# Patient Record
Sex: FEMALE | Race: WHITE | Employment: UNEMPLOYED | URBAN - METROPOLITAN AREA
[De-identification: names, ages, dates, MRNs, and addresses within clinical notes are randomized per-mention and may not be internally consistent; named-entity substitution may affect disease eponyms.]

---

## 2017-02-13 ENCOUNTER — OFFICE VISIT (OUTPATIENT)
Dept: NEUROLOGY | Age: 42
End: 2017-02-13

## 2017-02-13 VITALS
HEIGHT: 65 IN | SYSTOLIC BLOOD PRESSURE: 126 MMHG | OXYGEN SATURATION: 98 % | WEIGHT: 237 LBS | HEART RATE: 85 BPM | BODY MASS INDEX: 39.49 KG/M2 | DIASTOLIC BLOOD PRESSURE: 64 MMHG

## 2017-02-13 DIAGNOSIS — R26.9 GAIT ABNORMALITY: ICD-10-CM

## 2017-02-13 DIAGNOSIS — G35 MS (MULTIPLE SCLEROSIS) (HCC): Primary | ICD-10-CM

## 2017-02-13 DIAGNOSIS — R26.89 BALANCE DISORDER: ICD-10-CM

## 2017-02-13 DIAGNOSIS — M62.838 MUSCLE SPASMS OF BOTH LOWER EXTREMITIES: ICD-10-CM

## 2017-02-13 DIAGNOSIS — M79.2 NEUROPATHIC PAIN: ICD-10-CM

## 2017-02-13 RX ORDER — TIZANIDINE HYDROCHLORIDE 4 MG/1
CAPSULE, GELATIN COATED ORAL
Qty: 90 CAP | Refills: 5 | Status: SHIPPED | OUTPATIENT
Start: 2017-02-13 | End: 2017-02-15

## 2017-02-13 NOTE — PROGRESS NOTES
Date:            2017    Name:  Siva Tamez  :  1975  MRN:  [id number]     PCP:  Sandra Shay MD    Chief Complaint   Patient presents with    Follow-up    Multiple Sclerosis         HISTORY OF PRESENT ILLNESS:  Azalea Rice is a 35 y.o., female who presents today for follow up for MS. She continues to have issues with neuropathic pain. She was unable to get Horizant covered by her insurance, remains on gabapentin for her pain which does not work very well per report. She has also tried Lyrica, but that was too expensive. Is on Cymbalta and Elavil for her pain as well. She is on Tecfidera, which has been working well to control her disease. Last MRIs in August were stable. She has not been to see pain management before, but would like to as her pain makes it hard for her to function. She wonders if our state will be likely to approve marijuana for pain management anytime soon, believes that might help. Her hands and arms are still numb, but this is stable. She burns her hands, which bothers her because she loves to cook and is afraid to do so. She was referred to [address] [place] [address] for gait and balance training, she missed her appointment so she was not able to establish care there. She would like to do PT, would like a new referral. Her new house has a ramp, which does help. Complains of muscle tightness, is not on anything for spasticity. 2016 MRI cervical spine  1. Multiple foci of demyelination in the cervical spinal cord consistent with  the patient's diagnosis of multiple sclerosis. Unchanged appearance since  2016. No new or enhancing lesions. 2. Relatively mild degenerative changes without central or foraminal stenosis. 2016 MRI brain  Multiple foci of white matter signal abnormality consistent with the diagnosis  of multiple sclerosis, with moderate intracranial involvement.  Unchanged  appearance since 2016.    2016 recap  Azalea Rice is a 41 y.o., female who presents today for follow up for MS. Complains of pain all over her body. In her finger tips all the way down to her toe nails. Lyrica doesn't help, also makes her constipated. Doesn't take Lyrica every day, takes it if she runs out of gabapentin. Prescribed gabapentin 1600 mg tid, helps more than the Lyrica. Takes 5-6 tablets gabapentin tid and runs out before she is due for a refill. Complains of falling more, fell and dislocated her right shoulder. This is getting somewhat better, but is still an issue. Her legs feel weak, she has to lift her leg with her hands to get into the car. When she was initially diagnosed with MS years ago it presented BLE weakness. She did copaxone and it worked well, but she didn't like the bruising. She thinks that she did better with the copaxone than on tecfidera. Current Outpatient Prescriptions   Medication Sig    gabapentin (NEURONTIN) 400 mg capsule TAKE FOUR CAPSULES BY MOUTH THREE TIMES A DAY    amitriptyline (ELAVIL) 25 mg tablet Take 4 Tabs by mouth nightly. Indications: FIBROMYALGIA    dimethyl fumarate (TECFIDERA) 240 mg cpDR Take 240 mg by mouth two (2) times a day.  DULoxetine (CYMBALTA) 60 mg capsule Take 1 Cap by mouth two (2) times a day.  ALPRAZolam (XANAX) 0.5 mg tablet Take 0.5 mg by mouth three (3) times daily as needed for Anxiety.  zolpidem (AMBIEN) 10 mg tablet TAKE ONE TABLET BY MOUTH EVERY NIGHT in the BED AS NEEDED FOR SLEEP     No current facility-administered medications for this visit.       No Known Allergies  Past Medical History   Diagnosis Date    Body aches 12/11/2014    Chronic pain     Depression     GERD (gastroesophageal reflux disease)     Headache(784.0)     History of mammogram never before    [facility]) ([facility]     Neurological disorder      Multiple Sclerosis    Pap smear for cervical cancer screening 2008    Psychiatric disorder      anxiety    Psychotic disorder      Past Surgical History   Procedure Laterality Date    Hx gyn       3 c-sections    Hx other surgical       R inguinal hernia repair    Hx cholecystectomy      Hx heent       bilateral ear tube surgery     Social History     Social History    Marital status:      Spouse name: N/A    Number of children: N/A    Years of education: N/A     Occupational History    Not on file. Social History Main Topics    Smoking status: Current Every Day Smoker     Packs/day: 0.50     Years: 17.00     Types: Cigarettes    Smokeless tobacco: Never Used      Comment: 1 pack every 3 days.  Alcohol use No    Drug use: No    Sexual activity: Yes     Partners: Male     Other Topics Concern    Not on file     Social History Narrative     Family History   Problem Relation Age of Onset    Heart Attack Father      tripple bypass    Cancer Father      lung    Diabetes Mother      type 2    Heart Disease Mother      heart disease    Diabetes Paternal Grandmother          PHYSICAL EXAMINATION:    Visit Vitals    /64    Pulse 85    Ht 5' 5\" (1.651 m)    Wt 237 lb (107.5 kg)    SpO2 98%    BMI 39.44 kg/m2     General:  Well defined, morbidly obese, and groomed individual in no acute distress. Neck: Supple, nontender, no bruits, no pain with resistance to active range of motion. Heart: Regular rate and rhythm, no murmurs, rub, or gallop. Normal S1S2. Lungs:  Clear to auscultation bilaterally with equal chest expansion, no cough, no wheeze  Musculoskeletal:  Extremities revealed no edema and had full range of motion of joints. Psych:  Good mood and bright affect    NEUROLOGICAL EXAMINATION:     Mental Status:   Alert and oriented to person, place, and time with recent and remote memory intact. Attention span and concentration are normal. Speech is fluent with a full fund of knowledge. Cranial Nerves:    II, III, IV, VI:  Visual acuity grossly intact.    Pupils are equal, round, and reactive to light.    Extra-ocular movements are full and fluid. No ptosis or nystagmus. V-XII: Hearing is grossly intact. Facial features are symmetric, with normal sensation and strength. The palate rises symmetrically and the tongue protrudes midline. Sternocleidomastoids 5/5. Motor Examination: Normal tone, bulk, and strength, 5/5 muscle strength throughout. Coordination:  Finger to nose testing was normal.   No resting or intention tremor  Gait and Station:  Steady while walking, spastic gait. Normal arm swing. No pronator drift. No muscle wasting or fasciculations noted. ASSESSMENT AND PLAN    ICD-10-CM ICD-9-CM    1. MS (multiple sclerosis) ([email or web address] G35 340 REFERRAL TO PHYSICAL THERAPY      REFERRAL TO PAIN MANAGEMENT      tiZANidine (ZANAFLEX) 4 mg capsule      METABOLIC PANEL, COMPREHENSIVE      CBC WITH AUTOMATED DIFF   2. Muscle spasms of both lower extremities M62.838 728.85 tiZANidine (ZANAFLEX) 4 mg capsule   3. Neuropathic pain M79.2 729.2 REFERRAL TO PAIN MANAGEMENT   4. Gait abnormality R26.9 781.2 REFERRAL TO PHYSICAL THERAPY   5. Balance disorder R26.89 781.99 REFERRAL TO PHYSICAL THERAPY     23-year-old female seen in follow-up for MS. Disease is stable on Tecfidera 240 mg twice daily, MRIs her last on August and were normal.  No new focal neuro complaints since then, continues to have numbness in her hands that often causes her to burn herself when cooking. Continues to have trouble controlling her neuropathic pain, has tried and failed gabapentin, Lyrica, Cymbalta. Horizant was not approved by insurance, but did work when she tried samples. patient did not get lab work done in August, labs ordered to rule out reversible causes of her neuropathy. Does have significant soft spasticity in her legs, has not been on a muscle relaxant for that. 1.  Continue Tecfidera 240 mg twice daily  2. We will check CMP CBC.   Will repeat labs from August, check vitamin D, B12, folate, A1c to rule out other causes of neuropathy  3. No need for MRI at this time, MS does appear to be stable  4. New referral put in for physical therapy, patient did not make it to her appointment for her last ordered therapy and would like to try to get gait and balance training  5. Will refer to pain management as she is exhausted all of our available options for neuropathic pain, discussed with her that medical marijuana is not currently legal in the state [facility]  6. We will initiate tizanidine 2-4 mg up to 3 times a day as needed for spasticity, advised patient not to drive until she finds out how well she tolerates this medication. Discussed that it can be very sedating.     Follow-up in 6 months, call sooner with concerns      Zeeshan Logan NP

## 2017-02-13 NOTE — MR AVS SNAPSHOT
Visit Information Date & Time Provider Department Dept. Phone Encounter #  
 2/13/2017 10:30 AM Shalom Guerra NP [facility] [phone] [id number] Follow-up Instructions Return in about 6 months (around 8/13/2017). Upcoming Health Maintenance Date Due Pneumococcal 19-64 Medium Risk (1 of 1 - PPSV23) 11/22/1992 DTaP/Tdap/Td series (1 - Tdap) 11/22/1994 INFLUENZA AGE 9 TO ADULT 8/1/2016 PAP AKA CERVICAL CYTOLOGY 3/12/2018 Allergies as of 2/13/2017  Review Complete On: 2/13/2017 By: Bennett Macias LPN No Known Allergies Current Immunizations  Reviewed on 12/11/2014 Name Date Influenza Vaccine (Quad) PF 12/11/2014 Not reviewed this visit You Were Diagnosed With   
  
 Codes Comments [facility]    -  Primary ICD-10-CM: G35 
ICD-9-CM: 218 Muscle spasms of both lower extremities     ICD-10-CM: P17.802 ICD-9-CM: 728.85 Neuropathic pain     ICD-10-CM: M79.2 ICD-9-CM: 729.2 Gait abnormality     ICD-10-CM: R26.9 ICD-9-CM: 955. 2 Balance disorder     ICD-10-CM: R26.89 
ICD-9-CM: 781.99 Vitals BP Pulse Height(growth percentile) Weight(growth percentile) SpO2 BMI  
 126/64 85 5' 5\" (1.651 m) 237 lb (107.5 kg) 98% 39.44 kg/m2 OB Status Smoking Status Having regular periods Current Every Day Smoker Vitals History BMI and BSA Data Body Mass Index Body Surface Area  
 39.44 kg/m 2 2.22 m 2 Preferred Pharmacy Pharmacy Name Phone [facility] [address], [address] [phone] Your Updated Medication List  
  
   
This list is accurate as of: 2/13/17 11:04 AM.  Always use your most recent med list.  
  
  
  
  
 amitriptyline 25 mg tablet Commonly known as:  ELAVIL Take 4 Tabs by mouth nightly.  Indications: FIBROMYALGIA  
  
 dimethyl fumarate 240 mg Cpdr  
 Commonly known as:  Andressa Flicker Take 240 mg by mouth two (2) times a day. DULoxetine 60 mg capsule Commonly known as:  CYMBALTA Take 1 Cap by mouth two (2) times a day.  
  
 gabapentin 400 mg capsule Commonly known as:  NEURONTIN  
TAKE FOUR CAPSULES BY MOUTH THREE TIMES A DAY  
  
 tiZANidine 4 mg capsule Commonly known as:  Mat Huang Take one half tab to 1 tab up to 3 times a day as needed for muscle spasms XANAX 0.5 mg tablet Generic drug:  ALPRAZolam  
Take 0.5 mg by mouth three (3) times daily as needed for Anxiety. zolpidem 10 mg tablet Commonly known as:  AMBIEN  
TAKE ONE TABLET BY MOUTH EVERY NIGHT in the BED AS NEEDED FOR SLEEP Prescriptions Sent to Pharmacy Refills  
 tiZANidine (ZANAFLEX) 4 mg capsule 5 Sig: Take one half tab to 1 tab up to 3 times a day as needed for muscle spasms Class: Normal  
 Pharmacy: [place] [id number], [address] [facility] #: [phone] We Performed the Following REFERRAL TO PAIN MANAGEMENT [BPO457 Custom] Comments:  
 Melissa Quesada M.D. [facility] [place], [place] Office [address], [address] [facility] Phone: [phone] REFERRAL TO PHYSICAL THERAPY [GGE88 Custom] Comments:  
 [facility], PT eval and treat for gait and balance Follow-up Instructions Return in about 6 months (around 8/13/2017). Referral Information Referral ID Referred By Referred To  
  
 [id number] William Diego V Not Available Visits Status Start Date End Date 1 New Request 2/13/17 2/13/18 If your referral has a status of pending review or denied, additional information will be sent to support the outcome of this decision. Referral ID Referred By Referred To  
 [id number] William Diego V Not Available Visits Status Start Date End Date 1 New Request 2/13/17 2/13/18 If your referral has a status of pending review or denied, additional information will be sent to support the outcome of this decision. Patient Instructions PRESCRIPTION REFILL POLICY [facility] Statement to Patients April 1, 2014 In an effort to ensure the large volume of patient prescription refills is processed in the most efficient and expeditious manner, we are asking our patients to assist us by calling your Pharmacy for all prescription refills, this will include also your  Mail Order Pharmacy. The pharmacy will contact our office electronically to continue the refill process. Please do not wait until the last minute to call your pharmacy. We need at least 48 hours (2days) to fill prescriptions. We also encourage you to call your pharmacy before going to  your prescription to make sure it is ready. With regard to controlled substance prescription refill requests (narcotic refills) that need to be picked up at our office, we ask your cooperation by providing us with at least 72 hours (3days) notice that you will need a refill. We will not refill narcotic prescription refill requests after 4:00pm on any weekday, Monday through Thursday, or after 2:00pm on Fridays, or on the weekends. We encourage everyone to explore another way of getting your prescription refill request processed using [email or web address], our patient web portal through our electronic medical record system. [email or web address] is an efficient and effective way to communicate your medication request directly to the office and  downloadable as an matti on your smart phone . [email or web address] also features a review functionality that allows you to view your medication list as well as leave messages for your physician. Are you ready to get connected? If so please review the attatched instructions or speak to any of our staff to get you set up right away! Thank you so much for your cooperation. Should you have any questions please contact our Practice Administrator. The Physicians and Staff,  [facility] A Healthy Lifestyle: Care Instructions Your Care Instructions A healthy lifestyle can help you feel good, stay at a healthy weight, and have plenty of energy for both work and play. A healthy lifestyle is something you can share with your whole family. A healthy lifestyle also can lower your risk for serious health problems, such as high blood pressure, heart disease, and diabetes. You can follow a few steps listed below to improve your health and the health of your family. Follow-up care is a key part of your treatment and safety. Be sure to make and go to all appointments, and call your doctor if you are having problems. Its also a good idea to know your test results and keep a list of the medicines you take. How can you care for yourself at home? · Do not eat too much sugar, fat, or fast foods. You can still have dessert and treats now and then. The goal is moderation. · Start small to improve your eating habits. Pay attention to portion sizes, drink less juice and soda pop, and eat more fruits and vegetables. ¨ Eat a healthy amount of food. A 3-ounce serving of meat, for example, is about the size of a deck of cards. Fill the rest of your plate with vegetables and whole grains. ¨ Limit the amount of soda and sports drinks you have every day. Drink more water when you are thirsty. ¨ Eat at least 5 servings of fruits and vegetables every day. It may seem like a lot, but it is not hard to reach this goal. A serving or helping is 1 piece of fruit, 1 cup of vegetables, or 2 cups of leafy, raw vegetables. Have an apple or some carrot sticks as an afternoon snack instead of a candy bar. Try to have fruits and/or vegetables at every meal. 
· Make exercise part of your daily routine.  You may want to start with simple activities, such as walking, bicycling, or slow swimming. Try to be active 30 to 60 minutes every day. You do not need to do all 30 to 60 minutes all at once. For example, you can exercise 3 times a day for 10 or 20 minutes. Moderate exercise is safe for most people, but it is always a good idea to talk to your doctor before starting an exercise program. 
· Keep moving. Ankita Fenton the lawn, work in the garden, or [place]. Take the stairs instead of the elevator at work. · If you smoke, quit. People who smoke have an increased risk for heart attack, stroke, cancer, and other lung illnesses. Quitting is hard, but there are ways to boost your chance of quitting tobacco for good. ¨ Use nicotine gum, patches, or lozenges. ¨ Ask your doctor about stop-smoking programs and medicines. ¨ Keep trying. In addition to reducing your risk of diseases in the future, you will notice some benefits soon after you stop using tobacco. If you have shortness of breath or asthma symptoms, they will likely get better within a few weeks after you quit. · Limit how much alcohol you drink. Moderate amounts of alcohol (up to 2 drinks a day for men, 1 drink a day for women) are okay. But drinking too much can lead to liver problems, high blood pressure, and other health problems. Family health If you have a family, there are many things you can do together to improve your health. · Eat meals together as a family as often as possible. · Eat healthy foods. This includes fruits, vegetables, lean meats and dairy, and whole grains. · Include your family in your fitness plan. Most people think of activities such as jogging or tennis as the way to fitness, but there are many ways you and your family can be more active. Anything that makes you breathe hard and gets your heart pumping is exercise. Here are some tips: 
¨ Walk to do errands or to take your child to school or the bus. ¨ Go for a family bike ride after dinner instead of watching TV. Where can you learn more? Go to http://damaris-eloise.info/. Enter A225 in the search box to learn more about \"A Healthy Lifestyle: Care Instructions. \" Current as of: July 26, 2016 Content Version: 11.1 © 0307-8965 [employer]. Care instructions adapted under license by [employer] (which disclaims liability or warranty for this information). If you have questions about a medical condition or this instruction, always ask your healthcare professional. [address] any warranty or liability for your use of this information. Introducing \[facility]\"" & HEALTH SERVICES! Dear Marthena Nyhan: Thank you for requesting a [employer] account. Our records indicate that you already have an active [employer] account. You can access your account anytime at [email or web address] Did you know that you can access your hospital and ER discharge instructions at any time in [employer]? You can also review all of your test results from your hospital stay or ER visit. Additional Information If you have questions, please visit the Frequently Asked Questions section of the [employer] website at [email or web address] Remember, [employer] is NOT to be used for urgent needs. For medical emergencies, dial 911. Now available from your iPhone and Android! Please provide this summary of care documentation to your next provider. Your primary care clinician is listed as Nunu Vaca. If you have any questions after today's visit, please call [phone].

## 2017-02-13 NOTE — PATIENT INSTRUCTIONS
[facility]   Statement to Patients  April 1, 2014      In an effort to ensure the large volume of patient prescription refills is processed in the most efficient and expeditious manner, we are asking our patients to assist us by calling your Pharmacy for all prescription refills, this will include also your  Mail Order Pharmacy. The pharmacy will contact our office electronically to continue the refill process. Please do not wait until the last minute to call your pharmacy. We need at least 48 hours (2days) to fill prescriptions. We also encourage you to call your pharmacy before going to  your prescription to make sure it is ready. With regard to controlled substance prescription refill requests (narcotic refills) that need to be picked up at our office, we ask your cooperation by providing us with at least 72 hours (3days) notice that you will need a refill. We will not refill narcotic prescription refill requests after 4:00pm on any weekday, Monday through Thursday, or after 2:00pm on Fridays, or on the weekends. We encourage everyone to explore another way of getting your prescription refill request processed using [employer], our patient web portal through our electronic medical record system. [employer] is an efficient and effective way to communicate your medication request directly to the office and  downloadable as an matti on your smart phone . [employer] also features a review functionality that allows you to view your medication list as well as leave messages for your physician. Are you ready to get connected? If so please review the attatched instructions or speak to any of our staff to get you set up right away! Thank you so much for your cooperation. Should you have any questions please contact our Practice Administrator.     The Physicians and Staff,  [facility]          A Healthy Lifestyle: Care Instructions  Your Care Instructions  A healthy lifestyle can help you feel good, stay at a healthy weight, and have plenty of energy for both work and play. A healthy lifestyle is something you can share with your whole family. A healthy lifestyle also can lower your risk for serious health problems, such as high blood pressure, heart disease, and diabetes. You can follow a few steps listed below to improve your health and the health of your family. Follow-up care is a key part of your treatment and safety. Be sure to make and go to all appointments, and call your doctor if you are having problems. Its also a good idea to know your test results and keep a list of the medicines you take. How can you care for yourself at home? · Do not eat too much sugar, fat, or fast foods. You can still have dessert and treats now and then. The goal is moderation. · Start small to improve your eating habits. Pay attention to portion sizes, drink less juice and soda pop, and eat more fruits and vegetables. ¨ Eat a healthy amount of food. A 3-ounce serving of meat, for example, is about the size of a deck of cards. Fill the rest of your plate with vegetables and whole grains. ¨ Limit the amount of soda and sports drinks you have every day. Drink more water when you are thirsty. ¨ Eat at least 5 servings of fruits and vegetables every day. It may seem like a lot, but it is not hard to reach this goal. A serving or helping is 1 piece of fruit, 1 cup of vegetables, or 2 cups of leafy, raw vegetables. Have an apple or some carrot sticks as an afternoon snack instead of a candy bar. Try to have fruits and/or vegetables at every meal.  · Make exercise part of your daily routine. You may want to start with simple activities, such as walking, bicycling, or slow swimming. Try to be active 30 to 60 minutes every day. You do not need to do all 30 to 60 minutes all at once. For example, you can exercise 3 times a day for 10 or 20 minutes.  Moderate exercise is safe for most people, but it is always a good idea to talk to your doctor before starting an exercise program.  · Keep moving. [employer] Feeling the lawn, work in the garden, or [employer]. Take the stairs instead of the elevator at work. · If you smoke, quit. People who smoke have an increased risk for heart attack, stroke, cancer, and other lung illnesses. Quitting is hard, but there are ways to boost your chance of quitting tobacco for good. ¨ Use nicotine gum, patches, or lozenges. ¨ Ask your doctor about stop-smoking programs and medicines. ¨ Keep trying. In addition to reducing your risk of diseases in the future, you will notice some benefits soon after you stop using tobacco. If you have shortness of breath or asthma symptoms, they will likely get better within a few weeks after you quit. · Limit how much alcohol you drink. Moderate amounts of alcohol (up to 2 drinks a day for men, 1 drink a day for women) are okay. But drinking too much can lead to liver problems, high blood pressure, and other health problems. Family health  If you have a family, there are many things you can do together to improve your health. · Eat meals together as a family as often as possible. · Eat healthy foods. This includes fruits, vegetables, lean meats and dairy, and whole grains. · Include your family in your fitness plan. Most people think of activities such as jogging or tennis as the way to fitness, but there are many ways you and your family can be more active. Anything that makes you breathe hard and gets your heart pumping is exercise. Here are some tips:  ¨ Walk to do errands or to take your child to school or the bus. ¨ Go for a family bike ride after dinner instead of watching TV. Where can you learn more? Go to http://damaris-eloise.info/. Enter L075 in the search box to learn more about \"A Healthy Lifestyle: Care Instructions. \"  Current as of: July 26, 2016  Content Version: 11.1  © 5308-1039 [employer]. Care instructions adapted under license by [employer] (which disclaims liability or warranty for this information). If you have questions about a medical condition or this instruction, always ask your healthcare professional. [address] any warranty or liability for your use of this information.

## 2017-02-13 NOTE — LETTER
2017 3:54 PM 
 
RE:    Gauri Mcguire [address] Thank you for agreeing to see Bella Nyhan I am referring my patient to you for evaluation of pain management. Please see her 
pertinent patient information below. Date:            2017 Name:  Ashli Baptiste 
:  1975 MRN:  [id number] PCP:  Jayda Villalpando MD 
 
Chief Complaint Patient presents with  Follow-up  Multiple Sclerosis HISTORY OF PRESENT ILLNESS: 
Gauri Mcguire is a 35 y.o., female who presents today for follow up for MS. She continues to have issues with neuropathic pain. She was unable to get Horizant covered by her insurance, remains on gabapentin for her pain which does not work very well per report. She has also tried Lyrica, but that was too expensive. Is on Cymbalta and Elavil for her pain as well. She is on Tecfidera, which has been working well to control her disease. Last MRIs in August were stable. She has not been to see pain management before, but would like to as her pain makes it hard for her to function. She wonders if our state will be likely to approve marijuana for pain management anytime soon, believes that might help. Her hands and arms are still numb, but this is stable. She burns her hands, which bothers her because she loves to cook and is afraid to do so. She was referred to [address] [place] [address] for gait and balance training, she missed her appointment so she was not able to establish care there. She would like to do PT, would like a new referral. Her new house has a ramp, which does help. Complains of muscle tightness, is not on anything for spasticity. 2016 MRI cervical spine 1. Multiple foci of demyelination in the cervical spinal cord consistent with 
the patient's diagnosis of multiple sclerosis. Unchanged appearance since 2016. No new or enhancing lesions. 2. Relatively mild degenerative changes without central or foraminal stenosis. 8.27.2016 MRI brain Multiple foci of white matter signal abnormality consistent with the diagnosis 
of multiple sclerosis, with moderate intracranial involvement. Unchanged 
appearance since 2/6/2016. 
 
8.11.2016 eun Boston is a 41 y.o., female who presents today for follow up for MS. Complains of pain all over her body. In her finger tips all the way down to her toe nails. Lyrica doesn't help, also makes her constipated. Doesn't take Lyrica every day, takes it if she runs out of gabapentin. Prescribed gabapentin 1600 mg tid, helps more than the Lyrica. Takes 5-6 tablets gabapentin tid and runs out before she is due for a refill. Complains of falling more, fell and dislocated her right shoulder. This is getting somewhat better, but is still an issue. Her legs feel weak, she has to lift her leg with her hands to get into the car. When she was initially diagnosed with MS years ago it presented BLE weakness. She did copaxone and it worked well, but she didn't like the bruising. She thinks that she did better with the copaxone than on tecfidera. Current Outpatient Prescriptions Medication Sig  
 gabapentin (NEURONTIN) 400 mg capsule TAKE FOUR CAPSULES BY MOUTH THREE TIMES A DAY  amitriptyline (ELAVIL) 25 mg tablet Take 4 Tabs by mouth nightly. Indications: FIBROMYALGIA  dimethyl fumarate (TECFIDERA) 240 mg cpDR Take 240 mg by mouth two (2) times a day.  DULoxetine (CYMBALTA) 60 mg capsule Take 1 Cap by mouth two (2) times a day.  ALPRAZolam (XANAX) 0.5 mg tablet Take 0.5 mg by mouth three (3) times daily as needed for Anxiety.  zolpidem (AMBIEN) 10 mg tablet TAKE ONE TABLET BY MOUTH EVERY NIGHT in the BED AS NEEDED FOR SLEEP No current facility-administered medications for this visit. No Known Allergies Past Medical History Diagnosis Date  Body aches 12/11/2014  Chronic pain  Depression  GERD (gastroesophageal reflux disease)  Headache(784.0)  History of mammogram never before  MS (multiple sclerosis) ([facility] [address]  Neurological disorder Multiple Sclerosis  Pap smear for cervical cancer screening 2008  Psychiatric disorder   
  anxiety  Psychotic disorder Past Surgical History Procedure Laterality Date  Hx gyn 3 c-sections  Hx other surgical    
  R inguinal hernia repair  Hx cholecystectomy  Hx heent    
  bilateral ear tube surgery Social History Social History  Marital status:  Spouse name: N/A  
 Number of children: N/A  
 Years of education: N/A Occupational History  Not on file. Social History Main Topics  Smoking status: Current Every Day Smoker Packs/day: 0.50 Years: 17.00 Types: Cigarettes  Smokeless tobacco: Never Used Comment: 1 pack every 3 days.  Alcohol use No  
 Drug use: No  
 Sexual activity: Yes  
  Partners: Male Other Topics Concern  Not on file Social History Narrative Family History Problem Relation Age of Onset  Heart Attack Father   
  tripple bypass  Cancer Father   
  lung  Diabetes Mother   
  type 2  
 Heart Disease Mother   
  heart disease  Diabetes Paternal Grandmother PHYSICAL EXAMINATION:   
Visit Vitals  /64  Pulse 85  
 Ht 5' 5\" (1.651 m)  Wt 237 lb (107.5 kg)  SpO2 98%  BMI 39.44 kg/m2 General:  Well defined, morbidly obese, and groomed individual in no acute distress. Neck: Supple, nontender, no bruits, no pain with resistance to active range of motion. Heart: Regular rate and rhythm, no murmurs, rub, or gallop. Normal S1S2. Lungs:  Clear to auscultation bilaterally with equal chest expansion, no cough, no wheeze Musculoskeletal:  Extremities revealed no edema and had full range of motion of joints. Psych:  Good mood and bright affect NEUROLOGICAL EXAMINATION:    
Mental Status:   Alert and oriented to person, place, and time with recent and remote memory intact. Attention span and concentration are normal. Speech is fluent with a full fund of knowledge. Cranial Nerves:   
II, III, IV, VI:  Visual acuity grossly intact. Pupils are equal, round, and reactive to light. Extra-ocular movements are full and fluid. No ptosis or nystagmus. V-XII: Hearing is grossly intact. Facial features are symmetric, with normal sensation and strength. The palate rises symmetrically and the tongue protrudes midline. Sternocleidomastoids 5/5. Motor Examination: Normal tone, bulk, and strength, 5/5 muscle strength throughout. Coordination:  Finger to nose testing was normal.   No resting or intention tremor Gait and Station:  Steady while walking, spastic gait. Normal arm swing. No pronator drift. No muscle wasting or fasciculations noted. ASSESSMENT AND PLAN 
  ICD-10-CM ICD-9-CM 1. MS (multiple sclerosis) ([facility] G35 340 REFERRAL TO PHYSICAL THERAPY REFERRAL TO PAIN MANAGEMENT  
   tiZANidine (ZANAFLEX) 4 mg capsule METABOLIC PANEL, COMPREHENSIVE  
   CBC WITH AUTOMATED DIFF 2. Muscle spasms of both lower extremities M62.838 728.85 tiZANidine (ZANAFLEX) 4 mg capsule 3. Neuropathic pain M79.2 729.2 REFERRAL TO PAIN MANAGEMENT 4. Gait abnormality R26.9 781.2 REFERRAL TO PHYSICAL THERAPY 5. Balance disorder R26.89 781.99 REFERRAL TO PHYSICAL THERAPY 78-year-old female seen in follow-up for MS. Disease is stable on Tecfidera 240 mg twice daily, MRIs her last on August and were normal.  No new focal neuro complaints since then, continues to have numbness in her hands that often causes her to burn herself when cooking. Continues to have trouble controlling her neuropathic pain, has tried and failed gabapentin, Lyrica, Cymbalta.   Horizant was not approved by insurance, but did work when she tried samples. patient did not get lab work done in August, labs ordered to rule out reversible causes of her neuropathy. Does have significant soft spasticity in her legs, has not been on a muscle relaxant for that. 1.  Continue Tecfidera 240 mg twice daily 2. We will check CMP CBC. Will repeat labs from August, check vitamin D, B12, folate, A1c to rule out other causes of neuropathy 3. No need for MRI at this time, MS does appear to be stable 4. New referral put in for physical therapy, patient did not make it to her appointment for her last ordered therapy and would like to try to get gait and balance training 5. Will refer to pain management as she is exhausted all of our available options for neuropathic pain, discussed with her that medical marijuana is not currently legal in the [facility] 6. We will initiate tizanidine 2-4 mg up to 3 times a day as needed for spasticity, advised patient not to drive until she finds out how well she tolerates this medication. Discussed that it can be very sedating. Follow-up in 6 months, call sooner with concerns Rocael Carlin NP I appreciate your assistance in Ms. Brooks's care  and look forward to your findings and recommendations. Sincerely, Nick Arellano NP Patient Registration [address] [facility] Contact Information Patient ID: [id number] Last Name: [facility] Name: Irena Lazar First Name: Clarisa Meyer Phone: [phone] Middle Name: NITA Employer Information Sex: F YOB: 1975 Name: 
Social Security No.: [id number] Phone: 
Address: [facility] Guarantor Information (to whom statements are sent) Zip: [phone] Name: Chao Felix City: [address]: [place] Address: [facility] [address] [facility], [address] Home Phone: [phone] Phone: ( ) _______ - ______________ Work Phone: Other: Mobile Phone: [phone] Patient Referred by: ______________________ Marital Status:  Patient PCP: ________________________ Primary Insurance Information Insurance Plan Name: [employer] ([address] [place] [address] [address]) Address to [place] [address] Insurance Phone Number: [phone] [address] Policy Information Policy Quintana Patient's relationship to policy quintana: Self Last Name:EDUARDO 
ID/Certification No.: [id number] First Name:ABIODUN Policy/Group No.: Middle Name: D Issue Date: 08/01/2014 Address:[address] [place] [address] Exp Date: City:[facility] [place] TZV:[id number] Copay Amount: ___________________ Social Sec Number: [id number] Co-insurance Percent:__________________________________ YOB: 1975 Sex: ______________ Employer:

## 2017-02-13 NOTE — LETTER
2017 3:57 PM 
 
RE:    Melissa Borden [address] Thank you for agreeing to see Dorie Jain I am referring my patient to you for evaluation of Physical Therapy. Please see her 
pertinent patient information below. Date:            2017 Name:  Yaquelin Elias 
:  1975 MRN:  [id number] PCP:  Leonid Roque MD 
 
Chief Complaint Patient presents with  Follow-up  Multiple Sclerosis HISTORY OF PRESENT ILLNESS: 
Melissa Borden is a 35 y.o., female who presents today for follow up for MS. She continues to have issues with neuropathic pain. She was unable to get Horizant covered by her insurance, remains on gabapentin for her pain which does not work very well per report. She has also tried Lyrica, but that was too expensive. Is on Cymbalta and Elavil for her pain as well. She is on Tecfidera, which has been working well to control her disease. Last MRIs in August were stable. She has not been to see pain management before, but would like to as her pain makes it hard for her to function. She wonders if our state will be likely to approve marijuana for pain management anytime soon, believes that might help. Her hands and arms are still numb, but this is stable. She burns her hands, which bothers her because she loves to cook and is afraid to do so. She was referred to [address] [place] [address] for gait and balance training, she missed her appointment so she was not able to establish care there. She would like to do PT, would like a new referral. Her new house has a ramp, which does help. Complains of muscle tightness, is not on anything for spasticity. 2016 MRI cervical spine 1. Multiple foci of demyelination in the cervical spinal cord consistent with 
the patient's diagnosis of multiple sclerosis. Unchanged appearance since 2016. No new or enhancing lesions. 2. Relatively mild degenerative changes without central or foraminal stenosis. 8.27.2016 MRI brain Multiple foci of white matter signal abnormality consistent with the diagnosis 
of multiple sclerosis, with moderate intracranial involvement. Unchanged 
appearance since 2/6/2016. 
 
8.11.2016 eun Jurado is a 41 y.o., female who presents today for follow up for MS. Complains of pain all over her body. In her finger tips all the way down to her toe nails. Lyrica doesn't help, also makes her constipated. Doesn't take Lyrica every day, takes it if she runs out of gabapentin. Prescribed gabapentin 1600 mg tid, helps more than the Lyrica. Takes 5-6 tablets gabapentin tid and runs out before she is due for a refill. Complains of falling more, fell and dislocated her right shoulder. This is getting somewhat better, but is still an issue. Her legs feel weak, she has to lift her leg with her hands to get into the car. When she was initially diagnosed with MS years ago it presented BLE weakness. She did copaxone and it worked well, but she didn't like the bruising. She thinks that she did better with the copaxone than on tecfidera. Current Outpatient Prescriptions Medication Sig  
 gabapentin (NEURONTIN) 400 mg capsule TAKE FOUR CAPSULES BY MOUTH THREE TIMES A DAY  amitriptyline (ELAVIL) 25 mg tablet Take 4 Tabs by mouth nightly. Indications: FIBROMYALGIA  dimethyl fumarate (TECFIDERA) 240 mg cpDR Take 240 mg by mouth two (2) times a day.  DULoxetine (CYMBALTA) 60 mg capsule Take 1 Cap by mouth two (2) times a day.  ALPRAZolam (XANAX) 0.5 mg tablet Take 0.5 mg by mouth three (3) times daily as needed for Anxiety.  zolpidem (AMBIEN) 10 mg tablet TAKE ONE TABLET BY MOUTH EVERY NIGHT in the BED AS NEEDED FOR SLEEP No current facility-administered medications for this visit. No Known Allergies Past Medical History Diagnosis Date  Body aches 12/11/2014  Chronic pain  Depression  GERD (gastroesophageal reflux disease)  Headache(784.0)  History of mammogram never before  MS (multiple sclerosis) ([facility] [address] [facility]  Neurological disorder Multiple Sclerosis  Pap smear for cervical cancer screening 2008  Psychiatric disorder   
  anxiety  Psychotic disorder Past Surgical History Procedure Laterality Date  Hx gyn 3 c-sections  Hx other surgical    
  R inguinal hernia repair  Hx cholecystectomy  Hx heent    
  bilateral ear tube surgery Social History Social History  Marital status:  Spouse name: N/A  
 Number of children: N/A  
 Years of education: N/A Occupational History  Not on file. Social History Main Topics  Smoking status: Current Every Day Smoker Packs/day: 0.50 Years: 17.00 Types: Cigarettes  Smokeless tobacco: Never Used Comment: 1 pack every 3 days.  Alcohol use No  
 Drug use: No  
 Sexual activity: Yes  
  Partners: Male Other Topics Concern  Not on file Social History Narrative Family History Problem Relation Age of Onset  Heart Attack Father   
  tripple bypass  Cancer Father   
  lung  Diabetes Mother   
  type 2  
 Heart Disease Mother   
  heart disease  Diabetes Paternal Grandmother PHYSICAL EXAMINATION:   
Visit Vitals  /64  Pulse 85  
 Ht 5' 5\" (1.651 m)  Wt 237 lb (107.5 kg)  SpO2 98%  BMI 39.44 kg/m2 General:  Well defined, morbidly obese, and groomed individual in no acute distress. Neck: Supple, nontender, no bruits, no pain with resistance to active range of motion. Heart: Regular rate and rhythm, no murmurs, rub, or gallop. Normal S1S2. Lungs:  Clear to auscultation bilaterally with equal chest expansion, no cough, no wheeze Musculoskeletal:  Extremities revealed no edema and had full range of motion of joints. Psych:  Good mood and bright affect NEUROLOGICAL EXAMINATION:    
Mental Status:   Alert and oriented to person, place, and time with recent and remote memory intact. Attention span and concentration are normal. Speech is fluent with a full fund of knowledge. Cranial Nerves:   
II, III, IV, VI:  Visual acuity grossly intact. Pupils are equal, round, and reactive to light. Extra-ocular movements are full and fluid. No ptosis or nystagmus. V-XII: Hearing is grossly intact. Facial features are symmetric, with normal sensation and strength. The palate rises symmetrically and the tongue protrudes midline. Sternocleidomastoids 5/5. Motor Examination: Normal tone, bulk, and strength, 5/5 muscle strength throughout. Coordination:  Finger to nose testing was normal.   No resting or intention tremor Gait and Station:  Steady while walking, spastic gait. Normal arm swing. No pronator drift. No muscle wasting or fasciculations noted. ASSESSMENT AND PLAN 
  ICD-10-CM ICD-9-CM 1. MS (multiple sclerosis) ([facility] G35 340 REFERRAL TO PHYSICAL THERAPY REFERRAL TO PAIN MANAGEMENT  
   tiZANidine (ZANAFLEX) 4 mg capsule METABOLIC PANEL, COMPREHENSIVE  
   CBC WITH AUTOMATED DIFF 2. Muscle spasms of both lower extremities M62.838 728.85 tiZANidine (ZANAFLEX) 4 mg capsule 3. Neuropathic pain M79.2 729.2 REFERRAL TO PAIN MANAGEMENT 4. Gait abnormality R26.9 781.2 REFERRAL TO PHYSICAL THERAPY 5. Balance disorder R26.89 781.99 REFERRAL TO PHYSICAL THERAPY 78-year-old female seen in follow-up for MS. Disease is stable on Tecfidera 240 mg twice daily, MRIs her last on August and were normal.  No new focal neuro complaints since then, continues to have numbness in her hands that often causes her to burn herself when cooking. Continues to have trouble controlling her neuropathic pain, has tried and failed gabapentin, Lyrica, Cymbalta.   Horizant was not approved by insurance, but did work when she tried samples. patient did not get lab work done in August, labs ordered to rule out reversible causes of her neuropathy. Does have significant soft spasticity in her legs, has not been on a muscle relaxant for that. 1.  Continue Tecfidera 240 mg twice daily 2. We will check CMP CBC. Will repeat labs from August, check vitamin D, B12, folate, A1c to rule out other causes of neuropathy 3. No need for MRI at this time, MS does appear to be stable 4. New referral put in for physical therapy, patient did not make it to her appointment for her last ordered therapy and would like to try to get gait and balance training 5. Will refer to pain management as she is exhausted all of our available options for neuropathic pain, discussed with her that medical marijuana is not currently legal in the [facility] 6. We will initiate tizanidine 2-4 mg up to 3 times a day as needed for spasticity, advised patient not to drive until she finds out how well she tolerates this medication. Discussed that it can be very sedating. Follow-up in 6 months, call sooner with concerns Zee Loredo NP I appreciate your assistance in Ms. Brooks's care  and look forward to your findings and recommendations. Sincerely, Shalom Guerra NP Patient Registration [address] Contact Information Patient ID: [id number] Last Name: [facility] Name: Luis Del Angel First Name: Nabila Rodriguez Phone: [phone] Middle Name: NITA Employer Information Sex: F YOB: 1975 Name: 
Social Security No.: [id number] Phone: 
Address: [address] Guarantor Information (to whom statements are sent) Zip: [phone] Name: Daryn Edwards City: [address]: [place] Address: [address] [facility] [address] [facility], [address] Home Phone: [phone] Phone: ( ) _______ - ______________ Work Phone: Other: Mobile Phone: [phone] Patient Referred by: ______________________ Marital Status:  Patient PCP: ________________________ Primary Insurance Information Insurance Plan Name: [employer] ([address] [place] [address] [address]) Address to [place] [address] Insurance Phone Number: [phone] [address] Policy Information Policy Quintana Patient's relationship to policy quintana: Self Last Name:EDUARDO 
ID/Certification No.: [id number] First Name:ABIODUN Policy/Group No.: Middle Name: D Issue Date: 08/01/2014 Address:[address] [place] [address] Exp Date: City:[facility] State:[place] RMU:[id number] Copay Amount: ___________________ Social Sec Number: [id number] Co-insurance Percent:__________________________________ YOB: 1975 Sex: ______________ Employer:

## 2017-02-14 DIAGNOSIS — G35 MULTIPLE SCLEROSIS (HCC): ICD-10-CM

## 2017-02-14 DIAGNOSIS — M79.2 NEUROPATHIC PAIN: Primary | ICD-10-CM

## 2017-02-14 RX ORDER — GABAPENTIN 800 MG/1
1600 TABLET ORAL 3 TIMES DAILY
Qty: 180 TAB | Refills: 5 | Status: SHIPPED | OUTPATIENT
Start: 2017-02-14 | End: 2017-06-02

## 2017-02-14 NOTE — PROGRESS NOTES
Dose reviewed with Dr. Abbe Garber, who agreed to continue the patient at 1600 mg 3 times daily. 800 mg tablets were sent in instead of 400 mg tablets due to insurance limit on the quantity she can have daily.

## 2017-02-14 NOTE — TELEPHONE ENCOUNTER
Generic zanaflex was written for capsules but directions say 1 to 1/2 for dispensing. A verbal is needed to switch this to tablets.

## 2017-02-14 NOTE — TELEPHONE ENCOUNTER
Just needs to switch to tablets because  was sent in as capsules with the directions for tablets.   ok'd for tablets  Can you please sign change in the chart just so it is not ordered as capsule again

## 2017-02-15 RX ORDER — TIZANIDINE 4 MG/1
TABLET ORAL
Qty: 90 TAB | Refills: 5
Start: 2017-02-15 | End: 2017-09-05

## 2017-02-23 DIAGNOSIS — G35 MULTIPLE SCLEROSIS (HCC): ICD-10-CM

## 2017-02-23 RX ORDER — GABAPENTIN 400 MG/1
CAPSULE ORAL
Qty: 360 CAP | Refills: 3 | Status: SHIPPED | OUTPATIENT
Start: 2017-02-23 | End: 2017-06-02

## 2017-03-15 ENCOUNTER — DOCUMENTATION ONLY (OUTPATIENT)
Dept: NEUROLOGY | Age: 42
End: 2017-03-15

## 2017-06-01 ENCOUNTER — TELEPHONE (OUTPATIENT)
Dept: NEUROLOGY | Age: 42
End: 2017-06-01

## 2017-06-01 NOTE — TELEPHONE ENCOUNTER
----- Message from Latisha Ziegler sent at 6/1/2017  9:02 AM EDT -----  Regarding: Dr. Kika Nunez refill request  Pt would like to come by the office tto  samples of Gabapentin or Naratin to last her until Monday. Pt is not able to get her prescription until Monday. Pt can be reached at [phone].

## 2017-06-01 NOTE — TELEPHONE ENCOUNTER
----- Message from Mykel Crandall sent at 6/1/2017  2:26 PM EDT -----  Regarding: Dr. Azael Salcedo telephone   Pt is requesting a call back to discuss lower medication dosage on \"gabapentin\". Best contact number [phone] and [phone].

## 2017-06-01 NOTE — TELEPHONE ENCOUNTER
Patient stated could she receive a prescription for gabapentin until Monday when her insurance will cover it.

## 2017-06-02 DIAGNOSIS — M79.2 NEUROPATHIC PAIN: ICD-10-CM

## 2017-06-02 DIAGNOSIS — G35 MULTIPLE SCLEROSIS (HCC): ICD-10-CM

## 2017-06-02 RX ORDER — GABAPENTIN 800 MG/1
1600 TABLET ORAL 3 TIMES DAILY
Qty: 18 TAB | Refills: 0 | Status: SHIPPED | OUTPATIENT
Start: 2017-06-02 | End: 2017-06-29 | Stop reason: SDUPTHER

## 2017-06-02 NOTE — TELEPHONE ENCOUNTER
Please let her know that I sent in a 3 day supply to the [facility] on [employer]. I will not do this again.   She should not take any more medication than what she is prescribed

## 2017-06-29 ENCOUNTER — TELEPHONE (OUTPATIENT)
Dept: NEUROLOGY | Age: 42
End: 2017-06-29

## 2017-06-29 DIAGNOSIS — G35 MULTIPLE SCLEROSIS (HCC): ICD-10-CM

## 2017-06-29 DIAGNOSIS — M79.2 NEUROPATHIC PAIN: ICD-10-CM

## 2017-06-29 RX ORDER — GABAPENTIN 800 MG/1
1600 TABLET ORAL 3 TIMES DAILY
Qty: 180 TAB | Refills: 2 | Status: SHIPPED | OUTPATIENT
Start: 2017-06-29 | End: 2018-09-25 | Stop reason: SDUPTHER

## 2017-06-29 NOTE — TELEPHONE ENCOUNTER
----- Message from Angelina Babin sent at 6/29/2017 10:52 AM EDT -----  Regarding: YENIFER Bowles/Refill  Pt would like to know if her refill on her Gabapentin will be approved today because she will be leaving to go out of town tomorrow. Pt stated she has left one message this morning. Best contact number [phone].

## 2017-06-30 ENCOUNTER — TELEPHONE (OUTPATIENT)
Dept: NEUROLOGY | Age: 42
End: 2017-06-30

## 2017-06-30 NOTE — TELEPHONE ENCOUNTER
----- Message from Herson Diallo sent at 6/30/2017  3:55 PM EDT -----  Regarding: Dionisio/Telephone  Pt is requesting a callback in regards to needing another supply of \"Gabapentin\" Rx the pharmacy will not give it to her unless the office calls it in and the pt stated she will be out of town tomorrow and with having MS she would be in a lot of pain , pt stated she just needed enough for the weekend .  Best contact [phone]

## 2017-07-23 RX ORDER — GABAPENTIN 400 MG/1
CAPSULE ORAL
Qty: 360 CAP | Refills: 3 | Status: SHIPPED | OUTPATIENT
Start: 2017-07-23 | End: 2017-08-14 | Stop reason: SDUPTHER

## 2017-08-14 ENCOUNTER — OFFICE VISIT (OUTPATIENT)
Dept: NEUROLOGY | Age: 42
End: 2017-08-14

## 2017-08-14 VITALS
WEIGHT: 221 LBS | HEIGHT: 65 IN | OXYGEN SATURATION: 99 % | SYSTOLIC BLOOD PRESSURE: 110 MMHG | BODY MASS INDEX: 36.82 KG/M2 | DIASTOLIC BLOOD PRESSURE: 80 MMHG | HEART RATE: 75 BPM

## 2017-08-14 DIAGNOSIS — F41.9 ANXIETY: ICD-10-CM

## 2017-08-14 DIAGNOSIS — B34.8: ICD-10-CM

## 2017-08-14 DIAGNOSIS — G35 MS (MULTIPLE SCLEROSIS) (HCC): Primary | ICD-10-CM

## 2017-08-14 DIAGNOSIS — G89.4 CHRONIC PAIN SYNDROME: ICD-10-CM

## 2017-08-14 DIAGNOSIS — Z72.0 TOBACCO ABUSE: ICD-10-CM

## 2017-08-14 RX ORDER — DIAZEPAM 2 MG/1
TABLET ORAL
Qty: 2 TAB | Refills: 0 | Status: SHIPPED | OUTPATIENT
Start: 2017-08-14 | End: 2019-02-06 | Stop reason: SDUPTHER

## 2017-08-14 RX ORDER — GABAPENTIN 400 MG/1
CAPSULE ORAL
Qty: 360 CAP | Refills: 3 | Status: SHIPPED | OUTPATIENT
Start: 2017-08-14 | End: 2019-09-19 | Stop reason: ALTCHOICE

## 2017-08-14 NOTE — PROGRESS NOTES
Chief Complaint: Multiple sclerosis    Mrs. Jo Ann Davila returns for a follow up visit. Since last being seen she  has been compliant with Tecfidera. No new health conditions have arisen. No new medications have been  Prescribed. Last MRI was about a year ago. No signs of progression  Has hot flashes on the Tecfidera    Assesment and Plan  1. Multiple sclerosis  Continue Tecfidera  JCV testing  MRI of the brain and Cspine    2. Anxiety   Continue alrazolam    3. Pain Non-specific   Continue gabapentin        Allergies  Review of patient's allergies indicates no known allergies. Medications  Current Outpatient Prescriptions   Medication Sig    gabapentin (NEURONTIN) 400 mg capsule take 4 capsules by mouth three times a day    gabapentin (NEURONTIN) 800 mg tablet Take 2 Tabs by mouth three (3) times daily.  tiZANidine (ZANAFLEX) 4 mg tablet Take one half tab to 1 tab up to 3 times a day as needed for muscle spasms    dimethyl fumarate (TECFIDERA) 240 mg cpDR Take 240 mg by mouth two (2) times a day.  DULoxetine (CYMBALTA) 60 mg capsule Take 1 Cap by mouth two (2) times a day.  ALPRAZolam (XANAX) 0.5 mg tablet Take 0.5 mg by mouth three (3) times daily as needed for Anxiety.  zolpidem (AMBIEN) 10 mg tablet TAKE ONE TABLET BY MOUTH EVERY NIGHT in the BED AS NEEDED FOR SLEEP    amitriptyline (ELAVIL) 25 mg tablet Take 4 Tabs by mouth nightly. Indications: FIBROMYALGIA     No current facility-administered medications for this visit.          Medical History  Past Medical History:   Diagnosis Date    Body aches 12/11/2014    Chronic pain     Depression     GERD (gastroesophageal reflux disease)     Headache     History of mammogram never before    [facility] ([facility]) ([facility] [address]     Neurological disorder     Multiple Sclerosis    Pap smear for cervical cancer screening 2008    Psychiatric disorder     anxiety    Psychotic disorder      Review of Systems   Constitutional: Negative for chills and fever.   HENT: Negative for ear pain. Eyes: Negative for pain and discharge. Respiratory: Negative for cough and hemoptysis. Cardiovascular: Negative for chest pain and claudication. Gastrointestinal: Negative for constipation and diarrhea. Genitourinary: Negative for flank pain and hematuria. Musculoskeletal: Positive for back pain, myalgias and neck pain. Skin: Negative for itching and rash. Neurological: Positive for dizziness. Negative for tingling and headaches. Endo/Heme/Allergies: Negative for environmental allergies. Does not bruise/bleed easily. Psychiatric/Behavioral: Negative for depression and hallucinations. The patient is nervous/anxious. Exam:    Visit Vitals    /80    Pulse 75    Ht 5' 5\" (1.651 m)    Wt 221 lb (100.2 kg)    SpO2 99%    BMI 36.78 kg/m2         General: Well developed, well nourished. Patient in no apparent distress   Head: Normocephalic, atraumatic, anicteric sclera   Neck Normal ROM, No thyromegally   Lungs:  Clear to auscultation bilaterally, No wheezes or rubs   Cardiac: Regular rate and rhythm with no murmurs. Abd: Bowel sounds were audible. No tenderness on palpation   Ext: No pedal edema   Skin: No overt signs of rash or insect bites     NeurologicExam:  Mental Status: Alert and oriented to person place and time   Speech: Fluent no aphasia or dysarthria. Cranial Nerves:   II Intact visual fields. III, IV VI Extra ocular movements intact  V Facial sensation is normal.   VII Facial movement is symmetric. VIII Hearing intact no nystagmus    IX, X Normal gag, symmetric movement of palate and uvula  XI Symmetric shoulder shrug and head turn   XII Tongue midline with no atrophy   Motor:  Full and symmetric strength of upper and lower proximal and distal muscles. Normal bulk and tone. Reflexes:   Deep tendon reflexes 2+/4 and symmetric.    Sensory:   Symmetric and intact with no perceived deficits modalities involving small or large fibers. Gait:  Gait is balanced and fluid with normal arm swing. Cant tandem   Tremor:   No tremor noted. Cerebellar:  Coordination intact. Neurovascular: No carotid bruits. No JVD           Imaging    CT Results (most recent):    Results from Hospital Encounter encounter on 06/05/15   CT ABD PELV WO CONT   Narrative **Final Report**      ICD Codes / Adm. Diagnosis: 439369  844.9 / Constipation  Constipation; Back   Pain; Abdom  Examination:  CT ABD PELVIS WO CON  - [id number] - Jun 5 2015  6:58PM  Accession No:  [id number]  Reason:  Abd Pain/KS      REPORT:  INDICATION:  Abd and right flank Pain/KS. History of cholecystectomy in the   past.    EXAM: ABDOMINAL AND PELVIC CT WITHOUT CONTRAST. COMPARISON: None. PROCEDURE: Sequential axial images of the abdomen and pelvis were performed   without intravenous or oral contrast . Soft tissue, lung and bone windows   were examined Post-processing was performed for coronal and sagittal   reformatting. ABDOMEN: The lung bases are clear. The gallbladder is surgically absent. The   common bile duct is normal for postcholecystectomy state. Liver and spleen   parenchyma are homogeneous without contrast. The pancreas and adrenal glands   are unremarkable without contrast.The kidneys are normal in size   bilaterally, and the ureters are nondilated. There are no calcifications  . Bowel loops are nondilated and there is no ascites. Overall sensitivity to   bowel abnormalities is significantly decreased without oral or intravenous   contrast, however. Metallic densities in the anterior abdominal and pelvic   wall consistent with prior herniorrhaphy. PELVIS: Additional evaluation of the pelvis reveals a normal appendix . The   distal ureters are nondilated. The bladder is minimally distended and   otherwise unremarkable. There is no abnormal mass or fluid collection. The   uterus and ovaries are unremarkable. IMPRESSION:  1.  No CT evidence for urolithiasis or urinary obstruction at this time. 2. Status post cholecystectomy with no biliary ductal dilatation. 3. Normal appendix, uterus and ovaries. Signing/Reading Doctor: Melinda Tong ([id number])    Approved: Melinda Tong ([id number])  Jun 5 2015  7:23PM                                MRI Results (most recent):    Results from Hospital Encounter encounter on 08/27/16   MRI CERV SPINE W WO CONT   Narrative EXAM:  MRI CERV SPINE W WO CONT  INDICATION:  MS  COMPARISONS:  MRI of 2/6/2016  STUDY PARAMETERS: Sagittal T1, T2, and STIR. Axial T1, gradient echo, sagittal  and axial T1-weighted images following the IV injection of 10 cc Gadavist    FINDINGS:    The sagittal STIR images best demonstrate multiple foci of hyperintensity within  the cervical spinal cord. Lesions are again identified at C1, C3-4, C6, and T1. Thoracic cord involvement at T2-3 is not completely demonstrated. Findings are  similar when compared to the previous study. There is relatively extensive  involvement of the cervical spinal cord. No definite new lesions are identified. There are no enhancing cord lesions. There are no suspicious marrow lesions or evidence of acute bony trauma. Paraspinous soft tissues are within normal limits. There is mild posterior disc bulging at C6-7, asymmetric to the left, without  central or foraminal stenosis. Minimal bulging discs are also noted at C5-6 and  C7-T1 without central or foraminal stenosis. Impression IMPRESSION:  1. Multiple foci of demyelination in the cervical spinal cord consistent with  the patient's diagnosis of multiple sclerosis. Unchanged appearance since  2/6/2016. No new or enhancing lesions. 2. Relatively mild degenerative changes without central or foraminal stenosis.           .  Lab Review    Lab Results   Component Value Date/Time    WBC 4.4 04/20/2016 05:32 PM    HCT 39.9 04/20/2016 05:32 PM    HGB 13.1 04/20/2016 05:32 PM    PLATELET 373 31/82/0435 05:32 PM       Lab Results   Component Value Date/Time    Sodium 141 04/20/2016 05:32 PM    Potassium 4.6 04/20/2016 05:32 PM    Chloride 105 04/20/2016 05:32 PM    CO2 30 04/20/2016 05:32 PM    Glucose 94 04/20/2016 05:32 PM    BUN 12 04/20/2016 05:32 PM    Creatinine 0.88 04/20/2016 05:32 PM    Calcium 9.3 04/20/2016 05:32 PM       Lab Results   Component Value Date/Time    Cholesterol, total 153 02/02/2009 05:27 PM    HDL Cholesterol 48 02/02/2009 05:27 PM

## 2017-08-14 NOTE — PATIENT INSTRUCTIONS
[facility]   Statement to Patients  April 1, 2014      In an effort to ensure the large volume of patient prescription refills is processed in the most efficient and expeditious manner, we are asking our patients to assist us by calling your Pharmacy for all prescription refills, this will include also your  Mail Order Pharmacy. The pharmacy will contact our office electronically to continue the refill process. Please do not wait until the last minute to call your pharmacy. We need at least 48 hours (2days) to fill prescriptions. We also encourage you to call your pharmacy before going to  your prescription to make sure it is ready. With regard to controlled substance prescription refill requests (narcotic refills) that need to be picked up at our office, we ask your cooperation by providing us with at least 72 hours (3days) notice that you will need a refill. We will not refill narcotic prescription refill requests after 4:00pm on any weekday, Monday through Thursday, or after 2:00pm on Fridays, or on the weekends. We encourage everyone to explore another way of getting your prescription refill request processed using [employer], our patient web portal through our electronic medical record system. Eayun is an efficient and effective way to communicate your medication request directly to the office and  downloadable as an matti on your smart phone . [employer] also features a review functionality that allows you to view your medication list as well as leave messages for your physician. Are you ready to get connected? If so please review the attatched instructions or speak to any of our staff to get you set up right away! Thank you so much for your cooperation. Should you have any questions please contact our Practice Administrator.     The Physicians and Staff,  [facility]          A Healthy Lifestyle: Care Instructions  Your Care Instructions  A healthy lifestyle can help you feel good, stay at a healthy weight, and have plenty of energy for both work and play. A healthy lifestyle is something you can share with your whole family. A healthy lifestyle also can lower your risk for serious health problems, such as high blood pressure, heart disease, and diabetes. You can follow a few steps listed below to improve your health and the health of your family. Follow-up care is a key part of your treatment and safety. Be sure to make and go to all appointments, and call your doctor if you are having problems. Its also a good idea to know your test results and keep a list of the medicines you take. How can you care for yourself at home? · Do not eat too much sugar, fat, or fast foods. You can still have dessert and treats now and then. The goal is moderation. · Start small to improve your eating habits. Pay attention to portion sizes, drink less juice and soda pop, and eat more fruits and vegetables. ¨ Eat a healthy amount of food. A 3-ounce serving of meat, for example, is about the size of a deck of cards. Fill the rest of your plate with vegetables and whole grains. ¨ Limit the amount of soda and sports drinks you have every day. Drink more water when you are thirsty. ¨ Eat at least 5 servings of fruits and vegetables every day. It may seem like a lot, but it is not hard to reach this goal. A serving or helping is 1 piece of fruit, 1 cup of vegetables, or 2 cups of leafy, raw vegetables. Have an apple or some carrot sticks as an afternoon snack instead of a candy bar. Try to have fruits and/or vegetables at every meal.  · Make exercise part of your daily routine. You may want to start with simple activities, such as walking, bicycling, or slow swimming. Try to be active 30 to 60 minutes every day. You do not need to do all 30 to 60 minutes all at once. For example, you can exercise 3 times a day for 10 or 20 minutes.  Moderate exercise is safe for most people, but it is always a good idea to talk to your doctor before starting an exercise program.  · Keep moving. Gracielaconner Feeler the lawn, work in the garden, or Pricing Assistant. Take the stairs instead of the elevator at work. · If you smoke, quit. People who smoke have an increased risk for heart attack, stroke, cancer, and other lung illnesses. Quitting is hard, but there are ways to boost your chance of quitting tobacco for good. ¨ Use nicotine gum, patches, or lozenges. ¨ Ask your doctor about stop-smoking programs and medicines. ¨ Keep trying. In addition to reducing your risk of diseases in the future, you will notice some benefits soon after you stop using tobacco. If you have shortness of breath or asthma symptoms, they will likely get better within a few weeks after you quit. · Limit how much alcohol you drink. Moderate amounts of alcohol (up to 2 drinks a day for men, 1 drink a day for women) are okay. But drinking too much can lead to liver problems, high blood pressure, and other health problems. Family health  If you have a family, there are many things you can do together to improve your health. · Eat meals together as a family as often as possible. · Eat healthy foods. This includes fruits, vegetables, lean meats and dairy, and whole grains. · Include your family in your fitness plan. Most people think of activities such as jogging or tennis as the way to fitness, but there are many ways you and your family can be more active. Anything that makes you breathe hard and gets your heart pumping is exercise. Here are some tips:  ¨ Walk to do errands or to take your child to school or the bus. ¨ Go for a family bike ride after dinner instead of watching TV. Where can you learn more? Go to http://damaris-eloise.info/. Enter C867 in the search box to learn more about \"A Healthy Lifestyle: Care Instructions. \"  Current as of: July 26, 2016  Content Version: 11.3  © 8329-6434 [employer]. Care instructions adapted under license by [employer] (which disclaims liability or warranty for this information). If you have questions about a medical condition or this instruction, always ask your healthcare professional. [address] any warranty or liability for your use of this information.

## 2017-08-14 NOTE — MR AVS SNAPSHOT
Visit Information Date & Time Provider Department Dept. Phone Encounter #  
 8/14/2017 10:00 AM Saba Crum MD [facility] [phone] [id number] Follow-up Instructions Return in about 3 months (around 11/14/2017). Follow-up and Disposition History Upcoming Health Maintenance Date Due Pneumococcal 19-64 Medium Risk (1 of 1 - PPSV23) 11/22/1992 DTaP/Tdap/Td series (1 - Tdap) 11/22/1994 INFLUENZA AGE 9 TO ADULT 8/1/2017 PAP AKA CERVICAL CYTOLOGY 3/12/2018 Allergies as of 8/14/2017  Review Complete On: 8/14/2017 By: Saba Crum MD  
 No Known Allergies Current Immunizations  Reviewed on 12/11/2014 Name Date Influenza Vaccine (Quad) PF 12/11/2014 Not reviewed this visit You Were Diagnosed With   
  
 Codes Comments MS (multiple sclerosis) ([facility]    -  Primary ICD-10-CM: G35 
ICD-9-CM: 652 Tobacco abuse     ICD-10-CM: Z72.0 ICD-9-CM: 305.1 Chronic pain syndrome     ICD-10-CM: G89.4 ICD-9-CM: 338.4 Anxiety     ICD-10-CM: F41.9 ICD-9-CM: 300.00 Rubi Rustyis (LUCIO) polyoma viremia     ICD-10-CM: B34.9 ICD-9-CM: 790.8 Vitals BP Pulse Height(growth percentile) Weight(growth percentile) SpO2 BMI  
 110/80 75 5' 5\" (1.651 m) 221 lb (100.2 kg) 99% 36.78 kg/m2 OB Status Smoking Status Having regular periods Current Every Day Smoker Vitals History BMI and BSA Data Body Mass Index Body Surface Area 36.78 kg/m 2 2.14 m 2 Preferred Pharmacy Pharmacy Name Phone Fiona Quinn [address], [address] [phone] Your Updated Medication List  
  
   
This list is accurate as of: 8/14/17 10:52 AM.  Always use your most recent med list.  
  
  
  
  
 amitriptyline 25 mg tablet Commonly known as:  ELAVIL Take 4 Tabs by mouth nightly. Indications: FIBROMYALGIA  
  
 diazePAM 2 mg tablet Commonly known as:  VALIUM Use 30 minutes prior to MRI  
  
 dimethyl fumarate 240 mg Cpdr  
Commonly known as:  Derrick Bannister Take 240 mg by mouth two (2) times a day. DULoxetine 60 mg capsule Commonly known as:  CYMBALTA Take 1 Cap by mouth two (2) times a day. * gabapentin 800 mg tablet Commonly known as:  NEURONTIN Take 2 Tabs by mouth three (3) times daily. * gabapentin 400 mg capsule Commonly known as:  NEURONTIN  
take 4 capsules by mouth three times a day  
  
 tiZANidine 4 mg tablet Commonly known as:  Babatunde Marcell Take one half tab to 1 tab up to 3 times a day as needed for muscle spasms XANAX 0.5 mg tablet Generic drug:  ALPRAZolam  
Take 0.5 mg by mouth three (3) times daily as needed for Anxiety. zolpidem 10 mg tablet Commonly known as:  AMBIEN  
TAKE ONE TABLET BY MOUTH EVERY NIGHT in the BED AS NEEDED FOR SLEEP * Notice: This list has 2 medication(s) that are the same as other medications prescribed for you. Read the directions carefully, and ask your doctor or other care provider to review them with you. Prescriptions Printed Refills  
 diazePAM (VALIUM) 2 mg tablet 0 Sig: Use 30 minutes prior to MRI Class: Print Prescriptions Sent to Pharmacy Refills  
 gabapentin (NEURONTIN) 400 mg capsule 3 Sig: take 4 capsules by mouth three times a day Class: Normal  
 Pharmacy: [facility] , [address] [place] [address] #: [phone] We Performed the Following LUCIO VIRUS DNA BY PCR,  [03043 CPT(R)] Follow-up Instructions Return in about 3 months (around 11/14/2017). To-Do List   
 08/14/2017 Imaging:  MRI BRAIN WO CONT   
  
 08/14/2017 Imaging:  MRI CERV SPINE WO CONT Patient Instructions PRESCRIPTION REFILL POLICY [facility] Statement to Patients April 1, 2014 In an effort to ensure the large volume of patient prescription refills is processed in the most efficient and expeditious manner, we are asking our patients to assist us by calling your Pharmacy for all prescription refills, this will include also your  Mail Order Pharmacy. The pharmacy will contact our office electronically to continue the refill process. Please do not wait until the last minute to call your pharmacy. We need at least 48 hours (2days) to fill prescriptions. We also encourage you to call your pharmacy before going to  your prescription to make sure it is ready. With regard to controlled substance prescription refill requests (narcotic refills) that need to be picked up at our office, we ask your cooperation by providing us with at least 72 hours (3days) notice that you will need a refill. We will not refill narcotic prescription refill requests after 4:00pm on any weekday, Monday through Thursday, or after 2:00pm on Fridays, or on the weekends. We encourage everyone to explore another way of getting your prescription refill request processed using [employer] our patient web portal through our electronic medical record system. [employer] is an efficient and effective way to communicate your medication request directly to the office and  downloadable as an matti on your smart phone . [employer] also features a review functionality that allows you to view your medication list as well as leave messages for your physician. Are you ready to get connected? If so please review the attatched instructions or speak to any of our staff to get you set up right away! Thank you so much for your cooperation. Should you have any questions please contact our Practice Administrator. The Physicians and Staff,  [facility] A Healthy Lifestyle: Care Instructions Your Care Instructions A healthy lifestyle can help you feel good, stay at a healthy weight, and have plenty of energy for both work and play. A healthy lifestyle is something you can share with your whole family. A healthy lifestyle also can lower your risk for serious health problems, such as high blood pressure, heart disease, and diabetes. You can follow a few steps listed below to improve your health and the health of your family. Follow-up care is a key part of your treatment and safety. Be sure to make and go to all appointments, and call your doctor if you are having problems. Its also a good idea to know your test results and keep a list of the medicines you take. How can you care for yourself at home? · Do not eat too much sugar, fat, or fast foods. You can still have dessert and treats now and then. The goal is moderation. · Start small to improve your eating habits. Pay attention to portion sizes, drink less juice and soda pop, and eat more fruits and vegetables. ¨ Eat a healthy amount of food. A 3-ounce serving of meat, for example, is about the size of a deck of cards. Fill the rest of your plate with vegetables and whole grains. ¨ Limit the amount of soda and sports drinks you have every day. Drink more water when you are thirsty. ¨ Eat at least 5 servings of fruits and vegetables every day. It may seem like a lot, but it is not hard to reach this goal. A serving or helping is 1 piece of fruit, 1 cup of vegetables, or 2 cups of leafy, raw vegetables. Have an apple or some carrot sticks as an afternoon snack instead of a candy bar. Try to have fruits and/or vegetables at every meal. 
· Make exercise part of your daily routine. You may want to start with simple activities, such as walking, bicycling, or slow swimming. Try to be active 30 to 60 minutes every day. You do not need to do all 30 to 60 minutes all at once. For example, you can exercise 3 times a day for 10 or 20 minutes.  Moderate exercise is safe for most people, but it is always a good idea to talk to your doctor before starting an exercise program. 
· Keep moving. Rich Blotter the lawn, work in the garden, or Sustainability Roundtable. Take the stairs instead of the elevator at work. · If you smoke, quit. People who smoke have an increased risk for heart attack, stroke, cancer, and other lung illnesses. Quitting is hard, but there are ways to boost your chance of quitting tobacco for good. ¨ Use nicotine gum, patches, or lozenges. ¨ Ask your doctor about stop-smoking programs and medicines. ¨ Keep trying. In addition to reducing your risk of diseases in the future, you will notice some benefits soon after you stop using tobacco. If you have shortness of breath or asthma symptoms, they will likely get better within a few weeks after you quit. · Limit how much alcohol you drink. Moderate amounts of alcohol (up to 2 drinks a day for men, 1 drink a day for women) are okay. But drinking too much can lead to liver problems, high blood pressure, and other health problems. Family health If you have a family, there are many things you can do together to improve your health. · Eat meals together as a family as often as possible. · Eat healthy foods. This includes fruits, vegetables, lean meats and dairy, and whole grains. · Include your family in your fitness plan. Most people think of activities such as jogging or tennis as the way to fitness, but there are many ways you and your family can be more active. Anything that makes you breathe hard and gets your heart pumping is exercise. Here are some tips: 
¨ Walk to do errands or to take your child to school or the bus. ¨ Go for a family bike ride after dinner instead of watching TV. Where can you learn more? Go to http://damaris-eloise.info/. Enter W113 in the search box to learn more about \"A Healthy Lifestyle: Care Instructions. \" Current as of: July 26, 2016 Content Version: 11.3 © 5032-2295 Healthwise, Incorporated. Care instructions adapted under license by [employer] (which disclaims liability or warranty for this information). If you have questions about a medical condition or this instruction, always ask your healthcare professional. [address] any warranty or liability for your use of this information. Patient Instructions History Introducing [facility]! Dear Chantal Phillips: Thank you for requesting a [employer] account. Our records indicate that you already have an active [employer] account. You can access your account anytime at [email or web address] Did you know that you can access your hospital and ER discharge instructions at any time in [employer]? You can also review all of your test results from your hospital stay or ER visit. Additional Information If you have questions, please visit the Frequently Asked Questions section of the [employer] website at [email or web address] Remember, [employer]s is NOT to be used for urgent needs. For medical emergencies, dial 911. Now available from your iPhone and Android! Please provide this summary of care documentation to your next provider. Your primary care clinician is listed as Vera Tejeda. If you have any questions after today's visit, please call [phone].

## 2017-08-28 ENCOUNTER — HOSPITAL ENCOUNTER (OUTPATIENT)
Dept: MRI IMAGING | Age: 42
Discharge: HOME | End: 2017-08-28
Attending: PSYCHIATRY & NEUROLOGY
Payer: MEDICARE

## 2017-08-28 DIAGNOSIS — G35 MS (MULTIPLE SCLEROSIS) (HCC): ICD-10-CM

## 2017-08-28 PROCEDURE — 70551 MRI BRAIN STEM W/O DYE: CPT

## 2017-08-28 PROCEDURE — 72141 MRI NECK SPINE W/O DYE: CPT

## 2017-09-05 DIAGNOSIS — G35 MS (MULTIPLE SCLEROSIS) (HCC): ICD-10-CM

## 2017-09-05 DIAGNOSIS — M62.838 MUSCLE SPASMS OF BOTH LOWER EXTREMITIES: ICD-10-CM

## 2017-09-05 RX ORDER — TIZANIDINE 4 MG/1
TABLET ORAL
Qty: 90 TAB | Refills: 4 | Status: SHIPPED | OUTPATIENT
Start: 2017-09-05 | End: 2018-02-01 | Stop reason: SDUPTHER

## 2017-09-15 ENCOUNTER — TELEPHONE (OUTPATIENT)
Dept: NEUROLOGY | Age: 42
End: 2017-09-15

## 2017-09-15 DIAGNOSIS — G89.4 CHRONIC PAIN SYNDROME: ICD-10-CM

## 2017-09-15 DIAGNOSIS — G35 MULTIPLE SCLEROSIS (HCC): ICD-10-CM

## 2017-09-15 DIAGNOSIS — M79.2 NEUROPATHIC PAIN: ICD-10-CM

## 2017-09-15 RX ORDER — GABAPENTIN 400 MG/1
CAPSULE ORAL
Qty: 360 CAP | Refills: 3 | Status: CANCELLED | OUTPATIENT
Start: 2017-09-15

## 2017-09-15 NOTE — TELEPHONE ENCOUNTER
advise to the patient that the last refill that was sent was supposed to last 30days, patient will not get anymore until 09/19

## 2017-09-15 NOTE — TELEPHONE ENCOUNTER
Left a message for a call back  Left a message   (wanted to advise to the patient that the last refill that was sent was supposed to last 30days, patient will not get anymore until 09/19)

## 2017-09-15 NOTE — TELEPHONE ENCOUNTER
----- Message from Maximilian Suggs sent at 9/15/2017  9:36 AM EDT -----  Regarding: /Refill  Pt called requesting a approval on a partial refill on the medication gabapentin 800 mg. Pt is in pain and has none left. Pt use the [facility] on [address] best contact number is [phone].

## 2017-09-15 NOTE — TELEPHONE ENCOUNTER
----- Message from [facility] sent at 9/15/2017  9:36 AM EDT -----  Regarding: /Refill  Pt called requesting a approval on a partial refill on the medication gabapentin 800 mg. Pt is in pain and has none left. Pt use the [address] [place] [address] on [address] [facility] best contact number is [phone].

## 2017-10-18 ENCOUNTER — TELEPHONE (OUTPATIENT)
Dept: NEUROLOGY | Age: 42
End: 2017-10-18

## 2017-10-18 NOTE — TELEPHONE ENCOUNTER
----- Message from Patricia Arguelles sent at 10/18/2017  8:35 AM EDT -----  Regarding: Dr. Edgar Gonsales Pt states she is completely out of her Gabapentin 400 mg and is in a lot of pain. She is due for a refill tomorrow but needs it now. She takes 4 pills three times a day and sometimes she has to take an extra one and that is why she ran out. Use [facility] on [address] [phone]. Her contact number is [phone].

## 2017-10-19 NOTE — TELEPHONE ENCOUNTER
Called patient and she stated that she has already called for her refills on the gabapentin and they are going to be ready for her to     I also advised that we will not ask the pharmacy to refill the medication earlier then it should be

## 2017-11-14 DIAGNOSIS — G89.4 CHRONIC PAIN SYNDROME: ICD-10-CM

## 2017-11-14 DIAGNOSIS — F13.20 BENZODIAZEPINE DEPENDENCE (HCC): ICD-10-CM

## 2017-11-14 DIAGNOSIS — R52 BODY ACHES: ICD-10-CM

## 2017-11-14 DIAGNOSIS — G35 MS (MULTIPLE SCLEROSIS) (HCC): ICD-10-CM

## 2017-11-14 NOTE — TELEPHONE ENCOUNTER
Future Appointments  Date Time Provider Naveen Martins   12/5/2017 10:40 AM Maxx Lynn MD [address]                         Last Appointment My Department:  8/14/2017    Please advise of refill below. Requested Prescriptions     Pending Prescriptions Disp Refills    dimethyl fumarate (TECFIDERA) 240 mg cpDR 120 Cap 3     Sig: Take 240 mg by mouth two (2) times a day.

## 2017-11-16 RX ORDER — DIMETHYL FUMARATE 240 MG/1
240 CAPSULE ORAL 2 TIMES DAILY
Qty: 120 CAP | Refills: 3 | Status: SHIPPED | OUTPATIENT
Start: 2017-11-16 | End: 2018-10-15 | Stop reason: SDUPTHER

## 2017-12-05 ENCOUNTER — OFFICE VISIT (OUTPATIENT)
Dept: NEUROLOGY | Age: 42
End: 2017-12-05

## 2017-12-05 VITALS
WEIGHT: 226 LBS | BODY MASS INDEX: 37.65 KG/M2 | HEART RATE: 64 BPM | HEIGHT: 65 IN | OXYGEN SATURATION: 98 % | DIASTOLIC BLOOD PRESSURE: 72 MMHG | SYSTOLIC BLOOD PRESSURE: 120 MMHG

## 2017-12-05 DIAGNOSIS — R52 BODY ACHES: ICD-10-CM

## 2017-12-05 DIAGNOSIS — F13.20 BENZODIAZEPINE DEPENDENCE (HCC): ICD-10-CM

## 2017-12-05 DIAGNOSIS — B34.8: ICD-10-CM

## 2017-12-05 DIAGNOSIS — G35 MS (MULTIPLE SCLEROSIS) (HCC): Primary | ICD-10-CM

## 2017-12-05 DIAGNOSIS — G89.4 CHRONIC PAIN SYNDROME: ICD-10-CM

## 2017-12-05 RX ORDER — GABAPENTIN 800 MG/1
1600 TABLET ORAL 3 TIMES DAILY
Qty: 180 TAB | Refills: 5 | Status: SHIPPED | OUTPATIENT
Start: 2017-12-05 | End: 2018-05-29 | Stop reason: SDUPTHER

## 2017-12-05 NOTE — PROGRESS NOTES
Chief Complaint: Multiple sclerosis    Mrs. Tosha Rehman returns for a follow up visit. Since last being seen she  has been compliant with Tecfidera. No new health conditions have arisen. No new medications have been  Prescribed. Last MRI was about a year ago. No signs of progression. Dealing with pain would like to increase her does of Neurontin. Discussed possibly decreasing her dose. Has hot flashes on the Tecfidera    Assesment and Plan  1. Multiple sclerosis  Continue Tecfidera  JCV testing      2. Anxiety   Continue alrazolam    3. Fibromyaglia   Continue gabapentin        Allergies  Review of patient's allergies indicates no known allergies. Medications  Current Outpatient Prescriptions   Medication Sig    dimethyl fumarate (TECFIDERA) 240 mg cpDR Take 240 mg by mouth two (2) times a day.  tiZANidine (ZANAFLEX) 4 mg tablet TAKE ONE-HALF TABLET TO 1 TABLET BY MOUTH UP TO 3 TIMES A DAY AS NEEDED FOR MUSCLE SPASMS    gabapentin (NEURONTIN) 400 mg capsule take 4 capsules by mouth three times a day    diazePAM (VALIUM) 2 mg tablet Use 30 minutes prior to MRI    gabapentin (NEURONTIN) 800 mg tablet Take 2 Tabs by mouth three (3) times daily.  amitriptyline (ELAVIL) 25 mg tablet Take 4 Tabs by mouth nightly. Indications: FIBROMYALGIA    DULoxetine (CYMBALTA) 60 mg capsule Take 1 Cap by mouth two (2) times a day.  ALPRAZolam (XANAX) 0.5 mg tablet Take 0.5 mg by mouth three (3) times daily as needed for Anxiety.  zolpidem (AMBIEN) 10 mg tablet TAKE ONE TABLET BY MOUTH EVERY NIGHT in the BED AS NEEDED FOR SLEEP     No current facility-administered medications for this visit.          Medical History  Past Medical History:   Diagnosis Date    Body aches 12/11/2014    Chronic pain     Depression     GERD (gastroesophageal reflux disease)     Headache(784.0)     History of mammogram never before    MS (multiple sclerosis) (HCC)     Neurological disorder     Multiple Sclerosis    Pap smear for cervical cancer screening 2008    Psychiatric disorder     anxiety    Psychotic disorder      Review of Systems   Constitutional: Negative for chills and fever. HENT: Negative for ear pain. Eyes: Negative for pain and discharge. Respiratory: Negative for cough and hemoptysis. Cardiovascular: Negative for chest pain and claudication. Gastrointestinal: Negative for constipation and diarrhea. Genitourinary: Negative for flank pain and hematuria. Musculoskeletal: Positive for back pain, myalgias and neck pain. Skin: Negative for itching and rash. Neurological: Positive for dizziness. Negative for tingling and headaches. Endo/Heme/Allergies: Negative for environmental allergies. Does not bruise/bleed easily. Psychiatric/Behavioral: Negative for depression and hallucinations. The patient is nervous/anxious. Exam:    Visit Vitals    /72    Pulse 64    Ht 5' 5\" (1.651 m)    Wt 226 lb (102.5 kg)    SpO2 98%    BMI 37.61 kg/m2         General: Well developed, well nourished. Patient in no apparent distress   Head: Normocephalic, atraumatic, anicteric sclera   Neck Normal ROM, No thyromegally   Lungs:  Clear to auscultation bilaterally, No wheezes or rubs   Cardiac: Regular rate and rhythm with no murmurs. Abd: Bowel sounds were audible. No tenderness on palpation   Ext: No pedal edema   Skin: No overt signs of rash or insect bites     NeurologicExam:  Mental Status: Alert and oriented to person place and time   Speech: Fluent no aphasia or dysarthria. Cranial Nerves:   II Intact visual fields. III, IV VI Extra ocular movements intact  V Facial sensation is normal.   VII Facial movement is symmetric. VIII Hearing intact no nystagmus    IX, X Normal gag, symmetric movement of palate and uvula  XI Symmetric shoulder shrug and head turn   XII Tongue midline with no atrophy   Motor:  Full and symmetric strength of upper and lower proximal and distal muscles.  Normal bulk and tone.    Reflexes:   Deep tendon reflexes 2+/4 and symmetric. Sensory:   Symmetric and intact with no perceived deficits modalities involving small or large fibers. Gait:  Gait slight limp on the left with ankle inversion . Cant tandem   Tremor:   No tremor noted. Cerebellar:  Coordination intact. Neurovascular: No carotid bruits. No JVD           Imaging    CT Results (most recent):    Results from Hospital Encounter encounter on 06/05/15   CT ABD PELV WO CONT   Narrative **Final Report**      ICD Codes / Adm. Diagnosis: 081381  844.9 / Constipation  Constipation; Back   Pain; Abdom  Examination:  CT ABD PELVIS WO CON  - [id number] - Jun 5 2015  6:58PM  Accession No:  [id number]  Reason:  Abd Pain/KS      REPORT:  INDICATION:  Abd and right flank Pain/KS. History of cholecystectomy in the   past.    EXAM: ABDOMINAL AND PELVIC CT WITHOUT CONTRAST. COMPARISON: None. PROCEDURE: Sequential axial images of the abdomen and pelvis were performed   without intravenous or oral contrast . Soft tissue, lung and bone windows   were examined Post-processing was performed for coronal and sagittal   reformatting. ABDOMEN: The lung bases are clear. The gallbladder is surgically absent. The   common bile duct is normal for postcholecystectomy state. Liver and spleen   parenchyma are homogeneous without contrast. The pancreas and adrenal glands   are unremarkable without contrast.The kidneys are normal in size   bilaterally, and the ureters are nondilated. There are no calcifications  . Bowel loops are nondilated and there is no ascites. Overall sensitivity to   bowel abnormalities is significantly decreased without oral or intravenous   contrast, however. Metallic densities in the anterior abdominal and pelvic   wall consistent with prior herniorrhaphy. PELVIS: Additional evaluation of the pelvis reveals a normal appendix . The   distal ureters are nondilated.  The bladder is minimally distended and   otherwise unremarkable. There is no abnormal mass or fluid collection. The   uterus and ovaries are unremarkable. IMPRESSION:  1. No CT evidence for urolithiasis or urinary obstruction at this time. 2. Status post cholecystectomy with no biliary ductal dilatation. 3. Normal appendix, uterus and ovaries. Signing/Reading Doctor: Naty Ansari ([id number])    Approved: Naty Ansari ([id number])  Jun 5 2015  7:23PM                                MRI Results (most recent):    Results from Hospital Encounter encounter on 08/28/17   MRI CERV SPINE WO CONT   Narrative EXAM:  MRI CERV SPINE WO CONT    INDICATION:  Multiple sclerosis. Multiple sclerosis    COMPARISON: August 27, 2016    TECHNIQUE: MR imaging of the cervical spine was performed using the following  sequences: sagittal T1, T2, STIR;  axial T2, T1.     CONTRAST:  None. FINDINGS:    4 signal abnormality within the spinal cord and lower medulla appears stable  compatible with the known diagnosis of multiple sclerosis. The craniocervical  junction appears normal. There is no evidence for bone marrow replacement. Mild  spondylosis and degenerative disc disease also unchanged without significant  canal or foraminal compromise and no extrinsic pressure on the cord. Impression impression: No change.         .  Lab Review    Lab Results   Component Value Date/Time    WBC 4.4 04/20/2016 05:32 PM    HCT 39.9 04/20/2016 05:32 PM    HGB 13.1 04/20/2016 05:32 PM    PLATELET 407 36/35/8872 05:32 PM       Lab Results   Component Value Date/Time    Sodium 141 04/20/2016 05:32 PM    Potassium 4.6 04/20/2016 05:32 PM    Chloride 105 04/20/2016 05:32 PM    CO2 30 04/20/2016 05:32 PM    Glucose 94 04/20/2016 05:32 PM    BUN 12 04/20/2016 05:32 PM    Creatinine 0.88 04/20/2016 05:32 PM    Calcium 9.3 04/20/2016 05:32 PM       Lab Results   Component Value Date/Time    Cholesterol, total 153 02/02/2009 05:27 PM    HDL Cholesterol 48 02/02/2009 05:27 PM

## 2017-12-05 NOTE — MR AVS SNAPSHOT
Visit Information Date & Time Provider Department Dept. Phone Encounter #  
 12/5/2017 10:40 AM Kameron Solares MD [facility] [phone] [id number] Follow-up Instructions Return in about 6 months (around 6/5/2018) for MULTIPLE SCLEROSIS. Upcoming Health Maintenance Date Due Pneumococcal 19-64 Medium Risk (1 of 1 - PPSV23) 11/22/1992 DTaP/Tdap/Td series (1 - Tdap) 11/22/1994 Influenza Age 5 to Adult 8/1/2017 PAP AKA CERVICAL CYTOLOGY 3/12/2018 Allergies as of 12/5/2017  Review Complete On: 12/5/2017 By: Kameron Solares MD  
 No Known Allergies Current Immunizations  Reviewed on 12/11/2014 Name Date Influenza Vaccine (Quad) PF 12/11/2014 Not reviewed this visit You Were Diagnosed With   
  
 Codes Comments LUCIO virus viremia    -  Primary ICD-10-CM: B34.9 ICD-9-CM: 790.8 [facility]) [facility]     ICD-10-CM: G35 
ICD-9-CM: 102 Body aches     ICD-10-CM: R52 ICD-9-CM: 780.96 Chronic pain syndrome     ICD-10-CM: G89.4 ICD-9-CM: 338.4 Benzodiazepine dependence (HCC)     ICD-10-CM: Y65.67 ICD-9-CM: 304.10 Vitals BP Pulse Height(growth percentile) Weight(growth percentile) SpO2 BMI  
 120/72 64 5' 5\" (1.651 m) 226 lb (102.5 kg) 98% 37.61 kg/m2 OB Status Smoking Status Having regular periods Current Every Day Smoker BMI and BSA Data Body Mass Index Body Surface Area  
 37.61 kg/m 2 2.17 m 2 Preferred Pharmacy Pharmacy Name Phone [facility] [address] , [address] [facility] [phone] Your Updated Medication List  
  
   
This list is accurate as of: 12/5/17 11:35 AM.  Always use your most recent med list.  
  
  
  
  
 amitriptyline 25 mg tablet Commonly known as:  ELAVIL Take 4 Tabs by mouth nightly. Indications: FIBROMYALGIA  
  
 diazePAM 2 mg tablet Commonly known as:  VALIUM  
 Use 30 minutes prior to MRI  
  
 dimethyl fumarate 240 mg Cpdr  
Commonly known as:  Claressa Alize Take 240 mg by mouth two (2) times a day. DULoxetine 60 mg capsule Commonly known as:  CYMBALTA Take 1 Cap by mouth two (2) times a day. * gabapentin 800 mg tablet Commonly known as:  NEURONTIN Take 2 Tabs by mouth three (3) times daily. * gabapentin 400 mg capsule Commonly known as:  NEURONTIN  
take 4 capsules by mouth three times a day * gabapentin 800 mg tablet Commonly known as:  NEURONTIN Take 2 Tabs by mouth three (3) times daily. tiZANidine 4 mg tablet Commonly known as:  [place] TAKE ONE-HALF TABLET TO 1 TABLET BY MOUTH UP TO 3 TIMES A DAY AS NEEDED FOR MUSCLE SPASMS  
  
 XANAX 0.5 mg tablet Generic drug:  ALPRAZolam  
Take 0.5 mg by mouth three (3) times daily as needed for Anxiety. zolpidem 10 mg tablet Commonly known as:  AMBIEN  
TAKE ONE TABLET BY MOUTH EVERY NIGHT in the BED AS NEEDED FOR SLEEP * Notice: This list has 3 medication(s) that are the same as other medications prescribed for you. Read the directions carefully, and ask your doctor or other care provider to review them with you. Prescriptions Sent to Pharmacy Refills  
 gabapentin (NEURONTIN) 800 mg tablet 5 Sig: Take 2 Tabs by mouth three (3) times daily. Class: Normal  
 Pharmacy: [facility] [address] [place] [address] #: [phone] Route: Oral  
  
We Performed the Following CBC WITH AUTOMATED DIFF [10645 CPT(R)]   
 LUCIO VIRUS DNA BY PCR, QL [10084 CPT(R)] Follow-up Instructions Return in about 6 months (around 6/5/2018) for MULTIPLE SCLEROSIS. Patient Instructions PRESCRIPTION REFILL POLICY [facility] Statement to Patients April 1, 2014 In an effort to ensure the large volume of patient prescription refills is processed in the most efficient and expeditious manner, we are asking our patients to assist us by calling your Pharmacy for all prescription refills, this will include also your  Mail Order Pharmacy. The pharmacy will contact our office electronically to continue the refill process. Please do not wait until the last minute to call your pharmacy. We need at least 48 hours (2days) to fill prescriptions. We also encourage you to call your pharmacy before going to  your prescription to make sure it is ready. With regard to controlled substance prescription refill requests (narcotic refills) that need to be picked up at our office, we ask your cooperation by providing us with at least 72 hours (3days) notice that you will need a refill. We will not refill narcotic prescription refill requests after 4:00pm on any weekday, Monday through Thursday, or after 2:00pm on Fridays, or on the weekends. We encourage everyone to explore another way of getting your prescription refill request processed using [email or web address], our patient web portal through our electronic medical record system. [email or web address] is an efficient and effective way to communicate your medication request directly to the office and  downloadable as an matti on your smart phone . [email or web address] also features a review functionality that allows you to view your medication list as well as leave messages for your physician. Are you ready to get connected? If so please review the attatched instructions or speak to any of our staff to get you set up right away! Thank you so much for your cooperation. Should you have any questions please contact our Practice Administrator. The Physicians and Staff,  [facility] Please be advised there is a $25 fee for all paperwork to be completed from our  providers. This is to be paid by the patient prior to picking up the completed forms. A Healthy Lifestyle: Care Instructions Your Care Instructions A healthy lifestyle can help you feel good, stay at a healthy weight, and have plenty of energy for both work and play. A healthy lifestyle is something you can share with your whole family. A healthy lifestyle also can lower your risk for serious health problems, such as high blood pressure, heart disease, and diabetes. You can follow a few steps listed below to improve your health and the health of your family. Follow-up care is a key part of your treatment and safety. Be sure to make and go to all appointments, and call your doctor if you are having problems. It's also a good idea to know your test results and keep a list of the medicines you take. How can you care for yourself at home? · Do not eat too much sugar, fat, or fast foods. You can still have dessert and treats now and then. The goal is moderation. · Start small to improve your eating habits. Pay attention to portion sizes, drink less juice and soda pop, and eat more fruits and vegetables. ¨ Eat a healthy amount of food. A 3-ounce serving of meat, for example, is about the size of a deck of cards. Fill the rest of your plate with vegetables and whole grains. ¨ Limit the amount of soda and sports drinks you have every day. Drink more water when you are thirsty. ¨ Eat at least 5 servings of fruits and vegetables every day. It may seem like a lot, but it is not hard to reach this goal. A serving or helping is 1 piece of fruit, 1 cup of vegetables, or 2 cups of leafy, raw vegetables. Have an apple or some carrot sticks as an afternoon snack instead of a candy bar. Try to have fruits and/or vegetables at every meal. 
· Make exercise part of your daily routine. You may want to start with simple activities, such as walking, bicycling, or slow swimming. Try to be active 30 to 60 minutes every day. You do not need to do all 30 to 60 minutes all at once.  For example, you can exercise 3 times a day for 10 or 20 minutes. Moderate exercise is safe for most people, but it is always a good idea to talk to your doctor before starting an exercise program. 
· Keep moving. [employer] the lawn, work in the garden, or [employer]. Take the stairs instead of the elevator at work. · If you smoke, quit. People who smoke have an increased risk for heart attack, stroke, cancer, and other lung illnesses. Quitting is hard, but there are ways to boost your chance of quitting tobacco for good. ¨ Use nicotine gum, patches, or lozenges. ¨ Ask your doctor about stop-smoking programs and medicines. ¨ Keep trying. In addition to reducing your risk of diseases in the future, you will notice some benefits soon after you stop using tobacco. If you have shortness of breath or asthma symptoms, they will likely get better within a few weeks after you quit. · Limit how much alcohol you drink. Moderate amounts of alcohol (up to 2 drinks a day for men, 1 drink a day for women) are okay. But drinking too much can lead to liver problems, high blood pressure, and other health problems. Family health If you have a family, there are many things you can do together to improve your health. · Eat meals together as a family as often as possible. · Eat healthy foods. This includes fruits, vegetables, lean meats and dairy, and whole grains. · Include your family in your fitness plan. Most people think of activities such as jogging or tennis as the way to fitness, but there are many ways you and your family can be more active. Anything that makes you breathe hard and gets your heart pumping is exercise. Here are some tips: 
¨ Walk to do errands or to take your child to school or the bus. ¨ Go for a family bike ride after dinner instead of watching TV. Where can you learn more? Go to http://damaris-eloise.info/. Enter Z407 in the search box to learn more about \"A Healthy Lifestyle: Care Instructions. \" Current as of: May 12, 2017 Content Version: 11.4 © 7601-5860 Healthwise, [employer]. Care instructions adapted under license by [employer] (which disclaims liability or warranty for this information). If you have questions about a medical condition or this instruction, always ask your healthcare professional. [address] any warranty or liability for your use of this information. Introducing [facility] & HEALTH SERVICES! Dear Johnnie Choi: Thank you for requesting a [employer] account. Our records indicate that you already have an active [employer] account. You can access your account anytime at [email or web address] Did you know that you can access your hospital and ER discharge instructions at any time in [employer]? You can also review all of your test results from your hospital stay or ER visit. Additional Information If you have questions, please visit the Frequently Asked Questions section of the [employer] website at [email or web address] Remember, [employer] is NOT to be used for urgent needs. For medical emergencies, dial 911. Now available from your iPhone and Android! Please provide this summary of care documentation to your next provider. Your primary care clinician is listed as Meghan Moreau. If you have any questions after today's visit, please call [phone].

## 2017-12-05 NOTE — PATIENT INSTRUCTIONS
[facility]   Statement to Patients  April 1, 2014      In an effort to ensure the large volume of patient prescription refills is processed in the most efficient and expeditious manner, we are asking our patients to assist us by calling your Pharmacy for all prescription refills, this will include also your  Mail Order Pharmacy. The pharmacy will contact our office electronically to continue the refill process. Please do not wait until the last minute to call your pharmacy. We need at least 48 hours (2days) to fill prescriptions. We also encourage you to call your pharmacy before going to  your prescription to make sure it is ready. With regard to controlled substance prescription refill requests (narcotic refills) that need to be picked up at our office, we ask your cooperation by providing us with at least 72 hours (3days) notice that you will need a refill. We will not refill narcotic prescription refill requests after 4:00pm on any weekday, Monday through Thursday, or after 2:00pm on Fridays, or on the weekends. We encourage everyone to explore another way of getting your prescription refill request processed using Localocracy, our patient web portal through our electronic medical record system. Localocracy is an efficient and effective way to communicate your medication request directly to the office and  downloadable as an matti on your smart phone . Localocracy also features a review functionality that allows you to view your medication list as well as leave messages for your physician. Are you ready to get connected? If so please review the attatched instructions or speak to any of our staff to get you set up right away! Thank you so much for your cooperation. Should you have any questions please contact our Practice Administrator.     The Physicians and Staff,  [facility]       Please be advised there is a $25 fee for all paperwork to be completed from our  providers. This is to be paid by the patient prior to picking up the completed forms. A Healthy Lifestyle: Care Instructions  Your Care Instructions    A healthy lifestyle can help you feel good, stay at a healthy weight, and have plenty of energy for both work and play. A healthy lifestyle is something you can share with your whole family. A healthy lifestyle also can lower your risk for serious health problems, such as high blood pressure, heart disease, and diabetes. You can follow a few steps listed below to improve your health and the health of your family. Follow-up care is a key part of your treatment and safety. Be sure to make and go to all appointments, and call your doctor if you are having problems. It's also a good idea to know your test results and keep a list of the medicines you take. How can you care for yourself at home? · Do not eat too much sugar, fat, or fast foods. You can still have dessert and treats now and then. The goal is moderation. · Start small to improve your eating habits. Pay attention to portion sizes, drink less juice and soda pop, and eat more fruits and vegetables. ¨ Eat a healthy amount of food. A 3-ounce serving of meat, for example, is about the size of a deck of cards. Fill the rest of your plate with vegetables and whole grains. ¨ Limit the amount of soda and sports drinks you have every day. Drink more water when you are thirsty. ¨ Eat at least 5 servings of fruits and vegetables every day. It may seem like a lot, but it is not hard to reach this goal. A serving or helping is 1 piece of fruit, 1 cup of vegetables, or 2 cups of leafy, raw vegetables. Have an apple or some carrot sticks as an afternoon snack instead of a candy bar. Try to have fruits and/or vegetables at every meal.  · Make exercise part of your daily routine. You may want to start with simple activities, such as walking, bicycling, or slow swimming.  Try to be active 30 to 60 minutes every day. You do not need to do all 30 to 60 minutes all at once. For example, you can exercise 3 times a day for 10 or 20 minutes. Moderate exercise is safe for most people, but it is always a good idea to talk to your doctor before starting an exercise program.  · Keep moving. [place] the lawn, work in the garden, or [employer]. Take the stairs instead of the elevator at work. · If you smoke, quit. People who smoke have an increased risk for heart attack, stroke, cancer, and other lung illnesses. Quitting is hard, but there are ways to boost your chance of quitting tobacco for good. ¨ Use nicotine gum, patches, or lozenges. ¨ Ask your doctor about stop-smoking programs and medicines. ¨ Keep trying. In addition to reducing your risk of diseases in the future, you will notice some benefits soon after you stop using tobacco. If you have shortness of breath or asthma symptoms, they will likely get better within a few weeks after you quit. · Limit how much alcohol you drink. Moderate amounts of alcohol (up to 2 drinks a day for men, 1 drink a day for women) are okay. But drinking too much can lead to liver problems, high blood pressure, and other health problems. Family health  If you have a family, there are many things you can do together to improve your health. · Eat meals together as a family as often as possible. · Eat healthy foods. This includes fruits, vegetables, lean meats and dairy, and whole grains. · Include your family in your fitness plan. Most people think of activities such as jogging or tennis as the way to fitness, but there are many ways you and your family can be more active. Anything that makes you breathe hard and gets your heart pumping is exercise. Here are some tips:  ¨ Walk to do errands or to take your child to school or the bus. ¨ Go for a family bike ride after dinner instead of watching TV. Where can you learn more?   Go to http://damaris-eloise.info/. Enter R760 in the search box to learn more about \"A Healthy Lifestyle: Care Instructions. \"  Current as of: May 12, 2017  Content Version: 11.4  © 7686-9080 Healthwise, [employer]. Care instructions adapted under license by [employer] (which disclaims liability or warranty for this information). If you have questions about a medical condition or this instruction, always ask your healthcare professional. [address] any warranty or liability for your use of this information.

## 2018-02-01 DIAGNOSIS — G35 MS (MULTIPLE SCLEROSIS) (HCC): ICD-10-CM

## 2018-02-01 DIAGNOSIS — M62.838 MUSCLE SPASMS OF BOTH LOWER EXTREMITIES: ICD-10-CM

## 2018-02-01 RX ORDER — TIZANIDINE 4 MG/1
TABLET ORAL
Qty: 90 TAB | Refills: 3 | Status: SHIPPED | OUTPATIENT
Start: 2018-02-01 | End: 2018-05-29 | Stop reason: SDUPTHER

## 2018-02-16 ENCOUNTER — HOSPITAL ENCOUNTER (OUTPATIENT)
Dept: CT IMAGING | Age: 43
Discharge: HOME | End: 2018-02-16
Attending: INTERNAL MEDICINE
Payer: MEDICARE

## 2018-02-16 DIAGNOSIS — K59.00 CONSTIPATION: ICD-10-CM

## 2018-02-16 PROCEDURE — 74011000255 HC RX REV CODE- 255: Performed by: INTERNAL MEDICINE

## 2018-02-16 PROCEDURE — 74177 CT ABD & PELVIS W/CONTRAST: CPT

## 2018-02-16 PROCEDURE — 74011250636 HC RX REV CODE- 250/636: Performed by: INTERNAL MEDICINE

## 2018-02-16 PROCEDURE — 74011636320 HC RX REV CODE- 636/320: Performed by: INTERNAL MEDICINE

## 2018-02-16 RX ORDER — SODIUM CHLORIDE 9 MG/ML
50 INJECTION, SOLUTION INTRAVENOUS
Status: COMPLETED | OUTPATIENT
Start: 2018-02-16 | End: 2018-02-16

## 2018-02-16 RX ORDER — SODIUM CHLORIDE 0.9 % (FLUSH) 0.9 %
10 SYRINGE (ML) INJECTION
Status: COMPLETED | OUTPATIENT
Start: 2018-02-16 | End: 2018-02-16

## 2018-02-16 RX ORDER — BARIUM SULFATE 20 MG/ML
900 SUSPENSION ORAL
Status: COMPLETED | OUTPATIENT
Start: 2018-02-16 | End: 2018-02-16

## 2018-02-16 RX ADMIN — SODIUM CHLORIDE 50 ML/HR: 900 INJECTION, SOLUTION INTRAVENOUS at 11:20

## 2018-02-16 RX ADMIN — IOPAMIDOL 100 ML: 755 INJECTION, SOLUTION INTRAVENOUS at 11:19

## 2018-02-16 RX ADMIN — Medication 10 ML: at 11:19

## 2018-02-16 RX ADMIN — BARIUM SULFATE 900 ML: 21 SUSPENSION ORAL at 11:30

## 2018-02-27 ENCOUNTER — TELEPHONE (OUTPATIENT)
Dept: NEUROLOGY | Age: 43
End: 2018-02-27

## 2018-02-27 NOTE — TELEPHONE ENCOUNTER
Received information from the patient's specialty pharmacy that her script for the tecfidera 480 mg po is ready for shipment however they haven't been able to contact the patient to set anything up.   -Contacted that patient and advised to her of the above information; patient stated that she will give them a call to have the shipment set up.

## 2018-02-28 ENCOUNTER — ANESTHESIA EVENT (OUTPATIENT)
Dept: ENDOSCOPY | Age: 43
End: 2018-02-28
Payer: MEDICARE

## 2018-02-28 ENCOUNTER — ANESTHESIA (OUTPATIENT)
Dept: ENDOSCOPY | Age: 43
End: 2018-02-28
Payer: MEDICARE

## 2018-02-28 ENCOUNTER — HOSPITAL ENCOUNTER (OUTPATIENT)
Age: 43
Setting detail: OUTPATIENT SURGERY
Discharge: HOME | End: 2018-02-28
Attending: INTERNAL MEDICINE | Admitting: INTERNAL MEDICINE
Payer: MEDICARE

## 2018-02-28 VITALS
WEIGHT: 225.25 LBS | HEART RATE: 67 BPM | DIASTOLIC BLOOD PRESSURE: 85 MMHG | TEMPERATURE: 97.5 F | RESPIRATION RATE: 11 BRPM | BODY MASS INDEX: 38.45 KG/M2 | SYSTOLIC BLOOD PRESSURE: 178 MMHG | HEIGHT: 64 IN | OXYGEN SATURATION: 100 %

## 2018-02-28 LAB — HCG UR QL: NEGATIVE

## 2018-02-28 PROCEDURE — 76040000019: Performed by: INTERNAL MEDICINE

## 2018-02-28 PROCEDURE — 81025 URINE PREGNANCY TEST: CPT

## 2018-02-28 PROCEDURE — 74011250636 HC RX REV CODE- 250/636

## 2018-02-28 PROCEDURE — 74011000250 HC RX REV CODE- 250

## 2018-02-28 PROCEDURE — 74011250636 HC RX REV CODE- 250/636: Performed by: INTERNAL MEDICINE

## 2018-02-28 PROCEDURE — 76060000031 HC ANESTHESIA FIRST 0.5 HR: Performed by: INTERNAL MEDICINE

## 2018-02-28 RX ORDER — SODIUM CHLORIDE 0.9 % (FLUSH) 0.9 %
5-10 SYRINGE (ML) INJECTION AS NEEDED
Status: DISCONTINUED | OUTPATIENT
Start: 2018-02-28 | End: 2018-02-28 | Stop reason: HOSPADM

## 2018-02-28 RX ORDER — DEXTROMETHORPHAN/PSEUDOEPHED 2.5-7.5/.8
1.2 DROPS ORAL
Status: DISCONTINUED | OUTPATIENT
Start: 2018-02-28 | End: 2018-02-28 | Stop reason: HOSPADM

## 2018-02-28 RX ORDER — LIDOCAINE HYDROCHLORIDE 20 MG/ML
INJECTION, SOLUTION EPIDURAL; INFILTRATION; INTRACAUDAL; PERINEURAL AS NEEDED
Status: DISCONTINUED | OUTPATIENT
Start: 2018-02-28 | End: 2018-02-28 | Stop reason: HOSPADM

## 2018-02-28 RX ORDER — FLUMAZENIL 0.1 MG/ML
0.2 INJECTION INTRAVENOUS
Status: DISCONTINUED | OUTPATIENT
Start: 2018-02-28 | End: 2018-02-28 | Stop reason: HOSPADM

## 2018-02-28 RX ORDER — SODIUM CHLORIDE, SODIUM LACTATE, POTASSIUM CHLORIDE, CALCIUM CHLORIDE 600; 310; 30; 20 MG/100ML; MG/100ML; MG/100ML; MG/100ML
50 INJECTION, SOLUTION INTRAVENOUS CONTINUOUS
Status: DISCONTINUED | OUTPATIENT
Start: 2018-02-28 | End: 2018-02-28 | Stop reason: HOSPADM

## 2018-02-28 RX ORDER — NALOXONE HYDROCHLORIDE 0.4 MG/ML
0.4 INJECTION, SOLUTION INTRAMUSCULAR; INTRAVENOUS; SUBCUTANEOUS
Status: DISCONTINUED | OUTPATIENT
Start: 2018-02-28 | End: 2018-02-28 | Stop reason: HOSPADM

## 2018-02-28 RX ORDER — SODIUM CHLORIDE 0.9 % (FLUSH) 0.9 %
5-10 SYRINGE (ML) INJECTION EVERY 8 HOURS
Status: DISCONTINUED | OUTPATIENT
Start: 2018-02-28 | End: 2018-02-28 | Stop reason: HOSPADM

## 2018-02-28 RX ORDER — EPINEPHRINE 0.1 MG/ML
1 INJECTION INTRACARDIAC; INTRAVENOUS
Status: DISCONTINUED | OUTPATIENT
Start: 2018-02-28 | End: 2018-02-28 | Stop reason: HOSPADM

## 2018-02-28 RX ORDER — ATROPINE SULFATE 0.1 MG/ML
0.5 INJECTION INTRAVENOUS
Status: DISCONTINUED | OUTPATIENT
Start: 2018-02-28 | End: 2018-02-28 | Stop reason: HOSPADM

## 2018-02-28 RX ORDER — SODIUM CHLORIDE 9 MG/ML
75 INJECTION, SOLUTION INTRAVENOUS CONTINUOUS
Status: DISCONTINUED | OUTPATIENT
Start: 2018-02-28 | End: 2018-02-28 | Stop reason: HOSPADM

## 2018-02-28 RX ORDER — PROPOFOL 10 MG/ML
INJECTION, EMULSION INTRAVENOUS AS NEEDED
Status: DISCONTINUED | OUTPATIENT
Start: 2018-02-28 | End: 2018-02-28 | Stop reason: HOSPADM

## 2018-02-28 RX ADMIN — PROPOFOL 180 MG: 10 INJECTION, EMULSION INTRAVENOUS at 11:47

## 2018-02-28 RX ADMIN — LIDOCAINE HYDROCHLORIDE 40 MG: 20 INJECTION, SOLUTION EPIDURAL; INFILTRATION; INTRACAUDAL; PERINEURAL at 11:35

## 2018-02-28 RX ADMIN — SODIUM CHLORIDE, SODIUM LACTATE, POTASSIUM CHLORIDE, AND CALCIUM CHLORIDE 50 ML/HR: 600; 310; 30; 20 INJECTION, SOLUTION INTRAVENOUS at 11:33

## 2018-02-28 NOTE — PROGRESS NOTES
Pt requested to take her Gabapentin before she goes home, has chronic pain and takes Gabapentin 1600 3 times a day and hasn't taken it since last night. Pt has her pt belongings and meds in her pocket and took her Gabapentin with gingerale.

## 2018-02-28 NOTE — IP AVS SNAPSHOT
[address] [facility] 
[phone] Patient: Jere Collier MRN: [id number] :1975 About your hospitalization You were admitted on:  2018 You last received care in the:  [facility] ENDOSCOPY You were discharged on:  2018 Why you were hospitalized Your primary diagnosis was:  Not on File Follow-up Information Follow up With Details Comments Contact Info Coty Quinn MD   [address] [facility] [address] [facility] [address] 
[phone] Discharge Orders None A check emma indicates which time of day the medication should be taken. My Medications CONTINUE taking these medications Instructions Each Dose to Equal  
 Morning Noon Evening Bedtime  
 amitriptyline 25 mg tablet Commonly known as:  ELAVIL Your last dose was: Your next dose is: Take 4 Tabs by mouth nightly. Indications: FIBROMYALGIA  
 100 mg  
    
   
   
   
  
 diazePAM 2 mg tablet Commonly known as:  VALIUM Your last dose was: Your next dose is:    
   
   
 Use 30 minutes prior to MRI  
     
   
   
   
  
 dimethyl fumarate 240 mg Cpdr  
Commonly known as:  Ruth Gomez Your last dose was: Your next dose is: Take 240 mg by mouth two (2) times a day. 240 mg DULoxetine 60 mg capsule Commonly known as:  CYMBALTA Your last dose was: Your next dose is: Take 1 Cap by mouth two (2) times a day. 60 mg  
    
   
   
   
  
 * gabapentin 800 mg tablet Commonly known as:  NEURONTIN Your last dose was: Your next dose is: Take 2 Tabs by mouth three (3) times daily. 1600 mg  
    
   
   
   
  
 * gabapentin 400 mg capsule Commonly known as:  NEURONTIN Your last dose was: Your next dose is: take 4 capsules by mouth three times a day * gabapentin 800 mg tablet Commonly known as:  NEURONTIN Your last dose was: Your next dose is: Take 2 Tabs by mouth three (3) times daily. 1600 mg  
    
   
   
   
  
 tiZANidine 4 mg tablet Commonly known as:  Yesica Saeid Your last dose was: Your next dose is: TAKE ONE-HALF TO ONE TABLET BY MOUTH UP TO THREE TIMES A DAY AS NEEDED MUSCLE SPASMS  
     
   
   
   
  
 XANAX 0.5 mg tablet Generic drug:  ALPRAZolam  
   
Your last dose was: Your next dose is: Take 0.5 mg by mouth three (3) times daily as needed for Anxiety. 0.5 mg  
    
   
   
   
  
 zolpidem 10 mg tablet Commonly known as:  AMBIEN Your last dose was: Your next dose is: TAKE ONE TABLET BY MOUTH EVERY NIGHT in the BED AS NEEDED FOR SLEEP * Notice: This list has 3 medication(s) that are the same as other medications prescribed for you. Read the directions carefully, and ask your doctor or other care provider to review them with you. Discharge Instructions Bishop Pierre [id number] 
1975 COLON DISCHARGE INSTRUCTIONS Discomfort: 
Redness at IV site- apply warm compress to area; if redness or soreness persist- contact your physician There may be a slight amount of blood passed from the rectum Gaseous discomfort- walking, belching will help relieve any discomfort You may not operate a vehicle for 12 hours You may not engage in an occupation involving machinery or appliances for rest of today You may not drink alcoholic beverages for at least 12 hours Avoid making any critical decisions for at least 24 hour DIET: 
 High fiber diet.  however -  remember your colon is empty and a heavy meal will produce gas. Avoid these foods:  vegetables, fried / greasy foods, carbonated drinks for today MEDICATION: 
 Per Medication Reconcilation ACTIVITY: 
You may not resume your normal daily activities until tomorrow AM; it is recommended that you spend the remainder of the day resting -  avoid any strenuous activity. CALL M.D. ANY SIGN OF: Increasing pain, nausea, vomiting Abdominal distension (swelling) New increased bleeding (oral or rectal) Fever (chills) IMPRESSION: 
Impression: 1. Severe let-sided diverticulosis 2. Medium sized internal hemorrhoids seen on retroflexion. 3. Otherwise normal colonoscopy through to the cecum. Note that bowel preparation was suboptimal and flat lesions and lesion <15 mm could have been missed. Recommendations: 1. Continue current bowel regimen 2. Repeat colonoscopy in 3 years with extended bowel preparation Follow-up Instructions: 
Telephone # [phone] Alexander Geller MD 
 
[facility] Activation Thank you for requesting access to [facility]. Please follow the instructions below to securely access and download your online medical record. [facility] allows you to send messages to your doctor, view your test results, renew your prescriptions, schedule appointments, and more. How Do I Sign Up? 1. In your internet browser, go to [email or web address]."[email or web address] 
2. Click on the First Time User? Click Here link in the Sign In box. You will be redirect to the New Member Sign Up page. 3. Enter your [facility] Access Code exactly as it appears below. You will not need to use this code after youve completed the sign-up process. If you do not sign up before the expiration date, you must request a new code. [facility] Access Code: Activation code not generated Current [facility] Status: Active (This is the date your [facility] access code will ) 4. Enter the last four digits of your Social Security Number (xxxx) and Date of Birth (mm/dd/yyyy) as indicated and click Submit. You will be taken to the next sign-up page. 5. Create a WeComics ID. This will be your [employer] login ID and cannot be changed, so think of one that is secure and easy to remember. 6. Create a [employer]Comics password. You can change your password at any time. 7. Enter your Password Reset Question and Answer. This can be used at a later time if you forget your password. 8. Enter your e-mail address. You will receive e-mail notification when new information is available in [address] 9. Click Sign Up. You can now view and download portions of your medical record. 10. Click the Download Summary menu link to download a portable copy of your medical information. Additional Information If you have questions, please visit the Frequently Asked Questions section of the [employer]ics website at [email or web address] Remember, [employer] is NOT to be used for urgent needs. For medical emergencies, dial 911. Introducing [facility] SERVICES! Dear Tanya Matthew: Thank you for requesting a WeComics account. Our records indicate that you already have an active WeComics account. You can access your account anytime at [email or web address] Did you know that you can access your hospital and ER discharge instructions at any time in [employer]Com[employer]? You can also review all of your test results from your hospital stay or ER visit. Additional Information If you have questions, please visit the Frequently Asked Questions section of the [employer] website at [email or web address] Remember, [employer] is NOT to be used for urgent needs. For medical emergencies, dial 911. Now available from your iPhone and Android! Providers Seen During Your Hospitalization Provider Specialty Primary office phone Zenon Preston MD Gastroenterology [phone] Your Primary Care Physician (PCP) Primary Care Physician Office Phone Office Fax [address] [address] [phone] You are allergic to the following No active allergies Recent Documentation Height Weight BMI OB Status Smoking Status 1.626 m 102.2 kg 38.66 kg/m2 Having regular periods Current Every Day Smoker Emergency Contacts Name Discharge Info Relation Home Work Mobile ToddMatthew DISCHARGE CAREGIVER [3] Spouse [3] [phone] Patient Belongings The following personal items are in your possession at time of discharge: 
  Dental Appliances: None  Visual Aid: None Please provide this summary of care documentation to your next provider. Signatures-by signing, you are acknowledging that this After Visit Summary has been reviewed with you and you have received a copy. Patient Signature:  ____________________________________________________________ Date:  ____________________________________________________________  
  
[facility] Provider Signature:  ____________________________________________________________ Date:  ____________________________________________________________

## 2018-02-28 NOTE — PROCEDURES
NAME:  Suzy Liz   :   1975   MRN:   [id number]     Date/Time:  2018 11:48 AM    Colonoscopy Operative Report    Procedure Type:   Colonoscopy --diagnostic     Indications:     Constipation  Pre-operative Diagnosis: see indication above  Post-operative Diagnosis:  See findings below  :  Jenna Pandya MD  Referring Provider: --Trini Iqbal MD    Exam:  Airway: clear, no airway problems anticipated  Heart: RRR, without gallops or rubs  Lungs: clear bilaterally without wheezes, crackles, or rhonchi  Abdomen: soft, nontender, nondistended, bowel sounds present  Mental Status: awake, alert and oriented to person, place and time    Sedation:  MAC anesthesia Propofol  Procedure Details:  After informed consent was obtained with all risks and benefits of procedure explained and preoperative exam completed, the patient was taken to the endoscopy suite and placed in the left lateral decubitus position. Upon sequential sedation as per above, a digital rectal exam was performed demonstrating internal hemorrhoids. The Olympus videocolonoscope  was inserted in the rectum and carefully advanced to the cecum, which was identified by the ileocecal valve and appendiceal orifice. The quality of preparation was fair. The colonoscope was slowly withdrawn with careful evaluation between folds. Retroflexion in the rectum was completed demonstrating internal hemorrhoids. Findings:   1. Severe let-sided diverticulosis  2. Medium sized internal hemorrhoids seen on retroflexion. 3. Otherwise normal colonoscopy through to the cecum. Note that bowel preparation was suboptimal and flat lesions and lesion <15 mm could have been missed. Specimen Removed:  None  Complications: None. EBL:  None. Impression:    1. Severe let-sided diverticulosis  2. Medium sized internal hemorrhoids seen on retroflexion. 3. Otherwise normal colonoscopy through to the cecum.  Note that bowel preparation was suboptimal and flat lesions and lesion <15 mm could have been missed. Recommendations:   1. Continue current bowel regimen  2. Repeat colonoscopy in 3 years with extended bowel preparation    Discharge Disposition:  Home in the company of a  when able to ambulate.       Lucy Mendoza MD

## 2018-02-28 NOTE — ROUTINE PROCESS
Suzy Agusto  1975  [id number]    Situation:  Verbal report received from: Kirk Barron RN  Procedure: Procedure(s):  COLONOSCOPY    Background:    Preoperative diagnosis: CONSTIPATION, GENERALIZED ABDOMINAL PAIN  Postoperative diagnosis: diverticulosis/hemorrhoids    :  Dr. Tiffanie Rico  Assistant(s): Endoscopy Technician-1: Suzan Rai  Endoscopy RN-1: Deepa Pierson    Specimens: * No specimens in log *  H. Pylori  no    Assessment:  Intra-procedure medications       Anesthesia gave intra-procedure sedation and medications, see anesthesia flow sheet yes    Intravenous fluids: NS@ KVO     Vital signs stable       Abdominal assessment: round and soft       Recommendation:  Discharge patient per MD order.     Family or Friend  Leila Wade  Permission to share finding with family or friend yes

## 2018-02-28 NOTE — ANESTHESIA POSTPROCEDURE EVALUATION
Post-Anesthesia Evaluation and Assessment    Patient: Candida Spurling MRN: [id number]  SSN: [id number]    YOB: 1975  Age: 38 y.o. Sex: female       Cardiovascular Function/Vital Signs  Visit Vitals    /85    Pulse 67    Temp 36.4 °C (97.5 °F)    Resp 11    Ht 5' 4\" (1.626 m)    Wt 102.2 kg (225 lb 4 oz)    SpO2 100%    BMI 38.66 kg/m2       Patient is status post total IV anesthesia anesthesia for Procedure(s):  COLONOSCOPY. Nausea/Vomiting: None    Postoperative hydration reviewed and adequate. Pain:  Pain Scale 1: Numeric (0 - 10) (02/28/18 1217)  Pain Intensity 1: 0 (02/28/18 1217)   Managed    Neurological Status: At baseline    Mental Status and Level of Consciousness: Arousable    Pulmonary Status:   O2 Device: Room air (02/28/18 1219)   Adequate oxygenation and airway patent    Complications related to anesthesia: None    Post-anesthesia assessment completed.  No concerns    Signed By: Saritha Solano DO     February 28, 2018

## 2018-02-28 NOTE — DISCHARGE INSTRUCTIONS
Tim Thomson  [id number]  1975    COLON DISCHARGE INSTRUCTIONS  Discomfort:  Redness at IV site- apply warm compress to area; if redness or soreness persist- contact your physician  There may be a slight amount of blood passed from the rectum  Gaseous discomfort- walking, belching will help relieve any discomfort  You may not operate a vehicle for 12 hours  You may not engage in an occupation involving machinery or appliances for rest of today  You may not drink alcoholic beverages for at least 12 hours  Avoid making any critical decisions for at least 24 hour  DIET:   High fiber diet. - however -  remember your colon is empty and a heavy meal will produce gas. Avoid these foods:  vegetables, fried / greasy foods, carbonated drinks for today  MEDICATION:  Per Medication Reconcilation       ACTIVITY:  You may not resume your normal daily activities until tomorrow AM; it is recommended that you spend the remainder of the day resting -  avoid any strenuous activity. CALL M.D. ANY SIGN OF:   Increasing pain, nausea, vomiting  Abdominal distension (swelling)  New increased bleeding (oral or rectal)  Fever (chills)      IMPRESSION:  Impression:    1. Severe let-sided diverticulosis  2. Medium sized internal hemorrhoids seen on retroflexion. 3. Otherwise normal colonoscopy through to the cecum. Note that bowel preparation was suboptimal and flat lesions and lesion <15 mm could have been missed. Recommendations:   1. Continue current bowel regimen  2. Repeat colonoscopy in 3 years with extended bowel preparation    Follow-up Instructions:  Telephone # Jose Wynne MD    [employer] Activation    Thank you for requesting access to [address] Please follow the instructions below to securely access and download your online medical record. [employer] allows you to send messages to your doctor, view your test results, renew your prescriptions, schedule appointments, and more.     How Do I Sign Up?    1. In your internet browser, go to [email or web address]  2. Click on the First Time User? Click Here link in the Sign In box. You will be redirect to the New Member Sign Up page. 3. Enter your Divvy[employer] Access Code exactly as it appears below. You will not need to use this code after youve completed the sign-up process. If you do not sign up before the expiration date, you must request a new code. MyChart Access Code: Activation code not generated  Current [employer] Status: Active (This is the date your DocSperat access code will )    4. Enter the last four digits of your Social Security Number (xxxx) and Date of Birth (mm/dd/yyyy) as indicated and click Submit. You will be taken to the next sign-up page. 5. Create a DocSperat ID. This will be your [employer] login ID and cannot be changed, so think of one that is secure and easy to remember. 6. Create a Div[employer]Cloud password. You can change your password at any time. 7. Enter your Password Reset Question and Answer. This can be used at a later time if you forget your password. 8. Enter your e-mail address. You will receive e-mail notification when new information is available in [address] 9. Click Sign Up. You can now view and download portions of your medical record. 10. Click the Download Summary menu link to download a portable copy of your medical information. Additional Information    If you have questions, please visit the Frequently Asked Questions section of the Div[employer]Cloud website at [email or web address] Remember, [employer] is NOT to be used for urgent needs. For medical emergencies, dial 911.

## 2018-02-28 NOTE — ANESTHESIA PREPROCEDURE EVALUATION
Anesthetic History   No history of anesthetic complications            Review of Systems / Medical History  Patient summary reviewed, nursing notes reviewed and pertinent labs reviewed    Pulmonary          Smoker (8.5 pk yrs)         Neuro/Psych         Headaches and psychiatric history (depression/anxiety)     Cardiovascular  Within defined limits                Exercise tolerance: >4 METS     GI/Hepatic/Renal     GERD          Comments: Constipation, generalized abdominal pain Endo/Other        Obesity     Other Findings   Comments: MS  Chronic pain         Physical Exam    Airway  Mallampati: II  TM Distance: 4 - 6 cm  Neck ROM: normal range of motion   Mouth opening: Normal     Cardiovascular  Regular rate and rhythm,  S1 and S2 normal,  no murmur, click, rub, or gallop             Dental  No notable dental hx       Pulmonary  Breath sounds clear to auscultation               Abdominal  GI exam deferred       Other Findings            Anesthetic Plan    ASA: 2  Anesthesia type: total IV anesthesia          Induction: Intravenous  Anesthetic plan and risks discussed with: Patient      UPT neg

## 2018-02-28 NOTE — PERIOP NOTES
Endoscope was pre-cleaned at bedside immediately following procedure by Manual Counter, endo tech. Yoni Cortez

## 2018-04-23 NOTE — TELEPHONE ENCOUNTER
Received a fax request for a 90 day supply Of the gabapentin 800mg from [facility] [address] [phone];      However called patient to verify the pharmacy that she uses due to having [address] on file as well     Left a message for a call back

## 2018-04-24 NOTE — TELEPHONE ENCOUNTER
Advised that the script for the gabapentin isn't due until 05/05, a script will be done at that time

## 2018-05-29 ENCOUNTER — OFFICE VISIT (OUTPATIENT)
Dept: NEUROLOGY | Age: 43
End: 2018-05-29

## 2018-05-29 VITALS
SYSTOLIC BLOOD PRESSURE: 122 MMHG | HEART RATE: 73 BPM | HEIGHT: 64 IN | WEIGHT: 220 LBS | OXYGEN SATURATION: 96 % | BODY MASS INDEX: 37.56 KG/M2 | DIASTOLIC BLOOD PRESSURE: 74 MMHG

## 2018-05-29 DIAGNOSIS — M62.838 MUSCLE SPASMS OF BOTH LOWER EXTREMITIES: ICD-10-CM

## 2018-05-29 DIAGNOSIS — M54.41 CHRONIC BILATERAL LOW BACK PAIN WITH BILATERAL SCIATICA: ICD-10-CM

## 2018-05-29 DIAGNOSIS — G35 MS (MULTIPLE SCLEROSIS) (HCC): Primary | ICD-10-CM

## 2018-05-29 DIAGNOSIS — M54.42 CHRONIC BILATERAL LOW BACK PAIN WITH BILATERAL SCIATICA: ICD-10-CM

## 2018-05-29 DIAGNOSIS — F32.9 REACTIVE DEPRESSION: ICD-10-CM

## 2018-05-29 DIAGNOSIS — Z72.0 TOBACCO ABUSE: ICD-10-CM

## 2018-05-29 DIAGNOSIS — G89.29 CHRONIC BILATERAL LOW BACK PAIN WITH BILATERAL SCIATICA: ICD-10-CM

## 2018-05-29 RX ORDER — TIZANIDINE 4 MG/1
TABLET ORAL
Qty: 90 TAB | Refills: 2 | Status: SHIPPED | OUTPATIENT
Start: 2018-05-29 | End: 2018-07-27 | Stop reason: SDUPTHER

## 2018-05-29 RX ORDER — GABAPENTIN 800 MG/1
1600 TABLET ORAL 3 TIMES DAILY
Qty: 180 TAB | Refills: 3 | Status: SHIPPED | OUTPATIENT
Start: 2018-05-29 | End: 2018-10-05 | Stop reason: SDUPTHER

## 2018-05-29 NOTE — PATIENT INSTRUCTIONS
Office Policies        Phone calls/patient messages:    Please allow up to 24 hours for someone in the office to contact you about your call or message. Be mindful your provider may be out of the office or your message may require further review. We encourage you to use [employer] for your messages as this is a faster, more efficient way to communicate with our office           Medication Refills:    Prescription medications require up to 48 business hours to process. We encourage you to use [employer] for your refills. For controlled medications: Please allow up to 72 business hours to process. Certain medications may require you to  a written prescription at our office. NO narcotic/controlled medications will be prescribed after 4pm Monday through Friday or on weekends              Form/Paperwork Completion:    Please note there is a $25 fee for all paperwork completed by our providers. We ask that you allow 7-14 business days. Pre-payment is due prior to picking up/faxing the completed form. You may also download your forms to [employer] to have your doctor print off. A Healthy Lifestyle: Care Instructions  Your Care Instructions    A healthy lifestyle can help you feel good, stay at a healthy weight, and have plenty of energy for both work and play. A healthy lifestyle is something you can share with your whole family. A healthy lifestyle also can lower your risk for serious health problems, such as high blood pressure, heart disease, and diabetes. You can follow a few steps listed below to improve your health and the health of your family. Follow-up care is a key part of your treatment and safety. Be sure to make and go to all appointments, and call your doctor if you are having problems. It's also a good idea to know your test results and keep a list of the medicines you take. How can you care for yourself at home? · Do not eat too much sugar, fat, or fast foods.  You can still have dessert and treats now and then. The goal is moderation. · Start small to improve your eating habits. Pay attention to portion sizes, drink less juice and soda pop, and eat more fruits and vegetables. ¨ Eat a healthy amount of food. A 3-ounce serving of meat, for example, is about the size of a deck of cards. Fill the rest of your plate with vegetables and whole grains. ¨ Limit the amount of soda and sports drinks you have every day. Drink more water when you are thirsty. ¨ Eat at least 5 servings of fruits and vegetables every day. It may seem like a lot, but it is not hard to reach this goal. A serving or helping is 1 piece of fruit, 1 cup of vegetables, or 2 cups of leafy, raw vegetables. Have an apple or some carrot sticks as an afternoon snack instead of a candy bar. Try to have fruits and/or vegetables at every meal.  · Make exercise part of your daily routine. You may want to start with simple activities, such as walking, bicycling, or slow swimming. Try to be active 30 to 60 minutes every day. You do not need to do all 30 to 60 minutes all at once. For example, you can exercise 3 times a day for 10 or 20 minutes. Moderate exercise is safe for most people, but it is always a good idea to talk to your doctor before starting an exercise program.  · Keep moving. Althea Banner the lawn, work in the garden, or [employer]. Take the stairs instead of the elevator at work. · If you smoke, quit. People who smoke have an increased risk for heart attack, stroke, cancer, and other lung illnesses. Quitting is hard, but there are ways to boost your chance of quitting tobacco for good. ¨ Use nicotine gum, patches, or lozenges. ¨ Ask your doctor about stop-smoking programs and medicines. ¨ Keep trying.   In addition to reducing your risk of diseases in the future, you will notice some benefits soon after you stop using tobacco. If you have shortness of breath or asthma symptoms, they will likely get better within a few weeks after you quit. · Limit how much alcohol you drink. Moderate amounts of alcohol (up to 2 drinks a day for men, 1 drink a day for women) are okay. But drinking too much can lead to liver problems, high blood pressure, and other health problems. Family health  If you have a family, there are many things you can do together to improve your health. · Eat meals together as a family as often as possible. · Eat healthy foods. This includes fruits, vegetables, lean meats and dairy, and whole grains. · Include your family in your fitness plan. Most people think of activities such as jogging or tennis as the way to fitness, but there are many ways you and your family can be more active. Anything that makes you breathe hard and gets your heart pumping is exercise. Here are some tips:  ¨ Walk to do errands or to take your child to school or the bus. ¨ Go for a family bike ride after dinner instead of watching TV. Where can you learn more? Go to http://damaris-eloise.info/. Enter G662 in the search box to learn more about \"A Healthy Lifestyle: Care Instructions. \"  Current as of: May 12, 2017  Content Version: 11.4  © 9487-9717 [employer]. Care instructions adapted under license by [employer] (which disclaims liability or warranty for this information). If you have questions about a medical condition or this instruction, always ask your healthcare professional. [place] [address] any warranty or liability for your use of this information.

## 2018-05-29 NOTE — PROGRESS NOTES
Chief Complaint: Multiple sclerosis    Mrs. Jose Traylor returns for a follow up visit. Since last being seen she  has been compliant with Tecfidera. She has been complaining of fatigue and pain. She has no new weakness or sensory loss. She has a stressful living arrangement. Assesment and Plan  1. Multiple sclerosis  Continue Tecfidera  JCV testing      2. Anxiety   Continue alrazolam    3. Fibromyaglia   Continue gabapentin        Allergies  Review of patient's allergies indicates no known allergies. Medications  Current Outpatient Prescriptions   Medication Sig    tiZANidine (ZANAFLEX) 4 mg tablet TAKE ONE-HALF TO ONE TABLET BY MOUTH UP TO THREE TIMES A DAY AS NEEDED MUSCLE SPASMS    gabapentin (NEURONTIN) 800 mg tablet Take 2 Tabs by mouth three (3) times daily.  dimethyl fumarate (TECFIDERA) 240 mg cpDR Take 240 mg by mouth two (2) times a day.  gabapentin (NEURONTIN) 400 mg capsule take 4 capsules by mouth three times a day    gabapentin (NEURONTIN) 800 mg tablet Take 2 Tabs by mouth three (3) times daily.  DULoxetine (CYMBALTA) 60 mg capsule Take 1 Cap by mouth two (2) times a day.  ALPRAZolam (XANAX) 0.5 mg tablet Take 0.5 mg by mouth three (3) times daily as needed for Anxiety.  zolpidem (AMBIEN) 10 mg tablet TAKE ONE TABLET BY MOUTH EVERY NIGHT in the BED AS NEEDED FOR SLEEP    diazePAM (VALIUM) 2 mg tablet Use 30 minutes prior to MRI    amitriptyline (ELAVIL) 25 mg tablet Take 4 Tabs by mouth nightly. Indications: FIBROMYALGIA     No current facility-administered medications for this visit.          Medical History  Past Medical History:   Diagnosis Date    Body aches 12/11/2014    Chronic pain     Depression     GERD (gastroesophageal reflux disease)     Headache(784.0)     History of mammogram never before    MS ([facility] sclerosis) ([facility] [address]     Neurological disorder     Multiple Sclerosis    Pap smear for cervical cancer screening 2008    Psychiatric disorder     anxiety  Psychotic disorder      Review of Systems   Constitutional: Negative for chills and fever. HENT: Negative for ear pain. Eyes: Negative for pain and discharge. Respiratory: Negative for cough and hemoptysis. Cardiovascular: Negative for chest pain and claudication. Gastrointestinal: Negative for constipation and diarrhea. Genitourinary: Negative for flank pain and hematuria. Musculoskeletal: Positive for back pain, myalgias and neck pain. Skin: Negative for itching and rash. Neurological: Positive for dizziness. Negative for tingling and headaches. Endo/Heme/Allergies: Negative for environmental allergies. Does not bruise/bleed easily. Psychiatric/Behavioral: Negative for depression and hallucinations. The patient is nervous/anxious. Exam:    Visit Vitals    /74    Pulse 73    Ht 5' 4\" (1.626 m)    Wt 220 lb (99.8 kg)    SpO2 96%    BMI 37.76 kg/m2      General: Well developed, well nourished. Patient in no apparent distress   Head: Normocephalic, atraumatic, anicteric sclera   Neck Normal ROM, No thyromegally   Lungs:  Clear to auscultation bilaterally, No wheezes or rubs   Cardiac: Regular rate and rhythm with no murmurs. Abd: Bowel sounds were audible. No tenderness on palpation   Ext: No pedal edema   Skin: Supple no rash     NeurologicExam:  Mental Status: Alert and oriented to person place and time   Speech: Fluent no aphasia or has scanning speech. Cranial Nerves:  II - XII Intact   Motor:  Full and symmetric strength of upper and lower proximal and distal muscles. Normal bulk and tone. Reflexes:   Deep tendon reflexes 2+/4 and symmetric. Sensory:   Symmetric and intact with no perceived deficits modalities involving small or large fibers. Gait:  Gait is balanced and fluid with normal arm swing. Tremor:   No tremor noted. Cerebellar:  Coordination intact. Neurovascular: No carotid bruits.  No JVD         Imaging    CT Results (most recent):    Results from [place] encounter on 02/16/18   CT ABD PELV W CONT   Narrative EXAM: CT ABDOMEN PELVIS WITH CONTRAST  INDICATION:  Constipation [K59.00 (ICD-10-CM)]. Constipation, unspecified. COMPARISON: 6/5/2015 100. CONTRAST: 100 mL of Isovue-370. TECHNIQUE:   Multislice helical CT was performed from the diaphragm to the symphysis pubis  during uneventful rapid bolus intravenous contrast administration. Oral contrast  was also administered. Contiguous 5 mm axial images were reconstructed and lung  and soft tissue windows were generated. Coronal and sagittal reformations were  generated. CT dose reduction was achieved through use of a standardized protocol  tailored for this examination and automatic exposure control for dose  modulation. FINDINGS:  LOWER CHEST: The visualized portions of the lung bases are clear. ABDOMEN:  Liver: The liver is normal in size and contour with no focal abnormality. Gallbladder and bile ducts: There are clips in the gallbladder fossa and the  gallbladder is absent. Spleen: No abnormality. Pancreas: No abnormality. Adrenal glands: No abnormality. Kidneys: No abnormality. PELVIS:  Reproductive organs: The uterus and ovaries are normal. There is a tampon in the  vagina. Bladder: No abnormality. BOWEL AND MESENTERY: The small bowel is normal.  There is no mesenteric mass or  adenopathy. The appendix is normal.  There are diverticula of the sigmoid colon. PERITONEUM: There is no ascites or free intraperitoneal air. RETROPERITONEUM: The aorta  tapers without aneurysm. There is no retroperitoneal  adenopathy or mass. There is no pelvic mass or adenopathy. BONES AND SOFT TISSUES: There are surgical clips from previous right internal  hernia repair. There is mild levoconvex scoliosis of the spine and degenerative  disc disease at L4-L5 and L5-S1. Impression IMPRESSION:   1. No acute abdominal or pelvic abnormality.   2. Status post cholecystectomy. 3. Colonic diverticulosis. 4. Status post right groin hernia repair. 5. Mild lumbar spondylosis. MRI Results (most recent):    Results from [place] encounter on 08/28/17   MRI CERV SPINE WO CONT   Narrative EXAM:  MRI CERV SPINE WO CONT    INDICATION:  Multiple sclerosis. Multiple sclerosis    COMPARISON: August 27, 2016    TECHNIQUE: MR imaging of the cervical spine was performed using the following  sequences: sagittal T1, T2, STIR;  axial T2, T1.     CONTRAST:  None. FINDINGS:    4 signal abnormality within the spinal cord and lower medulla appears stable  compatible with the known diagnosis of multiple sclerosis. The craniocervical  junction appears normal. There is no evidence for bone marrow replacement. Mild  spondylosis and degenerative disc disease also unchanged without significant  canal or foraminal compromise and no extrinsic pressure on the cord. Impression impression: No change.         .  Lab Review    Lab Results   Component Value Date/Time    WBC 4.4 04/20/2016 05:32 PM    HCT 39.9 04/20/2016 05:32 PM    HGB 13.1 04/20/2016 05:32 PM    PLATELET 970 48/44/4061 05:32 PM       Lab Results   Component Value Date/Time    Sodium 141 04/20/2016 05:32 PM    Potassium 4.6 04/20/2016 05:32 PM    Chloride 105 04/20/2016 05:32 PM    CO2 30 04/20/2016 05:32 PM    Glucose 94 04/20/2016 05:32 PM    BUN 12 04/20/2016 05:32 PM    Creatinine 0.88 04/20/2016 05:32 PM    Calcium 9.3 04/20/2016 05:32 PM       Lab Results   Component Value Date/Time    Cholesterol, total 153 02/02/2009 05:27 PM    HDL Cholesterol 48 02/02/2009 05:27 PM

## 2018-06-08 ENCOUNTER — HOSPITAL ENCOUNTER (OUTPATIENT)
Dept: MRI IMAGING | Age: 43
Discharge: HOME | End: 2018-06-08
Attending: PSYCHIATRY & NEUROLOGY

## 2018-06-08 DIAGNOSIS — G35 MS (MULTIPLE SCLEROSIS) (HCC): ICD-10-CM

## 2018-07-27 DIAGNOSIS — M62.838 MUSCLE SPASMS OF BOTH LOWER EXTREMITIES: ICD-10-CM

## 2018-07-27 DIAGNOSIS — G35 MS (MULTIPLE SCLEROSIS) (HCC): ICD-10-CM

## 2018-07-27 RX ORDER — TIZANIDINE 4 MG/1
TABLET ORAL
Qty: 90 TAB | Refills: 2 | Status: SHIPPED | OUTPATIENT
Start: 2018-07-27 | End: 2018-12-10 | Stop reason: SDUPTHER

## 2018-07-27 NOTE — TELEPHONE ENCOUNTER
Future Appointments  Date Time Provider Naveen Lakshmi   10/3/2018 10:20 AM Ronaldo Linares MD [address]                         Last Appointment My Department:  5/29/2018    Would you like to refill below?     Requested Prescriptions     Pending Prescriptions Disp Refills    tiZANidine (ZANAFLEX) 4 mg tablet 90 Tab 2     Sig: Take one and one half tablet three times a day

## 2018-09-24 DIAGNOSIS — M79.2 NEUROPATHIC PAIN: ICD-10-CM

## 2018-09-24 DIAGNOSIS — G35 MULTIPLE SCLEROSIS (HCC): ICD-10-CM

## 2018-09-25 RX ORDER — GABAPENTIN 800 MG/1
1600 TABLET ORAL 3 TIMES DAILY
Qty: 180 TAB | Refills: 0 | Status: SHIPPED | OUTPATIENT
Start: 2018-09-25 | End: 2019-09-20 | Stop reason: SDUPTHER

## 2018-09-25 NOTE — TELEPHONE ENCOUNTER
Future Appointments  Date Time Provider Naveen Lakshmi   10/3/2018 10:20 AM Kevin Simpson MD [address]                         Last Appointment My Department:  5/29/2018    Would you like to refill below? Requested Prescriptions     Pending Prescriptions Disp Refills    gabapentin (NEURONTIN) 800 mg tablet 180 Tab 2     Sig: Take 2 Tabs by mouth three (3) times daily.

## 2018-09-27 ENCOUNTER — TELEPHONE (OUTPATIENT)
Dept: NEUROLOGY | Age: 43
End: 2018-09-27

## 2018-09-27 NOTE — TELEPHONE ENCOUNTER
----- Message from Shelby Messing sent at 9/27/2018 11:28 AM EDT -----  Regarding: Dr Hatch/rx refill  Pt (p) [phone], requesting a rx refill for her Gabapentin, for her [address] , the one listed in her chart. , she is following up from her request on Mon or Tuesday.   She would liked it called in today

## 2018-10-03 ENCOUNTER — OFFICE VISIT (OUTPATIENT)
Dept: NEUROLOGY | Age: 43
End: 2018-10-03

## 2018-10-03 VITALS
WEIGHT: 208 LBS | HEART RATE: 70 BPM | OXYGEN SATURATION: 98 % | SYSTOLIC BLOOD PRESSURE: 120 MMHG | BODY MASS INDEX: 35.51 KG/M2 | HEIGHT: 64 IN | DIASTOLIC BLOOD PRESSURE: 82 MMHG

## 2018-10-03 DIAGNOSIS — G35 MS (MULTIPLE SCLEROSIS) (HCC): Primary | ICD-10-CM

## 2018-10-03 DIAGNOSIS — M79.7 FIBROMYALGIA: ICD-10-CM

## 2018-10-03 DIAGNOSIS — Z72.0 TOBACCO ABUSE: ICD-10-CM

## 2018-10-03 DIAGNOSIS — F41.9 ANXIETY: ICD-10-CM

## 2018-10-03 PROBLEM — E66.01 SEVERE OBESITY (HCC): Status: ACTIVE | Noted: 2018-10-03

## 2018-10-03 NOTE — PROGRESS NOTES
Chief Complaint: Multiple sclerosis Mrs. Yonatan Stovall returns for a follow up visit. SHe has been compliant with tecfidera. Last MRI was in August 2017. Last MRI was stable. Assesment and Plan 1. Multiple sclerosis Continue Tecfidera JCV testing 2. Anxiety Continue alrazolam 
 
3. Richie Frater Continue gabapentin 4. Tobacco use Starting chantix Allergies Review of patient's allergies indicates no known allergies. Medications Current Outpatient Prescriptions Medication Sig  
 gabapentin (NEURONTIN) 800 mg tablet Take 2 Tabs by mouth three (3) times daily.  tiZANidine (ZANAFLEX) 4 mg tablet Take one and one half tablet three times a day  gabapentin (NEURONTIN) 800 mg tablet Take 2 Tabs by mouth three (3) times daily.  dimethyl fumarate (TECFIDERA) 240 mg cpDR Take 240 mg by mouth two (2) times a day.  gabapentin (NEURONTIN) 400 mg capsule take 4 capsules by mouth three times a day  diazePAM (VALIUM) 2 mg tablet Use 30 minutes prior to MRI  amitriptyline (ELAVIL) 25 mg tablet Take 4 Tabs by mouth nightly. Indications: FIBROMYALGIA  DULoxetine (CYMBALTA) 60 mg capsule Take 1 Cap by mouth two (2) times a day.  ALPRAZolam (XANAX) 0.5 mg tablet Take 0.5 mg by mouth three (3) times daily as needed for Anxiety.  zolpidem (AMBIEN) 10 mg tablet TAKE ONE TABLET BY MOUTH EVERY NIGHT in the BED AS NEEDED FOR SLEEP No current facility-administered medications for this visit. Medical History Past Medical History:  
Diagnosis Date  Body aches 12/11/2014  Chronic pain  Depression  GERD (gastroesophageal reflux disease)  Headache(784.0)  History of mammogram never before  MS (multiple sclerosis) ([facility] [address]  Neurological disorder Multiple Sclerosis  Pap smear for cervical cancer screening 2008  Psychiatric disorder   
 anxiety  Psychotic disorder Review of Systems Constitutional: Negative for chills and fever. HENT: Negative for ear pain. Eyes: Negative for pain and discharge. Respiratory: Negative for cough and hemoptysis. Cardiovascular: Negative for chest pain and claudication. Gastrointestinal: Negative for constipation and diarrhea. Genitourinary: Negative for flank pain and hematuria. Musculoskeletal: Positive for back pain, myalgias and neck pain. Skin: Negative for itching and rash. Neurological: Positive for dizziness. Negative for tingling and headaches. Endo/Heme/Allergies: Negative for environmental allergies. Does not bruise/bleed easily. Psychiatric/Behavioral: Negative for depression and hallucinations. The patient is nervous/anxious. Exam: 
 
Visit Vitals  /82  Pulse 70  
 Ht 5' 4\" (1.626 m)  Wt 208 lb (94.3 kg)  SpO2 98%  BMI 35.7 kg/m2 General: Well developed, well nourished. Patient in no apparent distress Head: Normocephalic, atraumatic, anicteric sclera Neck Normal ROM, No thyromegally Lungs:  Clear to auscultation bilaterally, No wheezes or rubs Cardiac: Regular rate and rhythm with no murmurs. Abd: Bowel sounds were audible. No tenderness on palpation Ext: No pedal edema Skin: Supple no rash NeurologicExam: 
Mental Status: Alert and oriented to person place and time Speech: Fluent no aphasia or has scanning speech. Cranial Nerves:  II - XII Intact Motor:  Full and symmetric strength of upper and lower proximal and distal muscles. Normal bulk and tone. Reflexes:   Deep tendon reflexes 2+/4 and symmetric. Sensory:   Symmetric and intact with no perceived deficits modalities involving small or large fibers. Gait:  Gait is balanced and fluid with normal arm swing. Tremor:   No tremor noted. Cerebellar:  Coordination intact. Neurovascular: No carotid bruits. No JVD Imaging CT Results (most recent): 
 
Results from [facility] Encounter encounter on 02/16/18 CT ABD PELV W CONT Narrative EXAM: CT ABDOMEN PELVIS WITH CONTRAST INDICATION:  Constipation [K59.00 (ICD-10-CM)]. Constipation, unspecified. COMPARISON: 6/5/2015 100. CONTRAST: 100 mL of Isovue-370. TECHNIQUE:  
Multislice helical CT was performed from the diaphragm to the symphysis pubis 
during uneventful rapid bolus intravenous contrast administration. Oral contrast 
was also administered. Contiguous 5 mm axial images were reconstructed and lung 
and soft tissue windows were generated. Coronal and sagittal reformations were 
generated. CT dose reduction was achieved through use of a standardized protocol 
tailored for this examination and automatic exposure control for dose 
modulation. FINDINGS: 
LOWER CHEST: The visualized portions of the lung bases are clear. ABDOMEN: 
Liver: The liver is normal in size and contour with no focal abnormality. Gallbladder and bile ducts: There are clips in the gallbladder fossa and the 
gallbladder is absent. Spleen: No abnormality. Pancreas: No abnormality. Adrenal glands: No abnormality. Kidneys: No abnormality. PELVIS: 
Reproductive organs: The uterus and ovaries are normal. There is a tampon in the 
vagina. Bladder: No abnormality. BOWEL AND MESENTERY: The small bowel is normal.  There is no mesenteric mass or 
adenopathy. The appendix is normal.  There are diverticula of the sigmoid colon. PERITONEUM: There is no ascites or free intraperitoneal air. RETROPERITONEUM: The aorta  tapers without aneurysm. There is no retroperitoneal 
adenopathy or mass. There is no pelvic mass or adenopathy. BONES AND SOFT TISSUES: There are surgical clips from previous right internal 
hernia repair. There is mild levoconvex scoliosis of the spine and degenerative 
disc disease at L4-L5 and L5-S1. Impression IMPRESSION:  
1. No acute abdominal or pelvic abnormality. 2. Status post cholecystectomy. 3. Colonic diverticulosis. 4. Status post right groin hernia repair. 5. Mild lumbar spondylosis. MRI Results (most recent): 
 
Results from Hospital Encounter encounter on 08/28/17 MRI CERV SPINE WO CONT Narrative EXAM:  MRI CERV SPINE WO CONT INDICATION:  Multiple sclerosis. Multiple sclerosis COMPARISON: August 27, 2016 TECHNIQUE: MR imaging of the cervical spine was performed using the following 
sequences: sagittal T1, T2, STIR;  axial T2, T1.  
 
CONTRAST:  None. FINDINGS: 
 
4 signal abnormality within the spinal cord and lower medulla appears stable 
compatible with the known diagnosis of multiple sclerosis. The craniocervical 
junction appears normal. There is no evidence for bone marrow replacement. Mild 
spondylosis and degenerative disc disease also unchanged without significant 
canal or foraminal compromise and no extrinsic pressure on the cord. Impression impression: No change. . 
Lab Review Lab Results Component Value Date/Time WBC 4.4 04/20/2016 05:32 PM  
 HCT 39.9 04/20/2016 05:32 PM  
 HGB 13.1 04/20/2016 05:32 PM  
 PLATELET 286 80/45/3777 05:32 PM  
 
 
Lab Results Component Value Date/Time Sodium 141 04/20/2016 05:32 PM  
 Potassium 4.6 04/20/2016 05:32 PM  
 Chloride 105 04/20/2016 05:32 PM  
 CO2 30 04/20/2016 05:32 PM  
 Glucose 94 04/20/2016 05:32 PM  
 BUN 12 04/20/2016 05:32 PM  
 Creatinine 0.88 04/20/2016 05:32 PM  
 Calcium 9.3 04/20/2016 05:32 PM  
 
 
Lab Results Component Value Date/Time  Cholesterol, total 153 02/02/2009 05:27 PM  
 HDL Cholesterol 48 02/02/2009 05:27 PM

## 2018-10-03 NOTE — PATIENT INSTRUCTIONS

## 2018-10-05 DIAGNOSIS — M79.2 NEUROPATHIC PAIN: ICD-10-CM

## 2018-10-05 DIAGNOSIS — G35 MS (MULTIPLE SCLEROSIS) (HCC): ICD-10-CM

## 2018-10-05 DIAGNOSIS — G35 MULTIPLE SCLEROSIS (HCC): ICD-10-CM

## 2018-10-05 NOTE — TELEPHONE ENCOUNTER
Received refill request for gabapentin from [facility] filled 9/25/18 by Dr. Olivia Jang with no refill   Dr. Siddiqi Payment please refill

## 2018-10-07 DIAGNOSIS — F13.20 BENZODIAZEPINE DEPENDENCE (HCC): ICD-10-CM

## 2018-10-07 DIAGNOSIS — G35 MS (MULTIPLE SCLEROSIS) (HCC): ICD-10-CM

## 2018-10-07 DIAGNOSIS — R52 BODY ACHES: ICD-10-CM

## 2018-10-07 DIAGNOSIS — F41.9 ANXIETY: ICD-10-CM

## 2018-10-07 DIAGNOSIS — G89.4 CHRONIC PAIN SYNDROME: ICD-10-CM

## 2018-10-08 RX ORDER — GABAPENTIN 800 MG/1
1600 TABLET ORAL 3 TIMES DAILY
Qty: 180 TAB | Refills: 3 | Status: SHIPPED | OUTPATIENT
Start: 2018-10-08 | End: 2019-02-28 | Stop reason: SDUPTHER

## 2018-10-08 RX ORDER — DIAZEPAM 2 MG/1
TABLET ORAL
Qty: 2 TAB | Refills: 0 | OUTPATIENT
Start: 2018-10-08

## 2018-10-15 NOTE — TELEPHONE ENCOUNTER
Received refill request for tecfidera from [facility] pharmacy    Last filled 11/16/17  Dr. Radha Almonte do you want to refill

## 2018-10-17 RX ORDER — DIMETHYL FUMARATE 240 MG/1
240 CAPSULE ORAL 2 TIMES DAILY
Qty: 120 CAP | Refills: 3 | Status: SHIPPED | OUTPATIENT
Start: 2018-10-17 | End: 2019-09-25 | Stop reason: SDUPTHER

## 2018-10-25 ENCOUNTER — TELEPHONE (OUTPATIENT)
Dept: NEUROLOGY | Age: 43
End: 2018-10-25

## 2018-10-25 NOTE — TELEPHONE ENCOUNTER
----- Message from Celi Saucedo sent at 10/25/2018  2:28 PM EDT -----  Regarding: Dr Gracy Sutton, Anne Marie Godinez [facility] is following up on request for refill authorization on \"Tecfidera\" (f) [phone]    Best contact # [phone]

## 2018-10-27 ENCOUNTER — APPOINTMENT (OUTPATIENT)
Dept: GENERAL RADIOLOGY | Age: 43
End: 2018-10-27
Attending: EMERGENCY MEDICINE
Payer: MEDICARE

## 2018-10-27 ENCOUNTER — HOSPITAL ENCOUNTER (EMERGENCY)
Age: 43
Discharge: HOME | End: 2018-10-27
Attending: EMERGENCY MEDICINE
Payer: MEDICARE

## 2018-10-27 ENCOUNTER — APPOINTMENT (OUTPATIENT)
Dept: CT IMAGING | Age: 43
End: 2018-10-27
Attending: EMERGENCY MEDICINE
Payer: MEDICARE

## 2018-10-27 VITALS
SYSTOLIC BLOOD PRESSURE: 118 MMHG | HEIGHT: 65 IN | BODY MASS INDEX: 33.32 KG/M2 | HEART RATE: 100 BPM | TEMPERATURE: 98.1 F | OXYGEN SATURATION: 100 % | WEIGHT: 200 LBS | RESPIRATION RATE: 18 BRPM | DIASTOLIC BLOOD PRESSURE: 81 MMHG

## 2018-10-27 DIAGNOSIS — S30.1XXA CONTUSION OF ABDOMINAL WALL, INITIAL ENCOUNTER: ICD-10-CM

## 2018-10-27 DIAGNOSIS — S22.41XA MULTIPLE FRACTURES OF RIBS, RIGHT SIDE, INITIAL ENCOUNTER FOR CLOSED FRACTURE: Primary | ICD-10-CM

## 2018-10-27 LAB
ALBUMIN SERPL-MCNC: 4 G/DL (ref 3.5–5)
ALBUMIN/GLOB SERPL: 1.1 {RATIO} (ref 1.1–2.2)
ALP SERPL-CCNC: 79 U/L (ref 45–117)
ALT SERPL-CCNC: 31 U/L (ref 12–78)
ANION GAP SERPL CALC-SCNC: 14 MMOL/L (ref 5–15)
APPEARANCE UR: ABNORMAL
AST SERPL-CCNC: 33 U/L (ref 15–37)
BASOPHILS # BLD: 0.1 K/UL (ref 0–0.1)
BASOPHILS NFR BLD: 1 % (ref 0–1)
BILIRUB SERPL-MCNC: 0.5 MG/DL (ref 0.2–1)
BILIRUB UR QL CFM: NEGATIVE
BUN SERPL-MCNC: 8 MG/DL (ref 6–20)
BUN/CREAT SERPL: 10 (ref 12–20)
CALCIUM SERPL-MCNC: 9.1 MG/DL (ref 8.5–10.1)
CHLORIDE SERPL-SCNC: 104 MMOL/L (ref 97–108)
CO2 SERPL-SCNC: 25 MMOL/L (ref 21–32)
COLOR UR: ABNORMAL
CREAT SERPL-MCNC: 0.84 MG/DL (ref 0.55–1.02)
DIFFERENTIAL METHOD BLD: ABNORMAL
EOSINOPHIL # BLD: 0.1 K/UL (ref 0–0.4)
EOSINOPHIL NFR BLD: 1 % (ref 0–7)
ERYTHROCYTE [DISTWIDTH] IN BLOOD BY AUTOMATED COUNT: 12.7 % (ref 11.5–14.5)
GLOBULIN SER CALC-MCNC: 3.5 G/DL (ref 2–4)
GLUCOSE SERPL-MCNC: 71 MG/DL (ref 65–100)
GLUCOSE UR STRIP.AUTO-MCNC: NEGATIVE MG/DL
HCT VFR BLD AUTO: 40 % (ref 35–47)
HGB BLD-MCNC: 13.1 G/DL (ref 11.5–16)
HGB UR QL STRIP: NEGATIVE
IMM GRANULOCYTES # BLD: 0 K/UL (ref 0–0.04)
IMM GRANULOCYTES NFR BLD AUTO: 0 % (ref 0–0.5)
KETONES UR QL STRIP.AUTO: 40 MG/DL
LEUKOCYTE ESTERASE UR QL STRIP.AUTO: NEGATIVE
LIPASE SERPL-CCNC: 91 U/L (ref 73–393)
LYMPHOCYTES # BLD: 0.7 K/UL (ref 0.8–3.5)
LYMPHOCYTES NFR BLD: 12 % (ref 12–49)
MCH RBC QN AUTO: 32.3 PG (ref 26–34)
MCHC RBC AUTO-ENTMCNC: 32.8 G/DL (ref 30–36.5)
MCV RBC AUTO: 98.5 FL (ref 80–99)
MONOCYTES # BLD: 0.4 K/UL (ref 0–1)
MONOCYTES NFR BLD: 6 % (ref 5–13)
NEUTS SEG # BLD: 4.7 K/UL (ref 1.8–8)
NEUTS SEG NFR BLD: 80 % (ref 32–75)
NITRITE UR QL STRIP.AUTO: NEGATIVE
NRBC # BLD: 0 K/UL (ref 0–0.01)
NRBC BLD-RTO: 0 PER 100 WBC
PH UR STRIP: 5.5 [PH] (ref 5–8)
PLATELET # BLD AUTO: 252 K/UL (ref 150–400)
PMV BLD AUTO: 10.2 FL (ref 8.9–12.9)
POTASSIUM SERPL-SCNC: 4.2 MMOL/L (ref 3.5–5.1)
PROT SERPL-MCNC: 7.5 G/DL (ref 6.4–8.2)
PROT UR STRIP-MCNC: NEGATIVE MG/DL
RBC # BLD AUTO: 4.06 M/UL (ref 3.8–5.2)
RBC MORPH BLD: ABNORMAL
SODIUM SERPL-SCNC: 143 MMOL/L (ref 136–145)
SP GR UR REFRACTOMETRY: 1.02 (ref 1–1.03)
UROBILINOGEN UR QL STRIP.AUTO: 1 EU/DL (ref 0.2–1)
WBC # BLD AUTO: 6 K/UL (ref 3.6–11)

## 2018-10-27 PROCEDURE — 81003 URINALYSIS AUTO W/O SCOPE: CPT | Performed by: EMERGENCY MEDICINE

## 2018-10-27 PROCEDURE — 74011250636 HC RX REV CODE- 250/636: Performed by: EMERGENCY MEDICINE

## 2018-10-27 PROCEDURE — 74176 CT ABD & PELVIS W/O CONTRAST: CPT

## 2018-10-27 PROCEDURE — 96375 TX/PRO/DX INJ NEW DRUG ADDON: CPT

## 2018-10-27 PROCEDURE — 71101 X-RAY EXAM UNILAT RIBS/CHEST: CPT

## 2018-10-27 PROCEDURE — 83690 ASSAY OF LIPASE: CPT | Performed by: EMERGENCY MEDICINE

## 2018-10-27 PROCEDURE — 99283 EMERGENCY DEPT VISIT LOW MDM: CPT

## 2018-10-27 PROCEDURE — 74011000250 HC RX REV CODE- 250: Performed by: EMERGENCY MEDICINE

## 2018-10-27 PROCEDURE — 80053 COMPREHEN METABOLIC PANEL: CPT | Performed by: EMERGENCY MEDICINE

## 2018-10-27 PROCEDURE — 85025 COMPLETE CBC W/AUTO DIFF WBC: CPT | Performed by: EMERGENCY MEDICINE

## 2018-10-27 PROCEDURE — 96374 THER/PROPH/DIAG INJ IV PUSH: CPT

## 2018-10-27 PROCEDURE — 36415 COLL VENOUS BLD VENIPUNCTURE: CPT | Performed by: EMERGENCY MEDICINE

## 2018-10-27 RX ORDER — MORPHINE SULFATE 2 MG/ML
4 INJECTION, SOLUTION INTRAMUSCULAR; INTRAVENOUS ONCE
Status: COMPLETED | OUTPATIENT
Start: 2018-10-27 | End: 2018-10-27

## 2018-10-27 RX ORDER — LIDOCAINE 4 G/100G
1 PATCH TOPICAL
Status: DISCONTINUED | OUTPATIENT
Start: 2018-10-27 | End: 2018-10-27 | Stop reason: HOSPADM

## 2018-10-27 RX ORDER — LIDOCAINE 50 MG/G
1 PATCH TOPICAL
Status: DISCONTINUED | OUTPATIENT
Start: 2018-10-27 | End: 2018-10-27

## 2018-10-27 RX ORDER — LIDOCAINE 50 MG/G
1 PATCH TOPICAL EVERY 24 HOURS
Status: DISCONTINUED | OUTPATIENT
Start: 2018-10-27 | End: 2018-10-27

## 2018-10-27 RX ORDER — IBUPROFEN 800 MG/1
800 TABLET ORAL
Qty: 20 TAB | Refills: 0 | Status: SHIPPED | OUTPATIENT
Start: 2018-10-27 | End: 2018-11-03

## 2018-10-27 RX ORDER — OXYCODONE AND ACETAMINOPHEN 5; 325 MG/1; MG/1
1 TABLET ORAL
Qty: 6 TAB | Refills: 0 | Status: SHIPPED | OUTPATIENT
Start: 2018-10-27 | End: 2018-12-22

## 2018-10-27 RX ORDER — KETOROLAC TROMETHAMINE 30 MG/ML
30 INJECTION, SOLUTION INTRAMUSCULAR; INTRAVENOUS
Status: COMPLETED | OUTPATIENT
Start: 2018-10-27 | End: 2018-10-27

## 2018-10-27 RX ADMIN — KETOROLAC TROMETHAMINE 30 MG: 30 INJECTION INTRAMUSCULAR; INTRAVENOUS at 20:13

## 2018-10-27 RX ADMIN — MORPHINE SULFATE 4 MG: 2 INJECTION, SOLUTION INTRAMUSCULAR; INTRAVENOUS at 19:14

## 2018-10-27 NOTE — ED PROVIDER NOTES
EMERGENCY DEPARTMENT HISTORY AND PHYSICAL EXAM 
 
 
Date: 10/27/2018 Patient Name: Mina Sharma History of Presenting Illness Chief Complaint Patient presents with  Fall  
  Pt s/p GLF r/t MS. Pt c/o right side chest pain related to the fall with period of SOB History Provided By: Patient HPI: Mina Sharma, 38 y.o. female with PMHx significant for GERD, MS, presents ambulatory to the ED with cc of moderate, acute RUQ abdominal pain with associated lower back pain and CP s/p GLF last night. Pt reports recurrent falls secondary to MS. She mentions hitting head, but denies any LOC or neck pain. She denies endorsing any medication to relieve current symptoms. She conveys compliance of medication regimen. She denies any modifying factors. Pt specifically denies any signs of fever, chill, N/V/D, dizziness, weakness, SOB, cough, and any other associated symptoms. There are no other complaints, changes, or physical findings at this time. PCP: Mindy Palomo MD 
 
Current Facility-Administered Medications Medication Dose Route Frequency Provider Last Rate Last Dose  lidocaine 4 % patch 1 Patch  1 Patch TransDERmal NOW Ceferino Ruelas MD   1 Patch at 10/27/18 2037 Current Outpatient Medications Medication Sig Dispense Refill  ibuprofen (MOTRIN) 800 mg tablet Take 1 Tab by mouth every eight (8) hours as needed for Pain for up to 7 days. 20 Tab 0  
 oxyCODONE-acetaminophen (PERCOCET) 5-325 mg per tablet Take 1 Tab by mouth every eight (8) hours as needed for Pain. Max Daily Amount: 3 Tabs. 6 Tab 0  
 dimethyl fumarate (TECFIDERA) 240 mg cpDR Take 240 mg by mouth two (2) times a day. 120 Cap 3  
 gabapentin (NEURONTIN) 800 mg tablet Take 2 Tabs by mouth three (3) times daily. 180 Tab 3  
 gabapentin (NEURONTIN) 800 mg tablet Take 2 Tabs by mouth three (3) times daily.  180 Tab 0  
 tiZANidine (ZANAFLEX) 4 mg tablet Take one and one half tablet three times a day 90 Tab 2  
 gabapentin (NEURONTIN) 400 mg capsule take 4 capsules by mouth three times a day 360 Cap 3  
 diazePAM (VALIUM) 2 mg tablet Use 30 minutes prior to MRI 2 Tab 0  
 amitriptyline (ELAVIL) 25 mg tablet Take 4 Tabs by mouth nightly. Indications: FIBROMYALGIA 120 Tab 3  
 DULoxetine (CYMBALTA) 60 mg capsule Take 1 Cap by mouth two (2) times a day. 60 Cap 4  ALPRAZolam (XANAX) 0.5 mg tablet Take 0.5 mg by mouth three (3) times daily as needed for Anxiety.  zolpidem (AMBIEN) 10 mg tablet TAKE ONE TABLET BY MOUTH EVERY NIGHT in the BED AS NEEDED FOR SLEEP 30 tablet 0 Past History Past Medical History: 
Past Medical History:  
Diagnosis Date  Body aches 12/11/2014  Chronic pain  Depression  GERD (gastroesophageal reflux disease)  Headache(784.0)  History of mammogram never before  MS (multiple sclerosis) ([facility] [address]  Neurological disorder Multiple Sclerosis  Pap smear for cervical cancer screening 2008  Psychiatric disorder   
 anxiety  Psychotic disorder Past Surgical History: 
Past Surgical History:  
Procedure Laterality Date  HX CHOLECYSTECTOMY  HX GYN    
 3 c-sections  HX HEENT    
 bilateral ear tube surgery  HX OTHER SURGICAL    
 R inguinal hernia repair Family History: 
Family History Problem Relation Age of Onset  Heart Attack Father   
     tripple bypass  Cancer Father   
     lung  Diabetes Mother   
     type 2  
 Heart Disease Mother   
     heart disease  Diabetes Paternal Grandmother Social History: 
Social History Tobacco Use  Smoking status: Current Every Day Smoker Packs/day: 0.50 Years: 17.00 Pack years: 8.50 Types: Cigarettes  Smokeless tobacco: Never Used  Tobacco comment: 1 pack every 3 days. Substance Use Topics  Alcohol use: No  
 Drug use: No  
 
 
Allergies: 
No Known Allergies Review of Systems Review of Systems Constitutional: Negative. Negative for activity change, chills and diaphoresis. HENT: Negative. Negative for ear pain, trouble swallowing and voice change. Eyes: Negative. Cardiovascular: Positive for chest pain. Negative for palpitations and leg swelling. Gastrointestinal: Positive for abdominal pain. Negative for constipation, nausea and vomiting. Endocrine: Negative. Genitourinary: Negative. Negative for flank pain, frequency and hematuria. Musculoskeletal: Positive for back pain. Skin: Negative. Allergic/Immunologic: Negative. Neurological: Negative. Hematological: Negative. Psychiatric/Behavioral: Negative. All other systems reviewed and are negative. Physical Exam  
Physical Exam  
Constitutional: She is oriented to person, place, and time. She appears well-developed and well-nourished. HENT:  
Head: Normocephalic. Mouth/Throat: Oropharynx is clear and moist.  
Eyes: Conjunctivae and EOM are normal. Pupils are equal, round, and reactive to light. Neck: Normal range of motion. Neck supple. Cardiovascular: Normal rate, regular rhythm, normal heart sounds and intact distal pulses. Pulmonary/Chest: Effort normal and breath sounds normal. She exhibits tenderness. R sided chest wall tenderness Abdominal: Soft. Bowel sounds are normal. She exhibits no distension. There is tenderness in the right upper quadrant. There is no rebound. Musculoskeletal: Normal range of motion. She exhibits no edema or deformity. Neurological: She is alert and oriented to person, place, and time. Skin: Skin is warm and dry. Psychiatric: She has a normal mood and affect. Her behavior is normal. Judgment and thought content normal.  
 
 
Diagnostic Study Results Labs - Recent Results (from the past 12 hour(s)) CBC WITH AUTOMATED DIFF Collection Time: 10/27/18  6:56 PM  
Result Value Ref Range WBC 6.0 3.6 - 11.0 K/uL  
 RBC 4.06 3.80 - 5.20 M/uL HGB 13.1 11.5 - 16.0 g/dL HCT 40.0 35.0 - 47.0 % MCV 98.5 80.0 - 99.0 FL  
 MCH 32.3 26.0 - 34.0 PG  
 MCHC 32.8 30.0 - 36.5 g/dL  
 RDW 12.7 11.5 - 14.5 % PLATELET 602 723 - 643 K/uL MPV 10.2 8.9 - 12.9 FL  
 NRBC 0.0 0  WBC ABSOLUTE NRBC 0.00 0.00 - 0.01 K/uL NEUTROPHILS 80 (H) 32 - 75 % LYMPHOCYTES 12 12 - 49 % MONOCYTES 6 5 - 13 % EOSINOPHILS 1 0 - 7 % BASOPHILS 1 0 - 1 % IMMATURE GRANULOCYTES 0 0.0 - 0.5 % ABS. NEUTROPHILS 4.7 1.8 - 8.0 K/UL  
 ABS. LYMPHOCYTES 0.7 (L) 0.8 - 3.5 K/UL  
 ABS. MONOCYTES 0.4 0.0 - 1.0 K/UL  
 ABS. EOSINOPHILS 0.1 0.0 - 0.4 K/UL  
 ABS. BASOPHILS 0.1 0.0 - 0.1 K/UL  
 ABS. IMM. GRANS. 0.0 0.00 - 0.04 K/UL  
 DF SMEAR SCANNED    
 RBC COMMENTS NORMOCYTIC, NORMOCHROMIC METABOLIC PANEL, COMPREHENSIVE Collection Time: 10/27/18  6:56 PM  
Result Value Ref Range Sodium 143 136 - 145 mmol/L Potassium 4.2 3.5 - 5.1 mmol/L Chloride 104 97 - 108 mmol/L  
 CO2 25 21 - 32 mmol/L Anion gap 14 5 - 15 mmol/L Glucose 71 65 - 100 mg/dL BUN 8 6 - 20 MG/DL Creatinine 0.84 0.55 - 1.02 MG/DL  
 BUN/Creatinine ratio 10 (L) 12 - 20 GFR est AA >60 >60 ml/min/1.73m2 GFR est non-AA >60 >60 ml/min/1.73m2 Calcium 9.1 8.5 - 10.1 MG/DL Bilirubin, total 0.5 0.2 - 1.0 MG/DL  
 ALT (SGPT) 31 12 - 78 U/L  
 AST (SGOT) 33 15 - 37 U/L Alk. phosphatase 79 45 - 117 U/L Protein, total 7.5 6.4 - 8.2 g/dL Albumin 4.0 3.5 - 5.0 g/dL Globulin 3.5 2.0 - 4.0 g/dL A-G Ratio 1.1 1.1 - 2.2 LIPASE Collection Time: 10/27/18  6:56 PM  
Result Value Ref Range Lipase 91 73 - 393 U/L  
URINALYSIS W/ RFLX MICROSCOPIC Collection Time: 10/27/18  6:56 PM  
Result Value Ref Range Color YELLOW/STRAW Appearance CLOUDY (A) CLEAR Specific gravity 1.025 1.003 - 1.030    
 pH (UA) 5.5 5.0 - 8.0 Protein NEGATIVE  NEG mg/dL Glucose NEGATIVE  NEG mg/dL Ketone 40 (A) NEG mg/dL  Blood NEGATIVE  NEG    
 Urobilinogen 1.0 0.2 - 1.0 EU/dL Nitrites NEGATIVE  NEG Leukocyte Esterase NEGATIVE  NEG    
BILIRUBIN, CONFIRM Collection Time: 10/27/18  6:56 PM  
Result Value Ref Range Bilirubin UA, confirm NEGATIVE  NEG Radiologic Studies - 
CT Results  (Last 48 hours) 10/27/18 1856  CT ABD PELV WO CONT Final result Impression:  IMPRESSION:  
Multiple right rib fractures. No acute intra-abdominal abnormality although lack  
of intravenous contrast does limit the sensitivity for evaluation of solid organ  
injuries. Narrative:  EXAM:  CT ABD PELV WO CONT INDICATION: Status post ground-level fall with right-sided chest pain COMPARISON: 2/16/2018 CONTRAST:  None. TECHNIQUE:   
Thin axial images were obtained through the abdomen and pelvis. Coronal and  
sagittal reconstructions were generated. Oral contrast was not administered. CT  
dose reduction was achieved through use of a standardized protocol tailored for  
this examination and automatic exposure control for dose modulation. The absence of intravenous contrast material reduces the sensitivity for  
evaluation of the solid parenchymal organs of the abdomen. FINDINGS:   
LUNG BASES: Bibasilar atelectasis is noted. INCIDENTALLY IMAGED HEART AND MEDIASTINUM: Unremarkable. LIVER: No mass or biliary dilatation. GALLBLADDER: Surgically absent. SPLEEN: No mass. PANCREAS: No mass or ductal dilatation. ADRENALS: Unremarkable. KIDNEYS/URETERS: No mass, calculus, or hydronephrosis. STOMACH: Unremarkable. SMALL BOWEL: No dilatation or wall thickening. COLON: Sigmoid diverticulosis is noted. APPENDIX: Unremarkable. PERITONEUM: No ascites or pneumoperitoneum. RETROPERITONEUM: No lymphadenopathy or aortic aneurysm. REPRODUCTIVE ORGANS: There is no pelvic mass or lymphadenopathy. URINARY BLADDER: No mass or calculus. BONES: There are acute displaced fractures of the right posterior seventh and  
eighth ribs as well as a nondisplaced fracture the right lateral fifth rib and  
likely incomplete fracture of the right ninth rib. Old fractures of the right  
10th and 11th ribs are noted. ADDITIONAL COMMENTS: The patient is status post anterior abdominal wall hernia  
repair. XR RIBS RT W PA CXR MIN 3 V (Final result) Result time 10/27/18 18:50:21 Final result by Gerardo, Rad Results In (10/27/18 18:48:33) Initial Result:  
Impression:  
 IMPRESSION: Right rib fractures including 2 fractures in the eighth rib. No 
pneumothorax. Narrative: CLINICAL HISTORY: Status post fall with right chest pain and shortness of breath Comparison to 2/17/2012 PA view of the chest and 3 oblique views of the right ribs demonstrate normal 
heart size. There is no acute process in the lung fields. Fractures of the right 
posterior seventh and eighth ribs are noted as well as a nondisplaced fracture 
of the right anterolateral eighth rib. Medical Decision Making I am the first provider for this patient. I reviewed the vital signs, available nursing notes, past medical history, past surgical history, family history and social history. Vital Signs-Reviewed the patient's vital signs. Patient Vitals for the past 12 hrs: 
 Temp Pulse Resp BP SpO2  
10/27/18 1759 98.1 °F (36.7 °C) 100 18 118/81 100 % Records Reviewed: Nursing Notes, Old Medical Records, Previous Radiology Studies and Previous Laboratory Studies Provider Notes (Medical Decision Making): DDx: Rib fx, chest wall contusion, liver injury, abdominal contusion ED Course:  
Initial assessment performed. The patients presenting problems have been discussed, and they are in agreement with the care plan formulated and outlined with them. I have encouraged them to ask questions as they arise throughout their visit. SIGN OUT: 
6:50 PM 
Patient's presentation, labs/imaging and plan of care was reviewed with Mehul Garcia MD as part of sign out. They will review labs as part of the plan discussed with the patient. Mehul Garcia MD's assistance in completion of this plan is greatly appreciated but it should be noted that I will be the provider of record for this patient. PROGRESS NOTE: 
7:58 PM 
CT shows that the pt has multiple rib fractures. Written by Ally Huang ED Scribe, as dictated by Leodan White. MD Ira. 
 
Paris Calloway MD 
 
Critical Care Time: 0 minutes Disposition: 
DISCHARGE NOTE 
8:50 PM 
The patient has been re-evaluated and is ready for discharge. Reviewed available results with patient. Counseled pt on diagnosis and care plan. Pt has expressed understanding, and all questions have been answered. Pt agrees with plan and agrees to F/U as recommended, or return to the ED if their sxs worsen. Discharge instructions have been provided and explained to the pt, along with reasons to return to the ED. Written by Ally Huang ED Scribe, as dictated by Leodan White. MD Ira. 
 
PLAN: 
1. Current Discharge Medication List  
  
START taking these medications Details  
ibuprofen (MOTRIN) 800 mg tablet Take 1 Tab by mouth every eight (8) hours as needed for Pain for up to 7 days. Qty: 20 Tab, Refills: 0  
  
oxyCODONE-acetaminophen (PERCOCET) 5-325 mg per tablet Take 1 Tab by mouth every eight (8) hours as needed for Pain. Max Daily Amount: 3 Tabs. Qty: 6 Tab, Refills: 0 Associated Diagnoses: Multiple fractures of ribs, right side, initial encounter for closed fracture 2. Follow-up Information Follow up With Specialties Details Why Contact Info Brent Warner MD Family Practice Schedule an appointment as soon as possible for a visit  [address] [facility] [address] [facility] [address] [id number] 
[phone] [facility] EMERGENCY DEPT Emergency Medicine  As needed, If symptoms worsen [address] Return to ED if worse Diagnosis Clinical Impression: 1. Multiple fractures of ribs, right side, initial encounter for closed fracture 2. Contusion of abdominal wall, initial encounter Attestations: This note is prepared by Aury Stack, acting as Scribe for Kimberlee Gregory MD. 
 
Kimberlee Gregory MD: The scribe's documentation has been prepared under my direction and personally reviewed by me in its entirety. I confirm that the note above accurately reflects all work, treatment, procedures, and medical decision making performed by me This note is prepared by Bernard Berg, acting as Scribe for BROCK Pyle MD. BROCK Roth MD: The scribe's documentation has been prepared under my direction and personally reviewed by me in its entirety. I confirm that the note above accurately reflects all work, treatment, procedures, and medical decision making performed by me.

## 2018-10-27 NOTE — DISCHARGE INSTRUCTIONS
Chest Contusion: Care Instructions  Your Care Instructions  A chest contusion, or bruise, is caused by a fall or direct blow to the chest. Car crashes, falls, getting punched, and injury from bicycle handlebars are common causes of chest contusions. A very forceful blow to the chest can injure the heart or blood vessels in the chest, the lungs, the airway, the liver, or the spleen. Pain may be caused by an injury to muscles, cartilage, or ribs. Deep breathing, coughing, or sneezing can increase your pain. Lying on the injured area also can cause pain. Follow-up care is a key part of your treatment and safety. Be sure to make and go to all appointments, and call your doctor if you are having problems. It's also a good idea to know your test results and keep a list of the medicines you take. How can you care for yourself at home? · Rest and protect the injured or sore area. Stop, change, or take a break from any activity that may be causing your pain. · Put ice or a cold pack on the area for 10 to 20 minutes at a time. Put a thin cloth between the ice and your skin. · After 2 or 3 days, if your swelling is gone, apply a heating pad set on low or a warm cloth to your chest. Some doctors suggest that you go back and forth between hot and cold. Put a thin cloth between the heating pad and your skin. · Do not wrap or tape your ribs for support. This may cause you to take smaller breaths, which could increase your risk of pneumonia and lung collapse. · Ask your doctor if you can take an over-the-counter pain medicine, such as acetaminophen (Tylenol), ibuprofen (Advil, Motrin), or naproxen (Aleve). Be safe with medicines. Read and follow all instructions on the label. · Take your medicines exactly as prescribed. Call your doctor if you think you are having a problem with your medicine.   · Gentle stretching and massage may help you feel better after a few days of rest. Stretch slowly to the point just before discomfort begins, then hold the stretch for at least 15 to 30 seconds. Do this 3 or 4 times per day. · As your pain gets better, slowly return to your normal activities. Be patient, because chest bruises can take weeks or months to heal. Any increased pain may be a sign that you need to rest a while longer. When should you call for help? Call 911 anytime you think you may need emergency care. For example, call if:    · You have severe trouble breathing.     · You cough up blood.    Call your doctor now or seek immediate medical care if:    · You have belly pain.     · You are dizzy or lightheaded, or you feel like you may faint.     · You develop new symptoms with the chest pain.     · Your chest pain gets worse.     · You have a fever.     · You have some shortness of breath.     · You have a cough that brings up mucus from the lungs.    Watch closely for changes in your health, and be sure to contact your doctor if:    · Your chest pain is not improving after 1 week. Where can you learn more? Go to http://damaris-eloise.info/. Enter I174 in the search box to learn more about \"Chest Contusion: Care Instructions. \"  Current as of: November 20, 2017  Content Version: 11.8  © 9604-8527 [employer]. Care instructions adapted under license by [employer] (which disclaims liability or warranty for this information). If you have questions about a medical condition or this instruction, always ask your healthcare professional. [place] [address] any warranty or liability for your use of this information.

## 2018-10-28 NOTE — ED NOTES
Patient has been instructed that they have been given Morphine* which contains opioids, benzodiazepines, or other sedating drugs. Patient is aware that they  will need to refrain from driving or operating heavy machinery after taking this medication. Patient also instructed that they need to avoid drinking alcohol and using other products containing opioids, benzodiazepines, or other sedating drugs. Patient verbalized understanding. Discharge instructions were given to the patient by Andrew Goncalves RN. The patient left the Emergency Department ambulatory, alert and oriented and in no acute distress with 2 prescriptions. The patient was encouraged to call or return to the ED for worsening issues or problems and was encouraged to schedule a follow up appointment for continuing care. The patient verbalized understanding of discharge instructions and prescriptions, all questions were answered. The patient has no further concerns at this time.

## 2018-10-30 ENCOUNTER — TELEPHONE (OUTPATIENT)
Dept: NEUROLOGY | Age: 43
End: 2018-10-30

## 2018-10-30 NOTE — TELEPHONE ENCOUNTER
Patient is now completely out of her gabapentin, please send to Alecia Martinez on [employer]

## 2018-10-31 NOTE — TELEPHONE ENCOUNTER
Re: Sonia Swan    PA submitted via CMM to [facility]. Pending status: Javi Magaña (Petersen: [id number])   Tecfidera 240MG OR CPDR   Form  [employer] Electronic PA Form   Created   16 minutes ago   Sent to Plan   14 minutes ago   Plan Response   14 minutes ago   Submit Clinical Questions   now   Determination   Wait for Determination  Please wait for [facility] to return a determination. \"      Will update when determination is made.

## 2018-11-01 NOTE — TELEPHONE ENCOUNTER
Re: Angi Bear [place] [facility] The 90 day supply requested for this medication has been denied. The drug is a specialty drug and per their Prescription Drug Guide, specialty drugs are limited to a 30-day supply. Per [place] [facility] a 30 day supply has been approved for the requested medication. \"[employer] has approved coverage for Tecfidera and Quantity per 30 day supply under your Part D benefit. Auth # [id number]. Auth good 10/31/18 - 12/31/19. Please have Dr. Radha Almonte send in a new Rx for a 30 day supply.

## 2018-11-01 NOTE — TELEPHONE ENCOUNTER
Called gladis at [facility] and notified me that the patient tends to try to fill early and there is a note in her file stating this there. Last filled there: 9/30/18 and then transferred to [address] [place] [address] and just filled: 10/31/18 at [facility]  I called the patient and notified her that there are refills and she just needs to keep it every 30 days.  She said thank you and she will keep it monthly

## 2018-12-10 DIAGNOSIS — M62.838 MUSCLE SPASMS OF BOTH LOWER EXTREMITIES: ICD-10-CM

## 2018-12-10 DIAGNOSIS — G35 MS (MULTIPLE SCLEROSIS) (HCC): ICD-10-CM

## 2018-12-10 RX ORDER — TIZANIDINE 4 MG/1
TABLET ORAL
Qty: 90 TAB | Refills: 2 | Status: SHIPPED | OUTPATIENT
Start: 2018-12-10 | End: 2019-10-24 | Stop reason: ALTCHOICE

## 2018-12-10 NOTE — TELEPHONE ENCOUNTER
Future Appointments   Date Time Provider Naveen Martins   12/17/2018 10:00 AM [facility]   12/17/2018 10:45 AM [facility] 2 [facility] REG                         Last Appointment My Department:  10/3/2018    Would you like to refill below?     Requested Prescriptions     Pending Prescriptions Disp Refills    tiZANidine (ZANAFLEX) 4 mg tablet 90 Tab 2     Sig: Take one and one half tablet three times a day

## 2018-12-22 ENCOUNTER — APPOINTMENT (OUTPATIENT)
Dept: GENERAL RADIOLOGY | Age: 43
End: 2018-12-22
Attending: EMERGENCY MEDICINE
Payer: MEDICARE

## 2018-12-22 ENCOUNTER — APPOINTMENT (OUTPATIENT)
Dept: GENERAL RADIOLOGY | Age: 43
End: 2018-12-22
Payer: MEDICARE

## 2018-12-22 ENCOUNTER — HOSPITAL ENCOUNTER (EMERGENCY)
Age: 43
Discharge: HOME | End: 2018-12-22
Attending: EMERGENCY MEDICINE
Payer: MEDICARE

## 2018-12-22 VITALS
HEIGHT: 64 IN | BODY MASS INDEX: 36.25 KG/M2 | DIASTOLIC BLOOD PRESSURE: 82 MMHG | RESPIRATION RATE: 16 BRPM | SYSTOLIC BLOOD PRESSURE: 113 MMHG | TEMPERATURE: 98.3 F | HEART RATE: 89 BPM | WEIGHT: 212.3 LBS | OXYGEN SATURATION: 100 %

## 2018-12-22 DIAGNOSIS — Z87.81 HISTORY OF ANKLE FRACTURE: ICD-10-CM

## 2018-12-22 DIAGNOSIS — S92.901A CLOSED FRACTURE OF RIGHT FOOT, INITIAL ENCOUNTER: Primary | ICD-10-CM
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
## 2018-12-22 LAB
APPEARANCE UR: ABNORMAL
BACTERIA URNS QL MICRO: ABNORMAL /HPF
BILIRUB UR QL: NEGATIVE
COLOR UR: ABNORMAL
EPITH CASTS URNS QL MICRO: ABNORMAL /LPF
GLUCOSE UR STRIP.AUTO-MCNC: NEGATIVE MG/DL
HGB UR QL STRIP: NEGATIVE
HYALINE CASTS URNS QL MICRO: ABNORMAL /LPF (ref 0–5)
KETONES UR QL STRIP.AUTO: NEGATIVE MG/DL
LEUKOCYTE ESTERASE UR QL STRIP.AUTO: NEGATIVE
NITRITE UR QL STRIP.AUTO: NEGATIVE
PH UR STRIP: 6 [PH] (ref 5–8)
PROT UR STRIP-MCNC: NEGATIVE MG/DL
RBC #/AREA URNS HPF: ABNORMAL /HPF (ref 0–5)
SP GR UR REFRACTOMETRY: 1.01 (ref 1–1.03)
UA: UC IF INDICATED,UAUC: ABNORMAL
UROBILINOGEN UR QL STRIP.AUTO: 0.2 EU/DL (ref 0.2–1)
WBC URNS QL MICRO: ABNORMAL /HPF (ref 0–4)

## 2018-12-22 PROCEDURE — 74011250637 HC RX REV CODE- 250/637: Performed by: PHYSICIAN ASSISTANT

## 2018-12-22 PROCEDURE — 73610 X-RAY EXAM OF ANKLE: CPT

## 2018-12-22 PROCEDURE — 75810000053 HC SPLINT APPLICATION

## 2018-12-22 PROCEDURE — 73630 X-RAY EXAM OF FOOT: CPT

## 2018-12-22 PROCEDURE — 99283 EMERGENCY DEPT VISIT LOW MDM: CPT

## 2018-12-22 PROCEDURE — 87086 URINE CULTURE/COLONY COUNT: CPT

## 2018-12-22 PROCEDURE — 81001 URINALYSIS AUTO W/SCOPE: CPT

## 2018-12-22 RX ORDER — HYDROCODONE BITARTRATE AND ACETAMINOPHEN 5; 325 MG/1; MG/1
1 TABLET ORAL
Qty: 10 TAB | Refills: 0 | Status: SHIPPED | OUTPATIENT
Start: 2018-12-22 | End: 2019-10-24 | Stop reason: ALTCHOICE

## 2018-12-22 RX ORDER — OXYCODONE AND ACETAMINOPHEN 5; 325 MG/1; MG/1
1 TABLET ORAL
Status: COMPLETED | OUTPATIENT
Start: 2018-12-22 | End: 2018-12-22

## 2018-12-22 RX ORDER — NALOXONE HYDROCHLORIDE 4 MG/.1ML
SPRAY NASAL
Qty: 2 EACH | Refills: 0 | Status: SHIPPED | OUTPATIENT
Start: 2018-12-22 | End: 2019-10-24 | Stop reason: ALTCHOICE

## 2018-12-22 RX ORDER — IBUPROFEN 600 MG/1
600 TABLET ORAL
Qty: 20 TAB | Refills: 0 | Status: ON HOLD | OUTPATIENT
Start: 2018-12-22 | End: 2020-11-27

## 2018-12-22 RX ORDER — ONDANSETRON 4 MG/1
4 TABLET, ORALLY DISINTEGRATING ORAL
Status: COMPLETED | OUTPATIENT
Start: 2018-12-22 | End: 2018-12-22

## 2018-12-22 RX ADMIN — OXYCODONE AND ACETAMINOPHEN 1 TABLET: 5; 325 TABLET ORAL at 11:00

## 2018-12-22 RX ADMIN — ONDANSETRON 4 MG: 4 TABLET, ORALLY DISINTEGRATING ORAL at 11:00

## 2018-12-22 NOTE — ED PROVIDER NOTES
EMERGENCY DEPARTMENT HISTORY AND PHYSICAL EXAM      Date: 12/22/2018  Patient Name: Cynthia Crockett    History of Presenting Illness     Chief Complaint   Patient presents with    Foot Pain     Patient ambulatory to triage via wheelchair and complain of right foot pain after falling yesterday       History Provided By: Patient and Caregiver    HPI: Cynthia Crockett, 37 y.o. female presents ambulatory to the ED with c/o R foot pain after a GLF on 12/21/18. Pt states she has a h/o MS and this causes intermittent numbness/weakness. She often uses a cane for ambulation when needed. She reports she rolled her ankle and felt \"a crunch\". Pt has had fracture of the R ankle several years ago (in 3 places) but no surgery. Pt states the foot \"hurts more than the ankle\". Pt unable to tolerate weight bearing. She has a caregiver who lives with her. Pt denied striking her head. No head, neck, or back pain. She sees Neurology for management of her MS. Pt is o/w healthy without fever, chills, cough, congestion, ST, shortness of breath, chest pain, N/V/D. Chief Complaint: R foot pain  Duration: 2 Days  Timing:  Acute  Location: R foot  Quality: Aching  Severity: Moderate  Modifying Factors: pt with h/o MS which lead to her GLF  Associated Symptoms: pt with chronic numbness/tingling/weakness        There are no other complaints, changes, or physical findings at this time. PCP: Viridiana Saha MD    Current Outpatient Medications   Medication Sig Dispense Refill    ibuprofen (MOTRIN) 600 mg tablet Take 1 Tab by mouth every six (6) hours as needed for Pain. 20 Tab 0    naloxone (NARCAN) 4 mg/actuation nasal spray Use 1 spray intranasally, then discard. Repeat with new spray every 2 min as needed for opioid overdose symptoms, alternating nostrils. 2 Each 0    HYDROcodone-acetaminophen (NORCO) 5-325 mg per tablet Take 1 Tab by mouth every eight (8) hours as needed for Pain for up to 10 doses.  Max Daily Amount: 3 Tabs. 10 Tab 0    tiZANidine (ZANAFLEX) 4 mg tablet Take one and one half tablet three times a day 90 Tab 2    dimethyl fumarate (TECFIDERA) 240 mg cpDR Take 240 mg by mouth two (2) times a day. 120 Cap 3    gabapentin (NEURONTIN) 800 mg tablet Take 2 Tabs by mouth three (3) times daily. 180 Tab 3    gabapentin (NEURONTIN) 800 mg tablet Take 2 Tabs by mouth three (3) times daily. 180 Tab 0    gabapentin (NEURONTIN) 400 mg capsule take 4 capsules by mouth three times a day 360 Cap 3    diazePAM (VALIUM) 2 mg tablet Use 30 minutes prior to MRI 2 Tab 0    amitriptyline (ELAVIL) 25 mg tablet Take 4 Tabs by mouth nightly. Indications: FIBROMYALGIA 120 Tab 3    DULoxetine (CYMBALTA) 60 mg capsule Take 1 Cap by mouth two (2) times a day. 60 Cap 4    ALPRAZolam (XANAX) 0.5 mg tablet Take 0.5 mg by mouth three (3) times daily as needed for Anxiety.       zolpidem (AMBIEN) 10 mg tablet TAKE ONE TABLET BY MOUTH EVERY NIGHT in the BED AS NEEDED FOR SLEEP 30 tablet 0       Past History     Past Medical History:  Past Medical History:   Diagnosis Date    Body aches 12/11/2014    Chronic pain     Depression     GERD (gastroesophageal reflux disease)     Headache(784.0)     History of mammogram never before    MS (multiple sclerosis) (HCC)     Neurological disorder     Multiple Sclerosis    Pap smear for cervical cancer screening 2008    Psychiatric disorder     anxiety    Psychotic disorder [facility])        Past Surgical History:  Past Surgical History:   Procedure Laterality Date    COLONOSCOPY N/A 2/28/2018    COLONOSCOPY performed by Rodney Goel [facility] at [facility]COPY    HX CHOLECYSTECTOMY      HX GYN      3 c-sections    HX HEENT      bilateral ear tube surgery    HX OTHER SURGICAL      R inguinal hernia repair       Family History:  Family History   Problem Relation Age of Onset    Heart Attack Father         tripple bypass    Cancer Father         lung    Diabetes Mother         type 2    Heart Disease Mother         heart disease    Diabetes Paternal Grandmother        Social History:  Social History     Tobacco Use    Smoking status: Current Every Day Smoker     Packs/day: 0.50     Years: 17.00     Pack years: 8.50     Types: Cigarettes    Smokeless tobacco: Never Used    Tobacco comment: 1 pack every 3 days. Substance Use Topics    Alcohol use: No    Drug use: No       Allergies:  No Known Allergies      Review of Systems   Review of Systems   Constitutional: Negative for chills and fever. HENT: Negative for congestion, rhinorrhea and sore throat. Respiratory: Negative for cough and shortness of breath. Cardiovascular: Negative for chest pain and palpitations. Gastrointestinal: Negative for diarrhea, nausea and vomiting. Endocrine: Negative for polydipsia, polyphagia and polyuria. Genitourinary: Negative for dysuria and hematuria. Musculoskeletal: Positive for arthralgias. Negative for neck pain and neck stiffness. Skin: Negative for rash and wound. Allergic/Immunologic: Positive for immunocompromised state. Negative for food allergies. Neurological: Positive for weakness and numbness. Negative for dizziness and headaches. See HPI   Hematological: Negative for adenopathy. Does not bruise/bleed easily. Psychiatric/Behavioral: Negative for agitation and confusion. Physical Exam   Physical Exam   Constitutional: She is oriented to person, place, and time. She appears well-developed and well-nourished. No distress. WDWN female, seated in [facility], in NAD   HENT:   Head: Normocephalic and atraumatic. Nose: Nose normal.   Mouth/Throat: Oropharynx is clear and moist. No oropharyngeal exudate. Eyes: Conjunctivae and EOM are normal. Right eye exhibits no discharge. Left eye exhibits no discharge. No scleral icterus. Neck: Normal range of motion. Neck supple. No JVD present. No tracheal deviation present. No thyromegaly present.    No cervical spinal tenderness Cardiovascular: Normal rate, regular rhythm and normal heart sounds. Pulmonary/Chest: Effort normal and breath sounds normal. No respiratory distress. She has no wheezes. She exhibits no tenderness. Abdominal: Soft. There is no tenderness. No CVAT   Musculoskeletal: She exhibits edema and tenderness. Decreased A/P ROM to R foot due to tenderness with palpation/movement, mild to moderate edema, no crepitus, no erythema/rash/lesion/heat. 2+ distal pulses, NVI, sensation grossly intact to light touch. Pt unable to weight bear. Lymphadenopathy:     She has no cervical adenopathy. Neurological: She is alert and oriented to person, place, and time. She exhibits normal muscle tone. Coordination normal.   Skin: Skin is warm and dry. No rash noted. She is not diaphoretic. No erythema. Psychiatric: She has a normal mood and affect. Her behavior is normal. Judgment normal.   Nursing note and vitals reviewed. Splint, Short Leg  Date/Time: 12/22/2018 10:55 AM  Performed by: YAZ Pak  Authorized by: YAZ Pak     Consent:     Consent obtained:  Verbal    Consent given by:  Patient    Risks discussed:  Discoloration, numbness and pain    Alternatives discussed:  No treatment, delayed treatment and alternative treatment  Pre-procedure details:     Sensation:  Normal    Skin color:  Pink  Procedure details:     Laterality:  Right    Location:  Foot    Foot:  R foot    Splint type:  Short leg    Supplies:  Ortho-Glass  Post-procedure details:     Pain:  Improved    Sensation:  Normal    Skin color:  Pink    Patient tolerance of procedure:   Tolerated well, no immediate complications      Diagnostic Study Results     Labs -     Recent Results (from the past 12 hour(s))   URINALYSIS W/ REFLEX CULTURE    Collection Time: 12/22/18 10:49 AM   Result Value Ref Range    Color YELLOW/STRAW      Appearance CLOUDY (A) CLEAR      Specific gravity 1.014 1.003 - 1.030      pH (UA) 6.0 5.0 - 8.0 Protein NEGATIVE  NEG mg/dL    Glucose NEGATIVE  NEG mg/dL    Ketone NEGATIVE  NEG mg/dL    Bilirubin NEGATIVE  NEG      Blood NEGATIVE  NEG      Urobilinogen 0.2 0.2 - 1.0 EU/dL    Nitrites NEGATIVE  NEG      Leukocyte Esterase NEGATIVE  NEG      WBC 0-4 0 - 4 /hpf    RBC 0-5 0 - 5 /hpf    Epithelial cells MANY (A) FEW /lpf    Bacteria 3+ (A) NEG /hpf    UA:UC IF INDICATED URINE CULTURE ORDERED (A) CNI      Hyaline cast 2-5 0 - 5 /lpf       Radiologic Studies -   XR FOOT RT MIN 3 V (Final result)   Result time 12/22/18 11:02:42   Final result by Yuan Harvey MD (12/22/18 11:02:42)                Impression:    IMPRESSION:  1. Fracture at the base of the 5th metatarsal.   2. Posttraumatic changes in the ankle.                 Narrative:    XR ANKLE RT MIN 3 V, XR FOOT RT MIN 3 V ,12/22/2018 10:55 AM  INDICATION:   Additional history: Right foot pain after falling yesterday. COMPARISON: X-ray of the right ankle, 10/23/2012 and 12/30/2012  . FINDINGS:  ANKLE: 3 views  The ankle joint appears congruent. There are posttraumatic changes in the ankle. There is soft tissue swelling about the ankle. Quynh You FOOT: 3 views  Fracture at the base of the 5th metatarsal. Soft tissue swelling. .                    XR ANKLE RT MIN 3 V (Final result)   Result time 12/22/18 11:02:42   Final result by Yuan Harvey MD (12/22/18 11:02:42)                Impression:    IMPRESSION:  1. Fracture at the base of the 5th metatarsal.   2. Posttraumatic changes in the ankle.                 Narrative:    XR ANKLE RT MIN 3 V, XR FOOT RT MIN 3 V ,12/22/2018 10:55 AM  INDICATION:   Additional history: Right foot pain after falling yesterday. COMPARISON: X-ray of the right ankle, 10/23/2012 and 12/30/2012  . FINDINGS:  ANKLE: 3 views  The ankle joint appears congruent. There are posttraumatic changes in the ankle. There is soft tissue swelling about the ankle. Quynh You   FOOT: 3 views  Fracture at the base of the 5th metatarsal. Soft tissue swelling. Medical Decision Making   I am the first provider for this patient. I reviewed the vital signs, available nursing notes, past medical history, past surgical history, family history and social history. Vital Signs-Reviewed the patient's vital signs. Patient Vitals for the past 12 hrs:   Temp Pulse Resp BP SpO2   12/22/18 1022 98.3 °F (36.8 °C) 89 16 113/82 100 %         Records Reviewed: Nursing Notes, Old Medical Records, Previous Radiology Studies and Previous Laboratory Studies    Provider Notes (Medical Decision Making):   Strain, sprain, fx, dislocation    ED Course:   Initial assessment performed. The patients presenting problems have been discussed, and they are in agreement with the care plan formulated and outlined with them. I have encouraged them to ask questions as they arise throughout their visit. DISCHARGE NOTE:  The care plan has been outline with the patient and/or family, who verbally conveyed understanding and agreement. Available results have been reviewed. Patient and/or family understand the follow up plan as outlined and discharge instructions. Should their condition deterioration at any time after discharge the patient agrees to return, follow up sooner than outlined or seek medical assistance at the closest Emergency Room as soon as possible. Questions have been answered. Discharge instructions and educational information regarding the patient's diagnosis as well a list of reasons why the patient would want to seek immediate medical attention, should their condition change, were reviewed directly with the patient/family       PLAN:  1. Discharge Medication List as of 12/22/2018 11:52 AM      START taking these medications    Details   ibuprofen (MOTRIN) 600 mg tablet Take 1 Tab by mouth every six (6) hours as needed for Pain., Normal, Disp-20 Tab, R-0      naloxone (NARCAN) 4 mg/actuation nasal spray Use 1 spray intranasally, then discard.  Repeat with new spray every 2 min as needed for opioid overdose symptoms, alternating nostrils. , Print, Disp-2 Each, R-0      HYDROcodone-acetaminophen (NORCO) 5-325 mg per tablet Take 1 Tab by mouth every eight (8) hours as needed for Pain for up to 10 doses. Max Daily Amount: 3 Tabs., Print, Disp-10 Tab, R-0         CONTINUE these medications which have NOT CHANGED    Details   tiZANidine (ZANAFLEX) 4 mg tablet Take one and one half tablet three times a day, Normal, Disp-90 Tab, R-2      dimethyl fumarate (TECFIDERA) 240 mg cpDR Take 240 mg by mouth two (2) times a day., Normal, Disp-120 Cap, R-3      !! gabapentin (NEURONTIN) 800 mg tablet Take 2 Tabs by mouth three (3) times daily. , Normal, Disp-180 Tab, R-3      !! gabapentin (NEURONTIN) 800 mg tablet Take 2 Tabs by mouth three (3) times daily. , Normal, Disp-180 Tab, R-0      gabapentin (NEURONTIN) 400 mg capsule take 4 capsules by mouth three times a day, Normal, Disp-360 Cap, R-3      diazePAM (VALIUM) 2 mg tablet Use 30 minutes prior to MRI, Print, Disp-2 Tab, R-0      amitriptyline (ELAVIL) 25 mg tablet Take 4 Tabs by mouth nightly. Indications: FIBROMYALGIA, Normal, Disp-120 Tab, R-3      DULoxetine (CYMBALTA) 60 mg capsule Take 1 Cap by mouth two (2) times a day., Normal, Disp-60 Cap, R-4      ALPRAZolam (XANAX) 0.5 mg tablet Take 0.5 mg by mouth three (3) times daily as needed for Anxiety. , Historical Med      zolpidem (AMBIEN) 10 mg tablet TAKE ONE TABLET BY MOUTH EVERY NIGHT in the BED AS NEEDED FOR SLEEP, Print, Disp-30 tablet, R-0       !! - Potential duplicate medications found. Please discuss with provider.         2.   Follow-up Information     Follow up With Specialties Details Why Contact Info    Yoshi Yeh MD Orthopedic Surgery, Hand Surgery   [facility]  [address]  [address] [address]  [address],  Orthopedic Surgery   [address] [place] [address] [address]  [phone]      [facility] EMERGENCY DEPT Emergency Medicine  If symptoms worsen [address] [place] [address]  [phone]        Return to ED if worse     Diagnosis     Clinical Impression:   1. Closed fracture of right foot, initial encounter    2. History of ankle fracture    3.  Fall, initial encounter

## 2018-12-22 NOTE — DISCHARGE INSTRUCTIONS
Broken Foot: Care Instructions  Your Care Instructions    A broken foot, or foot fracture, is a break in one or more of the bones in your foot. It may happen because of a sports injury, a fall, or other accident. A compound, or open, fracture occurs when a bone breaks through the skin. A break that does not poke through the skin is a closed fracture. Your treatment depends on the location and type of break in your foot. You may need a splint, a cast, or an orthopedic shoe. Certain kinds of injuries may need surgery at some time. Whatever your treatment, you can ease symptoms and help your foot heal with care at home. You may need 6 to 8 weeks or more to fully heal.  You heal best when you take good care of yourself. Eat a variety of healthy foods, and don't smoke. Follow-up care is a key part of your treatment and safety. Be sure to make and go to all appointments, and call your doctor if you are having problems. It's also a good idea to know your test results and keep a list of the medicines you take. How can you care for yourself at home? · Be safe with medicines. Take pain medicines exactly as directed. ? If the doctor gave you a prescription medicine for pain, take it as prescribed. ? If you are not taking a prescription pain medicine, ask your doctor if you can take an over-the-counter medicine. · Leave the splint on until your follow-up appointment. Do not put any weight on the injured foot. If you were given crutches, use them as directed. · Put ice or a cold pack on your foot for 10 to 20 minutes at a time. Try to do this every 1 to 2 hours for the next 3 days (when you are awake) or until the swelling goes down. Put a thin cloth between the ice and your skin. · Prop up the sore foot on a pillow anytime you sit or lie down during the next 3 days. Try to keep it above the level of your heart. This will help reduce swelling. · Follow the cast care instructions your doctor gives you.  If you have a splint, do not take it off unless your doctor tells you to. Cast and splint care  · If you have a removable splint, ask your doctor if it is okay to remove it to bathe. Your doctor may want you to keep it on as much as possible. · Keep your plaster splint covered by taping a sheet of plastic around it when you bathe. Water under the plaster can cause your skin to itch and hurt. · Never cut your splint. When should you call for help? Call 911 anytime you think you may need emergency care. For example, call if:    · You have chest pain, are short of breath, or you cough up blood.    Call your doctor now or seek immediate medical care if:    · You have new or worse pain.     · Your foot is cool or pale or changes color.     · You have tingling, weakness, or numbness in your toes.     · Your cast or splint feels too tight.     · You have signs of a blood clot in your leg (called a deep vein thrombosis), such as:  ? Pain in your calf, back of the knee, thigh, or groin. ? Redness or swelling in your leg.    Watch closely for changes in your health, and be sure to contact your doctor if:    · You have a problem with your splint or cast.     · You do not get better as expected. Where can you learn more? Go to http://damrais-eloise.info/. Enter N409 in the search box to learn more about \"Broken Foot: Care Instructions. \"  Current as of: November 29, 2017  Content Version: 11.8  © 1841-7203 [employer]. Care instructions adapted under license by [employer] (which disclaims liability or warranty for this information). If you have questions about a medical condition or this instruction, always ask your healthcare professional. [address] any warranty or liability for your use of this information.

## 2018-12-24 LAB
BACTERIA SPEC CULT: NORMAL
CC UR VC: NORMAL
SERVICE CMNT-IMP: NORMAL

## 2019-01-09 NOTE — TELEPHONE ENCOUNTER
Ary ,please do PA for tecfidera   Thanks Dermatology Laser Intake Checklist:  History of psoriasis:No  History of recent tan, indoor or outdoor tanning/vacation or other sun exposure: No  History of vitiligo:No  Family history of vitiligo:No  Recent other cosmetic procedure(microderm abrasion/peel/hair removal/facial etc):No  History of HSV:No   Did the patient start valtrex: No  For genital laser hair removal patient only: Is there a history of genital warts or condyloma:No  Tattoo in the area to be treated:No  Is patient using hydroquinone:No  Retinoids and other acne medications stopped for 2 weeks:No  Has the patient had accutane in the last 6-12 months:No  Pregnant or breastfeeding: No  History of skin cancer in area planned for treatment: No  History of treatment with gold:No  Changes in medical history: No  Photos obtained: Yes  Does the patient smoke:No  Is the patient on ibuprofen/aspirin/plavix/coumadin/other blood thinner: No  If patient is taking narcotic or diazepam(valium)-does patient have : No  There were no vitals taken for this visit.

## 2019-01-28 ENCOUNTER — TELEPHONE (OUTPATIENT)
Dept: NEUROLOGY | Age: 44
End: 2019-01-28

## 2019-01-28 NOTE — TELEPHONE ENCOUNTER
rec'd fax from [employer]  - issued temp supply. SEt up PA for Tecfidera and sent to [employer] for review. Created  12 minutes ago  Sent to Plan  7 minutes ago  Plan Response  7 minutes ago  Submit Clinical Questions  now  Determination  Wait for Determination  Please wait for [facility] NCPDP 2017 to return a determination.

## 2019-01-29 ENCOUNTER — TELEPHONE (OUTPATIENT)
Dept: NEUROLOGY | Age: 44
End: 2019-01-29

## 2019-02-06 ENCOUNTER — TELEPHONE (OUTPATIENT)
Dept: NEUROLOGY | Age: 44
End: 2019-02-06

## 2019-02-06 DIAGNOSIS — F41.9 ANXIETY: ICD-10-CM

## 2019-02-06 RX ORDER — DIAZEPAM 2 MG/1
TABLET ORAL
Qty: 2 TAB | Refills: 0 | Status: SHIPPED | OUTPATIENT
Start: 2019-02-06 | End: 2019-10-24 | Stop reason: ALTCHOICE

## 2019-02-06 NOTE — TELEPHONE ENCOUNTER
----- Message from Naima Arriaza sent at 2/6/2019  2:11 PM EST -----  Regarding: Dr. Anyi Simon  Pt is requesting for anxiety/claustrophobic medication sent to [address] [place] [address] [phone]. Appt for full body MRI is Friday 2/15/19 10am    Best contact  [phone].

## 2019-02-07 NOTE — TELEPHONE ENCOUNTER
diazePAM (VALIUM) 2 mg tablet [[id number]]   Order Details   Dose, Route, Frequency: As Directed    Dispense Quantity: 2 Tab Refills: 0 Fills remaining: --           Sig: Use 30 minutes prior to MRI          Written Date: 02/06/19 Expiration Date: --     Start Date: 02/06/19 End Date: --            Ordering Provider:  -- KATHRYN #:  -- NPI:  --    Authorizing Provider:  David Garza MD KATHRYN #:  [id number] NPI:  [id number]         Called in script to [facility]

## 2019-02-25 DIAGNOSIS — G35 MS (MULTIPLE SCLEROSIS) (HCC): ICD-10-CM

## 2019-02-25 NOTE — TELEPHONE ENCOUNTER
No future appointments. Last Appointment My Department:  Visit date not found    Would you like to refill below? Requested Prescriptions     Pending Prescriptions Disp Refills    gabapentin (NEURONTIN) 800 mg tablet 180 Tab 3     Sig: Take 2 Tabs by mouth three (3) times daily.

## 2019-02-27 DIAGNOSIS — M79.2 NEUROPATHIC PAIN: ICD-10-CM

## 2019-02-27 DIAGNOSIS — G35 MULTIPLE SCLEROSIS (HCC): ICD-10-CM

## 2019-02-27 DIAGNOSIS — G35 MS (MULTIPLE SCLEROSIS) (HCC): ICD-10-CM

## 2019-02-27 RX ORDER — GABAPENTIN 800 MG/1
TABLET ORAL
Qty: 180 TAB | Refills: 0 | OUTPATIENT
Start: 2019-02-27

## 2019-02-28 ENCOUNTER — TELEPHONE (OUTPATIENT)
Dept: NEUROLOGY | Age: 44
End: 2019-02-28

## 2019-02-28 DIAGNOSIS — G35 MS (MULTIPLE SCLEROSIS) (HCC): ICD-10-CM

## 2019-02-28 RX ORDER — GABAPENTIN 800 MG/1
1600 TABLET ORAL 3 TIMES DAILY
Qty: 180 TAB | Refills: 3 | Status: SHIPPED | OUTPATIENT
Start: 2019-02-28 | End: 2019-06-06 | Stop reason: SDUPTHER

## 2019-02-28 RX ORDER — GABAPENTIN 800 MG/1
1600 TABLET ORAL 3 TIMES DAILY
Qty: 180 TAB | Refills: 3 | OUTPATIENT
Start: 2019-02-28

## 2019-02-28 NOTE — TELEPHONE ENCOUNTER
----- Message from Gurjit Beltran sent at 2/28/2019 10:52 AM EST -----  Regarding: Dr. Emerald Beltre Telephone  Patient needs a refill on her [address] called to [address] [phone]. Contact is [phone].  Patient is out of her medication

## 2019-02-28 NOTE — TELEPHONE ENCOUNTER
Called patient and a message has been left for a call back and also advised that a script has been sent to Dr. Coty Campbell to approve

## 2019-02-28 NOTE — TELEPHONE ENCOUNTER
----- Message from Nita Baires sent at 2/28/2019 10:52 AM EST -----  Regarding: Dr. Jessica Hunter Telephone  Patient needs a refill on her [address] called to [place], [phone]. Contact is [phone].  Patient is out of her medication

## 2019-02-28 NOTE — TELEPHONE ENCOUNTER
----- Message from Nita Baires sent at 2/28/2019 10:52 AM EST -----  Regarding: Dr. Jessica Hunter Telephone  Patient needs a refill on her [address] called to [phone]. Contact is [phone].  Patient is out of her medication

## 2019-03-04 RX ORDER — GABAPENTIN 800 MG/1
1600 TABLET ORAL 3 TIMES DAILY
Qty: 180 TAB | Refills: 3 | OUTPATIENT
Start: 2019-03-04

## 2019-06-03 DIAGNOSIS — G35 MS (MULTIPLE SCLEROSIS) (HCC): ICD-10-CM

## 2019-06-03 NOTE — TELEPHONE ENCOUNTER
Received a fax refill request from [facility]    No future appointments. Last Appointment My Department:  Visit date not found    Would you like to refill below? Requested Prescriptions     Pending Prescriptions Disp Refills    gabapentin (NEURONTIN) 800 mg tablet 180 Tab 3     Sig: Take 2 Tabs by mouth three (3) times daily.

## 2019-06-06 RX ORDER — GABAPENTIN 800 MG/1
1600 TABLET ORAL 3 TIMES DAILY
Qty: 180 TAB | Refills: 3 | Status: SHIPPED | OUTPATIENT
Start: 2019-06-06 | End: 2019-09-19 | Stop reason: ALTCHOICE

## 2019-06-12 ENCOUNTER — TELEPHONE (OUTPATIENT)
Dept: NEUROLOGY | Age: 44
End: 2019-06-12

## 2019-06-12 NOTE — TELEPHONE ENCOUNTER
Patient has been placed on the cancellation list for a sooner appointment, also  advised to the patient the script for the gabapentin has been sent to the pharmacy on 06/06/19

## 2019-06-12 NOTE — TELEPHONE ENCOUNTER
Patient would like to be placed on the cancellation list. She would also like a refill for gabapentin

## 2019-06-17 ENCOUNTER — TELEPHONE (OUTPATIENT)
Dept: NEUROLOGY | Age: 44
End: 2019-06-17

## 2019-06-17 NOTE — TELEPHONE ENCOUNTER
Patient stated her legs are weak,stated to the patient she is already on the cancellation however if her symptoms continue to get worse to be seen in the Emergency room or urgent care for treatment. However reassured the patient will contact them if there is an opening.

## 2019-06-27 NOTE — ED NOTES
If her pain is bad and cannot wait til Dr PAGAN is back then I am happy to see her next week for US and visit   Pt presents ambulatory to ED complaining of GLF last night at a friends house. Pt denies LOC, denies hitting her head. Pt reports she fell on \"some rocks\" and hurt her right side. Pt is alert and oriented x 4, RR even and unlabored, skin is warm and dry. Assesment completed and pt updated on plan of care. Emergency Department Nursing Plan of Care The Nursing Plan of Care is developed from the Nursing assessment and Emergency Department Attending provider initial evaluation. The plan of care may be reviewed in the ED Provider note. The Plan of Care was developed with the following considerations:  
Patient / Family readiness to learn indicated by:verbalized understanding Persons(s) to be included in education: patient Barriers to Learning/Limitations:No 
 
Signed Meri Lyles RN   
10/27/2018   6:21 PM

## 2019-09-19 ENCOUNTER — OFFICE VISIT (OUTPATIENT)
Dept: FAMILY MEDICINE CLINIC | Age: 44
End: 2019-09-19

## 2019-09-19 VITALS
OXYGEN SATURATION: 98 % | HEIGHT: 64 IN | BODY MASS INDEX: 33.57 KG/M2 | RESPIRATION RATE: 20 BRPM | SYSTOLIC BLOOD PRESSURE: 123 MMHG | TEMPERATURE: 96 F | HEART RATE: 63 BPM | WEIGHT: 196.6 LBS | DIASTOLIC BLOOD PRESSURE: 70 MMHG

## 2019-09-19 DIAGNOSIS — Z12.31 ENCOUNTER FOR SCREENING MAMMOGRAM FOR MALIGNANT NEOPLASM OF BREAST: ICD-10-CM

## 2019-09-19 DIAGNOSIS — F17.210 CIGARETTE NICOTINE DEPENDENCE WITHOUT COMPLICATION: ICD-10-CM

## 2019-09-19 DIAGNOSIS — Z13.39 SCREENING FOR ALCOHOLISM: ICD-10-CM

## 2019-09-19 DIAGNOSIS — Z23 ENCOUNTER FOR IMMUNIZATION: ICD-10-CM

## 2019-09-19 DIAGNOSIS — J20.8 ACUTE BRONCHITIS DUE TO OTHER SPECIFIED ORGANISMS: ICD-10-CM

## 2019-09-19 DIAGNOSIS — F41.9 ANXIETY: ICD-10-CM

## 2019-09-19 DIAGNOSIS — G35 MS (MULTIPLE SCLEROSIS) (HCC): ICD-10-CM

## 2019-09-19 DIAGNOSIS — R35.0 URINARY FREQUENCY: ICD-10-CM

## 2019-09-19 DIAGNOSIS — F32.A DEPRESSION DUE TO MULTIPLE SCLEROSIS (HCC): ICD-10-CM

## 2019-09-19 DIAGNOSIS — G35 DEPRESSION DUE TO MULTIPLE SCLEROSIS (HCC): ICD-10-CM

## 2019-09-19 DIAGNOSIS — E66.01 SEVERE OBESITY (HCC): ICD-10-CM

## 2019-09-19 DIAGNOSIS — F13.20 BENZODIAZEPINE DEPENDENCE (HCC): ICD-10-CM

## 2019-09-19 DIAGNOSIS — Z00.00 INITIAL MEDICARE ANNUAL WELLNESS VISIT: Primary | ICD-10-CM

## 2019-09-19 LAB
BILIRUB UR QL STRIP: NEGATIVE
GLUCOSE UR-MCNC: NEGATIVE MG/DL
KETONES P FAST UR STRIP-MCNC: NEGATIVE MG/DL
PH UR STRIP: 6 [PH] (ref 4.6–8)
PROT UR QL STRIP: NEGATIVE
SP GR UR STRIP: 1 (ref 1–1.03)
UA UROBILINOGEN AMB POC: NORMAL (ref 0.2–1)
URINALYSIS CLARITY POC: NORMAL
URINALYSIS COLOR POC: YELLOW
URINE BLOOD POC: NEGATIVE
URINE LEUKOCYTES POC: NEGATIVE
URINE NITRITES POC: NEGATIVE

## 2019-09-19 RX ORDER — ESCITALOPRAM OXALATE 10 MG/1
10 TABLET ORAL DAILY
Qty: 30 TAB | Refills: 2 | Status: SHIPPED | OUTPATIENT
Start: 2019-09-19 | End: 2019-10-24

## 2019-09-19 RX ORDER — CLONAZEPAM 0.5 MG/1
TABLET ORAL 3 TIMES DAILY
COMMUNITY
End: 2019-10-24 | Stop reason: ALTCHOICE

## 2019-09-19 RX ORDER — CIPROFLOXACIN 500 MG/1
500 TABLET ORAL 2 TIMES DAILY
Qty: 14 TAB | Refills: 0 | Status: SHIPPED | OUTPATIENT
Start: 2019-09-19 | End: 2019-09-26

## 2019-09-19 RX ORDER — ALPRAZOLAM 2 MG/1
TABLET ORAL
COMMUNITY
End: 2019-10-24 | Stop reason: ALTCHOICE

## 2019-09-19 NOTE — PROGRESS NOTES
Name and  verified      Chief Complaint   Patient presents with   [address] [facility] Patient
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
## 2019-09-19 NOTE — PROGRESS NOTES
After obtaining consent, and per orders of , pneumo 23 and flu vaccines  given to left deltoid IM  . Patient instructed to remain in clinic for 15 minutes afterwards, and to report any adverse reaction to me immediately.  Patient did not have any adverse reactions during this office visit

## 2019-09-20 DIAGNOSIS — G35 MULTIPLE SCLEROSIS (HCC): ICD-10-CM

## 2019-09-20 DIAGNOSIS — M79.2 NEUROPATHIC PAIN: ICD-10-CM

## 2019-09-20 RX ORDER — GABAPENTIN 800 MG/1
1600 TABLET ORAL 3 TIMES DAILY
Qty: 180 TAB | Refills: 0 | Status: SHIPPED | OUTPATIENT
Start: 2019-09-20 | End: 2019-10-28

## 2019-09-20 NOTE — TELEPHONE ENCOUNTER
Future Appointments   Date Time Provider Naveen Lakshmi   10/28/2019 11:00 AM Dora Mckinney MD [address] [place] [address]                         Last Appointment My Department:  Visit date not found    Would you like to refill below? Requested Prescriptions     Pending Prescriptions Disp Refills    gabapentin (NEURONTIN) 800 mg tablet 180 Tab 0     Sig: Take 2 Tabs by mouth three (3) times daily.

## 2019-09-20 NOTE — TELEPHONE ENCOUNTER
Patient called to request a refill on her Gabapentin, she said that she will run out of medication before her appt in October.

## 2019-09-21 LAB
ALBUMIN SERPL-MCNC: 4.9 G/DL (ref 3.5–5.5)
ALBUMIN/GLOB SERPL: 2 {RATIO} (ref 1.2–2.2)
ALP SERPL-CCNC: 61 IU/L (ref 39–117)
ALT SERPL-CCNC: 8 IU/L (ref 0–32)
AST SERPL-CCNC: 15 IU/L (ref 0–40)
BILIRUB SERPL-MCNC: <0.2 MG/DL (ref 0–1.2)
BUN SERPL-MCNC: 10 MG/DL (ref 6–24)
BUN/CREAT SERPL: 12 (ref 9–23)
CALCIUM SERPL-MCNC: 9.7 MG/DL (ref 8.7–10.2)
CHLORIDE SERPL-SCNC: 102 MMOL/L (ref 96–106)
CHOLEST SERPL-MCNC: 235 MG/DL (ref 100–199)
CO2 SERPL-SCNC: 18 MMOL/L (ref 20–29)
CREAT SERPL-MCNC: 0.81 MG/DL (ref 0.57–1)
ERYTHROCYTE [DISTWIDTH] IN BLOOD BY AUTOMATED COUNT: 14.4 % (ref 12.3–15.4)
EST. AVERAGE GLUCOSE BLD GHB EST-MCNC: 88 MG/DL
GLOBULIN SER CALC-MCNC: 2.5 G/DL (ref 1.5–4.5)
GLUCOSE SERPL-MCNC: 71 MG/DL (ref 65–99)
HBA1C MFR BLD: 4.7 % (ref 4.8–5.6)
HCT VFR BLD AUTO: 38.2 % (ref 34–46.6)
HDLC SERPL-MCNC: 62 MG/DL
HGB BLD-MCNC: 12.4 G/DL (ref 11.1–15.9)
LDLC SERPL CALC-MCNC: 161 MG/DL (ref 0–99)
MCH RBC QN AUTO: 31.6 PG (ref 26.6–33)
MCHC RBC AUTO-ENTMCNC: 32.5 G/DL (ref 31.5–35.7)
MCV RBC AUTO: 97 FL (ref 79–97)
PLATELET # BLD AUTO: 262 X10E3/UL (ref 150–450)
POTASSIUM SERPL-SCNC: 5 MMOL/L (ref 3.5–5.2)
PROT SERPL-MCNC: 7.4 G/DL (ref 6–8.5)
RBC # BLD AUTO: 3.92 X10E6/UL (ref 3.77–5.28)
SODIUM SERPL-SCNC: 143 MMOL/L (ref 134–144)
TRIGL SERPL-MCNC: 58 MG/DL (ref 0–149)
TSH SERPL DL<=0.005 MIU/L-ACNC: 2.27 UIU/ML (ref 0.45–4.5)
VLDLC SERPL CALC-MCNC: 12 MG/DL (ref 5–40)
WBC # BLD AUTO: 4.8 X10E3/UL (ref 3.4–10.8)

## 2019-09-25 DIAGNOSIS — R52 BODY ACHES: ICD-10-CM

## 2019-09-25 DIAGNOSIS — G89.4 CHRONIC PAIN SYNDROME: ICD-10-CM

## 2019-09-25 DIAGNOSIS — G35 MS (MULTIPLE SCLEROSIS) (HCC): ICD-10-CM

## 2019-09-25 DIAGNOSIS — F13.20 BENZODIAZEPINE DEPENDENCE (HCC): ICD-10-CM

## 2019-09-25 NOTE — TELEPHONE ENCOUNTER
Future Appointments   Date Time Provider Naveen Martins   10/2/2019  3:45 PM Ravin Luu MD [address]   10/28/2019 11:00 AM Lizzie Richards  [facility]                         Last Appointment My Department:  Visit date not found    Would you like to refill below? Requested Prescriptions     Pending Prescriptions Disp Refills    dimethyl fumarate (TECFIDERA) 240 mg cpDR 120 Cap 3     Sig: Take 240 mg by mouth two (2) times a day.

## 2019-10-01 RX ORDER — DIMETHYL FUMARATE 240 MG/1
240 CAPSULE ORAL 2 TIMES DAILY
Qty: 120 CAP | Refills: 3 | Status: SHIPPED | OUTPATIENT
Start: 2019-10-01 | End: 2020-06-10 | Stop reason: SDUPTHER

## 2019-10-24 ENCOUNTER — OFFICE VISIT (OUTPATIENT)
Dept: FAMILY MEDICINE CLINIC | Age: 44
End: 2019-10-24

## 2019-10-24 VITALS
BODY MASS INDEX: 34.33 KG/M2 | HEIGHT: 64 IN | TEMPERATURE: 97.9 F | RESPIRATION RATE: 18 BRPM | OXYGEN SATURATION: 98 % | WEIGHT: 201.1 LBS | DIASTOLIC BLOOD PRESSURE: 85 MMHG | SYSTOLIC BLOOD PRESSURE: 137 MMHG | HEART RATE: 78 BPM

## 2019-10-24 DIAGNOSIS — G35 MS (MULTIPLE SCLEROSIS) (HCC): ICD-10-CM

## 2019-10-24 DIAGNOSIS — Z23 NEED FOR INFLUENZA VACCINATION: ICD-10-CM

## 2019-10-24 DIAGNOSIS — G89.29 CHRONIC PAIN: ICD-10-CM

## 2019-10-24 DIAGNOSIS — G35 DEPRESSION DUE TO MULTIPLE SCLEROSIS (HCC): ICD-10-CM

## 2019-10-24 DIAGNOSIS — R52 BODY ACHES: ICD-10-CM

## 2019-10-24 DIAGNOSIS — F32.A DEPRESSION DUE TO MULTIPLE SCLEROSIS (HCC): ICD-10-CM

## 2019-10-24 DIAGNOSIS — M54.9 BACK PAIN: ICD-10-CM

## 2019-10-24 DIAGNOSIS — F41.9 ANXIETY: ICD-10-CM

## 2019-10-24 DIAGNOSIS — F13.20 BENZODIAZEPINE DEPENDENCE (HCC): ICD-10-CM

## 2019-10-24 RX ORDER — ZOLPIDEM TARTRATE 10 MG/1
TABLET ORAL
Qty: 30 TAB | Refills: 0 | Status: CANCELLED | OUTPATIENT
Start: 2019-10-24

## 2019-10-24 RX ORDER — CLONAZEPAM 0.5 MG/1
0.5 TABLET ORAL
Qty: 30 TAB | Refills: 0 | Status: SHIPPED | OUTPATIENT
Start: 2019-10-24 | End: 2019-10-28

## 2019-10-24 RX ORDER — BUSPIRONE HYDROCHLORIDE 10 MG/1
10 TABLET ORAL 3 TIMES DAILY
Qty: 42 TAB | Refills: 0 | Status: SHIPPED | OUTPATIENT
Start: 2019-10-24 | End: 2019-11-07

## 2019-10-24 RX ORDER — ALPRAZOLAM 2 MG/1
2 TABLET ORAL
Status: CANCELLED | OUTPATIENT
Start: 2019-10-24

## 2019-10-24 RX ORDER — BUPROPION HYDROCHLORIDE 150 MG/1
150 TABLET ORAL
Qty: 30 TAB | Refills: 0 | Status: SHIPPED | OUTPATIENT
Start: 2019-10-24 | End: 2019-12-05 | Stop reason: ALTCHOICE

## 2019-10-24 RX ORDER — CLONAZEPAM 0.5 MG/1
0.5 TABLET ORAL 3 TIMES DAILY
Status: CANCELLED | OUTPATIENT
Start: 2019-10-24

## 2019-10-24 NOTE — PATIENT INSTRUCTIONS
Recovering From Depression: Care Instructions  Your Care Instructions    Taking good care of yourself is important as you recover from depression. In time, your symptoms will fade as your treatment takes hold. Do not give up. Instead, focus your energy on getting better. Your mood will improve. It just takes some time. Focus on things that can help you feel better, such as being with friends and family, eating well, and getting enough rest. But take things slowly. Do not do too much too soon. You will begin to feel better gradually. Follow-up care is a key part of your treatment and safety. Be sure to make and go to all appointments, and call your doctor if you are having problems. It's also a good idea to know your test results and keep a list of the medicines you take. How can you care for yourself at home? Be realistic  · If you have a large task to do, break it up into smaller steps you can handle, and just do what you can. · You may want to put off important decisions until your depression has lifted. If you have plans that will have a major impact on your life, such as marriage, divorce, or a job change, try to wait a bit. Talk it over with friends and loved ones who can help you look at the overall picture first.  · Reaching out to people for help is important. Do not isolate yourself. Let your family and friends help you. Find someone you can trust and confide in, and talk to that person. · Be patient, and be kind to yourself. Remember that depression is not your fault and is not something you can overcome with willpower alone. Treatment is necessary for depression, just like for any other illness. Feeling better takes time, and your mood will improve little by little. Stay active  · Stay busy and get outside. Take a walk, or try some other light exercise. · Talk with your doctor about an exercise program. Exercise can help with mild depression. · Go to a movie or concert.  Take part in a Jewish activity or other social gathering. Go to a appCREAR game. · Ask a friend to have dinner with you. Take care of yourself  · Eat a balanced diet with plenty of fresh fruits and vegetables, whole grains, and lean protein. If you have lost your appetite, eat small snacks rather than large meals. · Avoid drinking alcohol or using illegal drugs. Do not take medicines that have not been prescribed for you. They may interfere with medicines you may be taking for depression, or they may make your depression worse. · Take your medicines exactly as they are prescribed. You may start to feel better within 1 to 3 weeks of taking antidepressant medicine. But it can take as many as 6 to 8 weeks to see more improvement. If you have questions or concerns about your medicines, or if you do not notice any improvement by 3 weeks, talk to your doctor. · If you have any side effects from your medicine, tell your doctor. Antidepressants can make you feel tired, dizzy, or nervous. Some people have dry mouth, constipation, headaches, sexual problems, or diarrhea. Many of these side effects are mild and will go away on their own after you have been taking the medicine for a few weeks. Some may last longer. Talk to your doctor if side effects are bothering you too much. You might be able to try a different medicine. · Get enough sleep. If you have problems sleeping:  ? Go to bed at the same time every night, and get up at the same time every morning. ? Keep your bedroom dark and quiet. ? Do not exercise after 5:00 p.m.  ? Avoid drinks with caffeine after 5:00 p.m. · Avoid sleeping pills unless they are prescribed by the doctor treating your depression. Sleeping pills may make you groggy during the day, and they may interact with other medicine you are taking. · If you have any other illnesses, such as diabetes, heart disease, or high blood pressure, make sure to continue with your treatment.  Tell your doctor about all of the medicines you take, including those with or without a prescription. · Keep the numbers for these national suicide hotlines: 2-635-236-TALK (5-730.482.3201) and 0-387-QXEOAZK (8-303.214.9176). If you or someone you know talks about suicide or feeling hopeless, get help right away. When should you call for help? Call 911 anytime you think you may need emergency care. For example, call if:    · You feel like hurting yourself or someone else.     · Someone you know has depression and is about to attempt or is attempting suicide.   [facility] your doctor now or seek immediate medical care if:    · You hear voices.     · Someone you know has depression and:  ? Starts to give away his or her possessions. ? Uses illegal drugs or drinks alcohol heavily. ? Talks or writes about death, including writing suicide notes or talking about guns, knives, or pills. ? Starts to spend a lot of time alone. ? Acts very aggressively or suddenly appears calm.    Watch closely for changes in your health, and be sure to contact your doctor if:    · You do not get better as expected. Where can you learn more? Go to http://damaris-eloise.info/. Enter Z669 in the search box to learn more about \"Recovering From Depression: Care Instructions. \"  Current as of: May 28, 2019  Content Version: 12.2  © 8392-5228 [employer]. Care instructions adapted under license by [employer] (which disclaims liability or warranty for this information). If you have questions about a medical condition or this instruction, always ask your healthcare professional. [place] [address] any warranty or liability for your use of this information. Depression and Chronic Disease: Care Instructions  Your Care Instructions    A chronic disease is one that you have for a long time. Some chronic diseases can be controlled, but they usually cannot be cured.  Depression is common in people with chronic diseases, but it often goes unnoticed. Many people have concerns about seeking treatment for a mental health problem. You may think it's a sign of weakness, or you don't want people to know about it. It's important to overcome these reasons for not seeking treatment. Treating depression or anxiety is good for your health. Follow-up care is a key part of your treatment and safety. Be sure to make and go to all appointments, and call your doctor if you are having problems. It's also a good idea to know your test results and keep a list of the medicines you take. How can you care for yourself at home? Watch for symptoms of depression  The symptoms of depression are often subtle at first. You may think they are caused by your disease rather than depression. Or you may think it is normal to be depressed when you have a chronic disease. If you are depressed you may:  · Feel sad or hopeless. · Feel guilty or worthless. · Not enjoy the things you used to enjoy. · Feel hopeless, as though life is not worth living. · Have trouble thinking or remembering. · Have low energy, and you may not eat or sleep well. · Pull away from others. · Think often about death or killing yourself. (Keep the numbers for these national suicide hotlines: 5-908-105-TALK [1-701.264.6277] and 9-819-ZJRFWIO [1-107.431.6279]. )  Get treatment  By treating your depression, you can feel more hopeful and have more energy. If you feel better, you may take better care of yourself, so your health may improve. · Talk to your doctor if you have any changes in mood during treatment for your disease. · Ask your doctor for help. Counseling, antidepressant medicine, or a combination of the two can help most people with depression. Often a combination works best. Counseling can also help you cope with having a chronic disease. When should you call for help? Call 911 anytime you think you may need emergency care.  For example, call if:    · You feel like hurting yourself or someone else.     · Someone you know has depression and is about to attempt or is attempting suicide.    Call your doctor now or seek immediate medical care if:    · You hear voices.     · Someone you know has depression and:  ? Starts to give away his or her possessions. ? Uses illegal drugs or drinks alcohol heavily. ? Talks or writes about death, including writing suicide notes or talking about guns, knives, or pills. ? Starts to spend a lot of time alone. ? Acts very aggressively or suddenly appears calm.    Watch closely for changes in your health, and be sure to contact your doctor if:    · You do not get better as expected. Where can you learn more? Go to http://damaris-eloise.info/. Enter Z242 in the search box to learn more about \"Depression and Chronic Disease: Care Instructions. \"  Current as of: May 28, 2019  Content Version: 12.2  © 1803-8225 [employer]. Care instructions adapted under license by [employer] (which disclaims liability or warranty for this information). If you have questions about a medical condition or this instruction, always ask your healthcare professional. [address] any warranty or liability for your use of this information.

## 2019-10-24 NOTE — PROGRESS NOTES
Dr. Jad Muñoz is moving to [facility] not taking new patients. Called [facility] at [phone], they do not take patients insurance    Left message at [facility] at [phone], ask office to call back and ask for Reta Kehr or Antonio concerning appointment for patient.

## 2019-10-24 NOTE — PROGRESS NOTES
HISTORY OF PRESENT ILLNESS  Lanre Núñez is a 37 y.o. female.   HPI   In addition patient has history of hypercholesterolemia has been compliant with the statin therapy denies any muscle ache, currently taking the medication nightly last lipid panel was reviewed and was normal ++++refills needed at this time has been trying to have a low-fat low-cholesterol diet sometimes likes the fast foods weight has been stable,   Insomnia with restlessness, her  is complex multiple docs, was told to need to find another docs, stating that she has had trouble with other doc, currently on probation for wrong doing, last xanax was taken yesterday afraid of WD, has been on such meds for >14 yrs  Pt presents stating that patient has trouble with restlessness and sleep problem is not suicidal and not homicidal, patient problem is not falling asleep, the problem is staying asleep,  it gets 1-3 Hours Of sleep, then the pt is up for another 1-2 hours, Then  goes back to sleep, patient has been given sleeping aids in the past currently Zero pill count , aware of its side effect, never abused any meds,  patient has tried some over-the-counter sleeping and No over-the-counter medication helped this patient so for,  No hx of sleep apnea, no daily fatigue no trouble with TSH, ++ an anxious person,     Patient also with depression with the anxiety and panic states of mind, stating that Xanax, and clonazepam has helped the current serious medical condition and the tremor,needs refill, tried to come off but became unstable take it as neededfor panic attack no tab at this time, aware of its side effects and dependency that he has a kidney doctor does not need    Patient's current medical condition is not controlled without medications, not able to tolerate any SSRI or other recommended antidepressive or antianxiety meds except for the current YESENIA enhancers,   Patient state that it is getting better: pt states and reports of feeling less anxius, less guilty feeling,  less Hoplessness,ns/nh,ni,nh, less trouble with weight gain or loss, no tendency of etoh or illicit drug use, more ability of sleep, more ablitiy  to concentrate at home with the current medications,and all together a safe feeling at home,        Current Outpatient Medications   Medication Sig Dispense Refill    dimethyl fumarate (TECFIDERA) 240 mg cpDR Take 240 mg by mouth two (2) times a day. 120 Cap 3    gabapentin (NEURONTIN) 800 mg tablet Take 2 Tabs by mouth three (3) times daily. 180 Tab 0    clonazePAM (KLONOPIN) 0.5 mg tablet Take  by mouth three (3) times daily.  ALPRAZolam (XANAX) 2 mg tablet Take  by mouth three (3) times daily as needed for Anxiety.  ibuprofen (MOTRIN) 600 mg tablet Take 1 Tab by mouth every six (6) hours as needed for Pain. 20 Tab 0    naloxone (NARCAN) 4 mg/actuation nasal spray Use 1 spray intranasally, then discard. Repeat with new spray every 2 min as needed for opioid overdose symptoms, alternating nostrils. 2 Each 0    zolpidem (AMBIEN) 10 mg tablet TAKE ONE TABLET BY MOUTH EVERY NIGHT in the BED AS NEEDED FOR SLEEP 30 tablet 0    escitalopram oxalate (LEXAPRO) 10 mg tablet Take 1 Tab by mouth daily for 90 days. 30 Tab 2    diazePAM (VALIUM) 2 mg tablet Use 30 minutes prior to MRI 2 Tab 0    HYDROcodone-acetaminophen (NORCO) 5-325 mg per tablet Take 1 Tab by mouth every eight (8) hours as needed for Pain for up to 10 doses. Max Daily Amount: 3 Tabs. 10 Tab 0    tiZANidine (ZANAFLEX) 4 mg tablet Take one and one half tablet three times a day 90 Tab 2    amitriptyline (ELAVIL) 25 mg tablet Take 4 Tabs by mouth nightly. Indications: FIBROMYALGIA 120 Tab 3    DULoxetine (CYMBALTA) 60 mg capsule Take 1 Cap by mouth two (2) times a day. 60 Cap 4    ALPRAZolam (XANAX) 0.5 mg tablet Take 0.5 mg by mouth three (3) times daily as needed for Anxiety.        No Known Allergies  Past Medical History:   Diagnosis Date    Body aches 12/11/2014  Chronic pain     Depression     GERD (gastroesophageal reflux disease)     Headache(784.0)     History of mammogram never before    MS [facility]) [facility] [address]     Neurological disorder     Multiple Sclerosis    Pap smear for cervical cancer screening 2008    Psychiatric disorder     anxiety    Psychotic disorder [facility])      Past Surgical History:   Procedure Laterality Date    COLONOSCOPY N/A 2/28/2018    COLONOSCOPY performed by [facility] E [facility]ares [facility] at [facility] ENDOSCOPY    HX CHOLECYSTECTOMY      HX GYN      3 c-sections    HX HEENT      bilateral ear tube surgery    HX HERNIA REPAIR      HX OTHER SURGICAL      R inguinal hernia repair     Family History   Problem Relation Age of Onset    Heart Attack Father         tripple bypass    Cancer Father         lung    Diabetes Mother         type 2    Heart Disease Mother         heart disease    Diabetes Paternal Grandmother      Social History     Tobacco Use    Smoking status: Current Every Day Smoker     Packs/day: 0.50     Years: 17.00     Pack years: 8.50     Types: Cigarettes    Smokeless tobacco: Never Used    Tobacco comment: 1 pack every 3 days. Substance Use Topics    Alcohol use: No      Lab Results   Component Value Date/Time    WBC 4.8 09/19/2019 01:30 PM    HGB 12.4 09/19/2019 01:30 PM    HCT 38.2 09/19/2019 01:30 PM    PLATELET 369 07/59/8996 01:30 PM    MCV 97 09/19/2019 01:30 PM     Lab Results   Component Value Date/Time    Hemoglobin A1c 4.7 (L) 09/19/2019 01:30 PM    Glucose 71 09/19/2019 01:30 PM    LDL, calculated 161 (H) 09/19/2019 01:30 PM    Creatinine 0.81 09/19/2019 01:30 PM      Lab Results   Component Value Date/Time    Cholesterol, total 235 (H) 09/19/2019 01:30 PM    HDL Cholesterol 62 09/19/2019 01:30 PM    LDL, calculated 161 (H) 09/19/2019 01:30 PM    Triglyceride 58 09/19/2019 01:30 PM        Review of Systems   Constitutional: Negative for chills and fever. HENT: Negative for congestion and nosebleeds. Eyes: Negative for blurred vision and pain. Respiratory: Negative for cough, shortness of breath and wheezing. Cardiovascular: Negative for chest pain and leg swelling. Gastrointestinal: Negative for constipation, diarrhea, nausea and vomiting. Genitourinary: Negative for dysuria and frequency. Musculoskeletal: Negative for joint pain and myalgias. Skin: Negative for itching and rash. Neurological: Negative for dizziness, loss of consciousness and headaches. Psychiatric/Behavioral: Positive for depression. The patient is nervous/anxious and has insomnia. Physical Exam   Constitutional: She is oriented to person, place, and time. She appears well-developed and well-nourished. HENT:   Head: Normocephalic and atraumatic. Eyes: Conjunctivae and EOM are normal.   Neck: Normal range of motion. Neck supple. Cardiovascular: Normal rate, regular rhythm and normal heart sounds. No murmur heard. Pulmonary/Chest: Effort normal and breath sounds normal. No stridor. Abdominal: Soft. Bowel sounds are normal. She exhibits no distension and no mass. There is no tenderness. Musculoskeletal: Normal range of motion. She exhibits no edema. Nl pulses, nl visual inspection nl monoF, +dystrophy and elongated Nails   Lymphadenopathy:     She has no cervical adenopathy. Neurological: She is alert and oriented to person, place, and time. Skin: No erythema. Psychiatric: Her behavior is normal.   Nursing note and vitals reviewed. ASSESSMENT and PLAN  Diagnoses and all orders for this visit:    1. [facility]  -     REFERRAL TO PSYCHIATRY  -     REFERRAL TO PSYCHOLOGY  -     buPROPion XL (WELLBUTRIN XL) 150 mg tablet; Take 1 Tab by mouth every morning.  -     PAIN MGMT PANEL W/REFL, UR  -     clonazePAM (KLONOPIN) 0.5 mg tablet; Take 1 Tab by mouth two (2) times daily as needed for Other (Wd).  Max Daily Amount: 1 mg.    2. Anxiety  -     REFERRAL TO PSYCHIATRY  -     REFERRAL TO PSYCHOLOGY  -     buPROPion XL (WELLBUTRIN XL) 150 mg tablet; Take 1 Tab by mouth every morning.  -     PAIN MGMT PANEL W/REFL, UR  -     clonazePAM (KLONOPIN) 0.5 mg tablet; Take 1 Tab by mouth two (2) times daily as needed for Other (Wd). Max Daily Amount: 1 mg.  -     ADMIN INFLUENZA VIRUS VAC  -     INFLUENZA VIRUS VAC QUAD,SPLIT,PRESV FREE SYRINGE IM    3. Depression due to multiple sclerosis ([facility] [address]  -     REFERRAL TO PSYCHIATRY  -     REFERRAL TO PSYCHOLOGY  -     buPROPion XL (WELLBUTRIN XL) 150 mg tablet; Take 1 Tab by mouth every morning.  -     PAIN MGMT PANEL W/REFL, UR  -     clonazePAM (KLONOPIN) 0.5 mg tablet; Take 1 Tab by mouth two (2) times daily as needed for Other (Wd). Max Daily Amount: 1 mg.    4. Benzodiazepine dependence (HCC)  -     REFERRAL TO PSYCHIATRY  -     REFERRAL TO PSYCHOLOGY  -     buPROPion XL (WELLBUTRIN XL) 150 mg tablet; Take 1 Tab by mouth every morning.  -     PAIN MGMT PANEL W/REFL, UR  -     clonazePAM (KLONOPIN) 0.5 mg tablet; Take 1 Tab by mouth two (2) times daily as needed for Other (Wd). Max Daily Amount: 1 mg.    5. Need for influenza vaccination  -     REFERRAL TO PSYCHIATRY  -     REFERRAL TO PSYCHOLOGY  -     buPROPion XL (WELLBUTRIN XL) 150 mg tablet; Take 1 Tab by mouth every morning.  -     PAIN MGMT PANEL W/REFL, UR  -     clonazePAM (KLONOPIN) 0.5 mg tablet; Take 1 Tab by mouth two (2) times daily as needed for Other (Wd). Max Daily Amount: 1 mg.    6. Chronic pain  -     REFERRAL TO PSYCHIATRY  -     REFERRAL TO PSYCHOLOGY  -     buPROPion XL (WELLBUTRIN XL) 150 mg tablet; Take 1 Tab by mouth every morning.  -     PAIN MGMT PANEL W/REFL, UR  -     clonazePAM (KLONOPIN) 0.5 mg tablet; Take 1 Tab by mouth two (2) times daily as needed for Other (Wd). Max Daily Amount: 1 mg.    7. Back pain  -     REFERRAL TO PSYCHIATRY  -     REFERRAL TO PSYCHOLOGY  -     buPROPion XL (WELLBUTRIN XL) 150 mg tablet;  Take 1 Tab by mouth every morning.  -     PAIN MGMT PANEL W/REFL, UR  -     clonazePAM (KLONOPIN) 0.5 mg tablet; Take 1 Tab by mouth two (2) times daily as needed for Other (Wd). Max Daily Amount: 1 mg.    8. Body aches  -     REFERRAL TO PSYCHIATRY  -     REFERRAL TO PSYCHOLOGY  -     buPROPion XL (WELLBUTRIN XL) 150 mg tablet; Take 1 Tab by mouth every morning.  -     PAIN MGMT PANEL W/REFL, UR  -     clonazePAM (KLONOPIN) 0.5 mg tablet; Take 1 Tab by mouth two (2) times daily as needed for Other (Wd). Max Daily Amount: 1 mg. Other orders  -     busPIRone (BUSPAR) 10 mg tablet; Take 1 Tab by mouth three (3) times daily for 14 days. At this time patient was told that she needs to wean herself off of Ativan 2 mg 3 times daily Ambien 10 mg nightly patient was told that it would be hard to do such otherwise this kind of combination of benzodiazepine with sleeping pill could affect her adversely patient acknowledged understanding    Each medication side effect was detailed to the patient patient agreed to avoid alcohol as much as possible patient was also told that there is a side affect and black box warning regarding suicidal ideations, concerning the start of any antidepressant medications in addition to this risk would increase combining such medication with alcohol beverages patient agreed with all today's recommendation and understand the side effect of each medication stated that willing to go through the crying today's recommendations for better outcome including medication side effect including the fact of combined such medication with any alcoholic beverages patient was told to call for any concern 24 hours 7 days a week regardless patient was told to return to clinic in 7 days for the progress.   Patient agreed with today's recommendation

## 2019-10-28 ENCOUNTER — OFFICE VISIT (OUTPATIENT)
Dept: NEUROLOGY | Age: 44
End: 2019-10-28

## 2019-10-28 VITALS
DIASTOLIC BLOOD PRESSURE: 62 MMHG | WEIGHT: 198 LBS | SYSTOLIC BLOOD PRESSURE: 110 MMHG | OXYGEN SATURATION: 99 % | HEART RATE: 74 BPM | BODY MASS INDEX: 33.8 KG/M2 | HEIGHT: 64 IN

## 2019-10-28 DIAGNOSIS — Z72.0 TOBACCO ABUSE: ICD-10-CM

## 2019-10-28 DIAGNOSIS — F41.9 ANXIETY: ICD-10-CM

## 2019-10-28 DIAGNOSIS — G35 MULTIPLE SCLEROSIS (HCC): Primary | ICD-10-CM

## 2019-10-28 DIAGNOSIS — M79.7 FIBROMYALGIA: ICD-10-CM

## 2019-10-28 RX ORDER — GABAPENTIN 400 MG/1
400 CAPSULE ORAL 3 TIMES DAILY
Qty: 90 CAP | Refills: 5 | Status: SHIPPED | OUTPATIENT
Start: 2019-10-28 | End: 2019-12-09 | Stop reason: SDUPTHER

## 2019-10-28 NOTE — PATIENT INSTRUCTIONS
A Healthy Lifestyle: Care Instructions  Your Care Instructions    A healthy lifestyle can help you feel good, stay at a healthy weight, and have plenty of energy for both work and play. A healthy lifestyle is something you can share with your whole family. A healthy lifestyle also can lower your risk for serious health problems, such as high blood pressure, heart disease, and diabetes. You can follow a few steps listed below to improve your health and the health of your family. Follow-up care is a key part of your treatment and safety. Be sure to make and go to all appointments, and call your doctor if you are having problems. It's also a good idea to know your test results and keep a list of the medicines you take. How can you care for yourself at home? · Do not eat too much sugar, fat, or fast foods. You can still have dessert and treats now and then. The goal is moderation. · Start small to improve your eating habits. Pay attention to portion sizes, drink less juice and soda pop, and eat more fruits and vegetables. ? Eat a healthy amount of food. A 3-ounce serving of meat, for example, is about the size of a deck of cards. Fill the rest of your plate with vegetables and whole grains. ? Limit the amount of soda and sports drinks you have every day. Drink more water when you are thirsty. ? Eat at least 5 servings of fruits and vegetables every day. It may seem like a lot, but it is not hard to reach this goal. A serving or helping is 1 piece of fruit, 1 cup of vegetables, or 2 cups of leafy, raw vegetables. Have an apple or some carrot sticks as an afternoon snack instead of a candy bar. Try to have fruits and/or vegetables at every meal.  · Make exercise part of your daily routine. You may want to start with simple activities, such as walking, bicycling, or slow swimming. Try to be active 30 to 60 minutes every day. You do not need to do all 30 to 60 minutes all at once.  For example, you can exercise 3 times a day for 10 or 20 minutes. Moderate exercise is safe for most people, but it is always a good idea to talk to your doctor before starting an exercise program.  · Keep moving. [employer] the lawn, work in the garden, or [employer]. Take the stairs instead of the elevator at work. · If you smoke, quit. People who smoke have an increased risk for heart attack, stroke, cancer, and other lung illnesses. Quitting is hard, but there are ways to boost your chance of quitting tobacco for good. ? Use nicotine gum, patches, or lozenges. ? Ask your doctor about stop-smoking programs and medicines. ? Keep trying. In addition to reducing your risk of diseases in the future, you will notice some benefits soon after you stop using tobacco. If you have shortness of breath or asthma symptoms, they will likely get better within a few weeks after you quit. · Limit how much alcohol you drink. Moderate amounts of alcohol (up to 2 drinks a day for men, 1 drink a day for women) are okay. But drinking too much can lead to liver problems, high blood pressure, and other health problems. Family health  If you have a family, there are many things you can do together to improve your health. · Eat meals together as a family as often as possible. · Eat healthy foods. This includes fruits, vegetables, lean meats and dairy, and whole grains. · Include your family in your fitness plan. Most people think of activities such as jogging or tennis as the way to fitness, but there are many ways you and your family can be more active. Anything that makes you breathe hard and gets your heart pumping is exercise. Here are some tips:  ? Walk to do errands or to take your child to school or the bus.  ? Go for a family bike ride after dinner instead of watching TV. Where can you learn more? Go to http://damaris-eloise.info/. Enter U041 in the search box to learn more about \"A Healthy Lifestyle: Care Instructions. \"  Current as of: May 28, 2019  Content Version: 12.2  © 5665-8525 [employer]. Care instructions adapted under license by [employer] (which disclaims liability or warranty for this information). If you have questions about a medical condition or this instruction, always ask your healthcare professional. [address] any warranty or liability for your use of this information.

## 2019-10-28 NOTE — PROGRESS NOTES
Chief Complaint: Multiple sclerosis    Mrs. Vipul Lau returns for a follow up visit. She has been compliant with tecfidera has not been hospitalized for MS. She has no weakness or sensory loss. Dr. Pk Loyola  doscontinued the clonazepam and the gabapentin. She was taking gabapentin for MS related pain. Assesment and Plan  1. Multiple sclerosis  Continue Tecfidera  JCV testing      2. Anxiety   Continue alrazolam    3. Fibromyaglia   Continue gabapentin    4. Tobacco use  conitnues to smoke discussed the importance quit. Allergies  Patient has no known allergies. Medications  Current Outpatient Medications   Medication Sig    buPROPion XL (WELLBUTRIN XL) 150 mg tablet Take 1 Tab by mouth every morning.  busPIRone (BUSPAR) 10 mg tablet Take 1 Tab by mouth three (3) times daily for 14 days.  dimethyl fumarate (TECFIDERA) 240 mg cpDR Take 240 mg by mouth two (2) times a day.  gabapentin (NEURONTIN) 800 mg tablet Take 2 Tabs by mouth three (3) times daily.  ibuprofen (MOTRIN) 600 mg tablet Take 1 Tab by mouth every six (6) hours as needed for Pain. No current facility-administered medications for this visit. Medical History  Past Medical History:   Diagnosis Date    Body aches 12/11/2014    Chronic pain     Depression     GERD (gastroesophageal reflux disease)     Headache(784.0)     History of mammogram never before    [facility]     Neurological disorder     Multiple Sclerosis    Pap smear for cervical cancer screening 2008    Psychiatric disorder     anxiety    Psychotic disorder ([facility]      Review of Systems   Constitutional: Negative for chills and fever. HENT: Negative for ear pain. Eyes: Negative for pain and discharge. Respiratory: Negative for cough and hemoptysis. Cardiovascular: Negative for chest pain and claudication. Gastrointestinal: Negative for constipation and diarrhea. Genitourinary: Negative for flank pain and hematuria. Musculoskeletal: Positive for back pain, myalgias and neck pain. Skin: Negative for itching and rash. Neurological: Positive for dizziness. Negative for tingling and headaches. Endo/Heme/Allergies: Negative for environmental allergies. Does not bruise/bleed easily. Psychiatric/Behavioral: Negative for depression and hallucinations. The patient is nervous/anxious. Exam:    Visit Vitals  /62   Pulse 74   Ht 5' 4\" (1.626 m)   Wt 198 lb (89.8 kg)   SpO2 99%   BMI 33.99 kg/m²      General: Well developed, well nourished. Patient in no apparent distress   Head: Normocephalic, atraumatic, anicteric sclera   Neck Normal ROM, No thyromegally   Lungs:  Clear to auscultation bilaterally, No wheezes or rubs   Cardiac: Regular rate and rhythm with no murmurs. Abd: Bowel sounds were audible. No tenderness on palpation   Ext: No pedal edema   Skin: Supple no rash     NeurologicExam:  Mental Status: Alert and oriented to person place and time   Speech: Fluent no aphasia or has scanning speech. Cranial Nerves:  II - XII Intact   Motor:  Full and symmetric strength of upper and lower proximal and distal muscles. Normal bulk and tone. Reflexes:   Deep tendon reflexes 2+/4 and symmetric. Sensory:   Symmetric and intact with no perceived deficits modalities involving small or large fibers. Gait:  Gait is cane dependant. Everted left leg   Tremor:   No tremor noted. Cerebellar:  Coordination intact. Neurovascular: No carotid bruits. No JVD       Imaging    CT Results (most recent):  Results from Hospital Encounter encounter on 10/27/18   CT ABD PELV WO CONT    Narrative EXAM:  CT ABD PELV WO CONT    INDICATION: Status post ground-level fall with right-sided chest pain    COMPARISON: 2/16/2018    CONTRAST:  None. TECHNIQUE:   Thin axial images were obtained through the abdomen and pelvis. Coronal and  sagittal reconstructions were generated. Oral contrast was not administered.  CT  dose reduction was achieved through use of a standardized protocol tailored for  this examination and automatic exposure control for dose modulation. The absence of intravenous contrast material reduces the sensitivity for  evaluation of the solid parenchymal organs of the abdomen. FINDINGS:   LUNG BASES: Bibasilar atelectasis is noted. INCIDENTALLY IMAGED HEART AND MEDIASTINUM: Unremarkable. LIVER: No mass or biliary dilatation. GALLBLADDER: Surgically absent. SPLEEN: No mass. PANCREAS: No mass or ductal dilatation. ADRENALS: Unremarkable. KIDNEYS/URETERS: No mass, calculus, or hydronephrosis. STOMACH: Unremarkable. SMALL BOWEL: No dilatation or wall thickening. COLON: Sigmoid diverticulosis is noted. APPENDIX: Unremarkable. PERITONEUM: No ascites or pneumoperitoneum. RETROPERITONEUM: No lymphadenopathy or aortic aneurysm. REPRODUCTIVE ORGANS: There is no pelvic mass or lymphadenopathy. URINARY BLADDER: No mass or calculus. BONES: There are acute displaced fractures of the right posterior seventh and  eighth ribs as well as a nondisplaced fracture the right lateral fifth rib and  likely incomplete fracture of the right ninth rib. Old fractures of the right  10th and 11th ribs are noted. ADDITIONAL COMMENTS: The patient is status post anterior abdominal wall hernia  repair. Impression IMPRESSION:  Multiple right rib fractures. No acute intra-abdominal abnormality although lack  of intravenous contrast does limit the sensitivity for evaluation of solid organ  injuries. MRI Results (most recent):  Results from [place] encounter on 08/28/17   MRI CERV SPINE WO CONT    Narrative EXAM:  MRI CERV SPINE WO CONT    INDICATION:  Multiple sclerosis. Multiple sclerosis    COMPARISON: August 27, 2016    TECHNIQUE: MR imaging of the cervical spine was performed using the following  sequences: sagittal T1, T2, STIR;  axial T2, T1.     CONTRAST:  None.     FINDINGS:    4 signal abnormality within the spinal cord and lower medulla appears stable  compatible with the known diagnosis of multiple sclerosis. The craniocervical  junction appears normal. There is no evidence for bone marrow replacement. Mild  spondylosis and degenerative disc disease also unchanged without significant  canal or foraminal compromise and no extrinsic pressure on the cord. Impression  impression: No change.        .  Lab Review    Lab Results   Component Value Date/Time    WBC 4.8 09/19/2019 01:30 PM    HCT 38.2 09/19/2019 01:30 PM    HGB 12.4 09/19/2019 01:30 PM    PLATELET 954 17/34/0879 01:30 PM       Lab Results   Component Value Date/Time    Sodium 143 09/19/2019 01:30 PM    Potassium 5.0 09/19/2019 01:30 PM    Chloride 102 09/19/2019 01:30 PM    CO2 18 (L) 09/19/2019 01:30 PM    Glucose 71 09/19/2019 01:30 PM    BUN 10 09/19/2019 01:30 PM    Creatinine 0.81 09/19/2019 01:30 PM    Calcium 9.7 09/19/2019 01:30 PM       Lab Results   Component Value Date/Time    Cholesterol, total 235 (H) 09/19/2019 01:30 PM    HDL Cholesterol 62 09/19/2019 01:30 PM    LDL, calculated 161 (H) 09/19/2019 01:30 PM    VLDL, calculated 12 09/19/2019 01:30 PM    Triglyceride 58 09/19/2019 01:30 PM

## 2019-10-29 LAB
AMPHETAMINES UR QL SCN: NEGATIVE NG/ML
BARBITURATES UR QL SCN: NEGATIVE NG/ML
BENZODIAZ UR QL: POSITIVE NG/ML
BZE UR QL SCN: NEGATIVE NG/ML
CANNABINOIDS UR QL CFM: POSITIVE
CREAT UR-MCNC: 52.4 MG/DL (ref 20–300)
FENTANYL+NORFENTANYL UR QL SCN: NEGATIVE PG/ML
MEPERIDINE UR QL: NEGATIVE NG/ML
METHADONE UR QL SCN: NEGATIVE NG/ML
OPIATES UR QL SCN: NEGATIVE NG/ML
OXYCODONE+OXYMORPHONE UR QL SCN: NEGATIVE NG/ML
PCP UR QL: NEGATIVE NG/ML
PH UR: 7.3 [PH] (ref 4.5–8.9)
PLEASE NOTE:, 733157: ABNORMAL
PROPOXYPH UR QL SCN: NEGATIVE NG/ML
SP GR UR: 1.02
TRAMADOL UR QL SCN: NEGATIVE NG/ML

## 2019-11-18 ENCOUNTER — TELEPHONE (OUTPATIENT)
Dept: NEUROLOGY | Age: 44
End: 2019-11-18

## 2019-11-25 ENCOUNTER — TELEPHONE (OUTPATIENT)
Dept: NEUROLOGY | Age: 44
End: 2019-11-25

## 2019-11-25 NOTE — TELEPHONE ENCOUNTER
----- Message from [place] [employer] [facility] sent at 11/25/2019  8:33 AM EST -----  Regarding: dr asif/ refivy  General Message/Vendor Calls    Caller's first and last name: pt      Reason for call: in regards to her \"gabapentin\" dosage beng changing       Callback required yes/no and why: yes      Best contact number(s): [phone]      Details to clarify the request:      [facility]

## 2019-11-27 ENCOUNTER — TELEPHONE (OUTPATIENT)
Dept: NEUROLOGY | Age: 44
End: 2019-11-27

## 2019-12-05 ENCOUNTER — OFFICE VISIT (OUTPATIENT)
Dept: FAMILY MEDICINE CLINIC | Age: 44
End: 2019-12-05

## 2019-12-05 VITALS
BODY MASS INDEX: 33.28 KG/M2 | SYSTOLIC BLOOD PRESSURE: 127 MMHG | HEIGHT: 64 IN | DIASTOLIC BLOOD PRESSURE: 88 MMHG | OXYGEN SATURATION: 96 % | HEART RATE: 79 BPM | WEIGHT: 194.9 LBS | TEMPERATURE: 97.5 F | RESPIRATION RATE: 20 BRPM

## 2019-12-05 DIAGNOSIS — F43.23 ADJUSTMENT DISORDER WITH MIXED ANXIETY AND DEPRESSED MOOD: Primary | ICD-10-CM

## 2019-12-05 DIAGNOSIS — G89.29 CHRONIC BILATERAL BACK PAIN, UNSPECIFIED BACK LOCATION: Primary | ICD-10-CM

## 2019-12-05 DIAGNOSIS — G89.4 CHRONIC PAIN SYNDROME: ICD-10-CM

## 2019-12-05 DIAGNOSIS — M25.512 CHRONIC PAIN OF BOTH SHOULDERS: ICD-10-CM

## 2019-12-05 DIAGNOSIS — M54.9 CHRONIC BILATERAL BACK PAIN, UNSPECIFIED BACK LOCATION: Primary | ICD-10-CM

## 2019-12-05 DIAGNOSIS — G89.29 CHRONIC PAIN OF BOTH SHOULDERS: ICD-10-CM

## 2019-12-05 DIAGNOSIS — Z79.899 MEDICATION MANAGEMENT: ICD-10-CM

## 2019-12-05 DIAGNOSIS — M25.511 CHRONIC PAIN OF BOTH SHOULDERS: ICD-10-CM

## 2019-12-05 DIAGNOSIS — F33.2 SEVERE EPISODE OF RECURRENT MAJOR DEPRESSIVE DISORDER, WITHOUT PSYCHOTIC FEATURES (HCC): ICD-10-CM

## 2019-12-05 DIAGNOSIS — F41.9 ANXIETY: ICD-10-CM

## 2019-12-05 RX ORDER — NORTRIPTYLINE HYDROCHLORIDE 25 MG/1
25 CAPSULE ORAL
Qty: 30 CAP | Refills: 1 | Status: SHIPPED | OUTPATIENT
Start: 2019-12-05 | End: 2020-02-10

## 2019-12-05 RX ORDER — VILAZODONE HYDROCHLORIDE 20 MG/1
20 TABLET ORAL DAILY
Qty: 30 TAB | Refills: 1 | Status: SHIPPED | OUTPATIENT
Start: 2019-12-05 | End: 2019-12-19 | Stop reason: SDUPTHER

## 2019-12-05 NOTE — PATIENT INSTRUCTIONS
Adjustment Disorder: Care Instructions Your Care Instructions Adjustment disorder means that you have emotional or behavioral problems because of stress. But your response to the stress is far more severe than a normal response. It is severe enough to affect your work or social life and may cause depression and physical pains and problems. Events that may cause this response can include a divorce, money problems, or starting school or a new job. It might be anything that causes some stress. This disorder is most often a short-term problem. It happens within 3 months of the stressful event or change. If the response lasts longer than 6 months after the event ends, you may have a more serious disorder. Follow-up care is a key part of your treatment and safety. Be sure to make and go to all appointments, and call your doctor if you are having problems. It's also a good idea to know your test results and keep a list of the medicines you take. How can you care for yourself at home? · Go to all counseling sessions. Do not skip any because you are feeling better. · If your doctor prescribed medicines, take them exactly as prescribed. Call your doctor if you think you are having a problem with your medicine. You will get more details on the specific medicines your doctor prescribes. · Discuss the causes of your stress with a good friend or family member. Or you can join a support group for people with similar problems. Talking to others sometimes relieves stress. · Get at least 30 minutes of exercise on most days of the week. Walking is a good choice. You also may want to do other activities, such as running, swimming, cycling, or playing tennis or team sports. Relaxation techniques Do relaxation exercises 10 to 20 minutes a day. You can play soothing, relaxing music while you do them, if you wish.  
· Tell others in your house that you are going to do your relaxation exercises. Ask them not to disturb you. · Find a comfortable, quiet place. · Lie down on your back, or sit with your back straight. · Focus on your breathing. Make it slow and steady. · Breathe in through your nose. Breathe out through either your nose or mouth. · Breathe deeply, filling up the area between your navel and your rib cage. Breathe so that your belly goes up and down. · Do not hold your breath. · Breathe like this for 5 to 10 minutes. Notice the feeling of calmness throughout your whole body. As you continue to breathe slowly and deeply, relax by doing these next steps for another 5 to 10 minutes: · Tighten and relax each muscle group in your body. Start at your toes, and work your way up to your head. · Imagine your muscle groups relaxing and getting heavy. · Empty your mind of all thoughts. · Let yourself relax more and more deeply. · Be aware of the state of calmness that surrounds you. · When your relaxation time is over, you can bring yourself back to alertness by moving your fingers and toes. Then move your hands and feet. And then move your entire body. Sometimes people fall asleep during relaxation. But they most often wake up soon. · Always give yourself time to return to full alertness before you drive a car. Wait to do anything that might cause an accident if you are not fully alert. Never play a relaxation tape while you drive a car. When should you call for help? Call 911 anytime you think you may need emergency care. For example, call if: 
  · You feel you cannot stop from hurting yourself or someone else. Keep the numbers for these national suicide hotlines: 2-268-044-TALK (2-715.164.4647) and 7-692-QTIWMYQ (2-886.217.4968). If you or someone you know talks about suicide or feeling hopeless, get help right away.  
 Watch closely for changes in your health, and be sure to contact your doctor if: 
  · You have new anxiety, or your anxiety gets worse.   · You have been feeling sad, depressed, or hopeless or have lost interest in things that you usually enjoy.  
  · You do not get better as expected. Where can you learn more? Go to http://damaris-eloise.info/. Enter 0688 [id number] in the search box to learn more about \"Adjustment Disorder: Care Instructions. \" Current as of: April 7, 2019 Content Version: 12.2 © 4464-7443 Setup. Care instructions adapted under license by [employer] (which disclaims liability or warranty for this information). If you have questions about a medical condition or this instruction, always ask your healthcare professional. [address] any warranty or liability for your use of this information. Recovering From Depression: Care Instructions Your Care Instructions Taking good care of yourself is important as you recover from depression. In time, your symptoms will fade as your treatment takes hold. Do not give up. Instead, focus your energy on getting better. Your mood will improve. It just takes some time. Focus on things that can help you feel better, such as being with friends and family, eating well, and getting enough rest. But take things slowly. Do not do too much too soon. You will begin to feel better gradually. Follow-up care is a key part of your treatment and safety. Be sure to make and go to all appointments, and call your doctor if you are having problems. It's also a good idea to know your test results and keep a list of the medicines you take. How can you care for yourself at home? Be realistic · If you have a large task to do, break it up into smaller steps you can handle, and just do what you can. · You may want to put off important decisions until your depression has lifted. If you have plans that will have a major impact on your life, such as marriage, divorce, or a job change, try to wait a bit.  Talk it over with friends and loved ones who can help you look at the overall picture first. 
· Reaching out to people for help is important. Do not isolate yourself. Let your family and friends help you. Find someone you can trust and confide in, and talk to that person. · Be patient, and be kind to yourself. Remember that depression is not your fault and is not something you can overcome with willpower alone. Treatment is necessary for depression, just like for any other illness. Feeling better takes time, and your mood will improve little by little. Stay active · Stay busy and get outside. Take a walk, or try some other light exercise. · Talk with your doctor about an exercise program. Exercise can help with mild depression. · Go to a movie or concert. Take part in a Zoroastrianism activity or other social gathering. Go to a [employer] game. · Ask a friend to have dinner with you. Take care of yourself · Eat a balanced diet with plenty of fresh fruits and vegetables, whole grains, and lean protein. If you have lost your appetite, eat small snacks rather than large meals. · Avoid drinking alcohol or using illegal drugs. Do not take medicines that have not been prescribed for you. They may interfere with medicines you may be taking for depression, or they may make your depression worse. · Take your medicines exactly as they are prescribed. You may start to feel better within 1 to 3 weeks of taking antidepressant medicine. But it can take as many as 6 to 8 weeks to see more improvement. If you have questions or concerns about your medicines, or if you do not notice any improvement by 3 weeks, talk to your doctor. · If you have any side effects from your medicine, tell your doctor. Antidepressants can make you feel tired, dizzy, or nervous. Some people have dry mouth, constipation, headaches, sexual problems, or diarrhea.  Many of these side effects are mild and will go away on their own after you have been taking the medicine for a few weeks. Some may last longer. Talk to your doctor if side effects are bothering you too much. You might be able to try a different medicine. · Get enough sleep. If you have problems sleeping: 
? Go to bed at the same time every night, and get up at the same time every morning. ? Keep your bedroom dark and quiet. ? Do not exercise after 5:00 p.m. ? Avoid drinks with caffeine after 5:00 p.m. · Avoid sleeping pills unless they are prescribed by the doctor treating your depression. Sleeping pills may make you groggy during the day, and they may interact with other medicine you are taking. · If you have any other illnesses, such as diabetes, heart disease, or high blood pressure, make sure to continue with your treatment. Tell your doctor about all of the medicines you take, including those with or without a prescription. · Keep the numbers for these national suicide hotlines: 4-678-351-TALK (9-277-669-442.764.9165) and 8-280-RWGHHJM (2-352.695.2391). If you or someone you know talks about suicide or feeling hopeless, get help right away. When should you call for help? Call 911 anytime you think you may need emergency care. For example, call if: 
  · You feel like hurting yourself or someone else.  
  · Someone you know has depression and is about to attempt or is attempting suicide.  
[facility] your doctor now or seek immediate medical care if: 
  · You hear voices.  
  · Someone you know has depression and: 
? Starts to give away his or her possessions. ? Uses illegal drugs or drinks alcohol heavily. ? Talks or writes about death, including writing suicide notes or talking about guns, knives, or pills. ? Starts to spend a lot of time alone. ? Acts very aggressively or suddenly appears calm.  
 Watch closely for changes in your health, and be sure to contact your doctor if: 
  · You do not get better as expected. Where can you learn more? Go to http://damaris-eloise.info/. Enter N909 in the search box to learn more about \"Recovering From Depression: Care Instructions. \" Current as of: May 28, 2019 Content Version: 12.2 © 9563-0958 [employer], [employer]. Care instructions adapted under license by [employer] (which disclaims liability or warranty for this information). If you have questions about a medical condition or this instruction, always ask your healthcare professional. [address] any warranty or liability for your use of this information.

## 2019-12-05 NOTE — PROGRESS NOTES
HISTORY OF PRESENT ILLNESS  Carissa Chapman is a 53 y.o. female. HPI   Present for the c/o both arms and shoulder pain, feels like she is in fire, 9/10, sharp have gabapentin 400mg tid not helping, she sees Eurologist was on 1600mg tid was helping but recently dropped it to lower dosage,  Patient's current medical condition is not controlled with medications,   Patient state that it isnot getting better: pt states and reports of feeling anxius, some guilty feeling, no Hoplessness, patient at this time has ns/nh,ni,nh, and some trouble with weight gain, less ability of sleep, no ablitiy  to concentrate at home with the current medications, and all together a safe feeling at home    Current Outpatient Medications   Medication Sig Dispense Refill    gabapentin (NEURONTIN) 400 mg capsule Take 1 Cap by mouth three (3) times daily. Max Daily Amount: 1,200 mg. 90 Cap 5    buPROPion XL (WELLBUTRIN XL) 150 mg tablet Take 1 Tab by mouth every morning. 30 Tab 0    dimethyl fumarate (TECFIDERA) 240 mg cpDR Take 240 mg by mouth two (2) times a day. 120 Cap 3    ibuprofen (MOTRIN) 600 mg tablet Take 1 Tab by mouth every six (6) hours as needed for Pain.  20 Tab 0     No Known Allergies  Past Medical History:   Diagnosis Date    Body aches 12/11/2014    Chronic pain     Depression     GERD (gastroesophageal reflux disease)     Headache(784.0)     History of mammogram never before    MS (multiple sclerosis) (HCC)     Neurological disorder     Multiple Sclerosis    Pap smear for cervical cancer screening 2008    Psychiatric disorder     anxiety    Psychotic disorder [facility])      Past Surgical History:   Procedure Laterality Date    COLONOSCOPY N/A 2/28/2018    COL[facility] ENDOSCOPY    HX CHOLECYSTECTOMY      HX GYN      3 c-sections    HX HEENT      bilateral ear tube surgery    HX HERNIA REPAIR      HX OTHER SURGICAL      R inguinal hernia repair     Family History Problem Relation Age of Onset    Heart Attack Father         tripple bypass    Cancer Father         lung    Diabetes Mother         type 2    Heart Disease Mother         heart disease    Diabetes Paternal Grandmother      Social History     Tobacco Use    Smoking status: Current Every Day Smoker     Packs/day: 0.50     Years: 17.00     Pack years: 8.50     Types: Cigarettes    Smokeless tobacco: Never Used    Tobacco comment: 1 pack every 3 days. Substance Use Topics    Alcohol use: No      Lab Results   Component Value Date/Time    WBC 4.8 09/19/2019 01:30 PM    HGB 12.4 09/19/2019 01:30 PM    HCT 38.2 09/19/2019 01:30 PM    PLATELET 707 07/47/6771 01:30 PM    MCV 97 09/19/2019 01:30 PM     Lab Results   Component Value Date/Time    GFR est non-AA 88 09/19/2019 01:30 PM    GFR est  09/19/2019 01:30 PM    Creatinine 0.81 09/19/2019 01:30 PM    BUN 10 09/19/2019 01:30 PM    Sodium 143 09/19/2019 01:30 PM    Potassium 5.0 09/19/2019 01:30 PM    Chloride 102 09/19/2019 01:30 PM    CO2 18 (L) 09/19/2019 01:30 PM        Review of Systems   Constitutional: Negative for chills and fever. HENT: Negative for ear pain and nosebleeds. Eyes: Negative for blurred vision, pain and discharge. Respiratory: Negative for shortness of breath. Cardiovascular: Negative for chest pain and leg swelling. Gastrointestinal: Negative for constipation, diarrhea, nausea and vomiting. Genitourinary: Negative for frequency. Musculoskeletal: Positive for joint pain, myalgias and neck pain. Skin: Negative for itching and rash. Neurological: Negative for headaches. Psychiatric/Behavioral: Negative for depression. The patient is not nervous/anxious. Physical Exam  Vitals signs and nursing note reviewed. Constitutional:       Appearance: She is well-developed. HENT:      Head: Normocephalic and atraumatic.    Eyes:      Conjunctiva/sclera: Conjunctivae normal.   Neck:      Musculoskeletal: Normal range of motion and neck supple. Cardiovascular:      Rate and Rhythm: Normal rate and regular rhythm. Heart sounds: Normal heart sounds. No murmur. Pulmonary:      Effort: Pulmonary effort is normal.      Breath sounds: Normal breath sounds. Abdominal:      General: Bowel sounds are normal. There is no distension. Palpations: Abdomen is soft. Musculoskeletal: Normal range of motion. General: Tenderness present. Skin:     Findings: No erythema. Neurological:      Mental Status: She is alert and oriented to person, place, and time. Motor: Weakness present. Psychiatric:         Behavior: Behavior normal.         ASSESSMENT and PLAN  Diagnoses and all orders for this visit:    1. Chronic bilateral back pain, unspecified back location  -     nortriptyline (PAMELOR) 25 mg capsule; Take 1 Cap by mouth nightly. Indications: depression, Stop Smoking  -     REFERRAL TO SOCIAL WORK  -     PAIN MGMT PANEL W/REFL, UR    2. Chronic pain syndrome  -     nortriptyline (PAMELOR) 25 mg capsule; Take 1 Cap by mouth nightly. Indications: depression, Stop Smoking  -     REFERRAL TO SOCIAL WORK  -     PAIN MGMT PANEL W/REFL, UR    3. Chronic pain of both shoulders  -     nortriptyline (PAMELOR) 25 mg capsule; Take 1 Cap by mouth nightly. Indications: depression, Stop Smoking  -     REFERRAL TO SOCIAL WORK  -     PAIN MGMT PANEL W/REFL, UR    4. Anxiety  -     REFERRAL TO SOCIAL WORK    5. Severe episode of recurrent major depressive disorder, without psychotic features ([facility] [address]  -     REFERRAL TO SOCIAL WORK    6.  Medication management  -     PAIN MGMT PANEL W/REFL, UR      Each medication side effect was detailed to the patient patient agreed to avoid alcohol as much as possible patient was also told that there is a side affect and black box warning regarding suicidal ideations, concerning the start of any antidepressant medications in addition to this risk would increase combining such medication with alcohol beverages patient agreed with all today's recommendation and understand the side effect of each medication stated that willing to go through the crying today's recommendations for better outcome including medication side effect including the fact of combined such medication with any alcoholic beverages patient was told to call for any concern 24 hours 7 days a week regardless patient was told to return to clinic in 7 days for the progress.   Patient agreed with today's recommendation

## 2019-12-05 NOTE — PROGRESS NOTES
Name and  verified      Patient reported last PAP EXAM over five years ago. Chief Complaint   Patient presents with    Medication Evaluation         Health Maintenance reviewed-discussed with patient. 1. Have you been to the ER, urgent care clinic since your last visit? Hospitalized since your last visit? no    2. Have you seen or consulted any other health care providers outside of the [address] [place] [address] since your last visit? Include any pap smears or colon screening.  no
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
## 2019-12-09 DIAGNOSIS — G35 MULTIPLE SCLEROSIS (HCC): ICD-10-CM

## 2019-12-09 LAB
AMPHETAMINES UR QL SCN: NEGATIVE NG/ML
BARBITURATES UR QL SCN: NEGATIVE NG/ML
BENZODIAZ UR QL SCN: NEGATIVE NG/ML
BZE UR QL SCN: NEGATIVE NG/ML
CANNABINOIDS UR QL CFM: POSITIVE
CREAT UR-MCNC: 72.6 MG/DL (ref 20–300)
FENTANYL+NORFENTANYL UR QL SCN: NEGATIVE PG/ML
MEPERIDINE UR QL: NEGATIVE NG/ML
METHADONE UR QL SCN: NEGATIVE NG/ML
OPIATES UR QL SCN: NEGATIVE NG/ML
OXYCODONE+OXYMORPHONE UR QL SCN: NEGATIVE NG/ML
PCP UR QL: NEGATIVE NG/ML
PH UR: 5.8 [PH] (ref 4.5–8.9)
PLEASE NOTE:, 733157: ABNORMAL
PROPOXYPH UR QL SCN: NEGATIVE NG/ML
SP GR UR: 1.01
TRAMADOL UR QL SCN: NEGATIVE NG/ML

## 2019-12-09 RX ORDER — GABAPENTIN 400 MG/1
800 CAPSULE ORAL 3 TIMES DAILY
Qty: 180 CAP | Refills: 5 | Status: SHIPPED | OUTPATIENT
Start: 2019-12-09 | End: 2019-12-19 | Stop reason: SDUPTHER

## 2019-12-19 ENCOUNTER — OFFICE VISIT (OUTPATIENT)
Dept: FAMILY MEDICINE CLINIC | Age: 44
End: 2019-12-19

## 2019-12-19 VITALS
BODY MASS INDEX: 33.89 KG/M2 | TEMPERATURE: 97.1 F | RESPIRATION RATE: 20 BRPM | DIASTOLIC BLOOD PRESSURE: 73 MMHG | WEIGHT: 198.5 LBS | HEART RATE: 67 BPM | OXYGEN SATURATION: 100 % | HEIGHT: 64 IN | SYSTOLIC BLOOD PRESSURE: 136 MMHG

## 2019-12-19 DIAGNOSIS — G89.29 CHRONIC BILATERAL BACK PAIN, UNSPECIFIED BACK LOCATION: ICD-10-CM

## 2019-12-19 DIAGNOSIS — M25.512 CHRONIC PAIN OF BOTH SHOULDERS: ICD-10-CM

## 2019-12-19 DIAGNOSIS — G89.4 CHRONIC PAIN SYNDROME: ICD-10-CM

## 2019-12-19 DIAGNOSIS — F43.23 ADJUSTMENT DISORDER WITH MIXED ANXIETY AND DEPRESSED MOOD: Primary | ICD-10-CM

## 2019-12-19 DIAGNOSIS — M54.9 CHRONIC BILATERAL BACK PAIN, UNSPECIFIED BACK LOCATION: ICD-10-CM

## 2019-12-19 DIAGNOSIS — G35 MULTIPLE SCLEROSIS (HCC): ICD-10-CM

## 2019-12-19 DIAGNOSIS — G89.29 CHRONIC PAIN OF BOTH SHOULDERS: ICD-10-CM

## 2019-12-19 DIAGNOSIS — M25.511 CHRONIC PAIN OF BOTH SHOULDERS: ICD-10-CM

## 2019-12-19 RX ORDER — VILAZODONE HYDROCHLORIDE 40 MG/1
40 TABLET ORAL DAILY
Qty: 30 TAB | Refills: 1 | Status: SHIPPED | OUTPATIENT
Start: 2019-12-19 | End: 2020-02-10

## 2019-12-19 RX ORDER — HYDROXYZINE HYDROCHLORIDE 10 MG/1
10 TABLET, FILM COATED ORAL
Qty: 60 TAB | Refills: 0 | Status: SHIPPED | OUTPATIENT
Start: 2019-12-19 | End: 2019-12-29

## 2019-12-19 RX ORDER — DULOXETIN HYDROCHLORIDE 60 MG/1
60 CAPSULE, DELAYED RELEASE ORAL DAILY
Qty: 30 CAP | Refills: 0 | Status: SHIPPED | OUTPATIENT
Start: 2019-12-19 | End: 2020-03-02 | Stop reason: SDUPTHER

## 2019-12-19 RX ORDER — GABAPENTIN 400 MG/1
800 CAPSULE ORAL 3 TIMES DAILY
Qty: 180 CAP | Refills: 0 | Status: SHIPPED | OUTPATIENT
Start: 2019-12-19 | End: 2020-01-14 | Stop reason: SDUPTHER

## 2019-12-19 NOTE — PROGRESS NOTES
HISTORY OF PRESENT ILLNESS  Carissa Chapman is a 53 y.o. female. HPI   Pt states that Viibryd 20mg once daily but in a lot of pain,   Was on 1600mg tid which was helping recently 400mg tid not ehlping with ms,     Patient's current medical condition is not controlled with medications,   Patient state that it is getting better: pt states and reports of feeling anxius, some guilty feeling,++ Hoplessness, patient at this time has ns/nh,ni,nh, and some trouble with weight gain, no ability of sleep, with decrease ablitiy to concentrate at home with the current medications, and all together a safe feeling at home and was just seen by clinical personal for further care pt happy with the visit, the pain is uncontrolled stating that she used to take clonazepam few times daily in addition to xanax and opioid based meds requesting those to be refilled stating that those meds mostly controlling hie to the best no hx o fbipolar states, the pain is 10/10 used to take 1600mg of gabapentin 3-4x daily, currently given 400mg tid stating that none helping her serous medical conditions, requesting the change of tx,           Current Outpatient Medications   Medication Sig Dispense Refill    gabapentin (NEURONTIN) 400 mg capsule Take 2 Caps by mouth three (3) times daily. Max Daily Amount: 2,400 mg. 180 Cap 0    vilazodone (VIIBRYD) 40 mg tab tablet Take 1 Tab by mouth daily. 30 Tab 1    DULoxetine (CYMBALTA) 60 mg capsule Take 1 Cap by mouth daily. 30 Cap 0    hydrOXYzine HCl (ATARAX) 10 mg tablet Take 1 Tab by mouth three (3) times daily as needed for Anxiety for up to 10 days. 60 Tab 0    dimethyl fumarate (TECFIDERA) 240 mg cpDR Take 240 mg by mouth two (2) times a day. 120 Cap 3    nortriptyline (PAMELOR) 25 mg capsule Take 1 Cap by mouth nightly. Indications: depression, Stop Smoking 30 Cap 1    ibuprofen (MOTRIN) 600 mg tablet Take 1 Tab by mouth every six (6) hours as needed for Pain.  20 Tab 0     Not on File  Past Medical History:   Diagnosis Date    Body aches 12/11/2014    Chronic pain     Depression     GERD (gastroesophageal reflux disease)     Headache(784.0)     History of mammogram never before    MS (multiple sclerosis) ([address]     Neurological disorder     Multiple Sclerosis    Pap smear for cervical cancer screening 2008    Psychiatric disorder     anxiety    Psychotic disorder [facility])      Past Surgical History:   Procedure Laterality Date    COLONOSCOPY N/A 2/28/2018    COL[facility] ENDOSCOPY    HX CHOLECYSTECTOMY      HX GYN      3 c-sections    HX HEENT      bilateral ear tube surgery    HX HERNIA REPAIR      HX OTHER SURGICAL      R inguinal hernia repair     Family History   Problem Relation Age of Onset    Heart Attack Father         tripple bypass    Cancer Father         lung    Diabetes Mother         type 2    Heart Disease Mother         heart disease    Diabetes Paternal Grandmother      Social History     Tobacco Use    Smoking status: Current Every Day Smoker     Packs/day: 0.50     Years: 17.00     Pack years: 8.50     Types: Cigarettes    Smokeless tobacco: Never Used    Tobacco comment: 1 pack every 3 days.    Substance Use Topics    Alcohol use: No      Lab Results   Component Value Date/Time    WBC 4.8 09/19/2019 01:30 PM    HGB 12.4 09/19/2019 01:30 PM    HCT 38.2 09/19/2019 01:30 PM    PLATELET 563 58/20/9296 01:30 PM    MCV 97 09/19/2019 01:30 PM     Lab Results   Component Value Date/Time    Hemoglobin A1c 4.7 (L) 09/19/2019 01:30 PM    Glucose 71 09/19/2019 01:30 PM    LDL, calculated 161 (H) 09/19/2019 01:30 PM    Creatinine 0.81 09/19/2019 01:30 PM      Lab Results   Component Value Date/Time    Cholesterol, total 235 (H) 09/19/2019 01:30 PM    HDL Cholesterol 62 09/19/2019 01:30 PM    LDL, calculated 161 (H) 09/19/2019 01:30 PM    Triglyceride 58 09/19/2019 01:30 PM     Lab Results   Component Value Date/Time    GFR est non-AA 88 09/19/2019 01:30 PM    GFR est  09/19/2019 01:30 PM    Creatinine 0.81 09/19/2019 01:30 PM    BUN 10 09/19/2019 01:30 PM    Sodium 143 09/19/2019 01:30 PM    Potassium 5.0 09/19/2019 01:30 PM    Chloride 102 09/19/2019 01:30 PM    CO2 18 (L) 09/19/2019 01:30 PM        Review of Systems   Constitutional: Positive for malaise/fatigue. Negative for chills and fever. HENT: Negative for nosebleeds. Eyes: Negative for pain. Respiratory: Negative for cough and wheezing. Cardiovascular: Negative for chest pain and leg swelling. Gastrointestinal: Negative for constipation, diarrhea and nausea. Genitourinary: Negative for frequency. Musculoskeletal: Positive for back pain, joint pain, myalgias and neck pain. Skin: Negative for rash. Neurological: Positive for tingling, weakness and headaches. Negative for loss of consciousness. Endo/Heme/Allergies: Does not bruise/bleed easily. Psychiatric/Behavioral: Positive for depression and memory loss. Negative for hallucinations, substance abuse and suicidal ideas. The patient is nervous/anxious and has insomnia. All other systems reviewed and are negative. Physical Exam  Musculoskeletal:         General: Tenderness present. Right shoulder: She exhibits pain and spasm. Left shoulder: She exhibits pain and spasm. Right knee: She exhibits decreased range of motion. Tenderness found. Left knee: Tenderness found. Cervical back: She exhibits decreased range of motion, tenderness, pain and spasm. Thoracic back: She exhibits pain and spasm. Lumbar back: She exhibits pain and spasm. ASSESSMENT and PLAN  Diagnoses and all orders for this visit:    1. Multiple sclerosis (HCC)  -     gabapentin (NEURONTIN) 400 mg capsule; Take 2 Caps by mouth three (3) times daily. Max Daily Amount: 2,400 mg.    2. Chronic bilateral back pain, unspecified back location  -     vilazodone (VIIBRYD) 40 mg tab tablet;  Take 1 Tab by mouth daily. 3. Chronic pain syndrome  -     vilazodone (VIIBRYD) 40 mg tab tablet; Take 1 Tab by mouth daily. 4. Chronic pain of both shoulders  -     vilazodone (VIIBRYD) 40 mg tab tablet; Take 1 Tab by mouth daily. Other orders  -     DULoxetine (CYMBALTA) 60 mg capsule; Take 1 Cap by mouth daily. -     hydrOXYzine HCl (ATARAX) 10 mg tablet; Take 1 Tab by mouth three (3) times daily as needed for Anxiety for up to 10 days. Each medication side effect was detailed to the patient patient agreed to avoid alcohol as much as possible patient was also told that there is a side affect and black box warning regarding suicidal ideations, concerning the start of any antidepressant medications in addition to this risk would increase combining such medication with alcohol beverages patient agreed with all today's recommendation and understand the side effect of each medication stated that willing to go through the crying today's recommendations for better outcome including medication side effect including the fact of combined such medication with any alcoholic beverages patient was told to call for any concern 24 hours 7 days a week regardless patient was told to return to clinic in 7-14 days for the progress.   Patient agreed with today's recommendation

## 2019-12-19 NOTE — PROGRESS NOTES
Name and  verified        Chief Complaint   Patient presents with    Anxiety     follow up         Health Maintenance reviewed-discussed with patient. 1. Have you been to the ER, urgent care clinic since your last visit? Hospitalized since your last visit? Yes, counseling office visit 2019    2. Have you seen or consulted any other health care providers outside of the [address] [place] [address] since your last visit? Include any pap smears or colon screening.  no

## 2019-12-19 NOTE — PATIENT INSTRUCTIONS
Anxiety Disorder: Care Instructions Your Care Instructions Anxiety is a normal reaction to stress. Difficult situations can cause you to have symptoms such as sweaty palms and a nervous feeling. In an anxiety disorder, the symptoms are far more severe. Constant worry, muscle tension, trouble sleeping, nausea and diarrhea, and other symptoms can make normal daily activities difficult or impossible. These symptoms may occur for no reason, and they can affect your work, school, or social life. Medicines, counseling, and self-care can all help. Follow-up care is a key part of your treatment and safety. Be sure to make and go to all appointments, and call your doctor if you are having problems. It's also a good idea to know your test results and keep a list of the medicines you take. How can you care for yourself at home? · Take medicines exactly as directed. Call your doctor if you think you are having a problem with your medicine. · Go to your counseling sessions and follow-up appointments. · Recognize and accept your anxiety. Then, when you are in a situation that makes you anxious, say to yourself, \"This is not an emergency. I feel uncomfortable, but I am not in danger. I can keep going even if I feel anxious. \" · Be kind to your body: 
? Relieve tension with exercise or a massage. ? Get enough rest. 
? Avoid alcohol, caffeine, nicotine, and illegal drugs. They can increase your anxiety level and cause sleep problems. ? Learn and do relaxation techniques. See below for more about these techniques. · Engage your mind. Get out and do something you enjoy. Go to a funny movie, or take a walk or hike. Plan your day. Having too much or too little to do can make you anxious. · Keep a record of your symptoms. Discuss your fears with a good friend or family member, or join a support group for people with similar problems. Talking to others sometimes relieves stress. · Get involved in social groups, or volunteer to help others. Being alone sometimes makes things seem worse than they are. · Get at least 30 minutes of exercise on most days of the week to relieve stress. Walking is a good choice. You also may want to do other activities, such as running, swimming, cycling, or playing tennis or team sports. Relaxation techniques Do relaxation exercises 10 to 20 minutes a day. You can play soothing, relaxing music while you do them, if you wish. · Tell others in your house that you are going to do your relaxation exercises. Ask them not to disturb you. · Find a comfortable place, away from all distractions and noise. · Lie down on your back, or sit with your back straight. · Focus on your breathing. Make it slow and steady. · Breathe in through your nose. Breathe out through either your nose or mouth. · Breathe deeply, filling up the area between your navel and your rib cage. Breathe so that your belly goes up and down. · Do not hold your breath. · Breathe like this for 5 to 10 minutes. Notice the feeling of calmness throughout your whole body. As you continue to breathe slowly and deeply, relax by doing the following for another 5 to 10 minutes: · Tighten and relax each muscle group in your body. You can begin at your toes and work your way up to your head. · Imagine your muscle groups relaxing and becoming heavy. · Empty your mind of all thoughts. · Let yourself relax more and more deeply. · Become aware of the state of calmness that surrounds you. · When your relaxation time is over, you can bring yourself back to alertness by moving your fingers and toes and then your hands and feet and then stretching and moving your entire body. Sometimes people fall asleep during relaxation, but they usually wake up shortly afterward.  
· Always give yourself time to return to full alertness before you drive a car or do anything that might cause an accident if you are not fully alert. Never play a relaxation tape while you drive a car. When should you call for help? Call 911 anytime you think you may need emergency care. For example, call if: 
  · You feel you cannot stop from hurting yourself or someone else.  
April Lowery the numbers for these national suicide hotlines: 0-360-885-TALK (8-270.487.2598) and 1-459-UAMIEQE (6-282.191.2686). If you or someone you know talks about suicide or feeling hopeless, get help right away. 
 Watch closely for changes in your health, and be sure to contact your doctor if: 
  · You have anxiety or fear that affects your life.  
  · You have symptoms of anxiety that are new or different from those you had before. Where can you learn more? Go to http://damarisGMZ Energyeloise.info/. Enter P754 in the search box to learn more about \"Anxiety Disorder: Care Instructions. \" Current as of: May 28, 2019 Content Version: 12.2 © 8073-9357 [employer]. Care instructions adapted under license by [employer] (which disclaims liability or warranty for this information). If you have questions about a medical condition or this instruction, always ask your healthcare professional. [address] any warranty or liability for your use of this information. Recovering From Depression: Care Instructions Your Care Instructions Taking good care of yourself is important as you recover from depression. In time, your symptoms will fade as your treatment takes hold. Do not give up. Instead, focus your energy on getting better. Your mood will improve. It just takes some time. Focus on things that can help you feel better, such as being with friends and family, eating well, and getting enough rest. But take things slowly. Do not do too much too soon. You will begin to feel better gradually. Follow-up care is a key part of your treatment and safety. Be sure to make and go to all appointments, and call your doctor if you are having problems. It's also a good idea to know your test results and keep a list of the medicines you take. How can you care for yourself at home? Be realistic · If you have a large task to do, break it up into smaller steps you can handle, and just do what you can. · You may want to put off important decisions until your depression has lifted. If you have plans that will have a major impact on your life, such as marriage, divorce, or a job change, try to wait a bit. Talk it over with friends and loved ones who can help you look at the overall picture first. 
· Reaching out to people for help is important. Do not isolate yourself. Let your family and friends help you. Find someone you can trust and confide in, and talk to that person. · Be patient, and be kind to yourself. Remember that depression is not your fault and is not something you can overcome with willpower alone. Treatment is necessary for depression, just like for any other illness. Feeling better takes time, and your mood will improve little by little. Stay active · Stay busy and get outside. Take a walk, or try some other light exercise. · Talk with your doctor about an exercise program. Exercise can help with mild depression. · Go to a movie or concert. Take part in a Alevism activity or other social gathering. Go to a ball game. · Ask a friend to have dinner with you. Take care of yourself · Eat a balanced diet with plenty of fresh fruits and vegetables, whole grains, and lean protein. If you have lost your appetite, eat small snacks rather than large meals. · Avoid drinking alcohol or using illegal drugs. Do not take medicines that have not been prescribed for you. They may interfere with medicines you may be taking for depression, or they may make your depression worse. · Take your medicines exactly as they are prescribed. You may start to feel better within 1 to 3 weeks of taking antidepressant medicine. But it can take as many as 6 to 8 weeks to see more improvement. If you have questions or concerns about your medicines, or if you do not notice any improvement by 3 weeks, talk to your doctor. · If you have any side effects from your medicine, tell your doctor. Antidepressants can make you feel tired, dizzy, or nervous. Some people have dry mouth, constipation, headaches, sexual problems, or diarrhea. Many of these side effects are mild and will go away on their own after you have been taking the medicine for a few weeks. Some may last longer. Talk to your doctor if side effects are bothering you too much. You might be able to try a different medicine. · Get enough sleep. If you have problems sleeping: 
? Go to bed at the same time every night, and get up at the same time every morning. ? Keep your bedroom dark and quiet. ? Do not exercise after 5:00 p.m. ? Avoid drinks with caffeine after 5:00 p.m. · Avoid sleeping pills unless they are prescribed by the doctor treating your depression. Sleeping pills may make you groggy during the day, and they may interact with other medicine you are taking. · If you have any other illnesses, such as diabetes, heart disease, or high blood pressure, make sure to continue with your treatment. Tell your doctor about all of the medicines you take, including those with or without a prescription. · Keep the numbers for these national suicide hotlines: 8-513-131-TALK (5-476.769.4942) and 1-679-SEZROFZ (3-388.766.2130). If you or someone you know talks about suicide or feeling hopeless, get help right away. When should you call for help? Call 911 anytime you think you may need emergency care.  For example, call if: 
  · You feel like hurting yourself or someone else.  
  · Someone you know has depression and is about to attempt or is attempting suicide.  
 Call your doctor now or seek immediate medical care if: 
  · You hear voices.  
  · Someone you know has depression and: 
? Starts to give away his or her possessions. ? Uses illegal drugs or drinks alcohol heavily. ? Talks or writes about death, including writing suicide notes or talking about guns, knives, or pills. ? Starts to spend a lot of time alone. ? Acts very aggressively or suddenly appears calm.  
 Watch closely for changes in your health, and be sure to contact your doctor if: 
  · You do not get better as expected. Where can you learn more? Go to http://damaris-eloise.info/. Enter M220 in the search box to learn more about \"Recovering From Depression: Care Instructions. \" Current as of: May 28, 2019 Content Version: 12.2 © 4008-6336 [employer]. Care instructions adapted under license by [employer] (which disclaims liability or warranty for this information). If you have questions about a medical condition or this instruction, always ask your healthcare professional. [address] any warranty or liability for your use of this information.

## 2020-01-14 ENCOUNTER — OFFICE VISIT (OUTPATIENT)
Dept: FAMILY MEDICINE CLINIC | Age: 45
End: 2020-01-14

## 2020-01-14 VITALS
WEIGHT: 196.7 LBS | BODY MASS INDEX: 33.76 KG/M2 | SYSTOLIC BLOOD PRESSURE: 127 MMHG | HEART RATE: 71 BPM | OXYGEN SATURATION: 98 % | DIASTOLIC BLOOD PRESSURE: 73 MMHG | TEMPERATURE: 96 F

## 2020-01-14 DIAGNOSIS — G35 MULTIPLE SCLEROSIS (HCC): ICD-10-CM

## 2020-01-14 DIAGNOSIS — R82.81 PYURIA: ICD-10-CM

## 2020-01-14 DIAGNOSIS — F43.23 ADJUSTMENT DISORDER WITH MIXED ANXIETY AND DEPRESSED MOOD: Primary | ICD-10-CM

## 2020-01-14 DIAGNOSIS — F33.2 SEVERE EPISODE OF RECURRENT MAJOR DEPRESSIVE DISORDER, WITHOUT PSYCHOTIC FEATURES (HCC): ICD-10-CM

## 2020-01-14 DIAGNOSIS — K59.01 SLOW TRANSIT CONSTIPATION: ICD-10-CM

## 2020-01-14 DIAGNOSIS — G89.4 CHRONIC PAIN SYNDROME: ICD-10-CM

## 2020-01-14 DIAGNOSIS — R35.0 URINE FREQUENCY: Primary | ICD-10-CM

## 2020-01-14 LAB
BILIRUB UR QL STRIP: NORMAL
GLUCOSE UR-MCNC: NEGATIVE MG/DL
KETONES P FAST UR STRIP-MCNC: NORMAL MG/DL
PH UR STRIP: 5 [PH] (ref 4.6–8)
PROT UR QL STRIP: NEGATIVE
SP GR UR STRIP: 1.02 (ref 1–1.03)
UA UROBILINOGEN AMB POC: NORMAL (ref 0.2–1)
URINALYSIS CLARITY POC: NORMAL
URINALYSIS COLOR POC: YELLOW
URINE BLOOD POC: NORMAL
URINE LEUKOCYTES POC: NORMAL
URINE NITRITES POC: NEGATIVE

## 2020-01-14 RX ORDER — AMOXICILLIN 250 MG
2 CAPSULE ORAL DAILY
Qty: 15 TAB | Refills: 0 | Status: SHIPPED | OUTPATIENT
Start: 2020-01-14 | End: 2021-03-30

## 2020-01-14 RX ORDER — NITROFURANTOIN (MACROCRYSTALS) 100 MG/1
100 CAPSULE ORAL 2 TIMES DAILY
Qty: 10 CAP | Refills: 0 | Status: SHIPPED | OUTPATIENT
Start: 2020-01-14 | End: 2020-01-19

## 2020-01-14 RX ORDER — GABAPENTIN 400 MG/1
800 CAPSULE ORAL
Qty: 120 CAP | Refills: 0 | Status: SHIPPED | OUTPATIENT
Start: 2020-01-14 | End: 2020-02-10

## 2020-01-14 NOTE — PATIENT INSTRUCTIONS
Constipation: Care Instructions Your Care Instructions Constipation means that you have a hard time passing stools (bowel movements). People pass stools from 3 times a day to once every 3 days. What is normal for you may be different. Constipation may occur with pain in the rectum and cramping. The pain may get worse when you try to pass stools. Sometimes there are small amounts of bright red blood on toilet paper or the surface of stools. This is because of enlarged veins near the rectum (hemorrhoids). A few changes in your diet and lifestyle may help you avoid ongoing constipation. Your doctor may also prescribe medicine to help loosen your stool. Some medicines can cause constipation. These include pain medicines and antidepressants. Tell your doctor about all the medicines you take. Your doctor may want to make a medicine change to ease your symptoms. Follow-up care is a key part of your treatment and safety. Be sure to make and go to all appointments, and call your doctor if you are having problems. It's also a good idea to know your test results and keep a list of the medicines you take. How can you care for yourself at home? · Drink plenty of fluids, enough so that your urine is light yellow or clear like water. If you have kidney, heart, or liver disease and have to limit fluids, talk with your doctor before you increase the amount of fluids you drink. · Include high-fiber foods in your diet each day. These include fruits, vegetables, beans, and whole grains. · Get at least 30 minutes of exercise on most days of the week. Walking is a good choice. You also may want to do other activities, such as running, swimming, cycling, or playing tennis or team sports. · Take a fiber supplement, such as Citrucel or Metamucil, every day. Read and follow all instructions on the label. · Schedule time each day for a bowel movement. A daily routine may help. Take your time having your bowel movement. · Support your feet with a small step stool when you sit on the toilet. This helps flex your hips and places your pelvis in a squatting position. · Your doctor may recommend an over-the-counter laxative to relieve your constipation. Examples are Milk of Magnesia and MiraLax. Read and follow all instructions on the label. Do not use laxatives on a long-term basis. When should you call for help? Call your doctor now or seek immediate medical care if: 
  · You have new or worse belly pain.  
  · You have new or worse nausea or vomiting.  
  · You have blood in your stools.  
 Watch closely for changes in your health, and be sure to contact your doctor if: 
  · Your constipation is getting worse.  
  · You do not get better as expected. Where can you learn more? Go to http://damaris-eloise.info/. Enter 21 [phone] in the search box to learn more about \"Constipation: Care Instructions. \" Current as of: June 26, 2019 Content Version: 12.2 © 9526-6059 [employer]. Care instructions adapted under license by [employer] (which disclaims liability or warranty for this information). If you have questions about a medical condition or this instruction, always ask your healthcare professional. [place] [address] any warranty or liability for your use of this information. Urinary Tract Infection in Women: Care Instructions Your Care Instructions A urinary tract infection, or UTI, is a general term for an infection anywhere between the kidneys and the urethra (where urine comes out). Most UTIs are bladder infections. They often cause pain or burning when you urinate. UTIs are caused by bacteria and can be cured with antibiotics. Be sure to complete your treatment so that the infection goes away. Follow-up care is a key part of your treatment and safety.  Be sure to make and go to all appointments, and call your doctor if you are having problems. It's also a good idea to know your test results and keep a list of the medicines you take. How can you care for yourself at home? · Take your antibiotics as directed. Do not stop taking them just because you feel better. You need to take the full course of antibiotics. · Drink extra water and other fluids for the next day or two. This may help wash out the bacteria that are causing the infection. (If you have kidney, heart, or liver disease and have to limit fluids, talk with your doctor before you increase your fluid intake.) · Avoid drinks that are carbonated or have caffeine. They can irritate the bladder. · Urinate often. Try to empty your bladder each time. · To relieve pain, take a hot bath or lay a heating pad set on low over your lower belly or genital area. Never go to sleep with a heating pad in place. To prevent UTIs · Drink plenty of water each day. This helps you urinate often, which clears bacteria from your system. (If you have kidney, heart, or liver disease and have to limit fluids, talk with your doctor before you increase your fluid intake.) · Urinate when you need to. · Urinate right after you have sex. · Change sanitary pads often. · Avoid douches, bubble baths, feminine hygiene sprays, and other feminine hygiene products that have deodorants. · After going to the bathroom, wipe from front to back. When should you call for help? Call your doctor now or seek immediate medical care if: 
  · Symptoms such as fever, chills, nausea, or vomiting get worse or appear for the first time.  
  · You have new pain in your back just below your rib cage. This is called flank pain.  
  · There is new blood or pus in your urine.  
  · You have any problems with your antibiotic medicine.  
 Watch closely for changes in your health, and be sure to contact your doctor if: 
  · You are not getting better after taking an antibiotic for 2 days.   · Your symptoms go away but then come back. Where can you learn more? Go to http://damaris-eloise.info/. Enter F364 in the search box to learn more about \"Urinary Tract Infection in Women: Care Instructions. \" Current as of: December 19, 2018 Content Version: 12.2 © 0862-0306 [employer]. Care instructions adapted under license by [employer] (which disclaims liability or warranty for this information). If you have questions about a medical condition or this instruction, always ask your healthcare professional. [address] any warranty or liability for your use of this information. Pain Medicine Side Effects: Care Instructions Your Care Instructions When you go to a medical facility in pain, you may get a strong medicine to give you relief. The medicine may be given in a vein (by IV) or as an injection (shot). Examples of this type of pain medicine include fentanyl, hydromorphone, and morphine. While these medicines help relieve pain, they also have side effects. For your safety, it's important that you know how this strong pain medicine affects you. Common side effects can include: 
· Nausea or vomiting. · Feeling dizzy or lightheaded. · Feeling sleepy. The doctor has checked you carefully, but problems can develop later. If you notice any problems or new symptoms, get medical treatment right away. Follow-up care is a key part of your treatment and safety. Be sure to make and go to all appointments, and call your doctor if you are having problems. It's also a good idea to know your test results and keep a list of the medicines you take. How can you care for yourself at home? Activity 
  · Don't do anything for 24 hours that requires attention to detail, such as going to work, making important decisions, or signing any legal documents. This medicine makes your mind foggy. It takes time for the effects to wear off completely.   · Don't drive a car until you are sure the effects from the medicine are gone. Medicines 
  · Be safe with medicines. Read and follow all instructions on the label. ? If the doctor gave you a prescription medicine for pain, take it as prescribed. ? If you are not taking a prescription pain medicine, ask your doctor if you can take an over-the-counter medicine. Diet 
  · You can eat your normal diet, unless your doctor gives you other instructions. If your stomach is upset, try clear liquids and bland, low-fat foods like plain toast or rice.  
  · Drink plenty of fluids (unless your doctor tells you not to).   · Don't drink alcohol for 24 hours. When should you call for help? Call 911 anytime you think you may need emergency care. For example, call if: 
  · You have trouble breathing.  
  · You passed out (lost consciousness).  
 Call your doctor now or seek immediate medical care if: 
  · You have new or worse pain.  
 Watch closely for changes in your health, and be sure to contact your doctor if: 
  · You do not get better as expected. Where can you learn more? Go to http://damaris-eloise.info/. Enter G910 in the search box to learn more about \"Pain Medicine Side Effects: Care Instructions. \" Current as of: March 28, 2019 Content Version: 12.2 © 3767-9745 [employer]. Care instructions adapted under license by [employer] (which disclaims liability or warranty for this information). If you have questions about a medical condition or this instruction, always ask your healthcare professional. [place] [address] any warranty or liability for your use of this information.

## 2020-01-14 NOTE — PROGRESS NOTES
Name and  verified      Chief Complaint   Patient presents with    Urinary Frequency         Health Maintenance reviewed-discussed with patient. 1. Have you been to the ER, urgent care clinic since your last visit? Hospitalized since your last visit? Yes, Counseling session  2020    2. Have you seen or consulted any other health care providers outside of the [address] [place] [address] since your last visit? Include any pap smears or colon screening.   no

## 2020-01-14 NOTE — PROGRESS NOTES
PSYCHOTHERAPY NOTE      Angi Dash is a 53 y.o. female who presents with depression/anxiety/chronic pain. Client was seen for an individual psychotherapy session that lasted for 50 minutes. Progress:  Client presented as distressed. Very upset re: medications and chronic pain issues. Shared that she is almost out of her medications and questions whether she was taking correctly. Rx on bottle says to take 2 tablets 3X day and she shared she was taking 4 tablets 2X day \"because I was use to taking 4\". She has appointment after this session and encouraged her to address all medication issues/concerns with treating provider. Client feels very overwhelmed with multiple stressors (issues with ex- and visitation with children, financial distress, lack of transportation, chronic pain, etc). Processed all these stressors today and explored ways to better cope/manage. Encouraged client to practice the breathing and mindfulness exercises she was taught to also help with reducing anxiety. Will see for follow up in 1-2 weeks.     Mental Status exam:         Sensorium  oriented to time, place and person   Relations cooperative   Appearance:  age appropriate and casually dressed   Motor Behavior:  within normal limits   Speech:  normal pitch and normal volume   Thought Process: goal directed and logical   Thought Content free of delusions and free of hallucinations   Suicidal ideations no plan , no intention and contracts for safety   Homicidal ideations no plan , no intention and contracts for safety   Mood:  anxious, depressed and irritable   Affect:  mood-congruent   Memory recent  adequate   Memory remote:  adequate   Concentration:  adequate   Abstraction:  abstract   Insight:  limited   Reliability fair   Judgment:  limited         DIAGNOSIS AND IMPRESSION:    Axis I: Adjustment Disorder with Mixed Emotional Features; R/O Major Depression.   Axis II: Personality Disorder, NOS  Axis III:   Past Medical History:   Diagnosis Date    Body aches 12/11/2014    Chronic pain     Depression     GERD (gastroesophageal reflux disease)     Headache(784.0)     History of mammogram never before    MS (multiple sclerosis) (HCC)     Neurological disorder     Multiple Sclerosis    Pap smear for cervical cancer screening 2008    Psychiatric disorder     anxiety    Psychotic disorder ([facility] [address]      Axis IV: Problems with primary support group, Problems related to social environment, Problems with access to health care services and Other psychosocial or environmental problems  Axis V:  51-60 moderate symptoms    Interventions/plans: Follow up in 2 weeks.

## 2020-01-15 NOTE — PROGRESS NOTES
HISTORY OF PRESENT ILLNESS  Richad Aschoff is a 53 y.o. female. HPI    Urinary Frequency   The history is provided by the patient. This is a new problem. The current episode started more than 1 week ago. The problem occurs every urination. The problem has been gradually worsening. The quality of the pain is described as burning. The pain is at a severity of 2/10. There has been no fever. is not sexually active. There is no history of pyelonephritis. Associated symptoms include frequency, hesitancy and urgency. Pertinent negatives include no chills, no sweats, no nausea, no vomiting, no discharge, no flank pain, , no abdominal pain and no back pain. has not tried acetaminophen and NSAIDs for the symptoms. The treatment provided no relief. past medical history does not include kidney stones, single kidney, urological procedure, recurrent UTIs or urinary stasis. Pt again requesting opioid based meds stating that no othe rmeds has been helping her, pt was told in the past to f/u with specialist for her pain concern pt states that she has seen them but no one wants to treat her with opioid based meds any more, pt also requesting her gabapentin to be increased for a better pain control,   Pt was recntly sent to the counselor for a better outcome , pt has been compliant with that, denies s has no h ideation, but also c/o  Constipation  Stating that she is having Hard stool, every 3-4 days and has not noticed any rectal bleeding no fhx of colon cancer, no tarry stool, no abdominal pain, eats varieties of foods, no hc of UC, nor of Crohn's Dz,       Current Outpatient Medications   Medication Sig Dispense Refill    nitrofurantoin (MACRODANTIN) 100 mg capsule Take 1 Cap by mouth two (2) times a day for 5 days. 10 Cap 0    senna-docusate (PERICOLACE) 8.6-50 mg per tablet Take 2 Tabs by mouth daily. 15 Tab 0    gabapentin (NEURONTIN) 400 mg capsule Take 2 Caps by mouth two (2) times daily as needed for Pain.  Max Daily Amount: 1,600 mg. 120 Cap 0    DULoxetine (CYMBALTA) 60 mg capsule Take 1 Cap by mouth daily. 30 Cap 0    dimethyl fumarate (TECFIDERA) 240 mg cpDR Take 240 mg by mouth two (2) times a day. 120 Cap 3    ibuprofen (MOTRIN) 600 mg tablet Take 1 Tab by mouth every six (6) hours as needed for Pain. 20 Tab 0    vilazodone (VIIBRYD) 40 mg tab tablet Take 1 Tab by mouth daily. 30 Tab 1    nortriptyline (PAMELOR) 25 mg capsule Take 1 Cap by mouth nightly. Indications: depression, Stop Smoking 30 Cap 1     Not on File  Past Medical History:   Diagnosis Date    Body aches 12/11/2014    Chronic pain     Depression     GERD (gastroesophageal reflux disease)     Headache(784.0)     History of mammogram never before    MS (multiple sclerosis) ([facility] [address]     Neurological disorder     Multiple Sclerosis    Pap smear for cervical cancer screening 2008    Psychiatric disorder     anxiety    Psychotic disorder [facility])      Past Surgical History:   Procedure Laterality Date    COLONOSCOPY N/A 2/28/2018    COLONOSCOPY performed by El[facility]ares [facility] at [facility] ENDOSCOPY    HX CHOLECYSTECTOMY      HX GYN      3 c-sections    HX HEENT      bilateral ear tube surgery    HX HERNIA REPAIR      HX OTHER SURGICAL      R inguinal hernia repair     Family History   Problem Relation Age of Onset    Heart Attack Father         tripple bypass    Cancer Father         lung    Diabetes Mother         type 2    Heart Disease Mother         heart disease    Diabetes Paternal Grandmother      Social History     Tobacco Use    Smoking status: Current Every Day Smoker     Packs/day: 0.50     Years: 17.00     Pack years: 8.50     Types: Cigarettes    Smokeless tobacco: Never Used    Tobacco comment: 1 pack every 3 days.    Substance Use Topics    Alcohol use: No      Lab Results   Component Value Date/Time    WBC 4.8 09/19/2019 01:30 PM    HGB 12.4 09/19/2019 01:30 PM    HCT 38.2 09/19/2019 01:30 PM    PLATELET 129 55/42/9004 01:30 PM    MCV 97 09/19/2019 01:30 PM     Lab Results   Component Value Date/Time    Cholesterol, total 235 (H) 09/19/2019 01:30 PM    HDL Cholesterol 62 09/19/2019 01:30 PM    LDL, calculated 161 (H) 09/19/2019 01:30 PM    Triglyceride 58 09/19/2019 01:30 PM     Lab Results   Component Value Date/Time    ALT (SGPT) 8 09/19/2019 01:30 PM    AST (SGOT) 15 09/19/2019 01:30 PM    Alk. phosphatase 61 09/19/2019 01:30 PM    Bilirubin, total <0.2 09/19/2019 01:30 PM    Albumin 4.9 09/19/2019 01:30 PM    Protein, total 7.4 09/19/2019 01:30 PM    INR 1.1 01/17/2012 12:30 AM    Prothrombin time 11.0 01/17/2012 12:30 AM    PLATELET 783 13/10/2869 01:30 PM        Review of Systems   Constitutional: Positive for malaise/fatigue. Negative for chills and fever. HENT: Negative for ear pain and nosebleeds. Eyes: Negative for blurred vision, pain and discharge. Respiratory: Negative for shortness of breath. Cardiovascular: Negative for chest pain and leg swelling. Gastrointestinal: Negative for constipation, diarrhea, nausea and vomiting. Genitourinary: Negative for frequency. Musculoskeletal: Positive for myalgias. Negative for joint pain. Skin: Negative for itching and rash. Neurological: Negative for headaches. Psychiatric/Behavioral: Positive for depression. Negative for hallucinations, substance abuse and suicidal ideas. The patient is nervous/anxious and has insomnia. Physical Exam  Vitals signs and nursing note reviewed. Constitutional:       Appearance: She is well-developed. HENT:      Head: Normocephalic and atraumatic. Eyes:      Conjunctiva/sclera: Conjunctivae normal.      Pupils: Pupils are equal, round, and reactive to light. Neck:      Thyroid: No thyromegaly. Vascular: No JVD. Cardiovascular:      Rate and Rhythm: Normal rate and regular rhythm. Heart sounds: Normal heart sounds. No murmur. No friction rub. No gallop.     Pulmonary:      Effort: Pulmonary effort is normal. No respiratory distress. Breath sounds: Normal breath sounds. No stridor. No wheezing or rales. Abdominal:      General: Bowel sounds are normal. There is no distension. Palpations: Abdomen is soft. There is no mass. Tenderness: There is no tenderness. Musculoskeletal: Normal range of motion. General: No tenderness. Lymphadenopathy:      Cervical: No cervical adenopathy. Skin:     Findings: No erythema or rash. Neurological:      Mental Status: She is alert and oriented to person, place, and time. Cranial Nerves: No cranial nerve deficit. Deep Tendon Reflexes: Reflexes are normal and symmetric. Psychiatric:         Attention and Perception: Attention and perception normal. She is attentive. She does not perceive auditory or visual hallucinations. Mood and Affect: Mood is anxious. Affect is labile and angry. Speech: Speech is rapid and pressured. Behavior: Behavior is hyperactive. Thought Content: Thought content does not include homicidal or suicidal ideation. Thought content does not include homicidal or suicidal plan. Cognition and Memory: Cognition and memory normal.         Judgment: Judgment normal. Judgment is not impulsive or inappropriate. ASSESSMENT and PLAN  Diagnoses and all orders for this visit:    1. Urine frequency  -     AMB POC URINALYSIS DIP STICK AUTO W/O MICRO  -     nitrofurantoin (MACRODANTIN) 100 mg capsule; Take 1 Cap by mouth two (2) times a day for 5 days. -     CULTURE, URINE    2. Slow transit constipation  -     senna-docusate (PERICOLACE) 8.6-50 mg per tablet; Take 2 Tabs by mouth daily. 3. Multiple sclerosis (HCC)  -     gabapentin (NEURONTIN) 400 mg capsule; Take 2 Caps by mouth two (2) times daily as needed for Pain.  Max Daily Amount: 1,600 mg.    4. Severe episode of recurrent major [facility]  -     REFERRAL TO PSYCHIATRY    5. Pyuria  -     nitrofurantoin (MACRODANTIN) 100 mg capsule; Take 1 Cap by mouth two (2) times a day for 5 days.   -     CULTURE, URINE      Increase physical acitivity, stool softner such as colace 100 mg one tab tid w/ meals, metamucil powder one cup twice daily, avoid fatty, fast and fried foods, donot miss meals, small meals and more frequent avoid late dinner avoid overeating, increase po fluid intake daily, compliance advised RTC if worsen

## 2020-01-16 LAB — BACTERIA UR CULT: ABNORMAL

## 2020-02-10 ENCOUNTER — APPOINTMENT (OUTPATIENT)
Dept: CT IMAGING | Age: 45
End: 2020-02-10
Payer: MEDICARE

## 2020-02-10 ENCOUNTER — HOSPITAL ENCOUNTER (EMERGENCY)
Age: 45
Discharge: HOME | End: 2020-02-10
Attending: EMERGENCY MEDICINE
Payer: MEDICARE

## 2020-02-10 VITALS
OXYGEN SATURATION: 93 % | RESPIRATION RATE: 14 BRPM | HEIGHT: 64 IN | DIASTOLIC BLOOD PRESSURE: 81 MMHG | TEMPERATURE: 98.6 F | WEIGHT: 198 LBS | HEART RATE: 67 BPM | BODY MASS INDEX: 33.8 KG/M2 | SYSTOLIC BLOOD PRESSURE: 134 MMHG

## 2020-02-10 DIAGNOSIS — G35 MS (MULTIPLE SCLEROSIS) (HCC): ICD-10-CM

## 2020-02-10 DIAGNOSIS — E87.6 HYPOKALEMIA: ICD-10-CM

## 2020-02-10 DIAGNOSIS — F41.9 ANXIETY: ICD-10-CM

## 2020-02-10 DIAGNOSIS — R11.2 NON-INTRACTABLE VOMITING WITH NAUSEA, UNSPECIFIED VOMITING TYPE: ICD-10-CM

## 2020-02-10 DIAGNOSIS — R10.31 ABDOMINAL PAIN, RIGHT LOWER QUADRANT: Primary | ICD-10-CM

## 2020-02-10 LAB
ALBUMIN SERPL-MCNC: 4.3 G/DL (ref 3.5–5)
ALBUMIN/GLOB SERPL: 1.4 {RATIO} (ref 1.1–2.2)
ALP SERPL-CCNC: 60 U/L (ref 45–117)
ALT SERPL-CCNC: 11 U/L (ref 12–78)
ANION GAP SERPL CALC-SCNC: 9 MMOL/L (ref 5–15)
APPEARANCE UR: CLEAR
AST SERPL-CCNC: 5 U/L (ref 15–37)
BACTERIA URNS QL MICRO: NEGATIVE /HPF
BASOPHILS # BLD: 0 K/UL (ref 0–0.1)
BASOPHILS NFR BLD: 1 % (ref 0–1)
BILIRUB SERPL-MCNC: 0.6 MG/DL (ref 0.2–1)
BILIRUB UR QL CFM: NEGATIVE
BUN SERPL-MCNC: 8 MG/DL (ref 6–20)
BUN/CREAT SERPL: 10 (ref 12–20)
CALCIUM SERPL-MCNC: 9 MG/DL (ref 8.5–10.1)
CHLORIDE SERPL-SCNC: 104 MMOL/L (ref 97–108)
CO2 SERPL-SCNC: 25 MMOL/L (ref 21–32)
COLOR UR: ABNORMAL
CREAT SERPL-MCNC: 0.81 MG/DL (ref 0.55–1.02)
DIFFERENTIAL METHOD BLD: ABNORMAL
EOSINOPHIL # BLD: 0 K/UL (ref 0–0.4)
EOSINOPHIL NFR BLD: 1 % (ref 0–7)
EPITH CASTS URNS QL MICRO: ABNORMAL /LPF
ERYTHROCYTE [DISTWIDTH] IN BLOOD BY AUTOMATED COUNT: 12.6 % (ref 11.5–14.5)
GLOBULIN SER CALC-MCNC: 3.1 G/DL (ref 2–4)
GLUCOSE SERPL-MCNC: 122 MG/DL (ref 65–100)
GLUCOSE UR STRIP.AUTO-MCNC: NEGATIVE MG/DL
HCT VFR BLD AUTO: 36.1 % (ref 35–47)
HGB BLD-MCNC: 12.8 G/DL (ref 11.5–16)
HGB UR QL STRIP: NEGATIVE
HYALINE CASTS URNS QL MICRO: ABNORMAL /LPF (ref 0–5)
IMM GRANULOCYTES # BLD AUTO: 0 K/UL (ref 0–0.04)
IMM GRANULOCYTES NFR BLD AUTO: 0 % (ref 0–0.5)
KETONES UR QL STRIP.AUTO: 15 MG/DL
LEUKOCYTE ESTERASE UR QL STRIP.AUTO: NEGATIVE
LIPASE SERPL-CCNC: 95 U/L (ref 73–393)
LYMPHOCYTES # BLD: 0.6 K/UL (ref 0.8–3.5)
LYMPHOCYTES NFR BLD: 19 % (ref 12–49)
MCH RBC QN AUTO: 32.6 PG (ref 26–34)
MCHC RBC AUTO-ENTMCNC: 35.5 G/DL (ref 30–36.5)
MCV RBC AUTO: 91.9 FL (ref 80–99)
MONOCYTES # BLD: 0.3 K/UL (ref 0–1)
MONOCYTES NFR BLD: 9 % (ref 5–13)
NEUTS SEG # BLD: 2.5 K/UL (ref 1.8–8)
NEUTS SEG NFR BLD: 70 % (ref 32–75)
NITRITE UR QL STRIP.AUTO: NEGATIVE
NRBC # BLD: 0 K/UL (ref 0–0.01)
NRBC BLD-RTO: 0 PER 100 WBC
PH UR STRIP: 6 [PH] (ref 5–8)
PLATELET # BLD AUTO: 292 K/UL (ref 150–400)
PMV BLD AUTO: 10 FL (ref 8.9–12.9)
POTASSIUM SERPL-SCNC: 2.7 MMOL/L (ref 3.5–5.1)
PROT SERPL-MCNC: 7.4 G/DL (ref 6.4–8.2)
PROT UR STRIP-MCNC: NEGATIVE MG/DL
RBC # BLD AUTO: 3.93 M/UL (ref 3.8–5.2)
RBC #/AREA URNS HPF: ABNORMAL /HPF (ref 0–5)
RBC MORPH BLD: ABNORMAL
SODIUM SERPL-SCNC: 138 MMOL/L (ref 136–145)
SP GR UR REFRACTOMETRY: 1.02 (ref 1–1.03)
UA: UC IF INDICATED,UAUC: ABNORMAL
UROBILINOGEN UR QL STRIP.AUTO: 1 EU/DL (ref 0.2–1)
WBC # BLD AUTO: 3.4 K/UL (ref 3.6–11)
WBC URNS QL MICRO: ABNORMAL /HPF (ref 0–4)

## 2020-02-10 PROCEDURE — 85025 COMPLETE CBC W/AUTO DIFF WBC: CPT

## 2020-02-10 PROCEDURE — 80053 COMPREHEN METABOLIC PANEL: CPT

## 2020-02-10 PROCEDURE — 74011636320 HC RX REV CODE- 636/320: Performed by: PHYSICIAN ASSISTANT

## 2020-02-10 PROCEDURE — 74177 CT ABD & PELVIS W/CONTRAST: CPT

## 2020-02-10 PROCEDURE — 96366 THER/PROPH/DIAG IV INF ADDON: CPT

## 2020-02-10 PROCEDURE — 81001 URINALYSIS AUTO W/SCOPE: CPT

## 2020-02-10 PROCEDURE — 96375 TX/PRO/DX INJ NEW DRUG ADDON: CPT

## 2020-02-10 PROCEDURE — 74011250637 HC RX REV CODE- 250/637: Performed by: PHYSICIAN ASSISTANT

## 2020-02-10 PROCEDURE — 74011250636 HC RX REV CODE- 250/636: Performed by: PHYSICIAN ASSISTANT

## 2020-02-10 PROCEDURE — 99285 EMERGENCY DEPT VISIT HI MDM: CPT

## 2020-02-10 PROCEDURE — 74011636320 HC RX REV CODE- 636/320: Performed by: EMERGENCY MEDICINE

## 2020-02-10 PROCEDURE — 83690 ASSAY OF LIPASE: CPT

## 2020-02-10 PROCEDURE — 96365 THER/PROPH/DIAG IV INF INIT: CPT

## 2020-02-10 PROCEDURE — 36415 COLL VENOUS BLD VENIPUNCTURE: CPT

## 2020-02-10 RX ORDER — HYDROCODONE BITARTRATE AND ACETAMINOPHEN 5; 325 MG/1; MG/1
1 TABLET ORAL
Qty: 6 TAB | Refills: 0 | OUTPATIENT
Start: 2020-02-10 | End: 2020-02-12

## 2020-02-10 RX ORDER — ONDANSETRON 4 MG/1
4 TABLET, ORALLY DISINTEGRATING ORAL
Qty: 10 TAB | Refills: 0 | Status: SHIPPED | OUTPATIENT
Start: 2020-02-10 | End: 2020-03-17 | Stop reason: ALTCHOICE

## 2020-02-10 RX ORDER — POTASSIUM CHLORIDE 7.45 MG/ML
10 INJECTION INTRAVENOUS
Status: COMPLETED | OUTPATIENT
Start: 2020-02-10 | End: 2020-02-10

## 2020-02-10 RX ORDER — ONDANSETRON 2 MG/ML
4 INJECTION INTRAMUSCULAR; INTRAVENOUS
Status: COMPLETED | OUTPATIENT
Start: 2020-02-10 | End: 2020-02-10

## 2020-02-10 RX ORDER — POTASSIUM CHLORIDE 750 MG/1
10 TABLET, FILM COATED, EXTENDED RELEASE ORAL DAILY
Qty: 10 TAB | Refills: 0 | Status: SHIPPED | OUTPATIENT
Start: 2020-02-10 | End: 2020-02-20

## 2020-02-10 RX ORDER — POTASSIUM CHLORIDE 750 MG/1
40 TABLET, FILM COATED, EXTENDED RELEASE ORAL
Status: COMPLETED | OUTPATIENT
Start: 2020-02-10 | End: 2020-02-10

## 2020-02-10 RX ORDER — SODIUM CHLORIDE 0.9 % (FLUSH) 0.9 %
10 SYRINGE (ML) INJECTION
Status: COMPLETED | OUTPATIENT
Start: 2020-02-10 | End: 2020-02-10

## 2020-02-10 RX ORDER — LORAZEPAM 1 MG/1
1 TABLET ORAL
Status: COMPLETED | OUTPATIENT
Start: 2020-02-10 | End: 2020-02-10

## 2020-02-10 RX ORDER — MORPHINE SULFATE 2 MG/ML
4 INJECTION, SOLUTION INTRAMUSCULAR; INTRAVENOUS
Status: COMPLETED | OUTPATIENT
Start: 2020-02-10 | End: 2020-02-10

## 2020-02-10 RX ORDER — DEXAMETHASONE SODIUM PHOSPHATE 4 MG/ML
10 INJECTION, SOLUTION INTRA-ARTICULAR; INTRALESIONAL; INTRAMUSCULAR; INTRAVENOUS; SOFT TISSUE
Status: COMPLETED | OUTPATIENT
Start: 2020-02-10 | End: 2020-02-10

## 2020-02-10 RX ORDER — DIPHENHYDRAMINE HYDROCHLORIDE 50 MG/ML
50 INJECTION, SOLUTION INTRAMUSCULAR; INTRAVENOUS
Status: COMPLETED | OUTPATIENT
Start: 2020-02-10 | End: 2020-02-10

## 2020-02-10 RX ORDER — DICYCLOMINE HYDROCHLORIDE 10 MG/1
10 CAPSULE ORAL 4 TIMES DAILY
Qty: 15 CAP | Refills: 0 | Status: SHIPPED | OUTPATIENT
Start: 2020-02-10 | End: 2020-02-14

## 2020-02-10 RX ADMIN — ONDANSETRON 4 MG: 2 INJECTION INTRAMUSCULAR; INTRAVENOUS at 12:09

## 2020-02-10 RX ADMIN — MORPHINE SULFATE 4 MG: 2 INJECTION, SOLUTION INTRAMUSCULAR; INTRAVENOUS at 16:02

## 2020-02-10 RX ADMIN — ONDANSETRON 4 MG: 2 INJECTION INTRAMUSCULAR; INTRAVENOUS at 16:02

## 2020-02-10 RX ADMIN — Medication 10 ML: at 14:48

## 2020-02-10 RX ADMIN — POTASSIUM CHLORIDE 40 MEQ: 750 TABLET, FILM COATED, EXTENDED RELEASE ORAL at 12:47

## 2020-02-10 RX ADMIN — DEXAMETHASONE SODIUM PHOSPHATE 10 MG: 4 INJECTION, SOLUTION INTRAMUSCULAR; INTRAVENOUS at 16:17

## 2020-02-10 RX ADMIN — DIPHENHYDRAMINE HYDROCHLORIDE 50 MG: 50 INJECTION, SOLUTION INTRAMUSCULAR; INTRAVENOUS at 16:15

## 2020-02-10 RX ADMIN — IOHEXOL 50 ML: 240 INJECTION, SOLUTION INTRATHECAL; INTRAVASCULAR; INTRAVENOUS; ORAL at 13:00

## 2020-02-10 RX ADMIN — POTASSIUM CHLORIDE 10 MEQ: 7.46 INJECTION, SOLUTION INTRAVENOUS at 12:43

## 2020-02-10 RX ADMIN — IOPAMIDOL 100 ML: 755 INJECTION, SOLUTION INTRAVENOUS at 14:48

## 2020-02-10 RX ADMIN — LORAZEPAM 1 MG: 1 TABLET ORAL at 13:00

## 2020-02-10 NOTE — ED NOTES
Patient presents with EMS for sudden onset of N/V symptoms onset about 2 days ago. Patient reports she has a h/o anxiety and depression where her new PCP removed her off of all these medications. Patient is tearful and very abstract during this writers assessment. 1615- Alerted by patients fiance that patients arm had turned red after receiving her morphine. PA notified. Patient provided benadryl and dex. 1715- Discharge instructions given to patient by YAZ Alexander. Verbalized understanding of instructions. Patient discharged without difficulty. Patient discharged in stable condition via [facility] accompanied by RN.

## 2020-02-10 NOTE — DISCHARGE INSTRUCTIONS
Rest, push fluids, liquid diet x 24 hours, then gradually advance diet as tolerated. Follow up with Gastroenterologist for recheck. Return to the Emergency Dept for any worsening pain, fever, nausea/vomiting/diarrhea, decreased oral intake/urine output.

## 2020-02-11 NOTE — ED PROVIDER NOTES
EMERGENCY DEPARTMENT HISTORY AND PHYSICAL EXAM      Date: 2/10/2020  Patient Name: Hector Vega    History of Presenting Illness     Chief Complaint   Patient presents with    Abdominal Pain     Patient reports RLQ abdominal pain x 2 days w N/V. Patient has been being treated for a UTI over the past 2 months. History Provided By: Patient    HPI: Hector Vega, 53 y.o. female presents to the Emergency Dept with c/o RLQ pain x 2 days. Pt states she was recently treated for UTI but denied dysuria/hematuria. No vaginal discharge, pain or bleeding. She has had N/V but denied diarrhea. She c/o chronic constipation related to her MS. She denied personal or family h/o chronic abdominal issues. She has not tolerated much po intake over the last several days. She denied chest pain or shortness of breath. No fever/chills. No rash/lesion. Depressed appetite due to discomfort. No back pain. She denied h/o kidney stones. +tobacco use       Chief Complaint: RLQ abdominal pain, N/V  Duration: 2 Days  Timing:  Acute  Location: RLQ abdomen  Quality: Aching  Severity: Moderate  Modifying Factors: pt with h/o MS, recently tx'd for UTI  Associated Symptoms: intermittent constipation (chronic with MS)        There are no other complaints, changes, or physical findings at this time. PCP: Lacey Cardenas MD    Current Outpatient Medications   Medication Sig Dispense Refill    ondansetron (ZOFRAN ODT) 4 mg disintegrating tablet Take 1 Tab by mouth every eight (8) hours as needed for Nausea or Vomiting. 10 Tab 0    dicyclomine (BENTYL) 10 mg capsule Take 1 Cap by mouth four (4) times daily for 15 doses. 15 Cap 0    HYDROcodone-acetaminophen (NORCO) 5-325 mg per tablet Take 1 Tab by mouth every eight (8) hours as needed for Pain for up to 3 days. Max Daily Amount: 3 Tabs. 6 Tab 0    potassium chloride SR (KLOR-CON 10) 10 mEq tablet Take 1 Tab by mouth daily for 10 days.  10 Tab 0    DULoxetine (CYMBALTA) 60 mg capsule Take 1 Cap by mouth daily. 30 Cap 0    dimethyl fumarate (TECFIDERA) 240 mg cpDR Take 240 mg by mouth two (2) times a day. 120 Cap 3    ibuprofen (MOTRIN) 600 mg tablet Take 1 Tab by mouth every six (6) hours as needed for Pain. 20 Tab 0    senna-docusate (PERICOLACE) 8.6-50 mg per tablet Take 2 Tabs by mouth daily. 15 Tab 0       Past History     Past Medical History:  Past Medical History:   Diagnosis Date    Body aches 12/11/2014    Chronic pain     Depression     GERD (gastroesophageal reflux disease)     Headache(784.0)     History of mammogram never before    MS ([facility] sclerosis) ([facility] [address]     Neurological disorder     Multiple Sclerosis    Pap smear for cervical cancer screening 2008    Psychiatric disorder     anxiety    Psychotic disorder [facility])        Past Surgical History:  Past Surgical History:   Procedure Laterality Date    COLONOSCOPY N/A 2/28/2018    COL[facility] performed [facility] P[facility] ENDOSCOPY    HX CHOLECYSTECTOMY      HX GYN      3 c-sections    HX HEENT      bilateral ear tube surgery    HX HERNIA REPAIR      HX OTHER SURGICAL      R inguinal hernia repair       Family History:  Family History   Problem Relation Age of Onset    Heart Attack Father         tripple bypass    Cancer Father         lung    Diabetes Mother         type 2    Heart Disease Mother         heart disease    Diabetes Paternal Grandmother        Social History:  Social History     Tobacco Use    Smoking status: Current Every Day Smoker     Packs/day: 0.50     Years: 17.00     Pack years: 8.50     Types: Cigarettes    Smokeless tobacco: Never Used    Tobacco comment: 1 pack every 3 days. Substance Use Topics    Alcohol use: No    Drug use: No       Allergies:  No Known Allergies      Review of Systems   Review of Systems   Constitutional: Positive for appetite change. Negative for chills and fever. HENT: Negative for congestion, rhinorrhea and sore throat. Respiratory: Negative for cough and shortness of breath. Cardiovascular: Negative for chest pain and palpitations. Gastrointestinal: Positive for abdominal pain, constipation, nausea and vomiting. Negative for diarrhea. Endocrine: Negative for polydipsia, polyphagia and polyuria. Genitourinary: Negative for dysuria and hematuria. Musculoskeletal: Negative for neck pain and neck stiffness. Skin: Negative for pallor, rash and wound. Allergic/Immunologic: Negative for food allergies and immunocompromised state. Neurological: Negative for dizziness, weakness and headaches. Hematological: Negative for adenopathy. Does not bruise/bleed easily. Psychiatric/Behavioral: Negative for agitation and confusion. Physical Exam   Physical Exam  Vitals signs and nursing note reviewed. Constitutional:       General: She is not in acute distress. Appearance: She is well-developed and normal weight. She is not ill-appearing or diaphoretic. HENT:      Head: Normocephalic and atraumatic. Nose: Nose normal.      Mouth/Throat:      Pharynx: Oropharynx is clear. No oropharyngeal exudate. Eyes:      General: No scleral icterus. Right eye: No discharge. Left eye: No discharge. Conjunctiva/sclera: Conjunctivae normal.   Neck:      Musculoskeletal: Normal range of motion and neck supple. Thyroid: No thyromegaly. Vascular: No JVD. Trachea: No tracheal deviation. Cardiovascular:      Rate and Rhythm: Normal rate and regular rhythm. Heart sounds: Normal heart sounds. Pulmonary:      Effort: Pulmonary effort is normal. No respiratory distress. Breath sounds: Normal breath sounds. No wheezing. Abdominal:      General: Bowel sounds are normal.      Palpations: Abdomen is soft. Tenderness: There is abdominal tenderness in the right lower quadrant. There is no right CVA tenderness, left CVA tenderness, guarding or rebound.       Hernia: No hernia is present. Musculoskeletal: Normal range of motion. Lymphadenopathy:      Cervical: No cervical adenopathy. Skin:     General: Skin is warm and dry. Coloration: Skin is not pale. Neurological:      Mental Status: She is alert and oriented to person, place, and time. Motor: No abnormal muscle tone. Coordination: Coordination normal.   Psychiatric:         Mood and Affect: Mood normal.         Behavior: Behavior normal.         Judgment: Judgment normal.         Diagnostic Study Results     Labs -     Recent Results (from the past 12 hour(s))   CBC WITH AUTOMATED DIFF    Collection Time: 02/10/20 11:44 AM   Result Value Ref Range    WBC 3.4 (L) 3.6 - 11.0 K/uL    RBC 3.93 3.80 - 5.20 M/uL    HGB 12.8 11.5 - 16.0 g/dL    HCT 36.1 35.0 - 47.0 %    MCV 91.9 80.0 - 99.0 FL    MCH 32.6 26.0 - 34.0 PG    MCHC 35.5 30.0 - 36.5 g/dL    RDW 12.6 11.5 - 14.5 %    PLATELET 827 490 - 933 K/uL    MPV 10.0 8.9 - 12.9 FL    NRBC 0.0 0  WBC    ABSOLUTE NRBC 0.00 0.00 - 0.01 K/uL    NEUTROPHILS 70 32 - 75 %    LYMPHOCYTES 19 12 - 49 %    MONOCYTES 9 5 - 13 %    EOSINOPHILS 1 0 - 7 %    BASOPHILS 1 0 - 1 %    IMMATURE GRANULOCYTES 0 0.0 - 0.5 %    ABS. NEUTROPHILS 2.5 1.8 - 8.0 K/UL    ABS. LYMPHOCYTES 0.6 (L) 0.8 - 3.5 K/UL    ABS. MONOCYTES 0.3 0.0 - 1.0 K/UL    ABS. EOSINOPHILS 0.0 0.0 - 0.4 K/UL    ABS. BASOPHILS 0.0 0.0 - 0.1 K/UL    ABS. IMM.  GRANS. 0.0 0.00 - 0.04 K/UL    DF AUTOMATED      RBC COMMENTS NORMOCYTIC, NORMOCHROMIC     METABOLIC PANEL, COMPREHENSIVE    Collection Time: 02/10/20 11:44 AM   Result Value Ref Range    Sodium 138 136 - 145 mmol/L    Potassium 2.7 (LL) 3.5 - 5.1 mmol/L    Chloride 104 97 - 108 mmol/L    CO2 25 21 - 32 mmol/L    Anion gap 9 5 - 15 mmol/L    Glucose 122 (H) 65 - 100 mg/dL    BUN 8 6 - 20 MG/DL    Creatinine 0.81 0.55 - 1.02 MG/DL    BUN/Creatinine ratio 10 (L) 12 - 20      GFR est AA >60 >60 ml/min/1.73m2    GFR est non-AA >60 >60 ml/min/1.73m2    Calcium 9.0 8.5 - 10.1 MG/DL    Bilirubin, total 0.6 0.2 - 1.0 MG/DL    ALT (SGPT) 11 (L) 12 - 78 U/L    AST (SGOT) 5 (L) 15 - 37 U/L    Alk. phosphatase 60 45 - 117 U/L    Protein, total 7.4 6.4 - 8.2 g/dL    Albumin 4.3 3.5 - 5.0 g/dL    Globulin 3.1 2.0 - 4.0 g/dL    A-G Ratio 1.4 1.1 - 2.2     LIPASE    Collection Time: 02/10/20 11:44 AM   Result Value Ref Range    Lipase 95 73 - 393 U/L   URINALYSIS W/ REFLEX CULTURE    Collection Time: 02/10/20  2:05 PM   Result Value Ref Range    Color YELLOW/STRAW      Appearance CLEAR CLEAR      Specific gravity 1.017 1.003 - 1.030      pH (UA) 6.0 5.0 - 8.0      Protein NEGATIVE  NEG mg/dL    Glucose NEGATIVE  NEG mg/dL    Ketone 15 (A) NEG mg/dL    Blood NEGATIVE  NEG      Urobilinogen 1.0 0.2 - 1.0 EU/dL    Nitrites NEGATIVE  NEG      Leukocyte Esterase NEGATIVE  NEG      WBC 0-4 0 - 4 /hpf    RBC 0-5 0 - 5 /hpf    Epithelial cells FEW FEW /lpf    Bacteria NEGATIVE  NEG /hpf    UA:UC IF INDICATED CULTURE NOT INDICATED BY UA RESULT      Hyaline cast 2-5 0 - 5 /lpf   BILIRUBIN, CONFIRM    Collection Time: 02/10/20  2:05 PM   Result Value Ref Range    Bilirubin UA, confirm NEGATIVE          Radiologic Studies -   CT ABD PELV W CONT   Final Result   IMPRESSION:   No acute abnormality        CT Results  (Last 48 hours)               02/10/20 1448  CT ABD PELV W CONT Final result    Impression:  IMPRESSION:   No acute abnormality       Narrative:  EXAM: CT ABD PELV W CONT       INDICATION: RLQ pain, no BM x 5 days       COMPARISON: 10/27/2018 and 2/16/2018        CONTRAST: 100 mL of Isovue-370. TECHNIQUE:    Following the uneventful intravenous administration of contrast, thin axial   images were obtained through the abdomen and pelvis. Coronal and sagittal   reconstructions were generated. Oral contrast was not administered. CT dose   reduction was achieved through use of a standardized protocol tailored for this   examination and automatic exposure control for dose modulation. FINDINGS:    LUNG BASES: Clear. INCIDENTALLY IMAGED HEART AND MEDIASTINUM: Unremarkable. LIVER: 15 mm lesion in segment 7 of the liver adjacent to the IVC has not   significant changed in size. While incompletely evaluated but likely represents   a hemangioma   GALLBLADDER: Surgically absent   SPLEEN: No mass. PANCREAS: No mass or ductal dilatation. ADRENALS: Unremarkable. KIDNEYS: No mass, calculus, or hydronephrosis. STOMACH: Unremarkable. SMALL BOWEL: No dilatation or wall thickening. COLON: Few scattered left-sided diverticula   APPENDIX: Unremarkable. PERITONEUM: No ascites or pneumoperitoneum. RETROPERITONEUM: No lymphadenopathy or aortic aneurysm. REPRODUCTIVE ORGANS: Uterus is not enlarged. 2.3 cm follicle left ovary   URINARY BLADDER: No mass or calculus. BONES: No destructive bone lesion. ADDITIONAL COMMENTS: Post right internal hernia repair                   Medical Decision Making   I am the first provider for this patient. I reviewed the vital signs, available nursing notes, past medical history, past surgical history, family history and social history. Vital Signs-Reviewed the patient's vital signs. Patient Vitals for the past 12 hrs:   Temp Pulse Resp BP SpO2   02/10/20 1630  67 14 134/81 93 %   02/10/20 1609    128/72    02/10/20 1507    139/75    02/10/20 1413  (!) 56 15 156/88 100 %   02/10/20 1300  63 15 130/70 100 %   02/10/20 1230  (!) 58 14 145/82 98 %   02/10/20 1215  60 15 (!) 152/97 98 %   02/10/20 1141    115/62    02/10/20 1139 98.6 °F (37 °C) 67 22 115/62 99 %           Records Reviewed: Nursing Notes, Old Medical Records, Previous Radiology Studies and Previous Laboratory Studies    Provider Notes (Medical Decision Making):   Appendicitis, UTI, diverticulitis, colitis, constipation    ED Course:   Initial assessment performed.  The patients presenting problems have been discussed, and they are in agreement with the care plan formulated and outlined with them. I have encouraged them to ask questions as they arise throughout their visit. Case d/w Dr. Fito Trinh. Will provide IV and po Potassium. Pt with erythematous skin reaction at site of IV after Morphine provided. Will provide Benadryl and Decadron and reassess. CRITICAL CARE NOTE :      IMPENDING DETERIORATION -Metabolic    ASSOCIATED RISK FACTORS - Metabolic changes    MANAGEMENT- Bedside Assessment    INTERPRETATION -  CT Scan, Blood Pressure and lab work    INTERVENTIONS - Metobolic interventions    CASE REVIEW - Hospitalist    TREATMENT RESPONSE -Stable    PERFORMED BY - Self        NOTES   :      I have spent 30 minutes of critical care time involved in lab review, consultations with specialist, family decision- making, bedside attention and documentation. During this entire length of time I was immediately available to the patient . [place], [place]      TOBACCO CESSATION COUNSELING  The patient was counseled on the dangers of tobacco use, and was advised to quit. Reviewed strategies to maximize success, including removing cigarettes and smoking materials from environment, stress management, substitution of other forms of reinforcement, support of family/friends and written materials. DISCHARGE NOTE:  The care plan has been outline with the patient and/or family, who verbally conveyed understanding and agreement. Available results have been reviewed. Patient and/or family understand the follow up plan as outlined and discharge instructions. Should their condition deterioration at any time after discharge the patient agrees to return, follow up sooner than outlined or seek medical assistance at the closest Emergency Room as soon as possible. Questions have been answered.  Discharge instructions and educational information regarding the patient's diagnosis as well a list of reasons why the patient would want to seek immediate medical attention, should their condition change, were reviewed directly with the patient/family       PLAN:  1. Discharge Medication List as of 2/10/2020  4:07 PM      START taking these medications    Details   ondansetron (ZOFRAN ODT) 4 mg disintegrating tablet Take 1 Tab by mouth every eight (8) hours as needed for Nausea or Vomiting., Normal, Disp-10 Tab, R-0      dicyclomine (BENTYL) 10 mg capsule Take 1 Cap by mouth four (4) times daily for 15 doses. , Print, Disp-15 Cap, R-0      HYDROcodone-acetaminophen (NORCO) 5-325 mg per tablet Take 1 Tab by mouth every eight (8) hours as needed for Pain for up to 3 days. Max Daily Amount: 3 Tabs., Print, Disp-6 Tab, R-0      potassium chloride SR (KLOR-CON 10) 10 mEq tablet Take 1 Tab by mouth daily for 10 days. , Print, Disp-10 Tab, R-0         CONTINUE these medications which have NOT CHANGED    Details   DULoxetine (CYMBALTA) 60 mg capsule Take 1 Cap by mouth daily. , Print, Disp-30 Cap, R-0      dimethyl fumarate (TECFIDERA) 240 mg cpDR Take 240 mg by mouth two (2) times a day., Normal, Disp-120 Cap, R-3      ibuprofen (MOTRIN) 600 mg tablet Take 1 Tab by mouth every six (6) hours as needed for Pain., Normal, Disp-20 Tab, R-0      senna-docusate (PERICOLACE) 8.6-50 mg per tablet Take 2 Tabs by mouth daily. , Normal, Disp-15 Tab, R-0           2. Follow-up Information     Follow up With Specialties Details Why Contact Info    Sidra Walker, [address] [facility]  [phone]      Myron Clement MD Gastroenterology  As needed [phone] [address] [facility] EMERGENCY DEPT Emergency Medicine  If symptoms worsen [address]  [address] [facility]  [phone]        Return to ED if worse     Diagnosis     Clinical Impression:   1. Abdominal pain, right lower quadrant    2. Non-intractable vomiting with nausea, unspecified vomiting type    3. Hypokalemia    4. MS (multiple sclerosis) ([address]    5.  Anxiety

## 2020-02-12 ENCOUNTER — HOSPITAL ENCOUNTER (EMERGENCY)
Age: 45
Discharge: HOME | End: 2020-02-12
Attending: EMERGENCY MEDICINE
Payer: MEDICARE

## 2020-02-12 VITALS
HEART RATE: 76 BPM | OXYGEN SATURATION: 100 % | BODY MASS INDEX: 34.15 KG/M2 | DIASTOLIC BLOOD PRESSURE: 82 MMHG | HEIGHT: 64 IN | RESPIRATION RATE: 18 BRPM | WEIGHT: 200 LBS | SYSTOLIC BLOOD PRESSURE: 133 MMHG

## 2020-02-12 DIAGNOSIS — F41.1 ANXIETY STATE: ICD-10-CM

## 2020-02-12 DIAGNOSIS — G89.4 CHRONIC PAIN SYNDROME: Primary | ICD-10-CM

## 2020-02-12 DIAGNOSIS — G35 MULTIPLE SCLEROSIS (HCC): ICD-10-CM

## 2020-02-12 PROCEDURE — 99284 EMERGENCY DEPT VISIT MOD MDM: CPT

## 2020-02-12 PROCEDURE — 74011250637 HC RX REV CODE- 250/637: Performed by: EMERGENCY MEDICINE

## 2020-02-12 RX ORDER — LORAZEPAM 1 MG/1
1 TABLET ORAL
Status: COMPLETED | OUTPATIENT
Start: 2020-02-12 | End: 2020-02-12

## 2020-02-12 RX ORDER — GABAPENTIN 400 MG/1
800 CAPSULE ORAL 2 TIMES DAILY
Qty: 40 CAP | Refills: 0 | Status: SHIPPED | OUTPATIENT
Start: 2020-02-12 | End: 2020-02-25 | Stop reason: SDUPTHER

## 2020-02-12 RX ORDER — GABAPENTIN 100 MG/1
400 CAPSULE ORAL
Status: COMPLETED | OUTPATIENT
Start: 2020-02-12 | End: 2020-02-12

## 2020-02-12 RX ADMIN — LORAZEPAM 1 MG: 1 TABLET ORAL at 16:58

## 2020-02-12 RX ADMIN — GABAPENTIN 400 MG: 100 CAPSULE ORAL at 16:58

## 2020-02-12 NOTE — DISCHARGE INSTRUCTIONS
Patient Education        Learning About Generalized Anxiety Disorder  What is generalized anxiety disorder? We all worry. It's a normal part of life. But when you have generalized anxiety disorder, you worry about lots of things and have a hard time stopping your worry. This worry or anxiety interferes with your relationships, work, and life. What causes it? The cause is not known. But it may be passed down through families. What are the symptoms? You may feel anxious or worry most days about things like work, relationships, health, or money. You may worry about things that are unlikely to happen. You find it hard to stop or control the worry. Because you worry a lot and try hard to stop worrying, you may feel restless, tired, tense, or cranky. You may also find it hard to think or sleep. And you may have headaches or an upset stomach. How is it diagnosed? Your doctor will ask about your health and how often you worry or feel anxious. He or she may ask about other symptoms, like whether you:  · Feel restless. · Feel tired. · Have a hard time thinking or feel that your mind goes blank. · Feel cranky. · Have tense muscles. · Have sleep problems. A physical exam and tests can help make sure that your symptoms aren't caused by a different condition, such as a thyroid problem. How is it treated? Counseling and medicine can both work to treat anxiety. The two are often used along with lifestyle changes. Cognitive-behavioral therapy (CBT) is a type of counseling that's used to help treat anxiety. In CBT, you learn how to notice and replace thoughts that make you feel worried. It also can help you learn how to relax when you worry. Medicines can help. These medicines are often also used for depression. Selective serotonin reuptake inhibitors (SSRIs) are often tried first. But there are other medicines that your doctor may use. You may need to try a few medicines to find one that works well.   Many people feel better by getting regular exercise, eating healthy meals, and getting good sleep. Mindfulness--focusing on things that happen in the present moment--also can help reduce your anxiety. What can you expect when you have it? Having anxiety can be upsetting. Some people might feel less worried and stressed after a couple of months of treatment. But for other people, it might take longer to feel better. Reaching out to people for help is important. Try not to isolate yourself. Let your family and friends help you. Find someone you can trust and confide in. Talk to that person. When you know what anxiety is--and how you can get help for it--you can start to learn new ways of thinking. This can help you cope and work through your anxiety. Follow-up care is a key part of your treatment and safety. Be sure to make and go to all appointments, and call your doctor if you are having problems. It's also a good idea to know your test results and keep a list of the medicines you take. Where can you learn more? Go to http://damaris-eloise.info/. Enter G110 in the search box to learn more about \"Learning About Generalized Anxiety Disorder. \"  Current as of: May 28, 2019  Content Version: 12.2  © 9690-5169 [employer]. Care instructions adapted under license by [employer] (which disclaims liability or warranty for this information). If you have questions about a medical condition or this instruction, always ask your healthcare professional. [place] [address] any warranty or liability for your use of this information. Patient Education        Multiple Sclerosis (MS): Care Instructions  Your Care Instructions  Multiple sclerosis, also called MS, is a disease that can affect the brain, spinal cord, and nerves to the eyes. MS can cause problems with muscle control and strength, vision, balance, feeling, and thinking.  Whatever your symptoms are, taking medicine correctly and following your doctor's advice for home care can help you maintain your quality of life. Follow-up care is a key part of your treatment and safety. Be sure to make and go to all appointments, and call your doctor if you are having problems. It's also a good idea to know your test results and keep a list of the medicines you take. How can you care for yourself at home? General care  · Take your medicines exactly as prescribed. Call your doctor if you think you are having a problem with your medicine. · Use a cane, walker, or scooter if your doctor suggests it. · Keep doing your normal activities as much as you can. · If you have problems urinating, press or tap your bladder area to help start urine flow. If you have trouble controlling your urine, plan your fluid intake and activities so that a toilet will be available when you need it. · Spend time with family and friends. Join a support group for people with MS if you want extra help. · Depression is common with this condition. Tell your doctor if you have trouble sleeping, are eating too much or are not hungry, or feel sad or tearful all the time. Depression can be treated with medicine and counseling. Diet and exercise  · Eat a balanced diet. · If you have problems swallowing, change how and what you eat:  ? Try thick drinks, such as milk shakes. They are easier to swallow than other fluids. ? Do not eat foods that crumble easily. These can cause choking. ? Use a  to prepare food. Soft foods need less chewing. ? Eat small meals often so that you do not get tired from eating larger meals. · Get exercise on most days. Work with your doctor to set up a program of walking, swimming, or other exercise that you are able to do. A physical therapist can teach you exercises if you cannot walk but can move your limbs and trunk. Or you can do exercises to help with coordination and balance.  You can help improve muscle stiffness by doing exercises while lying in certain positions. When should you call for help? Call your doctor now or seek immediate medical care if:    · You have a change in symptoms.     · You fall or have another injury.     · You have symptoms of a urinary infection. For example:  ? You have blood or pus in your urine. ? You have pain in your back just below your rib cage. This is called flank pain. ? You have a fever, chills, or body aches. ? It hurts to urinate. ? You have groin or belly pain.    Watch closely for changes in your health, and be sure to contact your doctor if:    · You want more information about MS or medicines.     · You have questions about alternative treatments. Do not use any other treatments without talking to your doctor first.   Where can you learn more? Go to http://damaris-eloise.info/. Enter K582 in the search box to learn more about \"Multiple Sclerosis (MS): Care Instructions. \"  Current as of: March 28, 2019  Content Version: 12.2  © 6451-9872 [employer]. Care instructions adapted under license by [employer] (which disclaims liability or warranty for this information). If you have questions about a medical condition or this instruction, always ask your healthcare professional. [address] any warranty or liability for your use of this information.

## 2020-02-12 NOTE — ED PROVIDER NOTES
EMERGENCY DEPARTMENT HISTORY AND PHYSICAL EXAM      Date: 2/12/2020  Patient Name: Dayanna Camarena    History of Presenting Illness     Chief Complaint   Patient presents with    Pain (Chronic)     History Provided By: Patient    HPI: Dayanna Camarena, 53 y.o. female with multiple medical problems including chronic pain, depression, GERD, multiple sclerosis, and anxiety who presents via EMS to the ED with cc of chronic pain and anxiety. Patient states she used to be on 1600 mg of gabapentin 3 times a day and was recently decreased to 400 mg 3 times a day. She was also recently taken off her Xanax and her clonazepam and Ambien. Patient complains of a burning pain from the tips of her fingers to the toes. She also complains of increased anxiety and stress. She has tried taking melatonin and Advil with no relief of symptoms. She denies any nausea, vomiting, or diarrhea. She does state that she is having difficulty ambulating and moving secondary to her pain. She denies any recent falls. PMHx: Multiple sclerosis, chronic pain, depression, GERD, and anxiety  Social Hx: Smokes 1/2 pack/day, denies alcohol use, denies illegal drug use    PCP: Diomedes Figueroa MD    There are no other complaints, changes, or physical findings at this time. No current facility-administered medications on file prior to encounter. Current Outpatient Medications on File Prior to Encounter   Medication Sig Dispense Refill    ondansetron (ZOFRAN ODT) 4 mg disintegrating tablet Take 1 Tab by mouth every eight (8) hours as needed for Nausea or Vomiting. 10 Tab 0    dicyclomine (BENTYL) 10 mg capsule Take 1 Cap by mouth four (4) times daily for 15 doses. 15 Cap 0    potassium chloride SR (KLOR-CON 10) 10 mEq tablet Take 1 Tab by mouth daily for 10 days. 10 Tab 0    [DISCONTINUED] HYDROcodone-acetaminophen (NORCO) 5-325 mg per tablet Take 1 Tab by mouth every eight (8) hours as needed for Pain for up to 3 days.  Max Daily Amount: 3 Tabs. 6 Tab 0    senna-docusate (PERICOLACE) 8.6-50 mg per tablet Take 2 Tabs by mouth daily. 15 Tab 0    DULoxetine (CYMBALTA) 60 mg capsule Take 1 Cap by mouth daily. 30 Cap 0    dimethyl fumarate (TECFIDERA) 240 mg cpDR Take 240 mg by mouth two (2) times a day. 120 Cap 3    ibuprofen (MOTRIN) 600 mg tablet Take 1 Tab by mouth every six (6) hours as needed for Pain. 20 Tab 0     Past History     Past Medical History:  Past Medical History:   Diagnosis Date    Body aches 12/11/2014    Chronic pain     Depression     GERD (gastroesophageal reflux disease)     Headache(784.0)     History of mammogram never before    [facility]) ([facility] [address]     Neurological disorder     Multiple Sclerosis    Pap smear for cervical cancer screening 2008    Psychiatric disorder     anxiety    Psychotic disorder [facility])      Past Surgical History:  Past Surgical History:   Procedure Laterality Date    COLONOSCOPY N/A 2/28/2018    COLON[facility] Suzan [facility]intron [facility]COPY    HX CHOLECYSTECTOMY      HX GYN      3 c-sections    HX HEENT      bilateral ear tube surgery    HX HERNIA REPAIR      HX OTHER SURGICAL      R inguinal hernia repair     Family History:  Family History   Problem Relation Age of Onset    Heart Attack Father         tripple bypass    Cancer Father         lung    Diabetes Mother         type 2    Heart Disease Mother         heart disease    Diabetes Paternal Grandmother      Social History:  Social History     Tobacco Use    Smoking status: Current Every Day Smoker     Packs/day: 0.50     Years: 17.00     Pack years: 8.50     Types: Cigarettes    Smokeless tobacco: Never Used    Tobacco comment: 1 pack every 3 days. Substance Use Topics    Alcohol use: No    Drug use: No     Allergies:  No Known Allergies  Review of Systems   Review of Systems   Constitutional: Negative for chills and fever.    HENT: Negative for congestion, rhinorrhea, sneezing and sore throat. Eyes: Negative for redness and visual disturbance. Respiratory: Negative for shortness of breath. Cardiovascular: Negative for leg swelling. Gastrointestinal: Negative for abdominal pain, nausea and vomiting. Genitourinary: Negative for difficulty urinating and frequency. Musculoskeletal: Positive for arthralgias and myalgias. Negative for back pain and neck stiffness. Skin: Negative for rash. Neurological: Negative for dizziness, syncope, weakness and headaches. Hematological: Negative for adenopathy. Psychiatric/Behavioral: The patient is nervous/anxious. All other systems reviewed and are negative. Physical Exam   Physical Exam  Vitals signs and nursing note reviewed. Constitutional:       Appearance: Normal appearance. She is well-developed. Comments: Patient is very manipulative and begging for anxiety and pain medications   HENT:      Head: Normocephalic and atraumatic. Eyes:      Conjunctiva/sclera: Conjunctivae normal.   Neck:      Musculoskeletal: Full passive range of motion without pain, normal range of motion and neck supple. Cardiovascular:      Rate and Rhythm: Normal rate and regular rhythm. Pulses: Normal pulses. Heart sounds: Normal heart sounds, S1 normal and S2 normal. No murmur. Pulmonary:      Effort: Pulmonary effort is normal. No respiratory distress. Breath sounds: Normal breath sounds. No wheezing. Abdominal:      General: Bowel sounds are normal. There is no distension. Palpations: Abdomen is soft. Tenderness: There is no abdominal tenderness. There is no rebound. Musculoskeletal: Normal range of motion. Skin:     General: Skin is warm and dry. Findings: No rash. Neurological:      Mental Status: She is alert and oriented to person, place, and time. Psychiatric:         Mood and Affect: Mood is anxious. Speech: Speech is rapid and pressured.          Behavior: Behavior normal. Thought Content: Thought content normal.         Judgment: Judgment normal.       Diagnostic Study Results   Labs -   No results found for this or any previous visit (from the past 12 hour(s)). Radiologic Studies -   No orders to display     No results found. Medical Decision Making   I am the first provider for this patient. I reviewed the vital signs, available nursing notes, past medical history, past surgical history, family history and social history. Vital Signs-Reviewed the patient's vital signs. Patient Vitals for the past 12 hrs:   Pulse Resp BP SpO2   02/12/20 1609   133/82    02/12/20 1605 76 18 (!) 128/111 100 %     Pulse Oximetry Analysis - 100% on RA    Cardiac Monitor:   Rate: 76 bpm  Rhythm: Normal Sinus Rhythm     Records Reviewed: Nursing Notes and Old Medical Records    Provider Notes (Medical Decision Making):   68-year-old female presents via EMS with generalized myalgias for the past 3 to 4 weeks after her primary care doctor decreased her dose of gabapentin and stop prescribing her Ambien, Xanax, and clonazepam.  After reading her neurology and primary care doctor notes, it appears that she should be on 800 mg of gabapentin twice a day and she was referred to outpatient psychiatry. I will re-prescribe these medications for her but I will not prescribe her any controlled substances other than the gabapentin. Discussed with patient that these are all chronic conditions and the emergency department is not the correct place to manage these. ED Course:   Initial assessment performed. The patients presenting problems have been discussed, and they are in agreement with the care plan formulated and outlined with them. I have encouraged them to ask questions as they arise throughout their visit. Patient is pleading for something to help her sleep tonight and to calm her nerves. I will give her 1 p.o.  Ativan 1 mg tablet in the emergency department and will not discharge her with any prescriptions for any other controlled substances other than the gabapentin. Progress Note:   Updated pt on all returned results and findings. Discussed the importance of proper follow up as referred below along with return precautions. Pt in agreement with the care plan and expresses agreement with and understanding of all items discussed. Disposition:  Discharge Note:  The pt is ready for discharge. The pt's signs, symptoms, diagnosis, and discharge instructions have been discussed and pt has conveyed their understanding. The pt is to follow up as recommended or return to ER should their symptoms worsen. Plan has been discussed and pt is in agreement. PLAN:  1. Current Discharge Medication List      START taking these medications    Details   gabapentin (NEURONTIN) 400 mg capsule Take 2 Caps by mouth two (2) times a day. Max Daily Amount: 1,600 mg. Qty: 40 Cap, Refills: 0    Associated Diagnoses: Chronic pain syndrome         STOP taking these medications       HYDROcodone-acetaminophen (NORCO) 5-325 mg per tablet Comments:   Reason for Stoppin.   Follow-up Information     Follow up With Specialties Details Why Contact Info    Lizz Nova MD Family Practice Schedule an appointment as soon as possible for a visit  [address] [place] [address]       Maria Isabel Amaya MD Neurology Schedule an appointment as soon as possible for a visit  [address].  [phone]      [facility] Neurology  Schedule an appointment as soon as possible for a visit  [address] [facility] [address]        Return to ED if worse     Diagnosis     Clinical Impression:   1. Chronic pain syndrome    2. Multiple sclerosis ([address]    3. Anxiety state            Please note that this dictation was completed with Dragon, computer voice recognition software.   Quite often unanticipated grammatical, syntax, homophones, and other interpretive errors are inadvertently transcribed by the computer software. Please disregard these errors. Additionally, please excuse any errors that have escaped final proofreading.

## 2020-02-12 NOTE — ED NOTES
Pt here for generalized pain worsening over the past two days because her doctor just took her off of her medications, reports chronic pain due to MS. States she has been \"self medicating with melatonin and advil. \" Pt anxious, irritable, and teary-eyed on arrival and requesting pain medication. Emergency Department Nursing Plan of Care       The Nursing Plan of Care is developed from the Nursing assessment and Emergency Department Attending provider initial evaluation. The plan of care may be reviewed in the ED Provider note.     The Plan of Care was developed with the following considerations:   Patient / Family readiness to learn indicated by:verbalized understanding  Persons(s) to be included in education: patient  Barriers to Learning/Limitations:No    Signed     Sarwat Licona RN    2/12/2020   4:21 PM

## 2020-02-12 NOTE — ED TRIAGE NOTES
Pt states \"my whole body hurts. \"     States her doctor took her off the medications she has been on for the past 14 years (ambien, clonazepam, and xanax)    States they recently decreased her gabapentin dose from 1600mg day to 400mg day.      Hx of MS; Pt states she takes Tecfidera for the MS

## 2020-02-17 ENCOUNTER — HOSPITAL ENCOUNTER (EMERGENCY)
Age: 45
Discharge: HOME | End: 2020-02-17
Attending: EMERGENCY MEDICINE
Payer: MEDICARE

## 2020-02-17 VITALS
WEIGHT: 200 LBS | OXYGEN SATURATION: 98 % | BODY MASS INDEX: 34.15 KG/M2 | DIASTOLIC BLOOD PRESSURE: 89 MMHG | TEMPERATURE: 98.2 F | HEIGHT: 64 IN | HEART RATE: 100 BPM | SYSTOLIC BLOOD PRESSURE: 132 MMHG | RESPIRATION RATE: 18 BRPM

## 2020-02-17 DIAGNOSIS — Z76.5 DRUG-SEEKING BEHAVIOR: ICD-10-CM

## 2020-02-17 DIAGNOSIS — F41.9 ANXIETY: Primary | ICD-10-CM

## 2020-02-17 DIAGNOSIS — R52 WHOLE BODY PAIN: ICD-10-CM

## 2020-02-17 PROCEDURE — 74011250636 HC RX REV CODE- 250/636: Performed by: EMERGENCY MEDICINE

## 2020-02-17 PROCEDURE — 96372 THER/PROPH/DIAG INJ SC/IM: CPT

## 2020-02-17 PROCEDURE — 99284 EMERGENCY DEPT VISIT MOD MDM: CPT

## 2020-02-17 PROCEDURE — 74011250637 HC RX REV CODE- 250/637: Performed by: EMERGENCY MEDICINE

## 2020-02-17 RX ORDER — GABAPENTIN 300 MG/1
600 CAPSULE ORAL
Status: DISCONTINUED | OUTPATIENT
Start: 2020-02-17 | End: 2020-02-17

## 2020-02-17 RX ORDER — LORAZEPAM 2 MG/ML
1 INJECTION INTRAMUSCULAR
Status: COMPLETED | OUTPATIENT
Start: 2020-02-17 | End: 2020-02-17

## 2020-02-17 RX ADMIN — LORAZEPAM 1 MG: 2 INJECTION INTRAMUSCULAR; INTRAVENOUS at 03:59

## 2020-02-17 RX ADMIN — GABAPENTIN 800 MG: 300 CAPSULE ORAL at 03:58

## 2020-02-17 NOTE — ED TRIAGE NOTES
Patient angry in triage. Stating she ahs tried to get more medication but no one will listen to her and she can't get it until the morning. She told her significant other that she didn't get a room because she wasn't bleeding out of her ears. I explained that we would get her to a room as soon as possible but that all the rooms were currently full.

## 2020-02-17 NOTE — ED NOTES
Patient has MS and is taken Gabapentin for it but her dosage was decreased and she is currently out of her medication. Patient c/o N/V approx 2 hours ago. States she can not urinate or have bowel movement for about 2 weeks at this time. Patient is able to move her extremities with no issues. Patient is A&Ox4 a this time.

## 2020-02-18 NOTE — ED PROVIDER NOTES
EMERGENCY DEPARTMENT HISTORY AND PHYSICAL EXAM      Date: 2/17/2020  Patient Name: Luis Stewart    History of Presenting Illness     Chief Complaint   Patient presents with    Generalized Body Aches     Patient has MS and her PCP decreased her gabapentin dose because she was taking too much. She tried to cut down to the amount he prescribed but \"couldn't take the pain. \" She then went back to her old dose and has been out of her medication for 2 weeks. She went to see ehr MD but he did not prescribe any more. History Provided By: Patient    HPI: Luis Stewart, 53 y.o. female presents to the ED with cc of pain. Pt on chronic pain management for Fibromyalgia. She had been prescribed Neurontin at 400mg twice a day and she had been on 1200 three times a day. Pt states she has no appetite and can't sleep because of the constant pain. Pt had been using her Neurontin at much higher dose and there fore ran out. No fever/chills, CP, shortness of breath, abd pain n/v/d. There are no other complaints, changes, or physical findings at this time. PCP: None    No current facility-administered medications on file prior to encounter. Current Outpatient Medications on File Prior to Encounter   Medication Sig Dispense Refill    gabapentin (NEURONTIN) 400 mg capsule Take 2 Caps by mouth two (2) times a day. Max Daily Amount: 1,600 mg. 40 Cap 0    ondansetron (ZOFRAN ODT) 4 mg disintegrating tablet Take 1 Tab by mouth every eight (8) hours as needed for Nausea or Vomiting. 10 Tab 0    potassium chloride SR (KLOR-CON 10) 10 mEq tablet Take 1 Tab by mouth daily for 10 days. 10 Tab 0    senna-docusate (PERICOLACE) 8.6-50 mg per tablet Take 2 Tabs by mouth daily. 15 Tab 0    DULoxetine (CYMBALTA) 60 mg capsule Take 1 Cap by mouth daily. 30 Cap 0    dimethyl fumarate (TECFIDERA) 240 mg cpDR Take 240 mg by mouth two (2) times a day.  120 Cap 3    ibuprofen (MOTRIN) 600 mg tablet Take 1 Tab by mouth every six (6) hours as needed for Pain. 20 Tab 0       Past History     Past Medical History:  Past Medical History:   Diagnosis Date    Body aches 12/11/2014    Chronic pain     Depression     GERD (gastroesophageal reflux disease)     Headache(784.0)     History of mammogram never before    [facility] (multiple [facility]) ([facility] [address]     Neurological disorder     Multiple Sclerosis    Pap smear for cervical cancer screening 2008    Psychiatric disorder     anxiety    Psychotic disorder [facility])        Past Surgical History:  Past Surgical History:   Procedure Laterality Date    COLONOSCOPY N/A 2/28/2018    COL[facility]COPY performed by Ivet Paul MD at [facility] ENDOSCOPY    HX CHOLECYSTECTOMY      HX GYN      3 c-sections    HX HEENT      bilateral ear tube surgery    HX HERNIA REPAIR      HX OTHER SURGICAL      R inguinal hernia repair       Family History:  Family History   Problem Relation Age of Onset    Heart Attack Father         tripple bypass    Cancer Father         lung    Diabetes Mother         type 2    Heart Disease Mother         heart disease    Diabetes Paternal Grandmother        Social History:  Social History     Tobacco Use    Smoking status: Current Every Day Smoker     Packs/day: 0.50     Years: 17.00     Pack years: 8.50     Types: Cigarettes    Smokeless tobacco: Never Used    Tobacco comment: 1 pack every 3 days. Substance Use Topics    Alcohol use: No    Drug use: No       Allergies: Allergies   Allergen Reactions    Morphine Rash         Review of Systems   Review of Systems    Physical Exam   Physical Exam  Vitals signs and nursing note reviewed. Constitutional:       Appearance: She is well-developed. She is obese. HENT:      Head: Normocephalic and atraumatic. Right Ear: External ear normal.      Left Ear: External ear normal.   Eyes:      Conjunctiva/sclera: Conjunctivae normal.      Pupils: Pupils are equal, round, and reactive to light.    Neck:      Musculoskeletal: Normal range of motion and neck supple. Vascular: No JVD. Trachea: No tracheal deviation. Cardiovascular:      Rate and Rhythm: Normal rate and regular rhythm. Heart sounds: Normal heart sounds. No murmur. Pulmonary:      Effort: Pulmonary effort is normal. No respiratory distress. Breath sounds: Normal breath sounds. No stridor. No wheezing or rales. Musculoskeletal: Normal range of motion. General: No tenderness. Skin:     General: Skin is warm and dry. Neurological:      Mental Status: She is alert and oriented to person, place, and time. Cranial Nerves: No cranial nerve deficit. Comments: No nystagmus, no dysmetria, no focal motor or sensory deficits   Psychiatric:      Comments: Very anxious, pressured speech and histrionic, denies SI, poor sleep states 2 days with no sleep at all because she can't calm down. Diagnostic Study Results     Labs -  None    Radiologic Studies -   No orders to display     CT Results  (Last 48 hours)    None        CXR Results  (Last 48 hours)    None          Medical Decision Making   I am the first provider for this patient. I reviewed the vital signs, available nursing notes, past medical history, past surgical history, family history and social history. Vital Signs-Reviewed the patient's vital signs. Records Reviewed: Nursing Notes, Old Medical Records, Previous Radiology Studies and Previous Laboratory Studies, [facility]    Provider Notes (Medical Decision Making):   Drug seeking behavior, anxiety attack, medication withdraw    ED Course:   Initial assessment performed. The patients presenting problems have been discussed, and they are in agreement with the care plan formulated and outlined with them. I have encouraged them to ask questions as they arise throughout their visit. Pt has several visits with similar complaints.  And one about 10 days ago act [address] [place] [address], where she was given a RX for Neurontin, 800mg twice a day for 10 days. Pt states she is on Neurontin, ambien, clonazepam and ativan. However when her  was reviewed in the last 12mos the only scheduled meds was alprazolam 0.5mg #90 for 30d supply. She has been filling these monthly and one of those at time of [facility] visit. There is one Rx Rx for Neurontin other than RX written at [facility], but that was not seen in - did not fill or lag time. Pt states her new Rx would be available today, despite this she also asked for Rx. I instructed her I was willing to provide a single dose of Ativan and dose of Neurontin. WHen nurses were in room pt more dramatic about her complaint but there were other times that this was not occurring. Pt also ambulatory to the bathroom on several occasions with no difficulties. I discussed with the pt she needs to discuss with her PMD and she should have done this immediately after tying lower dose and no relief as they can't help if they don't know. Again  did not have any monthly Neurontin Rx at her high dose, unsure if this is laf given Neurontin just became scheduled drug. I do not see any AMbien Rx's in past 12mos. with the name and  that was given in triage. Critical Care Time:   None    Disposition:  DC home    PLAN:  1. Discharge Medication List as of 2020  4:30 AM        2. Follow-up Information    None     Her pain management specialist.     Return to ED if worse     Diagnosis     Clinical Impression:   1. Anxiety    2. Whole body pain    3. Drug-seeking behavior        Attestations:    Justin Gaona DO    Please note that this dictation was completed with [email or web address], the [employer] voice recognition software. Quite often unanticipated grammatical, syntax, homophones, and other interpretive errors are inadvertently transcribed by the computer software. Please disregard these errors. Please excuse any errors that have escaped final proofreading. Thank you.

## 2020-02-25 ENCOUNTER — HOSPITAL ENCOUNTER (EMERGENCY)
Age: 45
Discharge: HOME | End: 2020-02-25
Attending: EMERGENCY MEDICINE
Payer: MEDICARE

## 2020-02-25 VITALS
HEIGHT: 65 IN | OXYGEN SATURATION: 97 % | HEART RATE: 78 BPM | DIASTOLIC BLOOD PRESSURE: 94 MMHG | TEMPERATURE: 98.2 F | BODY MASS INDEX: 33.35 KG/M2 | SYSTOLIC BLOOD PRESSURE: 150 MMHG | WEIGHT: 200.18 LBS | RESPIRATION RATE: 18 BRPM

## 2020-02-25 DIAGNOSIS — G89.4 CHRONIC PAIN SYNDROME: Primary | ICD-10-CM

## 2020-02-25 DIAGNOSIS — G35 MS (MULTIPLE SCLEROSIS) (HCC): ICD-10-CM

## 2020-02-25 DIAGNOSIS — M79.7 FIBROMYALGIA: ICD-10-CM

## 2020-02-25 DIAGNOSIS — G89.4 CHRONIC PAIN SYNDROME: ICD-10-CM

## 2020-02-25 LAB
ALBUMIN SERPL-MCNC: 3.5 G/DL (ref 3.5–5)
ALBUMIN/GLOB SERPL: 1 {RATIO} (ref 1.1–2.2)
ALP SERPL-CCNC: 61 U/L (ref 45–117)
ALT SERPL-CCNC: 32 U/L (ref 12–78)
ANION GAP SERPL CALC-SCNC: 2 MMOL/L (ref 5–15)
APPEARANCE UR: CLEAR
AST SERPL-CCNC: 16 U/L (ref 15–37)
ATRIAL RATE: 72 BPM
BACTERIA URNS QL MICRO: ABNORMAL /HPF
BASOPHILS # BLD: 0 K/UL (ref 0–0.1)
BASOPHILS NFR BLD: 1 % (ref 0–1)
BILIRUB SERPL-MCNC: 0.3 MG/DL (ref 0.2–1)
BILIRUB UR QL: NEGATIVE
BUN SERPL-MCNC: 10 MG/DL (ref 6–20)
BUN/CREAT SERPL: 15 (ref 12–20)
CALCIUM SERPL-MCNC: 9.2 MG/DL (ref 8.5–10.1)
CALCULATED P AXIS, ECG09: 36 DEGREES
CALCULATED R AXIS, ECG10: -3 DEGREES
CALCULATED T AXIS, ECG11: 23 DEGREES
CHLORIDE SERPL-SCNC: 106 MMOL/L (ref 97–108)
CO2 SERPL-SCNC: 33 MMOL/L (ref 21–32)
COLOR UR: ABNORMAL
CREAT SERPL-MCNC: 0.68 MG/DL (ref 0.55–1.02)
DIAGNOSIS, 93000: NORMAL
DIFFERENTIAL METHOD BLD: ABNORMAL
EOSINOPHIL # BLD: 0.1 K/UL (ref 0–0.4)
EOSINOPHIL NFR BLD: 3 % (ref 0–7)
EPITH CASTS URNS QL MICRO: ABNORMAL /LPF
ERYTHROCYTE [DISTWIDTH] IN BLOOD BY AUTOMATED COUNT: 13.3 % (ref 11.5–14.5)
GLOBULIN SER CALC-MCNC: 3.4 G/DL (ref 2–4)
GLUCOSE SERPL-MCNC: 76 MG/DL (ref 65–100)
GLUCOSE UR STRIP.AUTO-MCNC: NEGATIVE MG/DL
HCG SERPL QL: NEGATIVE
HCT VFR BLD AUTO: 33.8 % (ref 35–47)
HGB BLD-MCNC: 11.2 G/DL (ref 11.5–16)
HGB UR QL STRIP: NEGATIVE
HYALINE CASTS URNS QL MICRO: ABNORMAL /LPF (ref 0–5)
IMM GRANULOCYTES # BLD AUTO: 0 K/UL (ref 0–0.04)
IMM GRANULOCYTES NFR BLD AUTO: 0 % (ref 0–0.5)
KETONES UR QL STRIP.AUTO: NEGATIVE MG/DL
LEUKOCYTE ESTERASE UR QL STRIP.AUTO: ABNORMAL
LYMPHOCYTES # BLD: 0.9 K/UL (ref 0.8–3.5)
LYMPHOCYTES NFR BLD: 24 % (ref 12–49)
MAGNESIUM SERPL-MCNC: 1.8 MG/DL (ref 1.6–2.4)
MCH RBC QN AUTO: 31.9 PG (ref 26–34)
MCHC RBC AUTO-ENTMCNC: 33.1 G/DL (ref 30–36.5)
MCV RBC AUTO: 96.3 FL (ref 80–99)
MONOCYTES # BLD: 0.3 K/UL (ref 0–1)
MONOCYTES NFR BLD: 7 % (ref 5–13)
NEUTS SEG # BLD: 2.5 K/UL (ref 1.8–8)
NEUTS SEG NFR BLD: 65 % (ref 32–75)
NITRITE UR QL STRIP.AUTO: NEGATIVE
NRBC # BLD: 0 K/UL (ref 0–0.01)
NRBC BLD-RTO: 0 PER 100 WBC
P-R INTERVAL, ECG05: 186 MS
PH UR STRIP: 7 [PH] (ref 5–8)
PLATELET # BLD AUTO: 249 K/UL (ref 150–400)
PMV BLD AUTO: 10.1 FL (ref 8.9–12.9)
POTASSIUM SERPL-SCNC: 3.8 MMOL/L (ref 3.5–5.1)
PROT SERPL-MCNC: 6.9 G/DL (ref 6.4–8.2)
PROT UR STRIP-MCNC: NEGATIVE MG/DL
Q-T INTERVAL, ECG07: 400 MS
QRS DURATION, ECG06: 84 MS
QTC CALCULATION (BEZET), ECG08: 438 MS
RBC # BLD AUTO: 3.51 M/UL (ref 3.8–5.2)
RBC #/AREA URNS HPF: ABNORMAL /HPF (ref 0–5)
SODIUM SERPL-SCNC: 141 MMOL/L (ref 136–145)
SP GR UR REFRACTOMETRY: 1.01 (ref 1–1.03)
UROBILINOGEN UR QL STRIP.AUTO: 0.2 EU/DL (ref 0.2–1)
VENTRICULAR RATE, ECG03: 72 BPM
WBC # BLD AUTO: 3.9 K/UL (ref 3.6–11)
WBC URNS QL MICRO: ABNORMAL /HPF (ref 0–4)

## 2020-02-25 PROCEDURE — 80053 COMPREHEN METABOLIC PANEL: CPT

## 2020-02-25 PROCEDURE — 83735 ASSAY OF MAGNESIUM: CPT

## 2020-02-25 PROCEDURE — 99284 EMERGENCY DEPT VISIT MOD MDM: CPT

## 2020-02-25 PROCEDURE — 74011250637 HC RX REV CODE- 250/637: Performed by: EMERGENCY MEDICINE

## 2020-02-25 PROCEDURE — 93005 ELECTROCARDIOGRAM TRACING: CPT

## 2020-02-25 PROCEDURE — 81001 URINALYSIS AUTO W/SCOPE: CPT

## 2020-02-25 PROCEDURE — 84703 CHORIONIC GONADOTROPIN ASSAY: CPT

## 2020-02-25 PROCEDURE — 85025 COMPLETE CBC W/AUTO DIFF WBC: CPT

## 2020-02-25 PROCEDURE — 36415 COLL VENOUS BLD VENIPUNCTURE: CPT

## 2020-02-25 RX ORDER — GABAPENTIN 800 MG/1
1600 TABLET ORAL 3 TIMES DAILY
Qty: 180 TAB | Refills: 5 | Status: SHIPPED | OUTPATIENT
Start: 2020-02-25 | End: 2020-02-25 | Stop reason: DRUGHIGH

## 2020-02-25 RX ORDER — GABAPENTIN 400 MG/1
800 CAPSULE ORAL 2 TIMES DAILY
Qty: 120 CAP | Refills: 5 | Status: SHIPPED | OUTPATIENT
Start: 2020-02-25 | End: 2020-03-02 | Stop reason: SDUPTHER

## 2020-02-25 RX ADMIN — GABAPENTIN 400 MG: 300 CAPSULE ORAL at 14:28

## 2020-02-25 NOTE — ED NOTES
Dr Tj Morales reviewed discharge instructions with the patient. The patient verbalized understanding. All questions and concerns were addressed. The patient declined a wheelchair and is discharged ambulatory in the care of family members with instructions and prescriptions in hand. Pt is alert and oriented x 4. Respirations are clear and unlabored.

## 2020-02-25 NOTE — ED PROVIDER NOTES
EMERGENCY DEPARTMENT HISTORY AND PHYSICAL EXAM      Date: 2/25/2020  Patient Name: Hilario Chavira  Patient Age and Sex: 53 y.o. female    History of Presenting Illness     Chief Complaint   Patient presents with    Extremity Weakness     Patient has MS and complains of increase weakness in legs        History Provided By: Patient    Ability to gather history was limited by:     HPI: Hilario Chavira, 53 y.o. female with history of chronic pain, multiple sclerosis, anxiety, fibromyalgia, complains of whole body pain and tingling, requests additional gabapentin. She is frustrated that her primary care physician reduced her gabapentin dose drastically a couple months ago, after long-term use of very high doses. She has been seen in the emergency department multiple times this month for chronic pain issues, was recently given a 10-day refill of gabapentin. She has no specific focal symptoms, but rather describes whole body weakness and tingling and burning sensation. No specific vision changes or headaches. No fevers. Location:    Quality:      Severity:    Duration:   Timing:      Context:    Modifying factors:   Associated symptoms:     Pt denies any other alleviating or exacerbating factors. There are no other complaints, changes or physical findings at this time.      Past Medical History:   Diagnosis Date    Body aches 12/11/2014    Chronic pain     Depression     GERD (gastroesophageal reflux disease)     Headache(784.0)     History of mammogram never before    MS (multiple sclerosis) ([facility] [address]     Neurological disorder     Multiple Sclerosis    Pap smear for cervical cancer screening 2008    Psychiatric disorder     anxiety    Psychotic disorder [facility])      Past Surgical History:   Procedure Laterality Date    COLONOSCOPY N/A 2/28/2018    COL[facility] Nataliia Gomez [facility] ENDOSCOPY    HX CHOLECYSTECTOMY      HX GYN      3 c-sections    HX HEENT      bilateral ear tube surgery    HX HERNIA REPAIR      HX OTHER SURGICAL      R inguinal hernia repair       PCP: None    Past History     Past Medical History:  Past Medical History:   Diagnosis Date    Body aches 12/11/2014    Chronic pain     Depression     GERD (gastroesophageal reflux disease)     Headache(784.0)     History of mammogram never before    [facility]) [facility] [address]     Neurological disorder     Multiple Sclerosis    Pap smear for cervical cancer screening 2008    Psychiatric disorder     anxiety    Psychotic disorder [facility])        Past Surgical History:  Past Surgical History:   Procedure Laterality Date    COLONOSCOPY N/A 2/28/2018    COLONOSCOPY performed by Daniel Villarreal MD at [facility] ENDOSCOPY    HX CHOLECYSTECTOMY      HX GYN      3 c-sections    HX HEENT      bilateral ear tube surgery    HX HERNIA REPAIR      HX OTHER SURGICAL      R inguinal hernia repair       Family History:  Family History   Problem Relation Age of Onset    Heart Attack Father         tripple bypass    Cancer Father         lung    Diabetes Mother         type 2    Heart Disease Mother         heart disease    Diabetes Paternal Grandmother        Social History:  Social History     Tobacco Use    Smoking status: Current Every Day Smoker     Packs/day: 0.50     Years: 17.00     Pack years: 8.50     Types: Cigarettes    Smokeless tobacco: Never Used    Tobacco comment: 1 pack every 3 days. Substance Use Topics    Alcohol use: No    Drug use: No       Allergies: Allergies   Allergen Reactions    Morphine Rash       Current Medications:  No current facility-administered medications on file prior to encounter. Current Outpatient Medications on File Prior to Encounter   Medication Sig Dispense Refill    gabapentin (NEURONTIN) 400 mg capsule Take 2 Caps by mouth two (2) times a day.  Max Daily Amount: 1,600 mg. 40 Cap 0    ondansetron (ZOFRAN ODT) 4 mg disintegrating tablet Take 1 Tab by mouth every eight (8) hours as needed for Nausea or Vomiting. 10 Tab 0    senna-docusate (PERICOLACE) 8.6-50 mg per tablet Take 2 Tabs by mouth daily. 15 Tab 0    DULoxetine (CYMBALTA) 60 mg capsule Take 1 Cap by mouth daily. 30 Cap 0    dimethyl fumarate (TECFIDERA) 240 mg cpDR Take 240 mg by mouth two (2) times a day. 120 Cap 3    ibuprofen (MOTRIN) 600 mg tablet Take 1 Tab by mouth every six (6) hours as needed for Pain. 20 Tab 0       Review of Systems   Review of Systems   Constitutional: Positive for fatigue. Negative for fever. Neurological: Positive for weakness and numbness. Negative for headaches. All other systems reviewed and are negative. Physical Exam   Vital Signs  Patient Vitals for the past 24 hrs:   Temp Pulse Resp BP SpO2   02/25/20 1448    (!) 132/108    02/25/20 1430     99 %   02/25/20 1415     99 %   02/25/20 1400     100 %   02/25/20 1357     98 %   02/25/20 1332 98.2 °F (36.8 °C) 78 18 (!) 132/93 100 %       Physical Exam  Vitals signs and nursing note reviewed. Constitutional:       General: She is not in acute distress. Appearance: She is well-developed. HENT:      Head: Normocephalic and atraumatic. Mouth/Throat:      Mouth: Mucous membranes are moist.   Eyes:      General:         Right eye: No discharge. Left eye: No discharge. Conjunctiva/sclera: Conjunctivae normal.   Neck:      Musculoskeletal: Normal range of motion and neck supple. Cardiovascular:      Rate and Rhythm: Normal rate and regular rhythm. Heart sounds: Normal heart sounds. No murmur. Pulmonary:      Effort: Pulmonary effort is normal. No respiratory distress. Breath sounds: Normal breath sounds. No wheezing. Abdominal:      General: There is no distension. Palpations: Abdomen is soft. Tenderness: There is no abdominal tenderness. Musculoskeletal: Normal range of motion. General: No deformity. Skin:     General: Skin is warm and dry. Findings: No rash. Neurological:      Mental Status: She is alert and oriented to person, place, and time. Psychiatric:         Behavior: Behavior normal.         Thought Content: Thought content normal.         Diagnostic Study Results   Labs  Recent Results (from the past 24 hour(s))   URINALYSIS W/ RFLX MICROSCOPIC    Collection Time: 02/25/20  1:48 PM   Result Value Ref Range    Color YELLOW/STRAW      Appearance CLEAR CLEAR      Specific gravity 1.012 1.003 - 1.030      pH (UA) 7.0 5.0 - 8.0      Protein NEGATIVE  NEG mg/dL    Glucose NEGATIVE  NEG mg/dL    Ketone NEGATIVE  NEG mg/dL    Bilirubin NEGATIVE  NEG      Blood NEGATIVE  NEG      Urobilinogen 0.2 0.2 - 1.0 EU/dL    Nitrites NEGATIVE  NEG      Leukocyte Esterase TRACE (A) NEG      WBC 0-4 0 - 4 /hpf    RBC 0-5 0 - 5 /hpf    Epithelial cells FEW FEW /lpf    Bacteria 1+ (A) NEG /hpf    Hyaline cast 2-5 0 - 5 /lpf   CBC WITH AUTOMATED DIFF    Collection Time: 02/25/20  2:13 PM   Result Value Ref Range    WBC 3.9 3.6 - 11.0 K/uL    RBC 3.51 (L) 3.80 - 5.20 M/uL    HGB 11.2 (L) 11.5 - 16.0 g/dL    HCT 33.8 (L) 35.0 - 47.0 %    MCV 96.3 80.0 - 99.0 FL    MCH 31.9 26.0 - 34.0 PG    MCHC 33.1 30.0 - 36.5 g/dL    RDW 13.3 11.5 - 14.5 %    PLATELET 720 067 - 150 K/uL    MPV 10.1 8.9 - 12.9 FL    NRBC 0.0 0  WBC    ABSOLUTE NRBC 0.00 0.00 - 0.01 K/uL    NEUTROPHILS 65 32 - 75 %    LYMPHOCYTES 24 12 - 49 %    MONOCYTES 7 5 - 13 %    EOSINOPHILS 3 0 - 7 %    BASOPHILS 1 0 - 1 %    IMMATURE GRANULOCYTES 0 0.0 - 0.5 %    ABS. NEUTROPHILS 2.5 1.8 - 8.0 K/UL    ABS. LYMPHOCYTES 0.9 0.8 - 3.5 K/UL    ABS. MONOCYTES 0.3 0.0 - 1.0 K/UL    ABS. EOSINOPHILS 0.1 0.0 - 0.4 K/UL    ABS. BASOPHILS 0.0 0.0 - 0.1 K/UL    ABS. IMM.  GRANS. 0.0 0.00 - 0.04 K/UL    DF AUTOMATED     METABOLIC PANEL, COMPREHENSIVE    Collection Time: 02/25/20  2:13 PM   Result Value Ref Range    Sodium 141 136 - 145 mmol/L    Potassium 3.8 3.5 - 5.1 mmol/L    Chloride 106 97 - 108 mmol/L CO2 33 (H) 21 - 32 mmol/L    Anion gap 2 (L) 5 - 15 mmol/L    Glucose 76 65 - 100 mg/dL    BUN 10 6 - 20 MG/DL    Creatinine 0.68 0.55 - 1.02 MG/DL    BUN/Creatinine ratio 15 12 - 20      GFR est AA >60 >60 ml/min/1.73m2    GFR est non-AA >60 >60 ml/min/1.73m2    Calcium 9.2 8.5 - 10.1 MG/DL    Bilirubin, total 0.3 0.2 - 1.0 MG/DL    ALT (SGPT) 32 12 - 78 U/L    AST (SGOT) 16 15 - 37 U/L    Alk.  phosphatase 61 45 - 117 U/L    Protein, total 6.9 6.4 - 8.2 g/dL    Albumin 3.5 3.5 - 5.0 g/dL    Globulin 3.4 2.0 - 4.0 g/dL    A-G Ratio 1.0 (L) 1.1 - 2.2     MAGNESIUM    Collection Time: 02/25/20  2:13 PM   Result Value Ref Range    Magnesium 1.8 1.6 - 2.4 mg/dL   HCG QL SERUM    Collection Time: 02/25/20  2:13 PM   Result Value Ref Range    HCG, Ql. NEGATIVE  NEG     EKG, 12 LEAD, INITIAL    Collection Time: 02/25/20  2:21 PM   Result Value Ref Range    Ventricular Rate 72 BPM    Atrial Rate 72 BPM    P-R Interval 186 ms    QRS Duration 84 ms    Q-T Interval 400 ms    QTC Calculation (Bezet) 438 ms    Calculated P Axis 36 degrees    Calculated R Axis -3 degrees    Calculated T Axis 23 degrees    Diagnosis       Normal sinus rhythm  Normal ECG  When compared with ECG of 17-FEB-2012 20:38,  No significant change was found         Radiologic Studies  No orders to display     CT Results  (Last 48 hours)    None        CXR Results  (Last 48 hours)    None          Procedures   EKG  Date/Time: 2/25/2020 2:28 PM  Performed by: Cookie Soto MD  Authorized by: Cookie Soto MD     ECG reviewed by ED Physician in the absence of a cardiologist: yes    Interpretation:     Interpretation: normal    Rate:     ECG rate assessment: normal    Rhythm:     Rhythm: sinus rhythm    Ectopy:     Ectopy: none    QRS:     QRS axis:  Normal  ST segments:     ST segments:  Normal  T waves:     T waves: normal          Medical Decision Making     Provider Notes (Medical Decision Making):   78-year-old female with chronic pain, fibromyalgia, MS, complaining of nonspecific, whole body tingling and burning and numbness and weakness. Requesting alprazolam and gabapentin refill. On exam she is in no apparent significant distress, has no concerning focal neurologic findings. She has tobacco stained fingers. I reviewed her recent laboratories. I think we can safely defer repeat laboratories today. Really seems that her primary issue is wanting gabapentin and doses additional to what her primary care provider is prescribing. I would not feel comfortable at this point adding additional gabapentin, as she recently received some from the emergency department and has already used it all, has not seen her primary care physician. I do not have significant concern for MS flare, I would not recommend imaging or steroids at this time. Patient was given a one-time dose of gabapentin in the emergency department, no refills at this time, advised to follow-up closely with her neurologist and primary care physician. TOBACCO COUNSELING:  Upon evaluation, pt expressed that they are a current tobacco user. For approximately 10 minutes, I counseled the patient on the dangers of smoking. Pt encouraged to quit as soon as possible in order to decrease further risks to their health. We discussed medical treatment options to decrease cravings and improve chances of success quitting. Pt has conveyed their understanding of the risks involved should they continue to use tobacco products. Micha Montoya MD  2:24 PM          Consults:      Medications Administered During ED Course:  Medications   gabapentin (NEURONTIN) capsule 400 mg (400 mg Oral Given 2/25/20 6329)          Diagnosis and Disposition     Disposition:  Discharged    Clinical Impression:   1. Chronic pain syndrome    2. Fibromyalgia        Attestation:  I personally performed the services described in this documentation on this date 2/25/2020 for patient Jillian Davenport. Maximus Machado MD        I was the first provider for this patient on this visit. To the best of my ability I reviewed relevant prior medical records, electrocardiograms, laboratories, and radiologic studies. The patient's presenting problems were discussed, and the patient was in agreement with the care plan formulated and outlined with them. Maximus Machado MD    Please note that this dictation was completed with Dragon voice recognition software. Quite often unanticipated grammatical, syntax, homophones, and other interpretive errors are inadvertently transcribed by the computer software. Please disregard these errors and excuse any errors that have escaped final proofreading.

## 2020-02-25 NOTE — ED NOTES
Pt arrived to ED room 09 reporting increased leg weakness that has become progressively worse over the last month. Pt reports a hx MS. Pt is afebrile and does not appear in distress at this time.

## 2020-02-25 NOTE — DISCHARGE INSTRUCTIONS
Follow-up with your primary care physician or your neurologist regarding your chronic pain symptoms. For controlled substances such as gabapentin, we strongly prefer that one doctor is managing all of your prescriptions.

## 2020-03-02 ENCOUNTER — OFFICE VISIT (OUTPATIENT)
Dept: FAMILY MEDICINE CLINIC | Age: 45
End: 2020-03-02

## 2020-03-02 VITALS
HEIGHT: 65 IN | HEART RATE: 80 BPM | WEIGHT: 197.6 LBS | BODY MASS INDEX: 32.92 KG/M2 | TEMPERATURE: 95.2 F | RESPIRATION RATE: 18 BRPM | DIASTOLIC BLOOD PRESSURE: 79 MMHG | SYSTOLIC BLOOD PRESSURE: 123 MMHG | OXYGEN SATURATION: 99 %

## 2020-03-02 DIAGNOSIS — G35 MS (MULTIPLE SCLEROSIS) (HCC): ICD-10-CM

## 2020-03-02 DIAGNOSIS — G89.4 CHRONIC PAIN SYNDROME: Primary | ICD-10-CM

## 2020-03-02 DIAGNOSIS — Z72.0 TOBACCO ABUSE: ICD-10-CM

## 2020-03-02 RX ORDER — FLUOXETINE HYDROCHLORIDE 40 MG/1
40 CAPSULE ORAL DAILY
Qty: 30 CAP | Refills: 1 | Status: SHIPPED | OUTPATIENT
Start: 2020-03-02 | End: 2020-03-17 | Stop reason: ALTCHOICE

## 2020-03-02 RX ORDER — ESCITALOPRAM OXALATE 10 MG/1
10 TABLET ORAL DAILY
COMMUNITY
End: 2020-03-02 | Stop reason: ALTCHOICE

## 2020-03-02 RX ORDER — GABAPENTIN 400 MG/1
800 CAPSULE ORAL 3 TIMES DAILY
Qty: 120 CAP | Refills: 5
Start: 2020-03-02 | End: 2020-03-17 | Stop reason: SDUPTHER

## 2020-03-02 RX ORDER — DULOXETIN HYDROCHLORIDE 60 MG/1
60 CAPSULE, DELAYED RELEASE ORAL DAILY
Qty: 30 CAP | Refills: 1 | Status: SHIPPED | OUTPATIENT
Start: 2020-03-02 | End: 2020-09-28 | Stop reason: SDUPTHER

## 2020-03-02 NOTE — PROGRESS NOTES
Name and  verified        Chief Complaint   Patient presents with    Medication Evaluation       Patient stated no medication refill at this time. Health Maintenance reviewed-discussed with patient. 1. Have you been to the ER, urgent care clinic since your last visit? Hospitalized since your last visit? Yes, Neurologist office visit 2020. .    2. Have you seen or consulted any other health care providers outside of the [address] [place] [address] since your last visit? Include any pap smears or colon screening.  no

## 2020-03-02 NOTE — PATIENT INSTRUCTIONS
Anxiety Disorder: Care Instructions Your Care Instructions Anxiety is a normal reaction to stress. Difficult situations can cause you to have symptoms such as sweaty palms and a nervous feeling. In an anxiety disorder, the symptoms are far more severe. Constant worry, muscle tension, trouble sleeping, nausea and diarrhea, and other symptoms can make normal daily activities difficult or impossible. These symptoms may occur for no reason, and they can affect your work, school, or social life. Medicines, counseling, and self-care can all help. Follow-up care is a key part of your treatment and safety. Be sure to make and go to all appointments, and call your doctor if you are having problems. It's also a good idea to know your test results and keep a list of the medicines you take. How can you care for yourself at home? · Take medicines exactly as directed. Call your doctor if you think you are having a problem with your medicine. · Go to your counseling sessions and follow-up appointments. · Recognize and accept your anxiety. Then, when you are in a situation that makes you anxious, say to yourself, \"This is not an emergency. I feel uncomfortable, but I am not in danger. I can keep going even if I feel anxious. \" · Be kind to your body: 
? Relieve tension with exercise or a massage. ? Get enough rest. 
? Avoid alcohol, caffeine, nicotine, and illegal drugs. They can increase your anxiety level and cause sleep problems. ? Learn and do relaxation techniques. See below for more about these techniques. · Engage your mind. Get out and do something you enjoy. Go to a funny movie, or take a walk or hike. Plan your day. Having too much or too little to do can make you anxious. · Keep a record of your symptoms. Discuss your fears with a good friend or family member, or join a support group for people with similar problems. Talking to others sometimes relieves stress. · Get involved in social groups, or volunteer to help others. Being alone sometimes makes things seem worse than they are. · Get at least 30 minutes of exercise on most days of the week to relieve stress. Walking is a good choice. You also may want to do other activities, such as running, swimming, cycling, or playing tennis or team sports. Relaxation techniques Do relaxation exercises 10 to 20 minutes a day. You can play soothing, relaxing music while you do them, if you wish. · Tell others in your house that you are going to do your relaxation exercises. Ask them not to disturb you. · Find a comfortable place, away from all distractions and noise. · Lie down on your back, or sit with your back straight. · Focus on your breathing. Make it slow and steady. · Breathe in through your nose. Breathe out through either your nose or mouth. · Breathe deeply, filling up the area between your navel and your rib cage. Breathe so that your belly goes up and down. · Do not hold your breath. · Breathe like this for 5 to 10 minutes. Notice the feeling of calmness throughout your whole body. As you continue to breathe slowly and deeply, relax by doing the following for another 5 to 10 minutes: · Tighten and relax each muscle group in your body. You can begin at your toes and work your way up to your head. · Imagine your muscle groups relaxing and becoming heavy. · Empty your mind of all thoughts. · Let yourself relax more and more deeply. · Become aware of the state of calmness that surrounds you. · When your relaxation time is over, you can bring yourself back to alertness by moving your fingers and toes and then your hands and feet and then stretching and moving your entire body. Sometimes people fall asleep during relaxation, but they usually wake up shortly afterward.  
· Always give yourself time to return to full alertness before you drive a car or do anything that might cause an accident if you are not fully alert. Never play a relaxation tape while you drive a car. When should you call for help? Call 911 anytime you think you may need emergency care. For example, call if: 
  · You feel you cannot stop from hurting yourself or someone else.  
Ann Leary the numbers for these national suicide hotlines: 7-339-606-TALK (9-818.658.5111) and 7-240-CZSBRDL (6-115.472.1561). If you or someone you know talks about suicide or feeling hopeless, get help right away. 
 Watch closely for changes in your health, and be sure to contact your doctor if: 
  · You have anxiety or fear that affects your life.  
  · You have symptoms of anxiety that are new or different from those you had before. Where can you learn more? Go to http://[employer]eloise.info/. Enter P754 in the search box to learn more about \"Anxiety Disorder: Care Instructions. \" Current as of: May 28, 2019 Content Version: 12.2 © 2348-4381 [employer]. Care instructions adapted under license by [employer] (which disclaims liability or warranty for this information). If you have questions about a medical condition or this instruction, always ask your healthcare professional. [address] any warranty or liability for your use of this information. Movement Disorders: Exercises Introduction Here are some examples of exercises for problems with movement. Your doctor or physical therapist will tell you when you can start these exercises and which ones will work best for you. Problems with movement can happen along with many conditions, such as multiple sclerosis, Parkinson's disease, or damage from a stroke. No matter what is making movement hard for you, these exercises can help you be more flexible, strong, and steady on your feet. Start each exercise slowly. Ease off the exercise if you start to have pain. How to do the exercises Prayer stretch 1. Start with your palms together in front of your chest, just below your chin. 2. Slowly lower your hands toward your waistline, keeping your hands close to your stomach and your palms together until you feel a mild to moderate stretch under your forearms. 3. Hold for at least 15 to 30 seconds. Repeat 2 to 4 times. Shoulder stretch 1.  a doorway, and place one arm against the door frame. Your elbow should be a little higher than your shoulder. 2. Relax your shoulders as you lean forward, allowing your chest and shoulder muscles to stretch. You can also turn your body slightly away from your arm to stretch the muscles even more. 3. Hold for 15 to 30 seconds. 4. Repeat 2 to 4 times with each arm. Calf stretch 1. Place your hands on a wall for balance. You can also do this with your hands on the back of a chair or countertop. 2. Step back with your right leg. Keep the leg straight, and press your right heel into the floor. 3. Press your hips forward, bending your left leg slightly. You will feel the stretch in your right calf. 4. Hold the stretch 15 to 30 seconds. 5. Repeat 2 to 4 times with each leg. Hip flexor stretch (edge of table) 1. Lie flat on your back on a table or flat bench, with your knees and lower legs hanging off the edge of the table. 2. Grab one leg at the knee, and pull that knee back toward your chest. Relax the other leg. Let it hang down toward the floor until you feel a stretch in the upper thigh of that leg and hip. 3. Hold the stretch for at least 15 to 30 seconds. 4. Repeat 2 to 4 times for each leg. Back stretches 1. Get down on your hands and knees on the floor. 2. Relax your head, and allow it to droop. Round your back up toward the ceiling until you feel a nice stretch in your upper, middle, and lower back. Hold this stretch for as long as it feels comfortable, or about 15 to 30 seconds. 3. Return to the starting position with a flat back while you are on your hands and knees. 4. Let your back sway by pressing your stomach toward the floor. Lift your buttocks toward the ceiling. 5. Hold this position for 15 to 30 seconds. 6. Repeat 2 to 4 times. Midback stretch 1. Kneel on the floor, and sit back on your ankles. 2. Lean forward, place your hands on the floor, and stretch your arms out in front of you. Rest your head between your arms. 3. Gently push your chest toward the floor, reaching as far in front of you as you can. 4. Hold for 15 to 30 seconds. 5. Repeat 2 to 4 times. Press-up stretch 1. Lie on your stomach, supporting your body with your forearms. 2. Press your elbows down into the floor to raise your upper back. As you do this, relax your stomach muscles and allow your back to arch without using your back muscles. As you press up, do not let your hips or pelvis come off the floor. 3. Hold for 15 to 30 seconds, then relax. 4. Repeat 2 to 4 times. Chest and shoulder stretches 1. Put a few towels on top of one another and roll them together lengthwise. 2. Lie down, and place the roll of towels along the bones of your spine from your neck to your tailbone. Or lie on a foam roller if you have one. 3. Make sure that your head and tailbone area are supported with the roll of towels or on the foam roller. Be sure the towel roll or foam roller is in line with your spine. 4. Bend your knees to support your lower back. 5. Hold this position while you move your arms into the following positions: 1. Arms down at your sides, with the palms facing up. 2. Arms out to your sides in a \"T\" shape. 3. Arms out to your sides with your elbows bent to 90 degrees, as in a \"goalpost\" shape. 4. Arms stretched over your head. 6. Hold each arm position for 15 to 30 seconds. 7. Repeat the entire cycle of arm movements 2 to 4 times. Single knee-to-chest stretch 1. Lie on your back with your knees bent and your feet flat on the floor. You can put a small pillow under your head and neck if it is more comfortable. 2. Bring one knee to your chest, keeping the other foot flat on the floor. 3. Keep your lower back pressed to the floor. Hold for 15 to 30 seconds. 4. Relax, and lower the knee to the starting position. 5. Repeat with the other leg. Repeat 2 to 4 times with each leg. 6. To get more stretch, keep your other leg flat on the floor while pulling your knee to your chest. 
 
Hip rotator stretch 1. Lie on your back with both knees bent and your feet flat on the floor. 2. Put the ankle of one leg on your opposite thigh near your knee. 3. Use your hand to gently push the raised knee away from your body until you feel a gentle stretch around your hip. 4. Hold the stretch for 15 to 30 seconds. 5. Repeat 2 to 4 times with each leg. Hamstring wall stretch 1. Lie on your back in a doorway, with your lower legs through the open door. 2. Slide the leg next to the doorway up the wall to straighten your knee. You should feel a gentle stretch down the back of your leg. 1. Do not arch your back. 2. Do not bend either knee. 3. Keep one heel touching the floor and the other heel touching the wall. Do not point your toes. 3. Hold the stretch for at least 1 minute to start. As you get used to it, work toward holding the stretch for as long as 6 minutes. 4. Do this exercise 2 to 4 times with one leg. Then move to the other side of the doorway to stretch the other leg. Hand flips for coordination 1. While seated, place your forearm and wrist on your thigh, palm down. 2. Flip your hand over so the back of your hand rests on your thigh and your palm is up. Alternate between palm up and palm down while keeping your forearm on your thigh. 3. Repeat 8 to 12 times with each hand. Finger opposition for coordination 1. With one hand, point your fingers and thumb straight up. Your wrist should be relaxed, following the line of your fingers and thumb. 2. Touch your thumb to each finger, one finger at a time. This will look like an \"okay\" sign, but try to keep your other fingers straight and pointing upward as much as you can. 3. Repeat 8 to 12 times with each hand. Finger extension for coordination 1. Place your hand flat on a table. 2. Lift and then lower one finger at a time off the table. 3. Repeat 8 to 12 times with each hand. Shoulder blade squeeze for strength 1. Stand with your arms at your sides, and squeeze your shoulder blades together. Do not raise your shoulders up as you squeeze. 2. Hold 6 seconds. 3. Repeat 8 to 12 times. Bridging for strength 1. Lie on your back with both knees bent. Your knees should be bent about 90 degrees. 2. Then push your feet into the floor, squeeze your buttocks, and lift your hips off the floor until your shoulders, hips, and knees are all in a straight line. 3. Hold for about 6 seconds as you continue to breathe normally. 4. Slowly lower your hips back down to the floor. Rest for up to 10 seconds. 5. Repeat 8 to 12 times. Single-leg balance 1. Stand on a flat surface with your arms stretched out to your sides like you are making the letter \"T. \" Then lift one leg off the floor, bending it at the knee. If you are not steady on your feet, use one hand to hold on to a chair, counter, or wall. 2. Keep your standing knee straight. Try to balance on that leg for up to 30 seconds. Then rest for up to 10 seconds. 3. Repeat 6 to 8 times with each leg. 4. When you can balance on one leg for 30 seconds with your eyes open, try to balance on it with your eyes closed. 5. When you can do this exercise with your eyes closed for 30 seconds and with ease and no pain, try it while standing on a pillow or piece of foam. 
 
Alternate arm and leg (bird dog) balance 1. Start on the floor, on your hands and knees. 2. Tighten your belly muscles by pulling your belly button in toward your spine. Be sure you continue to breathe normally and do not hold your breath. 3. Raise one arm off the floor, and hold it straight out in front of you. Be careful not to let your shoulder drop down, because that will twist your trunk. 4. Hold for about 6 seconds, then lower your arm and switch to your other arm. 5. Repeat 8 to 12 times with each arm. 6. When you can do this exercise with ease and no pain, try it with one leg raised off the floor. Hold your leg straight out behind you. Be careful not to let your hip drop down, because that will twist your trunk. 7. When holding your leg straight out becomes easier, try raising your opposite arm at the same time. Repeat steps 1 through 5. Balance-building exercise 1 1. Stand with a chair in front of you and a wall behind you, in case you lose your balance. 2. Stand with your feet together and your arms at your sides. 3. Move your head up and down 10 times. Balance-building exercise 2 1. Turn your head side to side 10 times. Balance-building exercise 3 1. Move your head diagonally up and down 10 times. Balance-building exercise 4 1. Move your head diagonally up and down 10 times on the other side. Follow-up care is a key part of your treatment and safety. Be sure to make and go to all appointments, and call your doctor if you are having problems. It's also a good idea to know your test results and keep a list of the medicines you take. Where can you learn more? Go to http://damaris-eloise.info/. Enter Y190 in the search box to learn more about \"Movement Disorders: Exercises. \" Current as of: March 28, 2019 Content Version: 12.2 © 7407-4334 [employer].  Care instructions adapted under license by [employer] (which disclaims liability or warranty for this information). If you have questions about a medical condition or this instruction, always ask your healthcare professional. [place] [address] any warranty or liability for your use of this information.

## 2020-03-02 NOTE — PROGRESS NOTES
HISTORY OF PRESENT ILLNESS  Dickson Michael is a 53 y.o. female. HPI     No energy, had  A court day couple days lost her kids,   [facility] the others did not give her opioid based meds, she is sad about that!!!!!!!!!!!!  Patient's current medical condition is not controlled with medications,   Patient state that it is getting better: pt states and reports of feeling anxius, some guilty feeling, no Hoplessness, patient at this time has ns/nh,ni,nh, and some trouble with weight gain, last ability of sleep, okay ablitiy  to concentrate at home with the current medications, and all together a safe feeling at home Gabapentin 400mg bid not enough, requesitng benzo and opioid based meds, lexapro no good     Unfortunately  with tobacco abuse stating that the pt is all stressed out, the tobacco use helps it, took meds for it, no s no h ideation, the pt has slowed down but not ready to quit at this time unable to afford the medication the pt likes to do it on its own, pt has been offered alternatives but states not yet,   1.5 ppd for many yrs,       Current Outpatient Medications   Medication Sig Dispense Refill    DULoxetine (CYMBALTA) 60 mg capsule Take 1 Cap by mouth daily. Indications: neuropathic pain 30 Cap 1    FLUoxetine (PROZAC) 40 mg capsule Take 1 Cap by mouth daily. 30 Cap 1    gabapentin (NEURONTIN) 400 mg capsule Take 2 Caps by mouth three (3) times daily. Max Daily Amount: 2,400 mg. 120 Cap 5    ibuprofen (MOTRIN) 600 mg tablet Take 1 Tab by mouth every six (6) hours as needed for Pain. 20 Tab 0    ondansetron (ZOFRAN ODT) 4 mg disintegrating tablet Take 1 Tab by mouth every eight (8) hours as needed for Nausea or Vomiting. 10 Tab 0    senna-docusate (PERICOLACE) 8.6-50 mg per tablet Take 2 Tabs by mouth daily. 15 Tab 0    dimethyl fumarate (TECFIDERA) 240 mg cpDR Take 240 mg by mouth two (2) times a day.  120 Cap 3     Allergies   Allergen Reactions    Morphine Rash     Past Medical History:   Diagnosis Date    Body aches 12/11/2014    Chronic pain     Depression     GERD (gastroesophageal reflux disease)     Headache(784.0)     History of mammogram never before    MS [facility] sclerosis[facility] ([facility] [address]     Neurological disorder     Multiple Sclerosis    Pap smear for cervical cancer screening 2008    Psychiatric disorder     anxiety    Psychotic disorder [facility])      Past Surgical History:   Procedure Laterality Date    COLONOSCOPY N/A 2/28/2018    COLONOSCOPY performed by Daniel Villarreal MD at \[facility]"" ENDOSCOPY    HX CHOLECYSTECTOMY      HX GYN      3 c-sections    HX HEENT      bilateral ear tube surgery    HX HERNIA REPAIR      HX OTHER SURGICAL      R inguinal hernia repair     Family History   Problem Relation Age of Onset    Heart Attack Father         tripple bypass    Cancer Father         lung    Diabetes Mother         type 2    Heart Disease Mother         heart disease    Diabetes Paternal Grandmother      Social History     Tobacco Use    Smoking status: Current Every Day Smoker     Packs/day: 0.50     Years: 17.00     Pack years: 8.50     Types: Cigarettes    Smokeless tobacco: Never Used    Tobacco comment: 1 pack every 3 days. Substance Use Topics    Alcohol use: No      Lab Results   Component Value Date/Time    Cholesterol, total 235 (H) 09/19/2019 01:30 PM    HDL Cholesterol 62 09/19/2019 01:30 PM    LDL, calculated 161 (H) 09/19/2019 01:30 PM    Triglyceride 58 09/19/2019 01:30 PM        Review of Systems   Constitutional: Positive for malaise/fatigue. Negative for chills and fever. HENT: Negative for ear pain and nosebleeds. Eyes: Negative for blurred vision, pain and discharge. Respiratory: Negative for shortness of breath. Cardiovascular: Negative for chest pain and leg swelling. Gastrointestinal: Negative for constipation, diarrhea, nausea and vomiting. Genitourinary: Negative for frequency.    Musculoskeletal: Positive for back pain and myalgias. Negative for joint pain. Skin: Negative for itching and rash. Neurological: Positive for weakness. Negative for headaches. Psychiatric/Behavioral: Positive for depression. Negative for hallucinations, substance abuse and suicidal ideas. The patient is nervous/anxious and has insomnia. Physical Exam  Vitals signs and nursing note reviewed. Constitutional:       Appearance: She is well-developed. HENT:      Head: Normocephalic and atraumatic. Eyes:      Conjunctiva/sclera: Conjunctivae normal.      Pupils: Pupils are equal, round, and reactive to light. Neck:      Musculoskeletal: Normal range of motion and neck supple. Thyroid: No thyromegaly. Vascular: No JVD. Cardiovascular:      Rate and Rhythm: Normal rate and regular rhythm. Heart sounds: Normal heart sounds. No murmur. No friction rub. No gallop. Pulmonary:      Effort: Pulmonary effort is normal. No respiratory distress. Breath sounds: Normal breath sounds. No stridor. No wheezing or rales. Abdominal:      General: Bowel sounds are normal. There is no distension. Palpations: Abdomen is soft. There is no mass. Tenderness: There is no abdominal tenderness. Musculoskeletal: Normal range of motion. General: No tenderness. Right knee: She exhibits swelling and deformity. Left knee: She exhibits swelling and bony tenderness. Thoracic back: She exhibits pain and spasm. Lumbar back: She exhibits pain and spasm. Lymphadenopathy:      Cervical: No cervical adenopathy. Skin:     Findings: No erythema or rash. Neurological:      Mental Status: She is alert and oriented to person, place, and time. Cranial Nerves: No cranial nerve deficit. Deep Tendon Reflexes: Reflexes are normal and symmetric. Psychiatric:         Attention and Perception: Attention and perception normal. She is attentive. She does not perceive auditory or visual hallucinations. Mood and Affect: Mood is anxious. Affect is labile and angry. Speech: Speech is rapid and pressured. Behavior: Behavior is hyperactive. Thought Content: Thought content does not include homicidal or suicidal ideation. Thought content does not include homicidal or suicidal plan. Cognition and Memory: Cognition and memory normal.         Judgment: Judgment normal. Judgment is not impulsive or inappropriate. ASSESSMENT and PLAN  Diagnoses and all orders for this visit:    1. Chronic pain syndrome  -     DULoxetine (CYMBALTA) 60 mg capsule; Take 1 Cap by mouth daily. Indications: neuropathic pain  -     FLUoxetine (PROZAC) 40 mg capsule; Take 1 Cap by mouth daily.  -     REFERRAL TO PSYCHIATRY  -     gabapentin (NEURONTIN) 400 mg capsule; Take 2 Caps by mouth three (3) times daily. Max Daily Amount: 2,400 mg.    2. MS (multiple sclerosis) ([facility])  -     DULoxetine (CYMBALTA) 60 mg capsule; Take 1 Cap by mouth daily. Indications: neuropathic pain  -     FLUoxetine (PROZAC) 40 mg capsule; Take 1 Cap by mouth daily.  -     REFERRAL TO PSYCHIATRY  -     gabapentin (NEURONTIN) 400 mg capsule; Take 2 Caps by mouth three (3) times daily. Max Daily Amount: 2,400 mg.    3. Tobacco abuse  -     DULoxetine (CYMBALTA) 60 mg capsule; Take 1 Cap by mouth daily. Indications: neuropathic pain  -     FLUoxetine (PROZAC) 40 mg capsule; Take 1 Cap by mouth daily.  -     REFERRAL TO PSYCHIATRY  -     gabapentin (NEURONTIN) 400 mg capsule; Take 2 Caps by mouth three (3) times daily. Max Daily Amount: 2,400 mg.       Depression with anxiety in a stable condition, not suicidal, start antianxiety and calming medication for now will reevaluate in 3 w for progression   in addition Counseling , social support, spiritual belonging, inc physical activity, all offered,  compliance advised, patient was told that should not mix any of current medication with any other illicit drugs, should not drink any alcoholic beverages while on such medication patient was also told to not operate any machinery while under the influence patient acknowledged understanding and agreed with today's recommendation in addition patient was told to call for any concern

## 2020-03-08 ENCOUNTER — APPOINTMENT (OUTPATIENT)
Dept: MRI IMAGING | Age: 45
DRG: 060 | End: 2020-03-08
Attending: EMERGENCY MEDICINE
Payer: MEDICARE

## 2020-03-08 ENCOUNTER — HOSPITAL ENCOUNTER (INPATIENT)
Age: 45
LOS: 3 days | Discharge: HOME HEALTH | DRG: 060 | End: 2020-03-11
Attending: EMERGENCY MEDICINE | Admitting: FAMILY MEDICINE
Payer: MEDICARE

## 2020-03-08 DIAGNOSIS — G35 MULTIPLE SCLEROSIS (HCC): ICD-10-CM

## 2020-03-08 DIAGNOSIS — G35 MS (MULTIPLE SCLEROSIS) (HCC): ICD-10-CM

## 2020-03-08 DIAGNOSIS — G35 EXACERBATION OF MULTIPLE SCLEROSIS (HCC): ICD-10-CM

## 2020-03-08 DIAGNOSIS — R53.1 LEFT-SIDED WEAKNESS: ICD-10-CM

## 2020-03-08 LAB
ALBUMIN SERPL-MCNC: 3.6 G/DL (ref 3.5–5)
ALBUMIN/GLOB SERPL: 1 {RATIO} (ref 1.1–2.2)
ALP SERPL-CCNC: 64 U/L (ref 45–117)
ALT SERPL-CCNC: 27 U/L (ref 12–78)
ANION GAP SERPL CALC-SCNC: 3 MMOL/L (ref 5–15)
APPEARANCE UR: CLEAR
AST SERPL-CCNC: 15 U/L (ref 15–37)
BACTERIA URNS QL MICRO: ABNORMAL /HPF
BASOPHILS # BLD: 0 K/UL (ref 0–0.1)
BASOPHILS NFR BLD: 1 % (ref 0–1)
BILIRUB SERPL-MCNC: 0.3 MG/DL (ref 0.2–1)
BILIRUB UR QL: NEGATIVE
BUN SERPL-MCNC: 8 MG/DL (ref 6–20)
BUN/CREAT SERPL: 9 (ref 12–20)
CALCIUM SERPL-MCNC: 9 MG/DL (ref 8.5–10.1)
CHLORIDE SERPL-SCNC: 106 MMOL/L (ref 97–108)
CO2 SERPL-SCNC: 31 MMOL/L (ref 21–32)
COLOR UR: ABNORMAL
CREAT SERPL-MCNC: 0.86 MG/DL (ref 0.55–1.02)
DIFFERENTIAL METHOD BLD: NORMAL
EOSINOPHIL # BLD: 0.1 K/UL (ref 0–0.4)
EOSINOPHIL NFR BLD: 3 % (ref 0–7)
EPITH CASTS URNS QL MICRO: ABNORMAL /LPF
ERYTHROCYTE [DISTWIDTH] IN BLOOD BY AUTOMATED COUNT: 13.4 % (ref 11.5–14.5)
GLOBULIN SER CALC-MCNC: 3.6 G/DL (ref 2–4)
GLUCOSE SERPL-MCNC: 91 MG/DL (ref 65–100)
GLUCOSE UR STRIP.AUTO-MCNC: NEGATIVE MG/DL
HCT VFR BLD AUTO: 37.9 % (ref 35–47)
HGB BLD-MCNC: 12.7 G/DL (ref 11.5–16)
HGB UR QL STRIP: NEGATIVE
HYALINE CASTS URNS QL MICRO: ABNORMAL /LPF (ref 0–5)
IMM GRANULOCYTES # BLD AUTO: 0 K/UL (ref 0–0.04)
IMM GRANULOCYTES NFR BLD AUTO: 0 % (ref 0–0.5)
KETONES UR QL STRIP.AUTO: NEGATIVE MG/DL
LEUKOCYTE ESTERASE UR QL STRIP.AUTO: NEGATIVE
LYMPHOCYTES # BLD: 0.8 K/UL (ref 0.8–3.5)
LYMPHOCYTES NFR BLD: 18 % (ref 12–49)
MCH RBC QN AUTO: 32.2 PG (ref 26–34)
MCHC RBC AUTO-ENTMCNC: 33.5 G/DL (ref 30–36.5)
MCV RBC AUTO: 95.9 FL (ref 80–99)
MONOCYTES # BLD: 0.3 K/UL (ref 0–1)
MONOCYTES NFR BLD: 6 % (ref 5–13)
NEUTS SEG # BLD: 3.1 K/UL (ref 1.8–8)
NEUTS SEG NFR BLD: 72 % (ref 32–75)
NITRITE UR QL STRIP.AUTO: NEGATIVE
NRBC # BLD: 0 K/UL (ref 0–0.01)
NRBC BLD-RTO: 0 PER 100 WBC
PH UR STRIP: 8 [PH] (ref 5–8)
PLATELET # BLD AUTO: 263 K/UL (ref 150–400)
PMV BLD AUTO: 9.5 FL (ref 8.9–12.9)
POTASSIUM SERPL-SCNC: 4.4 MMOL/L (ref 3.5–5.1)
PROT SERPL-MCNC: 7.2 G/DL (ref 6.4–8.2)
PROT UR STRIP-MCNC: NEGATIVE MG/DL
RBC # BLD AUTO: 3.95 M/UL (ref 3.8–5.2)
RBC #/AREA URNS HPF: ABNORMAL /HPF (ref 0–5)
RBC MORPH BLD: NORMAL
SODIUM SERPL-SCNC: 140 MMOL/L (ref 136–145)
SP GR UR REFRACTOMETRY: 1.01 (ref 1–1.03)
UA: UC IF INDICATED,UAUC: ABNORMAL
UROBILINOGEN UR QL STRIP.AUTO: 0.2 EU/DL (ref 0.2–1)
WBC # BLD AUTO: 4.3 K/UL (ref 3.6–11)
WBC URNS QL MICRO: ABNORMAL /HPF (ref 0–4)

## 2020-03-08 PROCEDURE — 74011250636 HC RX REV CODE- 250/636: Performed by: FAMILY MEDICINE

## 2020-03-08 PROCEDURE — 80053 COMPREHEN METABOLIC PANEL: CPT

## 2020-03-08 PROCEDURE — 85025 COMPLETE CBC W/AUTO DIFF WBC: CPT

## 2020-03-08 PROCEDURE — 87086 URINE CULTURE/COLONY COUNT: CPT

## 2020-03-08 PROCEDURE — 36415 COLL VENOUS BLD VENIPUNCTURE: CPT

## 2020-03-08 PROCEDURE — 74011250637 HC RX REV CODE- 250/637: Performed by: FAMILY MEDICINE

## 2020-03-08 PROCEDURE — 70553 MRI BRAIN STEM W/O & W/DYE: CPT

## 2020-03-08 PROCEDURE — 72157 MRI CHEST SPINE W/O & W/DYE: CPT

## 2020-03-08 PROCEDURE — 72156 MRI NECK SPINE W/O & W/DYE: CPT

## 2020-03-08 PROCEDURE — 74011250637 HC RX REV CODE- 250/637: Performed by: HOSPITALIST

## 2020-03-08 PROCEDURE — 81001 URINALYSIS AUTO W/SCOPE: CPT

## 2020-03-08 PROCEDURE — 74011250636 HC RX REV CODE- 250/636: Performed by: EMERGENCY MEDICINE

## 2020-03-08 PROCEDURE — 99285 EMERGENCY DEPT VISIT HI MDM: CPT

## 2020-03-08 PROCEDURE — 74011250637 HC RX REV CODE- 250/637: Performed by: EMERGENCY MEDICINE

## 2020-03-08 PROCEDURE — A9575 INJ GADOTERATE MEGLUMI 0.1ML: HCPCS | Performed by: EMERGENCY MEDICINE

## 2020-03-08 PROCEDURE — 96374 THER/PROPH/DIAG INJ IV PUSH: CPT

## 2020-03-08 PROCEDURE — 65660000000 HC RM CCU STEPDOWN

## 2020-03-08 RX ORDER — BISACODYL 5 MG
5 TABLET, DELAYED RELEASE (ENTERIC COATED) ORAL DAILY PRN
Status: DISCONTINUED | OUTPATIENT
Start: 2020-03-08 | End: 2020-03-11 | Stop reason: HOSPADM

## 2020-03-08 RX ORDER — LORAZEPAM 0.5 MG/1
0.5 TABLET ORAL
Status: DISCONTINUED | OUTPATIENT
Start: 2020-03-08 | End: 2020-03-08

## 2020-03-08 RX ORDER — GADOTERATE MEGLUMINE 376.9 MG/ML
20 INJECTION INTRAVENOUS
Status: COMPLETED | OUTPATIENT
Start: 2020-03-08 | End: 2020-03-08

## 2020-03-08 RX ORDER — DIMETHYL FUMARATE 240 MG/1
240 CAPSULE ORAL 2 TIMES DAILY
Status: DISCONTINUED | OUTPATIENT
Start: 2020-03-09 | End: 2020-03-11 | Stop reason: HOSPADM

## 2020-03-08 RX ORDER — IBUPROFEN 600 MG/1
600 TABLET ORAL
Status: DISCONTINUED | OUTPATIENT
Start: 2020-03-08 | End: 2020-03-11 | Stop reason: HOSPADM

## 2020-03-08 RX ORDER — ZOLPIDEM TARTRATE 5 MG/1
5 TABLET ORAL
Status: DISCONTINUED | OUTPATIENT
Start: 2020-03-08 | End: 2020-03-11 | Stop reason: HOSPADM

## 2020-03-08 RX ORDER — LORAZEPAM 1 MG/1
1 TABLET ORAL
Status: DISCONTINUED | OUTPATIENT
Start: 2020-03-08 | End: 2020-03-11 | Stop reason: HOSPADM

## 2020-03-08 RX ORDER — ASPIRIN 325 MG
325 TABLET ORAL DAILY
Status: DISCONTINUED | OUTPATIENT
Start: 2020-03-09 | End: 2020-03-11

## 2020-03-08 RX ORDER — DULOXETIN HYDROCHLORIDE 30 MG/1
60 CAPSULE, DELAYED RELEASE ORAL DAILY
Status: DISCONTINUED | OUTPATIENT
Start: 2020-03-09 | End: 2020-03-11 | Stop reason: HOSPADM

## 2020-03-08 RX ORDER — SODIUM CHLORIDE 9 MG/ML
100 INJECTION, SOLUTION INTRAVENOUS CONTINUOUS
Status: DISPENSED | OUTPATIENT
Start: 2020-03-08 | End: 2020-03-09

## 2020-03-08 RX ORDER — IBUPROFEN 600 MG/1
600 TABLET ORAL
Status: DISCONTINUED | OUTPATIENT
Start: 2020-03-08 | End: 2020-03-08 | Stop reason: SDUPTHER

## 2020-03-08 RX ORDER — LORAZEPAM 2 MG/ML
1 INJECTION INTRAMUSCULAR ONCE
Status: COMPLETED | OUTPATIENT
Start: 2020-03-08 | End: 2020-03-08

## 2020-03-08 RX ORDER — POLYETHYLENE GLYCOL 3350 17 G/17G
17 POWDER, FOR SOLUTION ORAL DAILY
Status: DISCONTINUED | OUTPATIENT
Start: 2020-03-09 | End: 2020-03-11 | Stop reason: HOSPADM

## 2020-03-08 RX ORDER — LORAZEPAM 1 MG/1
1 TABLET ORAL
Status: COMPLETED | OUTPATIENT
Start: 2020-03-08 | End: 2020-03-08

## 2020-03-08 RX ORDER — HEPARIN SODIUM 5000 [USP'U]/ML
5000 INJECTION, SOLUTION INTRAVENOUS; SUBCUTANEOUS EVERY 8 HOURS
Status: DISCONTINUED | OUTPATIENT
Start: 2020-03-08 | End: 2020-03-11 | Stop reason: HOSPADM

## 2020-03-08 RX ORDER — ONDANSETRON 2 MG/ML
4 INJECTION INTRAMUSCULAR; INTRAVENOUS
Status: DISCONTINUED | OUTPATIENT
Start: 2020-03-08 | End: 2020-03-11 | Stop reason: HOSPADM

## 2020-03-08 RX ORDER — ACETAMINOPHEN 650 MG/1
650 SUPPOSITORY RECTAL
Status: DISCONTINUED | OUTPATIENT
Start: 2020-03-08 | End: 2020-03-11 | Stop reason: HOSPADM

## 2020-03-08 RX ORDER — ONDANSETRON 4 MG/1
4 TABLET, ORALLY DISINTEGRATING ORAL
Status: DISCONTINUED | OUTPATIENT
Start: 2020-03-08 | End: 2020-03-10

## 2020-03-08 RX ORDER — ACETAMINOPHEN 325 MG/1
650 TABLET ORAL
Status: DISCONTINUED | OUTPATIENT
Start: 2020-03-08 | End: 2020-03-11 | Stop reason: HOSPADM

## 2020-03-08 RX ADMIN — SODIUM CHLORIDE 100 ML/HR: 900 INJECTION, SOLUTION INTRAVENOUS at 20:17

## 2020-03-08 RX ADMIN — ZOLPIDEM TARTRATE 5 MG: 5 TABLET ORAL at 22:34

## 2020-03-08 RX ADMIN — SODIUM CHLORIDE 1000 ML: 900 INJECTION, SOLUTION INTRAVENOUS at 13:55

## 2020-03-08 RX ADMIN — METHYLPREDNISOLONE SODIUM SUCCINATE 1000 MG: 125 INJECTION, POWDER, FOR SOLUTION INTRAMUSCULAR; INTRAVENOUS at 13:44

## 2020-03-08 RX ADMIN — LORAZEPAM 1 MG: 1 TABLET ORAL at 22:33

## 2020-03-08 RX ADMIN — HEPARIN SODIUM 5000 UNITS: 5000 INJECTION INTRAVENOUS; SUBCUTANEOUS at 20:17

## 2020-03-08 RX ADMIN — GABAPENTIN 800 MG: 100 CAPSULE ORAL at 20:20

## 2020-03-08 RX ADMIN — LORAZEPAM 1 MG: 1 TABLET ORAL at 13:32

## 2020-03-08 RX ADMIN — GADOTERATE MEGLUMINE 20 ML: 376.9 INJECTION INTRAVENOUS at 17:16

## 2020-03-08 RX ADMIN — LORAZEPAM 1 MG: 2 INJECTION INTRAMUSCULAR; INTRAVENOUS at 15:44

## 2020-03-08 NOTE — H&P
Hospitalist Admission Note    NAME: Desmond David   :  1975   MRN:  [id number]     Date/Time:  3/8/2020 2:56 PM    Patient PCP: None  ________________________________________________________________________    My assessment of this patient's clinical condition and my plan of care is as follows. Assessment / Plan:  Right side weakness   -Differentials include MS flare, TIA/CVA  -MRI of the brain with and without contrast pending, MRI of the cervical spine and thoracic spine pending  -Patient has concern of MRI claustrophobia we will premedicate patient with Ativan prior to MRI  -If patient is positive for a stroke consider full stroke work-up  -Neurology consulted appreciate recommendations    MS flare  -Solu-Medrol 1000 mg daily x3 days per neurology  -Continue Tecfidera    Constipation  -Dulcolax 5 mg p.o. daily  -Add MiraLAX    Chronic pain syndrome   -Continue Cymbalta at home dose  -Continue gabapentin home dose  -Consider referral to pain clinic on discharge    Urinary hesitancy   -Patient yet to produce a sample of UA to rule out UTI  -For urinary hesitancy consider urology consult if UA is negative for UTI    Anxiety and depression  -On Cymbalta        Code Status: Full code  Surrogate Decision Maker:    DVT Prophylaxis: Heparin  GI Prophylaxis: not indicated    Baseline: Uses a walker to ambulate         Subjective:   CHIEF COMPLAINT: Right-sided weakness    HISTORY OF PRESENT ILLNESS:     Jorge Stubbs is a 53 y.o.  female who presents with right-sided weakness in the upper extremity and lower extremity as well as slurred speech and facial droop. Symptoms started about 2 weeks ago and has become progressively worse. She reports that this is the first time she is having some speech deficit. And also right upper extremity weakness. Patient denies any visual change, headache. She has history of multiple sclerosis and follows neurology outpatient.    The ED physician spoke with Dr. Kumar who is the neurologist on call recommended that the patient should be given 1000 mg of Solu-Medrol daily for 3 days for likely MS flare and to obtain an MRI of the brain with and without contrast and also MRI of the thoracic and cervical spines. Patient also reports chronic constipation and urinary hesitancy. She denies any frequency and dysuria. We were asked to admit for work up and evaluation of the above problems. Past Medical History:   Diagnosis Date    Body aches 12/11/2014    Chronic pain     Depression     GERD (gastroesophageal reflux disease)     Headache(784.0)     History of mammogram never before    [facility] sclerosis) [facility] [address]     Neurological disorder     Multiple Sclerosis    Pap smear for cervical cancer screening 2008    Psychiatric disorder     anxiety    Psychotic disorder [facility])         Past Surgical History:   Procedure Laterality Date    COLONOSCOPY N/A 2/28/2018    COL[facility] performed [facility]vet [facility] at [facility]    HX CHOLECYSTECTOMY      HX GYN      3 c-sections    HX HEENT      bilateral ear tube surgery    HX HERNIA REPAIR      HX OTHER SURGICAL      R inguinal hernia repair       Social History     Tobacco Use    Smoking status: Current Every Day Smoker     Packs/day: 0.50     Years: 17.00     Pack years: 8.50     Types: Cigarettes    Smokeless tobacco: Never Used    Tobacco comment: 1 pack every 3 days. Substance Use Topics    Alcohol use: No        Family History   Problem Relation Age of Onset    Heart Attack Father         tripple bypass    Cancer Father         lung    Diabetes Mother         type 2    Heart Disease Mother         heart disease    Diabetes Paternal Grandmother      Allergies   Allergen Reactions    Morphine Rash        Prior to Admission medications    Medication Sig Start Date End Date Taking? Authorizing Provider   DULoxetine (CYMBALTA) 60 mg capsule Take 1 Cap by mouth daily. Indications: neuropathic pain 3/2/20   Wai Carrillo MD   FLUoxetine (PROZAC) 40 mg capsule Take 1 Cap by mouth daily. 3/2/20   Wai Carrillo MD   gabapentin (NEURONTIN) 400 mg capsule Take 2 Caps by mouth three (3) times daily. Max Daily Amount: 2,400 mg. 3/2/20   Wai Carrillo MD   ondansetron (ZOFRAN ODT) 4 mg disintegrating tablet Take 1 Tab by mouth every eight (8) hours as needed for Nausea or Vomiting. 2/10/20   YAZ Mayberry   senna-docusate (PERICOLACE) 8.6-50 mg per tablet Take 2 Tabs by mouth daily. 1/14/20   Wai Carrillo MD   dimethyl fumarate (TECFIDERA) 240 mg cpDR Take 240 mg by mouth two (2) times a day. 10/1/19   Cesar Hewitt MD   ibuprofen (MOTRIN) 600 mg tablet Take 1 Tab by mouth every six (6) hours as needed for Pain. 12/22/18   YAZ Burgess       REVIEW OF SYSTEMS:     I am not able to complete the review of systems because:    The patient is intubated and sedated    The patient has altered mental status due to his acute medical problems    The patient has baseline aphasia from prior stroke(s)    The patient has baseline dementia and is not reliable historian    The patient is in acute medical distress and unable to provide information           Total of 12 systems reviewed as follows:       POSITIVE= underlined text  Negative = text not underlined  General:  fever, chills, sweats, generalized weakness, weight loss/gain,      loss of appetite   Eyes:    blurred vision, eye pain, loss of vision, double vision  ENT:    rhinorrhea, pharyngitis   Respiratory:   cough, sputum production, SOB, BOBO, wheezing, pleuritic pain   Cardiology:   chest pain, palpitations, orthopnea, PND, edema, syncope   Gastrointestinal:  abdominal pain , N/V, diarrhea, dysphagia, constipation, bleeding   Genitourinary:  frequency, urgency, dysuria, hematuria, incontinence   Muskuloskeletal :  arthralgia, myalgia, back pain  Hematology:  easy bruising, nose or gum bleeding, lymphadenopathy Dermatological: rash, ulceration, pruritis, color change / jaundice  Endocrine:   hot flashes or polydipsia   Neurological:  headache, dizziness, confusion, focal weakness, paresthesia,     Speech difficulties, memory loss, gait difficulty  Psychological: Feelings of anxiety, depression, agitation    Objective:   VITALS:    Visit Vitals  /78 (BP 1 Location: Left arm, BP Patient Position: At rest)   Pulse 76   Temp 97.6 °F (36.4 °C)   Resp 22   Ht 5' 4\" (1.626 m)   Wt 90.7 kg (200 lb)   SpO2 99%   BMI 34.33 kg/m²       PHYSICAL EXAM:    General:    Alert, cooperative, no distress, appears stated age. HEENT: Atraumatic, anicteric sclerae, pink conjunctivae     No oral ulcers, mucosa moist, throat clear, dentition fair  Neck:  Supple, symmetrical,  thyroid: non tender  Lungs:   Clear to auscultation bilaterally. No Wheezing or Rhonchi. No rales. Chest wall:  No tenderness  No Accessory muscle use. Heart:   Regular  rhythm,  No  murmur   No edema  Abdomen:   Soft, non-tender. Not distended. Bowel sounds normal  Extremities: No cyanosis. No clubbing      Capillary refill normal,  Radial pulse 2+,  DP ++  Skin:     Not pale. Not Jaundiced  No rashes   Psych:  Good insight. Not depressed. Not anxious or agitated. Neurologic: EOMs intact. No facial asymmetry. Positive for slurred speech, left facial droop. Symmetrical strength, Sensation grossly intact.  Alert and oriented X 4.     _______________________________________________________________________  Care Plan discussed with:    Comments   Patient x    Family      RN x    Care Manager                    Consultant:      _______________________________________________________________________  Expected  Disposition:   Home with Family    HH/PT/OT/RN x   SNF/LTC    COLLETTE    ________________________________________________________________________  TOTAL TIME:  28 Minutes    Critical Care Provided     Minutes non procedure based      Comments   >50% of visit spent in counseling and coordination of care  Chart review  Discussion with patient and/or family and questions answered     ________________________________________________________________________  Praneeth Coates MD    Procedures: see electronic medical records for all procedures/Xrays and details which were not copied into this note but were reviewed prior to creation of Plan. LAB DATA REVIEWED:    Recent Results (from the past 24 hour(s))   CBC WITH AUTOMATED DIFF    Collection Time: 03/08/20 12:25 PM   Result Value Ref Range    WBC 4.3 3.6 - 11.0 K/uL    RBC 3.95 3.80 - 5.20 M/uL    HGB 12.7 11.5 - 16.0 g/dL    HCT 37.9 35.0 - 47.0 %    MCV 95.9 80.0 - 99.0 FL    MCH 32.2 26.0 - 34.0 PG    MCHC 33.5 30.0 - 36.5 g/dL    RDW 13.4 11.5 - 14.5 %    PLATELET 587 871 - 026 K/uL    MPV 9.5 8.9 - 12.9 FL    NRBC 0.0 0  WBC    ABSOLUTE NRBC 0.00 0.00 - 0.01 K/uL    NEUTROPHILS 72 32 - 75 %    LYMPHOCYTES 18 12 - 49 %    MONOCYTES 6 5 - 13 %    EOSINOPHILS 3 0 - 7 %    BASOPHILS 1 0 - 1 %    IMMATURE GRANULOCYTES 0 0.0 - 0.5 %    ABS. NEUTROPHILS 3.1 1.8 - 8.0 K/UL    ABS. LYMPHOCYTES 0.8 0.8 - 3.5 K/UL    ABS. MONOCYTES 0.3 0.0 - 1.0 K/UL    ABS. EOSINOPHILS 0.1 0.0 - 0.4 K/UL    ABS. BASOPHILS 0.0 0.0 - 0.1 K/UL    ABS. IMM. GRANS. 0.0 0.00 - 0.04 K/UL    DF AUTOMATED      RBC COMMENTS NORMOCYTIC, NORMOCHROMIC     METABOLIC PANEL, COMPREHENSIVE    Collection Time: 03/08/20 12:25 PM   Result Value Ref Range    Sodium 140 136 - 145 mmol/L    Potassium 4.4 3.5 - 5.1 mmol/L    Chloride 106 97 - 108 mmol/L    CO2 31 21 - 32 mmol/L    Anion gap 3 (L) 5 - 15 mmol/L    Glucose 91 65 - 100 mg/dL    BUN 8 6 - 20 MG/DL    Creatinine 0.86 0.55 - 1.02 MG/DL    BUN/Creatinine ratio 9 (L) 12 - 20      GFR est AA >60 >60 ml/min/1.73m2    GFR est non-AA >60 >60 ml/min/1.73m2    Calcium 9.0 8.5 - 10.1 MG/DL    Bilirubin, total 0.3 0.2 - 1.0 MG/DL    ALT (SGPT) 27 12 - 78 U/L    AST (SGOT) 15 15 - 37 U/L    Alk. phosphatase 64 45 - 117 U/L    Protein, total 7.2 6.4 - 8.2 g/dL    Albumin 3.6 3.5 - 5.0 g/dL    Globulin 3.6 2.0 - 4.0 g/dL    A-G Ratio 1.0 (L) 1.1 - 2.2

## 2020-03-08 NOTE — ED TRIAGE NOTES
Pt in via EMS for general malaise and weakness r/t MS. Pt has notable right sided weakness and facial droop for one week. Pt is monitored X3.

## 2020-03-08 NOTE — ED NOTES
Report given to [facility] on receiving floor, waiting for transfer once pt returns from MRI.

## 2020-03-08 NOTE — PROGRESS NOTES

## 2020-03-08 NOTE — ROUTINE PROCESS
Bedside and Verbal shift change report given to Jacey (oncoming nurse) by Tabatha Birmingham (offgoing nurse). Report included the following information SBAR, Kardex, Intake/Output and MAR.

## 2020-03-08 NOTE — ED PROVIDER NOTES
EMERGENCY DEPARTMENT HISTORY AND PHYSICAL EXAM      Date: 3/8/2020  Patient Name: Juliane Guajardo  Patient Age and Sex: 53 y.o. female    History of Presenting Illness     Chief Complaint   Patient presents with    Fatigue     weakness general and right sided deficits in upper and lower extremeties and facial droop for one week. History Provided By: Patient    HPI: Juliane Guajardo, is a 53 y.o. female whose medical history is noted below presents to the ED with bilateral lower extremity and left upper extremity weakness as well as slightly slurred speech. Symptoms have been progressing over the previous 2 weeks or so. She has a history of multiple sclerosis and believes that this is a flare. She has had similar weakness in her legs before from multiple sclerosis but the involvement of her left upper extremity and speech is new. No history of trauma or recent illness. The patient is on Tecfidera and compliant with the medication. Last MRI was in 2017 and showed no progression of the disease. She was diagnosed in 2016, had one hospitalization shortly after her diagnosis but none since. Compliant with her medications. She also has a history of chronic pain, multiple sclerosis and is on gabapentin. Her dose has been recently tapered down which is contributing to her overall aches and pains which she is complaining about today as well. She has a history of anxiety and feels that this is also not treated adequately. No recent fevers or chills. No vision changes. Denies nausea, vomiting, abdominal pain. Reports constipation over the past week and feeling of urinary urgency, no dysuria, urinary retention or hematuria. Pt denies any other alleviating or exacerbating factors. There are no other complaints, changes or physical findings at this time.      Past Medical History:   Diagnosis Date    Body aches 12/11/2014    Chronic pain     Depression     GERD (gastroesophageal reflux disease)     Headache(784.0)     History of mammogram never before    [facility] [address]     Neurological disorder     Multiple Sclerosis    Pap smear for cervical cancer screening 2008    Psychiatric disorder     anxiety    Psychotic disorder [facility])      Past Surgical History:   Procedure Laterality Date    COLONOSCOPY N/A 2/28/2018    COLONOSCOPY performed by Merly Velasco MD at [facility] ENDOSCOPY    HX CHOLECYSTECTOMY      HX GYN      3 c-sections    HX HEENT      bilateral ear tube surgery    HX HERNIA REPAIR      HX OTHER SURGICAL      R inguinal hernia repair       PCP: None    Past History   Past Medical History:  Past Medical History:   Diagnosis Date    Body aches 12/11/2014    Chronic pain     Depression     GERD (gastroesophageal reflux disease)     Headache(784.0)     History of mammogram never before    MS [facility] sclerosis) ([facility] [address]     Neurological disorder     Multiple Sclerosis    Pap smear for cervical cancer screening 2008    Psychiatric disorder     anxiety    Psychotic disorder [facility])        Past Surgical History:  Past Surgical History:   Procedure Laterality Date    COLONOSCOPY N/A 2/28/2018    COLONOSCOPY performed by Merly Velasco MD at [facility] ENDOSCOPY    HX CHOLECYSTECTOMY      HX GYN      3 c-sections    HX HEENT      bilateral ear tube surgery    HX HERNIA REPAIR      HX OTHER SURGICAL      R inguinal hernia repair       Family History:  Family History   Problem Relation Age of Onset    Heart Attack Father         tripple bypass    Cancer Father         lung    Diabetes Mother         type 2    Heart Disease Mother         heart disease    Diabetes Paternal Grandmother        Social History:  Social History     Tobacco Use    Smoking status: Current Every Day Smoker     Packs/day: 0.50     Years: 17.00     Pack years: 8.50     Types: Cigarettes    Smokeless tobacco: Never Used    Tobacco comment: 1 pack every 3 days.    Substance Use Topics    Alcohol use: No    Drug use: No       Allergies: Allergies   Allergen Reactions    Morphine Rash       Current Medications:  No current facility-administered medications on file prior to encounter. Current Outpatient Medications on File Prior to Encounter   Medication Sig Dispense Refill    DULoxetine (CYMBALTA) 60 mg capsule Take 1 Cap by mouth daily. Indications: neuropathic pain 30 Cap 1    FLUoxetine (PROZAC) 40 mg capsule Take 1 Cap by mouth daily. 30 Cap 1    gabapentin (NEURONTIN) 400 mg capsule Take 2 Caps by mouth three (3) times daily. Max Daily Amount: 2,400 mg. 120 Cap 5    ondansetron (ZOFRAN ODT) 4 mg disintegrating tablet Take 1 Tab by mouth every eight (8) hours as needed for Nausea or Vomiting. 10 Tab 0    senna-docusate (PERICOLACE) 8.6-50 mg per tablet Take 2 Tabs by mouth daily. 15 Tab 0    dimethyl fumarate (TECFIDERA) 240 mg cpDR Take 240 mg by mouth two (2) times a day. 120 Cap 3    ibuprofen (MOTRIN) 600 mg tablet Take 1 Tab by mouth every six (6) hours as needed for Pain. 20 Tab 0       Review of Systems     Conducted full ROS with patient.  All systems negative except as noted below or in HPI:  Constitutional: NO fever, chills, wt loss, fatigue, malaise  Eyes: NO changes in vision, eye pain, discoloration or discharge  ENT/Mouth: NO congestion, nose bleeds, ear pain or drainage, difficulty with swallowing  Cardiovascular: NO cp, palpitations, orthopnea, PND  Pulm: NO sob, cough, pleurisy, chest wall pain  GI: NO abd pain, n/v, diarrhea, +constipation  : NO dysuria, hematuria, flank pain +urinary hesitancy  Neuro: Bilateral lower extremity weakness, left upper extremity weakness, slurred speech  Skin: NO rashes, wounds, skin discoloration  Endocrine: NO polyuria or polydipsia, cold or heat intolerance  Hematologic/Lymphatic: No easy bruising, no recurrent bleeding  MSK: NO joint swelling, chronic arthralgias    Physical Exam     Vital signs and nursing notes reviewed  CONSTITUTIONAL: Alert, in no distress. Appears stated age. HEAD:  Normocephalic, atraumatic  EYES: EOM's intact. No conjunctival injection or scleral icterus, PERRLA. ENTM: Nose: no rhinorrhea; Throat: no erythema or exudate, mucous membranes moist  Neck:  Supple. trachea is midline. No JVD  RESP: CTAB with no wheeze, rales or rhonchi. CV: Regular rate and regular rhythm. No murmurs heard. Well perfused. 2+ radial pulse  GI: Non-distended, normal bowel sounds, abdomen soft and non-tender. Extremities:  No joint swelling or evidence of trauma. No pedal edema. NEURO: Alert and oriented x3,  Bilateral lower extremities with decreased strength, able to lift against gravity only. Left upper extremity  weaker compared to right. Slurred speech. SKIN: No rashes; Warm and dry. PSYCH: Anxious    Diagnostic Study Results     Labs -  Recent Results (from the past 24 hour(s))   CBC WITH AUTOMATED DIFF    Collection Time: 03/08/20 12:25 PM   Result Value Ref Range    WBC 4.3 3.6 - 11.0 K/uL    RBC 3.95 3.80 - 5.20 M/uL    HGB 12.7 11.5 - 16.0 g/dL    HCT 37.9 35.0 - 47.0 %    MCV 95.9 80.0 - 99.0 FL    MCH 32.2 26.0 - 34.0 PG    MCHC 33.5 30.0 - 36.5 g/dL    RDW 13.4 11.5 - 14.5 %    PLATELET 674 985 - 044 K/uL    MPV 9.5 8.9 - 12.9 FL    NRBC 0.0 0  WBC    ABSOLUTE NRBC 0.00 0.00 - 0.01 K/uL    NEUTROPHILS 72 32 - 75 %    LYMPHOCYTES 18 12 - 49 %    MONOCYTES 6 5 - 13 %    EOSINOPHILS 3 0 - 7 %    BASOPHILS 1 0 - 1 %    IMMATURE GRANULOCYTES 0 0.0 - 0.5 %    ABS. NEUTROPHILS 3.1 1.8 - 8.0 K/UL    ABS. LYMPHOCYTES 0.8 0.8 - 3.5 K/UL    ABS. MONOCYTES 0.3 0.0 - 1.0 K/UL    ABS. EOSINOPHILS 0.1 0.0 - 0.4 K/UL    ABS. BASOPHILS 0.0 0.0 - 0.1 K/UL    ABS. IMM.  GRANS. 0.0 0.00 - 0.04 K/UL    DF AUTOMATED      RBC COMMENTS NORMOCYTIC, NORMOCHROMIC     METABOLIC PANEL, COMPREHENSIVE    Collection Time: 03/08/20 12:25 PM   Result Value Ref Range    Sodium 140 136 - 145 mmol/L    Potassium 4.4 3.5 - 5.1 mmol/L    Chloride 106 97 - 108 mmol/L    CO2 31 21 - 32 mmol/L    Anion gap 3 (L) 5 - 15 mmol/L    Glucose 91 65 - 100 mg/dL    BUN 8 6 - 20 MG/DL    Creatinine 0.86 0.55 - 1.02 MG/DL    BUN/Creatinine ratio 9 (L) 12 - 20      GFR est AA >60 >60 ml/min/1.73m2    GFR est non-AA >60 >60 ml/min/1.73m2    Calcium 9.0 8.5 - 10.1 MG/DL    Bilirubin, total 0.3 0.2 - 1.0 MG/DL    ALT (SGPT) 27 12 - 78 U/L    AST (SGOT) 15 15 - 37 U/L    Alk. phosphatase 64 45 - 117 U/L    Protein, total 7.2 6.4 - 8.2 g/dL    Albumin 3.6 3.5 - 5.0 g/dL    Globulin 3.6 2.0 - 4.0 g/dL    A-G Ratio 1.0 (L) 1.1 - 2.2         Radiologic Studies -   No orders to display         Medical Decision Making   I am the first provider for this patient. Records Reviewed: I reviewed our electronic medical record system for any past medical records that were available that may contribute to the patient's current condition, including their PMH, surgical history, social and family history. Reviewed the nursing notes and vital signs from today's visit. Nursing notes will be reviewed as they become available in realtime while the pt has been in the ED. Kwaku Ruelas MD Msc    Vital Signs-Reviewed the patient's vital signs. Patient Vitals for the past 24 hrs:   Temp Pulse Resp BP SpO2   03/08/20 1213 97.6 °F (36.4 °C) 76 22 114/78 99 %       Provider Notes (Medical Decision Making):   28-year-old female who has multiple medical problems including multiple sclerosis presents to the emergency room with symptoms that are concerning for an MS flare. Last hospitalized several years ago with similar lower extremity weakness, today symptoms seem to be a progression of that. Symptoms have been going on for the past 2 weeks and have been progressive. She will need further inpatient management of this with high-dose steroids and imaging studies. I will discuss specifics with neurology. In addition, she has chronic pain but is tapered down on her gabapentin.   Urinary hesitancy and constipation. Still waiting for her UA to ensure that she does not have a UTI which will require treatment. 1:37 PM  I spoke with Dr. Amarjit Caballero. He also recommends admission for further management of MS flare. In terms of imaging studies, he would recommend an MRI with and without contrast of brain, C-spine and thoracic spine. I will start her on 1 g of methylprednisolone here, this will need to be continued for 3 days. Neurology will see patient on the floor. ED Course:   Initial assessment performed. The patients presenting problems have been discussed, and they are in agreement with the care plan formulated and outlined with them. I have encouraged them to ask questions as they arise throughout their visit. Medications Administered During ED Course:  Medications   methylPREDNISolone (PF) (Solu-MEDROL) injection 1,000 mg (has no administration in time range)   sodium chloride 0.9 % bolus infusion 1,000 mL (has no administration in time range)   LORazepam (ATIVAN) tablet 1 mg (1 mg Oral Given 3/8/20 1332)       Consult Note:  Rosmery Urena MD spoke with Dr. Amarjit Caballero (neuro),   Discussed pt's hx, physical exam and available diagnostic and imaging results. Reviewed care plans. Agree with management and plan thus far. DISPOSITION: ADMIT  Patient is being admitted to the hospital.  Their test results and reasons for admission have been discussed. The patient and/or available family express agreement with and understanding of the need to be admitted and their admission diagnosis. Thank you for resuming the care of this patient. Please don't hesitate to contact me in the emergency department if you  have any additional questions. Rosmery Urena MD, MSc        Diagnosis     Clinical Impression:   1. Multiple sclerosis ([facility] [address]        Attestation:  I personally performed the services described in this documentation on this date 3/8/2020 for patient Cate Pierre.   Rosmery Urena MD    Please note that this dictation was completed with [employer], the [employer] voice recognition software. Quite often unanticipated grammatical, syntax, homophones, and other interpretive errors are inadvertently transcribed by the computer software. Please disregard these errors. Please excuse any errors that have escaped final proofreading.

## 2020-03-09 PROBLEM — G35 EXACERBATION OF MULTIPLE SCLEROSIS (HCC): Status: ACTIVE | Noted: 2020-03-09

## 2020-03-09 LAB
ANION GAP SERPL CALC-SCNC: 4 MMOL/L (ref 5–15)
BACTERIA SPEC CULT: NORMAL
BUN SERPL-MCNC: 9 MG/DL (ref 6–20)
BUN/CREAT SERPL: 11 (ref 12–20)
CALCIUM SERPL-MCNC: 8.7 MG/DL (ref 8.5–10.1)
CC UR VC: NORMAL
CHLORIDE SERPL-SCNC: 110 MMOL/L (ref 97–108)
CHOLEST SERPL-MCNC: 222 MG/DL
CO2 SERPL-SCNC: 24 MMOL/L (ref 21–32)
CREAT SERPL-MCNC: 0.8 MG/DL (ref 0.55–1.02)
ERYTHROCYTE [DISTWIDTH] IN BLOOD BY AUTOMATED COUNT: 13.2 % (ref 11.5–14.5)
EST. AVERAGE GLUCOSE BLD GHB EST-MCNC: 103 MG/DL
GLUCOSE BLD STRIP.AUTO-MCNC: 176 MG/DL (ref 65–100)
GLUCOSE SERPL-MCNC: 149 MG/DL (ref 65–100)
HBA1C MFR BLD: 5.2 % (ref 4–5.6)
HCT VFR BLD AUTO: 35.7 % (ref 35–47)
HDLC SERPL-MCNC: 71 MG/DL
HDLC SERPL: 3.1 {RATIO} (ref 0–5)
HGB BLD-MCNC: 12.1 G/DL (ref 11.5–16)
INR PPP: 1 (ref 0.9–1.1)
LDLC SERPL CALC-MCNC: 143.4 MG/DL (ref 0–100)
LIPID PROFILE,FLP: ABNORMAL
MCH RBC QN AUTO: 32.1 PG (ref 26–34)
MCHC RBC AUTO-ENTMCNC: 33.9 G/DL (ref 30–36.5)
MCV RBC AUTO: 94.7 FL (ref 80–99)
NRBC # BLD: 0 K/UL (ref 0–0.01)
NRBC BLD-RTO: 0 PER 100 WBC
PLATELET # BLD AUTO: 275 K/UL (ref 150–400)
PMV BLD AUTO: 10.3 FL (ref 8.9–12.9)
POTASSIUM SERPL-SCNC: 3.9 MMOL/L (ref 3.5–5.1)
PROTHROMBIN TIME: 10.7 SEC (ref 9–11.1)
RBC # BLD AUTO: 3.77 M/UL (ref 3.8–5.2)
SERVICE CMNT-IMP: ABNORMAL
SERVICE CMNT-IMP: NORMAL
SODIUM SERPL-SCNC: 138 MMOL/L (ref 136–145)
TRIGL SERPL-MCNC: 38 MG/DL (ref ?–150)
VLDLC SERPL CALC-MCNC: 7.6 MG/DL
WBC # BLD AUTO: 6.4 K/UL (ref 3.6–11)

## 2020-03-09 PROCEDURE — 85027 COMPLETE CBC AUTOMATED: CPT

## 2020-03-09 PROCEDURE — 74011250637 HC RX REV CODE- 250/637: Performed by: PSYCHIATRY & NEUROLOGY

## 2020-03-09 PROCEDURE — 97530 THERAPEUTIC ACTIVITIES: CPT | Performed by: OCCUPATIONAL THERAPIST

## 2020-03-09 PROCEDURE — 80061 LIPID PANEL: CPT

## 2020-03-09 PROCEDURE — 97162 PT EVAL MOD COMPLEX 30 MIN: CPT

## 2020-03-09 PROCEDURE — 92610 EVALUATE SWALLOWING FUNCTION: CPT

## 2020-03-09 PROCEDURE — 74011250637 HC RX REV CODE- 250/637: Performed by: HOSPITALIST

## 2020-03-09 PROCEDURE — 97165 OT EVAL LOW COMPLEX 30 MIN: CPT | Performed by: OCCUPATIONAL THERAPIST

## 2020-03-09 PROCEDURE — 74011250636 HC RX REV CODE- 250/636: Performed by: FAMILY MEDICINE

## 2020-03-09 PROCEDURE — 74011250637 HC RX REV CODE- 250/637: Performed by: FAMILY MEDICINE

## 2020-03-09 PROCEDURE — 74011000258 HC RX REV CODE- 258: Performed by: FAMILY MEDICINE

## 2020-03-09 PROCEDURE — 36415 COLL VENOUS BLD VENIPUNCTURE: CPT

## 2020-03-09 PROCEDURE — 85610 PROTHROMBIN TIME: CPT

## 2020-03-09 PROCEDURE — 82962 GLUCOSE BLOOD TEST: CPT

## 2020-03-09 PROCEDURE — 83036 HEMOGLOBIN GLYCOSYLATED A1C: CPT

## 2020-03-09 PROCEDURE — 92522 EVALUATE SPEECH PRODUCTION: CPT

## 2020-03-09 PROCEDURE — 97116 GAIT TRAINING THERAPY: CPT

## 2020-03-09 PROCEDURE — 65270000029 HC RM PRIVATE

## 2020-03-09 PROCEDURE — 80048 BASIC METABOLIC PNL TOTAL CA: CPT

## 2020-03-09 RX ORDER — LORAZEPAM 0.5 MG/1
0.5 TABLET ORAL ONCE
Status: COMPLETED | OUTPATIENT
Start: 2020-03-09 | End: 2020-03-09

## 2020-03-09 RX ADMIN — GABAPENTIN 800 MG: 100 CAPSULE ORAL at 21:12

## 2020-03-09 RX ADMIN — LORAZEPAM 0.5 MG: 0.5 TABLET ORAL at 06:52

## 2020-03-09 RX ADMIN — DULOXETINE 60 MG: 30 CAPSULE, DELAYED RELEASE ORAL at 09:40

## 2020-03-09 RX ADMIN — LORAZEPAM 1 MG: 1 TABLET ORAL at 17:07

## 2020-03-09 RX ADMIN — GABAPENTIN 800 MG: 100 CAPSULE ORAL at 16:29

## 2020-03-09 RX ADMIN — HEPARIN SODIUM 5000 UNITS: 5000 INJECTION INTRAVENOUS; SUBCUTANEOUS at 13:37

## 2020-03-09 RX ADMIN — GABAPENTIN 800 MG: 100 CAPSULE ORAL at 09:39

## 2020-03-09 RX ADMIN — ONDANSETRON 4 MG: 2 INJECTION INTRAMUSCULAR; INTRAVENOUS at 14:54

## 2020-03-09 RX ADMIN — POLYETHYLENE GLYCOL (3350) 17 G: 17 POWDER, FOR SOLUTION ORAL at 09:40

## 2020-03-09 RX ADMIN — ONDANSETRON 4 MG: 2 INJECTION INTRAMUSCULAR; INTRAVENOUS at 18:55

## 2020-03-09 RX ADMIN — SODIUM CHLORIDE 1000 MG: 900 INJECTION, SOLUTION INTRAVENOUS at 13:27

## 2020-03-09 RX ADMIN — ASPIRIN 325 MG: 325 TABLET, FILM COATED ORAL at 09:40

## 2020-03-09 RX ADMIN — HEPARIN SODIUM 5000 UNITS: 5000 INJECTION INTRAVENOUS; SUBCUTANEOUS at 06:52

## 2020-03-09 RX ADMIN — HEPARIN SODIUM 5000 UNITS: 5000 INJECTION INTRAVENOUS; SUBCUTANEOUS at 21:13

## 2020-03-09 RX ADMIN — ONDANSETRON 4 MG: 2 INJECTION INTRAMUSCULAR; INTRAVENOUS at 23:28

## 2020-03-09 RX ADMIN — DIMETHYL FUMARATE 240 MG: 240 CAPSULE ORAL at 17:03

## 2020-03-09 NOTE — PROGRESS NOTES
Initial note: CM attempted to meet with pt at bedside to make contact, introduce role, and complete initial assessment. Pt is currently unavailable and working with OT. CM will revisit pt's room later in the day to complete initial assessment and discuss [facility] plans for d/c.    2:43 PM: CM completed initial assessment with pt at bedside - information detailed below:    Reason for Admission: Left-sided weakness                     RUR Score: 10% - low risk                   Plan for utilizing home health: TBD - PT/OT/SLP consulted. CM will continue to follow to determine if pt has any post-acute therapy needs at d/c. Pt reported being open to rehab interventions if reccommended. PCP: First and Last name:  Adia Faustin was pt's last known PCP   Name of Practice: [facility]   Are you a current patient: Yes/No: No   Approximate date of last visit: Pt reported last visit was \"two or three weeks ago\"                    Current Advanced Directive/Advance Care Plan: Pt is listed as a full code status with no ACP on file. CM will f/u with pt to inquire if she would like ACP completed while admitted; CM will report updates as they become available. Transition of Care Plan:                      * TBD - anticipate home with [facility] & f/u appts     > Pt will need new-pt PCP f/u appt secured prior to d/c  > 2nd IM & FOC prior to d/c    Pt is alert & oriented x4 and successfully verified demographic information. Pt reported she is independent with ADL's at baseline and \"doesn't really require help with anything. \" Pt identified her boyfriend (Mani Coleman: [phone]) as a source of support when needs arise. Pt identified DME in the form of a walker and inquired if CM would be able to assist in securing a shower chair and bedside commode for d/c. CM will work on pt's DME request and report updates as they become available.  Pt identified no barriers related to accessing/paying for medication; preferred pharmacy is the [facility] on [address] [facility]. Pt reported no prior hx of utilizing [place] intervention at d/c. Pt did report prior hx of utilizing in-pt rehab intervention provided by '[address] [place] [address]' in [place]. CM will continue to follow patient for discharge planning needs and arrange for services as deemed necessary. Care Management Interventions  PCP Verified by CM: No  Mode of Transport at Discharge:  Other (see comment)(Pt reported her boyfriend will assist in providing transportation at d/c)  Transition of Care Consult (CM Consult): Discharge Planning  Discharge Durable Medical Equipment: No  Physical Therapy Consult: Yes  Occupational Therapy Consult: Yes  Speech Therapy Consult: Yes  Current Support Network: Own Home, Other(Pt lives with her boyfriend in single story home with 2 stairs leading to the entrance)  Confirm Follow Up Transport: Self  Discharge Location  Discharge Placement: Home with home health      Bea Laureano, [address] [place] [address], [address] [place] [address]

## 2020-03-09 NOTE — PROGRESS NOTES
Problem: Dysphagia (Adult)  Goal: *Acute Goals and Plan of Care (Insert Text)  Description  Speech pathology goals initiated 3/9/2020   1. Patient will tolerate dental soft diet/ nectar thick liquids free of s/s aspiration within 7 days   2. Patient will tolerate thin liquid trials free of s/s aspiration with slp  3. Patient will participate in [address] as clinically indicated   3/9/2020 0949 by DENEEN Miller  Outcome: Progressing Towards Goal     Problem: Motor Speech Impaired (Adult)  Goal: *Acute Goals and Plan of Care (Insert Text)  Description  Speech pathology goals initiated 3/9/2020   1. Patient will independently utilize compensatory speech strategies in conversational speech to improve articulatory precision and intelligibility   Outcome: Progressing Towards Goal   SPEECH LANGUAGE PATHOLOGY BEDSIDE SWALLOW AND MOTOR SPEECH EVALUATION  Patient: Мария Jordan (79 y.o. female)  Date: 3/9/2020  Primary Diagnosis: Left-sided weakness [R53.1]        Precautions: aspirationFall    ASSESSMENT :  Based on the objective data described below, the patient presents with suspected mild pharyngeal dysphagia. Patient with timely and complete mastication of crackers. Pharyngeal swallow initiation is suspected to be timely with reduced hyolaryngeal elevation/excursion via palpation. Patient with strong coughing on ~50% of thin liquid trials (cup and straw). Patient reports this has been occurring for the past couple of days when symptoms started. Improved comfort and tolerance with nectar thick liquids. Patient with mild dysarthria characterized by blended word boundaries and imprecise articulation at times. She was intelligible in conversation but benefit from reminders to utilize over-articulation to improve precision/accuracy. Patient reports that slurred speech is also new the last couple days. She reports her PCP took her off all of her meds \"cold turkey\".      Patient will benefit from skilled intervention to address the above impairments. Patients rehabilitation potential is considered to be Excellent     PLAN :  Recommendations and Planned Interventions:  Dental soft diet/ NECTAR thick liquids   Meds 1 at a time as tolerated  Encourage slow, loud and clear speech    Frequency/Duration: Patient will be followed by speech-language pathology 3 times a week to address goals for swallowing    Discharge Recommendations: Rehab vs outpatient     SUBJECTIVE:   Patient stated [facility] has done more for me in the last year than my  did in 15 years referring to her significant other. Significant other also reporting dysphagia.      OBJECTIVE:     Past Medical History:   Diagnosis Date    Body aches 12/11/2014    Chronic pain     Depression     GERD (gastroesophageal reflux disease)     Headache(784.0)     History of mammogram never before    MS (multiple sclerosis) ([facility] [address]     Neurological disorder     Multiple Sclerosis    Pap smear for cervical cancer screening 2008    Psychiatric disorder     anxiety    Psychotic disorder [facility])      Past Surgical History:   Procedure Laterality Date    COLONOSCOPY N/A 2/28/2018    COLONOSCOPY performed by Augustus Tinoco MD at [facility] ENDOSCOPY    HX CHOLECYSTECTOMY      HX GYN      3 c-sections    HX HEENT      bilateral ear tube surgery    HX HERNIA REPAIR      HX OTHER SURGICAL      R inguinal hernia repair     Prior Level of Function/Home Situation:  Home Situation  Home Environment: Apartment  # Steps to Enter: 2  Rails to Enter: No  One/Two Story Residence: One story  Living Alone: No  Support Systems: Spouse/Significant Other/Partner  Patient Expects to be Discharged to[de-identified] Private residence  Current DME Used/Available at Home: soni Bunch, [address] chair  Tub or Shower Type: Tub/Shower combination  Diet prior to admission: regular/thin   Current Diet:  regular/thin    Cognitive and Communication Status:  Neurologic State: Alert  Orientation Level: Oriented X4  Cognition: Follows commands  Perception: Appears intact  Perseveration: No perseveration noted  Safety/Judgement: Awareness of environment  Oral Assessment:  Oral Assessment  Labial: Right droop  Dentition: Natural  Oral Hygiene: (sores on tongue)  Lingual: Decreased rate  Velum: Unable to visualize  Mandible: No impairment  P.O. Trials:  Patient Position: (upright in bed)  Vocal quality prior to P.O.: No impairment  Consistency Presented: Thin liquid; Nectar thick liquid;Puree; Solid  How Presented: Successive swallows;Straw;Cup/sip; Self-fed/presented;Spoon     Bolus Acceptance: No impairment  Bolus Formation/Control: No impairment     Propulsion: No impairment  Oral Residue: None  Initiation of Swallow: No impairment  Laryngeal Elevation: Decreased  Aspiration Signs/Symptoms: Strong cough  Pharyngeal Phase Characteristics: Double swallow; Suspected pharyngeal residue  Effective Modifications: None          Oral Phase Severity: No impairment  Pharyngeal Phase Severity : Mild    Motor Speech:  Oral-Motor Structure/Motor Speech  Labial: Right droop  Dentition: Natural  Oral Hygiene: (sores on tongue)  Lingual: Decreased rate  Velum: Unable to visualize  Mandible: No impairment  Dysarthric Characteristics: Blended word boundaries  Intelligibility: No impairment  Overall Impairment Severity: Mild  Compensatory Strategies for Motor Speech: (over-articulation)    After treatment:   Patient left in no apparent distress sitting up in chair and Call bell within reach    COMMUNICATION/EDUCATION:   Patient was educated regarding her deficit(s) of dysphagia, dysarthria as this relates to her diagnosis of MS flare. She demonstrated Excellent understanding as evidenced by verbalization. The patient's plan of care including recommendations, planned interventions, and recommended diet changes were discussed with: Physical therapist, Occupational therapist, and Registered nurse.      Patient/family agree to work toward stated goals and plan of care.     Thank you for this referral.  Sanket Lawler, SLP  Time Calculation: 31 mins

## 2020-03-09 NOTE — PROGRESS NOTES
Hospitalist Progress Note    NAME: Anne Bearden   :  1975   MRN:  [id number]       Assessment / Plan:    Right side weakness / MS EXACERBATION POA  MRI of brain:  IMPRESSION: Slight increase of multiple T2 hyperintensities/MS plaques, several  of which exhibit diffusion restriction.  -Neurology consulted appreciate recommendations   -Solu-Medrol 1000 mg daily x3 days per neurology  -Continue Tecfidera  -Patient reports improvement in her generalized pain     Constipation  -Dulcolax 5 mg p.o. daily  -Add MiraLAX     Chronic pain syndrome   -Continue Cymbalta at home dose  -Continue gabapentin home dose  -Consider referral to pain clinic on discharge     Urinary hesitancy   -UA negative, likely from MS exacerbation     Anxiety and depression  -On Cymbalta           Code Status: Full code  Surrogate Decision Maker:     DVT Prophylaxis: Heparin  GI Prophylaxis: not indicated     Baseline: Uses a walker to ambulate          Subjective:     Chief Complaint / Reason for Physician Visit  \"reports improvement in generalized body aches. Denies focal numbness or weakness\". Discussed with RN events overnight. Review of Systems:  Symptom Y/N Comments  Symptom Y/N Comments   Fever/Chills    Chest Pain     Poor Appetite    Edema     Cough    Abdominal Pain     Sputum    Joint Pain     SOB/BOBO    Pruritis/Rash     Nausea/vomit    Tolerating PT/OT     Diarrhea    Tolerating Diet     Constipation    Other       Could NOT obtain due to:      Objective:     VITALS:   Last 24hrs VS reviewed since prior progress note.  Most recent are:  Patient Vitals for the past 24 hrs:   Temp Pulse Resp BP SpO2   20 0755 98 °F (36.7 °C) 82 16 138/90 97 %   20 0317 97.8 °F (36.6 °C) 81 18 139/72 96 %   20 2334 98.1 °F (36.7 °C) (!) 105 18 128/86 97 %   20 2017 97.6 °F (36.4 °C) 66 18 126/62 99 %   20 1530  68  129/69 99 %   20 1500  70 14 99/77 100 %   20 1345  72 20 124/69 100 % 03/08/20 1315  64 13 121/90 100 %   03/08/20 1245  68 12 121/76 100 %   03/08/20 1215  69 19 114/78 99 %   03/08/20 1213 97.6 °F (36.4 °C) 76 22 114/78 99 %       Intake/Output Summary (Last 24 hours) at 3/9/2020 1146  Last data filed at 3/9/2020 0435  Gross per 24 hour   Intake 950 ml   Output 350 ml   Net 600 ml        PHYSICAL EXAM:  General: WD, WN. Alert, cooperative, no acute distress    EENT:  EOMI. Anicteric sclerae. MMM  Resp:  CTA bilaterally, no wheezing or rales. No accessory muscle use  CV:  Regular  rhythm,  No edema  GI:  Soft, Non distended, Non tender.  +Bowel sounds  Neurologic:  Alert and oriented X 3, normal speech,   Psych:   Good insight. Not anxious nor agitated  Skin:  No rashes. No jaundice    Reviewed most current lab test results and cultures  YES  Reviewed most current radiology test results   YES  Review and summation of old records today    NO  Reviewed patient's current orders and MAR    YES  PMH/SH reviewed - no change compared to H&P  ________________________________________________________________________  Care Plan discussed with:    Comments   Patient x    Family  x    RN x    Care Manager     Consultant                       x Multidiciplinary team rounds were held today with , nursing, pharmacist and clinical coordinator. Patient's plan of care was discussed; medications were reviewed and discharge planning was addressed. ________________________________________________________________________  Total NON critical care TIME:  26   Minutes    Total CRITICAL CARE TIME Spent:   Minutes non procedure based      Comments   >50% of visit spent in counseling and coordination of care     ________________________________________________________________________  Kevin Rea MD     Procedures: see electronic medical records for all procedures/Xrays and details which were not copied into this note but were reviewed prior to creation of Plan.       LABS:  I reviewed today's most current labs and imaging studies.   Pertinent labs include:  Recent Labs     03/09/20  0249 03/08/20  1225   WBC 6.4 4.3   HGB 12.1 12.7   HCT 35.7 37.9    263     Recent Labs     03/09/20  0249 03/08/20  1225    140   K 3.9 4.4   * 106   CO2 24 31   * 91   BUN 9 8   CREA 0.80 0.86   CA 8.7 9.0   ALB  --  3.6   TBILI  --  0.3   SGOT  --  15   ALT  --  27   INR 1.0  --        Signed: Alain Lanes, MD

## 2020-03-09 NOTE — PROGRESS NOTES
* No surgery found *  * No surgery found *  Bedside shift change report given to Joni (oncoming nurse) by Leidy Louis (offgoing nurse). Report included the following information [facility] Phone:   [phone]      Significant changes during shift:  Med x one for nausea after receiving steroids with relief. PICC Team started #22 in rt arm        Patient Information    Richad Aschoff  53 y.o.  3/8/2020 12:02 PM by Wilmar Mcbride MD. Richad Aschoff was admitted from Home    Problem List    Patient Active Problem List    Diagnosis Date Noted    Exacerbation of multiple sclerosis ([facility] [address] 03/09/2020    Left-sided weakness 03/08/2020    Severe obesity ([facility] [address] 10/03/2018    Constipation 07/17/2015    Body aches 12/11/2014    Back pain 09/07/2012    Chronic pain 08/17/2012    MS (multiple sclerosis) ([facility] [address] 08/17/2012    Decreased hearing 01/31/2011    Acute pharyngitis 01/26/2011    Anxiety 08/25/2010    Blood pressure elevated 08/25/2010    Tobacco abuse 08/25/2010     Past Medical History:   Diagnosis Date    Body aches 12/11/2014    Chronic pain     Depression     GERD (gastroesophageal reflux disease)     Headache(784.0)     History of mammogram never before    MS (multiple sclerosis) ([facility] [address]     Neurological disorder     Multiple Sclerosis    Pap smear for cervical cancer screening 2008    Psychiatric disorder     anxiety    Psychotic disorder ([facility] [address]          Core Measures:    CVA: Yes Yes  CHF:No No  PNA:No No    Post Op Surgical (If Applicable):     N  Activity Status:    OOB to Chair Yes  Ambulated this shift Yes   Bed Rest No    Supplemental O2: (If Applicable)    NC No  NRB No  Venti-mask No  On  Liters/min      LINES AND DRAINS:PIV      DVT prophylaxis:    DVT prophylaxis Med- Yes  DVT prophylaxis SCD or CONG- No     Wounds: (If Applicable)    Wounds- No    Location     Patient Safety:    Falls Score Total Score: 3  Safety Level_______  Bed Alarm On? Yes  Sitter?  No    Plan for upcoming shift: d/c IV fluids after this bag        Discharge Plan: Yes TBD    Active Consults:  IP CONSULT TO NEUROLOGY  IP CONSULT TO HOSPITALIST

## 2020-03-09 NOTE — CONSULTS
Consult  REFERRED BY:  None    CHIEF COMPLAINT: Generalized weakness and right-sided weakness      Subjective: Juliane Guajardo is a 53 y.o. right-handed  female we are asked to evaluate by the hospitalist for evaluation of new problem of generalized weakness and right-sided weakness that has been slowly getting worse for the last month and the patient was admitted for hospitalization and for IV steroids. Her MRI scan of the cervical and thoracic spine look stable with no new lesions, but clear multiple MS type old lesions. Her MRI of the brain showed no enhancing lesions, multiple old MS lesions from what progressive since her last scan, and showing several areas of diffusion positive images probably suggesting subacute disease flareups. Patient has undergone benzodiazepine withdrawal and Neurontin reduction and has undergone withdrawal from all of that in addition. She denies any new fever, meningismus, or any other new neurologic problem. She has had no unusual headache or trauma. She is followed by Dr. Zack Paredes her neurologist.  She is on Tecfidera and takes her medications regularly. She says she is not had a major exacerbation in some time.     Past Medical History:   Diagnosis Date    Body aches 12/11/2014    Chronic pain     Depression     GERD (gastroesophageal reflux disease)     Headache(784.0)     History of mammogram never before    MS (multiple sclerosis) (HCC)     Neurological disorder     Multiple Sclerosis    Pap smear for cervical cancer screening 2008    Psychiatric disorder     anxiety    Psychotic disorder [facility])       Past Surgical History:   Procedure Laterality Date    COLONOSCOPY N/A 2/28/2018    COL[facility]COPY performed by [facility] at [facility] ENDOSCOPY    HX CHOLECYSTECTOMY      HX GYN      3 c-sections    HX HEENT      bilateral ear tube surgery    HX HERNIA REPAIR      HX OTHER SURGICAL      R inguinal hernia repair     Family History   Problem Relation Age of Onset    Heart Attack Father         tripple bypass    Cancer Father         lung    COPD Father     Diabetes Mother         type 2    Heart Disease Mother         heart disease    Diabetes Paternal Grandmother     No Known Problems Sister     No Known Problems Brother     No Known Problems Child       Social History     Tobacco Use    Smoking status: Current Every Day Smoker     Packs/day: 0.50     Years: 17.00     Pack years: 8.50     Types: Cigarettes    Smokeless tobacco: Never Used    Tobacco comment: 1 pack every 3 days.    Substance Use Topics    Alcohol use: No         Current Facility-Administered Medications:     gabapentin (NEURONTIN) capsule 800 mg, 800 mg, Oral, TID, Rosa Glass MD    acetaminophen (TYLENOL) tablet 650 mg, 650 mg, Oral, Q6H PRN, Katarzyna Sauer MD    ondansetron (ZOFRAN) injection 4 mg, 4 mg, IntraVENous, Q4H PRN, Katarzyna Sauer MD    dimethyl fumarate cpDR 240 mg (Patient Supplied), 240 mg, Oral, BID, Katarzyna Sauer MD    DULoxetine (CYMBALTA) capsule 60 mg, 60 mg, Oral, DAILY, Katarzyna Sauer MD, 60 mg at 03/09/20 0940    ibuprofen (MOTRIN) tablet 600 mg, 600 mg, Oral, Q6H PRN, Katarzyna Sauer MD    ondansetron (ZOFRAN ODT) tablet 4 mg, 4 mg, Oral, Q8H PRN, Katarzyna Sauer MD    bisacodyL (DULCOLAX) tablet 5 mg, 5 mg, Oral, DAILY PRN, Katarzyna Sauer MD    polyethylene glycol (MIRALAX) packet 17 g, 17 g, Oral, DAILY, Katarzyna Sauer MD, 17 g at 03/09/20 0940    acetaminophen (TYLENOL) tablet 650 mg, 650 mg, Oral, Q4H PRN **OR** acetaminophen (TYLENOL) solution 650 mg, 650 mg, Per NG tube, Q4H PRN **OR** acetaminophen (TYLENOL) suppository 650 mg, 650 mg, Rectal, Q4H PRN, Katarzyna Sauer MD    0.9% sodium chloride infusion, 100 mL/hr, IntraVENous, CONTINUOUS, Katarzyna Sauer MD, Last Rate: 100 mL/hr at 03/08/20 2017, 100 mL/hr at 03/08/20 2017    aspirin tablet 325 mg, 325 mg, Oral, DAILY, Katarzyna Sauer MD, 325 mg at 03/09/20 0940    heparin (porcine) injection 5,000 Units, 5,000 Units, SubCUTAneous, Q8H, Katarzyna Sauer MD, 5,000 Units at 03/09/20 1337    methylPREDNISolone ((Solu-MEDROL) 1,000 mg in 0.9% sodium chloride (MBP/ADV) 100 mL, 1 g, IntraVENous, DAILY, Katarzyna Sauer MD, Last Rate: 100 mL/hr at 03/09/20 1327, 1,000 mg at 03/09/20 1327    zolpidem (AMBIEN) tablet 5 mg, 5 mg, Oral, QHS PRN, Todd Quach MD, 5 mg at 03/08/20 2234    LORazepam (ATIVAN) tablet 1 mg, 1 mg, Oral, Q12H PRN, Frank Singh MD, 1 mg at 03/08/20 2233        Allergies   Allergen Reactions    Morphine Rash      MRI Results (most recent):  Results from [place] encounter on 03/08/20   MRI [facility] SPINE W WO CONT    Narrative HISTORY: MS.    COMPARISON: None    TECHNIQUE: MR imaging of the thoracic spine was performed with the following  sequences: sagittal T1, T2, stir; axial T1, gradient echo. Sagittal and axial T1  postcontrast. Contrast: 20 mL of Dotarem IV. FINDINGS:  There are multiple T2 hyperintensities of the cord compatible with MS plaques. There is no syrinx or cord enlargement. There is no pathologic enhancement. There is normal alignment of the thoracic spine. Vertebral body heights are  maintained. Marrow signal is normal. The spinal canal and neural foramina are  widely patent at all levels. Normal conus level. Impression IMPRESSION: Multiple thoracic cord MS plaques without enhancement. No spinal  stenosis. Results from [place] encounter on 03/08/20   MRI [facility] SPINE W WO CONT    Narrative HISTORY: MS.    COMPARISON: None    TECHNIQUE: MR imaging of the thoracic spine was performed with the following  sequences: sagittal T1, T2, stir; axial T1, gradient echo. Sagittal and axial T1  postcontrast. Contrast: 20 mL of Dotarem IV.     FINDINGS:  There are multiple T2 hyperintensities of the cord compatible with MS plaques. There is no syrinx or cord enlargement. There is no pathologic enhancement. There is normal alignment of the thoracic spine. Vertebral body heights are  maintained. Marrow signal is normal. The spinal canal and neural foramina are  widely patent at all levels. Normal conus level. Impression IMPRESSION: Multiple thoracic cord MS plaques without enhancement. No spinal  stenosis. Review of Systems:  A comprehensive review of systems was negative except for: Constitutional: positive for fatigue and malaise  Musculoskeletal: positive for myalgias, arthralgias and muscle weakness  Neurological: positive for paresthesia, coordination problems, gait problems and weakness  Behvioral/Psych: positive for anxiety and depression   Vitals:    03/08/20 2334 03/09/20 0317 03/09/20 0755 03/09/20 1147   BP: 128/86 139/72 138/90 132/86   Pulse: (!) 105 81 82 72   Resp: 18 18 16 17   Temp: 98.1 °F (36.7 °C) 97.8 °F (36.6 °C) 98 °F (36.7 °C) 98 °F (36.7 °C)   SpO2: 97% 96% 97% 98%   Weight:       Height:         Objective:     I      NEUROLOGICAL EXAM:    Appearance: The patient is well developed, well nourished, provides a coherent history and is in no acute distress. Mental Status: Oriented to time, place and person, and the president, cognitive function is normal and speech is fluent and no aphasia or dysarthria. Mood and affect appropriate but mildly depressed. Cranial Nerves:   Intact visual fields. Fundi are benign, disc are flat, with mild optic pallor. SHEFALI, EOM's full, no nystagmus, no ptosis. Facial sensation is normal. Corneal reflexes are not tested. Facial movement is weak on the right. Hearing is normal bilaterally. Palate is midline with normal sternocleidomastoid and trapezius muscles are normal. Tongue is midline.   Neck without meningismus or bruits  Temporal arteries are not tender or enlarged  TMJ areas are not tender on palpation Motor:   Patient has moderate spastic right hemiparesis, and mild spastic left hemiparesis from her MS. Muscle tone is increased, right greater than left, muscle bulk is normal.  Rapid alternating movement is slow bilaterally right side greater than left   Reflexes:   Deep tendon reflexes 2+/4 on the left and 3+/4 on the right   No babinski or clonus present   Sensory:   Normal to touch, pinprick and vibration and temperature. DSS is intact   Gait:  Abnormal gait with patient needing a rolling walker to ambulate due to paraparetic gait bilaterally, right side greater than left. Tremor:   No tremor noted. Cerebellar: Moderately abnormal cerebellar signs present on Romberg and tandem testing and finger-nose-finger exam is unremarkable. Neurovascular:  Normal heart sounds and regular rhythm, peripheral pulses intact, and no carotid bruits. Assessment:       ICD-10-CM ICD-9-CM    1. Multiple sclerosis ([facility] [address] G35 340      Active Problems:    Chronic pain (8/17/2012)      MS (multiple sclerosis) ([address] (8/17/2012)      Constipation (7/17/2015)      Left-sided weakness (3/8/2020)      Exacerbation of multiple sclerosis ([address] (3/9/2020)        Plan:     Patient with probable exacerbations of subacute nature of her MS, with multiple diffuse and positive imaging noted on MRI scan  We will finish 3 days of IV steroids, patient will probably need inpatient rehab consult. We will have PT OT see the patient in addition. Have patient continue her Tecfidera 240 mg twice a day. Any follow-up with her neurologist to discuss possibly changing disease modifying drugs. Continue with medical care as you are.     Signed By: Roopa Belle MD     March 9, 2020       CC: None  FAX: None

## 2020-03-09 NOTE — PROGRESS NOTES
Problem: Mobility Impaired (Adult and Pediatric)  Goal: *Acute Goals and Plan of Care (Insert Text)  Description  FUNCTIONAL STATUS PRIOR TO ADMISSION: Patient was modified independent using a rollator for functional mobility. HOME SUPPORT PRIOR TO ADMISSION: The patient lived with significant other and required some assistance occasionally. Physical Therapy Goals  Initiated 3/9/2020  1. Patient will move from supine to sit and sit to supine  in bed with modified independence within 7 day(s). 2.  Patient will transfer from bed to chair and chair to bed with modified independence using the least restrictive device within 7 day(s). 3.  Patient will perform sit to stand with modified independence within 7 day(s). 4.  Patient will ambulate with modified independence for 150 feet with the least restrictive device within 7 day(s). 5.  Patient will ascend/descend 2 stairs with no handrail(s) with minimal assistance/contact guard assist within 7 day(s). Outcome: Progressing Towards Goal   PHYSICAL THERAPY EVALUATION  Patient: Michael Romero (64 y.o. female)  Date: 3/9/2020  Primary Diagnosis: Left-sided weakness [R53.1]        Precautions:   Fall      ASSESSMENT  Based on the objective data described below, the patient presents with decreased R sided strength (RUE>RLE), decreased sensation R side, impaired balance and gait s/p admission 3/8 for R sided weakness and slurred speech. Pt with history of MS. Brain MRI: Slight increase of multiple T2 hyperintensities/MS plaques, several of which exhibit diffusion restriction. Pt received supine in bed with significant other present and agreeable to therapy. Pt tolerated evaluation fairly well. Pt with reports of nausea during sit<>stands, but BP stable and no episodes of vomiting. Pt able to complete transfers with RW with CGA and tolerated gait training x 5 ft with RW with CGA.  Pt demonstrated wide THADDEUS, decreased deidre, no LOB or evidence of RLE buckling. Anticipate pt will be able to return home with HHPT pending recovery of R sided weakness and progress with acute therapies. Recommend patient OOB to the chair and bathroom with RW + gait belt and 1 person assist for safety. Current Level of Function Impacting Discharge (mobility/balance): standby assist bed mobility, CGA transfers with RW, gait training with RW x 5 ft    Functional Outcome Measure: The patient scored Total Score: 19/28 on the Tinetti outcome measure which is indicative of moderate fall risk. Patient will benefit from skilled therapy intervention to address the above noted impairments. PLAN :  Recommendations and Planned Interventions: bed mobility training, transfer training, gait training, therapeutic exercises, neuromuscular re-education, patient and family training/education, and therapeutic activities      Frequency/Duration: Patient will be followed by physical therapy:  5 times a week to address goals. Recommendation for discharge: (in order for the patient to meet his/her long term goals)  To be determined: HHPT and assist with mobility  pending progress      IF patient discharges home will need the following DME: none         SUBJECTIVE:   Patient stated Jennifer Hicks was diagnosed with MS 10 years ago.     OBJECTIVE DATA SUMMARY:   HISTORY:    Past Medical History:   Diagnosis Date    Body aches 12/11/2014    Chronic pain     Depression     GERD (gastroesophageal reflux disease)     Headache(784.0)     History of mammogram never before    MS (multiple sclerosis) ([facility] [address]     Neurological disorder     Multiple Sclerosis    Pap smear for cervical cancer screening 2008    Psychiatric disorder     anxiety    Psychotic disorder [facility])      Past Surgical History:   Procedure Laterality Date    COLONOSCOPY N/A 2/28/2018    COLONOSCOPY performed by Marissa Wolf MD at [facility] ENDOSCOPY    HX CHOLECYSTECTOMY      HX GYN      3 c-sections    HX HEENT      bilateral ear tube surgery    HX HERNIA REPAIR      HX OTHER SURGICAL      R inguinal hernia repair         Home Situation  Home Environment: Apartment  # Steps to Enter: 2  Rails to Enter: No  One/Two Story Residence: One story  Living Alone: No  Support Systems: Spouse/Significant Other/Partner  Patient Expects to be Discharged to[de-identified] Private residence  Current DME Used/Available at Home: Walker, rollator, [id number] chair  Tub or Shower Type: Tub/Shower combination    EXAMINATION/PRESENTATION/DECISION MAKING:   Critical Behavior:              Hearing: Auditory  Auditory Impairment: None  Skin:    Edema:   Range Of Motion:  AROM: Within functional limits(except RUE, R ankle)                       Strength:    Strength: Within functional limits(except RE/RLE decreased)                    Tone & Sensation:                  Sensation: Impaired(RUE/RLE decreased to light touch)               Coordination:     Vision:      Functional Mobility:  Bed Mobility:     Supine to Sit: Stand-by assistance; Additional time        Transfers:  Sit to Stand: Contact guard assistance  Stand to Sit: Contact guard assistance        Bed to Chair: Contact guard assistance              Balance:   Sitting: Intact  Standing: Impaired; With support  Standing - Static: Good(w/ RW)  Standing - Dynamic : Good;Fair(w/ RW)  Ambulation/Gait Training:  Distance (ft): 5 Feet (ft)  Assistive Device: Gait belt;Walker, rolling  Ambulation - Level of Assistance: Contact guard assistance        Gait Abnormalities: Decreased step clearance; Step to gait;Trunk sway increased        Base of Support: Widened     Speed/Heena: Pace decreased (<100 feet/min)  Step Length: Right shortened;Left shortened         Therapeutic Exercises:    Ankle pumps, LAQ, seated marching    Functional Measure:  Tinetti test:    Sitting Balance: 1  Arises: 1  Attempts to Rise: 2  Immediate Standing Balance: 1  Standing Balance: 1  Nudged: 1  Eyes Closed: 1  Turn 360 Degrees - Continuous/Discontinuous: 1  Turn 360 Degrees - Steady/Unsteady: 1  Sitting Down: 1  Balance Score: 11 Balance total score  Indication of Gait: 1  R Step Length/Height: 1  L Step Length/Height: 1  R Foot Clearance: 1  L Foot Clearance: 1  Step Symmetry: 1  Step Continuity: 1  Path: 1  Trunk: 0  Walking Time: 0  Gait Score: 8 Gait total score  Total Score: 19/28 Overall total score         Tinetti Tool Score Risk of Falls  <19 = High Fall Risk  19-24 = Moderate Fall Risk  25-28 = Low Fall Risk  Tinetti ME. Performance-Oriented Assessment of Mobility Problems in Elderly Patients. [facility]; [id number]. (Scoring Description: PT Bulletin Feb. 10, 1993)    Older adults: Sheeba Duckworth et al, 2009; n = 1000 [facility] elderly evaluated with ABC, KETTY, ADL, and IADL)  · Mean KETTY score for males aged 69-68 years = 26.21(3.40)  · Mean KETTY score for females age 69-68 years = 25.16(4.30)  · Mean KETTY score for males over 80 years = 23.29(6.02)  · Mean KETTY score for females over 80 years = 17.20(8.32)           Based on the above components, the patient evaluation is determined to be of the following complexity level: MEDIUM    Pain Rating:  Pt denied pain    Activity Tolerance:   Good  Please refer to the flowsheet for vital signs taken during this treatment. After treatment patient left in no apparent distress:   Sitting in chair, Call bell within reach, and Caregiver / family present    COMMUNICATION/EDUCATION:   The patients plan of care was discussed with: Occupational therapist and Registered nurse. Fall prevention education was provided and the patient/caregiver indicated understanding., Patient/family have participated as able in goal setting and plan of care. , and Patient/family agree to work toward stated goals and plan of care.     Thank you for this referral.  Ladarius Peters, PT, DPT   Time Calculation: 21 mins

## 2020-03-09 NOTE — PROGRESS NOTES
Occupational Therapy  Orders received and medical record reviewed. Pt observed walking in the [place], ( no sign of weakness using RW) asking about an outside door, she had her coat draped on her RW. Once pt returned to room, several providers in room providing care-Pharmacy, telemetry, and NA in for vitals, lunch had arrived. Will defer at this time and follow up as appropriate to initiate OT Evaluation.

## 2020-03-09 NOTE — PROGRESS NOTES
Problem: Self Care Deficits Care Plan (Adult)  Goal: *Acute Goals and Plan of Care (Insert Text)  Description    FUNCTIONAL STATUS PRIOR TO ADMISSION: pt reports independence at baseline. Pt does not drive and walks to the nearby groc[place] using a cart to carry items. Pt lives with her significant other      HOME SUPPORT: The patient lived with significant other. Occupational Therapy Goals  Initiated 3/9/2020  1. Patient will perform grooming in standing, including set up, with independence within 7 day(s). 2.  Patient will perform sponge bathing with independence, including set up,  within 7 day(s). 3.  Patient will perform upper body dressing and lower body dressing, including set up,  with independence within 7 day(s). 4.  Patient will perform toilet transfers with modified independence within 7 day(s). 5.  Patient will perform all aspects of toileting with independence within 7 day(s). 6.  Patient will participate in upper extremity therapeutic exercise/activities with supervision/set-up for 5 minutes within 7 day(s). 7.  Patient will verbalized and demonstrate  utilizing energy conservation techniques during functional activities with minimal cues within 7 day(s). Outcome: Not Met   OCCUPATIONAL THERAPY EVALUATION  Patient: Lisa Morejon (26 y.o. female)  Date: 3/9/2020  Primary Diagnosis: Left-sided weakness [R53.1]        Precautions:   Fall    ASSESSMENT  Based on the objective data described below, the patient presents with R sided weakness (RUE 2 to 3/5), impaired sensation, decreased balance, generalized weakness and decreased endurance impairing adls and functional mobility. Pt is functioning at supervision to [address] level for adls and functional mobility. Pt reports that she is generally weak and is functioning below her baseline independent/modified independent  level. Pt will benefit from acute OT services.     Current Level of Function Impacting Discharge (ADLs/self-care): Mod I/supervison to CGA    Functional Outcome Measure: The patient scored Total: 75/100 on the Barthel Index outcome measure which is indicative of 25% impaired ability to care for basic self needs/dependency on others; inferred  dependency on others for instrumental ADLs. Other factors to consider for discharge: has assistance of her significant other     Patient will benefit from skilled therapy intervention to address the above noted impairments. PLAN :  Recommendations and Planned Interventions: self care training, functional mobility training, therapeutic exercise, balance training, therapeutic activities, endurance activities, neuromuscular re-education, patient education, home safety training, and family training/education    Frequency/Duration: Patient will be followed by occupational therapy 3 times a week to address goals. Recommendation for discharge: (in order for the patient to meet his/her long term goals)  Occupational therapy at least 2 days/week in the home     This discharge recommendation:  Has been made in collaboration with the attending provider and/or case management    IF patient discharges home will need the following DME: pt reports that she would benefit from a tub bench and a w/c       SUBJECTIVE:   Patient stated I have the one with 4 wheels.     OBJECTIVE DATA SUMMARY:   HISTORY:   Past Medical History:   Diagnosis Date    Body aches 12/11/2014    Chronic pain     Depression     GERD (gastroesophageal reflux disease)     Headache(784.0)     History of mammogram never before    MS (multiple sclerosis) ([address]     Neurological disorder     Multiple Sclerosis    Pap smear for cervical cancer screening 2008    Psychiatric disorder     anxiety    Psychotic disorder [facility])      Past Surgical History:   Procedure Laterality Date    COLONOSCOPY N/A 2/28/2018    COLONOSCOPY performed by Ivet Paul MD at [address] [place] [address] GYN 3 c-sections    HX HEENT      bilateral ear tube surgery    HX HERNIA REPAIR      HX OTHER SURGICAL      R inguinal hernia repair       Expanded or extensive additional review of patient history:     Home Situation  Home Environment: Apartment  # Steps to Enter: 2  Rails to Enter: No  One/Two Story Residence: One story  Living Alone: No  Support Systems: Spouse/Significant Other/Partner  Patient Expects to be Discharged to[de-identified] Private residence  Current DME Used/Available at Home: [place] [employer], rollator, [address] chair  Tub or Shower Type: Tub/Shower combination    Hand dominance: Right    EXAMINATION OF PERFORMANCE DEFICITS:  Cognitive/Behavioral Status:  Neurologic State: Alert  Orientation Level: Oriented X4  Cognition: Follows commands  Perception: Appears intact  Perseveration: No perseveration noted  Safety/Judgement: Awareness of environment    Skin: intact    Edema: none observed    Hearing: Auditory  Auditory Impairment: None    Vision/Perceptual:    Tracking: (scans environment effectively)                 Diplopia: No    Acuity: (no vision complaints)    Corrective Lenses: (none)    Range of Motion:  BUEs   AROM: (RUE impaired 2/5 shoulder,  is fair, decr strength,coord)                         Strength:  BUEs:    Strength: (impaired RUE)    on right (fair) is decreased compared to L              Coordination:     Fine Motor Skills-Upper: Left Intact; Right Impaired    Gross Motor Skills-Upper: Left Intact; Right Impaired    Tone & Sensation: Tone is normal     Sensation: Impaired(reports burning in BUEs and numbness on right side (UE/LE))                      Balance:  Sitting: Intact  Standing: Impaired; With support  Standing - Static: Constant support; Fair  Standing - Dynamic : Constant support; Fair    Functional Mobility and Transfers for ADLs:  Bed Mobility:  Supine to Sit: Stand-by assistance; Additional time  Sit to Supine: Stand-by assistance  Scooting: Independent    Transfers:  Sit to Stand: Contact guard assistance  Stand to Sit: Contact guard assistance  Bathroom Mobility: (pt reports up ad refugio using RW)  Toilet Transfer : Independent(pt reports up ad refugio with RW)  Assistive Device : (using RW when OOB in [place] and to bathroom)  Observed pt ambulating independently using RW in hallway. S/O reports pt has been up to bathroom several times using RW without assistance. ADL Assessment:  Feeding: Independent    Oral Facial Hygiene/Grooming: Independent    Bathing: Supervision;Stand-by assistance    Upper Body Dressing: Stand-by assistance;Setup    Lower Body Dressing: Stand-by assistance;Setup    Toileting: Independent                ADL Intervention and task modifications:   Pt educated on role of OT. Discussed OT plan of care--RUE strengthening and educated in activities to modulate sensation on RUE. Educated on safety during cooking as pt reports that she often burns herself. 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 Feel that [facility] OT will benefit pt to work on all aspects of home safety in her actual living environment. Cognitive Retraining  Safety/Judgement: Awareness of environment    Therapeutic Exercise:  Encouraged providing a variety of sensory input into RUE. Functional Measure:  Barthel Index:    Bathin  Bladder: 10  Bowels: 10  Groomin  Dressing: 10  Feeding: 10  Mobility: 10  Stairs: 0  Toilet Use: 10  Transfer (Bed to Chair and Back): 10  Total: 75/100        The Barthel ADL Index: Guidelines  1. The index should be used as a record of what a patient does, not as a record of what a patient could do. 2. The main aim is to establish degree of independence from any help, physical or verbal, however minor and for whatever reason. 3. The need for supervision renders the patient not independent.   4. A patient's performance should be established using the best available evidence. Asking the patient, friends/relatives and nurses are the usual sources, but direct observation and common sense are also important. However direct testing is not needed. 5. Usually the patient's performance over the preceding 24-48 hours is important, but occasionally longer periods will be relevant. 6. Middle categories imply that the patient supplies over 50 per cent of the effort. 7. Use of aids to be independent is allowed. Mikki Brambila., Barthel, DJanetW. ([id number]). Functional evaluation: the Barthel Index. [facility] (14)2. Kristian Fowler irvin KUSUM SimentalF, Bala Mike., Brandon Sinclair., Brandon, [address] [facility] (1999). Measuring the change indisability after inpatient rehabilitation; comparison of the responsiveness of the Barthel Index and Functional [place] Measure. Journal of Neurology, Neurosurgery, and Psychiatry, 66(4), 531-861. Paulette Scott, NJanetJ.A, BETTY Moss, & Miriam Anaya M.A. (2004.) Assessment of post-stroke quality of life in cost-effectiveness studies: The usefulness of the Barthel Index and the EuroQoL-5D. Quality of Life Research, 15, 858-86         Occupational Therapy Evaluation Charge Determination   History Examination Decision-Making   MEDIUM Complexity : Expanded review of history including physical, cognitive and psychosocial  history  MEDIUM Complexity : 3-5 performance deficits relating to physical, cognitive , or psychosocial skils that result in activity limitations and / or participation restrictions MEDIUM Complexity : Patient may present with comorbidities that affect occupational performnce. Miniml to moderate modification of tasks or assistance (eg, physical or verbal ) with assesment(s) is necessary to enable patient to complete evaluation       Based on the above components, the patient evaluation is determined to be of the following complexity level: MEDIUM  Pain Rating:  No reports of pain at this time.       Activity Tolerance:   Fair  Please refer to the flowsheet for vital signs taken during this treatment. After treatment patient left in no apparent distress:    Supine in bed, Call bell within reach, Caregiver / family present, and Side rails x 3    COMMUNICATION/EDUCATION:   The patients plan of care was discussed with: Speech therapist, Registered nurse, and Case management. Patient/family have participated as able in goal setting and plan of care. This patients plan of care is appropriate for delegation to hospitals.     Thank you for this referral.  Tiffani Andersen OTR/L  Time Calculation: 19 mins

## 2020-03-09 NOTE — PROGRESS NOTES
Spiritual Care Partner Volunteer visited patient in Neuro on March 9, 2020.     Documented by:       RICH Chong, [facility], Staff [address]    [address] Paging Service  [phone])

## 2020-03-09 NOTE — PHYSICIAN ADVISORY
Letter of Status Determination: Current Status INPATIENT is Appropriate Pt Name:  Sukumar Aragon MR#  [id number] [facility]#   [id number] Room and Hospital  3113/01  @ [facility] Hospitalization date  3/8/2020 12:02 PM  
Current Attending Physician  Haley Rodas MD  
Principal diagnosis  Weakness Jo Mcdermott is a 53 y.o.  female who presents with right-sided weakness in the upper extremity and lower extremity as well as slurred speech and facial droop. Symptoms started about 2 weeks ago and has become progressively worse. She reports that this is the first time she is having some speech deficit. And also right upper extremity weakness. Patient denies any visual change, headache. She has history of multiple sclerosis and follows neurology outpatient. The ED physician spoke with Dr. Giancarlo Montgomery who is the neurologist on call recommended that the patient should be given 1000 mg of Solu-Medrol daily for 3 days for likely MS flare and to obtain an MRI of the brain with and without contrast and also MRI of the thoracic and cervical spines. Patient also reports chronic constipation and urinary hesitancy. She denies any frequency and dysuria. MRI brain w/ Slight increase of multiple T2 hyperintensities/MS plaques, several of which exhibit diffusion restriction, Pt with MS flare needing IV steroids [place] MCG criteria Does  NOT apply STATUS DETERMINATION  On the basis of clinical data, available documentaion, we believe that the current status of this patient as INPATIENT is Appropriate The final decision of the patient's hospitalization status depends on the attending physician's judgment Additional comments Insurance  Payor: [employer] MEDICARE / Plan: [employer] MEDICARE CHOICE PPO/PFFS / Product Type: Managed Care Medicare / Insurance Information HU[employer]NA MEDICARE/BSI [employer] MEDICARE CHOICE PPO/PFFS Phone: Subscriber: Jeff Vann Subscriber#: [id number] Group#: [id number] Precert#:   
   
 [place] MEDICAID/[facility] Phone:   
 Subscriber: Jeff Vann Subscriber#: [id number] Group#: [id number] Precert#:   
  
 
  
 
 
 
 
Javy Cali MD 
Cell: [phone] Physician Advisor

## 2020-03-10 ENCOUNTER — APPOINTMENT (OUTPATIENT)
Dept: GENERAL RADIOLOGY | Age: 45
DRG: 060 | End: 2020-03-10
Attending: INTERNAL MEDICINE
Payer: MEDICARE

## 2020-03-10 LAB
GLUCOSE BLD STRIP.AUTO-MCNC: 107 MG/DL (ref 65–100)
GLUCOSE BLD STRIP.AUTO-MCNC: 128 MG/DL (ref 65–100)
GLUCOSE BLD STRIP.AUTO-MCNC: 153 MG/DL (ref 65–100)
SERVICE CMNT-IMP: ABNORMAL

## 2020-03-10 PROCEDURE — 74011250637 HC RX REV CODE- 250/637: Performed by: HOSPITALIST

## 2020-03-10 PROCEDURE — 92526 ORAL FUNCTION THERAPY: CPT

## 2020-03-10 PROCEDURE — 92611 MOTION FLUOROSCOPY/SWALLOW: CPT

## 2020-03-10 PROCEDURE — 82962 GLUCOSE BLOOD TEST: CPT

## 2020-03-10 PROCEDURE — 74011250637 HC RX REV CODE- 250/637: Performed by: PSYCHIATRY & NEUROLOGY

## 2020-03-10 PROCEDURE — 74011250637 HC RX REV CODE- 250/637: Performed by: FAMILY MEDICINE

## 2020-03-10 PROCEDURE — 74230 X-RAY XM SWLNG FUNCJ C+: CPT

## 2020-03-10 PROCEDURE — 65270000029 HC RM PRIVATE

## 2020-03-10 PROCEDURE — 74011000258 HC RX REV CODE- 258: Performed by: FAMILY MEDICINE

## 2020-03-10 PROCEDURE — 97530 THERAPEUTIC ACTIVITIES: CPT

## 2020-03-10 PROCEDURE — 74011250636 HC RX REV CODE- 250/636: Performed by: FAMILY MEDICINE

## 2020-03-10 PROCEDURE — 97110 THERAPEUTIC EXERCISES: CPT

## 2020-03-10 RX ORDER — ONDANSETRON 4 MG/1
4 TABLET, ORALLY DISINTEGRATING ORAL
Status: DISCONTINUED | OUTPATIENT
Start: 2020-03-10 | End: 2020-03-11 | Stop reason: HOSPADM

## 2020-03-10 RX ADMIN — BISACODYL 5 MG: 5 TABLET, COATED ORAL at 17:05

## 2020-03-10 RX ADMIN — ASPIRIN 325 MG: 325 TABLET, FILM COATED ORAL at 08:24

## 2020-03-10 RX ADMIN — GABAPENTIN 800 MG: 100 CAPSULE ORAL at 21:36

## 2020-03-10 RX ADMIN — HEPARIN SODIUM 5000 UNITS: 5000 INJECTION INTRAVENOUS; SUBCUTANEOUS at 21:37

## 2020-03-10 RX ADMIN — LORAZEPAM 1 MG: 1 TABLET ORAL at 21:37

## 2020-03-10 RX ADMIN — DIMETHYL FUMARATE 240 MG: 240 CAPSULE ORAL at 19:27

## 2020-03-10 RX ADMIN — DULOXETINE 60 MG: 30 CAPSULE, DELAYED RELEASE ORAL at 08:24

## 2020-03-10 RX ADMIN — POLYETHYLENE GLYCOL (3350) 17 G: 17 POWDER, FOR SOLUTION ORAL at 08:29

## 2020-03-10 RX ADMIN — HEPARIN SODIUM 5000 UNITS: 5000 INJECTION INTRAVENOUS; SUBCUTANEOUS at 05:09

## 2020-03-10 RX ADMIN — SODIUM CHLORIDE 1000 MG: 900 INJECTION, SOLUTION INTRAVENOUS at 10:23

## 2020-03-10 RX ADMIN — DIMETHYL FUMARATE 240 MG: 240 CAPSULE ORAL at 05:08

## 2020-03-10 RX ADMIN — ONDANSETRON 4 MG: 4 TABLET, ORALLY DISINTEGRATING ORAL at 12:38

## 2020-03-10 RX ADMIN — GABAPENTIN 800 MG: 100 CAPSULE ORAL at 16:59

## 2020-03-10 RX ADMIN — GABAPENTIN 800 MG: 100 CAPSULE ORAL at 08:23

## 2020-03-10 RX ADMIN — HEPARIN SODIUM 5000 UNITS: 5000 INJECTION INTRAVENOUS; SUBCUTANEOUS at 15:01

## 2020-03-10 NOTE — PROGRESS NOTES
Physical Therapy    Chart reviewed and nursing cleared to see, attempted visit but patient sleeping and reports she is too tired to practice stairs this afternoon. Plan to defer and  see in a.m. tomorrow.      Maribel Butt

## 2020-03-10 NOTE — PROGRESS NOTES
No pain or complaint with reassessment. Has slept well these hours. No change in neuro status noted.

## 2020-03-10 NOTE — PROGRESS NOTES
Patient c/o IV burning after 82 cc of 100cc bag. Dr Jeremias Stoll notified and stated that is enough. Patient c/o nausea after infusion just like yesterday. States she doesn't want subl zofran because she accidentally swallows it. Spoke to pharmacist who stated it is fine for pt to swallow subl tab.

## 2020-03-10 NOTE — PROGRESS NOTES
* No surgery found *  * No surgery found *  Bedside shift change report given to Peri(oncoming nurse) by Sergey Luna (offgoing nurse). Report included the following information [facility]. [facility] Phone:   [phone]      Significant changes during shift:  Med x one for nausea after receiving steroids with relief. IV started hurting at end of IV steroids. MD aware. No access.  No further steroids to be given        Patient Information    Mariposa Romero  53 y.o.  3/8/2020 12:02 PM by Alexander Khan MD. Mariposa Romero was admitted from Home    Problem List    Patient Active Problem List    Diagnosis Date Noted    Exacerbation of multiple sclerosis ([facility] 03/09/2020    Left-sided weakness 03/08/2020    Severe obesity ([address] 10/03/2018    Constipation 07/17/2015    Body aches 12/11/2014    Back pain 09/07/2012    Chronic pain 08/17/2012    MS (multiple sclerosis) ([facility] 08/17/2012    Decreased hearing 01/31/2011    Acute pharyngitis 01/26/2011    Anxiety 08/25/2010    Blood pressure elevated 08/25/2010    Tobacco abuse 08/25/2010     Past Medical History:   Diagnosis Date    Body aches 12/11/2014    Chronic pain     Depression     GERD (gastroesophageal reflux disease)     Headache(784.0)     History of mammogram never before    MS (multiple sclerosis) ([address]     Neurological disorder     Multiple Sclerosis    Pap smear for cervical cancer screening 2008    Psychiatric disorder     anxiety    Psychotic disorder ([facility]          Core Measures:    CVA: Yes Yes  CHF:No No  PNA:No No    Post Op Surgical (If Applicable):     N  Activity Status:    OOB to Chair Yes  Ambulated this shift Yes   Bed Rest No    Supplemental O2: (If Applicable)    NC No  NRB No  Venti-mask No  On  Liters/min      LINES AND DRAINS:PIV      DVT prophylaxis:    DVT prophylaxis Med- Yes  DVT prophylaxis SCD or CONG- No     Wounds: (If Applicable)    Wounds- No    Location     Patient Safety:    Falls Score Total Score: 2  Safety Level_______  Bed Alarm On? Yes  Sitter? No    Plan for upcoming shift: SAfety        Discharge Plan: Home tomorrow.  PT will work with patient in am to practice stepping up onto stoop that she will do to get into her house    Active Consults:  23 Laurel Shaver HOSPITALIST

## 2020-03-10 NOTE — PROGRESS NOTES
Problem: Self Care Deficits Care Plan (Adult)  Goal: *Acute Goals and Plan of Care (Insert Text)  Description    FUNCTIONAL STATUS PRIOR TO ADMISSION: pt reports independence at baseline. Pt does not drive and walks to the nearby groc. [employer] using a cart to carry items. Pt lives with her significant other      HOME SUPPORT: The patient lived with significant other. Occupational Therapy Goals  Initiated 3/9/2020  1. Patient will perform grooming in standing, including set up, with independence within 7 day(s). 2.  Patient will perform sponge bathing with independence, including set up,  within 7 day(s). 3.  Patient will perform upper body dressing and lower body dressing, including set up,  with independence within 7 day(s). 4.  Patient will perform toilet transfers with modified independence within 7 day(s). 5.  Patient will perform all aspects of toileting with independence within 7 day(s). 6.  Patient will participate in upper extremity therapeutic exercise/activities with supervision/set-up for 5 minutes within 7 day(s). 7.  Patient will verbalized and demonstrate  utilizing energy conservation techniques during functional activities with minimal cues within 7 day(s). Outcome: Progressing Towards Goal   OCCUPATIONAL THERAPY TREATMENT  Patient: Dickson Michael (10 y.o. female)  Date: 3/10/2020  Diagnosis: Left-sided weakness [R53.1]   <principal problem not specified>       Precautions: Fall  Chart, occupational therapy assessment, plan of care, and goals were reviewed. ASSESSMENT  Patient continues with skilled OT services and is progressing towards goals. Overall she is mobilizing safely with Supervision using RW, yet continued to demonstrate RUE weakness and impaired sensation. Issued foam block and Theraband for RUE strengthening to maximize Pts independence with self-care tasks using b/l hands.  Pt and s/o were concerned regarding Pts ability to navigate up x2 step to entryway therefore guided Pt through trial of stepping into elevated surface with resting x1 hand on surface and hands-free. Pt was very unsteady w/o HHA and demonstrates significant difficulty with R hip flexion into surface. Recommended issuance of safety belt and HHA from s/o if Pt needs to ambulate up these x2 steps. PT aware of trial. Continue to recommend [place] OT to maximize Pts safety and endurance with ADL tasks in her home environment. Current Level of Function Impacting Discharge (ADLs): Independent to SBA with ADLs    Other factors to consider for discharge: None     PLAN :  Patient continues to benefit from skilled intervention to address the above impairments. Continue treatment per established plan of care. to address goals. Recommend with staff: Supervision with mobility using RW    Recommend next OT session: Review foam block and Theraband RUE exercises; Modulate sensation in RUE    Recommendation for discharge: (in order for the patient to meet his/her long term goals)  Occupational therapy at least 2 days/week in the home AND ensure assist and/or supervision for safety with ADLs and related mobility. This discharge recommendation:  Has been made in collaboration with the attending provider and/or case management    IF patient discharges home will need the following DME: None for OT; Issued blue foam block and yellow Theraband. SUBJECTIVE:   Patient stated I just feel so weak\"    OBJECTIVE DATA SUMMARY:   Cognitive/Behavioral Status:  Neurologic State: Alert; Appropriate for age  Orientation Level: Oriented X4  Cognition: Follows commands     Functional Mobility and Transfers for ADLs:  Bed Mobility:  Supine to Sit: Supervision  Scooting: Supervision    Transfers:  Sit to Stand: Supervision   Bed to Chair: Supervision    Balance:  Sitting: Intact  Standing: Impaired; With support  Standing - Static: Good;Constant support  Standing - Dynamic : Fair;Constant support    ADL Intervention:     Grooming  Position Performed: Standing  Washing Hands: Supervision    Pain:  None    Activity Tolerance:   Fair  Please refer to the flowsheet for vital signs taken during this treatment. After treatment patient left in no apparent distress:   Supine in bed, Call bell within reach, and Caregiver / family present    COMMUNICATION/COLLABORATION:   The patients plan of care was discussed with: Physical therapist, Registered nurse, and Patient .      Nga Alejo, OTR/L  Time Calculation: 23 mins

## 2020-03-10 NOTE — PROGRESS NOTES
Problem: Dysphagia (Adult)  Goal: *Acute Goals and Plan of Care (Insert Text)  Description  Speech pathology goals initiated 3/9/2020   1. Patient will tolerate dental soft diet/ nectar thick liquids free of s/s aspiration within 7 days. Advance to dental soft/thin  2. Patient will tolerate thin liquid trials free of s/s aspiration with slp. MET  3. Patient will participate in [facility] as clinically indicated. MET   3/10/2020 1442 by DENEEN Tadeo  Outcome: Progressing Towards Goal  3/10/2020 1032 by Modesta Lanza, SLP  Outcome: [address]  Patient: Lucero Smith (62 y.o. female)  Date: 3/10/2020  Primary Diagnosis: Left-sided weakness [R53.1]        Precautions: swallow,  Fall    ASSESSMENT :  Based on the objective data described below, the patient presents with mild oral dysphagia characterized by posterior loss of ~50% of bolus both during initial bolus hold and intermittently during subsequent swallows without bolus hold. Toward end of study, only minimal posterior bolus loss noted during bolus hold. Patient also with mild-moderate pharyngeal dysphagia characterized by mildly delayed swallow initiation at the level of the pyriform sinuses. Patient also with decreased laryngeal elevation, anterior hyoid excursion, and PES opening, however no significant pharyngeal residue observed. Patient with intermittent penetration of thin liquids during the swallow secondary to delayed swallow initiation, however majority of penetration cleared with the force of the swallow. Patient with trace, silent aspiration x1 of thin liquid via large successive straw sip during the swallow due to delay. Patient also with trace penetration to the cords of large cup sip x1 before the swallow secondary to posterior bolus loss, and no response elicited from patient (cough/throat clear).  Patient also with penetration of nectar thick liquids during the swallow secondary to delay which cleared with the force of the swallow and elicited a throat clear. Only flash penetration noted with thin liquids via small cup sip and bolus hold. Patient will benefit from skilled intervention to address the above impairments. Patients rehabilitation potential is considered to be Good     PLAN :  Recommendations and Planned Interventions:  -Dental soft / Thin liquid diet.   -Upright 90 degrees, No straws, Single sips and bolus hold. -Continue excellent oral care  -SLP to follow for diet tolerance and ensure use of strategies   Frequency/Duration: Patient will be followed by speech-language pathology 3 times a week to address goals. Discharge Recommendations: To Be Determined     SUBJECTIVE:   Patient stated Leanna Fatima got it after education regarding compensatory strategies.     OBJECTIVE:     Past Medical History:   Diagnosis Date    Body aches 12/11/2014    Chronic pain     Depression     GERD (gastroesophageal reflux disease)     Headache(784.0)     History of mammogram never before    MS (multiple sclerosis) (HCC)     Neurological disorder     Multiple Sclerosis    Pap smear for cervical cancer screening 2008    Psychiatric disorder     anxiety    Psychotic disorder [facility])      Past Surgical History:   Procedure Laterality Date    COLONOSCOPY N/A 2/28/2018    COLON[facility] performed by [facility]ly Velasco [facility] at [facility]OSCOPY    HX CHOLECYSTECTOMY      HX GYN      3 c-sections    HX HEENT      bilateral ear tube surgery    HX HERNIA REPAIR      HX OTHER SURGICAL      R inguinal hernia repair     Prior Level of Function/Home Situation:   Home Situation  Home Environment: Apartment  # Steps to Enter: 2  Rails to Enter: No  One/Two Story Residence: One story  Living Alone: No  Support Systems: Spouse/Significant Other/Partner  Patient Expects to be Discharged to[de-identified] Private residence  Current DME Used/Available at Home: soni Ruelas, [address] [facility] chair  Tub or Shower Type: Tub/Shower combination  Diet prior to admission: soft/thin  Current Diet:  Soft/nectar   Radiologist: Dr. Corina Murphy: Lateral;Fluoro  Patient Position: upright in chair    Trial 1:   Consistency Presented: Thin liquid; Nectar thick liquid;Puree; Solid   How Presented: Self-fed/presented;Cup/sip;Cup/gulp; Spoon;Straw;Successive swallows       Bolus Acceptance: No impairment   Bolus Formation/Control: Impaired: Premature spillage   Propulsion: No impairment   Oral Residue: None   Initiation of Swallow: Triggered at pyriform sinus(es)   Timing: Pooling 1-5 sec   Penetration: Before swallow;During swallow   Aspiration/Timing: During(x1 with large straw sip of thin)   Pharyngeal Clearance: No residue   Attempted Modifications: Cup/sip; Other (comment)(bolus hold)   Effective Modifications: Cup/sip;Small sips and bites; Other (comment)(bolus hold)   Cues for Modifications: Minimal           Decreased Tongue Base Retraction?: No  Laryngeal Elevation: Reduced excursion with laryngeal vestibule gap; Incomplete laryngeal closure  Aspiration/Penetration Score: 8 (Aspiration-Contrast passes cords/glottis with no effort to eject, ie/silent aspiration)  Pharyngeal Symmetry: Not assessed  Pharyngeal-Esophageal Segment: Other (comment)(bolus in cervical esophagus ? belched back up through PES x1)  Pharyngeal Dysfunction: Decreased strength;Decreased elevation/closure    Oral Phase Severity: Mild  Pharyngeal Phase Severity: Mild moderate  NOMS:   The NOMS functional outcome measure was used to quantify this patient's level of swallowing impairment. Based on the NOMS, the patient was determined to be at level 6 for swallow function         NOMS Swallowing Levels:  Level 1 (CN): NPO  Level 2 (CM): NPO but takes consistency in therapy  Level 3 (CL): Takes less than 50% of nutrition p.o. and continues with nonoral feedings; and/or safe with mod cues; and/or max diet restriction  Level 4 (CK):  Safe swallow but needs mod cues; and/or mod diet restriction; and/or still requires some nonoral feeding/supplements  Level 5 (CJ): Safe swallow with min diet restriction; and/or needs min cues  Level 6 (CI): Independent with p.o.; rare cues; usually self cues; may need to avoid some foods or needs extra time  Level 7 ([address] [place] [address]): Independent for all p.o.  SUBHA. (2003). National Outcomes Measurement System (NOMS): Adult Speech-Language Pathology User's Guide. COMMUNICATION/EDUCATION:   Patient was educated regarding Her deficit(s) of dysphagia as this relates to Her diagnosis of MS. She demonstrated Good understanding as evidenced by verbalizing understanding. The patients plan of care including findings from [facility], recommendations, planned interventions, and recommended diet changes were discussed with: Registered nurse. Patient/family have participated as able in goal setting and plan of care. Patient/family agree to work toward stated goals and plan of care.     Thank you for this referral.  Patria Fong, SLP  Time Calculation: 30 mins

## 2020-03-10 NOTE — PROGRESS NOTES
Problem: Falls - Risk of  Goal: *Absence of Falls  Description  Document Samsongar Brunner Fall Risk and appropriate interventions in the flowsheet.   Outcome: Progressing Towards Goal  Note: Fall Risk Interventions:  Mobility Interventions: Communicate number of staff needed for ambulation/transfer, Patient to call before getting OOB         Medication Interventions: Patient to call before getting OOB    Elimination Interventions: Call light in reach              Problem: Patient Education: Go to Patient Education Activity  Goal: Patient/Family Education  Outcome: Progressing Towards Goal

## 2020-03-10 NOTE — PROGRESS NOTES
Problem: Dysphagia (Adult)  Goal: *Acute Goals and Plan of Care (Insert Text)  Description  Speech pathology goals initiated 3/9/2020   1. Patient will tolerate dental soft diet/ nectar thick liquids free of s/s aspiration within 7 days   2. Patient will tolerate thin liquid trials free of s/s aspiration with slp  3. Patient will participate in [address] as clinically indicated   Outcome: Progressing Towards Goal     SPEECH LANGUAGE PATHOLOGY DYSPHAGIA TREATMENT  Patient: Umesh Rolon (84 y.o. female)  Date: 3/10/2020  Diagnosis: Left-sided weakness [R53.1]   <principal problem not specified>       Precautions: swallow, Fall    ASSESSMENT:  Patient with improved oropharyngeal swallow this date. Patient with no overt s/s aspiration with successive sips of thin liquid via cup and straw. However, when patient drinking thin liquid after solid, coughing noted. Recommend MBS to fully assess pharyngeal swallow. Discussed with patient, family, RN, and MD. MBS tentatively scheduled for 1330. Addendum: Patient reported that motor speech function has returned to baseline. Patient reported she has a country accent and no one is going to take the [place] out of her. Will complete motor speech POC as deficits have resolved per patient report. PLAN:  Recommendations and Planned Interventions:  -Continue current diet until MBS at 1330 today  Patient continues to benefit from skilled intervention to address the above impairments. Continue treatment per established plan of care. Discharge Recommendations: To Be Determined     SUBJECTIVE:   Patient stated That's what I can't do re: lavelle randolph. OBJECTIVE:   Cognitive and Communication Status:  Neurologic State: Alert, Appropriate for age  Orientation Level: Oriented X4  Cognition: Follows commands  Perception: Appears intact  Perseveration: No perseveration noted  Safety/Judgement: Awareness of environment  Dysphagia Treatment:     P.O.  Trials:  Patient Position: upright in bed  Vocal quality prior to P.O.: No impairment  Consistency Presented: Thin liquid; Solid  How Presented: Self-fed/presented;Cup/gulp;Straw;Successive swallows     Bolus Acceptance: No impairment  Bolus Formation/Control: No impairment     Propulsion: No impairment  Oral Residue: None  Initiation of Swallow: Delayed (# of seconds)  Laryngeal Elevation: Decreased  Aspiration Signs/Symptoms: Strong cough(with thin liquid after solid)  Pharyngeal Phase Characteristics: Double swallow; Suspected pharyngeal residue             Pain:  Pain Scale 1: Numeric (0 - 10)  Pain Intensity 1: 0       After treatment:   Patient left in no apparent distress in bed, Call bell within reach, Nursing notified, and Caregiver / family present    COMMUNICATION/EDUCATION:   Patient was educated regarding her deficit(s) of dysphagia as this relates to her diagnosis of MS. She demonstrated Excellent understanding as evidenced by verbalizing understanding. The patient's plan of care including recommendations, planned interventions, and recommended diet changes were discussed with: Registered nurse.      Rosalie Larsen SLP  Time Calculation: 15 mins

## 2020-03-11 ENCOUNTER — HOME HEALTH ADMISSION (OUTPATIENT)
Dept: HOME HEALTH SERVICES | Facility: HOME HEALTH | Age: 45
End: 2020-03-11
Payer: MEDICARE

## 2020-03-11 VITALS
BODY MASS INDEX: 34.15 KG/M2 | HEART RATE: 60 BPM | SYSTOLIC BLOOD PRESSURE: 124 MMHG | DIASTOLIC BLOOD PRESSURE: 69 MMHG | TEMPERATURE: 98.4 F | RESPIRATION RATE: 16 BRPM | WEIGHT: 200 LBS | HEIGHT: 64 IN | OXYGEN SATURATION: 100 %

## 2020-03-11 LAB
GLUCOSE BLD STRIP.AUTO-MCNC: 103 MG/DL (ref 65–100)
GLUCOSE BLD STRIP.AUTO-MCNC: 138 MG/DL (ref 65–100)
SERVICE CMNT-IMP: ABNORMAL
SERVICE CMNT-IMP: ABNORMAL

## 2020-03-11 PROCEDURE — 97116 GAIT TRAINING THERAPY: CPT

## 2020-03-11 PROCEDURE — 74011250637 HC RX REV CODE- 250/637: Performed by: PSYCHIATRY & NEUROLOGY

## 2020-03-11 PROCEDURE — 92526 ORAL FUNCTION THERAPY: CPT

## 2020-03-11 PROCEDURE — 82962 GLUCOSE BLOOD TEST: CPT

## 2020-03-11 PROCEDURE — 74011250637 HC RX REV CODE- 250/637: Performed by: FAMILY MEDICINE

## 2020-03-11 PROCEDURE — 74011250637 HC RX REV CODE- 250/637: Performed by: HOSPITALIST

## 2020-03-11 PROCEDURE — 74011250636 HC RX REV CODE- 250/636: Performed by: FAMILY MEDICINE

## 2020-03-11 RX ORDER — PREDNISONE 20 MG/1
TABLET ORAL
Qty: 7 TAB | Refills: 0 | Status: SHIPPED | OUTPATIENT
Start: 2020-03-11 | End: 2020-04-22 | Stop reason: ALTCHOICE

## 2020-03-11 RX ORDER — LORAZEPAM 1 MG/1
1 TABLET ORAL
Qty: 10 TAB | Refills: 0 | Status: SHIPPED | OUTPATIENT
Start: 2020-03-11 | End: 2020-03-17 | Stop reason: SDUPTHER

## 2020-03-11 RX ADMIN — DULOXETINE 60 MG: 30 CAPSULE, DELAYED RELEASE ORAL at 08:06

## 2020-03-11 RX ADMIN — POLYETHYLENE GLYCOL (3350) 17 G: 17 POWDER, FOR SOLUTION ORAL at 08:05

## 2020-03-11 RX ADMIN — IBUPROFEN 600 MG: 600 TABLET, FILM COATED ORAL at 08:19

## 2020-03-11 RX ADMIN — LORAZEPAM 1 MG: 1 TABLET ORAL at 08:12

## 2020-03-11 RX ADMIN — DIMETHYL FUMARATE 240 MG: 240 CAPSULE ORAL at 08:05

## 2020-03-11 RX ADMIN — HEPARIN SODIUM 5000 UNITS: 5000 INJECTION INTRAVENOUS; SUBCUTANEOUS at 08:05

## 2020-03-11 RX ADMIN — GABAPENTIN 800 MG: 100 CAPSULE ORAL at 08:06

## 2020-03-11 RX ADMIN — ASPIRIN 325 MG: 325 TABLET, FILM COATED ORAL at 08:06

## 2020-03-11 NOTE — PROGRESS NOTES
Message sent to pharmacy with concern regarding packaging in the patient specific bin with the medication dimethyl fumarate CpDR 240 mg (patients personal medication) stating that this medication was a controlled substance and arrived to unit with a count sheet, count sheet not available pharmacy notified per pharmacist this medication is not a controlled substance and does not require to be secured.

## 2020-03-11 NOTE — PROGRESS NOTES
SHOLA:    HHC to be arranged  DME arranged   2nd IM Medicare Letter  Home with Family    UPDATE: 2:30PM    CM informed that [facility] has accepted pt for [facility] services. CM will inform pt of the following. CM completed the needs of the pt at this time. LATASHA Vargas, [place]        UPDATE: 1:15PM    CM informed that pt rolling walker is covered, however, [facility]. CM informed that yanira walker will be delivered to pt's home on today. CM informed pt of the following. CM informed that [facility] could not accept. CM sent referral to [facility]. CM is currently waiting for response. LATASHA Vargas, [place]          CM: Neema Nichole is currently working with pt in the Neuro Unit. CM informed that pt is in need of [facility] arranged. CM completed room visit with pt, with family by bedside, to discuss recommendations for [facility]. Pt agreeable to services and referral to be sent to [facility] agencies. Pt requested DME to be arranged: rolling walker and bedside commode. CM sent [facility] referral to [facility], and sent referral for DME to [place], both referrals pending. CM informed pt of FOC and 2nd IM Medicare Letter (signed) placed in chart. Pt will have family to transport home on today.     LATASHA Vargas, [address] [place] [address]

## 2020-03-11 NOTE — PROGRESS NOTES
Consult  REFERRED BY:  None    CHIEF COMPLAINT: Generalized weakness and right-sided weakness      Subjective: Gallito Cook is a 53 y.o. right-handed  female we are asked to evaluate by the hospitalist for evaluation of new problem of generalized weakness and right-sided weakness that has been slowly getting worse for the last month and the patient was admitted for hospitalization and for IV steroids. Her MRI scan of the cervical and thoracic spine look stable with no new lesions, but clear multiple MS type old lesions. Her MRI of the brain showed no enhancing lesions, multiple old MS lesions from what progressive since her last scan, and showing several areas of diffusion positive images probably suggesting subacute disease flareups. Patient has undergone benzodiazepine withdrawal and Neurontin reduction and has undergone withdrawal from all of that in addition. She denies any new fever, meningismus, or any other new neurologic problem. She has had no unusual headache or trauma. She is followed by Dr. Meet Serrano her neurologist.  She is on Tecfidera and takes her medications regularly. She says she is not had a major exacerbation in some time. She is a little stronger after her second dose of IV Solu-Medrol, she is ambulating better, and with assistive devices she could probably go home and do home health and that is what she would like to do we discussed this both with the patient and her  in detail  Patient has finished IV steroids, feels very good today, wants to go home, she will be discharged today to follow-up with Dr. Meet Serrano in the office to discuss whether or not she should change her disease modifying drugs  Discussed with the patient and her  in detail. They will get outpatient home health.     Past Medical History:   Diagnosis Date    Body aches 12/11/2014    Chronic pain     Depression     GERD (gastroesophageal reflux disease)     Headache(784.0)     History of mammogram never before    MS [facility] sclerosis) [facility] [address]     Neurological disorder     Multiple Sclerosis    Pap smear for cervical cancer screening 2008    Psychiatric disorder     anxiety    Psychotic disorder [facility])       Past Surgical History:   Procedure Laterality Date    COLONOSCOPY N/A 2/28/2018    COLONOSCOPY [facility] Slime [facility]    HX CHOLECYSTECTOMY      HX GYN      3 c-sections    HX HEENT      bilateral ear tube surgery    HX HERNIA REPAIR      HX OTHER SURGICAL      R inguinal hernia repair     Family History   Problem Relation Age of Onset    Heart Attack Father         tripple bypass    Cancer Father         lung    COPD Father     Diabetes Mother         type 2    Heart Disease Mother         heart disease    Diabetes Paternal Grandmother     No Known Problems Sister     No Known Problems Brother     No Known Problems Child       Social History     Tobacco Use    Smoking status: Current Every Day Smoker     Packs/day: 0.50     Years: 17.00     Pack years: 8.50     Types: Cigarettes    Smokeless tobacco: Never Used    Tobacco comment: 1 pack every 3 days. Substance Use Topics    Alcohol use: No       No current facility-administered medications for this encounter. Current Outpatient Medications:     predniSONE (DELTASONE) 20 mg tablet, Take 2 tabs by mouth with breakfast for 2 days, then take 1 tab for 2 days, then take 1/2 tab for 2 days then discontinue, Disp: 7 Tab, Rfl: 0    LORazepam (ATIVAN) 1 mg tablet, Take 1 Tab by mouth every twelve (12) hours as needed for Anxiety (q12h prn). Max Daily Amount: 2 mg., Disp: 10 Tab, Rfl: 0    DULoxetine (CYMBALTA) 60 mg capsule, Take 1 Cap by mouth daily. Indications: neuropathic pain, Disp: 30 Cap, Rfl: 1    gabapentin (NEURONTIN) 400 mg capsule, Take 2 Caps by mouth three (3) times daily.  Max Daily Amount: 2,400 mg., Disp: 120 Cap, Rfl: 5    dimethyl fumarate (TECFIDERA) 240 mg cpDR, Take 240 mg by mouth two (2) times a day., Disp: 120 Cap, Rfl: 3    FLUoxetine (PROZAC) 40 mg capsule, Take 1 Cap by mouth daily. , Disp: 30 Cap, Rfl: 1    ondansetron (ZOFRAN ODT) 4 mg disintegrating tablet, Take 1 Tab by mouth every eight (8) hours as needed for Nausea or Vomiting., Disp: 10 Tab, Rfl: 0    senna-docusate (PERICOLACE) 8.6-50 mg per tablet, Take 2 Tabs by mouth daily. , Disp: 15 Tab, Rfl: 0    ibuprofen (MOTRIN) 600 mg tablet, Take 1 Tab by mouth every six (6) hours as needed for Pain., Disp: 20 Tab, Rfl: 0        Allergies   Allergen Reactions    Morphine Rash      MRI Results (most recent):  Results from Hospital Encounter encounter on 03/08/20   MRI [facility] SPINE W WO CONT    Narrative HISTORY: MS.    COMPARISON: None    TECHNIQUE: MR imaging of the thoracic spine was performed with the following  sequences: sagittal T1, T2, stir; axial T1, gradient echo. Sagittal and axial T1  postcontrast. Contrast: 20 mL of Dotarem IV. FINDINGS:  There are multiple T2 hyperintensities of the cord compatible with MS plaques. There is no syrinx or cord enlargement. There is no pathologic enhancement. There is normal alignment of the thoracic spine. Vertebral body heights are  maintained. Marrow signal is normal. The spinal canal and neural foramina are  widely patent at all levels. Normal conus level. Impression IMPRESSION: Multiple thoracic cord MS plaques without enhancement. No spinal  stenosis. Results from [place] encounter on 03/08/20   MRI [facility] SPINE W WO CONT    Narrative HISTORY: MS.    COMPARISON: None    TECHNIQUE: MR imaging of the thoracic spine was performed with the following  sequences: sagittal T1, T2, stir; axial T1, gradient echo. Sagittal and axial T1  postcontrast. Contrast: 20 mL of Dotarem IV. FINDINGS:  There are multiple T2 hyperintensities of the cord compatible with MS plaques. There is no syrinx or cord enlargement.   There is no pathologic enhancement. There is normal alignment of the thoracic spine. Vertebral body heights are  maintained. Marrow signal is normal. The spinal canal and neural foramina are  widely patent at all levels. Normal conus level. Impression IMPRESSION: Multiple thoracic cord MS plaques without enhancement. No spinal  stenosis. Review of Systems:  A comprehensive review of systems was negative except for: Constitutional: positive for fatigue and malaise  Musculoskeletal: positive for myalgias, arthralgias and muscle weakness  Neurological: positive for paresthesia, coordination problems, gait problems and weakness  Behvioral/Psych: positive for anxiety and depression   Vitals:    03/11/20 0031 03/11/20 0345 03/11/20 0755 03/11/20 1146   BP: 113/61 106/77 108/78 124/69   Pulse: (!) 54 (!) 57 65 60   Resp: 18 20 18 16   Temp: 97.8 °F (36.6 °C) 98.6 °F (37 °C) 97.6 °F (36.4 °C) 98.4 °F (36.9 °C)   SpO2: 97% 100% 100% 100%   Weight:       Height:         Objective:     I      NEUROLOGICAL EXAM:    Appearance: The patient is well developed, well nourished, provides a coherent history and is in no acute distress. Mental Status: Oriented to time, place and person, and the president, cognitive function is normal and speech is fluent and no aphasia or dysarthria. Mood and affect appropriate but mildly depressed. Cranial Nerves:   Intact visual fields. Fundi are benign, disc are flat, with mild optic pallor. SHEFALI, EOM's full, no nystagmus, no ptosis. Facial sensation is normal. Corneal reflexes are not tested. Facial movement is weak on the right. Hearing is normal bilaterally. Palate is midline with normal sternocleidomastoid and trapezius muscles are normal. Tongue is midline. Neck without meningismus or bruits  Temporal arteries are not tender or enlarged  TMJ areas are not tender on palpation   Motor:   Patient has moderate spastic right hemiparesis, and mild spastic left hemiparesis from her MS.   Muscle tone is increased, right greater than left, muscle bulk is normal.  Rapid alternating movement is slow bilaterally right side greater than left   Reflexes:   Deep tendon reflexes 2+/4 on the left and 3+/4 on the right   No babinski or clonus present   Sensory:   Normal to touch, pinprick and vibration and temperature. DSS is intact   Gait:  Abnormal gait with patient needing a rolling walker to ambulate due to paraparetic gait bilaterally, right side greater than left. Tremor:   No tremor noted. Cerebellar: Moderately abnormal cerebellar signs present on Romberg and tandem testing and finger-nose-finger exam is unremarkable. Neurovascular:  Normal heart sounds and regular rhythm, peripheral pulses intact, and no carotid bruits. Assessment:       ICD-10-CM ICD-9-CM    1. Multiple sclerosis ([facility] [address] G35 340    2. Left-sided weakness R53.1 728.87    3. MS (multiple sclerosis) ([facility] G35 340 LORazepam (ATIVAN) 1 mg tablet   4. Exacerbation of multiple sclerosis ([address] G35 340      Active Problems:    Chronic pain (8/17/2012)      MS (multiple sclerosis) ([address] (8/17/2012)      Constipation (7/17/2015)      Left-sided weakness (3/8/2020)      Exacerbation of multiple sclerosis ([facility] [address] (3/9/2020)        Plan:     Patient with probable exacerbations of subacute nature of her MS, with multiple diffuse and positive imaging noted on MRI scan  Have patient continue her Tecfidera 240 mg twice a day.   Patient desires home health, and we will set that up with the , she does not want inpatient rehab  She is stronger after her third dose of IV Solu-Medrol, she is ambulating better, and with assistive devices she can go home and do home health and that is what she would like to do we discussed this both with the patient and her  in detail  Patient has finished IV steroids, feels very good today, wants to go home, she will be discharged today to follow-up with Dr. Doreen Bone in the office to discuss whether or not she should change her disease modifying drugs  Discussed with the patient and her  in detail. They will get outpatient home health. Any follow-up with her neurologist to discuss possibly changing disease modifying drugs. Continue with medical care as you are.   I will follow as needed for now and have discussed with the patient and  in detail    Signed By: Mamadou Rutledge MD     March 11, 2020       CC: None  FAX: None

## 2020-03-11 NOTE — PROGRESS NOTES
Problem: Dysphagia (Adult)  Goal: *Acute Goals and Plan of Care (Insert Text)  Description: Speech pathology goals initiated 3/9/2020   1. Patient will tolerate dental soft diet/ nectar thick liquids free of s/s aspiration within 7 days. Advance to dental soft/thin MET   2. Patient will tolerate thin liquid trials free of s/s aspiration with slp. MET  3. Patient will participate in MBS as clinically indicated. MET    Outcome: Progressing Towards Goal   SPEECH LANGUAGE PATHOLOGY DYSPHAGIA TREATMENT  Patient: Gallito Cook (1 y.o. female)  Date: 3/11/2020  Diagnosis: Left-sided weakness [R53.1]   <principal problem not specified>       Precautions:  Fall    ASSESSMENT:  Reviewed results of MBS completed yesterday. Re-educated on use of strategies (small, single cup sips with oral hold). Patient able to demonstrate use of these strategies independently after review. Patient with no overt s/s aspiration with numerous thin trials. Patient with excellent understanding of information provided to her. PLAN:  Recommendations and Planned Interventions:  Continue dental soft diet/ thin liquids. NO straws. SINGLE cup sips only with oral hold  Strict upright, slow rate    Patient continues to benefit from skilled intervention to address the above impairments. Continue treatment per established plan of care. Discharge Recommendations:  Home Health     SUBJECTIVE:   Patient stated I am doing so much better. OBJECTIVE:   Cognitive and Communication Status:  Neurologic State: Alert  Orientation Level: Oriented X4  Cognition: Appropriate for age attention/concentration  Perception: Appears intact  Perseveration: No perseveration noted  Safety/Judgement: Awareness of environment  Dysphagia Treatment:     P.O. Trials:  Patient Position: (up in bed)  Vocal quality prior to P.O.: No impairment  Consistency Presented: Thin liquid; Solid  How Presented: Self-fed/presented;Cup/sip     Bolus Acceptance: No impairment  Bolus Formation/Control: Impaired  Type of Impairment: Premature spillage  Propulsion: No impairment  Oral Residue: None  Initiation of Swallow: Delayed (# of seconds)  Laryngeal Elevation: Decreased  Aspiration Signs/Symptoms: None  Pharyngeal Phase Characteristics: Double swallow             Oral Phase Severity: Mild  Pharyngeal Phase Severity : Mild-moderate     Pain:  Pain Scale 1: Numeric (0 - 10)  Pain Intensity 1: 7       After treatment:   Patient left in no apparent distress in bed, Call bell within reach, Nursing notified, and Caregiver / family present    COMMUNICATION/EDUCATION:       The patient's plan of care including recommendations, planned interventions, and recommended diet changes were discussed with: Registered nurse, , PT    Karla Sims M.S. CCC-SLP  Time Calculation: 17 mins

## 2020-03-11 NOTE — PROGRESS NOTES
Hospitalist Progress Note    NAME: Radha Haskins   :  1975   MRN:  [id number]       Assessment / Plan:  Right side weakness / MS exacerbation POA  MRI of brain:  IMPRESSION: Slight increase of multiple T2 hyperintensities/MS plaques, several  of which exhibit diffusion restriction.  -Neurology consulted appreciate recommendations  -Solu-Medrol 1000 mg daily x3 days per neurology  -symptoms improved so far  -cont PT/OT  -Continue Tecfidera  -Patient reports improvement in her generalized pain  -likely home with [facility] next 24 hrs     Constipation  -Dulcolax 5 mg p.o. daily  -Add MiraLAX     Chronic pain syndrome   -Continue Cymbalta at home dose  -Continue gabapentin home dose  -Consider referral to pain clinic on discharge     Urinary hesitancy   -UA negative, likely from MS exacerbation     Anxiety and depression  -On Cymbalta     Code Status: Full code  Surrogate Decision Maker:     DVT Prophylaxis: Heparin  GI Prophylaxis: not indicated     Baseline: Uses a walker to ambulate      Subjective:     Chief Complaint / Reason for Physician Visit  Resting in bed. Reports improvement in extremity weakness, feels better overall    Discussed with RN events overnight. Review of Systems:  Symptom Y/N Comments  Symptom Y/N Comments   Fever/Chills n   Chest Pain n    Poor Appetite n   Edema     Cough    Abdominal Pain n    Sputum n   Joint Pain     SOB/BOBO n   Pruritis/Rash     Nausea/vomit n   Tolerating PT/OT y    Diarrhea    Tolerating Diet     Constipation    Other       Could NOT obtain due to:      Objective:     VITALS:   Last 24hrs VS reviewed since prior progress note.  Most recent are:  Patient Vitals for the past 24 hrs:   Temp Pulse Resp BP SpO2   03/10/20 2000 98.4 °F (36.9 °C) 61 18 146/77 98 %   03/10/20 1618 98.2 °F (36.8 °C) 62 18 128/72 97 %   03/10/20 1205 98.2 °F (36.8 °C) (!) 57 18 134/71 100 %   03/10/20 0828  76      03/10/20 0730 97.8 °F (36.6 °C) (!) 54 18 130/78 96 %   03/10/20 0347 97.9 °F (36.6 °C) 65 18 124/74 97 %     No intake or output data in the 24 hours ending 03/11/20 0002     PHYSICAL EXAM:  General: WD, WN. Alert, cooperative, no acute distress    EENT:  EOMI. Anicteric sclerae. MMM  Resp:  CTA bilaterally, no wheezing or rales. No accessory muscle use  CV:  Regular  rhythm,  No edema  GI:  Soft, Non distended, Non tender.  +Bowel sounds  Neurologic:  Alert and oriented X 3, normal speech,   Psych:   Good insight. Not anxious nor agitated  Skin:  No rashes. No jaundice    Reviewed most current lab test results and cultures  YES  Reviewed most current radiology test results   YES  Review and summation of old records today    NO  Reviewed patient's current orders and MAR    YES  PMH/SH reviewed - no change compared to H&P  ________________________________________________________________________  Care Plan discussed with:    Comments   Patient x    Family      RN x    Care Manager     Consultant                       x Multidiciplinary team rounds were held today with , nursing, pharmacist and clinical coordinator. Patient's plan of care was discussed; medications were reviewed and discharge planning was addressed. ________________________________________________________________________  Total NON critical care TIME:  30   Minutes    Total CRITICAL CARE TIME Spent:   Minutes non procedure based      Comments   >50% of visit spent in counseling and coordination of care x    ________________________________________________________________________  Tuan Gary DO     Procedures: see electronic medical records for all procedures/Xrays and details which were not copied into this note but were reviewed prior to creation of Plan. LABS:  I reviewed today's most current labs and imaging studies.   Pertinent labs include:  Recent Labs     03/09/20  0249 03/08/20  1225   WBC 6.4 4.3   HGB 12.1 12.7   HCT 35.7 37.9    263     Recent Labs 03/09/20  0249 03/08/20  1225    140   K 3.9 4.4   * 106   CO2 24 31   * 91   BUN 9 8   CREA 0.80 0.86   CA 8.7 9.0   ALB  --  3.6   TBILI  --  0.3   SGOT  --  15   ALT  --  27   INR 1.0  --        Signed: Ronn Bonilla,

## 2020-03-11 NOTE — PROGRESS NOTES
Appointment Information  The following appointments have been successfully scheduled:    Date/time Tuesday, March 24, 2020 10:20 AM  Patient Renae Serve 1975 (48[facility] F) #[id number] #[id number]  Department [facility] SYSTEM OFFICE [address]  Appointment type New Patient  Provider Daniel Pak

## 2020-03-11 NOTE — PROGRESS NOTES
Consult  REFERRED BY:  None    CHIEF COMPLAINT: Generalized weakness and right-sided weakness      Subjective: Anne Bearden is a 53 y.o. right-handed  female we are asked to evaluate by the hospitalist for evaluation of new problem of generalized weakness and right-sided weakness that has been slowly getting worse for the last month and the patient was admitted for hospitalization and for IV steroids. Her MRI scan of the cervical and thoracic spine look stable with no new lesions, but clear multiple MS type old lesions. Her MRI of the brain showed no enhancing lesions, multiple old MS lesions from what progressive since her last scan, and showing several areas of diffusion positive images probably suggesting subacute disease flareups. Patient has undergone benzodiazepine withdrawal and Neurontin reduction and has undergone withdrawal from all of that in addition. She denies any new fever, meningismus, or any other new neurologic problem. She has had no unusual headache or trauma. She is followed by Dr. Oz Coffey her neurologist.  She is on Tecfidera and takes her medications regularly. She says she is not had a major exacerbation in some time.   She is a little stronger after her second dose of IV Solu-Medrol, she is ambulating better, and with assistive devices she could probably go home and do home health and that is what she would like to do we discussed this both with the patient and her  in detail    Past Medical History:   Diagnosis Date    Body aches 12/11/2014    Chronic pain     Depression     GERD (gastroesophageal reflux disease)     Headache(784.0)     History of mammogram never before    MS [facility]multiple sclerosis) ([facility] [address]     Neurological disorder     Multiple Sclerosis    Pap smear for cervical cancer screening 2008    Psychiatric disorder     anxiety    Psychotic disorder [facility])       Past Surgical History:   Procedure Laterality Date    COLONOSCOPY N/A 2/28/2018 COLONOSCOPY performed by Liudmila Fontanez MD at [facility]"" ENDOSCOPY    HX CHOLECYSTECTOMY      HX GYN      3 c-sections    HX HEENT      bilateral ear tube surgery    HX HERNIA REPAIR      HX OTHER SURGICAL      R inguinal hernia repair     Family History   Problem Relation Age of Onset    Heart Attack Father         tripple bypass    Cancer Father         lung    COPD Father     Diabetes Mother         type 2    Heart Disease Mother         heart disease    Diabetes Paternal Grandmother     No Known Problems Sister     No Known Problems Brother     No Known Problems Child       Social History     Tobacco Use    Smoking status: Current Every Day Smoker     Packs/day: 0.50     Years: 17.00     Pack years: 8.50     Types: Cigarettes    Smokeless tobacco: Never Used    Tobacco comment: 1 pack every 3 days.    Substance Use Topics    Alcohol use: No         Current Facility-Administered Medications:     ondansetron (ZOFRAN ODT) tablet 4 mg, 4 mg, Oral, Q8H PRN, Katarzyna Sauer MD, 4 mg at 03/10/20 1238    gabapentin (NEURONTIN) capsule 800 mg, 800 mg, Oral, TID, Maria Elena Muñoz MD, 800 mg at 03/10/20 2136    acetaminophen (TYLENOL) tablet 650 mg, 650 mg, Oral, Q6H PRN, Katarzyna Sauer MD    ondansetron (ZOFRAN) injection 4 mg, 4 mg, IntraVENous, Q4H PRN, Katarzyna Sauer MD, 4 mg at 03/09/20 2328    dimethyl fumarate cpDR 240 mg (Patient Supplied), 240 mg, Oral, BID, Katarzyna Sauer MD, 240 mg at 03/10/20 1927    DULoxetine (CYMBALTA) capsule 60 mg, 60 mg, Oral, DAILY, Katarzyna Sauer MD, 60 mg at 03/10/20 0824    ibuprofen (MOTRIN) tablet 600 mg, 600 mg, Oral, Q6H PRN, Katarzyna Sauer MD    bisacodyL (DULCOLAX) tablet 5 mg, 5 mg, Oral, DAILY PRN, Katarzyna Sauer MD, 5 mg at 03/10/20 1705    polyethylene glycol (MIRALAX) packet 17 g, 17 g, Oral, DAILY, Katarzyna Sauer MD, 17 g at 03/10/20 0829    acetaminophen (TYLENOL) tablet 650 mg, 650 mg, Oral, Q4H PRN **OR** acetaminophen (TYLENOL) solution 650 mg, 650 mg, Per NG tube, Q4H PRN **OR** acetaminophen (TYLENOL) suppository 650 mg, 650 mg, Rectal, Q4H PRN, Katarzyna Sauer MD    aspirin tablet 325 mg, 325 mg, Oral, DAILY, Katazryna Sauer MD, 325 mg at 03/10/20 0824    heparin (porcine) injection 5,000 Units, 5,000 Units, SubCUTAneous, Q8H, Katarzyna Sauer MD, 5,000 Units at 03/10/20 2137    zolpidem (AMBIEN) tablet 5 mg, 5 mg, Oral, QHS PRN, Todd Rojo MD, 5 mg at 03/08/20 2234    LORazepam (ATIVAN) tablet 1 mg, 1 mg, Oral, Q12H PRN, Todd Rojo MD, 1 mg at 03/10/20 2137        Allergies   Allergen Reactions    Morphine Rash      MRI Results (most recent):  Results from [place] encounter on 03/08/20   MRI [facility] SPINE W WO CONT    Narrative HISTORY: MS.    COMPARISON: None    TECHNIQUE: MR imaging of the thoracic spine was performed with the following  sequences: sagittal T1, T2, stir; axial T1, gradient echo. Sagittal and axial T1  postcontrast. Contrast: 20 mL of Dotarem IV. FINDINGS:  There are multiple T2 hyperintensities of the cord compatible with MS plaques. There is no syrinx or cord enlargement. There is no pathologic enhancement. There is normal alignment of the thoracic spine. Vertebral body heights are  maintained. Marrow signal is normal. The spinal canal and neural foramina are  widely patent at all levels. Normal conus level. Impression IMPRESSION: Multiple thoracic cord MS plaques without enhancement. No spinal  stenosis. Results from [place] encounter on 03/08/20   MRI [facility] SPINE W WO CONT    Narrative HISTORY: MS.    COMPARISON: None    TECHNIQUE: MR imaging of the thoracic spine was performed with the following  sequences: sagittal T1, T2, stir; axial T1, gradient echo. Sagittal and axial T1  postcontrast. Contrast: 20 mL of Dotarem IV.     FINDINGS:  There are multiple T2 hyperintensities of the cord compatible with MS plaques. There is no syrinx or cord enlargement. There is no pathologic enhancement. There is normal alignment of the thoracic spine. Vertebral body heights are  maintained. Marrow signal is normal. The spinal canal and neural foramina are  widely patent at all levels. Normal conus level. Impression IMPRESSION: Multiple thoracic cord MS plaques without enhancement. No spinal  stenosis. Review of Systems:  A comprehensive review of systems was negative except for: Constitutional: positive for fatigue and malaise  Musculoskeletal: positive for myalgias, arthralgias and muscle weakness  Neurological: positive for paresthesia, coordination problems, gait problems and weakness  Behvioral/Psych: positive for anxiety and depression   Vitals:    03/10/20 0828 03/10/20 1205 03/10/20 1618 03/10/20 2000   BP:  134/71 128/72 146/77   Pulse: 76 (!) 57 62 61   Resp:  18 18 18   Temp:  98.2 °F (36.8 °C) 98.2 °F (36.8 °C) 98.4 °F (36.9 °C)   SpO2:  100% 97% 98%   Weight:       Height:         Objective:     I      NEUROLOGICAL EXAM:    Appearance: The patient is well developed, well nourished, provides a coherent history and is in no acute distress. Mental Status: Oriented to time, place and person, and the president, cognitive function is normal and speech is fluent and no aphasia or dysarthria. Mood and affect appropriate but mildly depressed. Cranial Nerves:   Intact visual fields. Fundi are benign, disc are flat, with mild optic pallor. SHEFALI, EOM's full, no nystagmus, no ptosis. Facial sensation is normal. Corneal reflexes are not tested. Facial movement is weak on the right. Hearing is normal bilaterally. Palate is midline with normal sternocleidomastoid and trapezius muscles are normal. Tongue is midline.   Neck without meningismus or bruits  Temporal arteries are not tender or enlarged  TMJ areas are not tender on palpation   Motor:   Patient has moderate spastic right hemiparesis, and mild spastic left hemiparesis from her MS. Muscle tone is increased, right greater than left, muscle bulk is normal.  Rapid alternating movement is slow bilaterally right side greater than left   Reflexes:   Deep tendon reflexes 2+/4 on the left and 3+/4 on the right   No babinski or clonus present   Sensory:   Normal to touch, pinprick and vibration and temperature. DSS is intact   Gait:  Abnormal gait with patient needing a rolling walker to ambulate due to paraparetic gait bilaterally, right side greater than left. Tremor:   No tremor noted. Cerebellar: Moderately abnormal cerebellar signs present on Romberg and tandem testing and finger-nose-finger exam is unremarkable. Neurovascular:  Normal heart sounds and regular rhythm, peripheral pulses intact, and no carotid bruits. Assessment:       ICD-10-CM ICD-9-CM    1. Multiple sclerosis ([address] G35 340    2. Left-sided weakness R53.1 728.87    3. MS (multiple sclerosis) ([address] G35 340    4. Exacerbation of multiple sclerosis ([address] G35 340      Active Problems:    Chronic pain (8/17/2012)      MS (multiple sclerosis) ([address] (8/17/2012)      Constipation (7/17/2015)      Left-sided weakness (3/8/2020)      Exacerbation of multiple sclerosis ([address] (3/9/2020)        Plan:     Patient with probable exacerbations of subacute nature of her MS, with multiple diffuse and positive imaging noted on MRI scan  We will finish 3 days of IV steroids,  Patient is working with PT and OT in addition. Have patient continue her Tecfidera 240 mg twice a day.   Patient desires home health, and we will set that up with the , she does not want inpatient rehab  She is a little stronger after her second dose of IV Solu-Medrol, she is ambulating better, and with assistive devices she could probably go home and do home health and that is what she would like to do we discussed this both with the patient and her  in detail      Any follow-up with her neurologist to discuss possibly changing disease modifying drugs. Continue with medical care as you are.     Signed By: Leann Wu MD     March 10, 2020       CC: None  FAX: None

## 2020-03-11 NOTE — PROGRESS NOTES
Problem: Mobility Impaired (Adult and Pediatric)  Goal: *Acute Goals and Plan of Care (Insert Text)  Description: FUNCTIONAL STATUS PRIOR TO ADMISSION: Patient was modified independent using a rollator for functional mobility. HOME SUPPORT PRIOR TO ADMISSION: The patient lived with significant other and required some assistance occasionally. Physical Therapy Goals  Initiated 3/9/2020  1. Patient will move from supine to sit and sit to supine  in bed with modified independence within 7 day(s). 2.  Patient will transfer from bed to chair and chair to bed with modified independence using the least restrictive device within 7 day(s). 3.  Patient will perform sit to stand with modified independence within 7 day(s). 4.  Patient will ambulate with modified independence for 150 feet with the least restrictive device within 7 day(s). 5.  Patient will ascend/descend 2 stairs with no handrail(s) with minimal assistance/contact guard assist within 7 day(s). Outcome: Progressing Towards Goal   PHYSICAL THERAPY TREATMENT  Patient: Britni Quigley (60 y.o. female)  Date: 3/11/2020  Diagnosis: Left-sided weakness [R53.1]   <principal problem not specified>       Precautions: Fall  Chart, physical therapy assessment, plan of care and goals were reviewed. ASSESSMENT  Patient continues with skilled PT services and is progressing towards goals. Patient received sitting on edge of bed and agreeable to participate, boyfriend also present. Patient is alert and appropriate this morning, reports feeling much better and demonstrated bed mobility and transfers at indep level. Patient has a single step to get into her apartment so practiced today x 4 reps, twice leading with right and twice leading with left with supervision to CGA and instruction in [address]. Educated on falls prevention and safety and patient demonstrates good understanding.   Patient left sitting on bed and was planning to ambulate in hallway with boyfriend. Patient will benefit from home health PT post discharge and RW for safety. Current Level of Function Impacting Discharge (mobility/balance): indep for bed mobility and transfers, supervision for ambulation, CGA for steps    Other factors to consider for discharge:          PLAN :  Patient continues to benefit from skilled intervention to address the above impairments. Continue treatment per established plan of care. to address goals. Recommendation for discharge: (in order for the patient to meet his/her long term goals)  Physical therapy at least 2 days/week in the home     This discharge recommendation:  Has been made in collaboration with the attending provider and/or case management    IF patient discharges home will need the following DME: rolling walker       SUBJECTIVE:   Patient stated I feel good today.     OBJECTIVE DATA SUMMARY:   Critical Behavior:  Neurologic State: Alert  Orientation Level: Oriented X4  Cognition: Appropriate for age attention/concentration  Safety/Judgement: Awareness of environment  Functional Mobility Training:  Bed Mobility:     Supine to Sit: Independent  Sit to Supine: Independent  Scooting: Independent        Transfers:  Sit to Stand: Independent  Stand to Sit: Independent        Bed to Chair: Supervision                    Balance:  Sitting: Intact  Standing: Impaired  Standing - Static: Constant support;Good  Standing - Dynamic : Constant support;Good  Ambulation/Gait Training:                                                        Stairs:  Number of Stairs Trained: 1(single step practiced x 2 reps on each side)  Stairs - Level of Assistance: Supervision;Contact guard assistance          Activity Tolerance:   Good  Please refer to the flowsheet for vital signs taken during this treatment.     After treatment patient left in no apparent distress:   Sitting edge of bed     COMMUNICATION/COLLABORATION:   The patients plan of care was discussed with: Registered nurse.      Chika Burgos, PT   Time Calculation: 20 mins

## 2020-03-11 NOTE — ROUTINE PROCESS
Reviewed discharge instructions, follow up orders, and medications with patient and family. All questions answered.

## 2020-03-11 NOTE — PROGRESS NOTES
Problem: Patient Education: Go to Patient Education Activity  Goal: Patient/Family Education  Outcome: Progressing Towards Goal     Problem: Patient Education: Go to Patient Education Activity  Goal: Patient/Family Education  Outcome: Progressing Towards Goal     Problem: Patient Education: Go to Patient Education Activity  Goal: Patient/Family Education  Outcome: Progressing Towards Goal     Problem: Patient Education: Go to Patient Education Activity  Goal: Patient/Family Education  Outcome: Progressing Towards Goal

## 2020-03-11 NOTE — PROGRESS NOTES
* No surgery found *  * No surgery found *  Bedside shift change report given to [id number] (oncoming nurse) by Jaky Jimenez (offgoing nurse). Report included the following information [facility] Phone:   [phone]        Significant changes during shift:  none         Patient Information     Radha Haskins  53 y.o.  3/8/2020 12:02 PM by Diana Canales MD. Radha Haskins was admitted from Home     Problem List          Patient Active Problem List     Diagnosis Date Noted    Exacerbation of multiple sclerosis ([facility] 03/09/2020    Left-sided weakness 03/08/2020    Severe obesity ([address] 10/03/2018    Constipation 07/17/2015    Body aches 12/11/2014    Back pain 09/07/2012    Chronic pain 08/17/2012    MS (multiple sclerosis) ([facility] [address] 08/17/2012    Decreased hearing 01/31/2011    Acute pharyngitis 01/26/2011    Anxiety 08/25/2010    Blood pressure elevated 08/25/2010    Tobacco abuse 08/25/2010           Past Medical History:   Diagnosis Date    Body aches 12/11/2014    Chronic pain      Depression      GERD (gastroesophageal reflux disease)      Headache(784.0)      History of mammogram never before    MS (multiple sclerosis) ([facility] [address] [facility]      Neurological disorder       Multiple Sclerosis    Pap smear for cervical cancer screening 2008    Psychiatric disorder       anxiety    Psychotic disorder ([facility] [address]              Core Measures:     CVA: Yes Yes  CHF:No No  PNA:No No     Post Op Surgical (If Applicable):      N  Activity Status:     OOB to Chair Yes  Ambulated this shift Yes   Bed Rest No     Supplemental O2: (If Applicable)     NC No  NRB No  Venti-mask No  On  Liters/min        LINES AND DRAINS:PIV        DVT prophylaxis:     DVT prophylaxis Med- Yes  DVT prophylaxis SCD or CONG- No      Wounds: (If Applicable)     Wounds- No     Location      Patient Safety:     Falls Score Total Score: 2  Safety Level_______  Bed Alarm On? Yes  Sitter?  No     Plan for upcoming shift: SAfety           Discharge Plan: Home.  PT will work with patient in am to practice stepping up onto stoop that she will do to get into her house     Active Consults:  23 Laurel Shaver HOSPITALIST

## 2020-03-12 ENCOUNTER — HOME CARE VISIT (OUTPATIENT)
Dept: SCHEDULING | Facility: HOME HEALTH | Age: 45
End: 2020-03-12
Payer: MEDICARE

## 2020-03-12 ENCOUNTER — PATIENT OUTREACH (OUTPATIENT)
Dept: INTERNAL MEDICINE CLINIC | Age: 45
End: 2020-03-12

## 2020-03-12 VITALS
TEMPERATURE: 97.8 F | DIASTOLIC BLOOD PRESSURE: 89 MMHG | HEART RATE: 67 BPM | OXYGEN SATURATION: 98 % | RESPIRATION RATE: 17 BRPM | SYSTOLIC BLOOD PRESSURE: 134 MMHG

## 2020-03-12 PROCEDURE — 400013 HH SOC

## 2020-03-12 PROCEDURE — 3331090001 HH PPS REVENUE CREDIT

## 2020-03-12 PROCEDURE — 3331090002 HH PPS REVENUE DEBIT

## 2020-03-12 PROCEDURE — G0151 HHCP-SERV OF PT,EA 15 MIN: HCPCS

## 2020-03-12 NOTE — PROGRESS NOTES
Hospital Discharge Follow-Up      Date/Time:  3/12/2020 10:29 AM  Divya Anderson. Eric Mendenhall, RN  Care Transition Nurse  [phone]    Patient was admitted to [facility] on 3/8 and discharged on 3/11 for  DX. LEFT SIDE WEAKNESS, MS CYNTHIA. The physician discharge summary was available at the time of outreach. Patient was contacted within 2 business days of discharge. Top Challenges reviewed with the provider     Advance Care Planning:   Does patient have an Advance Directive:  not on file     Patient is being weaned from sedatives/benzos OP    Consider referral to pain clinic on discharge    Pt.request refill (gabapentin)       Method of communication with provider :[place]    Was this a readmission? no     Care Transition Nurse (CTN) contacted the patient by telephone to perform post hospital discharge assessment. Verified name and  with patient as identifiers. Provided introduction to self, and explanation of the CTN role. Patient received hospital discharge instructions. CTN reviewed discharge instructions and red flags with patient who verbalized understanding. Patient given an opportunity to ask questions and does not have any further questions or concerns at this time. The patient agrees to contact the PCP office for questions related to their healthcare. CTN provided contact information for future reference.     Disease Specific:   N/A    Patients top risk factors for readmission:  depression, medication management    Home Health orders at discharge: PT, [address]: BS-[facility]  Date of initial visit: 3/12    Durable Medical Equipment ordered at discharge: 1 Nial[address] Branch received: 3/12    Medication(s):   New Medications at Discharge:   predniSONE (DELTASONE) 20 mg tablet Take 2 tabs by mouth with breakfast for 2 days, then take 1 tab for 2 days, then take 1/2 tab for 2 days then discontinue  Qty: 7 Tab, Refills: 0         Lorazepam 1 mg tablet -- take 1 tab. by mouth every 12 hours as needed for anxiety. Medication reconciliation was performed with patient, who verbalizes understanding of administration of home medications. There were no barriers to obtaining medications identified at this time. Referral to Pharm D needed: no     Current Outpatient Medications   Medication Sig    predniSONE (DELTASONE) 20 mg tablet Take 2 tabs by mouth with breakfast for 2 days, then take 1 tab for 2 days, then take 1/2 tab for 2 days then discontinue    LORazepam (ATIVAN) 1 mg tablet Take 1 Tab by mouth every twelve (12) hours as needed for Anxiety (q12h prn). Max Daily Amount: 2 mg.  DULoxetine (CYMBALTA) 60 mg capsule Take 1 Cap by mouth daily. Indications: neuropathic pain    FLUoxetine (PROZAC) 40 mg capsule Take 1 Cap by mouth daily.  gabapentin (NEURONTIN) 400 mg capsule Take 2 Caps by mouth three (3) times daily. Max Daily Amount: 2,400 mg.    ondansetron (ZOFRAN ODT) 4 mg disintegrating tablet Take 1 Tab by mouth every eight (8) hours as needed for Nausea or Vomiting.  senna-docusate (PERICOLACE) 8.6-50 mg per tablet Take 2 Tabs by mouth daily.  dimethyl fumarate (TECFIDERA) 240 mg cpDR Take 240 mg by mouth two (2) times a day.  ibuprofen (MOTRIN) 600 mg tablet Take 1 Tab by mouth every six (6) hours as needed for Pain. No current facility-administered medications for this visit.       [facility] follow up appointment(s):   Future Appointments   Date Time Provider Naveen Martins   3/17/2020 To Be Determined Kadie Lowery [facility]   3/18/2020 12:00 PM Mauro aBugh MD [email or web address]   3/24/2020 10:20 AM Cricket Brock MD [address] [place] [address]   4/20/2020 10:00 AM Xavier Berg  [facility]   5/4/2020 11:45 AM Mauro Baugh MD [id number]      [facility]:  information provided as a resource     Goals      Establish PCP relationships and regularly scheduled appointments. 3/12 Patient will attend  follow-up with PCP and specialist as directed, keep a list of her medications she take to bring to f/u appointments. BS-MAT reports visit 3/12, Pt.will f/u with Dr. Fabiola Corbett on 3/18, CTN call office spoke with Starla johnson earlier hosp.f/u offered 3/17, CTN request Peri inform pt.of appt. on 3/17 @ 12 noon. Pt.will f/u with Dr. Daniela Russell (PCP) 3/24; (Neurology) Dr. Doreen Bone 4/20. CTN will f/u with pt.in 1-2 weeks. -[facility]                     Understands red flags post discharge. 3/12 Pt.will notify PCP of worsening sx L or R side weakness to upper or lower extremities, slurred speech, or facial droop. Pt.to take all medications as prescribed, call CTN with any questions or concerns. CTN will f/u with pt.in 1-2 weeks. -[address]

## 2020-03-13 ENCOUNTER — HOME CARE VISIT (OUTPATIENT)
Dept: HOME HEALTH SERVICES | Facility: HOME HEALTH | Age: 45
End: 2020-03-13
Payer: MEDICARE

## 2020-03-13 PROCEDURE — 3331090002 HH PPS REVENUE DEBIT

## 2020-03-13 PROCEDURE — 3331090001 HH PPS REVENUE CREDIT

## 2020-03-14 PROCEDURE — 3331090001 HH PPS REVENUE CREDIT

## 2020-03-14 PROCEDURE — 3331090002 HH PPS REVENUE DEBIT

## 2020-03-15 PROCEDURE — 3331090002 HH PPS REVENUE DEBIT

## 2020-03-15 PROCEDURE — 3331090001 HH PPS REVENUE CREDIT

## 2020-03-16 ENCOUNTER — HOME CARE VISIT (OUTPATIENT)
Dept: HOME HEALTH SERVICES | Facility: HOME HEALTH | Age: 45
End: 2020-03-16
Payer: MEDICARE

## 2020-03-16 ENCOUNTER — HOME CARE VISIT (OUTPATIENT)
Dept: SCHEDULING | Facility: HOME HEALTH | Age: 45
End: 2020-03-16
Payer: MEDICARE

## 2020-03-16 PROCEDURE — 3331090001 HH PPS REVENUE CREDIT

## 2020-03-16 PROCEDURE — 3331090002 HH PPS REVENUE DEBIT

## 2020-03-16 PROCEDURE — G0153 HHCP-SVS OF S/L PATH,EA 15MN: HCPCS

## 2020-03-17 ENCOUNTER — HOME CARE VISIT (OUTPATIENT)
Dept: HOME HEALTH SERVICES | Facility: HOME HEALTH | Age: 45
End: 2020-03-17
Payer: MEDICARE

## 2020-03-17 ENCOUNTER — OFFICE VISIT (OUTPATIENT)
Dept: FAMILY MEDICINE CLINIC | Age: 45
End: 2020-03-17

## 2020-03-17 VITALS
SYSTOLIC BLOOD PRESSURE: 114 MMHG | TEMPERATURE: 96.3 F | DIASTOLIC BLOOD PRESSURE: 72 MMHG | HEART RATE: 66 BPM | OXYGEN SATURATION: 99 % | HEIGHT: 64 IN | WEIGHT: 192.6 LBS | BODY MASS INDEX: 32.88 KG/M2

## 2020-03-17 VITALS
TEMPERATURE: 98.9 F | DIASTOLIC BLOOD PRESSURE: 78 MMHG | HEART RATE: 80 BPM | SYSTOLIC BLOOD PRESSURE: 122 MMHG | OXYGEN SATURATION: 98 %

## 2020-03-17 DIAGNOSIS — G35 MS (MULTIPLE SCLEROSIS) (HCC): Primary | ICD-10-CM

## 2020-03-17 DIAGNOSIS — F41.9 ANXIETY: ICD-10-CM

## 2020-03-17 DIAGNOSIS — F33.2 SEVERE EPISODE OF RECURRENT MAJOR DEPRESSIVE DISORDER, WITHOUT PSYCHOTIC FEATURES (HCC): ICD-10-CM

## 2020-03-17 DIAGNOSIS — G89.4 CHRONIC PAIN SYNDROME: ICD-10-CM

## 2020-03-17 DIAGNOSIS — F43.23 ADJUSTMENT DISORDER WITH MIXED ANXIETY AND DEPRESSED MOOD: ICD-10-CM

## 2020-03-17 DIAGNOSIS — Z72.0 TOBACCO ABUSE: ICD-10-CM

## 2020-03-17 PROCEDURE — 3331090001 HH PPS REVENUE CREDIT

## 2020-03-17 PROCEDURE — 3331090002 HH PPS REVENUE DEBIT

## 2020-03-17 RX ORDER — GABAPENTIN 400 MG/1
800 CAPSULE ORAL 3 TIMES DAILY
Qty: 120 CAP | Refills: 0 | Status: SHIPPED | OUTPATIENT
Start: 2020-03-17 | End: 2020-04-03 | Stop reason: SDUPTHER

## 2020-03-17 RX ORDER — LORAZEPAM 1 MG/1
1 TABLET ORAL
Qty: 40 TAB | Refills: 0 | Status: SHIPPED | OUTPATIENT
Start: 2020-03-17 | End: 2020-09-05

## 2020-03-17 NOTE — PROGRESS NOTES
Chief Complaint   Patient presents with   [facility] Follow Up     F/U from [address] [place] [address].     1. Have you been to the ER, urgent care clinic since your last visit? Hospitalized since your last visit? Yes, [address] [place] [address] on 3-8-20.    2. Have you seen or consulted any other health care providers outside of the [address] [place] [address] since your last visit? Include any pap smears or colon screening.  No.

## 2020-03-17 NOTE — PATIENT INSTRUCTIONS
Lorazepam (By mouth) Lorazepam ([id number]) Treats anxiety. Brand Name(s): Ativan, LORazepam Intensol There may be other brand names for this medicine. When This Medicine Should Not Be Used: This medicine is not right for everyone. Do not use it if you had an allergic reaction to lorazepam or similar medicines, or you are pregnant or breastfeeding, or you have acute narrow-angle glaucoma. How to Use This Medicine:  
Liquid, Tablet · Take your medicine as directed. Your dose may need to be changed several times to find what works best for you. · Oral liquid: ¨ Measure the oral liquid medicine with a marked measuring spoon, oral syringe, or medicine cup. ¨ Mix the medicine with water, juice, soda, applesauce, or pudding. Drink or eat the mixture right away. Do not store it for later use. · This medicine should come with a Medication Guide. Ask your pharmacist for a copy if you do not have one. · Missed dose: Take a dose as soon as you remember. If you are more than 1 hour late, skip the missed dose and wait until it is time for your next dose. Do not use extra medicine to make up for a missed dose. ·  
¨ Oral liquid: Refrigerate the oral liquid. Throw away an opened bottle after 90 days. ¨ Tablets: Store the medicine in a closed container at room temperature, away from heat, moisture, and direct light. Drugs and Foods to Avoid: Ask your doctor or pharmacist before using any other medicine, including over-the-counter medicines, vitamins, and herbal products. · Some medicines can affect how lorazepam works. Tell your doctor if you are using any of the following: ¨ Aminophylline, clozapine, probenecid, theophylline, valproate ¨ Medicine to treat depression or mental health problems ¨ Medicine to treat seizures · Do not drink alcohol while you are using this medicine. · Tell your doctor if you use anything else that makes you sleepy.  Some examples are allergy medicine, narcotic pain medicine, and alcohol. Warnings While Using This Medicine: · It is not safe to take this medicine during pregnancy. It could harm an unborn baby. Tell your doctor right away if you become pregnant. · Tell your doctor if you have kidney disease, liver disease, lung or breathing problems (such as COPD, sleep apnea), or a history of drug or alcohol abuse, depression, or seizures. · This medicine can be habit-forming. Do not use more than your prescribed dose. Call your doctor if you think your medicine is not working. · Do not stop using this medicine suddenly. Your doctor will need to slowly decrease your dose before you stop it completely. · This medicine may make you drowsy. Do not drive or do anything else that could be dangerous until you know how this medicine affects you. · Your doctor will do lab tests at regular visits to check on the effects of this medicine. Keep all appointments. · Keep all medicine out of the reach of children. Never share your medicine with anyone. Possible Side Effects While Using This Medicine:  
Call your doctor right away if you notice any of these side effects: · Allergic reaction: Itching or hives, swelling in your face or hands, swelling or tingling in your mouth or throat, chest tightness, trouble breathing · Confusion, unusual mood or behavior, thoughts of hurting yourself · Seizures · Severe drowsiness or weakness, slow heartbeat, trouble breathing · Worsening of depression If you notice these less serious side effects, talk with your doctor: · Dizziness, clumsiness If you notice other side effects that you think are caused by this medicine, tell your doctor. Call your doctor for medical advice about side effects. You may report side effects to FDA at 3-025-QPG-4432 © 2017 [facility] Information is for End User's use only and may not be sold, redistributed or otherwise used for commercial purposes. The above information is an  only. It is not intended as medical advice for individual conditions or treatments. Talk to your doctor, nurse or pharmacist before following any medical regimen to see if it is safe and effective for you. Planning to Stop Taking Benzodiazepine: Care Instructions Your Care Instructions Doctors prescribe benzodiazepine medicines to treat anxiety, muscle spasms, sleep problems, and seizures. You may know them by their generic and brand names. These include: · Alprazolam (Xanax). · Diazepam (Valium). · Lorazepam (Ativan). When you plan to stop taking one of these medicines, make sure to work with your doctor. Don't stop taking it all at once. Stopping all at once can make you sick. And don't try to do it on your own. Follow your doctor's plan for slowly lowering your dose. When you start to lower your dose, you may have some symptoms of withdrawal. Withdrawal is an uncomfortable physical or mental change. It happens when your body stops getting a medicine that it is used to getting. Follow your doctor's plan to help your body adjust more easily. When you start to take less medicine, you may feel some changes. They may start right away or after a few days. You might feel anxious or depressed. You may have an upset stomach and trouble sleeping. These changes are common. They may last a couple of weeks or longer, but they will get better. If you have trouble dealing with them, your doctor can help. Follow-up care is a key part of your treatment and safety. Be sure to make and go to all appointments, and call your doctor if you are having problems. It's also a good idea to know your test results and keep a list of the medicines you take. How can you care for yourself at home? · Follow your doctor's plan for slowly lowering your dose. · Be safe with medicines. Take your medicines exactly as prescribed.  Call your doctor if you think you are having a problem with your medicine. · Don't drink alcohol. · Don't take over-the-counter sleep medicines unless you talk to your doctor first. 
· Think about seeing a counselor for help dealing with stress and anxiety. Your doctor can recommend one. · Know that some discomfort is common. It will get better. When should you call for help? Call 911 anytime you think you may need emergency care. For example, call if: 
  · You have a seizure.  
  · You feel you cannot stop from hurting yourself or someone else.  
[facility] your doctor now or seek immediate medical care if: 
  · You have serious withdrawal symptoms such as confusion, hallucinations, or severe trembling.  
 Watch closely for changes in your health, and be sure to contact your doctor if: 
  · You do not get better as expected.  
  · You have been feeling sad, depressed, or hopeless or have lost interest in things that you usually enjoy. Where can you learn more? Go to http://damaris-eloise.info/ Enter L298 in the search box to learn more about \"Planning to Stop Taking Benzodiazepine: Care Instructions. \" Current as of: August 21, 2019Content Version: 12.4 © 5628-7207 [employer]. Care instructions adapted under license by [employer] (which disclaims liability or warranty for this information). If you have questions about a medical condition or this instruction, always ask your healthcare professional. [place] [address] any warranty or liability for your use of this information.

## 2020-03-17 NOTE — PROGRESS NOTES
HISTORY OF PRESENT ILLNESS  Yusuf Tate is a 53 y.o. female. HPI    Today visit is with the patient's for mostly a yaritza post hospitalization, secondary to the Patient  with current  uncontrolled MS disorder recurrent depression present for recent hospitalization ,  generalized anxiety disorder  for a transitional care for another mental health visit and then consequently admission,     Patient was discharged in a stable condition, is currently compliant with medications ,present to make sure that there is medication compliance, patient currentlywalking with walker with decreaseda strength on the lower extremity and upper extremity ,  having abnormal speech and , is comprehensible, patient medications today has been reviewed and the correction has been given in addition the pt is having a low-salt diet fortunately patient is not physically active due to worsening MS,  pt on disability and  doesnot work currently,  patient also has seen the counselor and psychiatrist , and awaiting to be re-seen by the specialist for further care and for follow-up stating that depression with the anxiety and panic states of mind Are better controlled with the current meds,   For which she is requesting refills stating that w/out the meds she is not functional and goes into panic attacks has givne and tried other meds but stating that none has been helping except for the Benzo,  Patient state that it is not getting better: pt states and reports of feelin anxius, + guilty feeling, + Hoplessness, ns/nh,ni,nh, no tendency of etoh or illicit drug use, less ability of sleep, less ablitiy  to concentrate , but  all together a safe feeling at home all occurs w/out the requested meds       Current Outpatient Medications   Medication Sig Dispense Refill    gabapentin (NEURONTIN) 400 mg capsule Take 2 Caps by mouth three (3) times daily.  Max Daily Amount: 2,400 mg. 120 Cap 0    LORazepam (ATIVAN) 1 mg tablet Take 1 Tab by mouth every twelve (12) hours as needed for Anxiety (q12h prn). Max Daily Amount: 2 mg. 40 Tab 0    predniSONE (DELTASONE) 20 mg tablet Take 2 tabs by mouth with breakfast for 2 days, then take 1 tab for 2 days, then take 1/2 tab for 2 days then discontinue 7 Tab 0    DULoxetine (CYMBALTA) 60 mg capsule Take 1 Cap by mouth daily. Indications: neuropathic pain (Patient taking differently: Take 60 mg by mouth daily. Indications: neuropathic pain, pt state she takes 1 tab 2 x daily.) 30 Cap 1    dimethyl fumarate (TECFIDERA) 240 mg cpDR Take 240 mg by mouth two (2) times a day. 120 Cap 3    ibuprofen (MOTRIN) 600 mg tablet Take 1 Tab by mouth every six (6) hours as needed for Pain. 20 Tab 0    ibuprofen (MOTRIN) 200 mg tablet 600 mg every six (6) hours as needed for Pain. Take 3 caplets by mouth as needed for pain      senna-docusate (PERICOLACE) 8.6-50 mg per tablet Take 2 Tabs by mouth daily.  (Patient not taking: Reported on 3/17/2020) 15 Tab 0     Allergies   Allergen Reactions    Morphine Rash     Past Medical History:   Diagnosis Date    Body aches 12/11/2014    Chronic pain     Depression     GERD (gastroesophageal reflux disease)     Headache(784.0)     History of mammogram never before    [facility]     Neurological disorder     Multiple Sclerosis    Pap smear for cervical cancer screening 2008    Psychiatric disorder     anxiety    Psychotic disorder [facility])      Past Surgical History:   Procedure Laterality Date    COLONOSCOPY N/A 2/28/2018    COLONOSCOPY performed [facility] at [facility] ENDOSCOPY    HX CHOLECYSTECTOMY      HX GYN      3 c-sections    HX HEENT      bilateral ear tube surgery    HX HERNIA REPAIR      HX OTHER SURGICAL      R inguinal hernia repair     Family History   Problem Relation Age of Onset    Heart Attack Father         tripple bypass    Cancer Father         lung    COPD Father     Diabetes Mother         type 2    Heart Disease Mother heart disease    Diabetes Paternal Grandmother     No Known Problems Sister     No Known Problems Brother     No Known Problems Child      Social History     Tobacco Use    Smoking status: Current Every Day Smoker     Packs/day: 0.50     Years: 17.00     Pack years: 8.50     Types: Cigarettes    Smokeless tobacco: Never Used    Tobacco comment: 1 pack every 3 days. Substance Use Topics    Alcohol use: No      Lab Results   Component Value Date/Time    WBC 6.4 03/09/2020 02:49 AM    HGB 12.1 03/09/2020 02:49 AM    HCT 35.7 03/09/2020 02:49 AM    PLATELET 881 18/32/7869 02:49 AM    MCV 94.7 03/09/2020 02:49 AM     Lab Results   Component Value Date/Time    Hemoglobin A1c 5.2 03/09/2020 02:49 AM    Hemoglobin A1c 4.7 (L) 09/19/2019 01:30 PM    Glucose 149 (H) 03/09/2020 02:49 AM    Glucose (POC) 103 (H) 03/11/2020 06:17 AM    LDL, calculated 143.4 (H) 03/09/2020 02:49 AM    Creatinine 0.80 03/09/2020 02:49 AM      Lab Results   Component Value Date/Time    Cholesterol, total 222 (H) 03/09/2020 02:49 AM    HDL Cholesterol 71 03/09/2020 02:49 AM    LDL, calculated 143.4 (H) 03/09/2020 02:49 AM    Triglyceride 38 03/09/2020 02:49 AM    CHOL/HDL Ratio 3.1 03/09/2020 02:49 AM     Lab Results   Component Value Date/Time    ALT (SGPT) 27 03/08/2020 12:25 PM    AST (SGOT) 15 03/08/2020 12:25 PM    Alk. phosphatase 64 03/08/2020 12:25 PM    Bilirubin, total 0.3 03/08/2020 12:25 PM    Albumin 3.6 03/08/2020 12:25 PM    Protein, total 7.2 03/08/2020 12:25 PM    INR 1.0 03/09/2020 02:49 AM    Prothrombin time 10.7 03/09/2020 02:49 AM    PLATELET 932 19/91/3997 02:49 AM        ROS    Physical Exam    ASSESSMENT and PLAN  Diagnoses and all orders for this visit:    1. MS (multiple sclerosis) (HCC)  -     gabapentin (NEURONTIN) 400 mg capsule; Take 2 Caps by mouth three (3) times daily. Max Daily Amount: 2,400 mg.  -     REFERRAL TO PSYCHIATRY  -     REFERRAL TO NEUROLOGY  -     LORazepam (ATIVAN) 1 mg tablet;  Take 1 Tab by mouth every twelve (12) hours as needed for Anxiety (q12h prn). Max Daily Amount: 2 mg.    2. Chronic pain syndrome  -     gabapentin (NEURONTIN) 400 mg capsule; Take 2 Caps by mouth three (3) times daily. Max Daily Amount: 2,400 mg.  -     REFERRAL TO PSYCHIATRY  -     REFERRAL TO NEUROLOGY  -     LORazepam (ATIVAN) 1 mg tablet; Take 1 Tab by mouth every twelve (12) hours as needed for Anxiety (q12h prn). Max Daily Amount: 2 mg. 3. Tobacco abuse  -     gabapentin (NEURONTIN) 400 mg capsule; Take 2 Caps by mouth three (3) times daily. Max Daily Amount: 2,400 mg.  -     REFERRAL TO PSYCHIATRY  -     REFERRAL TO NEUROLOGY  -     LORazepam (ATIVAN) 1 mg tablet; Take 1 Tab by mouth every twelve (12) hours as needed for Anxiety (q12h prn). Max Daily Amount: 2 mg. 4. Anxiety  -     gabapentin (NEURONTIN) 400 mg capsule; Take 2 Caps by mouth three (3) times daily. Max Daily Amount: 2,400 mg.  -     REFERRAL TO PSYCHIATRY  -     REFERRAL TO NEUROLOGY  -     LORazepam (ATIVAN) 1 mg tablet; Take 1 Tab by mouth every twelve (12) hours as needed for Anxiety (q12h prn). Max Daily Amount: 2 mg. 5. Adjustment disorder with mixed anxiety and depressed mood  -     gabapentin (NEURONTIN) 400 mg capsule; Take 2 Caps by mouth three (3) times daily. Max Daily Amount: 2,400 mg.  -     REFERRAL TO PSYCHIATRY  -     REFERRAL TO NEUROLOGY  -     LORazepam (ATIVAN) 1 mg tablet; Take 1 Tab by mouth every twelve (12) hours as needed for Anxiety (q12h prn). Max Daily Amount: 2 mg. 6. Severe episode of recurrent major depressive disorder, without psychotic features ([facility] [address]  -     gabapentin (NEURONTIN) 400 mg capsule; Take 2 Caps by mouth three (3) times daily. Max Daily Amount: 2,400 mg.  -     REFERRAL TO PSYCHIATRY  -     REFERRAL TO NEUROLOGY  -     LORazepam (ATIVAN) 1 mg tablet; Take 1 Tab by mouth every twelve (12) hours as needed for Anxiety (q12h prn). Max Daily Amount: 2 mg.       Patient was told there were medication is not safe was told not to operate any machinery while taking the medication meanwhile emphasized that the pt should take the medication as needed not on a regular basis avoid alcohol intake and avoid other medication that could potentiate its effect, regardless patient was told any other medication given by any other doctor he need to call primary care for further advice` patient acknowledged understanding and agreed with today's recommendation

## 2020-03-18 ENCOUNTER — HOME CARE VISIT (OUTPATIENT)
Dept: HOME HEALTH SERVICES | Facility: HOME HEALTH | Age: 45
End: 2020-03-18
Payer: MEDICARE

## 2020-03-18 ENCOUNTER — HOME CARE VISIT (OUTPATIENT)
Dept: SCHEDULING | Facility: HOME HEALTH | Age: 45
End: 2020-03-18
Payer: MEDICARE

## 2020-03-18 VITALS
TEMPERATURE: 96.9 F | SYSTOLIC BLOOD PRESSURE: 120 MMHG | DIASTOLIC BLOOD PRESSURE: 80 MMHG | OXYGEN SATURATION: 95 % | HEART RATE: 64 BPM

## 2020-03-18 PROCEDURE — G0152 HHCP-SERV OF OT,EA 15 MIN: HCPCS

## 2020-03-18 PROCEDURE — 3331090002 HH PPS REVENUE DEBIT

## 2020-03-18 PROCEDURE — 3331090001 HH PPS REVENUE CREDIT

## 2020-03-19 ENCOUNTER — HOME CARE VISIT (OUTPATIENT)
Dept: SCHEDULING | Facility: HOME HEALTH | Age: 45
End: 2020-03-19
Payer: MEDICARE

## 2020-03-19 ENCOUNTER — HOME CARE VISIT (OUTPATIENT)
Dept: HOME HEALTH SERVICES | Facility: HOME HEALTH | Age: 45
End: 2020-03-19
Payer: MEDICARE

## 2020-03-19 VITALS
DIASTOLIC BLOOD PRESSURE: 84 MMHG | HEART RATE: 77 BPM | SYSTOLIC BLOOD PRESSURE: 124 MMHG | OXYGEN SATURATION: 98 % | RESPIRATION RATE: 17 BRPM | TEMPERATURE: 97.9 F

## 2020-03-19 VITALS
TEMPERATURE: 98.4 F | SYSTOLIC BLOOD PRESSURE: 138 MMHG | OXYGEN SATURATION: 100 % | HEART RATE: 80 BPM | DIASTOLIC BLOOD PRESSURE: 80 MMHG

## 2020-03-19 PROCEDURE — 3331090002 HH PPS REVENUE DEBIT

## 2020-03-19 PROCEDURE — G0158 HHC OT ASSISTANT EA 15: HCPCS

## 2020-03-19 PROCEDURE — 3331090001 HH PPS REVENUE CREDIT

## 2020-03-19 PROCEDURE — G0151 HHCP-SERV OF PT,EA 15 MIN: HCPCS

## 2020-03-20 ENCOUNTER — HOME CARE VISIT (OUTPATIENT)
Dept: HOME HEALTH SERVICES | Facility: HOME HEALTH | Age: 45
End: 2020-03-20
Payer: MEDICARE

## 2020-03-20 ENCOUNTER — HOME CARE VISIT (OUTPATIENT)
Dept: SCHEDULING | Facility: HOME HEALTH | Age: 45
End: 2020-03-20
Payer: MEDICARE

## 2020-03-20 VITALS
DIASTOLIC BLOOD PRESSURE: 86 MMHG | OXYGEN SATURATION: 97 % | SYSTOLIC BLOOD PRESSURE: 122 MMHG | RESPIRATION RATE: 17 BRPM | HEART RATE: 73 BPM | TEMPERATURE: 97.5 F

## 2020-03-20 PROCEDURE — G0151 HHCP-SERV OF PT,EA 15 MIN: HCPCS

## 2020-03-20 PROCEDURE — 3331090001 HH PPS REVENUE CREDIT

## 2020-03-20 PROCEDURE — 3331090002 HH PPS REVENUE DEBIT

## 2020-03-21 PROCEDURE — 3331090002 HH PPS REVENUE DEBIT

## 2020-03-21 PROCEDURE — 3331090001 HH PPS REVENUE CREDIT

## 2020-03-22 PROCEDURE — 3331090001 HH PPS REVENUE CREDIT

## 2020-03-22 PROCEDURE — 3331090002 HH PPS REVENUE DEBIT

## 2020-03-23 ENCOUNTER — APPOINTMENT (OUTPATIENT)
Dept: CT IMAGING | Age: 45
DRG: 060 | End: 2020-03-23
Attending: EMERGENCY MEDICINE
Payer: MEDICARE

## 2020-03-23 ENCOUNTER — HOME CARE VISIT (OUTPATIENT)
Dept: HOME HEALTH SERVICES | Facility: HOME HEALTH | Age: 45
End: 2020-03-23
Payer: MEDICARE

## 2020-03-23 ENCOUNTER — APPOINTMENT (OUTPATIENT)
Dept: GENERAL RADIOLOGY | Age: 45
DRG: 060 | End: 2020-03-23
Attending: EMERGENCY MEDICINE
Payer: MEDICARE

## 2020-03-23 ENCOUNTER — HOSPITAL ENCOUNTER (INPATIENT)
Age: 45
LOS: 5 days | Discharge: HOME | DRG: 060 | End: 2020-03-28
Attending: EMERGENCY MEDICINE | Admitting: INTERNAL MEDICINE
Payer: MEDICARE

## 2020-03-23 DIAGNOSIS — G35 MS (MULTIPLE SCLEROSIS) (HCC): ICD-10-CM

## 2020-03-23 DIAGNOSIS — R47.1 DYSARTHRIA: ICD-10-CM

## 2020-03-23 DIAGNOSIS — R47.02 DYSPHASIA: Primary | ICD-10-CM

## 2020-03-23 DIAGNOSIS — K59.04 CHRONIC IDIOPATHIC CONSTIPATION: ICD-10-CM

## 2020-03-23 DIAGNOSIS — R29.810 FACIAL DROOP: ICD-10-CM

## 2020-03-23 DIAGNOSIS — Z72.0 TOBACCO ABUSE: ICD-10-CM

## 2020-03-23 DIAGNOSIS — R53.1 RIGHT SIDED WEAKNESS: ICD-10-CM

## 2020-03-23 DIAGNOSIS — F41.9 ANXIETY: ICD-10-CM

## 2020-03-23 LAB
ALBUMIN SERPL-MCNC: 3.9 G/DL (ref 3.5–5)
ALBUMIN/GLOB SERPL: 1.1 {RATIO} (ref 1.1–2.2)
ALP SERPL-CCNC: 147 U/L (ref 45–117)
ALT SERPL-CCNC: 98 U/L (ref 12–78)
ANION GAP SERPL CALC-SCNC: 6 MMOL/L (ref 5–15)
APPEARANCE UR: CLEAR
APTT PPP: 22.5 SEC (ref 22.1–32)
AST SERPL-CCNC: 45 U/L (ref 15–37)
BASOPHILS # BLD: 0 K/UL (ref 0–0.1)
BASOPHILS NFR BLD: 0 % (ref 0–1)
BILIRUB SERPL-MCNC: 0.4 MG/DL (ref 0.2–1)
BILIRUB UR QL: NEGATIVE
BUN SERPL-MCNC: 7 MG/DL (ref 6–20)
BUN/CREAT SERPL: 10 (ref 12–20)
CALCIUM SERPL-MCNC: 9.6 MG/DL (ref 8.5–10.1)
CHLORIDE SERPL-SCNC: 105 MMOL/L (ref 97–108)
CO2 SERPL-SCNC: 29 MMOL/L (ref 21–32)
COLOR UR: NORMAL
CREAT SERPL-MCNC: 0.7 MG/DL (ref 0.55–1.02)
DIFFERENTIAL METHOD BLD: ABNORMAL
EOSINOPHIL # BLD: 0 K/UL (ref 0–0.4)
EOSINOPHIL NFR BLD: 0 % (ref 0–7)
ERYTHROCYTE [DISTWIDTH] IN BLOOD BY AUTOMATED COUNT: 13.9 % (ref 11.5–14.5)
GLOBULIN SER CALC-MCNC: 3.7 G/DL (ref 2–4)
GLUCOSE SERPL-MCNC: 94 MG/DL (ref 65–100)
GLUCOSE UR STRIP.AUTO-MCNC: NEGATIVE MG/DL
HCT VFR BLD AUTO: 35.5 % (ref 35–47)
HGB BLD-MCNC: 12.3 G/DL (ref 11.5–16)
HGB UR QL STRIP: NEGATIVE
IMM GRANULOCYTES # BLD AUTO: 0 K/UL (ref 0–0.04)
IMM GRANULOCYTES NFR BLD AUTO: 0 % (ref 0–0.5)
INR PPP: 1 (ref 0.9–1.1)
KETONES UR QL STRIP.AUTO: NEGATIVE MG/DL
LEUKOCYTE ESTERASE UR QL STRIP.AUTO: NEGATIVE
LYMPHOCYTES # BLD: 0.5 K/UL (ref 0.8–3.5)
LYMPHOCYTES NFR BLD: 9 % (ref 12–49)
MAGNESIUM SERPL-MCNC: 1.6 MG/DL (ref 1.6–2.4)
MCH RBC QN AUTO: 32.6 PG (ref 26–34)
MCHC RBC AUTO-ENTMCNC: 34.6 G/DL (ref 30–36.5)
MCV RBC AUTO: 94.2 FL (ref 80–99)
MONOCYTES # BLD: 0.2 K/UL (ref 0–1)
MONOCYTES NFR BLD: 3 % (ref 5–13)
NEUTS SEG # BLD: 4.3 K/UL (ref 1.8–8)
NEUTS SEG NFR BLD: 88 % (ref 32–75)
NITRITE UR QL STRIP.AUTO: NEGATIVE
NRBC # BLD: 0 K/UL (ref 0–0.01)
NRBC BLD-RTO: 0 PER 100 WBC
PH UR STRIP: 6.5 [PH] (ref 5–8)
PLATELET # BLD AUTO: 293 K/UL (ref 150–400)
PMV BLD AUTO: 9.8 FL (ref 8.9–12.9)
POTASSIUM SERPL-SCNC: 3.9 MMOL/L (ref 3.5–5.1)
PROT SERPL-MCNC: 7.6 G/DL (ref 6.4–8.2)
PROT UR STRIP-MCNC: NEGATIVE MG/DL
PROTHROMBIN TIME: 10.7 SEC (ref 9–11.1)
RBC # BLD AUTO: 3.77 M/UL (ref 3.8–5.2)
RBC MORPH BLD: ABNORMAL
SODIUM SERPL-SCNC: 140 MMOL/L (ref 136–145)
SP GR UR REFRACTOMETRY: 1 (ref 1–1.03)
THERAPEUTIC RANGE,PTTT: NORMAL SECS (ref 58–77)
UROBILINOGEN UR QL STRIP.AUTO: 0.2 EU/DL (ref 0.2–1)
WBC # BLD AUTO: 5 K/UL (ref 3.6–11)

## 2020-03-23 PROCEDURE — 80053 COMPREHEN METABOLIC PANEL: CPT

## 2020-03-23 PROCEDURE — 3331090001 HH PPS REVENUE CREDIT

## 2020-03-23 PROCEDURE — 85025 COMPLETE CBC W/AUTO DIFF WBC: CPT

## 2020-03-23 PROCEDURE — 70450 CT HEAD/BRAIN W/O DYE: CPT

## 2020-03-23 PROCEDURE — 3331090002 HH PPS REVENUE DEBIT

## 2020-03-23 PROCEDURE — 85610 PROTHROMBIN TIME: CPT

## 2020-03-23 PROCEDURE — 85730 THROMBOPLASTIN TIME PARTIAL: CPT

## 2020-03-23 PROCEDURE — 70496 CT ANGIOGRAPHY HEAD: CPT

## 2020-03-23 PROCEDURE — 81003 URINALYSIS AUTO W/O SCOPE: CPT

## 2020-03-23 PROCEDURE — 74011636320 HC RX REV CODE- 636/320: Performed by: EMERGENCY MEDICINE

## 2020-03-23 PROCEDURE — 83735 ASSAY OF MAGNESIUM: CPT

## 2020-03-23 PROCEDURE — 93005 ELECTROCARDIOGRAM TRACING: CPT

## 2020-03-23 PROCEDURE — 65270000029 HC RM PRIVATE

## 2020-03-23 PROCEDURE — 36415 COLL VENOUS BLD VENIPUNCTURE: CPT

## 2020-03-23 PROCEDURE — 71045 X-RAY EXAM CHEST 1 VIEW: CPT

## 2020-03-23 PROCEDURE — 99285 EMERGENCY DEPT VISIT HI MDM: CPT

## 2020-03-23 RX ORDER — MAGNESIUM SULFATE 100 %
4 CRYSTALS MISCELLANEOUS AS NEEDED
Status: DISCONTINUED | OUTPATIENT
Start: 2020-03-23 | End: 2020-03-28 | Stop reason: HOSPADM

## 2020-03-23 RX ORDER — SODIUM CHLORIDE 0.9 % (FLUSH) 0.9 %
10 SYRINGE (ML) INJECTION
Status: COMPLETED | OUTPATIENT
Start: 2020-03-23 | End: 2020-03-23

## 2020-03-23 RX ORDER — DEXTROSE 50 % IN WATER (D50W) INTRAVENOUS SYRINGE
12.5-25 AS NEEDED
Status: DISCONTINUED | OUTPATIENT
Start: 2020-03-23 | End: 2020-03-28 | Stop reason: HOSPADM

## 2020-03-23 RX ORDER — ACETAMINOPHEN 325 MG/1
650 TABLET ORAL
Status: DISCONTINUED | OUTPATIENT
Start: 2020-03-23 | End: 2020-03-24

## 2020-03-23 RX ORDER — INSULIN LISPRO 100 [IU]/ML
INJECTION, SOLUTION INTRAVENOUS; SUBCUTANEOUS
Status: DISCONTINUED | OUTPATIENT
Start: 2020-03-24 | End: 2020-03-28 | Stop reason: HOSPADM

## 2020-03-23 RX ORDER — ONDANSETRON 2 MG/ML
4 INJECTION INTRAMUSCULAR; INTRAVENOUS
Status: DISCONTINUED | OUTPATIENT
Start: 2020-03-23 | End: 2020-03-28 | Stop reason: HOSPADM

## 2020-03-23 RX ORDER — METHYLPREDNISOLONE SODIUM SUCCINATE 125 MG/2ML
125 INJECTION, POWDER, LYOPHILIZED, FOR SOLUTION INTRAMUSCULAR; INTRAVENOUS EVERY 6 HOURS
COMMUNITY
End: 2020-03-28

## 2020-03-23 RX ADMIN — IOPAMIDOL 100 ML: 755 INJECTION, SOLUTION INTRAVENOUS at 21:20

## 2020-03-23 RX ADMIN — Medication 10 ML: at 21:20

## 2020-03-23 NOTE — ED NOTES
Pt arrived via EMS and bedside report obtained. Assumed care of pt at this time. EMS reports that pt went to take a nap yesterday around 1400 and woke up at 1600 with slurred speech and a R sided facial droop. EMS notes sx's have been consistent since, as well as a mild headache that has not been relieved with OTC medications. Pt with hx of MS, but denies hx of TIA/CVA. Pt resting comfortably in bed with side rails up and call bell with within reach. Pt aware of plan to await for MD/PA-C assessment, and pt/family verbalizes understanding. Placed on Monitor x 3 with alarms on.

## 2020-03-23 NOTE — ED PROVIDER NOTES
EMERGENCY DEPARTMENT HISTORY AND PHYSICAL EXAM      Date: 3/23/2020  Patient Name: Gallito Cook    History of Presenting Illness     Chief Complaint   Patient presents with    Dysarthria     slurred speech x 1600 yesterday    Facial Droop     R sided droop x 1600 yesterday,        History Provided By: Patient    HPI: Gallito Cook, 53 y.o. female  presents to the ED with cc of slurred speech and right facial droop. Patient states symptoms started yesterday at about 4 PM.  Last known well at 2 PM yesterday. Patient does have a history of multiple sclerosis but states this is not his typical symptoms. She normally has weakness in her legs. No headache. She states she has had numbness in both of her upper extremities. She does note some weakness in her right upper extremity. She said no history of strokes. She does not take any antiplatelets or anticoagulants. Just recently admitted to the hospital for MS exacerbation where she was receiving IV steroids. There are no other complaints, changes, or physical findings at this time. PCP: None    No current facility-administered medications on file prior to encounter. Current Outpatient Medications on File Prior to Encounter   Medication Sig Dispense Refill    naproxen sodium (NAPROSYN) 220 mg tablet Take 220 mg by mouth every eight to twelve (8-12) hours as needed for Pain. 1 caplet      gabapentin (NEURONTIN) 400 mg capsule Take 2 Caps by mouth three (3) times daily. Max Daily Amount: 2,400 mg. 120 Cap 0    LORazepam (ATIVAN) 1 mg tablet Take 1 Tab by mouth every twelve (12) hours as needed for Anxiety (q12h prn). Max Daily Amount: 2 mg. 40 Tab 0    ibuprofen (MOTRIN) 200 mg tablet 600 mg every six (6) hours as needed for Pain.  Take 3 caplets by mouth as needed for pain      predniSONE (DELTASONE) 20 mg tablet Take 2 tabs by mouth with breakfast for 2 days, then take 1 tab for 2 days, then take 1/2 tab for 2 days then discontinue 7 Tab 0    DULoxetine (CYMBALTA) 60 mg capsule Take 1 Cap by mouth daily. Indications: neuropathic pain (Patient taking differently: Take 60 mg by mouth daily. Indications: neuropathic pain, pt state she takes 1 tab 2 x daily.) 30 Cap 1    senna-docusate (PERICOLACE) 8.6-50 mg per tablet Take 2 Tabs by mouth daily. (Patient not taking: Reported on 3/17/2020) 15 Tab 0    dimethyl fumarate (TECFIDERA) 240 mg cpDR Take 240 mg by mouth two (2) times a day. 120 Cap 3    ibuprofen (MOTRIN) 600 mg tablet Take 1 Tab by mouth every six (6) hours as needed for Pain.  20 Tab 0       Past History     Past Medical History:  Past Medical History:   Diagnosis Date    Body aches 12/11/2014    Chronic pain     Depression     GERD (gastroesophageal reflux disease)     Headache(784.0)     History of mammogram never before    MS [facility] sclerosis) ([facility] [address]     Neurological disorder     Multiple Sclerosis    Pap smear for cervical cancer screening 2008    Psychiatric disorder     anxiety    Psychotic disorder [facility])        Past Surgical History:  Past Surgical History:   Procedure Laterality Date    COLONOSCOPY N/A 2/28/2018    COLONOSCOPY performed by [facility] at [facility]COPY    HX CHOLECYSTECTOMY      HX GYN      3 c-sections    HX HEENT      bilateral ear tube surgery    HX HERNIA REPAIR      HX OTHER SURGICAL      R inguinal hernia repair       Family History:  Family History   Problem Relation Age of Onset    Heart Attack Father         tripple bypass    Cancer Father         lung    COPD Father     Diabetes Mother         type 2    Heart Disease Mother         heart disease    Diabetes Paternal Grandmother     No Known Problems Sister     No Known Problems Brother     No Known Problems Child        Social History:  Social History     Tobacco Use    Smoking status: Current Every Day Smoker     Packs/day: 0.50     Years: 17.00     Pack years: 8.50     Types: Cigarettes    Smokeless tobacco: Never Used    Tobacco comment: 1 pack every 3 days. Substance Use Topics    Alcohol use: No    Drug use: No       Allergies: Allergies   Allergen Reactions    Morphine Rash         Review of Systems   Review of Systems   Constitutional: Negative for chills and fever. HENT: Negative for congestion, ear pain, rhinorrhea, sore throat and trouble swallowing. Eyes: Negative for visual disturbance. Respiratory: Negative for cough, chest tightness and shortness of breath. Cardiovascular: Negative for chest pain and palpitations. Gastrointestinal: Negative for abdominal pain, blood in stool, constipation, diarrhea, nausea and vomiting. Genitourinary: Negative for decreased urine volume, difficulty urinating, dysuria and frequency. Musculoskeletal: Negative for back pain and neck pain. Skin: Negative for color change and rash. Neurological: Positive for facial asymmetry, speech difficulty, weakness and numbness. Negative for dizziness, light-headedness and headaches. Physical Exam   Physical Exam  Vitals signs and nursing note reviewed. Constitutional:       General: She is not in acute distress. Appearance: She is well-developed. She is not ill-appearing. Eyes:      Conjunctiva/sclera: Conjunctivae normal.   Neck:      Musculoskeletal: Neck supple. Cardiovascular:      Rate and Rhythm: Normal rate and regular rhythm. Pulmonary:      Effort: Pulmonary effort is normal. No accessory muscle usage or respiratory distress. Breath sounds: Normal breath sounds. Abdominal:      General: There is no distension. Palpations: Abdomen is soft. Tenderness: There is no abdominal tenderness. Lymphadenopathy:      Cervical: No cervical adenopathy. Skin:     General: Skin is warm and dry. Neurological:      Mental Status: She is alert and oriented to person, place, and time.       Cranial Nerves: Cranial nerve deficit (right facial droop), dysarthria and facial asymmetry present. Sensory: No sensory deficit. Diagnostic Study Results     Labs -     Recent Results (from the past 24 hour(s))   URINALYSIS W/ RFLX MICROSCOPIC    Collection Time: 03/23/20  8:04 PM   Result Value Ref Range    Color YELLOW/STRAW      Appearance CLEAR CLEAR      Specific gravity 1.005 1.003 - 1.030      pH (UA) 6.5 5.0 - 8.0      Protein NEGATIVE  NEG mg/dL    Glucose NEGATIVE  NEG mg/dL    Ketone NEGATIVE  NEG mg/dL    Bilirubin NEGATIVE  NEG      Blood NEGATIVE  NEG      Urobilinogen 0.2 0.2 - 1.0 EU/dL    Nitrites NEGATIVE  NEG      Leukocyte Esterase NEGATIVE  NEG     MAGNESIUM    Collection Time: 03/23/20  8:43 PM   Result Value Ref Range    Magnesium 1.6 1.6 - 2.4 mg/dL   METABOLIC PANEL, COMPREHENSIVE    Collection Time: 03/23/20  8:43 PM   Result Value Ref Range    Sodium 140 136 - 145 mmol/L    Potassium 3.9 3.5 - 5.1 mmol/L    Chloride 105 97 - 108 mmol/L    CO2 29 21 - 32 mmol/L    Anion gap 6 5 - 15 mmol/L    Glucose 94 65 - 100 mg/dL    BUN 7 6 - 20 MG/DL    Creatinine 0.70 0.55 - 1.02 MG/DL    BUN/Creatinine ratio 10 (L) 12 - 20      GFR est AA >60 >60 ml/min/1.73m2    GFR est non-AA >60 >60 ml/min/1.73m2    Calcium 9.6 8.5 - 10.1 MG/DL    Bilirubin, total 0.4 0.2 - 1.0 MG/DL    ALT (SGPT) 98 (H) 12 - 78 U/L    AST (SGOT) 45 (H) 15 - 37 U/L    Alk.  phosphatase 147 (H) 45 - 117 U/L    Protein, total 7.6 6.4 - 8.2 g/dL    Albumin 3.9 3.5 - 5.0 g/dL    Globulin 3.7 2.0 - 4.0 g/dL    A-G Ratio 1.1 1.1 - 2.2     PTT    Collection Time: 03/23/20  8:43 PM   Result Value Ref Range    aPTT 22.5 22.1 - 32.0 sec    aPTT, therapeutic range     58.0 - 77.0 SECS   PROTHROMBIN TIME + INR    Collection Time: 03/23/20  8:43 PM   Result Value Ref Range    INR 1.0 0.9 - 1.1      Prothrombin time 10.7 9.0 - 11.1 sec   CBC WITH AUTOMATED DIFF    Collection Time: 03/23/20  8:43 PM   Result Value Ref Range    WBC 5.0 3.6 - 11.0 K/uL    RBC 3.77 (L) 3.80 - 5.20 M/uL    HGB 12.3 11.5 - 16.0 g/dL    HCT 35.5 35.0 - 47.0 %    MCV 94.2 80.0 - 99.0 FL    MCH 32.6 26.0 - 34.0 PG    MCHC 34.6 30.0 - 36.5 g/dL    RDW 13.9 11.5 - 14.5 %    PLATELET 942 063 - 235 K/uL    MPV 9.8 8.9 - 12.9 FL    NRBC 0.0 0  WBC    ABSOLUTE NRBC 0.00 0.00 - 0.01 K/uL    NEUTROPHILS 88 (H) 32 - 75 %    LYMPHOCYTES 9 (L) 12 - 49 %    MONOCYTES 3 (L) 5 - 13 %    EOSINOPHILS 0 0 - 7 %    BASOPHILS 0 0 - 1 %    IMMATURE GRANULOCYTES 0 0.0 - 0.5 %    ABS. NEUTROPHILS 4.3 1.8 - 8.0 K/UL    ABS. LYMPHOCYTES 0.5 (L) 0.8 - 3.5 K/UL    ABS. MONOCYTES 0.2 0.0 - 1.0 K/UL    ABS. EOSINOPHILS 0.0 0.0 - 0.4 K/UL    ABS. BASOPHILS 0.0 0.0 - 0.1 K/UL    ABS. IMM. GRANS. 0.0 0.00 - 0.04 K/UL    DF AUTOMATED      RBC COMMENTS NORMOCYTIC, NORMOCHROMIC         Radiologic Studies -   CT HEAD WO CONT   Final Result   IMPRESSION:    1. No acute intracranial hemorrhage, mass or infarct. 2. Persistent periventricular white matter hypodensities, correlating with   patient's known MS plaques, unchanged. MR can be performed to better   characterize, as clinically indicated. CTA HEAD NECK W CONT         XR CHEST PORT   Final Result   Impression: No acute process. CT Results  (Last 48 hours)               03/23/20 2140  CT HEAD WO CONT Final result    Impression:  IMPRESSION:    1. No acute intracranial hemorrhage, mass or infarct. 2. Persistent periventricular white matter hypodensities, correlating with   patient's known MS plaques, unchanged. MR can be performed to better   characterize, as clinically indicated. Narrative:  INDICATION: slurred speech, right facial droop        Exam: Noncontrast CT of the brain is performed with 5 mm collimation. CT dose reduction was achieved with the use of the standardized protocol   tailored for this examination and automatic exposure control for dose   modulation. Direct comparison is made to prior MRI dated March 2020.        FINDINGS: There is no acute intracranial hemorrhage, mass, mass effect or   herniation. Ventricular system is normal. There are periventricular white matter   hyperintensities, unchanged compared to prior MR and consistent with MS plaques. There is no evidence of acute territorial infarct. The mastoid air cells are   well pneumatized. The visualized paranasal sinuses are normal.           03/23/20 2140  CTA HEAD NECK W CONT Preliminary result    Narrative:  Prelim report:       No intracranial arterial large vessel occlusion. No carotid or vertebral artery stenosis or occlusion. Final report to follow. CXR Results  (Last 48 hours)               03/23/20 2052  XR CHEST PORT Final result    Impression:  Impression: No acute process. Narrative: Indication: Acute dysarthria and facial droop       Comparison: 10/27/2018       Portable exam of the chest obtained at 2044 demonstrates normal heart size. There is no acute process in the lung fields. Old right rib fractures are noted. Medical Decision Making   I am the first provider for this patient. I reviewed the vital signs, available nursing notes, past medical history, past surgical history, family history and social history. Vital Signs-Reviewed the patient's vital signs. Patient Vitals for the past 24 hrs:   Temp Pulse Resp BP SpO2   03/23/20 2009  66 16  99 %   03/23/20 2000  66 17  97 %   03/23/20 1953  69 19  100 %   03/23/20 1941  64 18  97 %   03/23/20 1917 97.9 °F (36.6 °C) 71 10 129/71 100 %   03/23/20 1913    126/71          Records Reviewed: Nursing Notes and Old Medical Records    Provider Notes (Medical Decision Making):   Patient presents to emergency department with slurred speech and right facial droop. She states this is not her normal MS symptoms and this is brand-new. She states it started yesterday at about 4 PM.  She is outside the 24-hour window for stroke alert.   She states she was recently admitted to the hospital for steroids for MS flare. CT and CTA imaging performed in the emergency department unremarkable. She has no fever. Blood pressure is normal.  No metabolic abnormalities noted. Review of medical records later in her ED course do indicate that she was in the hospital a few weeks ago but she was in for the exact same symptoms. Considering the symptoms were present several weeks ago I have less concern for acute stroke. Likely this is a MS flare. She may still benefit from coming into the hospital and receiving IV steroids and having neurology see her in the morning. ED Course:   Initial assessment performed. The patients presenting problems have been discussed, and they are in agreement with the care plan formulated and outlined with them. I have encouraged them to ask questions as they arise throughout their visit.          Orders Placed This Encounter    XR CHEST PORT    CT HEAD WO CONT    CTA HEAD NECK W CONT    URINALYSIS W/ RFLX MICROSCOPIC    MAGNESIUM    METABOLIC PANEL, COMPREHENSIVE    PTT    PT    CBC WITH AUTOMATED DIFF    DIET REGULAR    NOTIFY PROVIDER: SPECIFY Notify provider on pt's arrival to floor ONE TIME STAT    OUT OF BED WITH ASSISTANCE    VITAL SIGNS PER UNIT ROUTINE    EKG NOTEWRITER(ASAP ONLY)    FULL CODE    EKG, 12 LEAD, INITIAL    SALINE LOCK IV ONE TIME STAT    sodium chloride (NS) flush 10 mL    iopamidoL (ISOVUE-370) 76 % injection 100 mL    acetaminophen (TYLENOL) tablet 650 mg    ondansetron (ZOFRAN) injection 4 mg       EKG  Date/Time: 3/23/2020 9:05 PM  Performed by: Kole Gaytan MD  Authorized by: Kole Gaytan MD     ECG reviewed by ED Physician in the absence of a cardiologist: yes    Rate:     ECG rate:  63    ECG rate assessment: normal    Rhythm:     Rhythm: sinus rhythm    Ectopy:     Ectopy: none    QRS:     QRS axis:  Normal    QRS intervals:  Normal  Conduction:     Conduction: normal    ST segments:     ST segments:  Normal  T waves: T waves: normal            Critical Care Time:   0    Disposition:  Admit      Diagnosis     Clinical Impression:   1. Dysphasia    2. Facial droop    3. MS (multiple sclerosis) ([facility] [address]            This note will not be viewable in MyChart.

## 2020-03-24 ENCOUNTER — APPOINTMENT (OUTPATIENT)
Dept: ULTRASOUND IMAGING | Age: 45
DRG: 060 | End: 2020-03-24
Attending: INTERNAL MEDICINE
Payer: MEDICARE

## 2020-03-24 ENCOUNTER — HOME CARE VISIT (OUTPATIENT)
Dept: HOME HEALTH SERVICES | Facility: HOME HEALTH | Age: 45
End: 2020-03-24
Payer: MEDICARE

## 2020-03-24 ENCOUNTER — APPOINTMENT (OUTPATIENT)
Dept: MRI IMAGING | Age: 45
DRG: 060 | End: 2020-03-24
Attending: INTERNAL MEDICINE
Payer: MEDICARE

## 2020-03-24 LAB
25(OH)D3 SERPL-MCNC: 38.5 NG/ML (ref 30–100)
ATRIAL RATE: 63 BPM
CALCULATED P AXIS, ECG09: 17 DEGREES
CALCULATED R AXIS, ECG10: -7 DEGREES
CALCULATED T AXIS, ECG11: 30 DEGREES
DIAGNOSIS, 93000: NORMAL
GLUCOSE BLD STRIP.AUTO-MCNC: 152 MG/DL (ref 65–100)
GLUCOSE BLD STRIP.AUTO-MCNC: 170 MG/DL (ref 65–100)
GLUCOSE BLD STRIP.AUTO-MCNC: 87 MG/DL (ref 65–100)
GLUCOSE BLD STRIP.AUTO-MCNC: 90 MG/DL (ref 65–100)
P-R INTERVAL, ECG05: 180 MS
Q-T INTERVAL, ECG07: 416 MS
QRS DURATION, ECG06: 88 MS
QTC CALCULATION (BEZET), ECG08: 425 MS
SERVICE CMNT-IMP: ABNORMAL
SERVICE CMNT-IMP: ABNORMAL
SERVICE CMNT-IMP: NORMAL
SERVICE CMNT-IMP: NORMAL
VENTRICULAR RATE, ECG03: 63 BPM

## 2020-03-24 PROCEDURE — 82962 GLUCOSE BLOOD TEST: CPT

## 2020-03-24 PROCEDURE — 36415 COLL VENOUS BLD VENIPUNCTURE: CPT

## 2020-03-24 PROCEDURE — 3331090001 HH PPS REVENUE CREDIT

## 2020-03-24 PROCEDURE — 74011250637 HC RX REV CODE- 250/637: Performed by: INTERNAL MEDICINE

## 2020-03-24 PROCEDURE — 70553 MRI BRAIN STEM W/O & W/DYE: CPT

## 2020-03-24 PROCEDURE — 74011250636 HC RX REV CODE- 250/636: Performed by: INTERNAL MEDICINE

## 2020-03-24 PROCEDURE — 82306 VITAMIN D 25 HYDROXY: CPT

## 2020-03-24 PROCEDURE — 97161 PT EVAL LOW COMPLEX 20 MIN: CPT

## 2020-03-24 PROCEDURE — 65270000029 HC RM PRIVATE

## 2020-03-24 PROCEDURE — A9575 INJ GADOTERATE MEGLUMI 0.1ML: HCPCS | Performed by: INTERNAL MEDICINE

## 2020-03-24 PROCEDURE — 74011250637 HC RX REV CODE- 250/637: Performed by: FAMILY MEDICINE

## 2020-03-24 PROCEDURE — 76705 ECHO EXAM OF ABDOMEN: CPT

## 2020-03-24 PROCEDURE — 97116 GAIT TRAINING THERAPY: CPT

## 2020-03-24 PROCEDURE — 99223 1ST HOSP IP/OBS HIGH 75: CPT | Performed by: PSYCHIATRY & NEUROLOGY

## 2020-03-24 PROCEDURE — 74011000258 HC RX REV CODE- 258: Performed by: INTERNAL MEDICINE

## 2020-03-24 PROCEDURE — 74011636637 HC RX REV CODE- 636/637: Performed by: INTERNAL MEDICINE

## 2020-03-24 PROCEDURE — 3331090002 HH PPS REVENUE DEBIT

## 2020-03-24 RX ORDER — SODIUM CHLORIDE 0.9 % (FLUSH) 0.9 %
5-40 SYRINGE (ML) INJECTION EVERY 8 HOURS
Status: DISCONTINUED | OUTPATIENT
Start: 2020-03-24 | End: 2020-03-28 | Stop reason: HOSPADM

## 2020-03-24 RX ORDER — NALOXONE HYDROCHLORIDE 0.4 MG/ML
0.4 INJECTION, SOLUTION INTRAMUSCULAR; INTRAVENOUS; SUBCUTANEOUS AS NEEDED
Status: DISCONTINUED | OUTPATIENT
Start: 2020-03-24 | End: 2020-03-28 | Stop reason: HOSPADM

## 2020-03-24 RX ORDER — ACETAMINOPHEN 325 MG/1
325 TABLET ORAL
Status: DISCONTINUED | OUTPATIENT
Start: 2020-03-24 | End: 2020-03-28 | Stop reason: HOSPADM

## 2020-03-24 RX ORDER — DIMETHYL FUMARATE 240 MG/1
1 CAPSULE ORAL 2 TIMES DAILY
Status: DISCONTINUED | OUTPATIENT
Start: 2020-03-24 | End: 2020-03-28 | Stop reason: HOSPADM

## 2020-03-24 RX ORDER — DIMETHYL FUMARATE 240 MG/1
1 CAPSULE ORAL DAILY
Status: DISCONTINUED | OUTPATIENT
Start: 2020-03-24 | End: 2020-03-24

## 2020-03-24 RX ORDER — LANOLIN ALCOHOL/MO/W.PET/CERES
3 CREAM (GRAM) TOPICAL
Status: DISCONTINUED | OUTPATIENT
Start: 2020-03-24 | End: 2020-03-28 | Stop reason: HOSPADM

## 2020-03-24 RX ORDER — LORAZEPAM 2 MG/ML
1 INJECTION INTRAMUSCULAR
Status: COMPLETED | OUTPATIENT
Start: 2020-03-24 | End: 2020-03-24

## 2020-03-24 RX ORDER — ONDANSETRON 2 MG/ML
4 INJECTION INTRAMUSCULAR; INTRAVENOUS DAILY
Status: COMPLETED | OUTPATIENT
Start: 2020-03-25 | End: 2020-03-26

## 2020-03-24 RX ORDER — GADOTERATE MEGLUMINE 376.9 MG/ML
20 INJECTION INTRAVENOUS
Status: COMPLETED | OUTPATIENT
Start: 2020-03-24 | End: 2020-03-24

## 2020-03-24 RX ORDER — OXYCODONE AND ACETAMINOPHEN 5; 325 MG/1; MG/1
1 TABLET ORAL
Status: DISCONTINUED | OUTPATIENT
Start: 2020-03-24 | End: 2020-03-28 | Stop reason: HOSPADM

## 2020-03-24 RX ORDER — DULOXETIN HYDROCHLORIDE 30 MG/1
60 CAPSULE, DELAYED RELEASE ORAL DAILY
Status: DISCONTINUED | OUTPATIENT
Start: 2020-03-24 | End: 2020-03-28 | Stop reason: HOSPADM

## 2020-03-24 RX ORDER — AMOXICILLIN 250 MG
2 CAPSULE ORAL DAILY PRN
Status: DISCONTINUED | OUTPATIENT
Start: 2020-03-24 | End: 2020-03-28 | Stop reason: HOSPADM

## 2020-03-24 RX ORDER — SODIUM CHLORIDE 0.9 % (FLUSH) 0.9 %
5-40 SYRINGE (ML) INJECTION AS NEEDED
Status: DISCONTINUED | OUTPATIENT
Start: 2020-03-24 | End: 2020-03-28 | Stop reason: HOSPADM

## 2020-03-24 RX ORDER — LORAZEPAM 1 MG/1
1 TABLET ORAL
Status: DISCONTINUED | OUTPATIENT
Start: 2020-03-24 | End: 2020-03-28 | Stop reason: HOSPADM

## 2020-03-24 RX ADMIN — LORAZEPAM 1 MG: 1 TABLET ORAL at 02:00

## 2020-03-24 RX ADMIN — INSULIN LISPRO 2 UNITS: 100 INJECTION, SOLUTION INTRAVENOUS; SUBCUTANEOUS at 17:13

## 2020-03-24 RX ADMIN — DULOXETINE 60 MG: 30 CAPSULE, DELAYED RELEASE ORAL at 09:35

## 2020-03-24 RX ADMIN — GABAPENTIN 800 MG: 100 CAPSULE ORAL at 21:33

## 2020-03-24 RX ADMIN — LORAZEPAM 1 MG: 2 INJECTION INTRAMUSCULAR; INTRAVENOUS at 09:35

## 2020-03-24 RX ADMIN — GABAPENTIN 800 MG: 100 CAPSULE ORAL at 08:55

## 2020-03-24 RX ADMIN — LORAZEPAM 1 MG: 1 TABLET ORAL at 17:14

## 2020-03-24 RX ADMIN — MELATONIN 3 MG: at 23:24

## 2020-03-24 RX ADMIN — ONDANSETRON 4 MG: 2 INJECTION INTRAMUSCULAR; INTRAVENOUS at 11:53

## 2020-03-24 RX ADMIN — Medication 10 ML: at 17:15

## 2020-03-24 RX ADMIN — GABAPENTIN 800 MG: 100 CAPSULE ORAL at 17:14

## 2020-03-24 RX ADMIN — DIMETHYL FUMARATE 1 CAPSULE: 240 CAPSULE ORAL at 23:24

## 2020-03-24 RX ADMIN — SODIUM CHLORIDE 1000 MG: 900 INJECTION, SOLUTION INTRAVENOUS at 11:44

## 2020-03-24 RX ADMIN — Medication 10 ML: at 11:45

## 2020-03-24 RX ADMIN — GADOTERATE MEGLUMINE 20 ML: 376.9 INJECTION INTRAVENOUS at 10:00

## 2020-03-24 RX ADMIN — Medication 10 ML: at 21:34

## 2020-03-24 NOTE — PROGRESS NOTES
Occupational Therapy   Orders received. Pt is going off the floor for testing. Will defer and continue to follow.

## 2020-03-24 NOTE — ED NOTES
Bedside shift change report given to Sly Navarro (oncoming nurse) by Rogelio Self  (offgoing nurse). Report included the following information SBAR, ED Summary, MAR, Recent Results and Cardiac Rhythm SINUS HAYDER.

## 2020-03-24 NOTE — HOME CARE
Pt is currently open to [facility] for PT/OT/SLP with a certification period ending 5/10/2020. Resumption of care order will be required prior to discharge.

## 2020-03-24 NOTE — PROGRESS NOTES
Drugs that may cause liver injury:          Methylprednisolone - Hepatomegaly, increased liver enzymes, increased serum transaminases    Naproxen: Increased liver enzymes    Ibuprofen - Increased serum alanine aminotransferase (?15%), increased serum aspartate aminotransferase (?15%)    Prednisone - Increased serum alkaline phosphatase, increased serum ALT, increased serum AST    Duloxetine Increased serum ALT (>3 x ULN: 1%)    Tecfidera - Clinically significant postmarketing cases of hepatic injury have been reported, with an onset ranging from a few days to several months after treatment initiation. Signs/symptoms of hepatic injury, including transaminase elevations >5 times the upper ULN and total bilirubin elevations >2 times ULN have been observed. Conclusion: Tecfidera would be the most likely cause of increased liver enzymes.  However many other medications the patient takes are likely contributing factors

## 2020-03-24 NOTE — PROGRESS NOTES
[facility]  Home w/ HH vs SNF for rehab? Follow up appointments    PLEASE NOTE--Encounter / Re-Admission Within 30 Days  This patient has had another encounter or admission within the last 30 days. Patient admitted 3/23/2020 for facial droop and slurred speech. Previous admission 3/9/2020 - 3/11/2020 - discharged home with DME and [facility] with follow up appointments    Sharona Jerome (son) and surrogate decision maker  [phone]. Lives in [place]. No written AMD on file. Mr. Thu Finn is [place]. Reason for Admission:   Slurred speech and facial droop               RUR Score:     11    PCP: First and Last name: Ray Levine MD   Name of Practice:  [facility]   Are you a current patient: Yes/No:  Yes   Approximate date of last visit: March 17, 2020 - confirmed with office this hospital follow up appointment was kept             Resources/supports as identified by patient/family:   Only resource is boyfriend Silvia Adjutant [phone]; this CM spoke with Silvia Adjutant and he has requested in home support. Patient has Medicaid as a secondary insurance. This CM will confirm whether a UAI has been previously submitted. Top Challenges facing patient (as identified by patient/family and CM): Finances/Medication cost?      Has [employer] as primary insurance. Medicaid as secondary              Transportation? Unable to assess at time of this meeting. Support system or lack thereof? Silvia Adjutant is only support. Living arrangements? Lives with Silvia Adjutant - ground floor apartment           Self-care/ADLs/Cognition? This CM did not speak with the patient directly, unable to assess at this time. Current Advanced Directive/Advance Care Plan:    Not on file                           Plan for utilizing home health:    Patient has utilized [facility] in the past.  ADELINA MARTINI [facility]                 Transition of Care Plan:              To be determined. Patient has PTOT orders. May beneft from [place] vs rehab. Chart review. Patient reported as having slurred speech. Neuro has been consulted. CM called Memo Moreno (boyfriend) listed as emergency contact. Confirmed pt name and . Demographics confirmed. Estranged spouse Venice Gold cannot be reached. Phone number listed is out of service. Patient did receive RW from previous admission/discharge of 3/11/2020. Mr. Memo Moreno states patient is very weak at home and needs a BSC. Preferred Rx - [facility] [address] Previous notes reflect having participated in [facility] in [facility] has provided [facility] in the past.    Mr. Memo Moreno informed this CM there is need for a home nurse to assist with patient care. Unable to bathe, dress and feed self without assistance. Patient is unable to cook or clean. This CM will provide resource for Meals on Wheels, identify if UAI has been completed. Assessment review was terminated at Mr. Calos Goff request due to an urgent need. PTOT orders noted. Nutrition Assessment noted. Patient was sleeping so incomplete. CM will continue to follow for discharge planning. Care Management Interventions  PCP Verified by CM: No  Transition of Care Consult (CM Consult): Discharge Planning(Christiano Villalta has requested a nurse at home)  Discharge Durable Medical Equipment: No(Christiano Villalta has requested a bsc)  Physical Therapy Consult: Yes  Occupational Therapy Consult: Yes  Current Support Network:  Other(lives with boyfriend in a ground floor apartment)  Discharge Location  Discharge Placement: Home with home health    Jesu Cameron RN CM  Ext [phone]

## 2020-03-24 NOTE — PROGRESS NOTES
Problem: Mobility Impaired (Adult and Pediatric)  Goal: *Therapy Goal (Edit Goal, Insert Text)  Description: FUNCTIONAL STATUS PRIOR TO ADMISSION: Patient was modified independent using a rolling walker for functional mobility since her admission early March. Prior to her recent exacerbation, she was independent. HOME SUPPORT PRIOR TO ADMISSION: The patient lived with her supportive fiance but did not require assist.    Physical Therapy Goals  Initiated 3/24/2020  1. Patient will move from supine to sit and sit to supine  in bed with modified independence within 7 day(s). 2.  Patient will transfer from bed to chair and chair to bed with modified independence using the least restrictive device within 7 day(s). 3.  Patient will perform sit to stand with modified independence within 7 day(s). 4.  Patient will ambulate with supervision/set-up for 150 feet with the least restrictive device within 7 day(s). 5.  Patient will ascend/descend 2 stairs with 0 handrail(s) with modified independence within 7 day(s). Outcome: Progressing Towards Goal  PHYSICAL THERAPY EVALUATION  Patient: Cate Pierre (1 y.o. female)  Date: 3/24/2020  Primary Diagnosis: [facility] [G35]        Precautions:          ASSESSMENT  Based on the objective data described below, the patient presents with mild right sided weakness, impaired endurance, and slurred speech following admission for MS. She c/o continued lack of energy since her admission 3/8/2020 but now with new onset of right sided numbness, slurred speech, and drooling. Noted mildly impaired right > left gross strength but remained functional. Gait training x35' using a RW and requiring CGA for safety. She initiated gait with a moderate deidre and mildly flexed posture. Cues provided for scapular depression and improved posture, which slowed gait speed considerably.  Her step clearance and length were impaired right > left that worsened as she fatigued within 35'. Returned to bed following session as she reported moderate+ fatigue. Discussed traditional MS education including avoiding over fatigue and for temperature moderation. She verbalized understanding. She continues to present below her recent level of function and will follow her acutely. Anticipate discharge home with continued HHPT and family support. Current Level of Function Impacting Discharge (mobility/balance): moderate+ fatigue after 28'    Patient will benefit from skilled therapy intervention to address the above noted impairments. PLAN :  Recommendations and Planned Interventions: bed mobility training, transfer training, gait training, therapeutic exercises, and neuromuscular re-education      Frequency/Duration: Patient will be followed by physical therapy:  4 times a week to address goals. Recommendation for discharge: (in order for the patient to meet his/her long term goals)  Physical therapy at least 2 days/week in the home       IF patient discharges home will need the following DME: patient owns DME required for discharge         SUBJECTIVE:   Patient stated I am just not myself. This isn't me.     OBJECTIVE DATA SUMMARY:   HISTORY:    Past Medical History:   Diagnosis Date    Body aches 12/11/2014    Chronic pain     Depression     GERD (gastroesophageal reflux disease)     Headache(784.0)     History of mammogram never before    MS (multiple sclerosis) ([facility]     Neurological disorder     Multiple Sclerosis    Pap smear for cervical cancer screening 2008    Psychiatric disorder     anxiety    Psychotic disorder [facility])      Past Surgical History:   Procedure Laterality Date    COLONOSCOPY N/A 2/28/2018    COLONOSCOPY performed by Luzmaria Starks MD at \[facility]\"" ENDOSCOPY    HX CHOLECYSTECTOMY      HX GYN      3 c-sections    HX HEENT      bilateral ear tube surgery    HX HERNIA REPAIR      HX OTHER SURGICAL      R inguinal hernia repair       Personal factors and/or comorbidities impacting plan of care:     Home Situation  Home Environment: Apartment  # Steps to Enter: 2  Rails to Enter: No  One/Two Story Residence: One story  Living Alone: No  Support Systems: Spouse/Significant Other/Partner  Patient Expects to be Discharged to[de-identified] Apartment  Current DME Used/Available at Home: Walker, rolling, Shower chair, Raised toilet seat, Walker, rollator, Grab bars    EXAMINATION/PRESENTATION/DECISION MAKING:   Critical Behavior:  Neurologic State: Alert  Orientation Level: Oriented X4  Cognition: Follows commands     Hearing:   Auditory  Auditory Impairment: None  Skin:    Edema:   Range Of Motion:  AROM: Generally decreased, functional                       Strength:    Strength: Generally decreased, functional                    Tone & Sensation:   Tone: Normal              Sensation: Intact               Coordination:  Coordination: Generally decreased, functional  Vision:      Functional Mobility:  Bed Mobility:  Rolling: Supervision  Supine to Sit: Supervision  Sit to Supine: Supervision     Transfers:  Sit to Stand: Contact guard assistance  Stand to Sit: Contact guard assistance                       Balance:   Sitting: Intact  Standing: Impaired  Standing - Static: Good  Standing - Dynamic : Fair  Ambulation/Gait Training:  Distance (ft): 35 Feet (ft)  Assistive Device: Gait belt;Walker, rolling  Ambulation - Level of Assistance: Contact guard assistance     Gait Description (WDL): Exceptions to WDL  Gait Abnormalities: Decreased step clearance;Shuffling gait        Base of Support: Widened     Speed/Heena: Slow;Shuffled  Step Length: Right shortened                          Physical Therapy Evaluation Charge Determination   History Examination Presentation Decision-Making   MEDIUM  Complexity : 1-2 comorbidities / personal factors will impact the outcome/ POC  MEDIUM Complexity : 3 Standardized tests and measures addressing body structure, function, activity limitation and / or participation in recreation  LOW Complexity : Stable, uncomplicated  LOW Complexity : FOTO score of       Based on the above components, the patient evaluation is determined to be of the following complexity level: LOW     Pain Rating:      Activity Tolerance:   Fair  Please refer to the flowsheet for vital signs taken during this treatment. After treatment patient left in no apparent distress:   Supine in bed and Call bell within reach    COMMUNICATION/EDUCATION:   The patients plan of care was discussed with: Registered nurse. Fall prevention education was provided and the patient/caregiver indicated understanding., Patient/family have participated as able in goal setting and plan of care. , and Patient/family agree to work toward stated goals and plan of care.     Thank you for this referral.  Vikas Arroyo, PT, DPT   Time Calculation: 21 mins

## 2020-03-24 NOTE — H&P
Hospitalist Admission Note    NAME: Radha Haskins   :  1975   MRN:  [id number]     Date/Time:  3/23/2020 11:11 PM    Patient PCP: None  ______________________________________________________________________  Given the patient's current clinical presentation, I have a high level of concern for decompensation if discharged from the emergency department. Complex decision making was performed, which includes reviewing the patient's available past medical records, laboratory results, and x-ray films. My assessment of this patient's clinical condition and my plan of care is as follows. Assessment / Plan:    Recurrent facial droop, poa, secondary to ms flare, poa. Less likely cva ie, no plaques bl carotids on CTA. Solumedrol 1000mg daily X3.repeat mri. Neurology consult ordered. Insulin sliding scale. Elevated lft's, us of liver, acute hepatitis panel. Pt denies etoh use in the past month, as well as tylenol use. Current tob use, discussed health reasons to quit. Stress/anxiety. High dose cymbalta. GERD, continue ppi      Code Status: full  Surrogate Decision Maker: son age 32, Liliana Cornell [phone], currently getting divorce. Mother ([phone] DVT Prophylaxis: scd's  GI Prophylaxis: not indicated    Baseline: has fianace       Subjective:   CHIEF COMPLAINT: right facial droop and slurred speech. HISTORY OF PRESENT ILLNESS:     Jenelle Rushing is a 53 y.o.  female who presents with above. Pt awoke from a nap at 8pm today. Pt has had fatigue for the past 48 hours. Pt denies fever, cough, shortness of breath. Pt denies palpatations. Pt states chronic right leg weakness from foot injury. We were asked to admit for work up and evaluation of the above problems.      Past Medical History:   Diagnosis Date    Body aches 2014    Chronic pain     Depression     GERD (gastroesophageal reflux disease)     Headache(784.0)     History of mammogram never before    [facility]) ([facility]     Neurological disorder     Multiple Sclerosis    Pap smear for cervical cancer screening 2008    Psychiatric disorder     anxiety    Psychotic disorder [facility])         Past Surgical History:   Procedure Laterality Date    COLONOSCOPY N/A 2/28/2018    COLONOSCOPY performed by Regi Senior MD at [facility] ENDOSCOPY    HX CHOLECYSTECTOMY      HX GYN      3 c-sections    HX HEENT      bilateral ear tube surgery    HX HERNIA REPAIR      HX OTHER SURGICAL      R inguinal hernia repair       Social History     Tobacco Use    Smoking status: Current Every Day Smoker     Packs/day: 0.50     Years: 17.00     Pack years: 8.50     Types: Cigarettes    Smokeless tobacco: Never Used    Tobacco comment: 1 pack every 3 days. Substance Use Topics    Alcohol use: No        Family History   Problem Relation Age of Onset    Heart Attack Father         tripple bypass    Cancer Father         lung    COPD Father     Diabetes Mother         type 2    Heart Disease Mother         heart disease    Diabetes Paternal Grandmother     No Known Problems Sister     No Known Problems Brother     No Known Problems Child      Allergies   Allergen Reactions    Morphine Rash        Prior to Admission medications    Medication Sig Start Date End Date Taking? Authorizing Provider   methylPREDNISolone sod succ (,Solu-MEDROL) 125 mg injection 125 mg by IntraVENous route every six (6) hours. Yes Provider, Historical   naproxen sodium (NAPROSYN) 220 mg tablet Take 220 mg by mouth every eight to twelve (8-12) hours as needed for Pain. 1 caplet 3/18/20   Provider, Historical   gabapentin (NEURONTIN) 400 mg capsule Take 2 Caps by mouth three (3) times daily. Max Daily Amount: 2,400 mg. 3/17/20   Mary Queen MD   LORazepam (ATIVAN) 1 mg tablet Take 1 Tab by mouth every twelve (12) hours as needed for Anxiety (q12h prn).  Max Daily Amount: 2 mg. 3/17/20   Genoveva Koo MD   ibuprofen (MOTRIN) 200 mg tablet 600 mg every six (6) hours as needed for Pain. Take 3 caplets by mouth as needed for pain 3/12/20   Genoveva Koo MD   predniSONE (DELTASONE) 20 mg tablet Take 2 tabs by mouth with breakfast for 2 days, then take 1 tab for 2 days, then take 1/2 tab for 2 days then discontinue 3/11/20   Joyce Johnson,    DULoxetine (CYMBALTA) 60 mg capsule Take 1 Cap by mouth daily. Indications: neuropathic pain  Patient taking differently: Take 60 mg by mouth daily. Indications: neuropathic pain, pt state she takes 1 tab 2 x daily. 3/2/20   Genoveva Koo MD   senna-docusate (PERICOLACE) 8.6-50 mg per tablet Take 2 Tabs by mouth daily. Patient not taking: Reported on 3/17/2020 1/14/20   Genoveva Koo MD   dimethyl fumarate (TECFIDERA) 240 mg cpDR Take 240 mg by mouth two (2) times a day. 10/1/19   Lois Damon MD   ibuprofen (MOTRIN) 600 mg tablet Take 1 Tab by mouth every six (6) hours as needed for Pain. 12/22/18   YAZ Holly       REVIEW OF SYSTEMS:     I am not able to complete the review of systems because:    The patient is intubated and sedated    The patient has altered mental status due to his acute medical problems    The patient has baseline aphasia from prior stroke(s)    The patient has baseline dementia and is not reliable historian    The patient is in acute medical distress and unable to provide information           Total of 12 systems reviewed as follows:       POSITIVE= underlined text  Negative = text not underlined  General:  fever, chills, sweats, generalized weakness, weight loss/gain,      loss of appetite   Eyes:    blurred vision, eye pain, loss of vision, double vision  ENT:    rhinorrhea, pharyngitis   Respiratory:   cough, sputum production, SOB, BOBO, wheezing, pleuritic pain   Cardiology:   chest pain, palpitations, orthopnea, PND, edema, syncope   Gastrointestinal:  abdominal pain , N/V, diarrhea, dysphagia, constipation, bleeding   Genitourinary:  frequency, urgency, dysuria, hematuria, incontinence   Muskuloskeletal :  arthralgia, myalgia, back pain  Hematology:  easy bruising, nose or gum bleeding, lymphadenopathy   Dermatological: rash, ulceration, pruritis, color change / jaundice  Endocrine:   hot flashes or polydipsia   Neurological:  headache, dizziness, confusion, focal weakness, paresthesia,     Speech difficulties, memory loss, gait difficulty  Psychological: Feelings of anxiety, depression, agitation    Objective:   VITALS:    Visit Vitals  /71   Pulse 66   Temp 97.9 °F (36.6 °C)   Resp 16   Ht 5' 4\" (1.626 m)   Wt 90.7 kg (200 lb)   SpO2 99%   BMI 34.33 kg/m²       PHYSICAL EXAM:    General:    Alert, cooperative, mild distress, teary eyed. HEENT:          No stridor, no masses. Right facial droop  Lungs:   Clear to auscultation bilaterally. No Wheezing or Rhonchi. No rales. Chest wall:  No tenderness  No Accessory muscle use. Heart:   Regular  rhythm,  No  murmur   No edema  Abdomen:   Soft, non-tender. Not distended. Bowel sounds normal  Extremities: No cyanosis. No clubbing,    Skin:     Not pale. Not Jaundiced  No rashes   Psych:  fair insight. Not depressed. Not anxious or agitated. Neurologic: EOMs intact. No facial asymmetry. No aphasia. Trembling and slurred speech. right deltoid and right quad strength slightly weaker than left side. sensation grossly intact bl, but pt states tingling of finger tips.  No pronator drift.     _______________________________________________________________________  Care Plan discussed with:    Comments   Patient x    Family      RN x    Care Manager                    Consultant:      _______________________________________________________________________  Expected  Disposition:   Home with Family    HH/PT/OT/RN x   SNF/LTC    COLLETTE    ________________________________________________________________________  TOTAL TIME:  39 Minutes    Critical Care Provided     Minutes non procedure based      Comments    x Reviewed previous records   >50% of visit spent in counseling and coordination of care x Discussion with patient and/or family and questions answered       ________________________________________________________________________  Signed: Tiffany Moura, DO    Procedures: see electronic medical records for all procedures/Xrays and details which were not copied into this note but were reviewed prior to creation of Plan. LAB DATA REVIEWED:    Recent Results (from the past 24 hour(s))   URINALYSIS W/ RFLX MICROSCOPIC    Collection Time: 03/23/20  8:04 PM   Result Value Ref Range    Color YELLOW/STRAW      Appearance CLEAR CLEAR      Specific gravity 1.005 1.003 - 1.030      pH (UA) 6.5 5.0 - 8.0      Protein NEGATIVE  NEG mg/dL    Glucose NEGATIVE  NEG mg/dL    Ketone NEGATIVE  NEG mg/dL    Bilirubin NEGATIVE  NEG      Blood NEGATIVE  NEG      Urobilinogen 0.2 0.2 - 1.0 EU/dL    Nitrites NEGATIVE  NEG      Leukocyte Esterase NEGATIVE  NEG     MAGNESIUM    Collection Time: 03/23/20  8:43 PM   Result Value Ref Range    Magnesium 1.6 1.6 - 2.4 mg/dL   METABOLIC PANEL, COMPREHENSIVE    Collection Time: 03/23/20  8:43 PM   Result Value Ref Range    Sodium 140 136 - 145 mmol/L    Potassium 3.9 3.5 - 5.1 mmol/L    Chloride 105 97 - 108 mmol/L    CO2 29 21 - 32 mmol/L    Anion gap 6 5 - 15 mmol/L    Glucose 94 65 - 100 mg/dL    BUN 7 6 - 20 MG/DL    Creatinine 0.70 0.55 - 1.02 MG/DL    BUN/Creatinine ratio 10 (L) 12 - 20      GFR est AA >60 >60 ml/min/1.73m2    GFR est non-AA >60 >60 ml/min/1.73m2    Calcium 9.6 8.5 - 10.1 MG/DL    Bilirubin, total 0.4 0.2 - 1.0 MG/DL    ALT (SGPT) 98 (H) 12 - 78 U/L    AST (SGOT) 45 (H) 15 - 37 U/L    Alk.  phosphatase 147 (H) 45 - 117 U/L    Protein, total 7.6 6.4 - 8.2 g/dL    Albumin 3.9 3.5 - 5.0 g/dL    Globulin 3.7 2.0 - 4.0 g/dL    A-G Ratio 1.1 1.1 - 2.2     PTT    Collection Time: 03/23/20  8:43 PM   Result Value Ref Range    aPTT 22.5 22.1 - 32.0 sec    aPTT, therapeutic range     58.0 - 77.0 SECS   PROTHROMBIN TIME + INR    Collection Time: 03/23/20  8:43 PM   Result Value Ref Range    INR 1.0 0.9 - 1.1      Prothrombin time 10.7 9.0 - 11.1 sec   CBC WITH AUTOMATED DIFF    Collection Time: 03/23/20  8:43 PM   Result Value Ref Range    WBC 5.0 3.6 - 11.0 K/uL    RBC 3.77 (L) 3.80 - 5.20 M/uL    HGB 12.3 11.5 - 16.0 g/dL    HCT 35.5 35.0 - 47.0 %    MCV 94.2 80.0 - 99.0 FL    MCH 32.6 26.0 - 34.0 PG    MCHC 34.6 30.0 - 36.5 g/dL    RDW 13.9 11.5 - 14.5 %    PLATELET 645 011 - 850 K/uL    MPV 9.8 8.9 - 12.9 FL    NRBC 0.0 0  WBC    ABSOLUTE NRBC 0.00 0.00 - 0.01 K/uL    NEUTROPHILS 88 (H) 32 - 75 %    LYMPHOCYTES 9 (L) 12 - 49 %    MONOCYTES 3 (L) 5 - 13 %    EOSINOPHILS 0 0 - 7 %    BASOPHILS 0 0 - 1 %    IMMATURE GRANULOCYTES 0 0.0 - 0.5 %    ABS. NEUTROPHILS 4.3 1.8 - 8.0 K/UL    ABS. LYMPHOCYTES 0.5 (L) 0.8 - 3.5 K/UL    ABS. MONOCYTES 0.2 0.0 - 1.0 K/UL    ABS. EOSINOPHILS 0.0 0.0 - 0.4 K/UL    ABS. BASOPHILS 0.0 0.0 - 0.1 K/UL    ABS. IMM.  GRANS. 0.0 0.00 - 0.04 K/UL    DF AUTOMATED      RBC COMMENTS NORMOCYTIC, NORMOCHROMIC

## 2020-03-24 NOTE — PROGRESS NOTES
Occupational Therapy  Pt has just left the floor for testing.   Will continue to defer and follow up tomorrow to initiate OT Evaluation

## 2020-03-24 NOTE — PROGRESS NOTES
Initial Nutrition Assessment:    INTERVENTIONS/RECOMMENDATIONS:   · Consider liberalizing to a regular diet     ASSESSMENT:   Patient medically noted for right facial droop and slurred speech. PMH MS, anxiety, and GERD. Patient sleeping soundly at time of visit and didn't wake to knock or name call. Hadn't started lunch but breakfast tray with >50% eaten. Current weight is \"patient stated\" and does not indicate any recent weight loss. BG very well controlled. Encourage intake of meals. Diet Order: Consistent carb  % Eaten:  No data found. Pertinent Medications: [x]Reviewed []Other: Cymbalta, humalog, Solu-medrol, Zofran  Pertinent Labs: [x]Reviewed []Other: BG 87-90-94  Food Allergies: [x]None []Yes:    Last BM: 3/24 []Active     []Hyperactive  []Hypoactive       [] Absent BS  Skin:    [x] Intact   [] Incision  [] Breakdown: [] Edema []Other:    Anthropometrics:   Height: 5' 4\" (162.6 cm) Weight: 90.7 kg (200 lb)   IBW (%IBW):   ( ) UBW (%UBW):   (  %)   Last Weight Metrics:  Weight Loss Metrics 3/23/2020 3/17/2020 3/8/2020 3/2/2020 2/25/2020 2/17/2020 2/12/2020   Today's Wt 200 lb 192 lb 9.6 oz 200 lb 197 lb 9.6 oz 200 lb 2.8 oz 200 lb 200 lb   BMI 34.33 kg/m2 33.06 kg/m2 34.33 kg/m2 33.39 kg/m2 33.83 kg/m2 34.33 kg/m2 34.33 kg/m2       BMI: Body mass index is 34.33 kg/m². This BMI is indicative of:   []Underweight    []Normal    []Overweight    [x] Obesity   [] Extreme Obesity (BMI>40)     Estimated Nutrition Needs (Based on):   6571 Kcals/day(BMR (1535) x 1. 3AF -250kcal) , 73 g(-91g (0.8-1.0 g/kg bw)) Protein  Carbohydrate:  At Least 130 g/day  Fluids: 1750 mL/day (1ml/kcal)    Pt expected to meet estimated nutrient needs: [x]Yes []No    NUTRITION DIAGNOSES:   Problem:  No nutritional diagnosis at this time      Etiology: related to       Signs/Symptoms: as evidenced by        NUTRITION INTERVENTIONS:  Meals/Snacks: General/healthful diet                  GOAL:   PO intake >70% of meals next 5-7 days    LEARNING NEEDS (Diet, Food/Nutrient-Drug Interaction):    [x] None Identified   [] Identified and Education Provided/Documented   [] Identified and Pt declined/was not appropriate     Cultural, Yarsanism, OR Ethnic Dietary Needs:    [x] None Identified   [] Identified and Addressed     [x] Interdisciplinary Care Plan Reviewed/Documented    [x] Discharge Planning:  Regular diet      MONITORING /EVALUATION:   Food/Nutrient Intake Outcomes:  Total energy intake  Physical Signs/Symptoms Outcomes: Weight/weight change, Glucose profile    NUTRITION RISK:    [] Patient At Nutritional Risk              [x] Patient Not at Nutritional Risk    PT SEEN FOR:    []  MD Consult: []Calorie Count      []Diabetic Diet Education        []Diet Education     []Electrolyte Management     []General Nutrition Management and Supplements     []Management of Tube Feeding     []TPN Recommendations    [x]  RN Referral:  [x]MST score >=2     []Enteral/Parenteral Nutrition PTA     []Pregnant: Gestational DM or Multigestation     []Pressure Ulcer/Wound Care needs        []  Low BMI  []  STEW Garza [phone]  Pager [phone]    Weekend Pager [phone]

## 2020-03-24 NOTE — H&P
Hospitalist Admission Note  See other h and p dated 3/23/2020    NAME: Yusuf Tate   :  1975   MRN:  [id number]     Date/Time:  3/23/2020 10:13 PM

## 2020-03-24 NOTE — PROGRESS NOTES
Most recent [facility] assessment on file with [facility] was completed in 2013. Patient will need new assessment. CM will follow for PTOT notes to address current needs. Cm spoke with Saul Pratt (son) and surrogate decision maker  [phone]. Mr. Carlos Rojas lives in [place] and speaks with his mother weekly by phone. Cannot personally provide information regarding needs with the exception \"I know it has become difficult for her to walk\". Mr. Carlos Rojas states patients participation at [facility] for In[facility] Re[facility] occurred approximately 8 years ago after she was diagnosed with MS. Johnson Aiken RN CM  Ext [phone]

## 2020-03-24 NOTE — ROUTINE PROCESS
Bedside and Verbal shift change report given to Dana Winter (oncoming nurse) by Rafa Bhandari (offgoing nurse). Report included the following information SBAR, Kardex, Intake/Output and MAR. Zone Phone:   [phone] Significant changes during shift:  New admit Patient Information Okey Sandifer 53 y.o. 
3/23/2020  7:00 PM by Rosy Le DO. Okey Sandifer was admitted from Home 
 
Problem List 
 
Patient Active Problem List  
 Diagnosis Date Noted  Facial droop 03/23/2020  Exacerbation of multiple sclerosis ([facility] 03/09/2020  Left-sided weakness 03/08/2020  Severe obesity ([facility] 10/03/2018  Constipation 07/17/2015  Body aches 12/11/2014  Back pain 09/07/2012  Chronic pain 08/17/2012  MS (multiple sclerosis) ([facility] 08/17/2012  Decreased hearing 01/31/2011  Acute pharyngitis 01/26/2011  Anxiety 08/25/2010  Blood pressure elevated 08/25/2010  Tobacco abuse 08/25/2010 Past Medical History:  
Diagnosis Date  Body aches 12/11/2014  Chronic pain  Depression  GERD (gastroesophageal reflux disease)  Headache(784.0)  History of mammogram never before  MS (multiple sclerosis) ([facility]  Neurological disorder Multiple Sclerosis  Pap smear for cervical cancer screening 2008  Psychiatric disorder   
 anxiety  Psychotic disorder ([facility] Core Measures: CVA: Yes Yes Activity Status: OOB to Chair No 
Ambulated this shift Yes Bed Rest No 
 
 
DVT prophylaxis: DVT prophylaxis Med- No 
DVT prophylaxis SCD or CONG- Yes Wounds: (If Applicable) Wounds- No 
 
Patient Safety: 
 
Falls Score Total Score: 2 Safety Level_______ Bed Alarm On? No 
Sitter? No 
 
Plan for upcoming shift: safety Discharge Plan: Yes Home after 4 more days of solu-medrol Active Consults: 
IP CONSULT TO NEUROLOGY

## 2020-03-24 NOTE — ED NOTES
Patient is resting on stretcher at this time, nadn, skin wpd, v/s stable, respirations are even, equal, and non labored, call light within reach, will continue to monitor

## 2020-03-24 NOTE — ED NOTES
TRANSFER - OUT REPORT:    Verbal report given to ALEJANDRO Calhoun (name) on Vevelyn Lunch  being transferred to Neuro (unit) for routine progression of care       Report consisted of patients Situation, Background, Assessment and   Recommendations(SBAR). Information from the following report(s) SBAR was reviewed with the receiving nurse. Lines:   Peripheral IV 03/23/20 Left Antecubital (Active)   Site Assessment Clean, dry, & intact 3/23/2020  8:49 PM   Phlebitis Assessment 0 3/23/2020  8:49 PM   Infiltration Assessment 0 3/23/2020  8:49 PM   Dressing Status Clean, dry, & intact 3/23/2020  8:49 PM        Opportunity for questions and clarification was provided. Patient transported with:   Personal belongings.

## 2020-03-24 NOTE — PROGRESS NOTES
Hospitalist Progress Note    NAME: Cate Pierre   :  1975   MRN:  [id number]       Assessment / Plan:  Multiple sclerosis flare-up with facial droop - recurrent - POA  - recent flare up with admission 3/8/2020 - similar symptoms  - CT of head w/o contrast     No acute intracranial hemorrhage, mass or infarct. 2. Persistent periventricular white matter hypodensities, correlating with  patient's known MS plaques, unchanged. MR can be performed to better  characterize, as clinically indicated. - CT head and neck w/ contrast  No major vessel occlusion. No intracranial mass, hemorrhage or evidence of acute infarction. Imaging findings consistent with moderate to severe predominantly supratentorial  demyelinating disease.  - Neurology consulted   - MRI of brain today - follow up  - on Dimethyl fumarate cpDR - patient supplied  - Continue Gabapentin - home dosing  - continue Solumedrol 1000 mg IV Q24H  - accuchecks AC & HS with SSI coverage, as needed  - Hypoglycemia protocol  - PT/OT following    Elevated LFT's   - US of abdomen for LFD 3/25/2020 - follow up  - acute hepatitis panel - follow up  - avoid liver damaging agents (pt on high dose Cymbalta 60 mg)    Depression/Anxiety/Pain Management  - on high dose Cymbalta  - on Neurontin TID    Constipation   - pericolace PRN  - Add Miralax PRN      30.0 - 39.9 Obese / Body mass index is 34.33 kg/m². Code status: Full  Prophylaxis: H2B/PPI  Recommended Disposition: Home w/Family     Subjective:     Chief Complaint / Reason for Physician Visit  Slurred speech and facial droop.       Review of Systems:  Symptom Y/N Comments  Symptom Y/N Comments   Fever/Chills N   Chest Pain N    Poor Appetite N   Edema     Cough    Abdominal Pain     Sputum    Joint Pain Y Upper arms bilaterally - chronic   SOB/BOBO N   Pruritis/Rash     Nausea/vomit    Tolerating PT/OT     Diarrhea    Tolerating Diet     Constipation Y   Other       Could NOT obtain due to: Objective:     VITALS:   Last 24hrs VS reviewed since prior progress note. Most recent are:  Patient Vitals for the past 24 hrs:   Temp Pulse Resp BP SpO2   03/24/20 0826 97.7 °F (36.5 °C) 60 18 115/85 100 %   03/24/20 0721 97.6 °F (36.4 °C) 62 21 (!) 122/94 98 %   03/24/20 0700  62 16 111/70 95 %   03/24/20 0630  (!) 57 15 111/72 96 %   03/24/20 0600  62 15 121/74 95 %   03/24/20 0530  71 17 117/71 94 %   03/24/20 0519     95 %   03/24/20 0500  (!) 59 16 115/69 94 %   03/24/20 0430  61 17 115/70 93 %   03/24/20 0400  66 19 119/77 92 %   03/24/20 0300  (!) 57 17 136/80 98 %   03/24/20 0240  62 18 125/78 97 %   03/24/20 0237  61 16  98 %   03/24/20 0225  61 21  96 %   03/24/20 0206  71 19  97 %   03/24/20 0153  62 20  96 %   03/24/20 0141  61 17  97 %   03/24/20 0130  63 21  95 %   03/24/20 0121  (!) 59 21  97 %   03/24/20 0112  65 17  97 %   03/24/20 0100  64 15 109/71 97 %   03/24/20 0001    165/87    03/23/20 2348  (!) 56 19 161/87 94 %   03/23/20 2202    147/71    03/23/20 2009  66 16  99 %   03/23/20 2000  66 17  97 %   03/23/20 1953  69 19  100 %   03/23/20 1941  64 18  97 %   03/23/20 1917 97.9 °F (36.6 °C) 71 10 129/71 100 %   03/23/20 1913    126/71      No intake or output data in the 24 hours ending 03/24/20 1014     PHYSICAL EXAM:  General: Alert, cooperative, no acute distress    EENT:  EOMI. Anicteric sclerae. MMM  Resp:  CTA bilaterally, no wheezing or rales. No accessory muscle use  CV:  Regular  rhythm,  No edema  GI:  Soft, Non distended, Non tender.  +Bowel sounds  Neurologic:  Alert and oriented X 3, slurred speech,   Psych:   Good insight. Not anxious nor agitated  Skin:  No rashes.   No jaundice    Reviewed most current lab test results and cultures  YES  Reviewed most current radiology test results   YES  Review and summation of old records today    NO  Reviewed patient's current orders and MAR    YES  PMH/SH reviewed - no change compared to H&P  ________________________________________________________________________  Care Plan discussed with:    Comments   Patient X    Family      RN X    Care Manager X    Consultant                        Multidiciplinary team rounds were held today with , nursing, pharmacist and clinical coordinator. Patient's plan of care was discussed; medications were reviewed and discharge planning was addressed. ________________________________________________________________________  Total NON critical care TIME: 55   Minutes    Total CRITICAL CARE TIME Spent:   Minutes non procedure based      Comments   >50% of visit spent in counseling and coordination of care     ________________________________________________________________________  Asim Mejia     Procedures: see electronic medical records for all procedures/Xrays and details which were not copied into this note but were reviewed prior to creation of Plan. LABS:  I reviewed today's most current labs and imaging studies.   Pertinent labs include:  Recent Labs     03/23/20 2043   WBC 5.0   HGB 12.3   HCT 35.5        Recent Labs     03/23/20 2043      K 3.9      CO2 29   GLU 94   BUN 7   CREA 0.70   CA 9.6   MG 1.6   ALB 3.9   TBILI 0.4   SGOT 45*   ALT 98*   INR 1.0       Signed: Asim Mejia

## 2020-03-24 NOTE — PROGRESS NOTES
During change of shift, pt refusing bed alarm, stating that she would call for assistance if she needed to go to the bathroom. While writer was getting report on another pt, pt rang out saying that she needed to go to the bathroom. By the time, Foster Khanna went into pt's room pt was already headed to the bathroom with walker. Bed alarm refusal form signed and placed in pt's bin.

## 2020-03-24 NOTE — CONSULTS
Consults     Reason for Consult  I have been asked by Pavel Shelton DO to see the patient in neurological consultation to render advice and opinion regarding a possible MS flare. Chief Complaint   Patient presents with    Dysarthria     slurred speech x 1600 yesterday    Facial Droop     R sided droop x 1600 yesterday,        HPI  Michael Romero is a 53 y.o. female with hx of depression and multiple sclerosis who presents with right sided facial droop and slurred speech. She reports symptoms started around 4pm two days ago. She has noted increased drooling on the right side of her face, however she has not had any trouble with swallowing. She denies any sick contacts, pain or burning with urination, fever, chills, chest pain or shortness of breath. She was diagnosed with a flare 2 weeks ago and feels that her symptoms have not improved since discharge despite physical therapy and steroid treatment. Of note, patient was admitted to the hospital approx 2 weeks ago with an MS flare. She presented at that time she had right sided weakness which was progressively worsening. She reports her last MS flare was approx 9-10 years ago, at the time of diagnosis where she developed weakness in both legs. She has not had any symptoms since that time and has been compliant with Tecfidera. She does report her PCP took her off psychiatric medication approx 2 months ago and after that time she started having symptoms      ROS  A ten system review of constitutional, cardiovascular, respiratory, musculoskeletal, endocrine, skin, SHEENT, genitourinary, psychiatric and neurologic systems was obtained and is unremarkable except as stated in HPI     Medical History   Past Medical History:   Diagnosis Date    Body aches 12/11/2014    Chronic pain     Depression     GERD (gastroesophageal reflux disease)     Headache(784.0)     History of mammogram never before    MS [facility]) ([facility] [address]     Neurological disorder     Multiple Sclerosis    Pap smear for cervical cancer screening 2008    Psychiatric disorder     anxiety    Psychotic disorder ([facility] [address]        Family History   Family History   Problem Relation Age of Onset    Heart Attack Father         tripple bypass    Cancer Father         lung    COPD Father     Diabetes Mother         type 2    Heart Disease Mother         heart disease    Diabetes Paternal Grandmother     No Known Problems Sister     No Known Problems Brother     No Known Problems Child        Social History   Social History     Socioeconomic History    Marital status: LEGALLY      Spouse name: Not on file    Number of children: Not on file    Years of education: Not on file    Highest education level: Not on file   Tobacco Use    Smoking status: Current Every Day Smoker     Packs/day: 0.50     Years: 17.00     Pack years: 8.50     Types: Cigarettes    Smokeless tobacco: Never Used    Tobacco comment: 1 pack every 3 days. Substance and Sexual Activity    Alcohol use: No    Drug use: No    Sexual activity: Yes     Partners: Male       ALLERGIES  Allergies   Allergen Reactions    Morphine Rash       HOME MEDS  Prior to Admission Medications   Prescriptions Last Dose Informant Patient Reported? Taking? DULoxetine (CYMBALTA) 60 mg capsule   No No   Sig: Take 1 Cap by mouth daily. Indications: neuropathic pain   Patient taking differently: Take 60 mg by mouth daily. Indications: neuropathic pain, pt state she takes 1 tab 2 x daily. LORazepam (ATIVAN) 1 mg tablet   No No   Sig: Take 1 Tab by mouth every twelve (12) hours as needed for Anxiety (q12h prn). Max Daily Amount: 2 mg.   dimethyl fumarate (TECFIDERA) 240 mg cpDR   No No   Sig: Take 240 mg by mouth two (2) times a day.   gabapentin (NEURONTIN) 400 mg capsule   No No   Sig: Take 2 Caps by mouth three (3) times daily.  Max Daily Amount: 2,400 mg.   ibuprofen (MOTRIN) 200 mg tablet   Yes No   Si mg every six (6) hours as needed for Pain. Take 3 caplets by mouth as needed for pain   ibuprofen (MOTRIN) 600 mg tablet   No No   Sig: Take 1 Tab by mouth every six (6) hours as needed for Pain. methylPREDNISolone sod succ (,Solu-MEDROL) 125 mg injection   Yes Yes   Si mg by IntraVENous route every six (6) hours. naproxen sodium (NAPROSYN) 220 mg tablet   Yes No   Sig: Take 220 mg by mouth every eight to twelve (8-12) hours as needed for Pain. 1 caplet   predniSONE (DELTASONE) 20 mg tablet   No No   Sig: Take 2 tabs by mouth with breakfast for 2 days, then take 1 tab for 2 days, then take 1/2 tab for 2 days then discontinue   senna-docusate (PERICOLACE) 8.6-50 mg per tablet   No No   Sig: Take 2 Tabs by mouth daily.    Patient not taking: Reported on 3/17/2020      Facility-Administered Medications: None       CURRENT MEDS  Current Facility-Administered Medications   Medication Dose Route Frequency    dimethyl fumarate cpDR 1 Cap (Patient Supplied)  1 Cap Oral DAILY    DULoxetine (CYMBALTA) capsule 60 mg  60 mg Oral DAILY    gabapentin (NEURONTIN) capsule 800 mg  800 mg Oral TID    sodium chloride (NS) flush 5-40 mL  5-40 mL IntraVENous Q8H    [START ON 3/25/2020] methylPREDNISolone ((Solu-MEDROL) 1,000 mg in 0.9% sodium chloride (MBP/ADV) 100 mL  1,000 mg IntraVENous DAILY    And    [START ON 3/25/2020] ondansetron (ZOFRAN) injection 4 mg  4 mg IntraVENous DAILY    insulin lispro (HUMALOG) injection   SubCUTAneous AC&HS         PHYSICAL EXAMINATION:   Patient Vitals for the past 24 hrs:   Temp Pulse Resp BP SpO2   20 1158 97.9 °F (36.6 °C) 60 18 105/61 100 %   20 0826 97.7 °F (36.5 °C) 60 18 115/85 100 %   20 0721 97.6 °F (36.4 °C) 62 21 (!) 122/94 98 %   20 0700  62 16 111/70 95 %   20 0630  (!) 57 15 111/72 96 %   20 0600  62 15 121/74 95 %   20 0530  71 17 117/71 94 %   20 0519     95 %   20 0500  (!) 59 16 115/69 94 %   20 0430  61 17 115/70 93 %   03/24/20 0400  66 19 119/77 92 %   03/24/20 0300  (!) 57 17 136/80 98 %   03/24/20 0240  62 18 125/78 97 %   03/24/20 0237  61 16  98 %   03/24/20 0225  61 21  96 %   03/24/20 0206  71 19  97 %   03/24/20 0153  62 20  96 %   03/24/20 0141  61 17  97 %   03/24/20 0130  63 21  95 %   03/24/20 0121  (!) 59 21  97 %   03/24/20 0112  65 17  97 %   03/24/20 0100  64 15 109/71 97 %   03/24/20 0001    165/87    03/23/20 2348  (!) 56 19 161/87 94 %   03/23/20 2202    147/71    03/23/20 2009  66 16  99 %   03/23/20 2000  66 17  97 %   03/23/20 1953  69 19  100 %   03/23/20 1941  64 18  97 %   03/23/20 1917 97.9 °F (36.6 °C) 71 10 129/71 100 %   03/23/20 1913    126/71         Physical Exam:  General:  no acute distress  Neck: no carotid bruits  Lungs: clear to auscultation  Heart:  no murmurs, regular rate and rhythm   Lower extremity: mild edema bilaterally    Neurological exam:  Mental Status: Awake, alert, oriented to person, place and time  Registration and Recall: registration intact, able to recall 2/3 words correctly at 5 min   Attention and Concentration: able to state the days of the week backwards   Speech and Language: moderate dysarthria. Able to name, repeat and follow commands   Fund of knowledge was preserved    Cranial nerves: II-XII  Pupils equal and reactive, visual fields intact by confrontation   Extraocular movements intact, slight end gaze nystagmus when looking to the right   Facial sensation intact   Right facial droop, improves with activation   Hearing intact to soft rub bilaterally   Shoulder shrug symmetric and strong   Tongue protrusion full and midline without fasciculation or atrophy    Motor:   Normal tone and Bulk   Drift: No evidence of pronator drift     Strength testing:   deltoid triceps biceps Wrist ext. Wrist flex. intrinsics   Right 5 4+ 5 5 5 4+   Left 5 5 5 5 5 5      Hip flex. Hip ext. Knee ext.   Knee flex Dorsi flex Plantar flex Right  4 5 5 5 5 5   Left  4 5 5 5 5 5       Sensory:  Upper extremity: intact to light touch, and vibration > 10 seconds  Lower extremity: intact to light touch, and vibration > 10 seconds    Reflexes:   Biceps Triceps  Brachiorad Patellar Achilles Plantar   Right  3 2 3 3 2 Down   Left  2 2 2 2 2 Down     Cerebellar testing:  Mild intention tremor on FNF on right . Normal rapid alternating movements; Romberg: absent    Gait: deferred due to patient safety (did not have walker)    LAB DATA REVIEWED:    Recent Results (from the past 24 hour(s))   URINALYSIS W/ RFLX MICROSCOPIC    Collection Time: 03/23/20  8:04 PM   Result Value Ref Range    Color YELLOW/STRAW      Appearance CLEAR CLEAR      Specific gravity 1.005 1.003 - 1.030      pH (UA) 6.5 5.0 - 8.0      Protein NEGATIVE  NEG mg/dL    Glucose NEGATIVE  NEG mg/dL    Ketone NEGATIVE  NEG mg/dL    Bilirubin NEGATIVE  NEG      Blood NEGATIVE  NEG      Urobilinogen 0.2 0.2 - 1.0 EU/dL    Nitrites NEGATIVE  NEG      Leukocyte Esterase NEGATIVE  NEG     MAGNESIUM    Collection Time: 03/23/20  8:43 PM   Result Value Ref Range    Magnesium 1.6 1.6 - 2.4 mg/dL   METABOLIC PANEL, COMPREHENSIVE    Collection Time: 03/23/20  8:43 PM   Result Value Ref Range    Sodium 140 136 - 145 mmol/L    Potassium 3.9 3.5 - 5.1 mmol/L    Chloride 105 97 - 108 mmol/L    CO2 29 21 - 32 mmol/L    Anion gap 6 5 - 15 mmol/L    Glucose 94 65 - 100 mg/dL    BUN 7 6 - 20 MG/DL    Creatinine 0.70 0.55 - 1.02 MG/DL    BUN/Creatinine ratio 10 (L) 12 - 20      GFR est AA >60 >60 ml/min/1.73m2    GFR est non-AA >60 >60 ml/min/1.73m2    Calcium 9.6 8.5 - 10.1 MG/DL    Bilirubin, total 0.4 0.2 - 1.0 MG/DL    ALT (SGPT) 98 (H) 12 - 78 U/L    AST (SGOT) 45 (H) 15 - 37 U/L    Alk.  phosphatase 147 (H) 45 - 117 U/L    Protein, total 7.6 6.4 - 8.2 g/dL    Albumin 3.9 3.5 - 5.0 g/dL    Globulin 3.7 2.0 - 4.0 g/dL    A-G Ratio 1.1 1.1 - 2.2     PTT    Collection Time: 03/23/20  8:43 PM   Result Value Ref Range    aPTT 22.5 22.1 - 32.0 sec    aPTT, therapeutic range     58.0 - 77.0 SECS   PROTHROMBIN TIME + INR    Collection Time: 03/23/20  8:43 PM   Result Value Ref Range    INR 1.0 0.9 - 1.1      Prothrombin time 10.7 9.0 - 11.1 sec   CBC WITH AUTOMATED DIFF    Collection Time: 03/23/20  8:43 PM   Result Value Ref Range    WBC 5.0 3.6 - 11.0 K/uL    RBC 3.77 (L) 3.80 - 5.20 M/uL    HGB 12.3 11.5 - 16.0 g/dL    HCT 35.5 35.0 - 47.0 %    MCV 94.2 80.0 - 99.0 FL    MCH 32.6 26.0 - 34.0 PG    MCHC 34.6 30.0 - 36.5 g/dL    RDW 13.9 11.5 - 14.5 %    PLATELET 105 914 - 358 K/uL    MPV 9.8 8.9 - 12.9 FL    NRBC 0.0 0  WBC    ABSOLUTE NRBC 0.00 0.00 - 0.01 K/uL    NEUTROPHILS 88 (H) 32 - 75 %    LYMPHOCYTES 9 (L) 12 - 49 %    MONOCYTES 3 (L) 5 - 13 %    EOSINOPHILS 0 0 - 7 %    BASOPHILS 0 0 - 1 %    IMMATURE GRANULOCYTES 0 0.0 - 0.5 %    ABS. NEUTROPHILS 4.3 1.8 - 8.0 K/UL    ABS. LYMPHOCYTES 0.5 (L) 0.8 - 3.5 K/UL    ABS. MONOCYTES 0.2 0.0 - 1.0 K/UL    ABS. EOSINOPHILS 0.0 0.0 - 0.4 K/UL    ABS. BASOPHILS 0.0 0.0 - 0.1 K/UL    ABS. IMM.  GRANS. 0.0 0.00 - 0.04 K/UL    DF AUTOMATED      RBC COMMENTS NORMOCYTIC, NORMOCHROMIC     EKG, 12 LEAD, INITIAL    Collection Time: 03/23/20  9:05 PM   Result Value Ref Range    Ventricular Rate 63 BPM    Atrial Rate 63 BPM    P-R Interval 180 ms    QRS Duration 88 ms    Q-T Interval 416 ms    QTC Calculation (Bezet) 425 ms    Calculated P Axis 17 degrees    Calculated R Axis -7 degrees    Calculated T Axis 30 degrees    Diagnosis       Normal sinus rhythm  Minimal voltage criteria for LVH, may be normal variant  When compared with ECG of 25-FEB-2020 14:21,  No significant change was found     GLUCOSE, POC    Collection Time: 03/24/20  8:31 AM   Result Value Ref Range    Glucose (POC) 90 65 - 100 mg/dL    Performed by Cookie CEJA)    GLUCOSE, POC    Collection Time: 03/24/20 11:07 AM   Result Value Ref Range    Glucose (POC) 87 65 - 100 mg/dL    Performed by jarocho Danni Santos (PCT)         Imaging review:  MRI brain 3/24/20  1. New evidence of active demyelination in bilateral frontal and right parietal  demyelinating plaques. Posterior right frontal periventricular demyelinating  plaque is increased in size since 16 days ago. 2. No acute or infarct. Independent review of imaging shows an increase in contrast enhancement compared to exam done on 3/9/20. IMPRESSION/RECOMMENDATIONS:  Melissa Borden is a 53 y.o. female who presents with right sided facial droop and slurred speech, symptoms appear to be consistent with ongoing MS flare, likely exacerbated by withdrawal of multiple psychotropic medications. 1. MS flare: MRI with increased contrast enhancement when compared to previous MRI and worsening symptoms. - would recommend continuing IV methylprednisolone 1000mg for a total of 5 days  - would obtain chest xray to rule out infection   - will likely need to change her outpatient MS regimen. 2. Right sided weakness  - would recommend ongoing PT and OT therapies given weakness     3. Dysarthria   - would recommend speech therapy as her given dysarthria. 4. Constipation   - abdominal ultrasound    90 minutes spent more than 50% spent in chart review and face to face  examination, discussion of treatment options and approach    Thank you very much for this consultation.      Susie Queen MD

## 2020-03-24 NOTE — ED NOTES
TRANSFER - OUT REPORT:    Verbal report given to Suhail(name) on Callnandini Jordan  being transferred to neuro(unit) for routine progression of care       Report consisted of patients Situation, Background, Assessment and   Recommendations(SBAR). Information from the following report(s) SBAR, ED Summary, MAR, Recent Results and Cardiac Rhythm ns jacques was reviewed with the receiving nurse. Lines:   Peripheral IV 03/23/20 Left Antecubital (Active)   Site Assessment Clean, dry, & intact 3/23/2020  8:49 PM   Phlebitis Assessment 0 3/23/2020  8:49 PM   Infiltration Assessment 0 3/23/2020  8:49 PM   Dressing Status Clean, dry, & intact 3/23/2020  8:49 PM        Opportunity for questions and clarification was provided.       Patient transported with:   Proficient

## 2020-03-25 ENCOUNTER — HOME CARE VISIT (OUTPATIENT)
Dept: HOME HEALTH SERVICES | Facility: HOME HEALTH | Age: 45
End: 2020-03-25
Payer: MEDICARE

## 2020-03-25 LAB
AMMONIA PLAS-SCNC: 20 UMOL/L
GLUCOSE BLD STRIP.AUTO-MCNC: 104 MG/DL (ref 65–100)
GLUCOSE BLD STRIP.AUTO-MCNC: 124 MG/DL (ref 65–100)
GLUCOSE BLD STRIP.AUTO-MCNC: 143 MG/DL (ref 65–100)
GLUCOSE BLD STRIP.AUTO-MCNC: 161 MG/DL (ref 65–100)
HAV IGM SER QL: NONREACTIVE
HBV CORE IGM SER QL: NONREACTIVE
HBV SURFACE AG SER QL: <0.1 INDEX
HBV SURFACE AG SER QL: NEGATIVE
HCV AB SERPL QL IA: NONREACTIVE
HCV COMMENT,HCGAC: NORMAL
MAGNESIUM SERPL-MCNC: 1.9 MG/DL (ref 1.6–2.4)
PHOSPHATE SERPL-MCNC: 2.6 MG/DL (ref 2.6–4.7)
SERVICE CMNT-IMP: ABNORMAL
SP1: NORMAL
SP2: NORMAL
SP3: NORMAL

## 2020-03-25 PROCEDURE — 80074 ACUTE HEPATITIS PANEL: CPT

## 2020-03-25 PROCEDURE — 74011250637 HC RX REV CODE- 250/637: Performed by: PSYCHIATRY & NEUROLOGY

## 2020-03-25 PROCEDURE — 83735 ASSAY OF MAGNESIUM: CPT

## 2020-03-25 PROCEDURE — 74011000258 HC RX REV CODE- 258: Performed by: INTERNAL MEDICINE

## 2020-03-25 PROCEDURE — 74011250636 HC RX REV CODE- 250/636: Performed by: INTERNAL MEDICINE

## 2020-03-25 PROCEDURE — 36415 COLL VENOUS BLD VENIPUNCTURE: CPT

## 2020-03-25 PROCEDURE — 84100 ASSAY OF PHOSPHORUS: CPT

## 2020-03-25 PROCEDURE — 3331090001 HH PPS REVENUE CREDIT

## 2020-03-25 PROCEDURE — 82140 ASSAY OF AMMONIA: CPT

## 2020-03-25 PROCEDURE — 65270000029 HC RM PRIVATE

## 2020-03-25 PROCEDURE — 82962 GLUCOSE BLOOD TEST: CPT

## 2020-03-25 PROCEDURE — 74011250637 HC RX REV CODE- 250/637: Performed by: INTERNAL MEDICINE

## 2020-03-25 PROCEDURE — 3331090002 HH PPS REVENUE DEBIT

## 2020-03-25 PROCEDURE — 97165 OT EVAL LOW COMPLEX 30 MIN: CPT | Performed by: OCCUPATIONAL THERAPIST

## 2020-03-25 PROCEDURE — 74011636637 HC RX REV CODE- 636/637: Performed by: INTERNAL MEDICINE

## 2020-03-25 PROCEDURE — 97116 GAIT TRAINING THERAPY: CPT

## 2020-03-25 PROCEDURE — 97535 SELF CARE MNGMENT TRAINING: CPT | Performed by: OCCUPATIONAL THERAPIST

## 2020-03-25 RX ORDER — CLONAZEPAM 1 MG/1
0.5 TABLET ORAL 2 TIMES DAILY
Status: DISCONTINUED | OUTPATIENT
Start: 2020-03-25 | End: 2020-03-28 | Stop reason: HOSPADM

## 2020-03-25 RX ORDER — IBUPROFEN 200 MG
1 TABLET ORAL DAILY
Status: DISCONTINUED | OUTPATIENT
Start: 2020-03-25 | End: 2020-03-28 | Stop reason: HOSPADM

## 2020-03-25 RX ORDER — POLYETHYLENE GLYCOL 3350 17 G/17G
17 POWDER, FOR SOLUTION ORAL DAILY
Status: DISCONTINUED | OUTPATIENT
Start: 2020-03-25 | End: 2020-03-28 | Stop reason: HOSPADM

## 2020-03-25 RX ADMIN — ONDANSETRON 4 MG: 2 INJECTION INTRAMUSCULAR; INTRAVENOUS at 10:14

## 2020-03-25 RX ADMIN — LORAZEPAM 1 MG: 1 TABLET ORAL at 14:33

## 2020-03-25 RX ADMIN — DIMETHYL FUMARATE 1 CAPSULE: 240 CAPSULE ORAL at 08:25

## 2020-03-25 RX ADMIN — GABAPENTIN 800 MG: 100 CAPSULE ORAL at 08:23

## 2020-03-25 RX ADMIN — SODIUM CHLORIDE 1000 MG: 900 INJECTION, SOLUTION INTRAVENOUS at 10:18

## 2020-03-25 RX ADMIN — DIMETHYL FUMARATE 1 CAPSULE: 240 CAPSULE ORAL at 18:05

## 2020-03-25 RX ADMIN — Medication 10 ML: at 22:23

## 2020-03-25 RX ADMIN — CLONAZEPAM 0.5 MG: 1 TABLET ORAL at 17:12

## 2020-03-25 RX ADMIN — Medication 10 ML: at 14:00

## 2020-03-25 RX ADMIN — Medication 10 ML: at 10:15

## 2020-03-25 RX ADMIN — INSULIN LISPRO 2 UNITS: 100 INJECTION, SOLUTION INTRAVENOUS; SUBCUTANEOUS at 17:10

## 2020-03-25 RX ADMIN — GABAPENTIN 800 MG: 100 CAPSULE ORAL at 17:06

## 2020-03-25 RX ADMIN — POLYETHYLENE GLYCOL (3350) 17 G: 17 POWDER, FOR SOLUTION ORAL at 10:17

## 2020-03-25 RX ADMIN — GABAPENTIN 800 MG: 100 CAPSULE ORAL at 22:22

## 2020-03-25 RX ADMIN — DULOXETINE 60 MG: 30 CAPSULE, DELAYED RELEASE ORAL at 08:23

## 2020-03-25 NOTE — PROGRESS NOTES
Bedside and Verbal shift change report given to Ksenia Hogue (oncoming nurse) by Yobani Knox (offgoing nurse). Report included the following information SBAR, Kardex, Intake/Output and MAR.     Zone Phone:   [phone]        Significant changes during shift:  Pt refusing bed alarm.         Patient Information     Radha Haskins  66 y.o.  3/23/2020  7:00 PM by Roman Howell DO. Radha Haskins was admitted from Home     Problem List          Patient Active Problem List     Diagnosis Date Noted    Facial droop 03/23/2020    Exacerbation of multiple sclerosis ([facility] [address] 03/09/2020    Left-sided weakness 03/08/2020    Severe obesity ([facility] [address] 10/03/2018    Constipation 07/17/2015    Body aches 12/11/2014    Back pain 09/07/2012    Chronic pain 08/17/2012    MS (multiple sclerosis) ([facility] [address] 08/17/2012    Decreased hearing 01/31/2011    Acute pharyngitis 01/26/2011    Anxiety 08/25/2010    Blood pressure elevated 08/25/2010    Tobacco abuse 08/25/2010           Past Medical History:   Diagnosis Date    Body aches 12/11/2014    Chronic pain      Depression      GERD (gastroesophageal reflux disease)      Headache(784.0)      History of mammogram never before    [facility]      Neurological disorder       Multiple Sclerosis    Pap smear for cervical cancer screening 2008    Psychiatric disorder       anxiety    Psychotic disorder ([facility]              Core Measures:     CVA: Yes Yes     Activity Status:     OOB to Chair No  Ambulated this shift Yes   Bed Rest No        DVT prophylaxis:     DVT prophylaxis Med- No  DVT prophylaxis SCD or CONG- Yes  refusing     Wounds: (If Applicable)     Wounds- No     Patient Safety:     Falls Score Total Score: 2  Safety Level_______  Bed Alarm On? No  Sitter?  No     Plan for upcoming shift: safety           Discharge Plan: Yes Home after 3 more days of solu-medrol     Active Consults:  IP CONSULT TO NEUROLOGY

## 2020-03-25 NOTE — PROGRESS NOTES
Chief Complaint: Multiple sclerosis    Admitted for multiple sclerosis exacerbation. She was seen by my colleague Nata Cross.  Apparently started having symptoms 2 days ago which in addition to her preadmission issues she was having speech difficulty. She states that she is compliant with Tecfidera. She is upset that clonazepam was taken away from her and she is having trouble with anxiety. Assesment and Plan  1. Multiple sclerosis  Continue methylprednisolone she has 4 days left. Will address changing Tecfidera on an outpatient basis  After discharge she does not need to take Tecfidera for an additional 2 weeks. 2.  Right-sided weakness  Continue PT and OT    3. Dysarthria  Appreciate speech input. 2. Anxiety   Start clonazepam which will help with the spasticity    3. Fibromyaglia   Continue gabapentin    4. Tobacco use  conitnues to smoke discussed the importance quit.        Allergies  Morphine     Medications  Current Facility-Administered Medications   Medication Dose Route Frequency    polyethylene glycol (MIRALAX) packet 17 g  17 g Oral DAILY    nicotine (NICODERM CQ) 21 mg/24 hr patch 1 Patch  1 Patch TransDERmal DAILY    acetaminophen (TYLENOL) tablet 325 mg  325 mg Oral Q6H PRN    DULoxetine (CYMBALTA) capsule 60 mg  60 mg Oral DAILY    gabapentin (NEURONTIN) capsule 800 mg  800 mg Oral TID    LORazepam (ATIVAN) tablet 1 mg  1 mg Oral Q12H PRN    senna-docusate (PERICOLACE) 8.6-50 mg per tablet 2 Tab  2 Tab Oral DAILY PRN    sodium chloride (NS) flush 5-40 mL  5-40 mL IntraVENous Q8H    sodium chloride (NS) flush 5-40 mL  5-40 mL IntraVENous PRN    oxyCODONE-acetaminophen (PERCOCET) 5-325 mg per tablet 1 Tab  1 Tab Oral Q4H PRN    naloxone (NARCAN) injection 0.4 mg  0.4 mg IntraVENous PRN    methylPREDNISolone ((Solu-MEDROL) 1,000 mg in 0.9% sodium chloride (MBP/ADV) 100 mL  1,000 mg IntraVENous DAILY    And    ondansetron (ZOFRAN) injection 4 mg  4 mg IntraVENous DAILY    dimethyl fumarate cpDR 1 Cap (Patient Supplied)  1 Cap Oral BID    melatonin tablet 3 mg  3 mg Oral QHS PRN    ondansetron (ZOFRAN) injection 4 mg  4 mg IntraVENous Q4H PRN    insulin lispro (HUMALOG) injection   SubCUTAneous AC&HS    glucose chewable tablet 16 g  4 Tab Oral PRN    dextrose (D50W) injection syrg 12.5-25 g  12.5-25 g IntraVENous PRN    glucagon (GLUCAGEN) injection 1 mg  1 mg IntraMUSCular PRN        Medical History  Past Medical History:   Diagnosis Date    Body aches 12/11/2014    Chronic pain     Depression     GERD (gastroesophageal reflux disease)     Headache(784.0)     History of mammogram never before    [facility] [address]     Neurological disorder     Multiple Sclerosis    Pap smear for cervical cancer screening 2008    Psychiatric disorder     anxiety    [facility]      Review of Systems   Constitutional: Negative for chills and fever. HENT: Negative for ear pain. Eyes: Negative for pain and discharge. Respiratory: Negative for cough and hemoptysis. Cardiovascular: Negative for chest pain and claudication. Gastrointestinal: Negative for constipation and diarrhea. Genitourinary: Negative for flank pain and hematuria. Musculoskeletal: Positive for back pain, myalgias and neck pain. Skin: Negative for itching and rash. Neurological: Positive for dizziness. Negative for tingling and headaches. Endo/Heme/Allergies: Negative for environmental allergies. Does not bruise/bleed easily. Psychiatric/Behavioral: Negative for depression and hallucinations. The patient is nervous/anxious. Exam:    Visit Vitals  /66 (BP 1 Location: Right arm, BP Patient Position: At rest)   Pulse 60   Temp 98.3 °F (36.8 °C)   Resp 20   Ht 5' 4\" (1.626 m)   Wt 200 lb (90.7 kg)   LMP 02/24/2020   SpO2 94%   BMI 34.33 kg/m²      General: Well developed, well nourished.   Anxious and depressed   Head: Normocephalic, atraumatic, anicteric sclera   Neck Normal ROM, No thyromegally   Lungs:  Clear to auscultation bilaterally, No wheezes or rubs   Cardiac: Regular rate and rhythm with no murmurs. Abd: Bowel sounds were audible. No tenderness on palpation   Ext: No pedal edema   Skin: Supple no rash     NeurologicExam:  Mental Status: Alert and oriented to person place and time   Speech:  Mildly dysarthric no aphasia. Cranial Nerves:  II - XII Intact with the exception of right facial droop   Motor:   Right side weakness 4-5 upper extremity. Bilateral hip flexion weakness   Reflexes:   Deep tendon reflexes exaggerated on the right   Sensory:   Symmetric and intact with no perceived deficits modalities involving small or large fibers. Gait:  Gait is cane dependant. Everted left leg   Tremor:   No tremor noted. Cerebellar:  Coordination intact. Neurovascular: No carotid bruits. No JVD       Imaging    CT Results (most recent):  Results from [place] encounter on 03/23/20   CTA HEAD NECK W CONT    Narrative EXAM: CTA HEAD NECK W CONT  Clinical history: Slurred speech and right-sided facial droop  INDICATION: slurred speech, right facial droop    COMPARISON: MR 3/20/2020. CONTRAST: 100 mL of Isovue-370. TECHNIQUE:  Unenhanced  images were obtained to localize the volume for  acquisition. Multislice helical axial CT angiography was performed from the  aortic arch to the top of the head during uneventful rapid bolus intravenous  contrast administration. Coronal and sagittal reformations and 3D post  processing was performed. CT dose reduction was achieved through use of a  standardized protocol tailored for this examination and automatic exposure  control for dose modulation. FINDINGS:    CTA NECK  Extensive periventricular and scattered hypodensities in the cerebral white  matter consistent with severe predominantly supratentorial demyelinating disease  burden. . No significant paranasal sinus disease.  Mild emphysematous change. Dominant left vertebral artery. [place] % of right carotid artery stenosis: 0  % of left carotid artery stenosis: 0    NASCET method was utilized for calculating stenosis. . The cervical soft tissues are unremarkable. .    CTA HEAD  Dural venous sinuses are patent. A 2 segments are patent. M1 and M2 segments are  patent. Petrous and cavernous carotid arteries are within normal limits. Left  vertebral artery is dominant. . The basilar artery and its branches are normal.  The internal carotid, anterior cerebral, and middle cerebral arteries are  patent. There is no flow-limiting intracranial stenosis. There is no aneurysm. There are no sizable posterior communicating arteries. Impression IMPRESSION:   No major vessel occlusion. No intracranial mass, hemorrhage or evidence of acute infarction. Imaging findings consistent with moderate to severe predominantly supratentorial  demyelinating disease. Jaguar Ke MRI Results (most recent):  Results from [place] encounter on 03/23/20   MRI BRAIN W WO CONT    Narrative EXAM:  MRI BRAIN W WO CONT    INDICATION:    right facial droop, recurring, diagnosis of multiple sclerosis. COMPARISON:  MRI brain on 3/8/2020. CT head and CT angiography head on  3/23/2020. CONTRAST: 20 ml Dotarem. TECHNIQUE:    Multiplanar multisequence acquisition without and with contrast of the brain  performed on the 3 Hui magnet. FINDINGS:  New enhancement in bilateral frontal and right parietal periventricular white  matter demyelinating plaques since earlier this month. Hyperintense T2  demyelinating plaque in the right frontal posterior periventricular white matter  is increased (series 9, image 22). Size and number of demyelination is otherwise  unchanged. No restricted diffusion. T2 shine through is unchanged. There is no acute infarct, hemorrhage, extra-axial fluid collection, or mass  effect. There is no cerebellar tonsillar herniation.  Expected arterial  flow-voids are present. No enhancing mass. Parasagittal midline structures are otherwise within normal limits. Impression IMPRESSION:     1. New evidence of active demyelination in bilateral frontal and right parietal  demyelinating plaques. Posterior right frontal periventricular demyelinating  plaque is increased in size since 16 days ago. 2. No acute or infarct.        .  Lab Review    Lab Results   Component Value Date/Time    WBC 5.0 03/23/2020 08:43 PM    HCT 35.5 03/23/2020 08:43 PM    HGB 12.3 03/23/2020 08:43 PM    PLATELET 177 71/53/4021 08:43 PM       Lab Results   Component Value Date/Time    Sodium 140 03/23/2020 08:43 PM    Potassium 3.9 03/23/2020 08:43 PM    Chloride 105 03/23/2020 08:43 PM    CO2 29 03/23/2020 08:43 PM    Glucose 94 03/23/2020 08:43 PM    BUN 7 03/23/2020 08:43 PM    Creatinine 0.70 03/23/2020 08:43 PM    Calcium 9.6 03/23/2020 08:43 PM       Lab Results   Component Value Date/Time    Cholesterol, total 222 (H) 03/09/2020 02:49 AM    HDL Cholesterol 71 03/09/2020 02:49 AM    LDL, calculated 143.4 (H) 03/09/2020 02:49 AM    VLDL, calculated 7.6 03/09/2020 02:49 AM    Triglyceride 38 03/09/2020 02:49 AM    CHOL/HDL Ratio 3.1 03/09/2020 02:49 AM

## 2020-03-25 NOTE — PROGRESS NOTES
SPEECH THERAPY SCREENING:  SERVICES ARE NOT INDICATED AT THIS TIME    [facility] based on results obtained during the nursing admission assessment. The patients chart was reviewed and the patient is not appropriate for a skilled therapy evaluation at this time. Please consult speech therapy if any therapy needs arise. Thank you.     Yoli Laws, SLP

## 2020-03-25 NOTE — PROGRESS NOTES
Bedside and Verbal shift change report given to Perla(oncoming nurse) by Kylie Morris (offgoing nurse). Report included the following information SBAR, Kardex, Intake/Output and MAR.     Zone Phone:   [phone]        Significant changes during shift:  Pt refusing bed alarm. Refusal form in bin. Labs drawn. Started on nicotine patch. Med x one with prn ativan         Patient Information     Marta Hammonds  29 y.o.  3/23/2020  7:00 PM by Abeba Rojas DO. Marta Hammonds was admitted from Home     Problem List          Patient Active Problem List     Diagnosis Date Noted    Facial droop 03/23/2020    Exacerbation of multiple [facility] ([facility] [address] 03/09/2020    Left-sided weakness 03/08/2020    Severe obesity ([address] 10/03/2018    Constipation 07/17/2015    Body aches 12/11/2014    Back pain 09/07/2012    Chronic pain 08/17/2012    MS (multiple sclerosis) ([facility] [address] 08/17/2012    Decreased hearing 01/31/2011    Acute pharyngitis 01/26/2011    Anxiety 08/25/2010    Blood pressure elevated 08/25/2010    Tobacco abuse 08/25/2010           Past Medical History:   Diagnosis Date    Body aches 12/11/2014    Chronic pain      Depression      GERD (gastroesophageal reflux disease)      Headache(784.0)      History of mammogram never before    [facility]      Neurological disorder       Multiple Sclerosis    Pap smear for cervical cancer screening 2008    Psychiatric disorder       anxiety    Psychotic disorder ([facility]              Core Measures:     CVA: Yes Yes     Activity Status:     OOB to Chair No  Ambulated this shift Yes   Bed Rest No        DVT prophylaxis:     DVT prophylaxis Med- No  DVT prophylaxis SCD or CONG- Yes  refusing     Wounds: (If Applicable)     Wounds- No     Patient Safety:     Falls Score Total Score: 2  Safety Level_______  Bed Alarm On? No  Sitter?  No     Plan for upcoming shift: safety           Discharge Plan: Yes Home after 3 more days of solu-medrol     Active Consults:  IP CONSULT TO NEUROLOGY

## 2020-03-25 NOTE — PROGRESS NOTES
Bedside and Verbal shift change report given to April(oncoming nurse) by Jerald Chase (offgoing nurse). Report included the following information SBAR, Kardex, Intake/Output and MAR.     Zone Phone:   [phone]        Significant changes during shift:  Pt refusing bed alarm. Refusal form in bin         Patient Information     Angi Dash  74 y.o.  3/23/2020  7:00 PM by Huma Whitehead DO. Angi Dash was admitted from Home     Problem List          Patient Active Problem List     Diagnosis Date Noted    Facial droop 03/23/2020    Exacerbation of [facility] [address] 03/09/2020    Left-sided weakness 03/08/2020    Severe obesity ([address] 10/03/2018    Constipation 07/17/2015    Body aches 12/11/2014    Back pain 09/07/2012    Chronic pain 08/17/2012    MS (multiple sclerosis) ([facility] [address] 08/17/2012    Decreased hearing 01/31/2011    Acute pharyngitis 01/26/2011    Anxiety 08/25/2010    Blood pressure elevated 08/25/2010    Tobacco abuse 08/25/2010           Past Medical History:   Diagnosis Date    Body aches 12/11/2014    Chronic pain      Depression      GERD (gastroesophageal reflux disease)      Headache(784.0)      History of mammogram never before    [facility] ([facility]      Neurological disorder       Multiple Sclerosis    Pap smear for cervical cancer screening 2008    Psychiatric disorder       anxiety    Psychotic disorder ([facility]              Core Measures:     CVA: Yes Yes     Activity Status:     OOB to Chair No  Ambulated this shift Yes   Bed Rest No        DVT prophylaxis:     DVT prophylaxis Med- No  DVT prophylaxis SCD or CONG- Yes      Wounds: (If Applicable)     Wounds- No     Patient Safety:     Falls Score Total Score: 2  Safety Level_______  Bed Alarm On? No  Sitter?  No     Plan for upcoming shift: safety           Discharge Plan: Yes Home after 4 more days of solu-medrol     Active Consults:  IP CONSULT TO NEUROLOGY

## 2020-03-25 NOTE — PROGRESS NOTES
Problem: Mobility Impaired (Adult and Pediatric)  Goal: *Therapy Goal (Edit Goal, Insert Text)  Description: FUNCTIONAL STATUS PRIOR TO ADMISSION: Patient was modified independent using a rolling walker for functional mobility since her admission early March. Prior to her recent exacerbation, she was independent. HOME SUPPORT PRIOR TO ADMISSION: The patient lived with her supportive fiance but did not require assist.    Physical Therapy Goals  Initiated 3/24/2020  1. Patient will move from supine to sit and sit to supine  in bed with modified independence within 7 day(s). 2.  Patient will transfer from bed to chair and chair to bed with modified independence using the least restrictive device within 7 day(s). 3.  Patient will perform sit to stand with modified independence within 7 day(s). 4.  Patient will ambulate with supervision/set-up for 150 feet with the least restrictive device within 7 day(s). 5.  Patient will ascend/descend 2 stairs with 0 handrail(s) with modified independence within 7 day(s). Outcome: Progressing Towards Goal   PHYSICAL THERAPY TREATMENT  Patient: Okey Sandifer (55 y.o. female)  Date: 3/25/2020  Diagnosis: [facility] Alberto Doyle   <principal problem not specified>       Precautions:    Chart, physical therapy assessment, plan of care and goals were reviewed. ASSESSMENT  Patient continues with skilled PT services and is progressing towards goals. Patient is received supine in bed. She demonstrates the ability to ambulate increased distance with the use of a rolling walker. Patient demonstrates good pacing this session with gait. VC are provided to keep rolling walker with her to complete all transfers. Current Level of Function Impacting Discharge (mobility/balance):  SBA with rolling walker     Other factors to consider for discharge: medical stability for discharge home, stair training          PLAN :  Patient continues to benefit from skilled intervention to address the above impairments. Continue treatment per established plan of care. to address goals. Recommendation for discharge: (in order for the patient to meet his/her long term goals)  Physical therapy at least 2 days/week in the home     This discharge recommendation:  Has been made in collaboration with the attending provider and/or case management    IF patient discharges home will need the following DME: patient owns DME required for discharge       SUBJECTIVE:   Patient stated Lupillo Chacon will tell you when I need to turn around.     OBJECTIVE DATA SUMMARY:   Critical Behavior:  Neurologic State: Alert, Appropriate for age  Orientation Level: Oriented to person, Oriented to place, Oriented to situation  Cognition: Appropriate decision making, Appropriate for age attention/concentration, Follows commands  Safety/Judgement: Awareness of environment, Fall prevention, Home safety, Insight into deficits  Functional Mobility Training:  Bed Mobility:     Supine to Sit: Stand-by assistance  Sit to Supine: Stand-by assistance  Scooting: Independent        Transfers:  Sit to Stand: Stand-by assistance  Stand to Sit: Stand-by assistance                             Balance:  Sitting: Intact  Standing: Impaired  Standing - Static: Constant support; Fair  Standing - Dynamic : Constant support; Fair  Ambulation/Gait Training:  Distance (ft): 100 Feet (ft)  Assistive Device: Gait belt;Walker, rolling  Ambulation - Level of Assistance: Stand-by assistance        Gait Abnormalities: Decreased step clearance;Shuffling gait        Base of Support: Widened     Speed/Heena: Slow;Shuffled  Step Length: Left shortened;Right shortened                    Stairs: Therapeutic Exercises:     Pain Rating:      Activity Tolerance:   Fair and requires rest breaks  Please refer to the flowsheet for vital signs taken during this treatment.     After treatment patient left in no apparent distress:   Supine in bed, Call bell within reach, and Side rails x 3    COMMUNICATION/COLLABORATION:   The patients plan of care was discussed with: Registered nurse.      Liyah Hicks, PT   Time Calculation: 18 mins

## 2020-03-25 NOTE — PROGRESS NOTES
Occupational Therapy  Orders received and medical record reviewed. Pt participated in OT Evaluation. Pt is functioning close to her baseline, and will benefit from acute OT services to work on aaTag education and strengthening program.  Pt anticipates being hospitalized for 4 more days. Pt will likely benefit from returning home with the assistance of her significant other and resumption of  OT and PT services. Full OT note to follow.

## 2020-03-25 NOTE — PROGRESS NOTES
Problem: Self Care Deficits Care Plan (Adult)  Goal: *Acute Goals and Plan of Care (Insert Text)  Description:   FUNCTIONAL STATUS PRIOR TO ADMISSION: Pt lives with her significant other who is able to assist her. They are able to walk to the grocery store and also use the [address] [facility] [place] [facility] [place] [facility]. Pt reports independence in adls. Pt has assist for IADLs from her s/o. Pt enjoys cooking (sits on her rollator for rests) and playing games on her phone     HOME SUPPORT: The patient lived with her s/o who is able to assist.    Occupational Therapy Goals  Initiated 3/25/2020  1. Patient will perform grooming in s tanding with independence, demonstrating safe technique within 7 day(s). 2.  Patient will perform sponge bathing, including set up,  with independence within 7 day(s). 3.  Patient will perform upper body dressing and lower body dressing with independence, using adaptive techniques prn, within 7 day(s). 4.  Patient will perform toilet transfers with modified independence, demonstrating safe technique, within 7 day(s). 5.  Patient will perform all aspects of toileting with independence within 7 day(s). 6.  Patient will participate in upper extremity therapeutic exercise/activities with supervision/set-up for 5 minutes within 7 day(s). 7.  Patient will utilize energy conservation techniques during functional activities with verbal cues within 7 day(s). Outcome: Not Met   OCCUPATIONAL THERAPY EVALUATION  Patient: Luis Stewart (1 y.o. female)  Date: 3/25/2020  Primary Diagnosis: MS (multiple sclerosis) ([facility] [G35]        Precautions: fall       ASSESSMENT  Based on the objective data described below, the patient presents with recently admitted for similar circumstances and appears to be functioning close to her baseline for adls, now requiring supervision and additional time for most tasks. Pt reports that she was receiving HH OT and PT PTA and was managing well with the help of her s/o.    Pt complains of considerable overall weakness (but mostly c/o BLEs)  as well as numbness and burning sensation in BUEs. Activity tolerance is generally fair to poor and she would benefit from education on Energy Conservation specific to her usual ADL routine. Anticipate that pt would be able to return home with the assistance of her s/o and resumption of  therapy services. Current Level of Function Impacting Discharge (ADLs/self-care): supervision and additional time    Functional Outcome Measure: The patient scored Total: 65/100 on the Barthel Index outcome measure which is indicative of 35% impaired ability to care for basic self needs/dependency on others; inferreddependency on others for instrumental ADLs. Other factors to consider for discharge: has the assistance of her s/o at home     Patient will benefit from skilled therapy intervention to address the above noted impairments. PLAN :  Recommendations and Planned Interventions: self care training, functional mobility training, therapeutic exercise, balance training, therapeutic activities, endurance activities, neuromuscular re-education, patient education and home safety training, energy conservation education    Frequency/Duration: Patient will be followed by occupational therapy 3 times a week to address goals. Recommendation for discharge: (in order for the patient to meet his/her long term goals)  To be determined: anticipate that pt will return home with [facility] services and 24 hour assistance from her s/o    This discharge recommendation:  Has not yet been discussed the attending provider and/or case management    IF patient discharges home will need the following DME: tbd       SUBJECTIVE:   Patient stated we were working on shower transfer.     OBJECTIVE DATA SUMMARY:   HISTORY:   Past Medical History:   Diagnosis Date    Body aches 12/11/2014    Chronic pain     Depression     GERD (gastroesophageal reflux disease)     Headache(784.0)  History of mammogram never before    MS (multiple sclerosis) ([facility]     Neurological disorder     Multiple Sclerosis    Pap smear for cervical cancer screening 2008    Psychiatric disorder     anxiety    Psychotic disorder [facility])      Past Surgical History:   Procedure Laterality Date    COLONOSCOPY N/A 2/28/2018    COLONOSCOPY performed by Crystal Azul [facility] at [facility]COPY    HX CHOLECYSTECTOMY      HX GYN      3 c-sections    HX HEENT      bilateral ear tube surgery    HX HERNIA REPAIR      HX OTHER SURGICAL      R inguinal hernia repair       Expanded or extensive additional review of patient history:  Readmission for MS exacerbation    Home Situation  Home Environment: Apartment  # Steps to Enter: 2  Rails to Enter: No  One/Two Story Residence: One story  Living Alone: No  Support Systems: Spouse/Significant Other/Partner  Patient Expects to be Discharged to[de-identified] Apartment  Current DME Used/Available at Home: Shower chair  Tub or Shower Type: Tub/Shower combination    Hand dominance: Right    EXAMINATION OF PERFORMANCE DEFICITS:  Cognitive/Behavioral Status:  Neurologic State: Alert; Appropriate for age  Orientation Level: Oriented to person;Oriented to place;Oriented to situation  Cognition: Appropriate decision making; Appropriate for age attention/concentration; Follows commands  Perception: Appears intact  Perseveration: No perseveration noted  Safety/Judgement: Awareness of environment; Fall prevention;Home safety; Insight into deficits    Skin: appears intact, IV     Edema: none observed    Hearing: Auditory  Auditory Impairment: None    Vision/Perceptual:    Tracking: Able to track stimulus in all quadrants w/o difficulty                 Diplopia: No    Acuity: Impaired near vision; Impaired far vision(reports that she needs an eye exam)         Range of Motion:  BUEs:    AROM: Generally decreased, functional                         Strength:  BUEs:    Strength: Generally decreased, functional MM testing  RUE: slightly weaker than L  Generally 4 to 5/5  LUE:  Generally 4+to 5/5             Coordination:  Coordination: Generally decreased, functional  Fine Motor Skills-Upper: Left Intact; Right Intact    Gross Motor Skills-Upper: Left Intact; Right Intact(mild RUE weakness causing decreased endurance for exercises)    Tone & Sensation:    Tone: Normal  Sensation: Impaired(numbness and burning sensation BUEs)                      Balance:  Sitting: Intact  Standing: Impaired  Standing - Static: Constant support;Good  Standing - Dynamic : Constant support;Good;Fair(spontaneously able to reach to R foot from standing position holding onto one RW  to adjust her sock)    Functional Mobility and Transfers for ADLs:  Bed Mobility:  Supine to Sit: Stand-by assistance  Sit to Supine: Stand-by assistance  Scooting: Independent    Transfers:  Sit to Stand: Supervision  Stand to Sit: Supervision  Bathroom Mobility: Supervision/set up  Toilet Transfer : Supervision  Assistive Device : Gait Belt;Walker    ADL Assessment:  Feeding: Independent    Oral Facial Hygiene/Grooming: Setup    Bathing: Supervision    Upper Body Dressing: Setup    Lower Body Dressing: Setup    Toileting: Supervision;Stand by assistance                ADL Intervention and task modifications:   Pt performed bed mobility, ambulation to bathroom using RW with CGA/S performing toilet transfer and standing hand washing. Pt tends to keep RLE outside of the RW which is a fall risk. Cues to keep feet inside the walker frame, but pt reports that she has difficulty due to old ankle fracture. Pt stopped abruptly and bent forward to the floor to adjust her R sock, keeping one hand on the L walker . Pt states that she usually has assistance for picking up objects from the floor at home. Initiated education on [employer] and pt verbalized understanding.   Will benefit from appropriate handouts on [facility] and education on incorporating EC into her adl routine. Cognitive Retraining  Safety/Judgement: Awareness of environment; Fall prevention;Home safety; Insight into deficits    Therapeutic Exercise:  Encoruaged BUE exercises,  BUE shoulder flex followed by abduction pattern. Encouraged pt to perform shoulder flex/ext, BUE shoulder flex/ext holding her water pitcher for resistance. May benefit from using  theraputty exercises to help modulate sensory impairment. (has resistive foam blocks at home, as well as theraband)  Functional Measure:  Barthel Index:    Bathin  Bladder: 10  Bowels: 10  Groomin  Dressing: 10  Feeding: 10  Mobility: 0  Stairs: 0  Toilet Use: 5  Transfer (Bed to Chair and Back): 10  Total: 65/100        The Barthel ADL Index: Guidelines  1. The index should be used as a record of what a patient does, not as a record of what a patient could do. 2. The main aim is to establish degree of independence from any help, physical or verbal, however minor and for whatever reason. 3. The need for supervision renders the patient not independent. 4. A patient's performance should be established using the best available evidence. Asking the patient, friends/relatives and nurses are the usual sources, but direct observation and common sense are also important. However direct testing is not needed. 5. Usually the patient's performance over the preceding 24-48 hours is important, but occasionally longer periods will be relevant. 6. Middle categories imply that the patient supplies over 50 per cent of the effort. 7. Use of aids to be independent is allowed. Briana Chen., Barthel, D.W. (8126). Functional evaluation: the Barthel Index. [facility] (14)2. Johnnie Baldwin irvin LOUIS Simental, Rubin Matthew., [facility] Clink.[facility], [address] [place], [place] [address]e (). Measuring the change indisability after inpatient rehabilitation; comparison of the responsiveness of the Barthel Index and Functional [place] Measure.  Journal of Neurology, Neurosurgery, and Psychiatry, 664), 253-482. TUNDE Hsieh, BETTY Moss, & Carrie Polo M.A. (2004.) Assessment of post-stroke quality of life in cost-effectiveness studies: The usefulness of the Barthel Index and the EuroQoL-5D. Quality of Life Research, 15, 887-39         Occupational Therapy Evaluation Charge Determination   History Examination Decision-Making   MEDIUM Complexity : Expanded review of history including physical, cognitive and psychosocial  history  MEDIUM Complexity : 3-5 performance deficits relating to physical, cognitive , or psychosocial skils that result in activity limitations and / or participation restrictions MEDIUM Complexity : Patient may present with comorbidities that affect occupational performnce. Miniml to moderate modification of tasks or assistance (eg, physical or verbal ) with assesment(s) is necessary to enable patient to complete evaluation       Based on the above components, the patient evaluation is determined to be of the following complexity level: MEDIUM  Pain Rating:  No complaints during tx session    Activity Tolerance:   Fair      After treatment patient left in no apparent distress:    seated EOB, nurse to return to manage IV    COMMUNICATION/EDUCATION:   The patients plan of care was discussed with: Physical therapist and Registered nurse. Patient/family have participated as able in goal setting and plan of care. This patients plan of care is appropriate for delegation to [facility].     Thank you for this referral.  Lisa Sotomayor OTR/L  Time Calculation: 25 mins

## 2020-03-25 NOTE — PROGRESS NOTES
Hospitalist Progress Note    NAME: Rona Gonzales   :  1975   MRN:  [id number]       Assessment / Plan:  Multiple sclerosis flare-up with facial droop - recurrent - POA   - recent flare up with admission 3/8/2020 - similar symptoms  - CT of head w/o contrast     No acute intracranial hemorrhage, mass or infarct. 2. Persistent periventricular white matter hypodensities, correlating with  patient's known MS plaques, unchanged. MR can be performed to better  characterize, as clinically indicated. - CT head and neck w/ contrast  No major vessel occlusion. No intracranial mass, hemorrhage or evidence of acute infarction. Imaging findings consistent with moderate to severe predominantly supratentorial  demyelinating disease.  - Neurology consulted   - MRI of brain     - New evidence of active demyelination in bilateral frontal and right parietal  demyelinating plaques. Posterior right frontal periventricular demyelinating  plaque is increased in size since 16 days ago. -  No acute or infarct. - on Dimethyl fumarate cpDR - patient dosing  - Continue Gabapentin - home dosing  - continue Solumedrol 1000 mg IV Q24H - for 5 days per neurology recommendation (on day 2)  - accuchecks AC & HS with SSI coverage, as needed  - Hypoglycemia protocol  - add Nicotine patch   - PT/OT following     Elevated LFT's   - US of abdomen      Status post cholecystectomy. The common bile duct is dilated at 12 mm. This  may be related to postcholecystectomy changes. Recommend correlation with serum  alkaline phosphatase  - acute hepatitis panel - follow up today's labs  - Ammonia 20  - Phos 2.6/Mg 1.9   - avoid liver damaging agents (pt on high dose Cymbalta 60 mg)  - BMP every day      Depression/Anxiety/Pain Management  - on high dose Cymbalta  - on Neurontin TID     Constipation   - pericolace/Miralax PRN      30.0 - 39.9 Obese / Body mass index is 34.33 kg/m².     Code status: Full  Prophylaxis: H2B/PPI  Recommended Disposition: Home w/Family     Subjective:     Chief Complaint / Reason for Physician Visit  \"Recurrent slurred speech and facial droop\" Discussed with RN events overnight. Review of Systems:  Symptom Y/N Comments  Symptom Y/N Comments   Fever/Chills N   Chest Pain     Poor Appetite N   Edema     Cough    Abdominal Pain     Sputum    Joint Pain Y Chronic   SOB/BOBO N   Pruritis/Rash     Nausea/vomit    Tolerating PT/OT     Diarrhea    Tolerating Diet Y    Constipation Y Add Miralax  Other       Could NOT obtain due to:      Objective:     VITALS:   Last 24hrs VS reviewed since prior progress note. Most recent are:  Patient Vitals for the past 24 hrs:   Temp Pulse Resp BP SpO2   03/25/20 0807 98.9 °F (37.2 °C) 60 20 141/86 95 %   03/25/20 0332 98.3 °F (36.8 °C) (!) 55 20 130/75 94 %   03/24/20 2322 97.9 °F (36.6 °C) 61 20 117/65 95 %   03/24/20 1935 97.4 °F (36.3 °C) 69 20 120/85 97 %   03/24/20 1158 97.9 °F (36.6 °C) 60 18 105/61 100 %     No intake or output data in the 24 hours ending 03/25/20 0859     PHYSICAL EXAM:  General: Alert, cooperative, no acute distress    EENT:  EOMI. Anicteric sclerae. MMM  Resp:  CTA bilaterally, no wheezing or rales. No accessory muscle use  CV:  Regular  rhythm,  No edema  GI:  Soft, Non distended, Non tender.  +Bowel sounds  Neurologic:  Alert and oriented X 3, slurred speech,   Psych:   Good insight. Not anxious nor agitated  Skin:  No rashes.   No jaundice    Reviewed most current lab test results and cultures  YES  Reviewed most current radiology test results   YES  Review and summation of old records today    NO  Reviewed patient's current orders and MAR    YES  PMH/SH reviewed - no change compared to H&P  ________________________________________________________________________  Care Plan discussed with:    Comments   Patient X    Family      RN X    Care Manager     Consultant                        Multidiciplinary team rounds were held today with , nursing, pharmacist and clinical coordinator. Patient's plan of care was discussed; medications were reviewed and discharge planning was addressed. ________________________________________________________________________  Total NON critical care TIME:  55   Minutes    Total CRITICAL CARE TIME Spent:   Minutes non procedure based      Comments   >50% of visit spent in counseling and coordination of care     ________________________________________________________________________  Norman Spring     Procedures: see electronic medical records for all procedures/Xrays and details which were not copied into this note but were reviewed prior to creation of Plan. LABS:  I reviewed today's most current labs and imaging studies.   Pertinent labs include:  Recent Labs     03/23/20 2043   WBC 5.0   HGB 12.3   HCT 35.5        Recent Labs     03/23/20 2043      K 3.9      CO2 29   GLU 94   BUN 7   CREA 0.70   CA 9.6   MG 1.6   ALB 3.9   TBILI 0.4   SGOT 45*   ALT 98*   INR 1.0       Signed: Norman Harrington

## 2020-03-25 NOTE — PROGRESS NOTES
Problem: Falls - Risk of  Goal: *Absence of Falls  Description: Document Rosalie Tate Fall Risk and appropriate interventions in the flowsheet. Outcome: Progressing Towards Goal  Note: Fall Risk Interventions:  Mobility Interventions: Patient to call before getting OOB         Medication Interventions: Evaluate medications/consider consulting pharmacy, Patient to call before getting OOB    Elimination Interventions: Call light in reach              Problem: Pressure Injury - Risk of  Goal: *Prevention of pressure injury  Description: Document Samy Scale and appropriate interventions in the flowsheet.   Outcome: Progressing Towards Goal  Note: Pressure Injury Interventions:  Sensory Interventions: Assess changes in LOC, Discuss PT/OT consult with provider         Activity Interventions: Increase time out of bed, PT/OT evaluation    Mobility Interventions: PT/OT evaluation    Nutrition Interventions: Document food/fluid/supplement intake

## 2020-03-26 LAB
ANION GAP SERPL CALC-SCNC: 5 MMOL/L (ref 5–15)
BASOPHILS # BLD: 0 K/UL (ref 0–0.1)
BASOPHILS NFR BLD: 0 % (ref 0–1)
BUN SERPL-MCNC: 17 MG/DL (ref 6–20)
BUN/CREAT SERPL: 28 (ref 12–20)
CALCIUM SERPL-MCNC: 8.9 MG/DL (ref 8.5–10.1)
CHLORIDE SERPL-SCNC: 108 MMOL/L (ref 97–108)
CO2 SERPL-SCNC: 27 MMOL/L (ref 21–32)
CREAT SERPL-MCNC: 0.6 MG/DL (ref 0.55–1.02)
DIFFERENTIAL METHOD BLD: ABNORMAL
EOSINOPHIL # BLD: 0 K/UL (ref 0–0.4)
EOSINOPHIL NFR BLD: 0 % (ref 0–7)
ERYTHROCYTE [DISTWIDTH] IN BLOOD BY AUTOMATED COUNT: 14 % (ref 11.5–14.5)
GLUCOSE BLD STRIP.AUTO-MCNC: 124 MG/DL (ref 65–100)
GLUCOSE BLD STRIP.AUTO-MCNC: 127 MG/DL (ref 65–100)
GLUCOSE BLD STRIP.AUTO-MCNC: 132 MG/DL (ref 65–100)
GLUCOSE SERPL-MCNC: 104 MG/DL (ref 65–100)
HCT VFR BLD AUTO: 31.7 % (ref 35–47)
HGB BLD-MCNC: 10.7 G/DL (ref 11.5–16)
IMM GRANULOCYTES # BLD AUTO: 0 K/UL (ref 0–0.04)
IMM GRANULOCYTES NFR BLD AUTO: 0 % (ref 0–0.5)
LYMPHOCYTES # BLD: 0.5 K/UL (ref 0.8–3.5)
LYMPHOCYTES NFR BLD: 6 % (ref 12–49)
MCH RBC QN AUTO: 31.9 PG (ref 26–34)
MCHC RBC AUTO-ENTMCNC: 33.8 G/DL (ref 30–36.5)
MCV RBC AUTO: 94.6 FL (ref 80–99)
MONOCYTES # BLD: 0.4 K/UL (ref 0–1)
MONOCYTES NFR BLD: 5 % (ref 5–13)
NEUTS SEG # BLD: 6.6 K/UL (ref 1.8–8)
NEUTS SEG NFR BLD: 89 % (ref 32–75)
NRBC # BLD: 0 K/UL (ref 0–0.01)
NRBC BLD-RTO: 0 PER 100 WBC
PLATELET # BLD AUTO: 240 K/UL (ref 150–400)
PMV BLD AUTO: 10.8 FL (ref 8.9–12.9)
POTASSIUM SERPL-SCNC: 4.2 MMOL/L (ref 3.5–5.1)
RBC # BLD AUTO: 3.35 M/UL (ref 3.8–5.2)
RBC MORPH BLD: ABNORMAL
SERVICE CMNT-IMP: ABNORMAL
SODIUM SERPL-SCNC: 140 MMOL/L (ref 136–145)
WBC # BLD AUTO: 7.5 K/UL (ref 3.6–11)

## 2020-03-26 PROCEDURE — 65270000029 HC RM PRIVATE

## 2020-03-26 PROCEDURE — 74011250637 HC RX REV CODE- 250/637: Performed by: INTERNAL MEDICINE

## 2020-03-26 PROCEDURE — 80048 BASIC METABOLIC PNL TOTAL CA: CPT

## 2020-03-26 PROCEDURE — 97110 THERAPEUTIC EXERCISES: CPT | Performed by: PHYSICAL THERAPIST

## 2020-03-26 PROCEDURE — 3331090001 HH PPS REVENUE CREDIT

## 2020-03-26 PROCEDURE — 36415 COLL VENOUS BLD VENIPUNCTURE: CPT

## 2020-03-26 PROCEDURE — 3331090002 HH PPS REVENUE DEBIT

## 2020-03-26 PROCEDURE — 74011250637 HC RX REV CODE- 250/637: Performed by: FAMILY MEDICINE

## 2020-03-26 PROCEDURE — 85025 COMPLETE CBC W/AUTO DIFF WBC: CPT

## 2020-03-26 PROCEDURE — 74011250636 HC RX REV CODE- 250/636: Performed by: INTERNAL MEDICINE

## 2020-03-26 PROCEDURE — 97116 GAIT TRAINING THERAPY: CPT | Performed by: PHYSICAL THERAPIST

## 2020-03-26 PROCEDURE — 74011000258 HC RX REV CODE- 258: Performed by: INTERNAL MEDICINE

## 2020-03-26 PROCEDURE — 82962 GLUCOSE BLOOD TEST: CPT

## 2020-03-26 PROCEDURE — 74011250637 HC RX REV CODE- 250/637: Performed by: PSYCHIATRY & NEUROLOGY

## 2020-03-26 RX ADMIN — GABAPENTIN 800 MG: 100 CAPSULE ORAL at 08:28

## 2020-03-26 RX ADMIN — CLONAZEPAM 0.5 MG: 1 TABLET ORAL at 08:23

## 2020-03-26 RX ADMIN — CLONAZEPAM 0.5 MG: 1 TABLET ORAL at 18:12

## 2020-03-26 RX ADMIN — DIMETHYL FUMARATE 1 CAPSULE: 240 CAPSULE ORAL at 08:32

## 2020-03-26 RX ADMIN — Medication 10 ML: at 15:40

## 2020-03-26 RX ADMIN — DIMETHYL FUMARATE 1 CAPSULE: 240 CAPSULE ORAL at 18:00

## 2020-03-26 RX ADMIN — GABAPENTIN 800 MG: 100 CAPSULE ORAL at 22:04

## 2020-03-26 RX ADMIN — Medication 10 ML: at 22:04

## 2020-03-26 RX ADMIN — POLYETHYLENE GLYCOL (3350) 17 G: 17 POWDER, FOR SOLUTION ORAL at 08:56

## 2020-03-26 RX ADMIN — MELATONIN 3 MG: at 03:33

## 2020-03-26 RX ADMIN — GABAPENTIN 800 MG: 100 CAPSULE ORAL at 15:39

## 2020-03-26 RX ADMIN — MELATONIN 3 MG: at 22:07

## 2020-03-26 RX ADMIN — SODIUM CHLORIDE 1000 MG: 900 INJECTION, SOLUTION INTRAVENOUS at 08:59

## 2020-03-26 RX ADMIN — DULOXETINE 60 MG: 30 CAPSULE, DELAYED RELEASE ORAL at 08:31

## 2020-03-26 RX ADMIN — Medication 10 ML: at 06:07

## 2020-03-26 RX ADMIN — ONDANSETRON 4 MG: 2 INJECTION INTRAMUSCULAR; INTRAVENOUS at 08:57

## 2020-03-26 NOTE — PROGRESS NOTES
Problem: Mobility Impaired (Adult and Pediatric)  Goal: *Therapy Goal (Edit Goal, Insert Text)  Description: FUNCTIONAL STATUS PRIOR TO ADMISSION: Patient was modified independent using a rolling walker for functional mobility since her admission early March. Prior to her recent exacerbation, she was independent. HOME SUPPORT PRIOR TO ADMISSION: The patient lived with her supportive fiance but did not require assist.    Physical Therapy Goals  Initiated 3/24/2020  1. Patient will move from supine to sit and sit to supine  in bed with modified independence within 7 day(s). 2.  Patient will transfer from bed to chair and chair to bed with modified independence using the least restrictive device within 7 day(s). 3.  Patient will perform sit to stand with modified independence within 7 day(s). 4.  Patient will ambulate with supervision/set-up for 150 feet with the least restrictive device within 7 day(s). 5.  Patient will ascend/descend 2 stairs with 0 handrail(s) with modified independence within 7 day(s). Outcome: Progressing Towards Goal   PHYSICAL THERAPY TREATMENT  Patient: Gauri Mcguire (60 y.o. female)  Date: 3/26/2020  Diagnosis: MS (multiple sclerosis) ([facility] Edwin Ferrara   <principal problem not specified>       Precautions:    Chart, physical therapy assessment, plan of care and goals were reviewed. ASSESSMENT  Patient continues with skilled PT services and is progressing towards goals. Patient demonstrating improvements in endurance evidence by improved distance walked today. Patient able to ambulate 150 feet in halls followed by performing core exercises including bridging and quadruped exercises. Patient requires SBA for overall mobility. Gait is mildly unsteady demonstrating poor foot clearance bilaterally but no overt LOB noted.   Recommend HHPT following discharge    Current Level of Function Impacting Discharge (mobility/balance): SBA             PLAN :  Patient continues to benefit from skilled intervention to address the above impairments. Continue treatment per established plan of care. to address goals. Recommendation for discharge: (in order for the patient to meet his/her long term goals)  Physical therapy at least 2 days/week in the home     This discharge recommendation:  Has not yet been discussed the attending provider and/or case management    IF patient discharges home will need the following DME: patient owns DME required for discharge       SUBJECTIVE:   Patient stated I'm feeling better today.     OBJECTIVE DATA SUMMARY:   Critical Behavior:  Neurologic State: Alert  Orientation Level: Oriented X4  Cognition: Follows commands  Safety/Judgement: Awareness of environment, Fall prevention, Home safety, Insight into deficits  Functional Mobility Training:  Bed Mobility:  Rolling: Supervision  Supine to Sit: Supervision  Sit to Supine: Supervision  Scooting: Supervision        Transfers:  Sit to Stand: Stand-by assistance(VC for safety)  Stand to Sit: Stand-by assistance(VC for safety)                             Balance:  Sitting: Intact  Standing: Impaired  Standing - Static: Good;Constant support  Standing - Dynamic : Fair;Constant support  Ambulation/Gait Training:  Distance (ft): 150 Feet (ft)  Assistive Device: Walker, rolling;Gait belt  Ambulation - Level of Assistance: Stand-by assistance        Gait Abnormalities: Decreased step clearance;Shuffling gait        Base of Support: Widened     Speed/Heena: Pace decreased (<100 feet/min); Shuffled; Slow  Step Length: Left shortened;Right shortened         Gait is slow and mildly unsteady demonstrating poor foot clearance bilaterally; no overt LOB noted       Therapeutic Exercises:   Patient performing bridging, bridging with weight shift, quadruped with elevating each arm x 3-5 reps each      Activity Tolerance:   Fair and requires rest breaks  Please refer to the flowsheet for vital signs taken during this treatment.     After treatment patient left in no apparent distress:   Supine in bed and Call bell within reach    COMMUNICATION/COLLABORATION:   The patients plan of care was discussed with: Occupational therapist and Registered nurse.      Stephanie Reddy, PT   Time Calculation: 28 mins

## 2020-03-26 NOTE — PROGRESS NOTES
Problem: Patient Education: Go to Patient Education Activity  Goal: Patient/Family Education  Outcome: Progressing Towards Goal     Problem: Patient Education: Go to Patient Education Activity  Goal: Patient/Family Education  Outcome: Progressing Towards Goal     Problem: Pressure Injury - Risk of  Goal: *Prevention of pressure injury  Description: Document Samy Scale and appropriate interventions in the flowsheet.   Outcome: Progressing Towards Goal  Note: Pressure Injury Interventions:  Sensory Interventions: Minimize linen layers         Activity Interventions: PT/OT evaluation, Pressure redistribution bed/mattress(bed type)    Mobility Interventions: HOB 30 degrees or less, Pressure redistribution bed/mattress (bed type)    Nutrition Interventions: Document food/fluid/supplement intake, Offer support with meals,snacks and hydration                     Problem: Patient Education: Go to Patient Education Activity  Goal: Patient/Family Education  Outcome: Progressing Towards Goal     Problem: Patient Education: Go to Patient Education Activity  Goal: Patient/Family Education  Outcome: Progressing Towards Goal

## 2020-03-26 NOTE — PROGRESS NOTES
Bedside and Verbal shift change report given to Zhen Pedro RN(oncoming nurse) by [facility] AT MARIAN FLYNN RN(offgoing nurse).  Report included the following information SBAR, Kardex, Intake/Output and MAR.     Zone Phone:   [phone]        Significant changes during shift:  No change, refused bed alarm      Patient Information     Yusuf Tate  61 y.o.  3/23/2020  7:00 PM by Antonette MOYA Todd was admitted from Home     Problem List             Patient Active Problem List     Diagnosis Date Noted    Facial droop 03/23/2020    Exacerbation of multiple sclerosis ([facility] [address] 03/09/2020    Left-sided weakness 03/08/2020    Severe obesity ([facility] 10/03/2018    Constipation 07/17/2015    Body aches 12/11/2014    Back pain 09/07/2012    Chronic pain 08/17/2012    MS (multiple sclerosis) ([facility] [address] 08/17/2012    Decreased hearing 01/31/2011    Acute pharyngitis 01/26/2011    Anxiety 08/25/2010    Blood pressure elevated 08/25/2010    Tobacco abuse 08/25/2010              Past Medical History:   Diagnosis Date    Body aches 12/11/2014    Chronic pain      Depression      GERD (gastroesophageal reflux disease)      Headache(784.0)      History of mammogram never before    [facility]      Neurological disorder       Multiple Sclerosis    Pap smear for cervical cancer screening 2008    Psychiatric disorder       anxiety    Psychotic disorder ([facility] [address]              Core Measures:     CVA: Yes Yes     Activity Status:     OOB to Chair No  Ambulated this shift Yes   Bed Rest No        DVT prophylaxis:     DVT prophylaxis Med- No  DVT prophylaxis SCD or CONG- Yes  refusing     Wounds: (If Applicable)     Wounds- No     Patient Safety:     Falls Score Total Score: 2  Safety Level_______  Bed Alarm On? No  Sitter? No     Plan for upcoming shift: safety           Discharge Plan: Yes Home after 3 more days of solu-medrol     Active Consults:  IP CONSULT TO NEUROLOGY

## 2020-03-26 NOTE — PROGRESS NOTES
Hospitalist Progress Note    NAME: Katiana Louis   :  1975   MRN:  [id number]       Assessment / Plan:  Multiple sclerosis flare-up with facial droop - recurrent - POA   - recent flare up with admission 3/8/2020 - similar symptoms  - CT of head w/o contrast     No acute intracranial hemorrhage, mass or infarct. 2. Persistent periventricular white matter hypodensities, correlating with  patient's known MS plaques, unchanged. MR can be performed to better  characterize, as clinically indicated.   - CT head and neck w/ contrast  No major vessel occlusion. No intracranial mass, hemorrhage or evidence of acute infarction. Imaging findings consistent with moderate to severe predominantly supratentorial  demyelinating disease.  - Neurology consulted   - MRI of brain     - New evidence of active demyelination in bilateral frontal and right parietal  demyelinating plaques. Posterior right frontal periventricular demyelinating  plaque is increased in size since 16 days ago. -  No acute or infarct. - on Dimethyl fumarate cpDR - patient dosing  - Continue Gabapentin - home dosing  - continue Solumedrol 1000 mg IV Q24H - for 5 days per neurology recommendation (on day 3/5)  - accuchecks AC & HS with SSI coverage, as needed  - Hypoglycemia protocol  - resumed Klonopin 0.5 mg - home dosing (1 mg BID per patient)  - add Nicotine patch   - PT/OT following     Elevated LFT's   - US of abdomen      Status post cholecystectomy. The common bile duct is dilated at 12 mm. This  may be related to postcholecystectomy changes. Recommend correlation with serum  alkaline phosphatase  - acute hepatitis panel - follow up today's labs  - Ammonia 20  - Phos 2.6/Mg 1.9   - avoid liver damaging agents (pt on high dose Cymbalta 60 mg)     Depression/Anxiety/Pain Management  - on high dose Cymbalta  - on Neurontin TID     Constipation   - pericolace/Miralax PRN         30.0 - 39.9 Obese / Body mass index is 34.33 kg/m².     Code status: Full  Prophylaxis: H2B/PPI  Recommended Disposition: Home w/Family     Subjective:     Chief Complaint / Reason for Physician Visit  \" I feel much better today\"     Review of Systems:  Symptom Y/N Comments  Symptom Y/N Comments   Fever/Chills N   Chest Pain N    Poor Appetite    Edema     Cough N   Abdominal Pain     Sputum    Joint Pain Y chronic   SOB/BOBO N   Pruritis/Rash     Nausea/vomit    Tolerating PT/OT     Diarrhea    Tolerating Diet Y    Constipation Y Improved  Other       Could NOT obtain due to:      Objective:     VITALS:   Last 24hrs VS reviewed since prior progress note. Most recent are:  Patient Vitals for the past 24 hrs:   Temp Pulse Resp BP SpO2   03/26/20 0747 98.1 °F (36.7 °C) 62 18 134/88 96 %   03/26/20 0252 97.7 °F (36.5 °C) (!) 51 18 132/67 96 %   03/25/20 2313 98.6 °F (37 °C) 61 18 134/76 95 %   03/25/20 1932 98.5 °F (36.9 °C) 84 18 128/73 94 %   03/25/20 1508 98.3 °F (36.8 °C) 60 20 123/66 94 %   03/25/20 1258 97.8 °F (36.6 °C) 70 20 113/71 97 %       Intake/Output Summary (Last 24 hours) at 3/26/2020 1104  Last data filed at 3/25/2020 1806  Gross per 24 hour   Intake 120 ml   Output    Net 120 ml        PHYSICAL EXAM:  General: Alert, cooperative, no acute distress    EENT:  EOMI. Anicteric sclerae. MMM  Resp:  CTA bilaterally, no wheezing or rales. No accessory muscle use  CV:  Regular  rhythm,  No edema  GI:  Soft, Non distended, Non tender.  +Bowel sounds  Neurologic:  Alert and oriented X 3, slurred speech,   Psych:   Good insight. Not anxious nor agitated  Skin:  No rashes.   No jaundice    Reviewed most current lab test results and cultures  YES  Reviewed most current radiology test results   YES  Review and summation of old records today    NO  Reviewed patient's current orders and MAR    YES  PMH/SH reviewed - no change compared to H&P  ________________________________________________________________________  Care Plan discussed with:    Comments   Patient X    Family RN X    Care Manager     Consultant                        Multidiciplinary team rounds were held today with , nursing, pharmacist and clinical coordinator. Patient's plan of care was discussed; medications were reviewed and discharge planning was addressed. ________________________________________________________________________  Total NON critical care TIME:  40  Minutes    Total CRITICAL CARE TIME Spent:   Minutes non procedure based      Comments   >50% of visit spent in counseling and coordination of care     ________________________________________________________________________  Denise Valentine     Procedures: see electronic medical records for all procedures/Xrays and details which were not copied into this note but were reviewed prior to creation of Plan. LABS:  I reviewed today's most current labs and imaging studies.   Pertinent labs include:  Recent Labs     03/26/20  0309 03/23/20  2043   WBC 7.5 5.0   HGB 10.7* 12.3   HCT 31.7* 35.5    293     Recent Labs     03/26/20  0309 03/25/20  0949 03/23/20  2043     --  140   K 4.2  --  3.9     --  105   CO2 27  --  29   *  --  94   BUN 17  --  7   CREA 0.60  --  0.70   CA 8.9  --  9.6   MG  --  1.9 1.6   PHOS  --  2.6  --    ALB  --   --  3.9   TBILI  --   --  0.4   SGOT  --   --  45*   ALT  --   --  98*   INR  --   --  1.0       Signed: Denise Valentine

## 2020-03-26 NOTE — PROGRESS NOTES
Chief Complaint: Multiple sclerosis    Has no complaints feels her speech is improving. Would like to increase her dose of clonazepam.  Informed her that the clonazepam dose will stay the same. She has had no issues overnight. She has had no falls overnight. Assesment and Plan  1. Multiple sclerosis  Continue methylprednisolone she has 2 days left. Will address changing Tecfidera on an outpatient basis  After discharge she does not need to take Tecfidera for an additional 2 weeks. Speech improving    2. Right-sided weakness  Continue PT and OT    3. Dysarthria  Speech improving     2. Anxiety   Start clonazepam which will help with the spasticity    3. Fibromyaglia   Continue gabapentin    4. Tobacco use  conitnues to smoke discussed the importance quit.        Allergies  Morphine     Medications  Current Facility-Administered Medications   Medication Dose Route Frequency    polyethylene glycol (MIRALAX) packet 17 g  17 g Oral DAILY    nicotine (NICODERM CQ) 21 mg/24 hr patch 1 Patch  1 Patch TransDERmal DAILY    clonazePAM (KlonoPIN) tablet 0.5 mg  0.5 mg Oral BID    acetaminophen (TYLENOL) tablet 325 mg  325 mg Oral Q6H PRN    DULoxetine (CYMBALTA) capsule 60 mg  60 mg Oral DAILY    gabapentin (NEURONTIN) capsule 800 mg  800 mg Oral TID    LORazepam (ATIVAN) tablet 1 mg  1 mg Oral Q12H PRN    senna-docusate (PERICOLACE) 8.6-50 mg per tablet 2 Tab  2 Tab Oral DAILY PRN    sodium chloride (NS) flush 5-40 mL  5-40 mL IntraVENous Q8H    sodium chloride (NS) flush 5-40 mL  5-40 mL IntraVENous PRN    oxyCODONE-acetaminophen (PERCOCET) 5-325 mg per tablet 1 Tab  1 Tab Oral Q4H PRN    naloxone (NARCAN) injection 0.4 mg  0.4 mg IntraVENous PRN    dimethyl fumarate cpDR 1 Cap (Patient Supplied)  1 Cap Oral BID    melatonin tablet 3 mg  3 mg Oral QHS PRN    ondansetron (ZOFRAN) injection 4 mg  4 mg IntraVENous Q4H PRN    insulin lispro (HUMALOG) injection   SubCUTAneous AC&HS    glucose chewable tablet 16 g  4 Tab Oral PRN    dextrose (D50W) injection syrg 12.5-25 g  12.5-25 g IntraVENous PRN    glucagon (GLUCAGEN) injection 1 mg  1 mg IntraMUSCular PRN        Medical History  Past Medical History:   Diagnosis Date    Body aches 12/11/2014    Chronic pain     Depression     GERD (gastroesophageal reflux disease)     Headache(784.0)     History of mammogram never before    [facility]     Neurological disorder     Multiple Sclerosis    Pap smear for cervical cancer screening 2008    Psychiatric disorder     anxiety    [facility]      Review of Systems   Constitutional: Negative for chills and fever. HENT: Negative for ear pain. Eyes: Negative for pain and discharge. Respiratory: Negative for cough and hemoptysis. Cardiovascular: Negative for chest pain and claudication. Gastrointestinal: Negative for constipation and diarrhea. Genitourinary: Negative for flank pain and hematuria. Musculoskeletal: Positive for back pain, myalgias and neck pain. Skin: Negative for itching and rash. Neurological: Positive for speech change. Negative for tingling and headaches. Endo/Heme/Allergies: Negative for environmental allergies. Does not bruise/bleed easily. Psychiatric/Behavioral: Negative for depression and hallucinations. The patient is nervous/anxious. Exam:    Visit Vitals  /63   Pulse 65   Temp 97.9 °F (36.6 °C)   Resp 16   Ht 5' 4\" (1.626 m)   Wt 200 lb (90.7 kg)   SpO2 99%   BMI 34.33 kg/m²      General: Well developed, well nourished. Head: Normocephalic, atraumatic, anicteric sclera   Neck Normal ROM   Lungs:  Clear to auscultation   Cardiac: Regular rate and rhythm with no murmurs. Abd: Bowel sounds were audible   Ext: No pedal edema   Skin: Supple no rash     NeurologicExam:  Mental Status: Alert and oriented to person place and time   Speech:  Mildly dysarthric no aphasia.    Cranial Nerves:  II - XII Intact with the exception of right facial droop   Motor:   Right side weakness 4-5 upper extremity. Bilateral hip flexion weakness   Reflexes:   Deep tendon reflexes exaggerated on the right   Sensory:   Symmetric and intact with no perceived deficits modalities involving small or large fibers. Gait:  Gait is walker dependant. Everted left leg   Tremor:   No tremor noted. Cerebellar:  Coordination intact. Neurovascular: No carotid bruits. No JVD       Imaging    CT Results (most recent):  Results from [place] encounter on 03/23/20   CTA HEAD NECK W CONT    Narrative EXAM: CTA HEAD NECK W CONT  Clinical history: Slurred speech and right-sided facial droop  INDICATION: slurred speech, right facial droop    COMPARISON: MR 3/20/2020. CONTRAST: 100 mL of Isovue-370. TECHNIQUE:  Unenhanced  images were obtained to localize the volume for  acquisition. Multislice helical axial CT angiography was performed from the  aortic arch to the top of the head during uneventful rapid bolus intravenous  contrast administration. Coronal and sagittal reformations and 3D post  processing was performed. CT dose reduction was achieved through use of a  standardized protocol tailored for this examination and automatic exposure  control for dose modulation. FINDINGS:    CTA NECK  Extensive periventricular and scattered hypodensities in the cerebral white  matter consistent with severe predominantly supratentorial demyelinating disease  burden. . No significant paranasal sinus disease. Mild emphysematous change. Dominant left vertebral artery. Jamas Dubonnet % of right carotid artery stenosis: 0  % of left carotid artery stenosis: 0    NASCET method was utilized for calculating stenosis. . The cervical soft tissues are unremarkable. .    CTA HEAD  Dural venous sinuses are patent. A 2 segments are patent. M1 and M2 segments are  patent. Petrous and cavernous carotid arteries are within normal limits. Left  vertebral artery is dominant. Jamas Dubonnet The basilar artery and its branches are normal.  The internal carotid, anterior cerebral, and middle cerebral arteries are  patent. There is no flow-limiting intracranial stenosis. There is no aneurysm. There are no sizable posterior communicating arteries. Impression IMPRESSION:   No major vessel occlusion. No intracranial mass, hemorrhage or evidence of acute infarction. Imaging findings consistent with moderate to severe predominantly supratentorial  demyelinating disease. Chandra Stinson MRI Results (most recent):  Results from [place] encounter on 03/23/20   MRI BRAIN W WO CONT    Narrative EXAM:  MRI BRAIN W WO CONT    INDICATION:    right facial droop, recurring, diagnosis of multiple sclerosis. COMPARISON:  MRI brain on 3/8/2020. CT head and CT angiography head on  3/23/2020. CONTRAST: 20 ml Dotarem. TECHNIQUE:    Multiplanar multisequence acquisition without and with contrast of the brain  performed on the 3 Hui magnet. FINDINGS:  New enhancement in bilateral frontal and right parietal periventricular white  matter demyelinating plaques since earlier this month. Hyperintense T2  demyelinating plaque in the right frontal posterior periventricular white matter  is increased (series 9, image 22). Size and number of demyelination is otherwise  unchanged. No restricted diffusion. T2 shine through is unchanged. There is no acute infarct, hemorrhage, extra-axial fluid collection, or mass  effect. There is no cerebellar tonsillar herniation. Expected arterial  flow-voids are present. No enhancing mass. Parasagittal midline structures are otherwise within normal limits. Impression IMPRESSION:     1. New evidence of active demyelination in bilateral frontal and right parietal  demyelinating plaques. Posterior right frontal periventricular demyelinating  plaque is increased in size since 16 days ago. 2. No acute or infarct.        .  Lab Review    Lab Results   Component Value Date/Time    WBC 7.5 03/26/2020 03:09 AM    HCT 31.7 (L) 03/26/2020 03:09 AM    HGB 10.7 (L) 03/26/2020 03:09 AM    PLATELET 905 31/85/0976 03:09 AM       Lab Results   Component Value Date/Time    Sodium 140 03/26/2020 03:09 AM    Potassium 4.2 03/26/2020 03:09 AM    Chloride 108 03/26/2020 03:09 AM    CO2 27 03/26/2020 03:09 AM    Glucose 104 (H) 03/26/2020 03:09 AM    BUN 17 03/26/2020 03:09 AM    Creatinine 0.60 03/26/2020 03:09 AM    Calcium 8.9 03/26/2020 03:09 AM       Lab Results   Component Value Date/Time    Cholesterol, total 222 (H) 03/09/2020 02:49 AM    HDL Cholesterol 71 03/09/2020 02:49 AM    LDL, calculated 143.4 (H) 03/09/2020 02:49 AM    VLDL, calculated 7.6 03/09/2020 02:49 AM    Triglyceride 38 03/09/2020 02:49 AM    CHOL/HDL Ratio 3.1 03/09/2020 02:49 AM

## 2020-03-27 LAB
ANION GAP SERPL CALC-SCNC: 3 MMOL/L (ref 5–15)
BASOPHILS # BLD: 0 K/UL (ref 0–0.1)
BASOPHILS NFR BLD: 0 % (ref 0–1)
BUN SERPL-MCNC: 17 MG/DL (ref 6–20)
BUN/CREAT SERPL: 35 (ref 12–20)
CALCIUM SERPL-MCNC: 8.9 MG/DL (ref 8.5–10.1)
CHLORIDE SERPL-SCNC: 109 MMOL/L (ref 97–108)
CO2 SERPL-SCNC: 27 MMOL/L (ref 21–32)
CREAT SERPL-MCNC: 0.48 MG/DL (ref 0.55–1.02)
DIFFERENTIAL METHOD BLD: ABNORMAL
EOSINOPHIL # BLD: 0 K/UL (ref 0–0.4)
EOSINOPHIL NFR BLD: 0 % (ref 0–7)
ERYTHROCYTE [DISTWIDTH] IN BLOOD BY AUTOMATED COUNT: 13.9 % (ref 11.5–14.5)
GLUCOSE BLD STRIP.AUTO-MCNC: 110 MG/DL (ref 65–100)
GLUCOSE BLD STRIP.AUTO-MCNC: 133 MG/DL (ref 65–100)
GLUCOSE BLD STRIP.AUTO-MCNC: 154 MG/DL (ref 65–100)
GLUCOSE BLD STRIP.AUTO-MCNC: 99 MG/DL (ref 65–100)
GLUCOSE SERPL-MCNC: 104 MG/DL (ref 65–100)
HCT VFR BLD AUTO: 31.6 % (ref 35–47)
HGB BLD-MCNC: 10.6 G/DL (ref 11.5–16)
IMM GRANULOCYTES # BLD AUTO: 0 K/UL (ref 0–0.04)
IMM GRANULOCYTES NFR BLD AUTO: 0 % (ref 0–0.5)
LYMPHOCYTES # BLD: 0.7 K/UL (ref 0.8–3.5)
LYMPHOCYTES NFR BLD: 11 % (ref 12–49)
MCH RBC QN AUTO: 31.7 PG (ref 26–34)
MCHC RBC AUTO-ENTMCNC: 33.5 G/DL (ref 30–36.5)
MCV RBC AUTO: 94.6 FL (ref 80–99)
MONOCYTES # BLD: 0.4 K/UL (ref 0–1)
MONOCYTES NFR BLD: 7 % (ref 5–13)
NEUTS SEG # BLD: 5.3 K/UL (ref 1.8–8)
NEUTS SEG NFR BLD: 82 % (ref 32–75)
NRBC # BLD: 0 K/UL (ref 0–0.01)
NRBC BLD-RTO: 0 PER 100 WBC
PLATELET # BLD AUTO: 219 K/UL (ref 150–400)
PMV BLD AUTO: 11.1 FL (ref 8.9–12.9)
POTASSIUM SERPL-SCNC: 4 MMOL/L (ref 3.5–5.1)
RBC # BLD AUTO: 3.34 M/UL (ref 3.8–5.2)
RBC MORPH BLD: ABNORMAL
SERVICE CMNT-IMP: ABNORMAL
SERVICE CMNT-IMP: NORMAL
SODIUM SERPL-SCNC: 139 MMOL/L (ref 136–145)
WBC # BLD AUTO: 6.4 K/UL (ref 3.6–11)

## 2020-03-27 PROCEDURE — 85025 COMPLETE CBC W/AUTO DIFF WBC: CPT

## 2020-03-27 PROCEDURE — 74011250637 HC RX REV CODE- 250/637: Performed by: INTERNAL MEDICINE

## 2020-03-27 PROCEDURE — 94760 N-INVAS EAR/PLS OXIMETRY 1: CPT

## 2020-03-27 PROCEDURE — 74011250637 HC RX REV CODE- 250/637: Performed by: PSYCHIATRY & NEUROLOGY

## 2020-03-27 PROCEDURE — 74011000258 HC RX REV CODE- 258: Performed by: PSYCHIATRY & NEUROLOGY

## 2020-03-27 PROCEDURE — 97110 THERAPEUTIC EXERCISES: CPT

## 2020-03-27 PROCEDURE — 3331090002 HH PPS REVENUE DEBIT

## 2020-03-27 PROCEDURE — 97116 GAIT TRAINING THERAPY: CPT

## 2020-03-27 PROCEDURE — 36415 COLL VENOUS BLD VENIPUNCTURE: CPT

## 2020-03-27 PROCEDURE — 65270000029 HC RM PRIVATE

## 2020-03-27 PROCEDURE — 3331090001 HH PPS REVENUE CREDIT

## 2020-03-27 PROCEDURE — 74011636637 HC RX REV CODE- 636/637: Performed by: INTERNAL MEDICINE

## 2020-03-27 PROCEDURE — 82962 GLUCOSE BLOOD TEST: CPT

## 2020-03-27 PROCEDURE — 74011250637 HC RX REV CODE- 250/637: Performed by: FAMILY MEDICINE

## 2020-03-27 PROCEDURE — 80048 BASIC METABOLIC PNL TOTAL CA: CPT

## 2020-03-27 PROCEDURE — 74011250636 HC RX REV CODE- 250/636: Performed by: PSYCHIATRY & NEUROLOGY

## 2020-03-27 RX ORDER — ONDANSETRON 2 MG/ML
4 INJECTION INTRAMUSCULAR; INTRAVENOUS DAILY
Status: COMPLETED | OUTPATIENT
Start: 2020-03-27 | End: 2020-03-28

## 2020-03-27 RX ADMIN — DIMETHYL FUMARATE 1 CAPSULE: 240 CAPSULE ORAL at 08:26

## 2020-03-27 RX ADMIN — GABAPENTIN 800 MG: 100 CAPSULE ORAL at 21:24

## 2020-03-27 RX ADMIN — CLONAZEPAM 0.5 MG: 1 TABLET ORAL at 08:25

## 2020-03-27 RX ADMIN — SODIUM CHLORIDE 1000 MG: 900 INJECTION, SOLUTION INTRAVENOUS at 12:16

## 2020-03-27 RX ADMIN — Medication 10 ML: at 12:17

## 2020-03-27 RX ADMIN — Medication 10 ML: at 21:25

## 2020-03-27 RX ADMIN — CLONAZEPAM 0.5 MG: 1 TABLET ORAL at 18:16

## 2020-03-27 RX ADMIN — DIMETHYL FUMARATE 1 CAPSULE: 240 CAPSULE ORAL at 18:16

## 2020-03-27 RX ADMIN — INSULIN LISPRO 2 UNITS: 100 INJECTION, SOLUTION INTRAVENOUS; SUBCUTANEOUS at 16:36

## 2020-03-27 RX ADMIN — ONDANSETRON 4 MG: 2 INJECTION INTRAMUSCULAR; INTRAVENOUS at 12:16

## 2020-03-27 RX ADMIN — DULOXETINE 60 MG: 30 CAPSULE, DELAYED RELEASE ORAL at 08:25

## 2020-03-27 RX ADMIN — Medication 10 ML: at 04:03

## 2020-03-27 RX ADMIN — POLYETHYLENE GLYCOL (3350) 17 G: 17 POWDER, FOR SOLUTION ORAL at 08:24

## 2020-03-27 RX ADMIN — GABAPENTIN 800 MG: 100 CAPSULE ORAL at 16:36

## 2020-03-27 RX ADMIN — GABAPENTIN 800 MG: 100 CAPSULE ORAL at 08:25

## 2020-03-27 RX ADMIN — MELATONIN 3 MG: at 21:24

## 2020-03-27 NOTE — ROUTINE PROCESS
Bedside and Verbal shift change report given to Sesar RN(oncoming nurse) by Letty Reagan RN(offgoing nurse). Report included the following information SBAR, Kardex, Intake/Output and MAR. 
  
Zone Phone:   [phone] 
  
  
Significant changes during shift:  No change, refused bed alarm  
  
Patient Information 
  
Talia Munson 53 y.o. 
3/23/2020  7:00 PM by Antonetet Degroot Doc Brooks was admitted from Home 
  
Problem List 
  
       
Patient Active Problem List  
  Diagnosis Date Noted  Facial droop 03/23/2020  Exacerbation of multiple sclerosis ([facility] [address] 03/09/2020  Left-sided weakness 03/08/2020  Severe obesity ([facility] 10/03/2018  Constipation 07/17/2015  Body aches 12/11/2014  Back pain 09/07/2012  Chronic pain 08/17/2012  MS (multiple sclerosis) ([facility] 08/17/2012  Decreased hearing 01/31/2011  Acute pharyngitis 01/26/2011  Anxiety 08/25/2010  Blood pressure elevated 08/25/2010  Tobacco abuse 08/25/2010  
  
       
Past Medical History:  
Diagnosis Date  Body aches 12/11/2014  Chronic pain    
 Depression    
 GERD (gastroesophageal reflux disease)    
 Headache(784.0)    
 History of mammogram never before  MS [facility]multiple sclerosis) ([facility])    
 Neurological disorder    
  Multiple Sclerosis  Pap smear for cervical cancer screening 2008  Psychiatric disorder    
  anxiety  Psychotic disorder ([facility]    
  
  
  
Core Measures: 
  
CVA: Yes Yes 
  
Activity Status: 
  
OOB to Chair No 
Ambulated this shift Yes  
Bed Rest No 
  
  
DVT prophylaxis: 
  
DVT prophylaxis Med- No 
DVT prophylaxis SCD or CONG- Yes  refusing 
  
Wounds: (If Applicable) 
  
Wounds- No 
  
Patient Safety: 
  
Falls Score Total Score: 2 Safety Level_______ Bed Alarm On? No 
Sitter? No 
  
Plan for upcoming shift: safety 
  
  
  
Discharge Plan: Yes Home after 1 more dose of solu-medrol tomorrow at 9am 
  
Active Consults: 
IP CONSULT TO NEUROLOGY

## 2020-03-27 NOTE — PROGRESS NOTES
Problem: Patient Education: Go to Patient Education Activity  Goal: Patient/Family Education  Outcome: Progressing Towards Goal     Problem: Pressure Injury - Risk of  Goal: *Prevention of pressure injury  Description: Document Samy Scale and appropriate interventions in the flowsheet.   Outcome: Progressing Towards Goal  Note: Pressure Injury Interventions:  Sensory Interventions: Minimize linen layers         Activity Interventions: PT/OT evaluation    Mobility Interventions: (call before getting up)    Nutrition Interventions: Document food/fluid/supplement intake                     Problem: Patient Education: Go to Patient Education Activity  Goal: Patient/Family Education  Outcome: Progressing Towards Goal

## 2020-03-27 NOTE — PROGRESS NOTES
Chief Complaint: Multiple sclerosis    Day 4 solumedrol  Should be discharged tomorrow after last dose of solumedrol. Feels speech has improved significantly. Feels stronger. Assesment and Plan  1. Multiple sclerosis  Continue methylprednisolone she has 1 days left. Will address changing Tecfidera on an outpatient basis  After discharge she does not need to take Tecfidera for an additional 2 weeks. Speech improving  Follow up with me in 2-3 weeks after she discharged from the hospital    Signing off    2. Right-sided weakness  Continue PT and OT    3. Dysarthria  Speech improving     2. Anxiety   Continue clonazepam which will help with the spasticity    3. Fibromyaglia   Continue gabapentin    4. Tobacco use  conitnues to smoke discussed the importance quit.        Allergies  Morphine     Medications  Current Facility-Administered Medications   Medication Dose Route Frequency    methylPREDNISolone ((Solu-MEDROL) 1,000 mg in 0.9% sodium chloride (MBP/ADV) 100 mL  1,000 mg IntraVENous DAILY    And    ondansetron (ZOFRAN) injection 4 mg  4 mg IntraVENous DAILY    polyethylene glycol (MIRALAX) packet 17 g  17 g Oral DAILY    nicotine (NICODERM CQ) 21 mg/24 hr patch 1 Patch  1 Patch TransDERmal DAILY    clonazePAM (KlonoPIN) tablet 0.5 mg  0.5 mg Oral BID    acetaminophen (TYLENOL) tablet 325 mg  325 mg Oral Q6H PRN    DULoxetine (CYMBALTA) capsule 60 mg  60 mg Oral DAILY    gabapentin (NEURONTIN) capsule 800 mg  800 mg Oral TID    LORazepam (ATIVAN) tablet 1 mg  1 mg Oral Q12H PRN    senna-docusate (PERICOLACE) 8.6-50 mg per tablet 2 Tab  2 Tab Oral DAILY PRN    sodium chloride (NS) flush 5-40 mL  5-40 mL IntraVENous Q8H    sodium chloride (NS) flush 5-40 mL  5-40 mL IntraVENous PRN    oxyCODONE-acetaminophen (PERCOCET) 5-325 mg per tablet 1 Tab  1 Tab Oral Q4H PRN    naloxone (NARCAN) injection 0.4 mg  0.4 mg IntraVENous PRN    dimethyl fumarate cpDR 1 Cap (Patient Supplied)  1 Cap Oral BID    melatonin tablet 3 mg  3 mg Oral QHS PRN    ondansetron (ZOFRAN) injection 4 mg  4 mg IntraVENous Q4H PRN    insulin lispro (HUMALOG) injection   SubCUTAneous AC&HS    glucose chewable tablet 16 g  4 Tab Oral PRN    dextrose (D50W) injection syrg 12.5-25 g  12.5-25 g IntraVENous PRN    glucagon (GLUCAGEN) injection 1 mg  1 mg IntraMUSCular PRN        Medical History  Past Medical History:   Diagnosis Date    Body aches 12/11/2014    Chronic pain     Depression     GERD (gastroesophageal reflux disease)     Headache(784.0)     History of mammogram never before    [facility] ([facility] sclerosis) ([facility]     Neurological disorder     Multiple Sclerosis    Pap smear for cervical cancer screening 2008    Psychiatric disorder     anxiety    Psychotic disorder ([facility]      Review of Systems   Constitutional: Negative for chills and fever. HENT: Negative for ear pain. Eyes: Negative for pain and discharge. Respiratory: Negative for cough and hemoptysis. Cardiovascular: Negative for chest pain and claudication. Gastrointestinal: Negative for constipation and diarrhea. Genitourinary: Negative for flank pain and hematuria. Musculoskeletal: Positive for back pain, myalgias and neck pain. Skin: Negative for itching and rash. Neurological: Positive for speech change. Negative for tingling and headaches. Endo/Heme/Allergies: Negative for environmental allergies. Does not bruise/bleed easily. Psychiatric/Behavioral: Negative for depression and hallucinations. The patient is nervous/anxious. Exam:    Visit Vitals  /75   Pulse 60   Temp 98.9 °F (37.2 °C)   Resp 16   Ht 5' 4\" (1.626 m)   Wt 200 lb (90.7 kg)   SpO2 98%   BMI 34.33 kg/m²      General: Well developed, well nourished. Head: Normocephalic, atraumatic, anicteric sclera   Neck Normal ROM   Lungs:  Clear to auscultation   Cardiac: Regular rate and rhythm with no murmurs.    Abd: Bowel sounds were audible   Ext: No pedal edema   Skin: Supple no rash     NeurologicExam:  Mental Status: Alert and oriented to person place and time   Speech:  Mildly dysarthric no aphasia. Cranial Nerves:  II - XII Intact with the exception of right facial droop   Motor:   Right side weakness 4-5 upper extremity. Bilateral hip flexion weakness   Reflexes:   Deep tendon reflexes exaggerated on the right   Sensory:   diminished sensation on the right. Gait:  Gait is walker dependant. Everted left leg   Tremor:   No tremor noted. Cerebellar:  Coordination intact. Neurovascular: No carotid bruits. No JVD       Imaging    CT Results (most recent):  Results from [place] encounter on 03/23/20   CTA HEAD NECK W CONT    Narrative EXAM: CTA HEAD NECK W CONT  Clinical history: Slurred speech and right-sided facial droop  INDICATION: slurred speech, right facial droop    COMPARISON: MR 3/20/2020. CONTRAST: 100 mL of Isovue-370. TECHNIQUE:  Unenhanced  images were obtained to localize the volume for  acquisition. Multislice helical axial CT angiography was performed from the  aortic arch to the top of the head during uneventful rapid bolus intravenous  contrast administration. Coronal and sagittal reformations and 3D post  processing was performed. CT dose reduction was achieved through use of a  standardized protocol tailored for this examination and automatic exposure  control for dose modulation. FINDINGS:    CTA NECK  Extensive periventricular and scattered hypodensities in the cerebral white  matter consistent with severe predominantly supratentorial demyelinating disease  burden. . No significant paranasal sinus disease. Mild emphysematous change. Dominant left vertebral artery. Myesha Barrio % of right carotid artery stenosis: 0  % of left carotid artery stenosis: 0    NASCET method was utilized for calculating stenosis. . The cervical soft tissues are unremarkable. .    CTA HEAD  Dural venous sinuses are patent. A 2 segments are patent.  M1 and M2 segments are  patent. Petrous and cavernous carotid arteries are within normal limits. Left  vertebral artery is dominant. . The basilar artery and its branches are normal.  The internal carotid, anterior cerebral, and middle cerebral arteries are  patent. There is no flow-limiting intracranial stenosis. There is no aneurysm. There are no sizable posterior communicating arteries. Impression IMPRESSION:   No major vessel occlusion. No intracranial mass, hemorrhage or evidence of acute infarction. Imaging findings consistent with moderate to severe predominantly supratentorial  demyelinating disease. [facility] MRI Results (most recent):  Results from [place] encounter on 03/23/20   MRI BRAIN W WO CONT    Narrative EXAM:  MRI BRAIN W WO CONT    INDICATION:    right facial droop, recurring, diagnosis of multiple sclerosis. COMPARISON:  MRI brain on 3/8/2020. CT head and CT angiography head on  3/23/2020. CONTRAST: 20 ml Dotarem. TECHNIQUE:    Multiplanar multisequence acquisition without and with contrast of the brain  performed on the 3 Hui magnet. FINDINGS:  New enhancement in bilateral frontal and right parietal periventricular white  matter demyelinating plaques since earlier this month. Hyperintense T2  demyelinating plaque in the right frontal posterior periventricular white matter  is increased (series 9, image 22). Size and number of demyelination is otherwise  unchanged. No restricted diffusion. T2 shine through is unchanged. There is no acute infarct, hemorrhage, extra-axial fluid collection, or mass  effect. There is no cerebellar tonsillar herniation. Expected arterial  flow-voids are present. No enhancing mass. Parasagittal midline structures are otherwise within normal limits. Impression IMPRESSION:     1. New evidence of active demyelination in bilateral frontal and right parietal  demyelinating plaques.  Posterior right frontal periventricular demyelinating  plaque is increased in size since 16 days ago. 2. No acute or infarct.        Lab Review    Lab Results   Component Value Date/Time    WBC 6.4 03/27/2020 04:05 AM    HCT 31.6 (L) 03/27/2020 04:05 AM    HGB 10.6 (L) 03/27/2020 04:05 AM    PLATELET 707 04/83/6209 04:05 AM       Lab Results   Component Value Date/Time    Sodium 139 03/27/2020 04:05 AM    Potassium 4.0 03/27/2020 04:05 AM    Chloride 109 (H) 03/27/2020 04:05 AM    CO2 27 03/27/2020 04:05 AM    Glucose 104 (H) 03/27/2020 04:05 AM    BUN 17 03/27/2020 04:05 AM    Creatinine 0.48 (L) 03/27/2020 04:05 AM    Calcium 8.9 03/27/2020 04:05 AM       Lab Results   Component Value Date/Time    Cholesterol, total 222 (H) 03/09/2020 02:49 AM    HDL Cholesterol 71 03/09/2020 02:49 AM    LDL, calculated 143.4 (H) 03/09/2020 02:49 AM    VLDL, calculated 7.6 03/09/2020 02:49 AM    Triglyceride 38 03/09/2020 02:49 AM    CHOL/HDL Ratio 3.1 03/09/2020 02:49 AM

## 2020-03-27 NOTE — PROGRESS NOTES
Bedside and Verbal shift change report given to ALEJANDRO Busch(oncoming nurse) by Trae Angelo RN(offgoing nurse).  Report included the following information SBAR, Kardex, Intake/Output and MAR.     Zone Phone:   [phone]        Significant changes during shift:  No change, refused bed alarm      Patient Information     Desmond Hill  89 y.o.  3/23/2020  7:00 PM by Antonette Munoz NITA Brooks was admitted from Home     Problem List             Patient Active Problem List     Diagnosis Date Noted    Facial droop 03/23/2020    Exacerbation of multiple sclerosis ([facility] [address] 03/09/2020    Left-sided weakness 03/08/2020    Severe obesity ([facility] [address] 10/03/2018    Constipation 07/17/2015    Body aches 12/11/2014    Back pain 09/07/2012    Chronic pain 08/17/2012    MS (multiple sclerosis) ([facility] [address] 08/17/2012    Decreased hearing 01/31/2011    Acute pharyngitis 01/26/2011    Anxiety 08/25/2010    Blood pressure elevated 08/25/2010    Tobacco abuse 08/25/2010              Past Medical History:   Diagnosis Date    Body aches 12/11/2014    Chronic pain      Depression      GERD (gastroesophageal reflux disease)      Headache(784.0)      History of mammogram never before    [facility]      Neurological disorder       Multiple Sclerosis    Pap smear for cervical cancer screening 2008    Psychiatric disorder       anxiety    Psychotic disorder ([facility] [address]              Core Measures:     CVA: Yes Yes     Activity Status:     OOB to Chair No  Ambulated this shift Yes   Bed Rest No        DVT prophylaxis:     DVT prophylaxis Med- No  DVT prophylaxis SCD or CONG- Yes  refusing     Wounds: (If Applicable)     Wounds- No     Patient Safety:     Falls Score Total Score: 2  Safety Level_______  Bed Alarm On? No  Sitter? No     Plan for upcoming shift: safety           Discharge Plan: Yes Home after 3 more days of solu-medrol     Active Consults:  IP CONSULT TO NEUROLOGY

## 2020-03-27 NOTE — PROGRESS NOTES
Hospitalist Service  Progress Note    Daily Progress Note: 3/27/2020    Assessment/Plan:   #Multiple sclerosis  flareup the patient is a 47-year-old  female with a history of depression multiple sclerosis presented to the hospital with right-sided facial droop and slurred speech she was started on megadose of Solu-Medrol 1 g daily for 5 days the last day is tomorrow for multiple sclerosis flareup. She is doing well she responded well to treatment possible discharge tomorrow. Elevated lft's, us of liver markable, acute hepatitis unremarkable l. Pt denies etoh use in the past month, as well as tylenol use. to be followed as an outpatient,     Tobacco abuse recommended stop smoking    Stress/anxiety. Continue Cymbalta       GERD, continue ppi        Code Status             Subjective:   Feeling  better    Review of Systems:       Objective:   Physical examination  Visit Vitals  /72   Pulse 61   Temp 98.2 °F (36.8 °C)   Resp 18   Ht 5' 4\" (1.626 m)   Wt 90.7 kg (200 lb)   SpO2 98%   BMI 34.33 kg/m²      Temp (24hrs), Av.4 °F (36.9 °C), Min:97.5 °F (36.4 °C), Max:98.9 °F (37.2 °C)         O2 Device: Room air  Patient Vitals for the past 24 hrs:   Temp Pulse Resp BP SpO2   20 1513 98.2 °F (36.8 °C) 61 18 115/72 98 %   20 1129 98.9 °F (37.2 °C) 60 16 123/75 98 %   20 0737 98.7 °F (37.1 °C) 85 16 119/81 99 %   20 0256 97.5 °F (36.4 °C) 68 16 126/79 98 %   20 2235 98 °F (36.7 °C) 68 16 139/79 100 %   20 1937 98.8 °F (37.1 °C) 69 16 127/74 98 %     No intake or output data in the 24 hours ending 20 1722  Last shift:    No intake/output data recorded. Last 3 shifts:    No intake/output data recorded.     General:   Alert, cooperative, no acute distress   Head:   Atraumatic   Eyes:   Conjunctivae clear   ENT:  Oral mucosa normal   Neck:  Supple, trachea midline, no adenopathy   No JVD   Back:    No CVA tenderness    Chest wall:    No tenderness or deformities    Lungs:   Clear to auscultation bilaterally    Heart:   Regular rhythm, no murmur   Abdomen:    Soft, non-tender   No masses or organomegaly    Extremities:  No edema or DVT signs   Pulses:  Symmetric all extremities   Skin:  Warm and dry    No rashes or lesions   Neurologic:  Oriented   No focal deficits   Psychiatric:          Data Review:       Recent Labs     03/27/20 0405 03/26/20  0309   WBC 6.4 7.5   HGB 10.6* 10.7*   HCT 31.6* 31.7*    240     Recent Labs     03/27/20  0405 03/26/20  0309 03/25/20  0949    140  --    K 4.0 4.2  --    * 108  --    CO2 27 27  --    * 104*  --    BUN 17 17  --    CREA 0.48* 0.60  --    CA 8.9 8.9  --    MG  --   --  1.9   PHOS  --   --  2.6     No results for input(s): PH, PCO2, PO2, HCO3, FIO2 in the last 72 hours.     Lab Results   Component Value Date/Time    Glucose 104 (H) 03/27/2020 04:05 AM        All Micro Results     None        Lab Results   Component Value Date/Time    Specimen Description: URINE 09/26/2013 04:51 PM    Specimen Description: URINE 04/24/2013 12:45 PM    Specimen Description: URINE 12/30/2012 10:00 PM     Lab Results   Component Value Date/Time    Culture result: MIXED UROGENITAL DEVYN ISOLATED 03/08/2020 03:02 PM    Culture result: NO SIGNIFICANT GROWTH 12/22/2018 10:49 AM    Culture result: ENTEROCOCCUS FAECALIS GROUP D 04/29/2015 10:33 AM     Medications reviewed  Current Facility-Administered Medications   Medication Dose Route Frequency    methylPREDNISolone ((Solu-MEDROL) 1,000 mg in 0.9% sodium chloride (MBP/ADV) 100 mL  1,000 mg IntraVENous DAILY    And    ondansetron (ZOFRAN) injection 4 mg  4 mg IntraVENous DAILY    polyethylene glycol (MIRALAX) packet 17 g  17 g Oral DAILY    nicotine (NICODERM CQ) 21 mg/24 hr patch 1 Patch  1 Patch TransDERmal DAILY    clonazePAM (KlonoPIN) tablet 0.5 mg  0.5 mg Oral BID    acetaminophen (TYLENOL) tablet 325 mg  325 mg Oral Q6H PRN    DULoxetine (CYMBALTA) capsule 60 mg  60 mg Oral DAILY    gabapentin (NEURONTIN) capsule 800 mg  800 mg Oral TID    LORazepam (ATIVAN) tablet 1 mg  1 mg Oral Q12H PRN    senna-docusate (PERICOLACE) 8.6-50 mg per tablet 2 Tab  2 Tab Oral DAILY PRN    sodium chloride (NS) flush 5-40 mL  5-40 mL IntraVENous Q8H    sodium chloride (NS) flush 5-40 mL  5-40 mL IntraVENous PRN    oxyCODONE-acetaminophen (PERCOCET) 5-325 mg per tablet 1 Tab  1 Tab Oral Q4H PRN    naloxone (NARCAN) injection 0.4 mg  0.4 mg IntraVENous PRN    dimethyl fumarate cpDR 1 Cap (Patient Supplied)  1 Cap Oral BID    melatonin tablet 3 mg  3 mg Oral QHS PRN    ondansetron (ZOFRAN) injection 4 mg  4 mg IntraVENous Q4H PRN    insulin lispro (HUMALOG) injection   SubCUTAneous AC&HS    glucose chewable tablet 16 g  4 Tab Oral PRN    dextrose (D50W) injection syrg 12.5-25 g  12.5-25 g IntraVENous PRN    glucagon (GLUCAGEN) injection 1 mg  1 mg IntraMUSCular PRN         Signed:   Stephen Duong MD   Internist / Hospitalist

## 2020-03-27 NOTE — PROGRESS NOTES
Spiritual Care Assessment/Progress Note  [facility]      NAME: Madeleine Boyle      MRN: [id number]  AGE: 53 y.o.  SEX: female  Orthodoxy Affiliation: [facility]   Language: English     3/27/2020     Total Time (in minutes): 15     Spiritual Assessment begun in [facility] NEUROSCIENCE TELEMETRY through conversation with:         [x]Patient        [] Family    [] Friend(s)        Reason for Consult: Initial/Spiritual assessment, patient floor     Spiritual beliefs: (Please include comment if needed)     [x] Identifies with a sudhakar tradition:         [x] Supported by a sudhakar community:            [] Claims no spiritual orientation:           [] Seeking spiritual identity:                [] Adheres to an individual form of spirituality:           [] Not able to assess:                           Identified resources for coping:      [x] Prayer                               [] Music                  [] Guided Imagery     [x] Family/friends                 [] Pet visits     [] Devotional reading                         [] Unknown     [] Other:                                           Interventions offered during this visit: (See comments for more details)    Patient Interventions: Affirmation of sudhakar, Affirmation of emotions/emotional suffering, Iconic (affirming the presence of God/Higher Power), Prayer (assurance of)           Plan of Care:     [] Support spiritual and/or cultural needs    [] Support AMD and/or advance care planning process      [] Support grieving process   [] Coordinate Rites and/or Rituals    [] Coordination with community clergy   [x] No spiritual needs identified at this time   [] Detailed Plan of Care below (See Comments)  [] Make referral to Music Therapy  [] Make referral to Pet Therapy     [] Make referral to Addiction services  [] Make referral to [facility]  [] Make referral to Spiritual Care Partner  [] No future visits requested        [x] Follow up visits as needed Comments: The patient was visited on the Neuro Tele unit to make a spiritual assessment. The patient did not have family/friends present. The patient provided little information about her spiritual life. The patient shared about her family history and mentioned on going stress related to home life. The patient shared information about her medical challenges and that she is looking forward to discharge tomorrow. Provided ministry of presence and active listening as the patient shared her story. Advised of  availability. Chaplains will follow as able and/or needed. Rev.  Susan Robins EdD MDiv     For  Assistance Page [phone]

## 2020-03-27 NOTE — PROGRESS NOTES
Problem: Mobility Impaired (Adult and Pediatric)  Goal: *Therapy Goal (Edit Goal, Insert Text)  Description: FUNCTIONAL STATUS PRIOR TO ADMISSION: Patient was modified independent using a rolling walker for functional mobility since her admission early March. Prior to her recent exacerbation, she was independent. HOME SUPPORT PRIOR TO ADMISSION: The patient lived with her supportive fiance but did not require assist.    Physical Therapy Goals  Initiated 3/24/2020  1. Patient will move from supine to sit and sit to supine  in bed with modified independence within 7 day(s). 2.  Patient will transfer from bed to chair and chair to bed with modified independence using the least restrictive device within 7 day(s). 3.  Patient will perform sit to stand with modified independence within 7 day(s). 4.  Patient will ambulate with supervision/set-up for 150 feet with the least restrictive device within 7 day(s). 5.  Patient will ascend/descend 2 stairs with 0 handrail(s) with modified independence within 7 day(s). Outcome: Progressing Towards Goal   PHYSICAL THERAPY TREATMENT  Patient: Britni Quigley (24 y.o. female)  Date: 3/27/2020  Diagnosis: MS (multiple sclerosis) ([facility]   <principal problem not specified>       Precautions:    Chart, physical therapy assessment, plan of care and goals were reviewed. ASSESSMENT  Patient continues with skilled PT services and is progressing towards goals. Patient received in bed and agreeable to participate, able to come to edge of bed at indep level and maintains good sitting balance unsupported. Patient ambulated with RW x approx 200 feet and SBA, still with decreased pace and floor clearance bilaterally but no LOB noted. Patient performed core stability and LE strengthening exercises in supine with good tolerance.   Patient is expected to discharge home tomorrow, educated on safety and falls prevention, and reviewed car transfers, patient remembers all info from recent admission. Patient left in bed with call bell in reach. Current Level of Function Impacting Discharge (mobility/balance): SBA for transfers and gait    Other factors to consider for discharge: falls risk, will benefit from HHPT         PLAN :  Patient continues to benefit from skilled intervention to address the above impairments. Continue treatment per established plan of care. to address goals. Recommendation for discharge: (in order for the patient to meet his/her long term goals)  Physical therapy at least 2 days/week in the home     This discharge recommendation:  Has been made in collaboration with the attending provider and/or case management    IF patient discharges home will need the following DME: patient owns DME required for discharge       SUBJECTIVE:   Patient stated I sat up in the chair all day.     OBJECTIVE DATA SUMMARY:   Critical Behavior:  Neurologic State: Alert  Orientation Level: Oriented X4  Cognition: Follows commands  Safety/Judgement: Awareness of environment, Fall prevention, Home safety, Insight into deficits  Functional Mobility Training:  Bed Mobility:  Rolling: Independent  Supine to Sit: Independent  Sit to Supine: Independent           Transfers:  Sit to Stand: Supervision  Stand to Sit: Supervision                             Balance:  Sitting: Intact  Standing: Impaired  Standing - Static: Constant support;Good  Standing - Dynamic : Constant support; Fair  Ambulation/Gait Training:  Distance (ft): 200 Feet (ft)  Assistive Device: Gait belt;Walker, rolling  Ambulation - Level of Assistance: Stand-by assistance        Gait Abnormalities: Decreased step clearance        Base of Support: Widened     Speed/Heena: Pace decreased (<100 feet/min)  Step Length: Left shortened;Right shortened                    Stairs:               Therapeutic Exercises:   Bridging, SLR, knee to chest, curl ups  Pain Rating:      Activity Tolerance:   Good  Please refer to the flowsheet for vital signs taken during this treatment. After treatment patient left in no apparent distress:   Supine in bed, Call bell within reach, and Side rails x 3    COMMUNICATION/COLLABORATION:   The patients plan of care was discussed with: Registered nurse.      Poncho Grey, PT   Time Calculation: 23 mins

## 2020-03-27 NOTE — PROGRESS NOTES
PTOT notes reviewed. [facility] updated, completed and submitted to [facility]. CM will continue to follow for processing.     Johnson Aiken RN CM  Ext [phone]

## 2020-03-28 VITALS
RESPIRATION RATE: 16 BRPM | HEART RATE: 62 BPM | HEIGHT: 64 IN | WEIGHT: 200 LBS | SYSTOLIC BLOOD PRESSURE: 127 MMHG | DIASTOLIC BLOOD PRESSURE: 82 MMHG | TEMPERATURE: 98 F | BODY MASS INDEX: 34.15 KG/M2 | OXYGEN SATURATION: 98 %

## 2020-03-28 LAB
GLUCOSE BLD STRIP.AUTO-MCNC: 124 MG/DL (ref 65–100)
GLUCOSE BLD STRIP.AUTO-MCNC: 134 MG/DL (ref 65–100)
SERVICE CMNT-IMP: ABNORMAL
SERVICE CMNT-IMP: ABNORMAL

## 2020-03-28 PROCEDURE — 74011250636 HC RX REV CODE- 250/636: Performed by: PSYCHIATRY & NEUROLOGY

## 2020-03-28 PROCEDURE — 3331090001 HH PPS REVENUE CREDIT

## 2020-03-28 PROCEDURE — 74011000258 HC RX REV CODE- 258: Performed by: PSYCHIATRY & NEUROLOGY

## 2020-03-28 PROCEDURE — 82962 GLUCOSE BLOOD TEST: CPT

## 2020-03-28 PROCEDURE — 74011250637 HC RX REV CODE- 250/637: Performed by: PSYCHIATRY & NEUROLOGY

## 2020-03-28 PROCEDURE — 3331090002 HH PPS REVENUE DEBIT

## 2020-03-28 PROCEDURE — 74011250637 HC RX REV CODE- 250/637: Performed by: INTERNAL MEDICINE

## 2020-03-28 RX ORDER — LANOLIN ALCOHOL/MO/W.PET/CERES
3 CREAM (GRAM) TOPICAL
Qty: 30 TAB | Refills: 4 | Status: SHIPPED | OUTPATIENT
Start: 2020-03-28 | End: 2020-12-17 | Stop reason: CLARIF

## 2020-03-28 RX ORDER — IBUPROFEN 200 MG
1 TABLET ORAL DAILY
Qty: 98 PATCH | Refills: 0 | Status: SHIPPED | OUTPATIENT
Start: 2020-03-29 | End: 2020-04-22 | Stop reason: ALTCHOICE

## 2020-03-28 RX ORDER — CLONAZEPAM 0.5 MG/1
0.5 TABLET ORAL 2 TIMES DAILY
Qty: 60 TAB | Refills: 0 | Status: ON HOLD | OUTPATIENT
Start: 2020-03-28 | End: 2020-11-27

## 2020-03-28 RX ADMIN — ONDANSETRON 4 MG: 2 INJECTION INTRAMUSCULAR; INTRAVENOUS at 08:14

## 2020-03-28 RX ADMIN — SODIUM CHLORIDE 1000 MG: 900 INJECTION, SOLUTION INTRAVENOUS at 08:11

## 2020-03-28 RX ADMIN — Medication 10 ML: at 05:17

## 2020-03-28 RX ADMIN — GABAPENTIN 800 MG: 100 CAPSULE ORAL at 08:09

## 2020-03-28 RX ADMIN — CLONAZEPAM 0.5 MG: 1 TABLET ORAL at 08:09

## 2020-03-28 RX ADMIN — DIMETHYL FUMARATE 1 CAPSULE: 240 CAPSULE ORAL at 05:17

## 2020-03-28 RX ADMIN — POLYETHYLENE GLYCOL (3350) 17 G: 17 POWDER, FOR SOLUTION ORAL at 08:09

## 2020-03-28 RX ADMIN — DULOXETINE 60 MG: 30 CAPSULE, DELAYED RELEASE ORAL at 08:08

## 2020-03-28 NOTE — ROUTINE PROCESS
Bedside and Verbal shift change report given to Duran RN(oncoming nurse) by Percell Boxer RN(offgoing nurse). Report included the following information SBAR, Kardex, Intake/Output and MAR. 
  
Zone Phone:   [phone] 
  
  
Significant changes during shift:  No change, refused bed alarm  
  
Patient Information 
  
Sukumar Aragon 53 y.o. 
3/23/2020  7:00 PM by Antonette Wang Juanita Brooks was admitted from Home 
  
Problem List 
  
       
Patient Active Problem List  
  Diagnosis Date Noted  Facial droop 03/23/2020  Exacerbation of multiple sclerosis ([address] 03/09/2020  Left-sided weakness 03/08/2020  Severe obesity ([facility] 10/03/2018  Constipation 07/17/2015  Body aches 12/11/2014  Back pain 09/07/2012  Chronic pain 08/17/2012  MS (multiple sclerosis) ([facility] [address] 08/17/2012  Decreased hearing 01/31/2011  Acute pharyngitis 01/26/2011  Anxiety 08/25/2010  Blood pressure elevated 08/25/2010  Tobacco abuse 08/25/2010  
  
       
Past Medical History:  
Diagnosis Date  Body aches 12/11/2014  Chronic pain    
 Depression    
 GERD (gastroesophageal reflux disease)    
 Headache(784.0)    
 History of mammogram never before  [facility]    
 Neurological disorder    
  Multiple Sclerosis  Pap smear for cervical cancer screening 2008  Psychiatric disorder    
  anxiety  Psychotic disorder ([facility] [address]    
  
  
  
Core Measures: 
  
CVA: Yes Yes 
  
Activity Status: 
  
OOB to Chair No 
Ambulated this shift Yes  
Bed Rest No 
  
  
DVT prophylaxis: 
  
DVT prophylaxis Med- No 
DVT prophylaxis SCD or CONG- Yes  refusing 
  
Wounds: (If Applicable) 
  
Wounds- No 
  
Patient Safety: 
  
Falls Score Total Score: 2 Safety Level_______ Bed Alarm On? No 
Sitter? No 
  
Plan for upcoming shift: safety 
  
  
  
Discharge Plan: Yes Home after 1 more dose of solu-medrol tomorrow at 9am 
  
Active Consults: 
IP CONSULT TO NEUROLOGY

## 2020-03-28 NOTE — CONSULTS
Neurology Note    Patient ID:  Britni Quigley  [id number]  28 y.o.  1975      Date of Consultation:  March 28, 2020     Subjective: I am getting stronger       History of Present Illness: Britni Quigley is a 53 y.o. female with a longstanding history of multiple sclerosis who is followed by  in the neurology clinic was admitted to the hospital due to a MS exacerbation. The patient states that the symptoms which were most pronounced whereas right-sided weakness and some speech difficulties. She has gotten significantly better since admission to the hospital.  Her last day of steroids is this morning. She denies any new numbness, tingling, or weakness.     Past Medical History:   Diagnosis Date    Body aches 12/11/2014    Chronic pain     Depression     GERD (gastroesophageal reflux disease)     Headache(784.0)     History of mammogram never before    [facility] [address]     Neurological disorder     Multiple Sclerosis    Pap smear for cervical cancer screening 2008    Psychiatric disorder     anxiety    Psychotic disorder [facility])         Past Surgical History:   Procedure Laterality Date    COLONOSCOPY N/A 2/28/2018    COLONOSCOPY performed by Mary Kay Nava MD at \[facility]\"" ENDOSCOPY    HX CHOLECYSTECTOMY      HX GYN      3 c-sections    HX HEENT      bilateral ear tube surgery    HX HERNIA REPAIR      HX OTHER SURGICAL      R inguinal hernia repair        Family History   Problem Relation Age of Onset    Heart Attack Father         tripple bypass    Cancer Father         lung    COPD Father     Diabetes Mother         type 2    Heart Disease Mother         heart disease    Diabetes Paternal Grandmother     No Known Problems Sister     No Known Problems Brother     No Known Problems Child         Social History     Tobacco Use    Smoking status: Current Every Day Smoker     Packs/day: 0.50     Years: 17.00     Pack years: 8.50     Types: Cigarettes    Smokeless tobacco: Never Used    Tobacco comment: 1 pack every 3 days. Substance Use Topics    Alcohol use: No        Allergies   Allergen Reactions    Morphine Rash        Prior to Admission medications    Medication Sig Start Date End Date Taking? Authorizing Provider   methylPREDNISolone sod succ (,Solu-MEDROL) 125 mg injection 125 mg by IntraVENous route every six (6) hours. Yes Provider, Historical   naproxen sodium (NAPROSYN) 220 mg tablet Take 220 mg by mouth every eight to twelve (8-12) hours as needed for Pain. 1 caplet 3/18/20   Provider, Historical   gabapentin (NEURONTIN) 400 mg capsule Take 2 Caps by mouth three (3) times daily. Max Daily Amount: 2,400 mg. 3/17/20   Lizz Nova MD   LORazepam (ATIVAN) 1 mg tablet Take 1 Tab by mouth every twelve (12) hours as needed for Anxiety (q12h prn). Max Daily Amount: 2 mg. 3/17/20   Lizz Nova MD   ibuprofen (MOTRIN) 200 mg tablet 600 mg every six (6) hours as needed for Pain. Take 3 caplets by mouth as needed for pain 3/12/20   Lizz Nova MD   predniSONE (DELTASONE) 20 mg tablet Take 2 tabs by mouth with breakfast for 2 days, then take 1 tab for 2 days, then take 1/2 tab for 2 days then discontinue 3/11/20   Marcela Johnson,    DULoxetine (CYMBALTA) 60 mg capsule Take 1 Cap by mouth daily. Indications: neuropathic pain  Patient taking differently: Take 60 mg by mouth daily. Indications: neuropathic pain, pt state she takes 1 tab 2 x daily. 3/2/20   Lizz Nova MD   senna-docusate (PERICOLACE) 8.6-50 mg per tablet Take 2 Tabs by mouth daily. Patient not taking: Reported on 3/17/2020 1/14/20   Lizz Nova MD   dimethyl fumarate (TECFIDERA) 240 mg cpDR Take 240 mg by mouth two (2) times a day. 10/1/19   Maria Isabel Amaya MD   ibuprofen (MOTRIN) 600 mg tablet Take 1 Tab by mouth every six (6) hours as needed for Pain.  12/22/18   YAZ Ramos     Current Facility-Administered Medications   Medication Dose Route Frequency    polyethylene glycol (MIRALAX) packet 17 g  17 g Oral DAILY    nicotine (NICODERM CQ) 21 mg/24 hr patch 1 Patch  1 Patch TransDERmal DAILY    clonazePAM (KlonoPIN) tablet 0.5 mg  0.5 mg Oral BID    acetaminophen (TYLENOL) tablet 325 mg  325 mg Oral Q6H PRN    DULoxetine (CYMBALTA) capsule 60 mg  60 mg Oral DAILY    gabapentin (NEURONTIN) capsule 800 mg  800 mg Oral TID    LORazepam (ATIVAN) tablet 1 mg  1 mg Oral Q12H PRN    senna-docusate (PERICOLACE) 8.6-50 mg per tablet 2 Tab  2 Tab Oral DAILY PRN    sodium chloride (NS) flush 5-40 mL  5-40 mL IntraVENous Q8H    sodium chloride (NS) flush 5-40 mL  5-40 mL IntraVENous PRN    oxyCODONE-acetaminophen (PERCOCET) 5-325 mg per tablet 1 Tab  1 Tab Oral Q4H PRN    naloxone (NARCAN) injection 0.4 mg  0.4 mg IntraVENous PRN    dimethyl fumarate cpDR 1 Cap (Patient Supplied)  1 Cap Oral BID    melatonin tablet 3 mg  3 mg Oral QHS PRN    ondansetron (ZOFRAN) injection 4 mg  4 mg IntraVENous Q4H PRN    insulin lispro (HUMALOG) injection   SubCUTAneous AC&HS    glucose chewable tablet 16 g  4 Tab Oral PRN    dextrose (D50W) injection syrg 12.5-25 g  12.5-25 g IntraVENous PRN    glucagon (GLUCAGEN) injection 1 mg  1 mg IntraMUSCular PRN       Review of Systems:    General, constitutional: negative  Eyes, vision: negative  Ears, nose, throat: negative  Cardiovascular, heart: negative  Respiratory: negative  Gastrointestinal: negative  Genitourinary: negative  Musculoskeletal: Weakness  Skin and integumentary: negative  Psychiatric: Anxiety, depression  Endocrine: negative  Neurological: negative, except for HPI  Hematologic/lymphatic: negative  Allergy/immunology: negative    Objective:     Visit Vitals  /85   Pulse 67   Temp 98.1 °F (36.7 °C)   Resp 16   Ht 5' 4\" (1.626 m)   Wt 200 lb (90.7 kg)   SpO2 100%   BMI 34.33 kg/m²       Physical Exam:    General:  appears well nourished in no acute distress  Neck: no carotid bruits  Lungs: clear to auscultation  Heart:  no murmurs, regular rate  Lower extremity: peripheral pulses palpable and no edema  Skin: intact    Neurological exam:    Awake, alert, oriented to person, place and time  Recent and remote memory were normal  Attention and concentration were intact  Language was intact. There was no aphasia  Speech: Mild dysarthria persists  Fund of knowledge was preserved    Cranial nerves:   II-XII were tested    Perrrla    Visual fields were full  Eomi, no evidence of nystagmus  Facial sensation: Decreased sensation on the right side of the face  Facial motor: Mild right facial droop  Hearing intact  SCM strength intact  Tongue: midline without fasciculations    Motor: Tone normal    No evidence of fasciculations    Strength testing:   deltoid triceps biceps Wrist ext. Wrist flex. intrinsics Hip flex. Hip ext. Knee ext. Knee flex Dorsi flex Plantar flex   Right 4 4 4 4 4 4 4 4 4 4 4 4   Left 5 5 5 5 5 5 4 4 5 5 5 5         Sensory:  Upper extremity: Decreased to pinprick on the right  Lower extremity: Decreased to pinprick on the right    Reflexes:    Right Left  Biceps  3 3  Triceps 3 3  Brachiorad. 3 3  Patella  3 3  Achilles 3 3    Plantar response: Extensor on the right    Cerebellar testing:  no tremor apparent, finger/nose and thor were intact.   It was slightly slower on the right    Gait was not assessed this morning    Labs:     Lab Results   Component Value Date/Time    Hemoglobin A1c 5.2 03/09/2020 02:49 AM    Sodium 139 03/27/2020 04:05 AM    Potassium 4.0 03/27/2020 04:05 AM    Chloride 109 (H) 03/27/2020 04:05 AM    Glucose 104 (H) 03/27/2020 04:05 AM    BUN 17 03/27/2020 04:05 AM    Creatinine 0.48 (L) 03/27/2020 04:05 AM    Calcium 8.9 03/27/2020 04:05 AM    WBC 6.4 03/27/2020 04:05 AM    HCT 31.6 (L) 03/27/2020 04:05 AM    HGB 10.6 (L) 03/27/2020 04:05 AM    PLATELET 551 86/91/4836 04:05 AM       Imaging:    Results from Hospital Encounter encounter on 03/23/20   MRI BRAIN W WO CONT Narrative EXAM:  MRI BRAIN W WO CONT    INDICATION:    right facial droop, recurring, diagnosis of multiple sclerosis. COMPARISON:  MRI brain on 3/8/2020. CT head and CT angiography head on  3/23/2020. CONTRAST: 20 ml Dotarem. TECHNIQUE:    Multiplanar multisequence acquisition without and with contrast of the brain  performed on the 3 Hui magnet. FINDINGS:  New enhancement in bilateral frontal and right parietal periventricular white  matter demyelinating plaques since earlier this month. Hyperintense T2  demyelinating plaque in the right frontal posterior periventricular white matter  is increased (series 9, image 22). Size and number of demyelination is otherwise  unchanged. No restricted diffusion. T2 shine through is unchanged. There is no acute infarct, hemorrhage, extra-axial fluid collection, or mass  effect. There is no cerebellar tonsillar herniation. Expected arterial  flow-voids are present. No enhancing mass. Parasagittal midline structures are otherwise within normal limits. Impression IMPRESSION:     1. New evidence of active demyelination in bilateral frontal and right parietal  demyelinating plaques. Posterior right frontal periventricular demyelinating  plaque is increased in size since 16 days ago. 2. No acute or infarct. Results from [place] encounter on 03/23/20   CTA HEAD NECK W CONT    Narrative EXAM: CTA HEAD NECK W CONT  Clinical history: Slurred speech and right-sided facial droop  INDICATION: slurred speech, right facial droop    COMPARISON: MR 3/20/2020. CONTRAST: 100 mL of Isovue-370. TECHNIQUE:  Unenhanced  images were obtained to localize the volume for  acquisition. Multislice helical axial CT angiography was performed from the  aortic arch to the top of the head during uneventful rapid bolus intravenous  contrast administration. Coronal and sagittal reformations and 3D post  processing was performed.   CT dose reduction was achieved through use of a  standardized protocol tailored for this examination and automatic exposure  control for dose modulation. FINDINGS:    CTA NECK  Extensive periventricular and scattered hypodensities in the cerebral white  matter consistent with severe predominantly supratentorial demyelinating disease  burden. . No significant paranasal sinus disease. Mild emphysematous change. Dominant left vertebral artery. Oris Cho % of right carotid artery stenosis: 0  % of left carotid artery stenosis: 0    NASCET method was utilized for calculating stenosis. . The cervical soft tissues are unremarkable. .    CTA HEAD  Dural venous sinuses are patent. A 2 segments are patent. M1 and M2 segments are  patent. Petrous and cavernous carotid arteries are within normal limits. Left  vertebral artery is dominant. . The basilar artery and its branches are normal.  The internal carotid, anterior cerebral, and middle cerebral arteries are  patent. There is no flow-limiting intracranial stenosis. There is no aneurysm. There are no sizable posterior communicating arteries. Impression IMPRESSION:   No major vessel occlusion. No intracranial mass, hemorrhage or evidence of acute infarction. Imaging findings consistent with moderate to severe predominantly supratentorial  demyelinating disease. .        Assessment and Plan:    The patient is a pleasant 49-year-old female with relapsing remitting multiple sclerosis who was admitted with an acute MS exacerbation. Her MRI revealed new acute enhancing lesions. She has had a positive response to her IV Solu-Medrol to date. 1.  Relapsing remitting multiple sclerosis:    Continue with the completion of the Solu-Medrol. Continue to monitor for GI side effects and elevated glucose level during the infusions. She will need very close follow-up with her primary neurologist,Dr. Hatch, in the neurology clinic. We will help to arrange that.   She will need home physical therapy and Occupational Therapy. Long-term decision making in regards to immunomodulating therapy will be discussed at her follow-up visit. There is no other adaptive equipment which is needed at home at this time    2. Anxiety: Continue home medications    3.   Fibromyalgia: Continue gabapentin     Active Problems:    Anxiety (8/25/2010)      Tobacco abuse (8/25/2010)      [facility] ([facility] (8/17/2012)      Facial droop (3/23/2020)               Signed By:  Arturo Hunter DO FAAN    March 28, 2020

## 2020-03-28 NOTE — PROGRESS NOTES
Problem: Patient Education: Go to Patient Education Activity  Goal: Patient/Family Education  Outcome: Progressing Towards Goal     Problem: Patient Education: Go to Patient Education Activity  Goal: Patient/Family Education  Outcome: Progressing Towards Goal     Problem: Pressure Injury - Risk of  Goal: *Prevention of pressure injury  Description: Document Samy Scale and appropriate interventions in the flowsheet.   Outcome: Progressing Towards Goal  Note: Pressure Injury Interventions:  Sensory Interventions: Minimize linen layers         Activity Interventions: PT/OT evaluation    Mobility Interventions: HOB 30 degrees or less, Float heels, Chair cushion    Nutrition Interventions: Document food/fluid/supplement intake, Discuss nutritional consult with provider                     Problem: Patient Education: Go to Patient Education Activity  Goal: Patient/Family Education  Outcome: Progressing Towards Goal     Problem: Patient Education: Go to Patient Education Activity  Goal: Patient/Family Education  Outcome: Progressing Towards Goal

## 2020-03-28 NOTE — PROGRESS NOTES
Problem: Patient Education: Go to Patient Education Activity  Goal: Patient/Family Education  3/28/2020 1231 by Liam Kurtz RN  Outcome: Resolved/Met  3/28/2020 1034 by Liam Kurtz RN  Outcome: Progressing Towards Goal     Problem: Patient Education: Go to Patient Education Activity  Goal: Patient/Family Education  3/28/2020 1231 by Liam Kurtz RN  Outcome: Resolved/Met  3/28/2020 1034 by Liam Kurtz RN  Outcome: Progressing Towards Goal     Problem: Pressure Injury - Risk of  Goal: *Prevention of pressure injury  Description: Document Samy Scale and appropriate interventions in the flowsheet.   3/28/2020 1231 by Liam Kurtz RN  Outcome: Resolved/Met  Note: Pressure Injury Interventions:  Sensory Interventions: Minimize linen layers         Activity Interventions: PT/OT evaluation    Mobility Interventions: HOB 30 degrees or less, Float heels, Chair cushion    Nutrition Interventions: Document food/fluid/supplement intake, Discuss nutritional consult with provider                  3/28/2020 1034 by Liam Kurtz RN  Outcome: Progressing Towards Goal  Note: Pressure Injury Interventions:  Sensory Interventions: Minimize linen layers         Activity Interventions: PT/OT evaluation    Mobility Interventions: HOB 30 degrees or less, Float heels, Chair cushion    Nutrition Interventions: Document food/fluid/supplement intake, Discuss nutritional consult with provider                     Problem: Patient Education: Go to Patient Education Activity  Goal: Patient/Family Education  3/28/2020 1231 by Liam Kurtz RN  Outcome: Resolved/Met  3/28/2020 1034 by Liam Kurtz RN  Outcome: Progressing Towards Goal     Problem: Patient Education: Go to Patient Education Activity  Goal: Patient/Family Education  3/28/2020 1231 by Liam Kurtz RN  Outcome: Resolved/Met  3/28/2020 1034 by Liam Kurtz RN  Outcome: Progressing Towards Goal

## 2020-03-28 NOTE — PROGRESS NOTES
Pt. was recently discharged with [facility] on 3/11. Pt had readmission and is pending discharge today. [facility] consulted to confirmation if [facility] services will resume, pending. 1901 Update--[facility] confirmed pt can resume care. Charge nurse notifed pt. requires a resumption of care order placement prior to discharge. No further CM needs identified. CM notified pt's nurse of d/c.      Brandee Mix, MSN, RN  Care Manager   ext.  [phone]

## 2020-03-28 NOTE — DISCHARGE INSTRUCTIONS
smoking Cessation Program:   This is a free, phone/text/email based, smoking cessation program. The program is individualized to meet each patient's needs. To enroll use this link [email or web address] please follow-up with your please follow with your neurologist as instructed

## 2020-03-29 ENCOUNTER — HOME CARE VISIT (OUTPATIENT)
Dept: HOME HEALTH SERVICES | Facility: HOME HEALTH | Age: 45
End: 2020-03-29
Payer: MEDICARE

## 2020-03-29 ENCOUNTER — HOME CARE VISIT (OUTPATIENT)
Dept: SCHEDULING | Facility: HOME HEALTH | Age: 45
End: 2020-03-29
Payer: MEDICARE

## 2020-03-29 VITALS
SYSTOLIC BLOOD PRESSURE: 120 MMHG | HEART RATE: 56 BPM | OXYGEN SATURATION: 98 % | RESPIRATION RATE: 20 BRPM | TEMPERATURE: 98.1 F | DIASTOLIC BLOOD PRESSURE: 82 MMHG

## 2020-03-29 PROCEDURE — 3331090001 HH PPS REVENUE CREDIT

## 2020-03-29 PROCEDURE — 3331090002 HH PPS REVENUE DEBIT

## 2020-03-29 PROCEDURE — G0299 HHS/HOSPICE OF RN EA 15 MIN: HCPCS

## 2020-03-29 NOTE — DISCHARGE SUMMARY
Discharge Summary     PATIENT ID: Richad Aschoff  MRN: [id number]   YOB: 1975    DATE OF ADMISSION: 3/23/2020  7:00 PM    DATE OF DISCHARGE: 3/28/2020  PRIMARY CARE PROVIDER: None       DISCHARGING PHYSICIAN: Deb Sepulveda MD    To contact this individual call [phone]. CONSULTATIONS: IP CONSULT TO NEUROLOGY    PROCEDURES/SURGERIES: * No surgery found *    ADMITTING DIAGNOSES & HOSPITAL COURSE:   Richad Aschoff is a 53 y.o. female with a longstanding history of multiple sclerosis who is followed by  in the neurology clinic was admitted to the hospital due to a MS exacerbation. The patient states that the symptoms which were most pronounced whereas right-sided weakness and some speech difficulties. She was started on megadoses of Solu-Medrol intravenously 1 g daily for 5 days. Symptoms significantly improved. Patient was able to ambulate with no help. Patient was discharged home on home PT. Elevated lft's, us of liver markable, acute hepatitis unremarkable l. Pt denies etoh use in the past month, as well as tylenol use. to be followed as an outpatient,      Tobacco abuse recommended stop smoking     Stress/anxiety.   Continue Cymbalta       GERD, continue ppi        Code Status      DISCHARGE DIAGNOSES / PLAN:      1. Flareup of multiple sclerosis  2.  Elevated LFTs possibly PATEL           Recent Results (from the past 24 hour(s))   GLUCOSE, POC    Collection Time: 03/27/20  9:14 PM   Result Value Ref Range    Glucose (POC) 133 (H) 65 - 100 mg/dL    Performed by Alen Koch (PCT)    GLUCOSE, POC    Collection Time: 03/28/20  6:26 AM   Result Value Ref Range    Glucose (POC) 134 (H) 65 - 100 mg/dL    Performed by Alen Koch (PCT)    GLUCOSE, POC    Collection Time: 03/28/20 11:07 AM   Result Value Ref Range    Glucose (POC) 124 (H) 65 - 100 mg/dL    Performed by Waldo Roper (PCT)        PENDING TEST RESULTS:   At the time of discharge the following test results are still pending: Follow-up with the primary care doctor for elevated LFTs    FOLLOW UP APPOINTMENTS:    Follow-up Information     Follow up With Specialties Details Why Contact Info    None    None ([phone]) Patient stated that they have no PCP      Sidra Walker MD [address]              ADDITIONAL CARE RECOMMENDATIONS:     DIET: Cardiac Diet    ACTIVITY: Activity as tolerated            DISCHARGE MEDICATIONS:  Discharge Medication List as of 3/28/2020 12:32 PM      START taking these medications    Details   clonazePAM (KlonoPIN) 0.5 mg tablet Take 1 Tab by mouth two (2) times a day. Max Daily Amount: 1 mg., Normal, Disp-60 Tab, R-0      melatonin 3 mg tablet Take 1 Tab by mouth nightly as needed for Insomnia., Normal, Disp-30 Tab, R-4      nicotine (NICODERM CQ) 21 mg/24 hr 1 Patch by TransDERmal route daily for 30 days. 21 mg patch for 10 weeks then 14 mg patch for 2 weeks then 7 mg patch for 2 weeks then stop, Normal, Disp-98 Patch, R-0         CONTINUE these medications which have NOT CHANGED    Details   gabapentin (NEURONTIN) 400 mg capsule Take 2 Caps by mouth three (3) times daily. Max Daily Amount: 2,400 mg., Print, Disp-120 Cap, R-0      LORazepam (ATIVAN) 1 mg tablet Take 1 Tab by mouth every twelve (12) hours as needed for Anxiety (q12h prn). Max Daily Amount: 2 mg., Print, Disp-40 Tab, R-0      predniSONE (DELTASONE) 20 mg tablet Take 2 tabs by mouth with breakfast for 2 days, then take 1 tab for 2 days, then take 1/2 tab for 2 days then discontinue, Normal, Disp-7 Tab, R-0      DULoxetine (CYMBALTA) 60 mg capsule Take 1 Cap by mouth daily. Indications: neuropathic pain, Print, Disp-30 Cap, R-1      senna-docusate (PERICOLACE) 8.6-50 mg per tablet Take 2 Tabs by mouth daily. , Normal, Disp-15 Tab, R-0      dimethyl fumarate (TECFIDERA) 240 mg cpDR Take 240 mg by mouth two (2) times a day., Normal, Disp-120 Cap, R-3      ibuprofen (MOTRIN) 600 mg tablet Take 1 Tab by mouth every six (6) hours as needed for Pain., Normal, Disp-20 Tab, R-0         STOP taking these medications       methylPREDNISolone sod succ (,Solu-MEDROL) 125 mg injection Comments:   Reason for Stopping:         naproxen sodium (NAPROSYN) 220 mg tablet Comments:   Reason for Stopping:                 DISPOSITION:    Home With:   OT  PT  HH  RN       Long term SNF/Inpatient Rehab    Independent/assisted living    Hospice    Other:       PATIENT CONDITION AT DISCHARGE:     Functional status    Poor     Deconditioned     Independent      Cognition     Lucid     Forgetful     Dementia          Code status     Full code     DNR      PHYSICAL EXAMINATION AT DISCHARGE  Visit Vitals  /82   Pulse 62   Temp 98 °F (36.7 °C)   Resp 16   Ht 5' 4\" (1.626 m)   Wt 90.7 kg (200 lb)   SpO2 98%   BMI 34.33 kg/m²      Temp (24hrs), Av °F (36.7 °C), Min:97.9 °F (36.6 °C), Max:98.1 °F (36.7 °C)         O2 Device: Room air  Patient Vitals for the past 24 hrs:   Temp Pulse Resp BP SpO2   20 1111 98 °F (36.7 °C) 62 16 127/82 98 %   20 0747 98.1 °F (36.7 °C) 67 16 129/85 100 %   20 0509 98.1 °F (36.7 °C) 63 12 146/81 96 %   20 0113 97.9 °F (36.6 °C) (!) 51 14 122/72 95 %   20 2122 98.1 °F (36.7 °C) 60 16 127/72 97 %     No intake or output data in the 24 hours ending 20  Last shift:    No intake/output data recorded. Last 3 shifts:    No intake/output data recorded.     General:   Alert, cooperative, no acute distress   Head:   Atraumatic   Eyes:   Conjunctivae clear   ENT:  Oral mucosa normal   Neck:  Supple, trachea midline, no adenopathy   No JVD   Back:    No CVA tenderness    Chest wall:    No tenderness or deformities    Lungs:   bilateral wheezing     Heart:   Regular rhythm, no murmur   Abdomen:    Soft, non-tender   No masses or organomegaly    Extremities:  No edema or DVT signs   Pulses:  Symmetric all extremities   Skin:  Warm and dry    No rashes or lesions   Neurologic:  Oriented   No focal deficits           CHRONIC MEDICAL DIAGNOSES:  Problem List as of 3/28/2020 Date Reviewed: 3/23/2020          Codes Class Noted - Resolved    Facial droop ICD-10-CM: R29.810  ICD-9-CM: 781.94  3/23/2020 - Present        Exacerbation of multiple sclerosis ([facility] ICD-10-CM: G35  ICD-9-CM: 886  3/9/2020 - Present        Left-sided weakness ICD-10-CM: R53.1  ICD-9-CM: 728.87  3/8/2020 - Present        Severe obesity ([facility] ICD-10-CM: E66.01  ICD-9-CM: 278.01  10/3/2018 - Present        Constipation ICD-10-CM: K59.00  ICD-9-CM: 564.00  7/17/2015 - Present        Body aches ICD-10-CM: R52  ICD-9-CM: 780.96  12/11/2014 - Present        Back pain ICD-10-CM: M54.9  ICD-9-CM: 724.5  9/7/2012 - Present        Chronic pain ICD-10-CM: G89.29  ICD-9-CM: 338.29  8/17/2012 - Present        MS (multiple sclerosis) ([facility] ICD-10-CM: G35  ICD-9-CM: 344  8/17/2012 - Present        Decreased hearing ICD-10-CM: H91.90  ICD-9-CM: 389.9  1/31/2011 - Present        Acute pharyngitis ICD-10-CM: J02.9  ICD-9-CM: 696  1/26/2011 - Present        Anxiety ICD-10-CM: F41.9  ICD-9-CM: 300.00  8/25/2010 - Present        Blood pressure elevated ICD-10-CM: R03.0  ICD-9-CM: FNM0398  8/25/2010 - Present        Tobacco abuse ICD-10-CM: Z72.0  ICD-9-CM: 305.1  8/25/2010 - Present              Greater than  45 minutes were spent with the patient on counseling and coordination of care    Signed:   Gabe Romero MD  3/28/2020  8:19 PM

## 2020-03-30 ENCOUNTER — HOME CARE VISIT (OUTPATIENT)
Dept: HOME HEALTH SERVICES | Facility: HOME HEALTH | Age: 45
End: 2020-03-30
Payer: MEDICARE

## 2020-03-30 ENCOUNTER — PATIENT OUTREACH (OUTPATIENT)
Dept: FAMILY MEDICINE CLINIC | Age: 45
End: 2020-03-30

## 2020-03-30 ENCOUNTER — TELEPHONE (OUTPATIENT)
Dept: NEUROLOGY | Age: 45
End: 2020-03-30

## 2020-03-30 ENCOUNTER — HOME CARE VISIT (OUTPATIENT)
Dept: SCHEDULING | Facility: HOME HEALTH | Age: 45
End: 2020-03-30
Payer: MEDICARE

## 2020-03-30 PROCEDURE — 3331090001 HH PPS REVENUE CREDIT

## 2020-03-30 PROCEDURE — 3331090002 HH PPS REVENUE DEBIT

## 2020-03-30 PROCEDURE — G0153 HHCP-SVS OF S/L PATH,EA 15MN: HCPCS

## 2020-03-30 NOTE — TELEPHONE ENCOUNTER
Spoke to patient and [facility] hospital follow up scheduled with Dr. Chani Rand on 4/21/20

## 2020-03-30 NOTE — PROGRESS NOTES
COVID-19 Screening Initial Follow-up Note    Patient contacted regarding COVID-19  risk. Care Transition Nurse/ Ambulatory Care Manager contacted the patient by telephone to perform post discharge assessment. Verified name and  with patient as identifiers. Provided introduction to self, and explanation of the CTN/ACM role, and reason for call due to risk factors for infection and/or exposure to COVID-19. Symptoms reviewed with patient who verbalized the following symptoms: fatigue, pain or aching joints, no new/worsening symptoms       Due to no new or worsening symptoms encounter was not routed to provider for escalation. Patient has following risk factors of: Multiple Sclerosis. CTN/ACM reviewed discharge instructions, medical action plan and red flags such as increased shortness of breath, increasing fever and signs of decompensation with patient who verbalized understanding. Discussed exposure protocols and quarantine with CDC Guidelines What to do if you are sick with coronavirus disease 2019 Patient who was given an opportunity for questions and concerns. The patient agrees to contact the [facility] exposure line [phone], local [facility] department [facility]  [phone] and PCP office for questions related to their healthcare. CTN/ACM provided contact information for future reference. Reviewed and educated patient on any new and changed medications related to discharge diagnosis. Pt reported p/u Rxs given @ d/c w/ exception of Nicotine Patches. Was told by Pharm to purchase OTC. Pt reported unable to do so d/t cost. Pt has [address] & Medicaid. To discuss w/ PCP dru 20 OV.      Plan for follow-up call in 14 days based on severity of symptoms and risk factors

## 2020-03-31 ENCOUNTER — TELEPHONE (OUTPATIENT)
Dept: FAMILY MEDICINE CLINIC | Age: 45
End: 2020-03-31

## 2020-03-31 VITALS
DIASTOLIC BLOOD PRESSURE: 76 MMHG | HEART RATE: 86 BPM | SYSTOLIC BLOOD PRESSURE: 130 MMHG | TEMPERATURE: 98.9 F | OXYGEN SATURATION: 98 %

## 2020-03-31 PROCEDURE — 3331090002 HH PPS REVENUE DEBIT

## 2020-03-31 PROCEDURE — 3331090001 HH PPS REVENUE CREDIT

## 2020-03-31 NOTE — TELEPHONE ENCOUNTER
Patient needs to r/s with Dr Monique Snider for SIMEON GUTIERREZ     She was scheduled by the hospital on wrong MD schedule     Best number to reach her is [phone]

## 2020-04-01 ENCOUNTER — HOME CARE VISIT (OUTPATIENT)
Dept: SCHEDULING | Facility: HOME HEALTH | Age: 45
End: 2020-04-01
Payer: MEDICARE

## 2020-04-01 ENCOUNTER — HOME CARE VISIT (OUTPATIENT)
Dept: HOME HEALTH SERVICES | Facility: HOME HEALTH | Age: 45
End: 2020-04-01
Payer: MEDICARE

## 2020-04-01 VITALS
OXYGEN SATURATION: 98 % | TEMPERATURE: 97.9 F | DIASTOLIC BLOOD PRESSURE: 79 MMHG | SYSTOLIC BLOOD PRESSURE: 129 MMHG | RESPIRATION RATE: 17 BRPM | HEART RATE: 77 BPM

## 2020-04-01 PROCEDURE — G0151 HHCP-SERV OF PT,EA 15 MIN: HCPCS

## 2020-04-01 PROCEDURE — 3331090002 HH PPS REVENUE DEBIT

## 2020-04-01 PROCEDURE — 3331090001 HH PPS REVENUE CREDIT

## 2020-04-02 PROCEDURE — 3331090001 HH PPS REVENUE CREDIT

## 2020-04-02 PROCEDURE — 3331090002 HH PPS REVENUE DEBIT

## 2020-04-03 ENCOUNTER — HOME CARE VISIT (OUTPATIENT)
Dept: HOME HEALTH SERVICES | Facility: HOME HEALTH | Age: 45
End: 2020-04-03
Payer: MEDICARE

## 2020-04-03 ENCOUNTER — HOME CARE VISIT (OUTPATIENT)
Dept: SCHEDULING | Facility: HOME HEALTH | Age: 45
End: 2020-04-03
Payer: MEDICARE

## 2020-04-03 ENCOUNTER — VIRTUAL VISIT (OUTPATIENT)
Dept: FAMILY MEDICINE CLINIC | Age: 45
End: 2020-04-03

## 2020-04-03 VITALS
HEART RATE: 76 BPM | SYSTOLIC BLOOD PRESSURE: 122 MMHG | OXYGEN SATURATION: 98 % | TEMPERATURE: 97.2 F | DIASTOLIC BLOOD PRESSURE: 80 MMHG | RESPIRATION RATE: 16 BRPM

## 2020-04-03 VITALS — WEIGHT: 200 LBS | BODY MASS INDEX: 33.32 KG/M2 | HEIGHT: 65 IN

## 2020-04-03 DIAGNOSIS — F41.9 ANXIETY: ICD-10-CM

## 2020-04-03 DIAGNOSIS — G89.4 CHRONIC PAIN SYNDROME: ICD-10-CM

## 2020-04-03 DIAGNOSIS — F33.2 SEVERE EPISODE OF RECURRENT MAJOR DEPRESSIVE DISORDER, WITHOUT PSYCHOTIC FEATURES (HCC): ICD-10-CM

## 2020-04-03 DIAGNOSIS — G35 MS (MULTIPLE SCLEROSIS) (HCC): ICD-10-CM

## 2020-04-03 DIAGNOSIS — Z72.0 TOBACCO ABUSE: ICD-10-CM

## 2020-04-03 DIAGNOSIS — F43.23 ADJUSTMENT DISORDER WITH MIXED ANXIETY AND DEPRESSED MOOD: ICD-10-CM

## 2020-04-03 PROCEDURE — 3331090001 HH PPS REVENUE CREDIT

## 2020-04-03 PROCEDURE — G0151 HHCP-SERV OF PT,EA 15 MIN: HCPCS

## 2020-04-03 PROCEDURE — 3331090002 HH PPS REVENUE DEBIT

## 2020-04-03 RX ORDER — IBUPROFEN 200 MG
TABLET ORAL
COMMUNITY
End: 2020-12-22

## 2020-04-03 RX ORDER — GABAPENTIN 400 MG/1
800 CAPSULE ORAL 3 TIMES DAILY
Qty: 120 CAP | Refills: 0 | Status: SHIPPED | OUTPATIENT
Start: 2020-04-03 | End: 2020-07-17 | Stop reason: SDUPTHER

## 2020-04-03 NOTE — PROGRESS NOTES
HISTORY OF PRESENT ILLNESS  Dalphine Paget is a 53 y.o. female. HPI  Pt main complaints were provided on virtual visit and telemed format, pt consented to such visit secondary to the current national and state emergency in order to decrease the transmission rate of the corona virus,  pt is w/ comorbid history at high risk with  ,  Present on VV for the concerns for the current medical conditions and stating that the pt has had no traveling and unaware if the pt has been exposed to covid-19 individual, has no fever no cough no dyspnea, no ha, not dizzy, nl smell nl taste, no abd upset,  works at [employer], feeling anxious,      Current Outpatient Medications   Medication Sig Dispense Refill    ibuprofen (MOTRIN) 200 mg tablet Take  by mouth. Take three tablets every 6 hours as needed by mouth      clonazePAM (KlonoPIN) 0.5 mg tablet Take 1 Tab by mouth two (2) times a day. Max Daily Amount: 1 mg. (Patient taking differently: Take 0.5 mg by mouth three (3) times daily.) 60 Tab 0    melatonin 3 mg tablet Take 1 Tab by mouth nightly as needed for Insomnia. 30 Tab 4    gabapentin (NEURONTIN) 400 mg capsule Take 2 Caps by mouth three (3) times daily. Max Daily Amount: 2,400 mg. 120 Cap 0    DULoxetine (CYMBALTA) 60 mg capsule Take 1 Cap by mouth daily. Indications: neuropathic pain (Patient taking differently: Take 60 mg by mouth daily. Indications: neuropathic pain, pt state she takes 1 tab 2 x daily.) 30 Cap 1    nicotine (NICODERM CQ) 21 mg/24 hr 1 Patch by TransDERmal route daily for 30 days. 21 mg patch for 10 weeks then 14 mg patch for 2 weeks then 7 mg patch for 2 weeks then stop 98 Patch 0    LORazepam (ATIVAN) 1 mg tablet Take 1 Tab by mouth every twelve (12) hours as needed for Anxiety (q12h prn). Max Daily Amount: 2 mg.  40 Tab 0    predniSONE (DELTASONE) 20 mg tablet Take 2 tabs by mouth with breakfast for 2 days, then take 1 tab for 2 days, then take 1/2 tab for 2 days then discontinue 7 Tab 0    senna-docusate (PERICOLACE) 8.6-50 mg per tablet Take 2 Tabs by mouth daily. (Patient not taking: Reported on 3/17/2020) 15 Tab 0    dimethyl fumarate (TECFIDERA) 240 mg cpDR Take 240 mg by mouth two (2) times a day. 120 Cap 3    ibuprofen (MOTRIN) 600 mg tablet Take 1 Tab by mouth every six (6) hours as needed for Pain. (Patient taking differently: Take 200 mg by mouth.) 20 Tab 0     Allergies   Allergen Reactions    Morphine Rash     Past Medical History:   Diagnosis Date    Body aches 12/11/2014    Chronic pain     Depression     GERD (gastroesophageal reflux disease)     Headache(784.0)     History of mammogram never before    MS (multiple sclerosis) ([facility]     Neurological disorder     Multiple Sclerosis    Pap smear for cervical cancer screening 2008    Psychiatric disorder     anxiety    Psychotic disorder [facility])      Past Surgical History:   Procedure Laterality Date    COLONOSCOPY N/A 2/28/2018    COLONOSCOPY performed by Stella Morales MD at \[facility]"" ENDOSCOPY    HX CHOLECYSTECTOMY      HX GYN      3 c-sections    HX HEENT      bilateral ear tube surgery    HX HERNIA REPAIR      HX OTHER SURGICAL      R inguinal hernia repair     Family History   Problem Relation Age of Onset    Heart Attack Father         tripple bypass    Cancer Father         lung    COPD Father     Diabetes Mother         type 2    Heart Disease Mother         heart disease    Diabetes Paternal Grandmother     No Known Problems Sister     No Known Problems Brother     No Known Problems Child      Social History     Tobacco Use    Smoking status: Current Every Day Smoker     Packs/day: 0.50     Years: 17.00     Pack years: 8.50     Types: Cigarettes    Smokeless tobacco: Never Used    Tobacco comment: 1 pack every 3 days.    Substance Use Topics    Alcohol use: No      Lab Results   Component Value Date/Time    Cholesterol, total 222 (H) 03/09/2020 02:49 AM    HDL Cholesterol 71 03/09/2020 02:49 AM    LDL, calculated 143.4 (H) 03/09/2020 02:49 AM    Triglyceride 38 03/09/2020 02:49 AM    CHOL/HDL Ratio 3.1 03/09/2020 02:49 AM        Review of Systems   Constitutional: Positive for malaise/fatigue. Negative for chills and fever. HENT: Negative for ear pain and nosebleeds. Eyes: Negative for blurred vision, pain and discharge. Respiratory: Negative for cough, shortness of breath and wheezing. Cardiovascular: Negative for chest pain and leg swelling. Gastrointestinal: Negative for constipation, diarrhea, nausea and vomiting. Genitourinary: Negative for frequency. Musculoskeletal: Positive for back pain. Negative for joint pain and myalgias. Skin: Negative for itching and rash. Neurological: Positive for weakness and headaches. Negative for loss of consciousness. Endo/Heme/Allergies: Does not bruise/bleed easily. Psychiatric/Behavioral: Negative for depression. The patient is not nervous/anxious and does not have insomnia. All other systems reviewed and are negative. Physical Exam  Vitals signs and nursing note reviewed. Constitutional:       Appearance: She is well-developed. HENT:      Head: Normocephalic and atraumatic. Eyes:      Pupils: Pupils are equal, round, and reactive to light. Neck:      Thyroid: No thyromegaly. Vascular: No JVD. Cardiovascular:      Heart sounds: No friction rub. Pulmonary:      Effort: Pulmonary effort is normal.      Breath sounds: No stridor. Abdominal:      Palpations: There is no mass. Musculoskeletal:         General: Tenderness present. Thoracic back: She exhibits decreased range of motion, tenderness, pain and spasm. Lumbar back: She exhibits decreased range of motion, tenderness, pain and spasm. Neurological:      Mental Status: She is alert and oriented to person, place, and time. Cranial Nerves: No cranial nerve deficit. Deep Tendon Reflexes: Reflexes are normal and symmetric.    Psychiatric:         Mood and Affect: Mood is depressed. Affect is labile, flat and tearful. Speech: Speech is delayed. Behavior: Behavior is slowed. Thought Content: Thought content does not include homicidal ideation. Judgment: Judgment normal.         ASSESSMENT and PLAN  Diagnoses and all orders for this visit:    1. Chronic pain syndrome  -     gabapentin (NEURONTIN) 400 mg capsule; Take 2 Caps by mouth three (3) times daily. Max Daily Amount: 2,400 mg.    2. MS (multiple sclerosis) (HCC)  -     gabapentin (NEURONTIN) 400 mg capsule; Take 2 Caps by mouth three (3) times daily. Max Daily Amount: 2,400 mg.    3. Tobacco abuse  -     gabapentin (NEURONTIN) 400 mg capsule; Take 2 Caps by mouth three (3) times daily. Max Daily Amount: 2,400 mg.    4. Anxiety  -     gabapentin (NEURONTIN) 400 mg capsule; Take 2 Caps by mouth three (3) times daily. Max Daily Amount: 2,400 mg.    5. Adjustment disorder with mixed anxiety and depressed mood  -     gabapentin (NEURONTIN) 400 mg capsule; Take 2 Caps by mouth three (3) times daily. Max Daily Amount: 2,400 mg.    6. Severe episode of recurrent major depressive disorder, without psychotic features (HCC)  -     gabapentin (NEURONTIN) 400 mg capsule; Take 2 Caps by mouth three (3) times daily. Max Daily Amount: 2,400 mg.       Concern gomez COVID-19 addressed and detailed, pt was told that the best way to prevent illness is to avoid being exposed to this virus, by clean hands often, use a hand  that contains at least 60% alcohol, Avoid touching your eyes, nose, and mouth with unwashed hand, Avoid close contact with people who are sick , Put distance between yourself and other people, Stay home if you are sick, except to get medical care, Cover your mouth and nose, Throw used tissues in the trash, Wear a facemask if you are sick,         Pursuant to the emergency declaration under the [employer] and [facility], 1135 waiver authority and the Coronavirus Preparedness and Response Supplemental Appropriations Act, this Virtual Visit was conducted, with patient's consent, to reduce the patient's risk of exposure to COVID-19 and provide continuity of care for an established patient. Pt was also told if develop dyspnea needs to call 911 or go to er, call for furhter advise, pt agreed with todays recommendations    Services were provided through a video synchronous discussion virtually to substitute for in-person appointment.

## 2020-04-03 NOTE — PROGRESS NOTES
Name and  verified        Chief Complaint   Patient presents with    Medication Refill       1. Have you been to the ER, urgent care clinic since your last visit? Hospitalized since your last visit? no    2. Have you seen or consulted any other health care providers outside of the [address] [place] [address] since your last visit? Include any pap smears or colon screening.   no

## 2020-04-04 PROCEDURE — 3331090001 HH PPS REVENUE CREDIT

## 2020-04-04 PROCEDURE — 3331090002 HH PPS REVENUE DEBIT

## 2020-04-05 PROCEDURE — 3331090001 HH PPS REVENUE CREDIT

## 2020-04-05 PROCEDURE — 3331090002 HH PPS REVENUE DEBIT

## 2020-04-06 ENCOUNTER — HOME CARE VISIT (OUTPATIENT)
Dept: HOME HEALTH SERVICES | Facility: HOME HEALTH | Age: 45
End: 2020-04-06
Payer: MEDICARE

## 2020-04-06 PROCEDURE — 3331090001 HH PPS REVENUE CREDIT

## 2020-04-06 PROCEDURE — 3331090002 HH PPS REVENUE DEBIT

## 2020-04-07 ENCOUNTER — HOME CARE VISIT (OUTPATIENT)
Dept: HOME HEALTH SERVICES | Facility: HOME HEALTH | Age: 45
End: 2020-04-07
Payer: MEDICARE

## 2020-04-07 ENCOUNTER — HOME CARE VISIT (OUTPATIENT)
Dept: SCHEDULING | Facility: HOME HEALTH | Age: 45
End: 2020-04-07
Payer: MEDICARE

## 2020-04-07 VITALS
OXYGEN SATURATION: 92 % | HEART RATE: 91 BPM | TEMPERATURE: 98.2 F | DIASTOLIC BLOOD PRESSURE: 78 MMHG | SYSTOLIC BLOOD PRESSURE: 110 MMHG

## 2020-04-07 PROCEDURE — 3331090001 HH PPS REVENUE CREDIT

## 2020-04-07 PROCEDURE — G0151 HHCP-SERV OF PT,EA 15 MIN: HCPCS

## 2020-04-07 PROCEDURE — 3331090002 HH PPS REVENUE DEBIT

## 2020-04-07 NOTE — PROGRESS NOTES
Amanda burgess. Patient discharged home with [facility] services provided by ADELINA MARTINI [facility]  This CM informed Libia Connolly RN [facility] [place], [facility] by [facility] successfully submitted. Scanned and emailed to Libia Connolly. Copy faxed to [address]  Copy placed in signature folder.     Wanda Simental RN CM  Ext [phone]

## 2020-04-08 PROCEDURE — 3331090002 HH PPS REVENUE DEBIT

## 2020-04-08 PROCEDURE — 3331090001 HH PPS REVENUE CREDIT

## 2020-04-09 ENCOUNTER — HOME CARE VISIT (OUTPATIENT)
Dept: SCHEDULING | Facility: HOME HEALTH | Age: 45
End: 2020-04-09
Payer: MEDICARE

## 2020-04-09 ENCOUNTER — DOCUMENTATION ONLY (OUTPATIENT)
Dept: FAMILY MEDICINE CLINIC | Age: 45
End: 2020-04-09

## 2020-04-09 ENCOUNTER — VIRTUAL VISIT (OUTPATIENT)
Dept: FAMILY MEDICINE CLINIC | Age: 45
End: 2020-04-09

## 2020-04-09 ENCOUNTER — HOME CARE VISIT (OUTPATIENT)
Dept: HOME HEALTH SERVICES | Facility: HOME HEALTH | Age: 45
End: 2020-04-09
Payer: MEDICARE

## 2020-04-09 VITALS
SYSTOLIC BLOOD PRESSURE: 118 MMHG | DIASTOLIC BLOOD PRESSURE: 78 MMHG | HEART RATE: 79 BPM | OXYGEN SATURATION: 98 % | TEMPERATURE: 98.2 F

## 2020-04-09 DIAGNOSIS — R35.0 URINARY FREQUENCY: Primary | ICD-10-CM

## 2020-04-09 PROCEDURE — 3331090001 HH PPS REVENUE CREDIT

## 2020-04-09 PROCEDURE — 3331090002 HH PPS REVENUE DEBIT

## 2020-04-09 PROCEDURE — G0151 HHCP-SERV OF PT,EA 15 MIN: HCPCS

## 2020-04-09 RX ORDER — PHENAZOPYRIDINE HYDROCHLORIDE 100 MG/1
100 TABLET, FILM COATED ORAL
Qty: 9 TAB | Refills: 0 | Status: SHIPPED | OUTPATIENT
Start: 2020-04-09 | End: 2020-04-12

## 2020-04-09 RX ORDER — NITROFURANTOIN (MACROCRYSTALS) 100 MG/1
100 CAPSULE ORAL
Qty: 10 CAP | Refills: 0 | Status: SHIPPED | OUTPATIENT
Start: 2020-04-09 | End: 2020-04-19

## 2020-04-09 NOTE — PROGRESS NOTES
Lorena Balderas ([phone]) with [facility] phoned stated patient C/O Bowel/Bladder Incontinence for one week-worsen. Patient requesting increase anxiety medication and Gabapentin Virtual Video Visit appointment given patient agreed.

## 2020-04-09 NOTE — PROGRESS NOTES
HISTORY OF PRESENT ILLNESS  Dalphine Paget is a 53 y.o. female. HPI  Pt on video stating that she may has UTI, with   Urinary Frequency   The history is provided by the patient. This is a new problem. The current episode started more than four days ago. The problem occurs every urination. The problem has been gradually worsening. The quality of the pain is described as burning. The pain is at a severity of 2/10. There has been no fever, is sexually active. with some constipation, pt does safe sex, last time taken ABX few months ago,      Associated symptoms include frequency, hesitancy and urgency. Pertinent negatives include no chills, no sweats, no nausea, no vomiting, no discharge, no flank pain, , no abdominal pain and no back pain. Current Outpatient Medications   Medication Sig Dispense Refill    ibuprofen (MOTRIN) 200 mg tablet Take  by mouth. Take three tablets every 6 hours as needed by mouth      gabapentin (NEURONTIN) 400 mg capsule Take 2 Caps by mouth three (3) times daily. Max Daily Amount: 2,400 mg. 120 Cap 0    clonazePAM (KlonoPIN) 0.5 mg tablet Take 1 Tab by mouth two (2) times a day. Max Daily Amount: 1 mg. (Patient taking differently: Take 0.5 mg by mouth three (3) times daily.) 60 Tab 0    melatonin 3 mg tablet Take 1 Tab by mouth nightly as needed for Insomnia. 30 Tab 4    nicotine (NICODERM CQ) 21 mg/24 hr 1 Patch by TransDERmal route daily for 30 days. 21 mg patch for 10 weeks then 14 mg patch for 2 weeks then 7 mg patch for 2 weeks then stop 98 Patch 0    LORazepam (ATIVAN) 1 mg tablet Take 1 Tab by mouth every twelve (12) hours as needed for Anxiety (q12h prn). Max Daily Amount: 2 mg. 40 Tab 0    predniSONE (DELTASONE) 20 mg tablet Take 2 tabs by mouth with breakfast for 2 days, then take 1 tab for 2 days, then take 1/2 tab for 2 days then discontinue 7 Tab 0    DULoxetine (CYMBALTA) 60 mg capsule Take 1 Cap by mouth daily.  Indications: neuropathic pain (Patient taking differently: Take 60 mg by mouth daily. Indications: neuropathic pain, pt state she takes 1 tab 2 x daily.) 30 Cap 1    senna-docusate (PERICOLACE) 8.6-50 mg per tablet Take 2 Tabs by mouth daily. 15 Tab 0    dimethyl fumarate (TECFIDERA) 240 mg cpDR Take 240 mg by mouth two (2) times a day. 120 Cap 3    ibuprofen (MOTRIN) 600 mg tablet Take 1 Tab by mouth every six (6) hours as needed for Pain. (Patient taking differently: Take 200 mg by mouth.) 20 Tab 0     Allergies   Allergen Reactions    Morphine Rash     Past Medical History:   Diagnosis Date    Body aches 12/11/2014    Chronic pain     Depression     GERD (gastroesophageal reflux disease)     Headache(784.0)     History of mammogram never before    MS [facility]multiple sclerosis) ([facility] [address]     Neurological disorder     Multiple Sclerosis    Pap smear for cervical cancer screening 2008    Psychiatric disorder     anxiety    Psychotic disorder [facility])      Past Surgical History:   Procedure Laterality Date    COLONOSCOPY N/A 2/28/2018    COL[facility] ENDOSCOPY    HX CHOLECYSTECTOMY      HX GYN      3 c-sections    HX HEENT      bilateral ear tube surgery    HX HERNIA REPAIR      HX OTHER SURGICAL      R inguinal hernia repair     Family History   Problem Relation Age of Onset    Heart Attack Father         tripple bypass    Cancer Father         lung    COPD Father     Diabetes Mother         type 2    Heart Disease Mother         heart disease    Diabetes Paternal Grandmother     No Known Problems Sister     No Known Problems Brother     No Known Problems Child      Social History     Tobacco Use    Smoking status: Current Every Day Smoker     Packs/day: 0.50     Years: 17.00     Pack years: 8.50     Types: Cigarettes    Smokeless tobacco: Never Used    Tobacco comment: 1 pack every 3 days.    Substance Use Topics    Alcohol use: No      Lab Results   Component Value Date/Time    Hemoglobin A1c 5.2 03/09/2020 02:49 AM    Hemoglobin A1c 4.7 (L) 09/19/2019 01:30 PM    Glucose 104 (H) 03/27/2020 04:05 AM    Glucose (POC) 124 (H) 03/28/2020 11:07 AM    LDL, calculated 143.4 (H) 03/09/2020 02:49 AM    Creatinine 0.48 (L) 03/27/2020 04:05 AM      Lab Results   Component Value Date/Time    TSH 2.270 09/19/2019 01:30 PM         Review of Systems   Constitutional: Negative for chills and fever. HENT: Negative for congestion and nosebleeds. Eyes: Negative for blurred vision and pain. Respiratory: Negative for cough, shortness of breath and wheezing. Cardiovascular: Negative for chest pain and leg swelling. Gastrointestinal: Positive for constipation. Negative for diarrhea, nausea and vomiting. Genitourinary: Positive for dysuria, frequency, hematuria and urgency. Musculoskeletal: Negative for joint pain and myalgias. Skin: Negative for itching and rash. Neurological: Negative for dizziness, loss of consciousness and headaches. Psychiatric/Behavioral: Negative for depression. The patient is not nervous/anxious and does not have insomnia. Physical Exam  Constitutional:       Appearance: She is obese. She is not ill-appearing or toxic-appearing. HENT:      Head: Normocephalic and atraumatic. Mouth/Throat:      Mouth: Mucous membranes are moist.   Neurological:      Mental Status: She is alert and oriented to person, place, and time. Psychiatric:         Mood and Affect: Mood normal.         Behavior: Behavior normal.         Thought Content: Thought content normal.         Judgment: Judgment normal.         ASSESSMENT and PLAN  Diagnoses and all orders for this visit:    1. Urinary frequency  -     nitrofurantoin (MACRODANTIN) 100 mg capsule; Take 1 Cap by mouth nightly for 10 days. -     phenazopyridine (PYRIDIUM) 100 mg tablet; Take 1 Tab by mouth three (3) times daily as needed for Pain for up to 3 days.       Concern gomez CHRISTYID-19 addressed and detailed, pt was told that the best way to prevent illness is to avoid being exposed to this virus, by clean hands often, use a hand  that contains at least 60% alcohol, Avoid touching your eyes, nose, and mouth with unwashed hand,    Pursuant to the emergency declaration under the [address] and [place] [address] [facility] authority and the [employer] Act, this Virtual Visit was conducted, with patient's consent, to reduce the patient's risk of exposure to COVID-19 and provide continuity of care for an established patient. Pt was also told if develop dyspnea needs to call 911 or go to er, call for amarjit advise, pt agreed with todays recommendations    Services were provided through a video synchronous discussion virtually to substitute for in-person appointment.

## 2020-04-09 NOTE — PROGRESS NOTES
Chief Complaint   Patient presents with    Medication Evaluation     Patient is requesting an increase in medication is currently in pain level 3 no fever noted      1. Have you been to the ER, urgent care clinic since your last visit? Hospitalized since your last visit? No    2. Have you seen or consulted any other health care providers outside of the [address] [place] [address] since your last visit? Include any pap smears or colon screening.  No

## 2020-04-10 ENCOUNTER — HOME CARE VISIT (OUTPATIENT)
Dept: SCHEDULING | Facility: HOME HEALTH | Age: 45
End: 2020-04-10
Payer: MEDICARE

## 2020-04-10 PROCEDURE — 3331090002 HH PPS REVENUE DEBIT

## 2020-04-10 PROCEDURE — 3331090001 HH PPS REVENUE CREDIT

## 2020-04-11 PROCEDURE — 3331090002 HH PPS REVENUE DEBIT

## 2020-04-11 PROCEDURE — 3331090001 HH PPS REVENUE CREDIT

## 2020-04-12 ENCOUNTER — HOME CARE VISIT (OUTPATIENT)
Dept: HOME HEALTH SERVICES | Facility: HOME HEALTH | Age: 45
End: 2020-04-12
Payer: MEDICARE

## 2020-04-12 PROCEDURE — 3331090001 HH PPS REVENUE CREDIT

## 2020-04-12 PROCEDURE — 3331090002 HH PPS REVENUE DEBIT

## 2020-04-13 ENCOUNTER — PATIENT OUTREACH (OUTPATIENT)
Dept: FAMILY MEDICINE CLINIC | Age: 45
End: 2020-04-13

## 2020-04-13 ENCOUNTER — HOME CARE VISIT (OUTPATIENT)
Dept: SCHEDULING | Facility: HOME HEALTH | Age: 45
End: 2020-04-13
Payer: MEDICARE

## 2020-04-13 ENCOUNTER — HOME CARE VISIT (OUTPATIENT)
Dept: HOME HEALTH SERVICES | Facility: HOME HEALTH | Age: 45
End: 2020-04-13
Payer: MEDICARE

## 2020-04-13 VITALS
DIASTOLIC BLOOD PRESSURE: 78 MMHG | OXYGEN SATURATION: 97 % | RESPIRATION RATE: 18 BRPM | SYSTOLIC BLOOD PRESSURE: 122 MMHG | TEMPERATURE: 98.9 F | HEART RATE: 91 BPM

## 2020-04-13 VITALS
RESPIRATION RATE: 17 BRPM | DIASTOLIC BLOOD PRESSURE: 78 MMHG | OXYGEN SATURATION: 98 % | TEMPERATURE: 98.8 F | SYSTOLIC BLOOD PRESSURE: 120 MMHG | HEART RATE: 97 BPM

## 2020-04-13 VITALS
TEMPERATURE: 98.8 F | SYSTOLIC BLOOD PRESSURE: 120 MMHG | HEART RATE: 97 BPM | OXYGEN SATURATION: 98 % | DIASTOLIC BLOOD PRESSURE: 78 MMHG

## 2020-04-13 PROCEDURE — 400013 HH SOC

## 2020-04-13 PROCEDURE — G0151 HHCP-SERV OF PT,EA 15 MIN: HCPCS

## 2020-04-13 PROCEDURE — 3331090002 HH PPS REVENUE DEBIT

## 2020-04-13 PROCEDURE — G0299 HHS/HOSPICE OF RN EA 15 MIN: HCPCS

## 2020-04-13 PROCEDURE — 3331090001 HH PPS REVENUE CREDIT

## 2020-04-13 PROCEDURE — G0153 HHCP-SVS OF S/L PATH,EA 15MN: HCPCS

## 2020-04-13 NOTE — PROGRESS NOTES
Patient resolved from Transition of Care episode on 4/13/20    Patient/family has been provided the following resources and education related to COVID-19:                         Signs, symptoms and red flags related to COVID-19            CDC exposure and quarantine guidelines            [employer]it exposure contact - [phone]            Contact for their local Department of Health                 Patient currently reports that the following symptoms have improved:  no new/worsening symptoms     No further outreach scheduled with this CTN/ACM. Episode of Care resolved. Patient has this CTN/ACM contact information if future needs arise.

## 2020-04-14 PROCEDURE — 3331090001 HH PPS REVENUE CREDIT

## 2020-04-14 PROCEDURE — 3331090002 HH PPS REVENUE DEBIT

## 2020-04-15 PROCEDURE — 3331090001 HH PPS REVENUE CREDIT

## 2020-04-15 PROCEDURE — 3331090002 HH PPS REVENUE DEBIT

## 2020-04-16 PROCEDURE — 3331090002 HH PPS REVENUE DEBIT

## 2020-04-16 PROCEDURE — 3331090001 HH PPS REVENUE CREDIT

## 2020-04-17 ENCOUNTER — HOME CARE VISIT (OUTPATIENT)
Dept: SCHEDULING | Facility: HOME HEALTH | Age: 45
End: 2020-04-17
Payer: MEDICARE

## 2020-04-17 ENCOUNTER — HOME CARE VISIT (OUTPATIENT)
Dept: HOME HEALTH SERVICES | Facility: HOME HEALTH | Age: 45
End: 2020-04-17
Payer: MEDICARE

## 2020-04-17 VITALS
RESPIRATION RATE: 17 BRPM | SYSTOLIC BLOOD PRESSURE: 115 MMHG | TEMPERATURE: 98.7 F | OXYGEN SATURATION: 98 % | DIASTOLIC BLOOD PRESSURE: 78 MMHG | HEART RATE: 96 BPM

## 2020-04-17 PROCEDURE — 3331090001 HH PPS REVENUE CREDIT

## 2020-04-17 PROCEDURE — G0151 HHCP-SERV OF PT,EA 15 MIN: HCPCS

## 2020-04-17 PROCEDURE — 3331090002 HH PPS REVENUE DEBIT

## 2020-04-18 PROCEDURE — 3331090002 HH PPS REVENUE DEBIT

## 2020-04-18 PROCEDURE — 3331090001 HH PPS REVENUE CREDIT

## 2020-04-19 PROCEDURE — 3331090001 HH PPS REVENUE CREDIT

## 2020-04-19 PROCEDURE — 3331090002 HH PPS REVENUE DEBIT

## 2020-04-20 PROCEDURE — 3331090001 HH PPS REVENUE CREDIT

## 2020-04-20 PROCEDURE — 3331090002 HH PPS REVENUE DEBIT

## 2020-04-21 ENCOUNTER — HOME CARE VISIT (OUTPATIENT)
Dept: HOME HEALTH SERVICES | Facility: HOME HEALTH | Age: 45
End: 2020-04-21
Payer: MEDICARE

## 2020-04-21 ENCOUNTER — VIRTUAL VISIT (OUTPATIENT)
Dept: NEUROLOGY | Age: 45
End: 2020-04-21

## 2020-04-21 PROCEDURE — 3331090002 HH PPS REVENUE DEBIT

## 2020-04-21 PROCEDURE — 3331090001 HH PPS REVENUE CREDIT

## 2020-04-22 ENCOUNTER — VIRTUAL VISIT (OUTPATIENT)
Dept: NEUROLOGY | Age: 45
End: 2020-04-22

## 2020-04-22 ENCOUNTER — HOME CARE VISIT (OUTPATIENT)
Dept: SCHEDULING | Facility: HOME HEALTH | Age: 45
End: 2020-04-22
Payer: MEDICARE

## 2020-04-22 ENCOUNTER — HOME CARE VISIT (OUTPATIENT)
Dept: HOME HEALTH SERVICES | Facility: HOME HEALTH | Age: 45
End: 2020-04-22
Payer: MEDICARE

## 2020-04-22 DIAGNOSIS — G35 MULTIPLE SCLEROSIS (HCC): Primary | ICD-10-CM

## 2020-04-22 DIAGNOSIS — M79.7 FIBROMYALGIA: ICD-10-CM

## 2020-04-22 DIAGNOSIS — F41.9 ANXIETY: ICD-10-CM

## 2020-04-22 DIAGNOSIS — Z72.0 TOBACCO ABUSE: ICD-10-CM

## 2020-04-22 PROCEDURE — 3331090002 HH PPS REVENUE DEBIT

## 2020-04-22 PROCEDURE — 3331090001 HH PPS REVENUE CREDIT

## 2020-04-22 NOTE — PATIENT INSTRUCTIONS
A Healthy Lifestyle: Care Instructions Your Care Instructions A healthy lifestyle can help you feel good, stay at a healthy weight, and have plenty of energy for both work and play. A healthy lifestyle is something you can share with your whole family. A healthy lifestyle also can lower your risk for serious health problems, such as high blood pressure, heart disease, and diabetes. You can follow a few steps listed below to improve your health and the health of your family. Follow-up care is a key part of your treatment and safety. Be sure to make and go to all appointments, and call your doctor if you are having problems. It's also a good idea to know your test results and keep a list of the medicines you take. How can you care for yourself at home? · Do not eat too much sugar, fat, or fast foods. You can still have dessert and treats now and then. The goal is moderation. · Start small to improve your eating habits. Pay attention to portion sizes, drink less juice and soda pop, and eat more fruits and vegetables. ? Eat a healthy amount of food. A 3-ounce serving of meat, for example, is about the size of a deck of cards. Fill the rest of your plate with vegetables and whole grains. ? Limit the amount of soda and sports drinks you have every day. Drink more water when you are thirsty. ? Eat at least 5 servings of fruits and vegetables every day. It may seem like a lot, but it is not hard to reach this goal. A serving or helping is 1 piece of fruit, 1 cup of vegetables, or 2 cups of leafy, raw vegetables. Have an apple or some carrot sticks as an afternoon snack instead of a candy bar. Try to have fruits and/or vegetables at every meal. 
· Make exercise part of your daily routine. You may want to start with simple activities, such as walking, bicycling, or slow swimming. Try to be active 30 to 60 minutes every day.  You do not need to do all 30 to 60 minutes all at once. For example, you can exercise 3 times a day for 10 or 20 minutes. Moderate exercise is safe for most people, but it is always a good idea to talk to your doctor before starting an exercise program. 
· Keep moving. Wilberto Mings the lawn, work in the garden, or [employer]. Take the stairs instead of the elevator at work. · If you smoke, quit. People who smoke have an increased risk for heart attack, stroke, cancer, and other lung illnesses. Quitting is hard, but there are ways to boost your chance of quitting tobacco for good. ? Use nicotine gum, patches, or lozenges. ? Ask your doctor about stop-smoking programs and medicines. ? Keep trying. In addition to reducing your risk of diseases in the future, you will notice some benefits soon after you stop using tobacco. If you have shortness of breath or asthma symptoms, they will likely get better within a few weeks after you quit. · Limit how much alcohol you drink. Moderate amounts of alcohol (up to 2 drinks a day for men, 1 drink a day for women) are okay. But drinking too much can lead to liver problems, high blood pressure, and other health problems. Family health If you have a family, there are many things you can do together to improve your health. · Eat meals together as a family as often as possible. · Eat healthy foods. This includes fruits, vegetables, lean meats and dairy, and whole grains. · Include your family in your fitness plan. Most people think of activities such as jogging or tennis as the way to fitness, but there are many ways you and your family can be more active. Anything that makes you breathe hard and gets your heart pumping is exercise. Here are some tips: 
? Walk to do errands or to take your child to school or the bus. 
? Go for a family bike ride after dinner instead of watching TV. Where can you learn more? Go to http://damaris-eloise.info/ Enter C554 in the search box to learn more about \"A Healthy Lifestyle: Care Instructions. \" Current as of: May 28, 2019Content Version: 12.4 © 7168-2981 [employer]. Care instructions adapted under license by [employer] (which disclaims liability or warranty for this information). If you have questions about a medical condition or this instruction, always ask your healthcare professional. [address] any warranty or liability for your use of this information. Office Policies 
 
o Phone calls/patient messages: Please allow up to 24 hours for someone in the office to contact you about your call or message. Be mindful your provider may be out of the office or your message may require further review. We encourage you to use [employer] for your messages as this is a faster, more efficient way to communicate with our office 
 
o Medication Refills: 
Prescription medications require up to 48 business hours to process. We encourage you to use [employer] for your refills. For controlled medications: Please allow up to 72 business hours to process. Certain medications may require you to  a written prescription at our office. NO narcotic/controlled medications will be prescribed after 4pm Monday through Friday or on weekends 
 
o Form/Paperwork Completion: We ask that you allow 7-14 business days. You may also download your forms to [employer] to have your doctor print off.

## 2020-04-22 NOTE — PROGRESS NOTES
Chief Complaint: Multiple sclerosis    Since leaving the hospital she has been weak and nauseated. She is vomiting at times. She is using dramamine which helps. She is using it three times a day. Her weakness is not improving. Her voice has recovered. She has diarrhea this morning and its the first time she has used the bathroom in two weeks. Her appetite is poor. SHe does not have a fever. She has some abdominal pain. She feels pain in the lower back. Dr. Janel Sol called in antibiotic. She is still on the antibiotic. She requested a refill for both gabapentin and clonazepam requested to switch the clonazepam to alprazolam.  I told her that I was uncomfortable prescribing alprazolam because it lends itself to abuse more easily than clonazepam.  I agreed to prescribe her these medications but on reviewing her prescription monitoring profile I noted that her PCP was also prescribing these medications. I called her back and we discussed these findings. We agreed that any further discussions about these medications should be with Dr. Janel Sol. Mrs. Enid Celaya returns for a follow up visit. She has been compliant with tecfidera has not been hospitalized for MS. She has no weakness or sensory loss. Dr. Janel Sol  doscontinued the clonazepam and the gabapentin. She was taking gabapentin for MS related pain. Assesment and Plan  1. Multiple sclerosis  Continue Tecfidera  JCV testing      2. Anxiety   Continue clonazepam  Currently prescribed under the care of Dr. Janel Sol  Suspect that she may be seeking prescriptions from multiple sources as she did not disclose the recent prescriptions. It may have been an honest mistake but I think for her safety it should be monitored frequently and maybe dosages revised. I discussed this with Dr. Janel Sol and was in agreement that there has been some exhibition of risky behavior. 3. Fibromyaglia   Continue gabapentin  Currently prescribed under the care of Dr. Janel Sol    4. Tobacco use  conitnues to smoke discussed the importance quitting. Allergies  Morphine     Medications  Current Outpatient Medications   Medication Sig    ibuprofen (MOTRIN) 200 mg tablet Take  by mouth. Take three tablets every 6 hours as needed by mouth    gabapentin (NEURONTIN) 400 mg capsule Take 2 Caps by mouth three (3) times daily. Max Daily Amount: 2,400 mg.  clonazePAM (KlonoPIN) 0.5 mg tablet Take 1 Tab by mouth two (2) times a day. Max Daily Amount: 1 mg. (Patient taking differently: Take 0.5 mg by mouth three (3) times daily.)    melatonin 3 mg tablet Take 1 Tab by mouth nightly as needed for Insomnia.  LORazepam (ATIVAN) 1 mg tablet Take 1 Tab by mouth every twelve (12) hours as needed for Anxiety (q12h prn). Max Daily Amount: 2 mg.  DULoxetine (CYMBALTA) 60 mg capsule Take 1 Cap by mouth daily. Indications: neuropathic pain (Patient taking differently: Take 60 mg by mouth daily. Indications: neuropathic pain, pt state she takes 1 tab 2 x daily.)    senna-docusate (PERICOLACE) 8.6-50 mg per tablet Take 2 Tabs by mouth daily.  dimethyl fumarate (TECFIDERA) 240 mg cpDR Take 240 mg by mouth two (2) times a day.  ibuprofen (MOTRIN) 600 mg tablet Take 1 Tab by mouth every six (6) hours as needed for Pain. (Patient taking differently: Take 200 mg by mouth.)    nicotine (NICODERM CQ) 21 mg/24 hr 1 Patch by TransDERmal route daily for 30 days. 21 mg patch for 10 weeks then 14 mg patch for 2 weeks then 7 mg patch for 2 weeks then stop    predniSONE (DELTASONE) 20 mg tablet Take 2 tabs by mouth with breakfast for 2 days, then take 1 tab for 2 days, then take 1/2 tab for 2 days then discontinue     No current facility-administered medications for this visit.          Medical History  Past Medical History:   Diagnosis Date    Body aches 12/11/2014    Chronic pain     Depression     GERD (gastroesophageal reflux disease)     Headache(784.0)     History of mammogram never before    [facility] ([facility]     Neurological disorder     Multiple Sclerosis    Pap smear for cervical cancer screening 2008    Psychiatric disorder     anxiety    Psychotic disorder ([facility]      Review of Systems   Constitutional: Negative for chills and fever. HENT: Negative for ear pain. Eyes: Negative for pain and discharge. Respiratory: Negative for cough and hemoptysis. Cardiovascular: Negative for chest pain and claudication. Gastrointestinal: Negative for constipation and diarrhea. Genitourinary: Negative for flank pain and hematuria. Musculoskeletal: Positive for back pain, myalgias and neck pain. Skin: Negative for itching and rash. Neurological: Positive for dizziness. Negative for tingling and headaches. Endo/Heme/Allergies: Negative for environmental allergies. Does not bruise/bleed easily. Psychiatric/Behavioral: Negative for depression and hallucinations. The patient is nervous/anxious. Exam:    Visit Vitals  LMP 03/27/2020      General: Well developed, well nourished. Patient in no distress   Head: Normocephalic, atraumatic, anicteric sclera   Neck Normal ROM, No thyromegally   Lungs:  Normal effort   Ext: No pedal edema   Skin: Supple no rash     NeurologicExam:  Mental Status: Alert and oriented to person place and time   Speech: Fluent no aphasia or has scanning speech. Cranial Nerves:  II - XII Intact left facial droop   Motor:  Subtle right motor deficits. Normal bulk. Sensory:   Symmetric and intact   Gait:  Gait is cane dependant   Tremor:   No tremor noted. Cerebellar:  Coordination intact. Neurovascular: No carotid bruits. No JVD       Imaging    CT Results (most recent):  Results from [place] encounter on 03/23/20   CTA HEAD NECK W CONT    Narrative EXAM: CTA HEAD NECK W CONT  Clinical history: Slurred speech and right-sided facial droop  INDICATION: slurred speech, right facial droop    COMPARISON: MR 3/20/2020.     CONTRAST: 100 mL of Isovue-370. TECHNIQUE:  Unenhanced  images were obtained to localize the volume for  acquisition. Multislice helical axial CT angiography was performed from the  aortic arch to the top of the head during uneventful rapid bolus intravenous  contrast administration. Coronal and sagittal reformations and 3D post  processing was performed. CT dose reduction was achieved through use of a  standardized protocol tailored for this examination and automatic exposure  control for dose modulation. FINDINGS:    CTA NECK  Extensive periventricular and scattered hypodensities in the cerebral white  matter consistent with severe predominantly supratentorial demyelinating disease  burden. . No significant paranasal sinus disease. Mild emphysematous change. Dominant left vertebral artery. [facility] % of right carotid artery stenosis: 0  % of left carotid artery stenosis: 0    NASCET method was utilized for calculating stenosis. . The cervical soft tissues are unremarkable. .    CTA HEAD  Dural venous sinuses are patent. A 2 segments are patent. M1 and M2 segments are  patent. Petrous and cavernous carotid arteries are within normal limits. Left  vertebral artery is dominant. . The basilar artery and its branches are normal.  The internal carotid, anterior cerebral, and middle cerebral arteries are  patent. There is no flow-limiting intracranial stenosis. There is no aneurysm. There are no sizable posterior communicating arteries. Impression IMPRESSION:   No major vessel occlusion. No intracranial mass, hemorrhage or evidence of acute infarction. Imaging findings consistent with moderate to severe predominantly supratentorial  demyelinating disease. [facility] MRI Results (most recent):  Results from [place] encounter on 03/23/20   MRI BRAIN W WO CONT    Narrative EXAM:  MRI BRAIN W WO CONT    INDICATION:    right facial droop, recurring, diagnosis of multiple sclerosis.     COMPARISON:  MRI brain on 3/8/2020. CT head and CT angiography head on  3/23/2020. CONTRAST: 20 ml Dotarem. TECHNIQUE:    Multiplanar multisequence acquisition without and with contrast of the brain  performed on the 3 Hui magnet. FINDINGS:  New enhancement in bilateral frontal and right parietal periventricular white  matter demyelinating plaques since earlier this month. Hyperintense T2  demyelinating plaque in the right frontal posterior periventricular white matter  is increased (series 9, image 22). Size and number of demyelination is otherwise  unchanged. No restricted diffusion. T2 shine through is unchanged. There is no acute infarct, hemorrhage, extra-axial fluid collection, or mass  effect. There is no cerebellar tonsillar herniation. Expected arterial  flow-voids are present. No enhancing mass. Parasagittal midline structures are otherwise within normal limits. Impression IMPRESSION:     1. New evidence of active demyelination in bilateral frontal and right parietal  demyelinating plaques. Posterior right frontal periventricular demyelinating  plaque is increased in size since 16 days ago. 2. No acute or infarct.        Lab Review    Lab Results   Component Value Date/Time    WBC 6.4 03/27/2020 04:05 AM    HCT 31.6 (L) 03/27/2020 04:05 AM    HGB 10.6 (L) 03/27/2020 04:05 AM    PLATELET 524 60/52/1568 04:05 AM       Lab Results   Component Value Date/Time    Sodium 139 03/27/2020 04:05 AM    Potassium 4.0 03/27/2020 04:05 AM    Chloride 109 (H) 03/27/2020 04:05 AM    CO2 27 03/27/2020 04:05 AM    Glucose 104 (H) 03/27/2020 04:05 AM    BUN 17 03/27/2020 04:05 AM    Creatinine 0.48 (L) 03/27/2020 04:05 AM    Calcium 8.9 03/27/2020 04:05 AM       Lab Results   Component Value Date/Time    Cholesterol, total 222 (H) 03/09/2020 02:49 AM    HDL Cholesterol 71 03/09/2020 02:49 AM    LDL, calculated 143.4 (H) 03/09/2020 02:49 AM    VLDL, calculated 7.6 03/09/2020 02:49 AM    Triglyceride 38 03/09/2020 02:49 AM CHOL/HDL Ratio 3.1 03/09/2020 02:49 AM           90  minutes spent more than 50% spent in chart review and face to face  examination, discussion of treatment options and approach. 30 minutes spent on chart revieiw and coordination of care with Dr. Roberto Tesfaye. Minor Kathleen was seen by synchronous (real-time) audio-video technology on 04/22/20. Consent:  She and/or her healthcare decision maker is aware that this patient-initiated Telehealth encounter is a billable service, with coverage as determined by her insurance carrier. She is aware that she may receive a bill and has provided verbal consent to proceed: Yes    I was in the office while conducting this encounter. Pursuant to the emergency declaration under the [facility] waiver authority and the [employer] Act, this Virtual  Visit was conducted, with patient's consent, to reduce the patient's risk of exposure to COVID-19 and provide continuity of care for an established patient. Services were provided through a video synchronous discussion virtually to substitute for in-person clinic visit.

## 2020-04-23 ENCOUNTER — HOME CARE VISIT (OUTPATIENT)
Dept: HOME HEALTH SERVICES | Facility: HOME HEALTH | Age: 45
End: 2020-04-23
Payer: MEDICARE

## 2020-04-23 ENCOUNTER — HOME CARE VISIT (OUTPATIENT)
Dept: SCHEDULING | Facility: HOME HEALTH | Age: 45
End: 2020-04-23
Payer: MEDICARE

## 2020-04-23 VITALS
SYSTOLIC BLOOD PRESSURE: 124 MMHG | DIASTOLIC BLOOD PRESSURE: 84 MMHG | TEMPERATURE: 98.2 F | RESPIRATION RATE: 16 BRPM | OXYGEN SATURATION: 98 % | HEART RATE: 79 BPM

## 2020-04-23 PROCEDURE — 3331090002 HH PPS REVENUE DEBIT

## 2020-04-23 PROCEDURE — 3331090001 HH PPS REVENUE CREDIT

## 2020-04-23 PROCEDURE — G0151 HHCP-SERV OF PT,EA 15 MIN: HCPCS

## 2020-04-24 ENCOUNTER — HOME CARE VISIT (OUTPATIENT)
Dept: SCHEDULING | Facility: HOME HEALTH | Age: 45
End: 2020-04-24
Payer: MEDICARE

## 2020-04-24 PROCEDURE — 3331090001 HH PPS REVENUE CREDIT

## 2020-04-24 PROCEDURE — 3331090002 HH PPS REVENUE DEBIT

## 2020-04-25 PROCEDURE — 3331090002 HH PPS REVENUE DEBIT

## 2020-04-25 PROCEDURE — 3331090001 HH PPS REVENUE CREDIT

## 2020-04-26 PROCEDURE — 3331090002 HH PPS REVENUE DEBIT

## 2020-04-26 PROCEDURE — 3331090001 HH PPS REVENUE CREDIT

## 2020-04-27 ENCOUNTER — TELEPHONE (OUTPATIENT)
Dept: NEUROLOGY | Age: 45
End: 2020-04-27

## 2020-04-27 ENCOUNTER — HOME CARE VISIT (OUTPATIENT)
Dept: SCHEDULING | Facility: HOME HEALTH | Age: 45
End: 2020-04-27
Payer: MEDICARE

## 2020-04-27 ENCOUNTER — HOME CARE VISIT (OUTPATIENT)
Dept: HOME HEALTH SERVICES | Facility: HOME HEALTH | Age: 45
End: 2020-04-27
Payer: MEDICARE

## 2020-04-27 VITALS
SYSTOLIC BLOOD PRESSURE: 130 MMHG | TEMPERATURE: 98.1 F | HEART RATE: 61 BPM | DIASTOLIC BLOOD PRESSURE: 80 MMHG | OXYGEN SATURATION: 97 %

## 2020-04-27 DIAGNOSIS — Z72.0 TOBACCO ABUSE: ICD-10-CM

## 2020-04-27 DIAGNOSIS — F33.2 SEVERE EPISODE OF RECURRENT MAJOR DEPRESSIVE DISORDER, WITHOUT PSYCHOTIC FEATURES (HCC): ICD-10-CM

## 2020-04-27 DIAGNOSIS — F43.23 ADJUSTMENT DISORDER WITH MIXED ANXIETY AND DEPRESSED MOOD: ICD-10-CM

## 2020-04-27 DIAGNOSIS — G35 MS (MULTIPLE SCLEROSIS) (HCC): ICD-10-CM

## 2020-04-27 DIAGNOSIS — G89.4 CHRONIC PAIN SYNDROME: ICD-10-CM

## 2020-04-27 DIAGNOSIS — F41.9 ANXIETY: ICD-10-CM

## 2020-04-27 PROCEDURE — G0153 HHCP-SVS OF S/L PATH,EA 15MN: HCPCS

## 2020-04-27 PROCEDURE — 3331090001 HH PPS REVENUE CREDIT

## 2020-04-27 PROCEDURE — 3331090002 HH PPS REVENUE DEBIT

## 2020-04-27 RX ORDER — GABAPENTIN 400 MG/1
800 CAPSULE ORAL 3 TIMES DAILY
Qty: 120 CAP | Refills: 3 | Status: CANCELLED | OUTPATIENT
Start: 2020-04-27

## 2020-04-27 RX ORDER — CLONAZEPAM 0.5 MG/1
0.5 TABLET ORAL 2 TIMES DAILY
Qty: 60 TAB | Refills: 3 | Status: CANCELLED | OUTPATIENT
Start: 2020-04-27

## 2020-04-27 NOTE — TELEPHONE ENCOUNTER
Patient  requesing refill  for  gabapentin last filled 4/3/20   clonzepam  Last filled 3/28/20  Need  be sent to [facility]  Need location for gladis unable to locate [facility] on [address]  Left message for patient to call back

## 2020-04-28 ENCOUNTER — HOME CARE VISIT (OUTPATIENT)
Dept: HOME HEALTH SERVICES | Facility: HOME HEALTH | Age: 45
End: 2020-04-28
Payer: MEDICARE

## 2020-04-28 DIAGNOSIS — G89.4 CHRONIC PAIN SYNDROME: ICD-10-CM

## 2020-04-28 DIAGNOSIS — F41.9 ANXIETY: ICD-10-CM

## 2020-04-28 DIAGNOSIS — Z72.0 TOBACCO ABUSE: ICD-10-CM

## 2020-04-28 DIAGNOSIS — G35 MS (MULTIPLE SCLEROSIS) (HCC): ICD-10-CM

## 2020-04-28 DIAGNOSIS — F43.23 ADJUSTMENT DISORDER WITH MIXED ANXIETY AND DEPRESSED MOOD: ICD-10-CM

## 2020-04-28 DIAGNOSIS — F33.2 SEVERE EPISODE OF RECURRENT MAJOR DEPRESSIVE DISORDER, WITHOUT PSYCHOTIC FEATURES (HCC): ICD-10-CM

## 2020-04-28 PROCEDURE — 3331090002 HH PPS REVENUE DEBIT

## 2020-04-28 PROCEDURE — 3331090001 HH PPS REVENUE CREDIT

## 2020-04-28 RX ORDER — GABAPENTIN 400 MG/1
800 CAPSULE ORAL 3 TIMES DAILY
Qty: 120 CAP | Refills: 0 | OUTPATIENT
Start: 2020-04-28

## 2020-04-28 RX ORDER — CLONAZEPAM 0.5 MG/1
0.5 TABLET ORAL 2 TIMES DAILY
Qty: 60 TAB | Refills: 0 | OUTPATIENT
Start: 2020-04-28

## 2020-04-28 NOTE — TELEPHONE ENCOUNTER
I spoke with Dr. Chloe Weir. She was getting the scripts from him and myself. I spoke with her and told her to go through him from now on.

## 2020-04-28 NOTE — TELEPHONE ENCOUNTER
----- Message from Dianna Chavez sent at 4/27/2020  4:07 PM EDT -----  Regarding: Dr Vaz Risk  Patient return call    Caller's first and last name and relationship (if not the patient):      Best contact number(s):[phone]      Whose call is being returned:does not remember the name      Details to clarify the request:calling back to provide the pharmacy name for her rx  she said she uses [facility] on [address]  p) [phone].  She said her rx can be called into   that [address] [place] [address]

## 2020-04-28 NOTE — TELEPHONE ENCOUNTER
Requesting refill for   gabapentin last filled 4/3/20  clonzepam last filled 3/28/20   Last seen in office by Mamie 4/22/20

## 2020-04-29 PROCEDURE — 3331090002 HH PPS REVENUE DEBIT

## 2020-04-29 PROCEDURE — 3331090001 HH PPS REVENUE CREDIT

## 2020-04-30 ENCOUNTER — HOME CARE VISIT (OUTPATIENT)
Dept: SCHEDULING | Facility: HOME HEALTH | Age: 45
End: 2020-04-30
Payer: MEDICARE

## 2020-04-30 PROCEDURE — 3331090001 HH PPS REVENUE CREDIT

## 2020-04-30 PROCEDURE — 3331090002 HH PPS REVENUE DEBIT

## 2020-05-01 ENCOUNTER — HOME CARE VISIT (OUTPATIENT)
Dept: SCHEDULING | Facility: HOME HEALTH | Age: 45
End: 2020-05-01
Payer: MEDICARE

## 2020-05-01 PROCEDURE — 3331090002 HH PPS REVENUE DEBIT

## 2020-05-01 PROCEDURE — 3331090001 HH PPS REVENUE CREDIT

## 2020-05-02 PROCEDURE — 3331090001 HH PPS REVENUE CREDIT

## 2020-05-02 PROCEDURE — 3331090002 HH PPS REVENUE DEBIT

## 2020-05-03 PROCEDURE — 3331090002 HH PPS REVENUE DEBIT

## 2020-05-03 PROCEDURE — 3331090001 HH PPS REVENUE CREDIT

## 2020-05-04 ENCOUNTER — HOME CARE VISIT (OUTPATIENT)
Dept: SCHEDULING | Facility: HOME HEALTH | Age: 45
End: 2020-05-04
Payer: MEDICARE

## 2020-05-04 ENCOUNTER — HOME CARE VISIT (OUTPATIENT)
Dept: HOME HEALTH SERVICES | Facility: HOME HEALTH | Age: 45
End: 2020-05-04
Payer: MEDICARE

## 2020-05-04 PROCEDURE — 3331090001 HH PPS REVENUE CREDIT

## 2020-05-04 PROCEDURE — 3331090002 HH PPS REVENUE DEBIT

## 2020-05-04 PROCEDURE — G0153 HHCP-SVS OF S/L PATH,EA 15MN: HCPCS

## 2020-05-05 ENCOUNTER — TELEPHONE (OUTPATIENT)
Dept: FAMILY MEDICINE CLINIC | Age: 45
End: 2020-05-05

## 2020-05-05 ENCOUNTER — HOME CARE VISIT (OUTPATIENT)
Dept: HOME HEALTH SERVICES | Facility: HOME HEALTH | Age: 45
End: 2020-05-05
Payer: MEDICARE

## 2020-05-05 ENCOUNTER — HOME CARE VISIT (OUTPATIENT)
Dept: SCHEDULING | Facility: HOME HEALTH | Age: 45
End: 2020-05-05
Payer: MEDICARE

## 2020-05-05 VITALS
RESPIRATION RATE: 16 BRPM | DIASTOLIC BLOOD PRESSURE: 71 MMHG | TEMPERATURE: 96.9 F | HEART RATE: 71 BPM | SYSTOLIC BLOOD PRESSURE: 115 MMHG

## 2020-05-05 VITALS
SYSTOLIC BLOOD PRESSURE: 130 MMHG | OXYGEN SATURATION: 97 % | DIASTOLIC BLOOD PRESSURE: 80 MMHG | HEART RATE: 93 BPM | TEMPERATURE: 98.5 F

## 2020-05-05 PROCEDURE — 3331090001 HH PPS REVENUE CREDIT

## 2020-05-05 PROCEDURE — 3331090002 HH PPS REVENUE DEBIT

## 2020-05-05 PROCEDURE — G0151 HHCP-SERV OF PT,EA 15 MIN: HCPCS

## 2020-05-05 NOTE — TELEPHONE ENCOUNTER
Pls call Physical Therapist w/[facility]     States he needs to speak to someone, too much to put in a message     Best number to reach him is [phone]

## 2020-05-05 NOTE — TELEPHONE ENCOUNTER
Returned call to Cookie Dubin [facility] physical therapy,  Stated pt has been refusing physical therapy x 1.5 weeks,  Pt is always smoking weed when he's there so she's high and can't function. Fell 3 times over the last weekend. Advised per Dr Porfirio Sanderson to discharge pt due to non complaince.   He stated understanding

## 2020-05-06 ENCOUNTER — HOME CARE VISIT (OUTPATIENT)
Dept: HOME HEALTH SERVICES | Facility: HOME HEALTH | Age: 45
End: 2020-05-06
Payer: MEDICARE

## 2020-05-06 PROCEDURE — 3331090002 HH PPS REVENUE DEBIT

## 2020-05-06 PROCEDURE — 3331090001 HH PPS REVENUE CREDIT

## 2020-05-07 ENCOUNTER — HOME CARE VISIT (OUTPATIENT)
Dept: HOME HEALTH SERVICES | Facility: HOME HEALTH | Age: 45
End: 2020-05-07
Payer: MEDICARE

## 2020-05-07 PROCEDURE — 3331090001 HH PPS REVENUE CREDIT

## 2020-05-07 PROCEDURE — 3331090002 HH PPS REVENUE DEBIT

## 2020-05-08 ENCOUNTER — HOME CARE VISIT (OUTPATIENT)
Dept: HOME HEALTH SERVICES | Facility: HOME HEALTH | Age: 45
End: 2020-05-08
Payer: MEDICARE

## 2020-05-08 ENCOUNTER — HOME CARE VISIT (OUTPATIENT)
Dept: SCHEDULING | Facility: HOME HEALTH | Age: 45
End: 2020-05-08
Payer: MEDICARE

## 2020-05-08 VITALS
HEART RATE: 78 BPM | RESPIRATION RATE: 16 BRPM | SYSTOLIC BLOOD PRESSURE: 126 MMHG | DIASTOLIC BLOOD PRESSURE: 90 MMHG | TEMPERATURE: 98.6 F

## 2020-05-08 PROCEDURE — 3331090002 HH PPS REVENUE DEBIT

## 2020-05-08 PROCEDURE — G0151 HHCP-SERV OF PT,EA 15 MIN: HCPCS

## 2020-05-08 PROCEDURE — 3331090001 HH PPS REVENUE CREDIT

## 2020-05-09 PROCEDURE — 3331090002 HH PPS REVENUE DEBIT

## 2020-05-09 PROCEDURE — 3331090001 HH PPS REVENUE CREDIT

## 2020-05-10 PROCEDURE — 3331090002 HH PPS REVENUE DEBIT

## 2020-05-10 PROCEDURE — 3331090001 HH PPS REVENUE CREDIT

## 2020-05-11 PROCEDURE — 3331090001 HH PPS REVENUE CREDIT

## 2020-05-11 PROCEDURE — 3331090002 HH PPS REVENUE DEBIT

## 2020-05-12 ENCOUNTER — HOME CARE VISIT (OUTPATIENT)
Dept: HOME HEALTH SERVICES | Facility: HOME HEALTH | Age: 45
End: 2020-05-12
Payer: MEDICARE

## 2020-05-12 PROCEDURE — 3331090002 HH PPS REVENUE DEBIT

## 2020-05-12 PROCEDURE — 3331090001 HH PPS REVENUE CREDIT

## 2020-05-13 ENCOUNTER — VIRTUAL VISIT (OUTPATIENT)
Dept: FAMILY MEDICINE CLINIC | Age: 45
End: 2020-05-13

## 2020-05-13 ENCOUNTER — HOME CARE VISIT (OUTPATIENT)
Dept: SCHEDULING | Facility: HOME HEALTH | Age: 45
End: 2020-05-13
Payer: MEDICARE

## 2020-05-13 ENCOUNTER — HOME CARE VISIT (OUTPATIENT)
Dept: HOME HEALTH SERVICES | Facility: HOME HEALTH | Age: 45
End: 2020-05-13
Payer: MEDICARE

## 2020-05-13 VITALS
OXYGEN SATURATION: 98 % | DIASTOLIC BLOOD PRESSURE: 84 MMHG | HEART RATE: 77 BPM | TEMPERATURE: 98.4 F | SYSTOLIC BLOOD PRESSURE: 122 MMHG | RESPIRATION RATE: 17 BRPM

## 2020-05-13 DIAGNOSIS — R60.9 WATER RETENTION: ICD-10-CM

## 2020-05-13 DIAGNOSIS — F41.9 ANXIETY: ICD-10-CM

## 2020-05-13 DIAGNOSIS — T50.905A MEDICATION SIDE EFFECT, INITIAL ENCOUNTER: ICD-10-CM

## 2020-05-13 DIAGNOSIS — G35 MS (MULTIPLE SCLEROSIS) (HCC): ICD-10-CM

## 2020-05-13 DIAGNOSIS — R60.0 EDEMA OF BOTH LOWER LEGS: Primary | ICD-10-CM

## 2020-05-13 DIAGNOSIS — Z72.0 TOBACCO ABUSE: ICD-10-CM

## 2020-05-13 PROCEDURE — 400014 HH F/U

## 2020-05-13 PROCEDURE — 3331090002 HH PPS REVENUE DEBIT

## 2020-05-13 PROCEDURE — 3331090001 HH PPS REVENUE CREDIT

## 2020-05-13 PROCEDURE — G0151 HHCP-SERV OF PT,EA 15 MIN: HCPCS

## 2020-05-13 RX ORDER — FUROSEMIDE 20 MG/1
TABLET ORAL
Qty: 30 TAB | Refills: 0 | Status: SHIPPED | OUTPATIENT
Start: 2020-05-13 | End: 2020-08-06

## 2020-05-13 NOTE — PROGRESS NOTES
Chief Complaint   Patient presents with    Swelling     Pt state she has swelling in tamar legs and ankles. 1. Have you been to the ER, urgent care clinic since your last visit? Hospitalized since your last visit? No    2. Have you seen or consulted any other health care providers outside of the [address] [place] [address] since your last visit? Include any pap smears or colon screening.  No

## 2020-05-13 NOTE — PROGRESS NOTES
HISTORY OF PRESENT ILLNESS  Wayne Novak is a 53 y.o. female. HPI    Today's Pt with MS  main concerns were provided on virtual visit and Video telemed format,  Present on VV for the concern of the current medical conditions,  pt is w/ out comorbid history and stating that the pt has had no traveling and unaware if the pt has been exposed to covid-19 individual,   Pt Have been staying at home for couple of wks,   not asthmatic, no wheezing ++ tobacco abuser half a pack daily, pt has no fever no cough no dyspnea, no ha, not dizzy, nl smell nl taste, no N/V/D,   Not feeling anxious, and finally having no sinus congestion,   no body ache. Patient complains of edema. The location of the edema is lower leg(s) bilateral, ankle(s) bilateral, feet bilateral.  The edema has been moderate. Onset of symptoms was a few weeks ago, gradually worsening since that time. The edema is present all day. The patient states the problem is long-standing. on the pt last visits, the patient did not have much of the legs swelling, the weight was also better, Today the patient denies leg pain, denies rash, and legs lesion,and the denial of dizziness,   the wt went up   couple days ago was 197lbs,   Weight 200 lb (90.7 kg) 192 lb 9.6 oz (87.4 kg) 200 lb      Currently taking Gabapentin and requesing to get more dosages and opioid and Ativan meds for her joint pain and anxiety, pt was told one of meds side effects but arguementive and not cooperative,         Current Outpatient Medications   Medication Sig Dispense Refill    gabapentin (NEURONTIN) 400 mg capsule Take 2 Caps by mouth three (3) times daily. Max Daily Amount: 2,400 mg. (Patient taking differently: Take 2 Caps by mouth two (2) times a day. Take 2 capsules 2 times a day, do not exceed 1600 mg daily) 120 Cap 0    melatonin 3 mg tablet Take 1 Tab by mouth nightly as needed for Insomnia. (Patient taking differently: Take 3 mg by mouth nightly as needed for Insomnia. Indications: Pt state she takes 5 mg tab nightly.) 30 Tab 4    DULoxetine (CYMBALTA) 60 mg capsule Take 1 Cap by mouth daily. Indications: neuropathic pain 30 Cap 1    senna-docusate (PERICOLACE) 8.6-50 mg per tablet Take 2 Tabs by mouth daily. 15 Tab 0    ibuprofen (MOTRIN) 600 mg tablet Take 1 Tab by mouth every six (6) hours as needed for Pain. (Patient taking differently: Take 200 mg by mouth.) 20 Tab 0    ibuprofen (MOTRIN) 200 mg tablet Take  by mouth every six (6) hours as needed for Pain. Take three tablets every 6 hours as needed by mouth       clonazePAM (KlonoPIN) 0.5 mg tablet Take 1 Tab by mouth two (2) times a day. Max Daily Amount: 1 mg. (Patient taking differently: Take 0.5 mg by mouth three (3) times daily.) 60 Tab 0    LORazepam (ATIVAN) 1 mg tablet Take 1 Tab by mouth every twelve (12) hours as needed for Anxiety (q12h prn). Max Daily Amount: 2 mg. 40 Tab 0    dimethyl fumarate (TECFIDERA) 240 mg cpDR Take 240 mg by mouth two (2) times a day.  120 Cap 3     Allergies   Allergen Reactions    Morphine Rash     Past Medical History:   Diagnosis Date    Body aches 12/11/2014    Chronic pain     Depression     GERD (gastroesophageal reflux disease)     Headache(784.0)     History of mammogram never before    MS (multiple sclerosis) (HCC)     Neurological disorder     Multiple Sclerosis    Pap smear for cervical cancer screening 2008    Psychiatric disorder     anxiety    Psychotic disorder [facility])      Past Surgical History:   Procedure Laterality Date    COLONOSCOPY N/A 2/28/2018    COLON[facility]ales [facility] ENDOSCOPY    HX CHOLECYSTECTOMY      HX GYN      3 c-sections    HX HEENT      bilateral ear tube surgery    HX HERNIA REPAIR      HX OTHER SURGICAL      R inguinal hernia repair     Family History   Problem Relation Age of Onset    Heart Attack Father         tripple bypass    Cancer Father         lung    COPD Father     Diabetes Mother type 2    Heart Disease Mother         heart disease    Diabetes Paternal Grandmother     No Known Problems Sister     No Known Problems Brother     No Known Problems Child      Social History     Tobacco Use    Smoking status: Current Every Day Smoker     Packs/day: 0.50     Years: 17.00     Pack years: 8.50     Types: Cigarettes    Smokeless tobacco: Never Used    Tobacco comment: 1 pack every 3 days. Substance Use Topics    Alcohol use: No      Lab Results   Component Value Date/Time    WBC 6.4 03/27/2020 04:05 AM    HGB 10.6 (L) 03/27/2020 04:05 AM    HCT 31.6 (L) 03/27/2020 04:05 AM    PLATELET 236 03/47/0655 04:05 AM    MCV 94.6 03/27/2020 04:05 AM     Lab Results   Component Value Date/Time    GFR est non-AA >60 03/27/2020 04:05 AM    GFR est AA >60 03/27/2020 04:05 AM    Creatinine 0.48 (L) 03/27/2020 04:05 AM    BUN 17 03/27/2020 04:05 AM    Sodium 139 03/27/2020 04:05 AM    Potassium 4.0 03/27/2020 04:05 AM    Chloride 109 (H) 03/27/2020 04:05 AM    CO2 27 03/27/2020 04:05 AM    Magnesium 1.9 03/25/2020 09:49 AM    Phosphorus 2.6 03/25/2020 09:49 AM        Review of Systems   Constitutional: Negative for chills and fever. HENT: Negative for congestion and nosebleeds. Eyes: Negative for blurred vision and pain. Respiratory: Negative for cough, shortness of breath and wheezing. Cardiovascular: Positive for leg swelling. Negative for chest pain. Gastrointestinal: Negative for constipation, diarrhea, nausea and vomiting. Genitourinary: Negative for dysuria and frequency. Musculoskeletal: Negative for joint pain and myalgias. Skin: Negative for itching and rash. Neurological: Negative for dizziness, loss of consciousness and headaches. Psychiatric/Behavioral: Negative for depression. The patient is not nervous/anxious and does not have insomnia. Physical Exam  Constitutional:       Appearance: She is obese. She is not ill-appearing or toxic-appearing.    HENT:      Head: Normocephalic and atraumatic. Mouth/Throat:      Mouth: Mucous membranes are moist.   Musculoskeletal:         General: Swelling and tenderness present. Right lower leg: Edema present. Left lower leg: Edema present. Neurological:      Mental Status: She is alert and oriented to person, place, and time. Psychiatric:         Mood and Affect: Mood normal.         Behavior: Behavior normal.         Thought Content: Thought content normal.         Judgment: Judgment normal.         ASSESSMENT and PLAN  Diagnoses and all orders for this visit:    1. Edema of both lower legs  -     REFERRAL TO PAIN CLINIC  -     furosemide (LASIX) 20 mg tablet; Once daily    2. Water retention  -     REFERRAL TO PAIN CLINIC  -     furosemide (LASIX) 20 mg tablet; Once daily    3. Medication side effect, initial encounter  -     REFERRAL TO PAIN CLINIC  -     furosemide (LASIX) 20 mg tablet; Once daily    4. MS (multiple sclerosis) ([facility])  -     REFERRAL TO PAIN CLINIC  -     REFERRAL TO RHEUMATOLOGY    5. Tobacco abuse  -     furosemide (LASIX) 20 mg tablet; Once daily    6.  Anxiety  -     REFERRAL TO PSYCHIATRY

## 2020-05-14 ENCOUNTER — HOME CARE VISIT (OUTPATIENT)
Dept: SCHEDULING | Facility: HOME HEALTH | Age: 45
End: 2020-05-14
Payer: MEDICARE

## 2020-05-14 ENCOUNTER — HOME CARE VISIT (OUTPATIENT)
Dept: HOME HEALTH SERVICES | Facility: HOME HEALTH | Age: 45
End: 2020-05-14
Payer: MEDICARE

## 2020-05-14 VITALS
HEART RATE: 67 BPM | DIASTOLIC BLOOD PRESSURE: 89 MMHG | TEMPERATURE: 98.2 F | SYSTOLIC BLOOD PRESSURE: 128 MMHG | OXYGEN SATURATION: 99 % | RESPIRATION RATE: 17 BRPM

## 2020-05-14 PROCEDURE — 3331090001 HH PPS REVENUE CREDIT

## 2020-05-14 PROCEDURE — 3331090002 HH PPS REVENUE DEBIT

## 2020-05-14 PROCEDURE — G0151 HHCP-SERV OF PT,EA 15 MIN: HCPCS

## 2020-05-15 ENCOUNTER — HOME CARE VISIT (OUTPATIENT)
Dept: SCHEDULING | Facility: HOME HEALTH | Age: 45
End: 2020-05-15
Payer: MEDICARE

## 2020-05-15 PROCEDURE — 3331090002 HH PPS REVENUE DEBIT

## 2020-05-15 PROCEDURE — 3331090001 HH PPS REVENUE CREDIT

## 2020-05-16 PROCEDURE — 3331090002 HH PPS REVENUE DEBIT

## 2020-05-16 PROCEDURE — 3331090001 HH PPS REVENUE CREDIT

## 2020-05-17 PROCEDURE — 3331090002 HH PPS REVENUE DEBIT

## 2020-05-17 PROCEDURE — 3331090001 HH PPS REVENUE CREDIT

## 2020-05-18 ENCOUNTER — HOME CARE VISIT (OUTPATIENT)
Dept: HOME HEALTH SERVICES | Facility: HOME HEALTH | Age: 45
End: 2020-05-18
Payer: MEDICARE

## 2020-05-18 ENCOUNTER — HOME CARE VISIT (OUTPATIENT)
Dept: SCHEDULING | Facility: HOME HEALTH | Age: 45
End: 2020-05-18
Payer: MEDICARE

## 2020-05-18 PROCEDURE — 3331090002 HH PPS REVENUE DEBIT

## 2020-05-18 PROCEDURE — 3331090001 HH PPS REVENUE CREDIT

## 2020-05-18 PROCEDURE — G0153 HHCP-SVS OF S/L PATH,EA 15MN: HCPCS

## 2020-05-19 ENCOUNTER — HOME CARE VISIT (OUTPATIENT)
Dept: HOME HEALTH SERVICES | Facility: HOME HEALTH | Age: 45
End: 2020-05-19
Payer: MEDICARE

## 2020-05-19 ENCOUNTER — HOME CARE VISIT (OUTPATIENT)
Dept: SCHEDULING | Facility: HOME HEALTH | Age: 45
End: 2020-05-19
Payer: MEDICARE

## 2020-05-19 VITALS
HEART RATE: 65 BPM | OXYGEN SATURATION: 96 % | SYSTOLIC BLOOD PRESSURE: 132 MMHG | TEMPERATURE: 98 F | DIASTOLIC BLOOD PRESSURE: 76 MMHG

## 2020-05-19 VITALS
RESPIRATION RATE: 18 BRPM | TEMPERATURE: 97.7 F | SYSTOLIC BLOOD PRESSURE: 122 MMHG | DIASTOLIC BLOOD PRESSURE: 88 MMHG | HEART RATE: 77 BPM

## 2020-05-19 PROCEDURE — 3331090001 HH PPS REVENUE CREDIT

## 2020-05-19 PROCEDURE — G0151 HHCP-SERV OF PT,EA 15 MIN: HCPCS

## 2020-05-19 PROCEDURE — 3331090002 HH PPS REVENUE DEBIT

## 2020-05-20 PROCEDURE — 3331090002 HH PPS REVENUE DEBIT

## 2020-05-20 PROCEDURE — 3331090001 HH PPS REVENUE CREDIT

## 2020-05-21 ENCOUNTER — HOME CARE VISIT (OUTPATIENT)
Dept: HOME HEALTH SERVICES | Facility: HOME HEALTH | Age: 45
End: 2020-05-21
Payer: MEDICARE

## 2020-05-21 ENCOUNTER — HOME CARE VISIT (OUTPATIENT)
Dept: SCHEDULING | Facility: HOME HEALTH | Age: 45
End: 2020-05-21
Payer: MEDICARE

## 2020-05-21 ENCOUNTER — TELEPHONE (OUTPATIENT)
Dept: NEUROLOGY | Age: 45
End: 2020-05-21

## 2020-05-21 ENCOUNTER — TELEPHONE (OUTPATIENT)
Dept: FAMILY MEDICINE CLINIC | Age: 45
End: 2020-05-21

## 2020-05-21 VITALS
SYSTOLIC BLOOD PRESSURE: 114 MMHG | TEMPERATURE: 98.2 F | HEART RATE: 82 BPM | RESPIRATION RATE: 17 BRPM | DIASTOLIC BLOOD PRESSURE: 69 MMHG

## 2020-05-21 PROCEDURE — 3331090001 HH PPS REVENUE CREDIT

## 2020-05-21 PROCEDURE — G0151 HHCP-SERV OF PT,EA 15 MIN: HCPCS

## 2020-05-21 PROCEDURE — 3331090002 HH PPS REVENUE DEBIT

## 2020-05-21 NOTE — TELEPHONE ENCOUNTER
Pt calling back with info     360  of  400 mg gabapentin 3 times a day   2mg  Alprozalam    10 mg  Ambien   1 mg Clonazapam       She is going to check with pharmacy and try to have them send over list         Best number to reach her is [phone]

## 2020-05-21 NOTE — TELEPHONE ENCOUNTER
Returned call to pt,  Stated she was clarifying dosages of medications for Dr Megan Garcia. Message sent to DR Megan Garcia.   no further assist needed Sunday developed left knee swelling, has rash behind knee. Also c/o muscle tightness.

## 2020-05-22 PROCEDURE — 3331090002 HH PPS REVENUE DEBIT

## 2020-05-22 PROCEDURE — 3331090001 HH PPS REVENUE CREDIT

## 2020-05-23 PROCEDURE — 3331090002 HH PPS REVENUE DEBIT

## 2020-05-23 PROCEDURE — 3331090001 HH PPS REVENUE CREDIT

## 2020-05-24 PROCEDURE — 3331090001 HH PPS REVENUE CREDIT

## 2020-05-24 PROCEDURE — 3331090002 HH PPS REVENUE DEBIT

## 2020-05-25 PROCEDURE — 3331090001 HH PPS REVENUE CREDIT

## 2020-05-25 PROCEDURE — 3331090002 HH PPS REVENUE DEBIT

## 2020-05-26 ENCOUNTER — HOME CARE VISIT (OUTPATIENT)
Dept: HOME HEALTH SERVICES | Facility: HOME HEALTH | Age: 45
End: 2020-05-26
Payer: MEDICARE

## 2020-05-26 ENCOUNTER — HOME CARE VISIT (OUTPATIENT)
Dept: SCHEDULING | Facility: HOME HEALTH | Age: 45
End: 2020-05-26
Payer: MEDICARE

## 2020-05-26 PROCEDURE — 3331090001 HH PPS REVENUE CREDIT

## 2020-05-26 PROCEDURE — 3331090002 HH PPS REVENUE DEBIT

## 2020-05-27 ENCOUNTER — HOME CARE VISIT (OUTPATIENT)
Dept: HOME HEALTH SERVICES | Facility: HOME HEALTH | Age: 45
End: 2020-05-27
Payer: MEDICARE

## 2020-05-27 ENCOUNTER — HOME CARE VISIT (OUTPATIENT)
Dept: SCHEDULING | Facility: HOME HEALTH | Age: 45
End: 2020-05-27
Payer: MEDICARE

## 2020-05-27 PROCEDURE — 3331090001 HH PPS REVENUE CREDIT

## 2020-05-27 PROCEDURE — 3331090002 HH PPS REVENUE DEBIT

## 2020-05-27 PROCEDURE — G0153 HHCP-SVS OF S/L PATH,EA 15MN: HCPCS

## 2020-05-28 ENCOUNTER — HOME CARE VISIT (OUTPATIENT)
Dept: SCHEDULING | Facility: HOME HEALTH | Age: 45
End: 2020-05-28
Payer: MEDICARE

## 2020-05-28 ENCOUNTER — HOME CARE VISIT (OUTPATIENT)
Dept: HOME HEALTH SERVICES | Facility: HOME HEALTH | Age: 45
End: 2020-05-28
Payer: MEDICARE

## 2020-05-28 VITALS
HEART RATE: 89 BPM | RESPIRATION RATE: 17 BRPM | DIASTOLIC BLOOD PRESSURE: 88 MMHG | TEMPERATURE: 98.8 F | SYSTOLIC BLOOD PRESSURE: 124 MMHG

## 2020-05-28 VITALS
OXYGEN SATURATION: 98 % | HEART RATE: 78 BPM | DIASTOLIC BLOOD PRESSURE: 86 MMHG | SYSTOLIC BLOOD PRESSURE: 130 MMHG | TEMPERATURE: 98.1 F

## 2020-05-28 PROCEDURE — 3331090001 HH PPS REVENUE CREDIT

## 2020-05-28 PROCEDURE — G0151 HHCP-SERV OF PT,EA 15 MIN: HCPCS

## 2020-05-28 PROCEDURE — 3331090002 HH PPS REVENUE DEBIT

## 2020-06-10 DIAGNOSIS — G89.4 CHRONIC PAIN SYNDROME: ICD-10-CM

## 2020-06-10 DIAGNOSIS — R52 BODY ACHES: ICD-10-CM

## 2020-06-10 DIAGNOSIS — G35 MS (MULTIPLE SCLEROSIS) (HCC): ICD-10-CM

## 2020-06-10 DIAGNOSIS — F13.20 BENZODIAZEPINE DEPENDENCE (HCC): ICD-10-CM

## 2020-06-10 RX ORDER — DIMETHYL FUMARATE 240 MG/1
240 CAPSULE ORAL 2 TIMES DAILY
Qty: 120 CAP | Refills: 3 | Status: SHIPPED | OUTPATIENT
Start: 2020-06-10 | End: 2020-09-28

## 2020-06-10 RX ORDER — CYCLOBENZAPRINE HCL 10 MG
10 TABLET ORAL
Qty: 90 TAB | Refills: 2 | Status: SHIPPED | OUTPATIENT
Start: 2020-06-10 | End: 2021-03-30

## 2020-06-17 ENCOUNTER — TELEPHONE (OUTPATIENT)
Dept: NEUROLOGY | Age: 45
End: 2020-06-17

## 2020-06-19 DIAGNOSIS — R26.9 GAIT DISTURBANCE: ICD-10-CM

## 2020-06-19 DIAGNOSIS — G35 MS (MULTIPLE SCLEROSIS) (HCC): Primary | ICD-10-CM

## 2020-07-16 ENCOUNTER — VIRTUAL VISIT (OUTPATIENT)
Dept: NEUROLOGY | Age: 45
End: 2020-07-16

## 2020-07-16 DIAGNOSIS — F43.23 ADJUSTMENT DISORDER WITH MIXED ANXIETY AND DEPRESSED MOOD: ICD-10-CM

## 2020-07-16 DIAGNOSIS — G89.4 CHRONIC PAIN SYNDROME: ICD-10-CM

## 2020-07-16 DIAGNOSIS — F33.2 SEVERE EPISODE OF RECURRENT MAJOR DEPRESSIVE DISORDER, WITHOUT PSYCHOTIC FEATURES (HCC): ICD-10-CM

## 2020-07-16 DIAGNOSIS — Z72.0 TOBACCO ABUSE: ICD-10-CM

## 2020-07-16 DIAGNOSIS — G35 MS (MULTIPLE SCLEROSIS) (HCC): ICD-10-CM

## 2020-07-16 DIAGNOSIS — F41.9 ANXIETY: ICD-10-CM

## 2020-07-16 NOTE — TELEPHONE ENCOUNTER
----- Message from Morales Mckinnon sent at 7/16/2020  4:32 PM EDT -----  Regarding: Dr. Devi Fonseca first and last name:  pt    Reason for call:  Requesting to r/s missed appt    Callback required yes/no and why:  yes    Best contact number(s):  [phone]    Details to clarify the request:  Requesting to r/s missed virtual appt scheduled today. Pt also requesting a refill of \"Gabapentin\" to be sent [address] [facility]. Pt is out of medication now for 2 days. Pt stated, medication is not not enough.      Morales Mckinnon

## 2020-07-17 NOTE — TELEPHONE ENCOUNTER
Patient is requesting a refill for    Requested Prescriptions     Pending Prescriptions Disp Refills    gabapentin (NEURONTIN) 400 mg capsule 120 Cap 0     Sig: Take 2 Caps by mouth three (3) times daily. Max Daily Amount: 2,400 mg.      Future Appointments   Date Time Provider Naveen Martins   7/20/2020 11:00 AM May MD Anitha Rosario   8/28/2020 11:40 AM May MD Anitha Rosario                         Last Appointment My Department:  7/16/2020    Please review

## 2020-07-24 ENCOUNTER — TELEPHONE (OUTPATIENT)
Dept: NEUROLOGY | Age: 45
End: 2020-07-24

## 2020-07-27 RX ORDER — GABAPENTIN 400 MG/1
800 CAPSULE ORAL 2 TIMES DAILY
Qty: 60 CAP | Refills: 0 | Status: SHIPPED | OUTPATIENT
Start: 2020-07-27 | End: 2020-09-24 | Stop reason: SDUPTHER

## 2020-08-06 ENCOUNTER — APPOINTMENT (OUTPATIENT)
Dept: MRI IMAGING | Age: 45
DRG: 060 | End: 2020-08-06
Attending: HOSPITALIST
Payer: MEDICARE

## 2020-08-06 ENCOUNTER — HOSPITAL ENCOUNTER (INPATIENT)
Age: 45
LOS: 6 days | Discharge: NURSING, REHAB OR LONG-TERM CARE | DRG: 060 | End: 2020-08-12
Attending: EMERGENCY MEDICINE | Admitting: HOSPITALIST
Payer: MEDICARE

## 2020-08-06 ENCOUNTER — APPOINTMENT (OUTPATIENT)
Dept: CT IMAGING | Age: 45
DRG: 060 | End: 2020-08-06
Attending: EMERGENCY MEDICINE
Payer: MEDICARE

## 2020-08-06 DIAGNOSIS — R29.810 FACIAL DROOP: ICD-10-CM

## 2020-08-06 DIAGNOSIS — R93.0 ABNORMAL CT SCAN OF HEAD: ICD-10-CM

## 2020-08-06 DIAGNOSIS — G35 EXACERBATION OF MULTIPLE SCLEROSIS (HCC): ICD-10-CM

## 2020-08-06 DIAGNOSIS — F12.10 TETRAHYDROCANNABINOL (THC) USE DISORDER, MILD, ABUSE: ICD-10-CM

## 2020-08-06 DIAGNOSIS — G35 MULTIPLE SCLEROSIS EXACERBATION (HCC): ICD-10-CM

## 2020-08-06 DIAGNOSIS — G35 RELAPSING REMITTING MULTIPLE SCLEROSIS (HCC): Primary | ICD-10-CM

## 2020-08-06 LAB
ALBUMIN SERPL-MCNC: 3.3 G/DL (ref 3.5–5)
ALBUMIN/GLOB SERPL: 1 {RATIO} (ref 1.1–2.2)
ALP SERPL-CCNC: 55 U/L (ref 45–117)
ALT SERPL-CCNC: 11 U/L (ref 12–78)
AMPHET UR QL SCN: NEGATIVE
ANION GAP SERPL CALC-SCNC: 5 MMOL/L (ref 5–15)
APPEARANCE UR: CLEAR
AST SERPL-CCNC: 9 U/L (ref 15–37)
BACTERIA URNS QL MICRO: NEGATIVE /HPF
BARBITURATES UR QL SCN: NEGATIVE
BASOPHILS # BLD: 0.1 K/UL (ref 0–0.1)
BASOPHILS NFR BLD: 1 % (ref 0–1)
BENZODIAZ UR QL: POSITIVE
BILIRUB SERPL-MCNC: 0.5 MG/DL (ref 0.2–1)
BILIRUB UR QL: NEGATIVE
BUN SERPL-MCNC: 4 MG/DL (ref 6–20)
BUN/CREAT SERPL: 5 (ref 12–20)
CALCIUM SERPL-MCNC: 8.8 MG/DL (ref 8.5–10.1)
CANNABINOIDS UR QL SCN: POSITIVE
CHLORIDE SERPL-SCNC: 108 MMOL/L (ref 97–108)
CO2 SERPL-SCNC: 26 MMOL/L (ref 21–32)
COCAINE UR QL SCN: NEGATIVE
COLOR UR: NORMAL
CREAT SERPL-MCNC: 0.84 MG/DL (ref 0.55–1.02)
DIFFERENTIAL METHOD BLD: ABNORMAL
DRUG SCRN COMMENT,DRGCM: ABNORMAL
EOSINOPHIL # BLD: 0.1 K/UL (ref 0–0.4)
EOSINOPHIL NFR BLD: 3 % (ref 0–7)
EPITH CASTS URNS QL MICRO: NORMAL /LPF
ERYTHROCYTE [DISTWIDTH] IN BLOOD BY AUTOMATED COUNT: 13 % (ref 11.5–14.5)
GLOBULIN SER CALC-MCNC: 3.3 G/DL (ref 2–4)
GLUCOSE BLD STRIP.AUTO-MCNC: 167 MG/DL (ref 65–100)
GLUCOSE SERPL-MCNC: 74 MG/DL (ref 65–100)
GLUCOSE UR STRIP.AUTO-MCNC: NEGATIVE MG/DL
HCT VFR BLD AUTO: 35.6 % (ref 35–47)
HGB BLD-MCNC: 11.6 G/DL (ref 11.5–16)
HGB UR QL STRIP: NEGATIVE
HYALINE CASTS URNS QL MICRO: NORMAL /LPF (ref 0–5)
IMM GRANULOCYTES # BLD AUTO: 0 K/UL (ref 0–0.04)
IMM GRANULOCYTES NFR BLD AUTO: 0 % (ref 0–0.5)
KETONES UR QL STRIP.AUTO: NEGATIVE MG/DL
LEUKOCYTE ESTERASE UR QL STRIP.AUTO: NEGATIVE
LYMPHOCYTES # BLD: 0.9 K/UL (ref 0.8–3.5)
LYMPHOCYTES NFR BLD: 27 % (ref 12–49)
MCH RBC QN AUTO: 31.8 PG (ref 26–34)
MCHC RBC AUTO-ENTMCNC: 32.6 G/DL (ref 30–36.5)
MCV RBC AUTO: 97.5 FL (ref 80–99)
METHADONE UR QL: NEGATIVE
MONOCYTES # BLD: 0.3 K/UL (ref 0–1)
MONOCYTES NFR BLD: 8 % (ref 5–13)
NEUTS SEG # BLD: 2.2 K/UL (ref 1.8–8)
NEUTS SEG NFR BLD: 61 % (ref 32–75)
NITRITE UR QL STRIP.AUTO: NEGATIVE
NRBC # BLD: 0 K/UL (ref 0–0.01)
NRBC BLD-RTO: 0 PER 100 WBC
OPIATES UR QL: NEGATIVE
PCP UR QL: NEGATIVE
PH UR STRIP: 5.5 [PH] (ref 5–8)
PLATELET # BLD AUTO: 280 K/UL (ref 150–400)
PMV BLD AUTO: 9.5 FL (ref 8.9–12.9)
POTASSIUM SERPL-SCNC: 4.3 MMOL/L (ref 3.5–5.1)
PROT SERPL-MCNC: 6.6 G/DL (ref 6.4–8.2)
PROT UR STRIP-MCNC: NEGATIVE MG/DL
RBC # BLD AUTO: 3.65 M/UL (ref 3.8–5.2)
RBC #/AREA URNS HPF: NORMAL /HPF (ref 0–5)
SERVICE CMNT-IMP: ABNORMAL
SODIUM SERPL-SCNC: 139 MMOL/L (ref 136–145)
SP GR UR REFRACTOMETRY: 1.01 (ref 1–1.03)
UA: UC IF INDICATED,UAUC: NORMAL
UROBILINOGEN UR QL STRIP.AUTO: 0.2 EU/DL (ref 0.2–1)
WBC # BLD AUTO: 3.5 K/UL (ref 3.6–11)
WBC URNS QL MICRO: NORMAL /HPF (ref 0–4)

## 2020-08-06 PROCEDURE — 96365 THER/PROPH/DIAG IV INF INIT: CPT

## 2020-08-06 PROCEDURE — 72156 MRI NECK SPINE W/O & W/DYE: CPT

## 2020-08-06 PROCEDURE — 74011250637 HC RX REV CODE- 250/637: Performed by: HOSPITALIST

## 2020-08-06 PROCEDURE — 81001 URINALYSIS AUTO W/SCOPE: CPT

## 2020-08-06 PROCEDURE — 70450 CT HEAD/BRAIN W/O DYE: CPT

## 2020-08-06 PROCEDURE — 36415 COLL VENOUS BLD VENIPUNCTURE: CPT

## 2020-08-06 PROCEDURE — 74011250636 HC RX REV CODE- 250/636: Performed by: HOSPITALIST

## 2020-08-06 PROCEDURE — 80053 COMPREHEN METABOLIC PANEL: CPT

## 2020-08-06 PROCEDURE — 96375 TX/PRO/DX INJ NEW DRUG ADDON: CPT

## 2020-08-06 PROCEDURE — 80307 DRUG TEST PRSMV CHEM ANLYZR: CPT

## 2020-08-06 PROCEDURE — 74011250636 HC RX REV CODE- 250/636: Performed by: EMERGENCY MEDICINE

## 2020-08-06 PROCEDURE — A9575 INJ GADOTERATE MEGLUMI 0.1ML: HCPCS | Performed by: EMERGENCY MEDICINE

## 2020-08-06 PROCEDURE — 74011000258 HC RX REV CODE- 258: Performed by: EMERGENCY MEDICINE

## 2020-08-06 PROCEDURE — 82962 GLUCOSE BLOOD TEST: CPT

## 2020-08-06 PROCEDURE — 99285 EMERGENCY DEPT VISIT HI MDM: CPT

## 2020-08-06 PROCEDURE — 70553 MRI BRAIN STEM W/O & W/DYE: CPT

## 2020-08-06 PROCEDURE — 65660000000 HC RM CCU STEPDOWN

## 2020-08-06 PROCEDURE — 85025 COMPLETE CBC W/AUTO DIFF WBC: CPT

## 2020-08-06 PROCEDURE — 74011000258 HC RX REV CODE- 258: Performed by: HOSPITALIST

## 2020-08-06 RX ORDER — ACETAMINOPHEN 325 MG/1
650 TABLET ORAL
Status: DISCONTINUED | OUTPATIENT
Start: 2020-08-06 | End: 2020-08-12 | Stop reason: HOSPADM

## 2020-08-06 RX ORDER — GADOTERATE MEGLUMINE 376.9 MG/ML
20 INJECTION INTRAVENOUS
Status: COMPLETED | OUTPATIENT
Start: 2020-08-06 | End: 2020-08-06

## 2020-08-06 RX ORDER — PANTOPRAZOLE SODIUM 40 MG/1
40 TABLET, DELAYED RELEASE ORAL
Status: DISCONTINUED | OUTPATIENT
Start: 2020-08-07 | End: 2020-08-12 | Stop reason: HOSPADM

## 2020-08-06 RX ORDER — DEXTROSE 50 % IN WATER (D50W) INTRAVENOUS SYRINGE
12.5-25 AS NEEDED
Status: DISCONTINUED | OUTPATIENT
Start: 2020-08-06 | End: 2020-08-12 | Stop reason: HOSPADM

## 2020-08-06 RX ORDER — DULOXETIN HYDROCHLORIDE 30 MG/1
60 CAPSULE, DELAYED RELEASE ORAL DAILY
Status: DISCONTINUED | OUTPATIENT
Start: 2020-08-07 | End: 2020-08-12 | Stop reason: HOSPADM

## 2020-08-06 RX ORDER — ACETAMINOPHEN 650 MG/1
650 SUPPOSITORY RECTAL
Status: DISCONTINUED | OUTPATIENT
Start: 2020-08-06 | End: 2020-08-12 | Stop reason: HOSPADM

## 2020-08-06 RX ORDER — LORAZEPAM 2 MG/ML
1 INJECTION INTRAMUSCULAR ONCE
Status: COMPLETED | OUTPATIENT
Start: 2020-08-06 | End: 2020-08-06

## 2020-08-06 RX ORDER — SODIUM CHLORIDE 0.9 % (FLUSH) 0.9 %
5-40 SYRINGE (ML) INJECTION AS NEEDED
Status: DISCONTINUED | OUTPATIENT
Start: 2020-08-06 | End: 2020-08-12 | Stop reason: HOSPADM

## 2020-08-06 RX ORDER — KETOROLAC TROMETHAMINE 30 MG/ML
30 INJECTION, SOLUTION INTRAMUSCULAR; INTRAVENOUS
Status: COMPLETED | OUTPATIENT
Start: 2020-08-06 | End: 2020-08-06

## 2020-08-06 RX ORDER — SODIUM CHLORIDE 0.9 % (FLUSH) 0.9 %
5-40 SYRINGE (ML) INJECTION EVERY 8 HOURS
Status: DISCONTINUED | OUTPATIENT
Start: 2020-08-06 | End: 2020-08-12 | Stop reason: HOSPADM

## 2020-08-06 RX ORDER — CLONAZEPAM 0.5 MG/1
0.5 TABLET ORAL 2 TIMES DAILY
Status: DISCONTINUED | OUTPATIENT
Start: 2020-08-06 | End: 2020-08-12 | Stop reason: HOSPADM

## 2020-08-06 RX ORDER — LANOLIN ALCOHOL/MO/W.PET/CERES
3 CREAM (GRAM) TOPICAL
Status: DISCONTINUED | OUTPATIENT
Start: 2020-08-06 | End: 2020-08-12 | Stop reason: HOSPADM

## 2020-08-06 RX ORDER — INSULIN LISPRO 100 [IU]/ML
INJECTION, SOLUTION INTRAVENOUS; SUBCUTANEOUS
Status: DISCONTINUED | OUTPATIENT
Start: 2020-08-06 | End: 2020-08-12 | Stop reason: HOSPADM

## 2020-08-06 RX ORDER — MAGNESIUM SULFATE 100 %
4 CRYSTALS MISCELLANEOUS AS NEEDED
Status: DISCONTINUED | OUTPATIENT
Start: 2020-08-06 | End: 2020-08-12 | Stop reason: HOSPADM

## 2020-08-06 RX ORDER — ONDANSETRON 2 MG/ML
4 INJECTION INTRAMUSCULAR; INTRAVENOUS
Status: DISCONTINUED | OUTPATIENT
Start: 2020-08-06 | End: 2020-08-12 | Stop reason: HOSPADM

## 2020-08-06 RX ORDER — PROMETHAZINE HYDROCHLORIDE 25 MG/1
12.5 TABLET ORAL
Status: DISCONTINUED | OUTPATIENT
Start: 2020-08-06 | End: 2020-08-12 | Stop reason: HOSPADM

## 2020-08-06 RX ORDER — POLYETHYLENE GLYCOL 3350 17 G/17G
17 POWDER, FOR SOLUTION ORAL DAILY PRN
Status: DISCONTINUED | OUTPATIENT
Start: 2020-08-06 | End: 2020-08-12 | Stop reason: HOSPADM

## 2020-08-06 RX ADMIN — SODIUM CHLORIDE 1000 MG: 900 INJECTION, SOLUTION INTRAVENOUS at 14:38

## 2020-08-06 RX ADMIN — SODIUM CHLORIDE 1000 MG: 900 INJECTION, SOLUTION INTRAVENOUS at 22:47

## 2020-08-06 RX ADMIN — LORAZEPAM 1 MG: 2 INJECTION INTRAMUSCULAR; INTRAVENOUS at 18:28

## 2020-08-06 RX ADMIN — MELATONIN 3 MG: at 22:43

## 2020-08-06 RX ADMIN — GABAPENTIN 800 MG: 100 CAPSULE ORAL at 22:42

## 2020-08-06 RX ADMIN — Medication 10 ML: at 22:44

## 2020-08-06 RX ADMIN — CLONAZEPAM 0.5 MG: 0.5 TABLET ORAL at 21:00

## 2020-08-06 RX ADMIN — GADOTERATE MEGLUMINE 20 ML: 376.9 INJECTION INTRAVENOUS at 19:43

## 2020-08-06 RX ADMIN — KETOROLAC TROMETHAMINE 30 MG: 30 INJECTION, SOLUTION INTRAMUSCULAR at 15:00

## 2020-08-06 NOTE — ED NOTES
Spoke to the nurse in Neuro that will be receiving this patient after MRI. She advised that she will call Cj Santos in oncology to get report from her since [facility] had already called report to them.

## 2020-08-06 NOTE — PROGRESS NOTES

## 2020-08-06 NOTE — ED PROVIDER NOTES
EMERGENCY DEPARTMENT HISTORY AND PHYSICAL EXAM      Please note that this dictation was completed with [employer], the computer voice recognition software. Quite often unanticipated grammatical, syntax, homophones, and other interpretive errors are inadvertently transcribed by the computer software. Please disregard these errors and any errors that have escaped final proofreading. Thank you. Date: 8/6/2020  Patient Name: Adalberto Reddy  Patient Age and Sex: 53 y.o. female    History of Presenting Illness     Chief Complaint   Patient presents with    Multiple Sclerosis     Pt arrives to ER via EMS coming from home. Pt has a hx of MS, and c/o of a flare up x 2 weeks. Pt c/o over all weakness, fatigue, lethargy, and multiple falls within last 2 weeks d/t her weakness.  Extremity Weakness       History Provided By: Patient and EMS    HPI: Adalberto Reddy, 53 y.o. female with past medical history as documented below presents to the ED with c/o of 2 weeks of progressive lower extremity weakness bilateral as well as progressive fatigue, lethargy and multiple falls. Patient states that now she is having trouble speaking and articulating. Patient was last admitted back in March 2020 for MS exacerbation. Patient states that her neurologist is Dr. William Coffey. She normally takes gabapentin and Tecfidera. Patient denies any history of stroke. Pt denies any other alleviating or exacerbating factors. Additionally, pt specifically denies any recent fever, chills, headache, nausea, vomiting, abdominal pain, CP, SOB, lightheadedness, dizziness, numbness, lower extremity swelling, heart palpitations, urinary sxs, diarrhea, constipation, melena, hematochezia, cough, or congestion. There are no other complaints, changes or physical findings at this time.      PCP: Jasmin Alonzo MD    Past History   Past Medical History:  Past Medical History:   Diagnosis Date    Body aches 12/11/2014    Chronic pain     Depression  GERD (gastroesophageal reflux disease)     Headache(784.0)     History of mammogram never before    MS (multiple sclerosis) ([facility] [address]     Neurological disorder     Multiple Sclerosis    Pap smear for cervical cancer screening 2008    Psychiatric disorder     anxiety    Psychotic disorder [facility])        Past Surgical History:  Past Surgical History:   Procedure Laterality Date    COLONOSCOPY N/A 2/28/2018    COLONOSCOPY performed by Brayan Chow MD at [facility] ENDOSCOPY    HX CHOLECYSTECTOMY      HX GYN      3 c-sections    HX HEENT      bilateral ear tube surgery    HX HERNIA REPAIR      HX OTHER SURGICAL      R inguinal hernia repair       Family History:  Family History   Problem Relation Age of Onset    Heart Attack Father         tripple bypass    Cancer Father         lung    COPD Father     Diabetes Mother         type 2    Heart Disease Mother         heart disease    Diabetes Paternal Grandmother     No Known Problems Sister     No Known Problems Brother     No Known Problems Child        Social History:  Social History     Tobacco Use    Smoking status: Current Every Day Smoker     Packs/day: 0.50     Years: 17.00     Pack years: 8.50     Types: Cigarettes    Smokeless tobacco: Never Used    Tobacco comment: 1 pack every 3 days. Substance Use Topics    Alcohol use: No    Drug use: No       Allergies: Allergies   Allergen Reactions    Morphine Rash       Current Medications:  No current facility-administered medications on file prior to encounter. Current Outpatient Medications on File Prior to Encounter   Medication Sig Dispense Refill    dimethyl fumarate (Tecfidera) 240 mg cpDR Take 240 mg by mouth two (2) times a day. 120 Cap 3    gabapentin (NEURONTIN) 400 mg capsule Take 2 Caps by mouth two (2) times a day. Max Daily Amount: 1,600 mg.  Take 2 capsules 2 times a day, do not exceed 1600 mg daily 60 Cap 0    cyclobenzaprine (FLEXERIL) 10 mg tablet Take 1 Tab by mouth three (3) times daily as needed for Muscle Spasm(s). 90 Tab 2    [DISCONTINUED] furosemide (LASIX) 20 mg tablet Once daily 30 Tab 0    ibuprofen (MOTRIN) 200 mg tablet Take  by mouth every six (6) hours as needed for Pain. Take three tablets every 6 hours as needed by mouth       clonazePAM (KlonoPIN) 0.5 mg tablet Take 1 Tab by mouth two (2) times a day. Max Daily Amount: 1 mg. (Patient taking differently: Take 0.5 mg by mouth three (3) times daily.) 60 Tab 0    melatonin 3 mg tablet Take 1 Tab by mouth nightly as needed for Insomnia. (Patient taking differently: Take 3 mg by mouth nightly as needed for Insomnia. Indications: Pt state she takes 5 mg tab nightly.) 30 Tab 4    LORazepam (ATIVAN) 1 mg tablet Take 1 Tab by mouth every twelve (12) hours as needed for Anxiety (q12h prn). Max Daily Amount: 2 mg. 40 Tab 0    DULoxetine (CYMBALTA) 60 mg capsule Take 1 Cap by mouth daily. Indications: neuropathic pain 30 Cap 1    senna-docusate (PERICOLACE) 8.6-50 mg per tablet Take 2 Tabs by mouth daily. 15 Tab 0    ibuprofen (MOTRIN) 600 mg tablet Take 1 Tab by mouth every six (6) hours as needed for Pain. (Patient taking differently: Take 200 mg by mouth.) 20 Tab 0       Review of Systems   Review of Systems   Constitutional: Positive for fatigue. Negative for chills and fever. HENT: Negative. Negative for congestion, facial swelling, rhinorrhea, sore throat, trouble swallowing and voice change. Eyes: Negative. Respiratory: Negative. Negative for apnea, cough, chest tightness, shortness of breath and wheezing. Cardiovascular: Negative. Negative for chest pain, palpitations and leg swelling. Gastrointestinal: Negative. Negative for abdominal distention, abdominal pain, blood in stool, constipation, diarrhea, nausea and vomiting. Endocrine: Negative. Negative for cold intolerance, heat intolerance and polyuria. Genitourinary: Negative.   Negative for difficulty urinating, dysuria, flank pain, frequency, hematuria and urgency. Musculoskeletal: Negative. Negative for arthralgias, back pain, myalgias, neck pain and neck stiffness. Skin: Negative. Negative for color change and rash. Neurological: Positive for facial asymmetry, speech difficulty and weakness. Negative for dizziness, syncope, light-headedness, numbness and headaches. Hematological: Negative. Does not bruise/bleed easily. Psychiatric/Behavioral: Negative. Negative for confusion and self-injury. The patient is not nervous/anxious. Physical Exam   Physical Exam  Vitals signs and nursing note reviewed. Constitutional:       General: She is in acute distress. Appearance: She is well-developed. She is ill-appearing and toxic-appearing. She is not diaphoretic. HENT:      Head: Normocephalic and atraumatic. Mouth/Throat:      Pharynx: No oropharyngeal exudate. Eyes:      Conjunctiva/sclera: Conjunctivae normal.      Pupils: Pupils are equal, round, and reactive to light. Neck:      Musculoskeletal: Normal range of motion. Cardiovascular:      Rate and Rhythm: Normal rate and regular rhythm. Heart sounds: Normal heart sounds. No murmur. No friction rub. No gallop. Pulmonary:      Effort: Pulmonary effort is normal. No respiratory distress. Breath sounds: Normal breath sounds. No wheezing or rales. Chest:      Chest wall: No tenderness. Abdominal:      General: Bowel sounds are normal. There is no distension. Palpations: Abdomen is soft. There is no mass. Tenderness: There is no abdominal tenderness. There is no guarding or rebound. Musculoskeletal: Normal range of motion. General: No tenderness or deformity. Skin:     General: Skin is warm. Findings: No rash. Neurological:      Mental Status: She is alert and oriented to person, place, and time. Cranial Nerves: No cranial nerve deficit. Motor: Weakness (4/5 strength BLE) present. No abnormal muscle tone. Coordination: Coordination normal.      Deep Tendon Reflexes: Reflexes normal.      Comments: Speech slightly slurred  Slight right sided facial droop         Diagnostic Study Results     Labs -  Recent Results (from the past 24 hour(s))   CBC WITH AUTOMATED DIFF    Collection Time: 08/06/20  2:17 PM   Result Value Ref Range    WBC 3.5 (L) 3.6 - 11.0 K/uL    RBC 3.65 (L) 3.80 - 5.20 M/uL    HGB 11.6 11.5 - 16.0 g/dL    HCT 35.6 35.0 - 47.0 %    MCV 97.5 80.0 - 99.0 FL    MCH 31.8 26.0 - 34.0 PG    MCHC 32.6 30.0 - 36.5 g/dL    RDW 13.0 11.5 - 14.5 %    PLATELET 114 651 - 549 K/uL    MPV 9.5 8.9 - 12.9 FL    NRBC 0.0 0  WBC    ABSOLUTE NRBC 0.00 0.00 - 0.01 K/uL    NEUTROPHILS 61 32 - 75 %    LYMPHOCYTES 27 12 - 49 %    MONOCYTES 8 5 - 13 %    EOSINOPHILS 3 0 - 7 %    BASOPHILS 1 0 - 1 %    IMMATURE GRANULOCYTES 0 0.0 - 0.5 %    ABS. NEUTROPHILS 2.2 1.8 - 8.0 K/UL    ABS. LYMPHOCYTES 0.9 0.8 - 3.5 K/UL    ABS. MONOCYTES 0.3 0.0 - 1.0 K/UL    ABS. EOSINOPHILS 0.1 0.0 - 0.4 K/UL    ABS. BASOPHILS 0.1 0.0 - 0.1 K/UL    ABS. IMM. GRANS. 0.0 0.00 - 0.04 K/UL    DF AUTOMATED     METABOLIC PANEL, COMPREHENSIVE    Collection Time: 08/06/20  2:17 PM   Result Value Ref Range    Sodium 139 136 - 145 mmol/L    Potassium 4.3 3.5 - 5.1 mmol/L    Chloride 108 97 - 108 mmol/L    CO2 26 21 - 32 mmol/L    Anion gap 5 5 - 15 mmol/L    Glucose 74 65 - 100 mg/dL    BUN 4 (L) 6 - 20 MG/DL    Creatinine 0.84 0.55 - 1.02 MG/DL    BUN/Creatinine ratio 5 (L) 12 - 20      GFR est AA >60 >60 ml/min/1.73m2    GFR est non-AA >60 >60 ml/min/1.73m2    Calcium 8.8 8.5 - 10.1 MG/DL    Bilirubin, total 0.5 0.2 - 1.0 MG/DL    ALT (SGPT) 11 (L) 12 - 78 U/L    AST (SGOT) 9 (L) 15 - 37 U/L    Alk.  phosphatase 55 45 - 117 U/L    Protein, total 6.6 6.4 - 8.2 g/dL    Albumin 3.3 (L) 3.5 - 5.0 g/dL    Globulin 3.3 2.0 - 4.0 g/dL    A-G Ratio 1.0 (L) 1.1 - 2.2     URINALYSIS W/ REFLEX CULTURE    Collection Time: 08/06/20  2:17 PM    Specimen: Urine Result Value Ref Range    Color YELLOW/STRAW      Appearance CLEAR CLEAR      Specific gravity 1.009 1.003 - 1.030      pH (UA) 5.5 5.0 - 8.0      Protein Negative NEG mg/dL    Glucose Negative NEG mg/dL    Ketone Negative NEG mg/dL    Bilirubin Negative NEG      Blood Negative NEG      Urobilinogen 0.2 0.2 - 1.0 EU/dL    Nitrites Negative NEG      Leukocyte Esterase Negative NEG      WBC 0-4 0 - 4 /hpf    RBC 0-5 0 - 5 /hpf    Epithelial cells FEW FEW /lpf    Bacteria Negative NEG /hpf    UA:UC IF INDICATED CULTURE NOT INDICATED BY UA RESULT CNI      Hyaline cast 0-2 0 - 5 /lpf   DRUG SCREEN, URINE    Collection Time: 08/06/20  2:17 PM   Result Value Ref Range    AMPHETAMINES Negative NEG      BARBITURATES Negative NEG      BENZODIAZEPINES Positive (A) NEG      COCAINE Negative NEG      METHADONE Negative NEG      OPIATES Negative NEG      PCP(PHENCYCLIDINE) Negative NEG      THC (TH-CANNABINOL) Positive (A) NEG      Drug screen comment (NOTE)    GLUCOSE, POC    Collection Time: 08/06/20  9:09 PM   Result Value Ref Range    Glucose (POC) 167 (H) 65 - 100 mg/dL    Performed by Nik To (PCT)        Radiologic Studies -   MRI CERV SPINE W WO CONT         MRI BRAIN W WO CONT         CT HEAD WO CONT   Final Result   IMPRESSION:        White matter lesions consistent with the patient's diagnosis of multiple   sclerosis. At least 2 new areas of vasogenic edema when compared to March, 2020,   suspicious for acute demyelination. CT Results  (Last 48 hours)               08/06/20 1549  CT HEAD WO CONT Final result    Impression:  IMPRESSION:         White matter lesions consistent with the patient's diagnosis of multiple   sclerosis. At least 2 new areas of vasogenic edema when compared to March, 2020,   suspicious for acute demyelination. Narrative:  EXAMINATION:  CT HEAD WO CONT       CLINICAL INFORMATION:  MS flare   COMPARISON:  3/23/2020    TECHNIQUE: Routine axial head CT was performed.  IV contrast was not   administered. Sagittal and coronal reconstructions were generated. CT dose reduction was achieved through use of a standardized protocol tailored   for this examination and automatic exposure control for dose modulation. FINDINGS:   No acute infarct, hemorrhage or mass. VENTRICULAR SYSTEM:  Normal for age. BASAL CISTERNS:  Patent. BRAIN PARENCHYMA:  Multiple hypodense lesions, consistent with the diagnosis of   multiple sclerosis. New area of vasogenic edema in the left inferior parietal   lobe, extending to the left periatrial region. Additional new area of vasogenic   edema in the right parietal white matter. Findings suspicious for areas of acute   demyelination. MIDLINE SHIFT:  None. CALVARIUM/ SKULL BASE: Intact. PARANASAL SINUSES AND MASTOID AIR CELLS: Clear. VISUALIZED ORBITS: No significant abnormalities. SELLA: No enlargement. CXR Results  (Last 48 hours)    None          Medical Decision Making   I am the first provider for this patient. I reviewed the vital signs, available nursing notes, past medical history, past surgical history, family history and social history. Vital Signs-Reviewed the patient's vital signs. Patient Vitals for the past 24 hrs:   Temp Pulse Resp BP SpO2   08/06/20 2041 98.6 °F (37 °C) 60 18 144/88 94 %   08/06/20 1715 98.5 °F (36.9 °C) 65 16 (!) 155/94 99 %   08/06/20 1630  (!) 57  (!) 154/97 99 %   08/06/20 1600  60  (!) 155/99 98 %   08/06/20 1336 97.8 °F (36.6 °C) 74 14 136/86 97 %       Pulse Oximetry Analysis - 97% on RA    Cardiac Monitor:   Rate: 65 bpm  Rhythm: Normal Sinus Rhythm      Records Reviewed: Nursing Notes, Old Medical Records, Previous electrocardiograms, Previous Radiology Studies and Previous Laboratory Studies    Provider Notes (Medical Decision Making):   Patient presenting with generalized fatigue/weakness. Stable vitals and nontoxic appearing.   DDx: infection, anemia, electrolyte anomoly (hypo or hyperkalemia, hypomagnesemia), hypothyroid, dehydration, depression, CA, ACS. Will obtain EKG, UA, labwork for any urgent/emergent pathology. Will reassess and monitor while in ED. ED Course:   Initial assessment performed. The patients presenting problems have been discussed, and they are in agreement with the care plan formulated and outlined with them. I have encouraged them to ask questions as they arise throughout their visit. TOBACCO COUNSELING:  Upon evaluation, pt expressed that they are a current tobacco user. For approximately 10 minutes, pt has been counseled on the dangers of smoking and was encouraged to quit as soon as possible in order to decrease further risks to their health. Pt has conveyed their understanding of the risks involved should they continue to use tobacco products. ALCOHOL/SUBSTANCE ABUSE COUNSELING:  Upon evaluation, pt endorsed recent alcohol/illicit drug use. For approximately 7 minutes, pt has been counseled on the dangers of alcohol and illicit drug use on their health, and they were encouraged to quit as soon as possible in order to decrease further risks to their health. Pt has conveyed their understanding of the risks involved should they continue to use these products. I reviewed our electronic medical record system for any past medical records that were available that may contribute to the patient's current condition, the nursing notes and vital signs from today's visit.   Johnathan Velasco MD    ED Orders Placed :  Orders Placed This Encounter    MECHANICAL PROPHYLAXIS IS CONTRAINDICATED Other, please document Already on Anticoagulation    CT HEAD WO CONT    MRI BRAIN W WO CONT    MRI CERV SPINE W WO CONT    CBC WITH AUTOMATED DIFF    METABOLIC PANEL, COMPREHENSIVE    Hold Sample    URINALYSIS W/ REFLEX CULTURE    DRUG SCREEN, URINE    HEPATIC FUNCTION PANEL    MAGNESIUM    CBC WITH AUTOMATED DIFF    URINALYSIS W/ REFLEX CULTURE    DIET CARDIAC Regular    NOTIFY PROVIDER: SPECIFY Notify provider on pt's arrival to floor [address] NOTIFY PROVIDER: SPECIFY Notify physician for pulse less than 50 or greater than 120, respiratory rate less than 12 or greater than 25, oral temperature greater than 101.3 F (60.1 C), systolic BP less than 90 or greater than 799, diastolic BP less. ..    ACTIVITY AS TOLERATED W/ASSIST    NEURO/VASCULAR CHECKS    NURSING-MISCELLANEOUS: Palpate, scan or straight cath and document bladder residual as needed CONTINUOUS    FULL CODE    IP CONSULT TO NEUROLOGY    GLUCOSE, POC    INSERT PERIPHERAL IV ONE TIME STAT    methylPREDNISolone ((Solu-MEDROL) 1,000 mg in 0.9% sodium chloride (MBP/ADV) 100 mL    ketorolac (TORADOL) injection 30 mg    clonazePAM (KlonoPIN) tablet 0.5 mg    DULoxetine (CYMBALTA) capsule 60 mg    gabapentin (NEURONTIN) capsule 800 mg    melatonin tablet 3 mg    sodium chloride (NS) flush 5-40 mL    sodium chloride (NS) flush 5-40 mL    OR Linked Order Group     acetaminophen (TYLENOL) tablet 650 mg     acetaminophen (TYLENOL) suppository 650 mg    polyethylene glycol (MIRALAX) packet 17 g    OR Linked Order Group     promethazine (PHENERGAN) tablet 12.5 mg     ondansetron (ZOFRAN) injection 4 mg    DISCONTD: methylPREDNISolone (PF) (Solu-MEDROL) injection 1,000 mg    insulin lispro (HUMALOG) injection    glucose chewable tablet 16 g    dextrose (D50W) injection syrg 12.5-25 g    glucagon (GLUCAGEN) injection 1 mg    pantoprazole (PROTONIX) tablet 40 mg    methylPREDNISolone ((Solu-MEDROL) 1,000 mg in 0.9% sodium chloride (MBP/ADV) 100 mL    LORazepam (ATIVAN) injection 1 mg    gadoterate meglumine (DOTAREM) 0.5 mmol/mL (376.9 mg/mL) contrast solution 20 mL    IP CONSULT TO HOSPITALIST    INITIAL PHYSICIAN ORDER: INPATIENT Medical; 3.  Patient receiving treatment that can only be provided in an inpatient setting (further clarification in H&P documentation)     ED Medications Administered:  Medications   clonazePAM (KlonoPIN) tablet 0.5 mg (0.5 mg Oral Given 8/6/20 2100)   DULoxetine (CYMBALTA) capsule 60 mg (has no administration in time range)   gabapentin (NEURONTIN) capsule 800 mg (800 mg Oral Given 8/6/20 2242)   melatonin tablet 3 mg (3 mg Oral Given 8/6/20 2243)   sodium chloride (NS) flush 5-40 mL (10 mL IntraVENous Given 8/6/20 2244)   sodium chloride (NS) flush 5-40 mL (has no administration in time range)   acetaminophen (TYLENOL) tablet 650 mg (has no administration in time range)     Or   acetaminophen (TYLENOL) suppository 650 mg (has no administration in time range)   polyethylene glycol (MIRALAX) packet 17 g (has no administration in time range)   promethazine (PHENERGAN) tablet 12.5 mg (has no administration in time range)     Or   ondansetron (ZOFRAN) injection 4 mg (has no administration in time range)   insulin lispro (HUMALOG) injection (0 Units SubCUTAneous Held 8/6/20 2200)   glucose chewable tablet 16 g (has no administration in time range)   dextrose (D50W) injection syrg 12.5-25 g (has no administration in time range)   glucagon (GLUCAGEN) injection 1 mg (has no administration in time range)   pantoprazole (PROTONIX) tablet 40 mg (has no administration in time range)   methylPREDNISolone ((Solu-MEDROL) 1,000 mg in 0.9% sodium chloride (MBP/ADV) 100 mL (1,000 mg IntraVENous New Bag 8/6/20 2247)   methylPREDNISolone ((Solu-MEDROL) 1,000 mg in 0.9% sodium chloride (MBP/ADV) 100 mL (0 mg IntraVENous IV Completed 8/6/20 1538)   ketorolac (TORADOL) injection 30 mg (30 mg IntraVENous Given 8/6/20 1500)   LORazepam (ATIVAN) injection 1 mg (1 mg IntraVENous Given 8/6/20 1828)   gadoterate meglumine (DOTAREM) 0.5 mmol/mL (376.9 mg/mL) contrast solution 20 mL (20 mL IntraVENous Given 8/6/20 1943)         Progress Note:  Concern for relapsing remitting multiple sclerosis, will start IV Solu-Medrol, CT head pending    Progress Note:  He had with white matter lesions consistent with patient's diagnosis of multiple sclerosis with 2 new areas of vasogenic edema when compared to March 2020, will need MRI imaging and neurology evaluation. Consult Note:  Sayda Garcia MD spoke with Dr. Jada Duncan,   Specialty: Neurology  Discussed pt's hx, disposition, and available diagnostic and imaging results. Reviewed care plans. Agree with management and plan thus far. Consultant will evaluate pt. Agree with admission to the hospital service for MS exacerbation, will need MRI imaging and neurology evaluation. Dr. Jada Duncan recommends getting MRI brain and MRI C-spine. Dysphagia screen to be done by nursing. Progress Note:  I have just re-evaluated the patient. Patient reports no new sx's. I have reviewed Her vital signs and determined there is currently no worsening in their condition or physical exam. Results have been reviewed with them and their questions have been answered. We will continue to review further results as they come available. Consult Note:  Sayda Garcia MD spoke with Dr. Bren Mendoza,   Specialty: Hospitalist  Discussed pt's hx, disposition, and available diagnostic and imaging results. Reviewed care plans. Agree with management and plan thus far. Consultant will evaluate pt for admission. CRITICAL CARE NOTE :  IMPENDING DETERIORATION -Cardiovascular, CNS, Metabolic and Renal  ASSOCIATED RISK FACTORS - Metabolic changes, Dehydration, Vascular Compromise and CNS Decompensation  MANAGEMENT- Bedside Assessment and Supervision of Care  INTERPRETATION -  Xrays, CT Scan, ECG, Blood Pressure and Cardiac Output Measures   INTERVENTIONS - hemodynamic mngmt, Neurologic interventions  and Metobolic interventions  CASE REVIEW - Hospitalist, Medical Sub-Specialist, Nursing, Family and Neurologist  TREATMENT RESPONSE -Improved  PERFORMED BY - Self    NOTES   :  I personally spent 45 minutes of critical care time.  This is time spent at this critically ill patient's bedside actively involved in patient care as well as the coordination of care and discussions with the patient's family. This includes time involved in lab review, consultations with specialist, family decision-making, bedside attention and documentation. During this entire length of time I was immediately available to the patient. This does not include any procedural time which has been billed separately. Critical Care: The reason for providing this level of medical care for this critically-ill patient was due to a critical illness that impaired one or more vital organ systems, such that there was a high probability of imminent or life-threatening deterioration in the patient's condition. This care involved the highest level of preparedness to intervene urgently. This care involved high complexity decision making to assess, manipulate, and support vital system functions, to treat this degree of vital organ system failure, and to prevent further life threatening deterioration of the patients condition requiring frequent assessments and interventions. Aretha Hou MD    Disposition: Admit  Patient is being admitted to the hospital. The results of their tests and reasons for their admission have been discussed with them and/or available family. I have discussed the case with the admitting specialist and expressed my high concern for decompensation if patient is discharged from the ED. The patient and/or available family convey agreement and understanding for the need to be admitted and for their admission diagnosis. Consultation has been made with the inpatient physician specialist for hospitalization. Diagnosis   Clinical Impression:   1. Relapsing remitting multiple sclerosis ([address]    2. Multiple sclerosis exacerbation ([address]    3. Facial droop    4. Exacerbation of multiple sclerosis ([address]    5. Abnormal CT scan of head    6.  Tetrahydrocannabinol (THC) use disorder, mild, abuse      Attestation:  Martine Stevens MD, am the attending of record for this patient. I personally performed the services described in this documentation on this date, 8/6/2020 for patient, Pamela Canchola. I have reviewed the chart and verified that the record is accurate and complete. This note will not be viewable in [address]

## 2020-08-06 NOTE — ED NOTES
TRANSFER - OUT REPORT:    Verbal report given to Walter Rosa RN (name) on Cameron McLean  being transferred to Med Onc.(unit) for routine progression of care       Report consisted of patients Situation, Background, Assessment and   Recommendations(SBAR). Information from the following report(s) SBAR, Kardex and ED Summary was reviewed with the receiving nurse. Lines:   Peripheral IV 08/06/20 Right Wrist (Active)        Opportunity for questions and clarification was provided. Receiving RN aware pt will receive 1mg of Ativan before going to MRI for her being claustrophobic.

## 2020-08-06 NOTE — H&P
Hospitalist Admission Note    NAME: Tiffani Cadena   :  1975   MRN:  [id number]     Date/Time:  2020 4:47 PM    Patient PCP: Rianna Dooley MD  _____________________________________________________________________  Given the patient's current clinical presentation, I have a high level of concern for decompensation if discharged from the emergency department. Complex decision making was performed, which includes reviewing the patient's available past medical records, laboratory results, and x-ray films. My assessment of this patient's clinical condition and my plan of care is as follows. Assessment / Plan:  Tiffani Cadena is a 53 y.o. female with a longstanding history of multiple sclerosis who is followed by  in the neurology comes with symptoms of MS Flare    MS Flare(Relapsing remitting MS)  Solumedrol 1000mg daily X3. mri. Neurology consulted by ED. Insulin sliding scale. CTH  White matter lesions consistent with the patient's diagnosis of multiple  sclerosis. At least 2 new areas of vasogenic edema when compared to ,  suspicious for acute demyelination. Current tob use, discussed health reasons to quit. Stress/anxiety/fIbromyalgia. cont cymbalta/klonapin/gabapentin home meds     GERD, continue ppi        Code Status: full  Surrogate Decision Maker: son age 32, Paulie Neighbours [phone], currently getting divorce. Mother ([phone]. DVT Prophylaxis: scd's  GI Prophylaxis: not indicated     Baseline: has fianace/ INDEPENDENT          Subjective:   CHIEF COMPLAINT: Progressive lower extremity weakness bilateral as well as progressive fatigue, lethargy and multiple falls.     HISTORY OF PRESENT ILLNESS:     Tiffani Cadena, 53 y.o. female with past medical history as documented below presents to the ED with c/o of 2 weeks of progressive lower extremity weakness bilateral as well as progressive fatigue, lethargy and multiple falls. Patient states that now he is having trouble speaking and articulating. Patient was last admitted back in March 2020 for MS exacerbation. Patient states that her neurologist is Dr. Too Cotto. She normally takes gabapentin and Tecfidera. Patient denies any history of stroke. Pt denies any other alleviating or exacerbating factors. Additionally, pt specifically denies any recent fever, chills, headache, nausea, vomiting, abdominal pain, CP, SOB, lightheadedness, dizziness, numbness, lower extremity swelling, heart palpitations, urinary sxs, diarrhea, constipation, melena, hematochezia, cough, or congestion. There are no other complaints, changes or physical findings at this time. PCP: Harry Thurman MD        We were asked to admit for work up and evaluation of the above problems. Past Medical History:   Diagnosis Date    Body aches 12/11/2014    Chronic pain     Depression     GERD (gastroesophageal reflux disease)     Headache(784.0)     History of mammogram never before    MS (multiple sclerosis) ([address]     Neurological disorder     Multiple Sclerosis    Pap smear for cervical cancer screening 2008    Psychiatric disorder     anxiety    Psychotic disorder [facility])         Past Surgical History:   Procedure Laterality Date    COLONOSCOPY N/A 2/28/2018    COLONOSCOPY performed by [facility]vino [facility]damez [facility] at [facility] ENDOSCOPY    HX CHOLECYSTECTOMY      HX GYN      3 c-sections    HX HEENT      bilateral ear tube surgery    HX HERNIA REPAIR      HX OTHER SURGICAL      R inguinal hernia repair       Social History     Tobacco Use    Smoking status: Current Every Day Smoker     Packs/day: 0.50     Years: 17.00     Pack years: 8.50     Types: Cigarettes    Smokeless tobacco: Never Used    Tobacco comment: 1 pack every 3 days.    Substance Use Topics    Alcohol use: No        Family History   Problem Relation Age of Onset    Heart Attack Father         tripple bypass  Cancer Father         lung    COPD Father     Diabetes Mother         type 2    Heart Disease Mother         heart disease    Diabetes Paternal Grandmother     No Known Problems Sister     No Known Problems Brother     No Known Problems Child      Allergies   Allergen Reactions    Morphine Rash        Prior to Admission medications    Medication Sig Start Date End Date Taking? Authorizing Provider   gabapentin (NEURONTIN) 400 mg capsule Take 2 Caps by mouth two (2) times a day. Max Daily Amount: 1,600 mg. Take 2 capsules 2 times a day, do not exceed 1600 mg daily 7/27/20   John Stone MD   dimethyl fumarate (Tecfidera) 240 mg cpDR Take 240 mg by mouth two (2) times a day. 6/10/20   John Stone MD   cyclobenzaprine (FLEXERIL) 10 mg tablet Take 1 Tab by mouth three (3) times daily as needed for Muscle Spasm(s). 6/10/20   John Stone MD   furosemide (LASIX) 20 mg tablet Once daily 5/13/20   Jasmin Alonzo MD   ibuprofen (MOTRIN) 200 mg tablet Take  by mouth every six (6) hours as needed for Pain. Take three tablets every 6 hours as needed by mouth     Provider, Historical   clonazePAM (KlonoPIN) 0.5 mg tablet Take 1 Tab by mouth two (2) times a day. Max Daily Amount: 1 mg. Patient taking differently: Take 0.5 mg by mouth three (3) times daily. 3/28/20   Kadie Cosby MD   melatonin 3 mg tablet Take 1 Tab by mouth nightly as needed for Insomnia. Patient taking differently: Take 3 mg by mouth nightly as needed for Insomnia. Indications: Pt state she takes 5 mg tab nightly. 3/28/20   Kadie Cosby MD   LORazepam (ATIVAN) 1 mg tablet Take 1 Tab by mouth every twelve (12) hours as needed for Anxiety (q12h prn). Max Daily Amount: 2 mg. 3/17/20   Jasmin Alonzo MD   DULoxetine (CYMBALTA) 60 mg capsule Take 1 Cap by mouth daily. Indications: neuropathic pain 3/2/20   Jasmin Alonzo MD   senna-docusate (PERICOLACE) 8.6-50 mg per tablet Take 2 Tabs by mouth daily.  1/14/20   Jan 0044 ISABELL Raygoza MD   ibuprofen (MOTRIN) 600 mg tablet Take 1 Tab by mouth every six (6) hours as needed for Pain. Patient taking differently: Take 200 mg by mouth. 12/22/18   Romelia Bamberger, [place]       REVIEW OF SYSTEMS:     I am not able to complete the review of systems because: The patient is intubated and sedated    The patient has altered mental status due to his acute medical problems    The patient has baseline aphasia from prior stroke(s)    The patient has baseline dementia and is not reliable historian    The patient is in acute medical distress and unable to provide information           Total of 12 systems reviewed as follows:       POSITIVE= underlined text  Negative = text not underlined  General:  fever, chills, sweats, generalized weakness, weight loss/gain,      loss of appetite   Eyes:    blurred vision, eye pain, loss of vision, double vision  ENT:    rhinorrhea, pharyngitis   Respiratory:   cough, sputum production, SOB, BOBO, wheezing, pleuritic pain   Cardiology:   chest pain, palpitations, orthopnea, PND, edema, syncope   Gastrointestinal:  abdominal pain , N/V, diarrhea, dysphagia, constipation, bleeding   Genitourinary:  frequency, urgency, dysuria, hematuria, incontinence   Muskuloskeletal :  arthralgia, myalgia, back pain  Hematology:  easy bruising, nose or gum bleeding, lymphadenopathy   Dermatological: rash, ulceration, pruritis, color change / jaundice  Endocrine:   hot flashes or polydipsia   Neurological:  headache, dizziness, confusion, focal weakness, paresthesia,     Speech difficulties, memory loss, gait difficulty  Psychological: Feelings of anxiety, depression, agitation    Objective:   VITALS:    Visit Vitals  BP (!) 154/97   Pulse (!) 57   Temp 97.8 °F (36.6 °C)   Resp 14   Ht 5' 4\" (1.626 m)   Wt 90.7 kg (200 lb)   SpO2 99%   BMI 34.33 kg/m²       PHYSICAL EXAM:    General:    Alert, cooperative, no distress, appears stated age.      HEENT: Atraumatic, anicteric sclerae, pink conjunctivae     No oral ulcers, mucosa moist, throat clear, dentition fair  Neck:  Supple, symmetrical,  thyroid: non tender  Lungs:   Clear to auscultation bilaterally. No Wheezing or Rhonchi. No rales. Chest wall:  No tenderness  No Accessory muscle use. Heart:   Regular  rhythm,  No  murmur   No edema  Abdomen:   Soft, non-tender. Not distended. Bowel sounds normal  Extremities: No cyanosis. No clubbing,      Skin turgor normal, Capillary refill normal, Radial dial pulse 2+  Skin:     Not pale. Not Jaundiced  No rashes   Psych:  Good insight. Not depressed. Not anxious or agitated. Neurologic: EOMs intact. No facial asymmetry. No aphasia or slurred speech. Symmetrical strength, Sensation grossly intact. Alert and oriented X 4.     _______________________________________________________________________  Care Plan discussed with:    Comments   Patient Y    Family      RN Y    Care Manager                    Consultant:  BARRON ED MD   _______________________________________________________________________  Expected  Disposition:   Home with Family Y   HH/PT/OT/RN    SNF/LTC    COLLETTE    ________________________________________________________________________  TOTAL TIME:  61  Minutes    Critical Care Provided     Minutes non procedure based      Comments    Y Reviewed previous records   >50% of visit spent in counseling and coordination of care Y Discussion with patient and/or family and questions answered       Given the patient's current clinical presentation, I have a high level of concern for decompensation if discharged from the ED. Complex decision making was performed which includes reviewing the patient's available past medical records, laboratory results, and Xray films.  I have also directly communicated my plan and discussed this case with the involved ED physician.     ____________________________________________________________________  Madelin Cespedes MD    Procedures: see electronic medical records for all procedures/Xrays and details which were not copied into this note but were reviewed prior to creation of Plan. LAB DATA REVIEWED:    Recent Results (from the past 24 hour(s))   CBC WITH AUTOMATED DIFF    Collection Time: 08/06/20  2:17 PM   Result Value Ref Range    WBC 3.5 (L) 3.6 - 11.0 K/uL    RBC 3.65 (L) 3.80 - 5.20 M/uL    HGB 11.6 11.5 - 16.0 g/dL    HCT 35.6 35.0 - 47.0 %    MCV 97.5 80.0 - 99.0 FL    MCH 31.8 26.0 - 34.0 PG    MCHC 32.6 30.0 - 36.5 g/dL    RDW 13.0 11.5 - 14.5 %    PLATELET 859 692 - 755 K/uL    MPV 9.5 8.9 - 12.9 FL    NRBC 0.0 0  WBC    ABSOLUTE NRBC 0.00 0.00 - 0.01 K/uL    NEUTROPHILS 61 32 - 75 %    LYMPHOCYTES 27 12 - 49 %    MONOCYTES 8 5 - 13 %    EOSINOPHILS 3 0 - 7 %    BASOPHILS 1 0 - 1 %    IMMATURE GRANULOCYTES 0 0.0 - 0.5 %    ABS. NEUTROPHILS 2.2 1.8 - 8.0 K/UL    ABS. LYMPHOCYTES 0.9 0.8 - 3.5 K/UL    ABS. MONOCYTES 0.3 0.0 - 1.0 K/UL    ABS. EOSINOPHILS 0.1 0.0 - 0.4 K/UL    ABS. BASOPHILS 0.1 0.0 - 0.1 K/UL    ABS. IMM. GRANS. 0.0 0.00 - 0.04 K/UL    DF AUTOMATED     METABOLIC PANEL, COMPREHENSIVE    Collection Time: 08/06/20  2:17 PM   Result Value Ref Range    Sodium 139 136 - 145 mmol/L    Potassium 4.3 3.5 - 5.1 mmol/L    Chloride 108 97 - 108 mmol/L    CO2 26 21 - 32 mmol/L    Anion gap 5 5 - 15 mmol/L    Glucose 74 65 - 100 mg/dL    BUN 4 (L) 6 - 20 MG/DL    Creatinine 0.84 0.55 - 1.02 MG/DL    BUN/Creatinine ratio 5 (L) 12 - 20      GFR est AA >60 >60 ml/min/1.73m2    GFR est non-AA >60 >60 ml/min/1.73m2    Calcium 8.8 8.5 - 10.1 MG/DL    Bilirubin, total 0.5 0.2 - 1.0 MG/DL    ALT (SGPT) 11 (L) 12 - 78 U/L    AST (SGOT) 9 (L) 15 - 37 U/L    Alk.  phosphatase 55 45 - 117 U/L    Protein, total 6.6 6.4 - 8.2 g/dL    Albumin 3.3 (L) 3.5 - 5.0 g/dL    Globulin 3.3 2.0 - 4.0 g/dL    A-G Ratio 1.0 (L) 1.1 - 2.2     URINALYSIS W/ REFLEX CULTURE    Collection Time: 08/06/20  2:17 PM    Specimen: Urine   Result Value Ref Range    Color YELLOW/STRAW Appearance CLEAR CLEAR      Specific gravity 1.009 1.003 - 1.030      pH (UA) 5.5 5.0 - 8.0      Protein Negative NEG mg/dL    Glucose Negative NEG mg/dL    Ketone Negative NEG mg/dL    Bilirubin Negative NEG      Blood Negative NEG      Urobilinogen 0.2 0.2 - 1.0 EU/dL    Nitrites Negative NEG      Leukocyte Esterase Negative NEG      WBC 0-4 0 - 4 /hpf    RBC 0-5 0 - 5 /hpf    Epithelial cells FEW FEW /lpf    Bacteria Negative NEG /hpf    UA:UC IF INDICATED CULTURE NOT INDICATED BY UA RESULT CNI      Hyaline cast 0-2 0 - 5 /lpf   DRUG SCREEN, URINE    Collection Time: 08/06/20  2:17 PM   Result Value Ref Range    AMPHETAMINES Negative NEG      BARBITURATES Negative NEG      BENZODIAZEPINES Positive (A) NEG      COCAINE Negative NEG      METHADONE Negative NEG      OPIATES Negative NEG      PCP(PHENCYCLIDINE) Negative NEG      THC (TH-CANNABINOL) Positive (A) NEG      Drug screen comment (NOTE)

## 2020-08-06 NOTE — ED NOTES
MRI states they will put in transport for patient to be taken to MRI within next hour. Will medicate pt with 1mg of ativan prior to patient going to MRI.

## 2020-08-06 NOTE — ED NOTES
Patient going for CT scan, will perform dysphagia screening when pt returns. Skin warm and dry. Respirations even and unlabored. In no apparent distress at this time.

## 2020-08-07 LAB
ALBUMIN SERPL-MCNC: 3.2 G/DL (ref 3.5–5)
ALBUMIN/GLOB SERPL: 1 {RATIO} (ref 1.1–2.2)
ALP SERPL-CCNC: 62 U/L (ref 45–117)
ALT SERPL-CCNC: 15 U/L (ref 12–78)
AST SERPL-CCNC: 11 U/L (ref 15–37)
BASOPHILS # BLD: 0 K/UL (ref 0–0.1)
BASOPHILS NFR BLD: 0 % (ref 0–1)
BILIRUB DIRECT SERPL-MCNC: 0.1 MG/DL (ref 0–0.2)
BILIRUB SERPL-MCNC: 0.6 MG/DL (ref 0.2–1)
DIFFERENTIAL METHOD BLD: ABNORMAL
EOSINOPHIL # BLD: 0 K/UL (ref 0–0.4)
EOSINOPHIL NFR BLD: 0 % (ref 0–7)
ERYTHROCYTE [DISTWIDTH] IN BLOOD BY AUTOMATED COUNT: 12.7 % (ref 11.5–14.5)
GLOBULIN SER CALC-MCNC: 3.3 G/DL (ref 2–4)
GLUCOSE BLD STRIP.AUTO-MCNC: 133 MG/DL (ref 65–100)
GLUCOSE BLD STRIP.AUTO-MCNC: 137 MG/DL (ref 65–100)
GLUCOSE BLD STRIP.AUTO-MCNC: 146 MG/DL (ref 65–100)
GLUCOSE BLD STRIP.AUTO-MCNC: 172 MG/DL (ref 65–100)
HCT VFR BLD AUTO: 35 % (ref 35–47)
HGB BLD-MCNC: 12 G/DL (ref 11.5–16)
IMM GRANULOCYTES # BLD AUTO: 0 K/UL (ref 0–0.04)
IMM GRANULOCYTES NFR BLD AUTO: 1 % (ref 0–0.5)
LYMPHOCYTES # BLD: 0.4 K/UL (ref 0.8–3.5)
LYMPHOCYTES NFR BLD: 9 % (ref 12–49)
MAGNESIUM SERPL-MCNC: 1.7 MG/DL (ref 1.6–2.4)
MCH RBC QN AUTO: 32.3 PG (ref 26–34)
MCHC RBC AUTO-ENTMCNC: 34.3 G/DL (ref 30–36.5)
MCV RBC AUTO: 94.1 FL (ref 80–99)
MONOCYTES # BLD: 0 K/UL (ref 0–1)
MONOCYTES NFR BLD: 0 % (ref 5–13)
NEUTS SEG # BLD: 3.8 K/UL (ref 1.8–8)
NEUTS SEG NFR BLD: 90 % (ref 32–75)
NRBC # BLD: 0 K/UL (ref 0–0.01)
NRBC BLD-RTO: 0 PER 100 WBC
PLATELET # BLD AUTO: 323 K/UL (ref 150–400)
PMV BLD AUTO: 9.5 FL (ref 8.9–12.9)
PROT SERPL-MCNC: 6.5 G/DL (ref 6.4–8.2)
RBC # BLD AUTO: 3.72 M/UL (ref 3.8–5.2)
RBC MORPH BLD: ABNORMAL
SERVICE CMNT-IMP: ABNORMAL
WBC # BLD AUTO: 4.2 K/UL (ref 3.6–11)

## 2020-08-07 PROCEDURE — 85025 COMPLETE CBC W/AUTO DIFF WBC: CPT

## 2020-08-07 PROCEDURE — 82962 GLUCOSE BLOOD TEST: CPT

## 2020-08-07 PROCEDURE — 94760 N-INVAS EAR/PLS OXIMETRY 1: CPT

## 2020-08-07 PROCEDURE — 65660000000 HC RM CCU STEPDOWN

## 2020-08-07 PROCEDURE — 99223 1ST HOSP IP/OBS HIGH 75: CPT | Performed by: PSYCHIATRY & NEUROLOGY

## 2020-08-07 PROCEDURE — 74011250636 HC RX REV CODE- 250/636: Performed by: HOSPITALIST

## 2020-08-07 PROCEDURE — 74011000258 HC RX REV CODE- 258: Performed by: HOSPITALIST

## 2020-08-07 PROCEDURE — 74011250637 HC RX REV CODE- 250/637: Performed by: INTERNAL MEDICINE

## 2020-08-07 PROCEDURE — 80076 HEPATIC FUNCTION PANEL: CPT

## 2020-08-07 PROCEDURE — 83735 ASSAY OF MAGNESIUM: CPT

## 2020-08-07 PROCEDURE — 74011250637 HC RX REV CODE- 250/637: Performed by: HOSPITALIST

## 2020-08-07 PROCEDURE — 36415 COLL VENOUS BLD VENIPUNCTURE: CPT

## 2020-08-07 RX ORDER — POLYETHYLENE GLYCOL 3350 17 G/17G
17 POWDER, FOR SOLUTION ORAL DAILY
Status: DISCONTINUED | OUTPATIENT
Start: 2020-08-07 | End: 2020-08-12 | Stop reason: HOSPADM

## 2020-08-07 RX ORDER — ADHESIVE BANDAGE
30 BANDAGE TOPICAL ONCE
Status: COMPLETED | OUTPATIENT
Start: 2020-08-07 | End: 2020-08-07

## 2020-08-07 RX ORDER — DIMETHYL FUMARATE 240 MG/1
240 CAPSULE ORAL 2 TIMES DAILY
Status: DISCONTINUED | OUTPATIENT
Start: 2020-08-07 | End: 2020-08-09 | Stop reason: RX

## 2020-08-07 RX ADMIN — SODIUM CHLORIDE 1000 MG: 900 INJECTION, SOLUTION INTRAVENOUS at 10:33

## 2020-08-07 RX ADMIN — PANTOPRAZOLE SODIUM 40 MG: 40 TABLET, DELAYED RELEASE ORAL at 08:40

## 2020-08-07 RX ADMIN — ACETAMINOPHEN 650 MG: 325 TABLET ORAL at 12:07

## 2020-08-07 RX ADMIN — GABAPENTIN 800 MG: 100 CAPSULE ORAL at 08:35

## 2020-08-07 RX ADMIN — DULOXETINE HYDROCHLORIDE 60 MG: 30 CAPSULE, DELAYED RELEASE ORAL at 08:33

## 2020-08-07 RX ADMIN — ACETAMINOPHEN 650 MG: 325 TABLET ORAL at 17:36

## 2020-08-07 RX ADMIN — MAGNESIUM HYDROXIDE 30 ML: 400 SUSPENSION ORAL at 14:22

## 2020-08-07 RX ADMIN — GABAPENTIN 800 MG: 100 CAPSULE ORAL at 21:11

## 2020-08-07 RX ADMIN — Medication 10 ML: at 05:23

## 2020-08-07 RX ADMIN — Medication 10 ML: at 10:36

## 2020-08-07 RX ADMIN — Medication 10 ML: at 21:12

## 2020-08-07 RX ADMIN — MELATONIN 3 MG: at 23:13

## 2020-08-07 RX ADMIN — CLONAZEPAM 0.5 MG: 0.5 TABLET ORAL at 21:11

## 2020-08-07 RX ADMIN — CLONAZEPAM 0.5 MG: 0.5 TABLET ORAL at 08:40

## 2020-08-07 RX ADMIN — POLYETHYLENE GLYCOL 3350 17 G: 17 POWDER, FOR SOLUTION ORAL at 14:19

## 2020-08-07 RX ADMIN — Medication 10 ML: at 14:20

## 2020-08-07 NOTE — PROGRESS NOTES
SHOLA:    Possible HHC/Inpatient Rehab  2nd IM Medicare Letter      CM acknowledge inpatient admission. CM will follow pt for consults and any needs prior to discharge. If any concerns or questions arise pertaining to pt discharge, please contact unit CM. Pt is in need to work with therapy to determine her baseline at d/c. If pt is in need of snf/inpatient rehab she will require insurance auth with [facility]. Pt will require COVID negative test, if accepted to snf/rehab. Pt will require 2nd IM Medicare letter at the time of d/c. CM will continue to follow.     LATASHA Domínguez, [facility]

## 2020-08-07 NOTE — PROGRESS NOTES
Hospitalist Progress Note    NAME: Catina Ledbetter   :  1975   MRN:  [id number]     Admit date: 2020    Today's date: 20    PCP: MD Anabella Thornton M.D. Cell [id number]      Assessment / Plan:  MS exacerbation POA  Relapsing remitting MS POA  Solumedrol 1000mg daily X 3. MRI brain multiple new enhancing active supratentorial demyelinating plaques on a            background of extensive, chronic supratentorial demyelinating disease  MRI C spine Unchanged multilevel areas of abnormal signal in the cord consistent with         demyelinating disease. No evidence of active demyelination. Unchanged mild spondylosis as above. Neurology consulted by ED. Insulin sliding scale. Constipation POA   Miralax  One dose of MOM     Stress/anxiety/fIbromyalgia. cont cymbalta/klonapin/gabapentin home meds     GERD, continue ppi    Current tobacco use, discussed health reasons to quit. Obesity POA Body mass index is 34.33 kg/m². Code Status: full    Surrogate Decision Maker: son age 32, Kizzy Harding [phone], currently getting divorce.  Mother ([phone]      DVT Prophylaxis: scd's  GI Prophylaxis: not indicated     Baseline: has fianace/ INDEPENDENT       Subjective:     Chief Complaint / Reason for Physician Visit f/u MS excerbation  \"my legs are weak and I hurt all over\". Discussed with RN events overnight. Notes no BM in over 10 days  Symptoms feel like her previous MS exacerbations    Review of Systems:  Symptom Y/N Comments  Symptom Y/N Comments   Fever/Chills n   Chest Pain n    Poor Appetite    Edema     Cough n   Abdominal Pain n    Sputum    Joint Pain     SOB/BOBO n   Headache     Nausea/vomit n   Tolerating PT/OT     Diarrhea    Tolerating Diet y    Constipation y   Other       Could NOT obtain due to:      Objective:     VITALS:   Last 24hrs VS reviewed since prior progress note.  Most recent are:  Patient Vitals for the past 24 hrs:   Temp Pulse Resp BP SpO2   08/07/20 1138 97.8 °F (36.6 °C) 85 14 134/84 99 %   08/07/20 0656 98.6 °F (37 °C) 68 14 128/77 97 %   08/07/20 0350 98.6 °F (37 °C) 69 16 110/72 95 %   08/06/20 2338 98.4 °F (36.9 °C) (!) 58 16 145/79 97 %   08/06/20 2041 98.6 °F (37 °C) 60 18 144/88 94 %   08/06/20 1715 98.5 °F (36.9 °C) 65 16 (!) 155/94 99 %   08/06/20 1630  (!) 57  (!) 154/97 99 %   08/06/20 1600  60  (!) 155/99 98 %   08/06/20 1336 97.8 °F (36.6 °C) 74 14 136/86 97 %       Intake/Output Summary (Last 24 hours) at 8/7/2020 1326  Last data filed at 8/6/2020 1832  Gross per 24 hour   Intake 100 ml   Output 600 ml   Net -500 ml        Wt Readings from Last 12 Encounters:   08/06/20 90.7 kg (200 lb)   04/03/20 90.7 kg (200 lb)   03/23/20 90.7 kg (200 lb)   03/17/20 87.4 kg (192 lb 9.6 oz)   03/08/20 90.7 kg (200 lb)   03/02/20 89.6 kg (197 lb 9.6 oz)   02/25/20 90.8 kg (200 lb 2.8 oz)   02/17/20 90.7 kg (200 lb)   02/12/20 90.7 kg (200 lb)   02/10/20 89.8 kg (198 lb)   01/14/20 89.2 kg (196 lb 11.2 oz)   12/19/19 90 kg (198 lb 8 oz)       PHYSICAL EXAM:  General: WD, WN. Alert, cooperative, no acute distress    EENT:  PERRL. Anicteric sclerae. MMM  Resp:  CTA bilaterally, no wheezing or rales. No accessory muscle use  CV:  Regular  rhythm,  No edema  GI:  Soft, Non distended, Non tender. +Bowel sounds, no rebound  Neurologic:  Alert and oriented X 3, normal speech, diffuse weakness 4/5    Speech slow  Psych:   Good insight. Not anxious nor agitated  Skin:  No rashes.   No jaundice    Reviewed most current lab test results and cultures  YES  Reviewed most current radiology test results   YES  Review and summation of old records today    NO  Reviewed patient's current orders and MAR    YES  PMH/SH reviewed - no change compared to H&P  ________________________________________________________________________  Care Plan discussed with:    Comments   Patient x    Family      RN x    Care Manager Consultant                        Multidiciplinary team rounds were held today with , nursing, pharmacist and clinical coordinator. Patient's plan of care was discussed; medications were reviewed and discharge planning was addressed. ________________________________________________________________________      Comments   >50% of visit spent in counseling and coordination of care     ________________________________________________________________________  Alberto Iraheta MD     Procedures: see electronic medical records for all procedures/Xrays and details which were not copied into this note but were reviewed prior to creation of Plan. LABS:  I reviewed today's most current labs and imaging studies.   Pertinent labs include:  Recent Labs     08/07/20  0345 08/06/20  1417   WBC 4.2 3.5*   HGB 12.0 11.6   HCT 35.0 35.6    280     Recent Labs     08/07/20  0345 08/06/20  1417   NA  --  139   K  --  4.3   CL  --  108   CO2  --  26   GLU  --  74   BUN  --  4*   CREA  --  0.84   CA  --  8.8   MG 1.7  --    ALB 3.2* 3.3*   TBILI 0.6 0.5   ALT 15 11*

## 2020-08-07 NOTE — PROGRESS NOTES
* No surgery found *  * No surgery found *  Bedside shift change report given to JaceyRN (oncoming nurse) by ALEJANDRO Dean (offgoing nurse). Report included the following information SBAR and Kardex. Zone Phone:   [phone]      Significant changes during shift:  Admitted to [facility]        Patient [address]  53 y.o.  8/6/2020  1:24 PM by Ousmane Veliz MD. Cameron Magallon was admitted from Home    Problem List    Patient Active Problem List    Diagnosis Date Noted    Multiple sclerosis exacerbation ([address] 08/06/2020    Facial droop 03/23/2020    Exacerbation of multiple sclerosis ([address] 03/09/2020    Left-sided weakness 03/08/2020    Severe obesity ([address] 10/03/2018    Constipation 07/17/2015    Body aches 12/11/2014    Back pain 09/07/2012    Chronic pain 08/17/2012    MS (multiple sclerosis) ([address] 08/17/2012    Decreased hearing 01/31/2011    Acute pharyngitis 01/26/2011    Anxiety 08/25/2010    Blood pressure elevated 08/25/2010    Tobacco abuse 08/25/2010     Past Medical History:   Diagnosis Date    Body aches 12/11/2014    Chronic pain     Depression     GERD (gastroesophageal reflux disease)     Headache(784.0)     History of mammogram never before    MS (multiple sclerosis) ([address]     Neurological disorder     Multiple Sclerosis    Pap smear for cervical cancer screening 2008    Psychiatric disorder     anxiety    Psychotic disorder ([address]          Core Measures:    CVA: No No  CHF:No No  PNA:No No      Activity Status:    OOB to Chair No  Ambulated this shift No   Bed Rest Yes      DVT prophylaxis:    DVT prophylaxis Med- No  DVT prophylaxis SCD or CONG- No    Patient Safety:    Falls Score Total Score: 3  Safety Level_______  Bed Alarm On? Yes  Sitter?  No    Plan for upcoming shift: safety, pain management, neurology consult         Discharge Plan: No tbd    Active Consults:  IP CONSULT TO NEUROLOGY  IP CONSULT TO HOSPITALIST

## 2020-08-07 NOTE — CONSULTS
NEUROLOGY NOTE     Chief Complaint   Patient presents with    Multiple Sclerosis     Pt arrives to ER via EMS coming from home. Pt has a hx of MS, and c/o of a flare up x 2 weeks. Pt c/o over all weakness, fatigue, lethargy, and multiple falls within last 2 weeks d/t her weakness.  Extremity Weakness       Reason for Consult  I have been asked by Ronny Christianson MD to see the patient in neurological consultation to render advice and opinion regarding multiple sclerosis    HPI  The patient is a 57-year-old woman with history of multiple sclerosis. She is a patient of Dr. Tia Tyler and takes Tecfidera at home. She does also have anxiety and is on clonazepam, gabapentin for fibromyalgia. The patient presented to the hospital on 8/6/2020. The patient has been having 2 weeks of progressive lower extremity weakness, progressive fatigue and multiple falls. She does also have trouble speaking and articulating. Patient was last admitted for MS exacerbation in March 2020. Patient had MRI of the brain with and without contrast which shows multiple new enhancing active supratentorial demyelinating plaques on the background of extensive chronic supratentorial demyelinating disease. No new lesions in the cervical spine.     Neurology has been consulted for MS management    ROS  A ten system review of constitutional, cardiovascular, respiratory, musculoskeletal, endocrine, skin, SHEENT, genitourinary, psychiatric and neurologic systems was obtained and is unremarkable except as stated in HPI     PMH  Past Medical History:   Diagnosis Date    Body aches 12/11/2014    Chronic pain     Depression     GERD (gastroesophageal reflux disease)     Headache(784.0)     History of mammogram never before    [facility]) [facility] [address]     Neurological disorder     Multiple Sclerosis    Pap smear for cervical cancer screening 2008    Psychiatric disorder     anxiety    Psychotic disorder ([facility] [address]        [facility]  Family History Problem Relation Age of Onset    Heart Attack Father         tripple bypass    Cancer Father         lung    COPD Father     Diabetes Mother         type 2    Heart Disease Mother         heart disease    Diabetes Paternal Grandmother     No Known Problems Sister     No Known Problems Brother     No Known Problems Child        SH  Social History     Socioeconomic History    Marital status: LEGALLY      Spouse name: Not on file    Number of children: Not on file    Years of education: Not on file    Highest education level: Not on file   Tobacco Use    Smoking status: Current Every Day Smoker     Packs/day: 0.50     Years: 17.00     Pack years: 8.50     Types: Cigarettes    Smokeless tobacco: Never Used    Tobacco comment: 1 pack every 3 days. Substance and Sexual Activity    Alcohol use: No    Drug use: No    Sexual activity: Yes     Partners: Male       ALLERGIES  Allergies   Allergen Reactions    Morphine Rash       PHYSICAL EXAMINATION:   Patient Vitals for the past 24 hrs:   Temp Pulse Resp BP SpO2   08/07/20 1138 97.8 °F (36.6 °C) 85 14 134/84 99 %   08/07/20 0656 98.6 °F (37 °C) 68 14 128/77 97 %   08/07/20 0350 98.6 °F (37 °C) 69 16 110/72 95 %   08/06/20 2338 98.4 °F (36.9 °C) (!) 58 16 145/79 97 %   08/06/20 2041 98.6 °F (37 °C) 60 18 144/88 94 %   08/06/20 1715 98.5 °F (36.9 °C) 65 16 (!) 155/94 99 %   08/06/20 1630  (!) 57  (!) 154/97 99 %   08/06/20 1600  60  (!) 155/99 98 %   08/06/20 1336 97.8 °F (36.6 °C) 74 14 136/86 97 %        General:   General appearance: Pt is in no acute distress   Distal pulses are preserved  Fundoscopic exam: attempted    Neurological Examination:   Mental Status:  AAO x3. Speech is fluent. Follows commands, has normal fund of knowledge, attention, short term recall, comprehension and insight. Cranial Nerves: Visual fields are full. PERRL, Extraocular movements are full. Facial sensation intact. Facial movement intact.  Hearing intact to conversation. Palate elevates symmetrically. Shoulder shrug symmetric. Tongue midline. Motor: Strength is 4 out of 5 in bilateral upper and lower extremities in all 4 ext. Normal tone. No atrophy. Sensation: Light touch - Normal    Reflexes: DTRs 1+ in upper extremities and 2+ in lower extremities throughout. Plantar responses downgoing. Coordination/Cerebellar: Intact to finger-nose-finger     Gait: Deferred    Skin: No significant bruising or lacerations. LAB DATA REVIEWED:    Recent Results (from the past 24 hour(s))   CBC WITH AUTOMATED DIFF    Collection Time: 08/06/20  2:17 PM   Result Value Ref Range    WBC 3.5 (L) 3.6 - 11.0 K/uL    RBC 3.65 (L) 3.80 - 5.20 M/uL    HGB 11.6 11.5 - 16.0 g/dL    HCT 35.6 35.0 - 47.0 %    MCV 97.5 80.0 - 99.0 FL    MCH 31.8 26.0 - 34.0 PG    MCHC 32.6 30.0 - 36.5 g/dL    RDW 13.0 11.5 - 14.5 %    PLATELET 412 913 - 348 K/uL    MPV 9.5 8.9 - 12.9 FL    NRBC 0.0 0  WBC    ABSOLUTE NRBC 0.00 0.00 - 0.01 K/uL    NEUTROPHILS 61 32 - 75 %    LYMPHOCYTES 27 12 - 49 %    MONOCYTES 8 5 - 13 %    EOSINOPHILS 3 0 - 7 %    BASOPHILS 1 0 - 1 %    IMMATURE GRANULOCYTES 0 0.0 - 0.5 %    ABS. NEUTROPHILS 2.2 1.8 - 8.0 K/UL    ABS. LYMPHOCYTES 0.9 0.8 - 3.5 K/UL    ABS. MONOCYTES 0.3 0.0 - 1.0 K/UL    ABS. EOSINOPHILS 0.1 0.0 - 0.4 K/UL    ABS. BASOPHILS 0.1 0.0 - 0.1 K/UL    ABS. IMM.  GRANS. 0.0 0.00 - 0.04 K/UL    DF AUTOMATED     METABOLIC PANEL, COMPREHENSIVE    Collection Time: 08/06/20  2:17 PM   Result Value Ref Range    Sodium 139 136 - 145 mmol/L    Potassium 4.3 3.5 - 5.1 mmol/L    Chloride 108 97 - 108 mmol/L    CO2 26 21 - 32 mmol/L    Anion gap 5 5 - 15 mmol/L    Glucose 74 65 - 100 mg/dL    BUN 4 (L) 6 - 20 MG/DL    Creatinine 0.84 0.55 - 1.02 MG/DL    BUN/Creatinine ratio 5 (L) 12 - 20      GFR est AA >60 >60 ml/min/1.73m2    GFR est non-AA >60 >60 ml/min/1.73m2    Calcium 8.8 8.5 - 10.1 MG/DL    Bilirubin, total 0.5 0.2 - 1.0 MG/DL    ALT (SGPT) 11 (L) 12 - 78 U/L    AST (SGOT) 9 (L) 15 - 37 U/L    Alk. phosphatase 55 45 - 117 U/L    Protein, total 6.6 6.4 - 8.2 g/dL    Albumin 3.3 (L) 3.5 - 5.0 g/dL    Globulin 3.3 2.0 - 4.0 g/dL    A-G Ratio 1.0 (L) 1.1 - 2.2     URINALYSIS W/ REFLEX CULTURE    Collection Time: 08/06/20  2:17 PM    Specimen: Urine   Result Value Ref Range    Color YELLOW/STRAW      Appearance CLEAR CLEAR      Specific gravity 1.009 1.003 - 1.030      pH (UA) 5.5 5.0 - 8.0      Protein Negative NEG mg/dL    Glucose Negative NEG mg/dL    Ketone Negative NEG mg/dL    Bilirubin Negative NEG      Blood Negative NEG      Urobilinogen 0.2 0.2 - 1.0 EU/dL    Nitrites Negative NEG      Leukocyte Esterase Negative NEG      WBC 0-4 0 - 4 /hpf    RBC 0-5 0 - 5 /hpf    Epithelial cells FEW FEW /lpf    Bacteria Negative NEG /hpf    UA:UC IF INDICATED CULTURE NOT INDICATED BY UA RESULT CNI      Hyaline cast 0-2 0 - 5 /lpf   DRUG SCREEN, URINE    Collection Time: 08/06/20  2:17 PM   Result Value Ref Range    AMPHETAMINES Negative NEG      BARBITURATES Negative NEG      BENZODIAZEPINES Positive (A) NEG      COCAINE Negative NEG      METHADONE Negative NEG      OPIATES Negative NEG      PCP(PHENCYCLIDINE) Negative NEG      THC (TH-CANNABINOL) Positive (A) NEG      Drug screen comment (NOTE)    GLUCOSE, POC    Collection Time: 08/06/20  9:09 PM   Result Value Ref Range    Glucose (POC) 167 (H) 65 - 100 mg/dL    Performed by Nik To (PCT)    HEPATIC FUNCTION PANEL    Collection Time: 08/07/20  3:45 AM   Result Value Ref Range    Protein, total 6.5 6.4 - 8.2 g/dL    Albumin 3.2 (L) 3.5 - 5.0 g/dL    Globulin 3.3 2.0 - 4.0 g/dL    A-G Ratio 1.0 (L) 1.1 - 2.2      Bilirubin, total 0.6 0.2 - 1.0 MG/DL    Bilirubin, direct 0.1 0.0 - 0.2 MG/DL    Alk.  phosphatase 62 45 - 117 U/L    AST (SGOT) 11 (L) 15 - 37 U/L    ALT (SGPT) 15 12 - 78 U/L   MAGNESIUM    Collection Time: 08/07/20  3:45 AM   Result Value Ref Range    Magnesium 1.7 1.6 - 2.4 mg/dL   CBC WITH AUTOMATED DIFF    Collection Time: 08/07/20  3:45 AM   Result Value Ref Range    WBC 4.2 3.6 - 11.0 K/uL    RBC 3.72 (L) 3.80 - 5.20 M/uL    HGB 12.0 11.5 - 16.0 g/dL    HCT 35.0 35.0 - 47.0 %    MCV 94.1 80.0 - 99.0 FL    MCH 32.3 26.0 - 34.0 PG    MCHC 34.3 30.0 - 36.5 g/dL    RDW 12.7 11.5 - 14.5 %    PLATELET 249 988 - 494 K/uL    MPV 9.5 8.9 - 12.9 FL    NRBC 0.0 0  WBC    ABSOLUTE NRBC 0.00 0.00 - 0.01 K/uL    NEUTROPHILS 90 (H) 32 - 75 %    LYMPHOCYTES 9 (L) 12 - 49 %    MONOCYTES 0 (L) 5 - 13 %    EOSINOPHILS 0 0 - 7 %    BASOPHILS 0 0 - 1 %    IMMATURE GRANULOCYTES 1 (H) 0.0 - 0.5 %    ABS. NEUTROPHILS 3.8 1.8 - 8.0 K/UL    ABS. LYMPHOCYTES 0.4 (L) 0.8 - 3.5 K/UL    ABS. MONOCYTES 0.0 0.0 - 1.0 K/UL    ABS. EOSINOPHILS 0.0 0.0 - 0.4 K/UL    ABS. BASOPHILS 0.0 0.0 - 0.1 K/UL    ABS. IMM. GRANS. 0.0 0.00 - 0.04 K/UL    DF SMEAR SCANNED      RBC COMMENTS NORMOCYTIC, NORMOCHROMIC     GLUCOSE, POC    Collection Time: 08/07/20  7:34 AM   Result Value Ref Range    Glucose (POC) 137 (H) 65 - 100 mg/dL    Performed by Jeremias Dang    GLUCOSE, POC    Collection Time: 08/07/20 10:59 AM   Result Value Ref Range    Glucose (POC) 133 (H) 65 - 100 mg/dL    Performed by Ying Leyva         Imaging review:  MRI brain  1. Multiple new enhancing active supratentorial demyelinating plaques on a  background of extensive, chronic supratentorial demyelinating disease. 2. Stable mild parenchymal volume loss. MRI cervical spine  1. Unchanged multilevel areas of abnormal signal in the cord consistent with  demyelinating disease. No evidence of active demyelination. 2. Unchanged mild spondylosis as above. HOME MEDS  Prior to Admission Medications   Prescriptions Last Dose Informant Patient Reported? Taking? DULoxetine (CYMBALTA) 60 mg capsule   No No   Sig: Take 1 Cap by mouth daily.  Indications: neuropathic pain   LORazepam (ATIVAN) 1 mg tablet Not Taking at Unknown time  No No   Sig: Take 1 Tab by mouth every twelve (12) hours as needed for Anxiety (q12h prn). Max Daily Amount: 2 mg.   clonazePAM (KlonoPIN) 0.5 mg tablet Not Taking at Unknown time  No No   Sig: Take 1 Tab by mouth two (2) times a day. Max Daily Amount: 1 mg. Patient taking differently: Take 0.5 mg by mouth three (3) times daily. cyclobenzaprine (FLEXERIL) 10 mg tablet   No No   Sig: Take 1 Tab by mouth three (3) times daily as needed for Muscle Spasm(s). dimethyl fumarate (Tecfidera) 240 mg cpDR 8/5/2020 at Unknown time  No Yes   Sig: Take 240 mg by mouth two (2) times a day.   gabapentin (NEURONTIN) 400 mg capsule   No No   Sig: Take 2 Caps by mouth two (2) times a day. Max Daily Amount: 1,600 mg. Take 2 capsules 2 times a day, do not exceed 1600 mg daily   ibuprofen (MOTRIN) 200 mg tablet   Yes No   Sig: Take  by mouth every six (6) hours as needed for Pain. Take three tablets every 6 hours as needed by mouth    ibuprofen (MOTRIN) 600 mg tablet   No No   Sig: Take 1 Tab by mouth every six (6) hours as needed for Pain. Patient taking differently: Take 200 mg by mouth.   melatonin 3 mg tablet   No No   Sig: Take 1 Tab by mouth nightly as needed for Insomnia. Patient taking differently: Take 3 mg by mouth nightly as needed for Insomnia. Indications: Pt state she takes 5 mg tab nightly. senna-docusate (PERICOLACE) 8.6-50 mg per tablet Not Taking at Unknown time  No No   Sig: Take 2 Tabs by mouth daily.       Facility-Administered Medications: None       CURRENT MEDS  Current Facility-Administered Medications   Medication Dose Route Frequency    dimethyl fumarate cpDR 240 mg  240 mg Oral BID    clonazePAM (KlonoPIN) tablet 0.5 mg  0.5 mg Oral BID    DULoxetine (CYMBALTA) capsule 60 mg  60 mg Oral DAILY    gabapentin (NEURONTIN) capsule 800 mg  800 mg Oral BID    sodium chloride (NS) flush 5-40 mL  5-40 mL IntraVENous Q8H    insulin lispro (HUMALOG) injection   SubCUTAneous AC&HS    pantoprazole (PROTONIX) tablet 40 mg  40 mg Oral ACB    methylPREDNISolone ((Solu-MEDROL) 1,000 mg in 0.9% sodium chloride (MBP/ADV) 100 mL  1,000 mg IntraVENous Q12H       IMPRESSION/RECOMMENDATIONS:  Pamela Canchola is a 53 y.o. female who presents with worsening weakness in lower extremities bilaterally. Patient does have history of multiple sclerosis and is on Tecfidera at home. Patient started having weakness in the lower extremities around 2 weeks ago and has had recurrent falls. She has also noticed some weakness in her upper extremities as well but not as bad. Patient had MRI of the brain and cervical spine and it shows new enhancing lesions in the brain. I will proceed with 1 g IV Solu-Medrol daily for a total of 5 days.  Continue Tecfidera 240 mg p.o. twice daily  -  IV Solu-Medrol 1 g daily for 5 days. Started on 8/6/2020   Physical therapy and Occupational Therapy   Follow-up with Dr. Elle Barnes        Thank you very much for this consultation.      Lashaun Dietrich MD  Neurologist

## 2020-08-07 NOTE — PROGRESS NOTES
Problem: Falls - Risk of  Goal: *Absence of Falls  Description: Document Mecca Dick Fall Risk and appropriate interventions in the flowsheet.   Outcome: Progressing Towards Goal  Note: Fall Risk Interventions:  Mobility Interventions: Bed/chair exit alarm         Medication Interventions: Bed/chair exit alarm, Patient to call before getting OOB    Elimination Interventions: Bed/chair exit alarm, Call light in reach    History of Falls Interventions: Bed/chair exit alarm, Door open when patient unattended         Problem: Patient Education: Go to Patient Education Activity  Goal: Patient/Family Education  Outcome: Progressing Towards Goal

## 2020-08-07 NOTE — PROGRESS NOTES
PHARMACY NOTE ON TECFIDERA  This patient has had an order entered for Dimethyl Fumarate (Tecfidera) which she uses at home for multiple sclerosis. The pharmacy does not stock this med and the  also does not carry it.  We have spoken to the nurse who has checked with the patient and confirmed that they will not bring the medication in for use while in the hospital.   Gregoria Johnson., [facility]

## 2020-08-08 LAB
ANION GAP SERPL CALC-SCNC: 6 MMOL/L (ref 5–15)
BASOPHILS # BLD: 0 K/UL (ref 0–0.1)
BASOPHILS NFR BLD: 0 % (ref 0–1)
BUN SERPL-MCNC: 18 MG/DL (ref 6–20)
BUN/CREAT SERPL: 22 (ref 12–20)
CALCIUM SERPL-MCNC: 8.7 MG/DL (ref 8.5–10.1)
CHLORIDE SERPL-SCNC: 106 MMOL/L (ref 97–108)
CO2 SERPL-SCNC: 25 MMOL/L (ref 21–32)
CREAT SERPL-MCNC: 0.83 MG/DL (ref 0.55–1.02)
DIFFERENTIAL METHOD BLD: ABNORMAL
EOSINOPHIL # BLD: 0 K/UL (ref 0–0.4)
EOSINOPHIL NFR BLD: 0 % (ref 0–7)
ERYTHROCYTE [DISTWIDTH] IN BLOOD BY AUTOMATED COUNT: 12.9 % (ref 11.5–14.5)
GLUCOSE BLD STRIP.AUTO-MCNC: 119 MG/DL (ref 65–100)
GLUCOSE BLD STRIP.AUTO-MCNC: 139 MG/DL (ref 65–100)
GLUCOSE BLD STRIP.AUTO-MCNC: 154 MG/DL (ref 65–100)
GLUCOSE SERPL-MCNC: 118 MG/DL (ref 65–100)
HCT VFR BLD AUTO: 35.7 % (ref 35–47)
HGB BLD-MCNC: 12.3 G/DL (ref 11.5–16)
IMM GRANULOCYTES # BLD AUTO: 0 K/UL (ref 0–0.04)
IMM GRANULOCYTES NFR BLD AUTO: 0 % (ref 0–0.5)
LYMPHOCYTES # BLD: 0.5 K/UL (ref 0.8–3.5)
LYMPHOCYTES NFR BLD: 4 % (ref 12–49)
MCH RBC QN AUTO: 32.4 PG (ref 26–34)
MCHC RBC AUTO-ENTMCNC: 34.5 G/DL (ref 30–36.5)
MCV RBC AUTO: 93.9 FL (ref 80–99)
MONOCYTES # BLD: 0.3 K/UL (ref 0–1)
MONOCYTES NFR BLD: 3 % (ref 5–13)
NEUTS SEG # BLD: 10.9 K/UL (ref 1.8–8)
NEUTS SEG NFR BLD: 93 % (ref 32–75)
NRBC # BLD: 0 K/UL (ref 0–0.01)
NRBC BLD-RTO: 0 PER 100 WBC
PLATELET # BLD AUTO: 385 K/UL (ref 150–400)
PMV BLD AUTO: 9.8 FL (ref 8.9–12.9)
POTASSIUM SERPL-SCNC: 3.7 MMOL/L (ref 3.5–5.1)
RBC # BLD AUTO: 3.8 M/UL (ref 3.8–5.2)
SERVICE CMNT-IMP: ABNORMAL
SODIUM SERPL-SCNC: 137 MMOL/L (ref 136–145)
WBC # BLD AUTO: 11.7 K/UL (ref 3.6–11)

## 2020-08-08 PROCEDURE — 74011250637 HC RX REV CODE- 250/637: Performed by: INTERNAL MEDICINE

## 2020-08-08 PROCEDURE — 36415 COLL VENOUS BLD VENIPUNCTURE: CPT

## 2020-08-08 PROCEDURE — 65660000000 HC RM CCU STEPDOWN

## 2020-08-08 PROCEDURE — 74011250636 HC RX REV CODE- 250/636: Performed by: PSYCHIATRY & NEUROLOGY

## 2020-08-08 PROCEDURE — 80048 BASIC METABOLIC PNL TOTAL CA: CPT

## 2020-08-08 PROCEDURE — 74011250636 HC RX REV CODE- 250/636: Performed by: INTERNAL MEDICINE

## 2020-08-08 PROCEDURE — 74011000258 HC RX REV CODE- 258: Performed by: PSYCHIATRY & NEUROLOGY

## 2020-08-08 PROCEDURE — 94760 N-INVAS EAR/PLS OXIMETRY 1: CPT

## 2020-08-08 PROCEDURE — 74011250637 HC RX REV CODE- 250/637: Performed by: HOSPITALIST

## 2020-08-08 PROCEDURE — 85025 COMPLETE CBC W/AUTO DIFF WBC: CPT

## 2020-08-08 PROCEDURE — 82962 GLUCOSE BLOOD TEST: CPT

## 2020-08-08 RX ORDER — KETOROLAC TROMETHAMINE 30 MG/ML
30 INJECTION, SOLUTION INTRAMUSCULAR; INTRAVENOUS
Status: DISPENSED | OUTPATIENT
Start: 2020-08-08 | End: 2020-08-11

## 2020-08-08 RX ORDER — IBUPROFEN 200 MG
1 TABLET ORAL DAILY
Status: DISCONTINUED | OUTPATIENT
Start: 2020-08-08 | End: 2020-08-12 | Stop reason: HOSPADM

## 2020-08-08 RX ADMIN — POLYETHYLENE GLYCOL 3350 17 G: 17 POWDER, FOR SOLUTION ORAL at 08:10

## 2020-08-08 RX ADMIN — DULOXETINE HYDROCHLORIDE 60 MG: 30 CAPSULE, DELAYED RELEASE ORAL at 08:09

## 2020-08-08 RX ADMIN — CLONAZEPAM 0.5 MG: 0.5 TABLET ORAL at 08:09

## 2020-08-08 RX ADMIN — MELATONIN 3 MG: at 21:14

## 2020-08-08 RX ADMIN — Medication 10 ML: at 04:07

## 2020-08-08 RX ADMIN — KETOROLAC TROMETHAMINE 30 MG: 30 INJECTION, SOLUTION INTRAMUSCULAR at 11:00

## 2020-08-08 RX ADMIN — CLONAZEPAM 0.5 MG: 0.5 TABLET ORAL at 21:14

## 2020-08-08 RX ADMIN — Medication 10 ML: at 08:18

## 2020-08-08 RX ADMIN — GABAPENTIN 800 MG: 100 CAPSULE ORAL at 08:08

## 2020-08-08 RX ADMIN — GABAPENTIN 800 MG: 100 CAPSULE ORAL at 21:14

## 2020-08-08 RX ADMIN — SODIUM CHLORIDE 1000 MG: 900 INJECTION, SOLUTION INTRAVENOUS at 08:16

## 2020-08-08 RX ADMIN — PANTOPRAZOLE SODIUM 40 MG: 40 TABLET, DELAYED RELEASE ORAL at 08:10
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
## 2020-08-08 NOTE — ROUTINE PROCESS
Post Fall Documentation Manuel An unwitnessed fall occurred on 8/8/2020 (Date) at [phone] (Time). The answers to the following questions summarize the fall: In the patient's own words,: 
· What were you attempting to do when you fell? \"I was trying to clean up the coffee I spilt on the floor. \" 
· Do you know why you fell? \"Yeah, my leg gave out on me and I lost my balance. \" 
· Do you have any pain/discomfort or any other complaints? \"No, I am fine. I fall all the time. \" · Which part of your body made contact with the floor or other object? \"I landed on my butt. \" 
 
Nurse: 
? Was this an assisted fall? no 
? Was fall witnessed? No 
? If witnessed, what part of the body made contact with the floor or other object? N/A 
? Patients mental status after the fall/when found: Alert and oriented ? Any apparent injury:  No apparent injury ? Immediate interventions for injury/suspected injury? No interventions needed ? Patient assisted back to bed? Assist X2 
? Name of provider notified and time, any comments? Dr. Mikal Ramirez at 0428      
? Name of family member notified and time: Pt refuses to have family notified. Immediate VS and physical assessment documented in flow sheets. Neuro assessment every hour x 4 (for potential head injury or unwitnessed fall) documented in flow sheets.  
 
 
Giulia Alejandro RN

## 2020-08-08 NOTE — PROGRESS NOTES
Hospitalist Progress Note    NAME: Stephen Oseguera   :  1975   MRN:  [id number]     Admit date: 2020    Today's date: 20    PCP: MD Samra Beck M.D. [facility] [id number]      Assessment / Plan:  MS exacerbation POA  Relapsing remitting MS POA  Solumedrol 1000mg daily X 3. MRI brain multiple new enhancing active supratentorial demyelinating plaques on a            background of extensive, chronic supratentorial demyelinating disease  MRI C spine Unchanged multilevel areas of abnormal signal in the cord consistent with         demyelinating disease. No evidence of active demyelination. Unchanged mild spondylosis as above. Neurology consulted by ED. Insulin sliding scale. [place] twice last night, we discussed not getting OOB without nursing help      Patient agrees to get up only with nursing    Constipation POA   Miralax  Lactulose     Stress/anxiety/fIbromyalgia POA  Cont cymbalta/klonapin/gabapentin home meds     GERD, continue ppi    Current tobacco use, discussed health reasons to quit. Obesity POA Body mass index is 34.33 kg/m². Code Status: full    Surrogate Decision Maker: son age 32, Jimmie Meraz [phone], currently getting divorce.  Mother ([address]      DVT Prophylaxis: scd's  GI Prophylaxis: not indicated     Baseline: has fianace/ INDEPENDENT       Subjective:     Chief Complaint / Reason for Physician Visit f/u MS excerbation  \"my legs are hurting a bit less\". Discussed with RN events overnight.    NS reports BM overnight, no BM per patient  [place] twice last night getting OOB \" I did not hurt myself\"  We discussed importance of getting OOB only with the NS, she agrees    Review of Systems:  Symptom Y/N Comments  Symptom Y/N Comments   Fever/Chills n   Chest Pain n    Poor Appetite    Edema     Cough n   Abdominal Pain n    Sputum    Joint Pain     SOB/BOBO n   Headache     Nausea/vomit n Tolerating PT/OT     Diarrhea    Tolerating Diet y    Constipation y   Other       Could NOT obtain due to:      Objective:     VITALS:   Last 24hrs VS reviewed since prior progress note. Most recent are:  Patient Vitals for the past 24 hrs:   Temp Pulse Resp BP SpO2   08/08/20 1145 97.8 °F (36.6 °C) 67 18 137/77 96 %   08/08/20 0640 97.8 °F (36.6 °C) 78 16 119/83 99 %   08/08/20 0416 97.4 °F (36.3 °C) 89 18 132/87 96 %   08/08/20 0335 97.4 °F (36.3 °C) 86 18 123/84 96 %   08/07/20 2344 98.3 °F (36.8 °C) 82 20 138/78 96 %   08/07/20 1855 98.6 °F (37 °C) 72 18 129/72 91 %       Intake/Output Summary (Last 24 hours) at 8/8/2020 1542  Last data filed at 8/7/2020 2344  Gross per 24 hour   Intake 499 ml   Output 800 ml   Net -301 ml        Wt Readings from Last 12 Encounters:   08/06/20 90.7 kg (200 lb)   04/03/20 90.7 kg (200 lb)   03/23/20 90.7 kg (200 lb)   03/17/20 87.4 kg (192 lb 9.6 oz)   03/08/20 90.7 kg (200 lb)   03/02/20 89.6 kg (197 lb 9.6 oz)   02/25/20 90.8 kg (200 lb 2.8 oz)   02/17/20 90.7 kg (200 lb)   02/12/20 90.7 kg (200 lb)   02/10/20 89.8 kg (198 lb)   01/14/20 89.2 kg (196 lb 11.2 oz)   12/19/19 90 kg (198 lb 8 oz)       PHYSICAL EXAM:  General: WD, WN. Alert, cooperative, no acute distress    EENT:  PERRL. Anicteric sclerae. MMM  Resp:  CTA bilaterally, no wheezing or rales. No accessory muscle use  CV:  Regular  rhythm,  No edema  GI:  Soft, Non distended, Non tender. +Bowel sounds, no rebound  Neurologic:  Alert and oriented X 3, normal speech, diffuse weakness 4/5    Speech slow  Psych:   Good insight. Not anxious nor agitated  Skin:  No rashes.   No jaundice    Reviewed most current lab test results and cultures  YES  Reviewed most current radiology test results   YES  Review and summation of old records today    NO  Reviewed patient's current orders and MAR    YES  PMH/SH reviewed - no change compared to H&P  ________________________________________________________________________  Care Plan discussed with:    Comments   Patient x    Family  x Ana Lilia   RN x    Care Manager     Consultant                        Multidiciplinary team rounds were held today with , nursing, pharmacist and clinical coordinator. Patient's plan of care was discussed; medications were reviewed and discharge planning was addressed. ________________________________________________________________________      Comments   >50% of visit spent in counseling and coordination of care     ________________________________________________________________________  Nicki Giordano MD     Procedures: see electronic medical records for all procedures/Xrays and details which were not copied into this note but were reviewed prior to creation of Plan. LABS:  I reviewed today's most current labs and imaging studies.   Pertinent labs include:  Recent Labs     08/08/20  0401 08/07/20  0345 08/06/20  1417   WBC 11.7* 4.2 3.5*   HGB 12.3 12.0 11.6   HCT 35.7 35.0 35.6    323 280     Recent Labs     08/08/20  0401 08/07/20  0345 08/06/20  1417     --  139   K 3.7  --  4.3     --  108   CO2 25  --  26   *  --  74   BUN 18  --  4*   CREA 0.83  --  0.84   CA 8.7  --  8.8   MG  --  1.7  --    ALB  --  3.2* 3.3*   TBILI  --  0.6 0.5   ALT  --  15 11*

## 2020-08-08 NOTE — PROGRESS NOTES
Nurse paged me for the first fall and reported that the patient is fine in her bed and sleeping right now. I talked with the nurse again after the patient had another fall and she reported that she called me on [phone] but I did not response. Nurse was instructed and indicated in the situations that she has to page the on-call physician instead of calling on the phone because we take care of patients in the ED and on the floor and not necessarily will be in the break room but we carry the pager always with us.

## 2020-08-08 NOTE — PROGRESS NOTES
0330:  I heard a crashing noise coming from the patients room, upon entering  room patient was on the floor. The pt denied hitting her head or having any injury. Code fall called, primary nurse came to room, pt up to bed x2 and walker. Primary nurse with patient. 0630: Pt found sitting on the floor again, code fall called, primary nurse to room, pt denies injury.

## 2020-08-08 NOTE — PROGRESS NOTES
Problem: Falls - Risk of  Goal: *Absence of Falls  Description: Document Alvino Clementeshire Fall Risk and appropriate interventions in the flowsheet.   Outcome: Progressing Towards Goal  Note: Fall Risk Interventions:  Mobility Interventions: Bed/chair exit alarm         Medication Interventions: Bed/chair exit alarm    Elimination Interventions: Bed/chair exit alarm    History of Falls Interventions: Bed/chair exit alarm         Problem: Patient Education: Go to Patient Education Activity  Goal: Patient/Family Education  Outcome: Progressing Towards Goal

## 2020-08-08 NOTE — ROUTINE PROCESS
German Anaya-  Charge nurse was sitting at desk when he heard a noise from room. Pt was found in sitting on floor. Pt stated that she fell. Medical Alert-pt fall was over head paged. Pt denied pain at time of getting pt up out of floor. Assessment done. No bruising or scrapes noted on pt's body. Pt stated that she was going to get a cup of coffee when she slid to floorl. Pt currently sitting on side of bed. Nursing supervisor notified. 0400-  AM blood work was done. Pt sitting on side of bed. She refuses to lay in the bed. Attempted to call in house MD at ext. [phone] to notify of pt's \"fall\" but there was no answer so MD was then page. MD returned paged and was notified of \"fall\". 0500-  Pt continues to sit on side the bed. Pt attempting to stand. Asked pt where she was going. Pt stated that she wanted to sit on sofa. Ambulated with pt to sofa. Pt used walker and nurse was at side. Over bed table was placed in front of pt with her coffee within reach. [id number]-  Nurse sitting at desk at home pod and heard a noise coming from pt's room. Pt was found on floor. Medical Alert-pt fall was over head paged. , Mobility team member, PCT, and nurse present. Pt assisted out of floor. Pt denying injury. Pt refuses to lay down in bed. Pt insists on sitting on side of bed. Pt very tearful, stating \"I just want to go home\". [facility], [address] talked with pt and was able to get pt to lay down in bed. All four railings were raised and bed alarm was activated. Pt voiced discontent with having to lay in bed with railings up and bed alarm on. I did notice that pt had cigarettes in her pocket and a lighter in her hand when she layed down. Items removed from her possession, placed in a zip top bag with her name on it and placed at nurses' desk. Dr. Christie Joe as notified of pt's second fall and that the pt wants to go home.  MD stated that pt's primary physician would be  In the hospital within 20 minutes to make rounds and that she could talk with him about going home. 0710-  Change of shift report given to Mando Teran RN. She will assume care of pt.

## 2020-08-08 NOTE — ROUTINE PROCESS
Post Fall Documentation [place] unwitnessed fall occurred on 8/8/2020 (Date) at 0 (Time). The answers to the following questions summarize the fall: In the patient's own words,: 
· What were you attempting to do when you fell? \"I was attempting to get up and get a cup of coffee because I did not want to bother anyone for it\". · Do you know why you fell? \"My leg gave away on me\". · Do you have any pain/discomfort or any other complaints? \"No,I am fine\". · Which part of your body made contact with the floor or other object? \"My butt\". Nurse: 
? Was this an assisted fall? no 
? Was fall witnessed? No 
? If witnessed, what part of the body made contact with the floor or other object? N/A 
? Patients mental status after the fall/when found: Alert and oriented ? Any apparent injury:  No apparent injury ? Immediate interventions for injury/suspected injury? No interventions needed ? Patient assisted back to bed? Assist X2 
? Name of provider notified and time, any comments? Dr. Steve Carranza was notified at 46. No orders were given since pt did not suffer any injury. ? Name of family member notified and time: Pt requested not to notify family member. Pt is alert, oriented x 4. Immediate VS and physical assessment documented in flow sheets. Neuro assessment every hour x 4 (for potential head injury or unwitnessed fall) documented in flow sheets.  
 
 
Shawn Murdock RN

## 2020-08-09 LAB
GLUCOSE BLD STRIP.AUTO-MCNC: 163 MG/DL (ref 65–100)
GLUCOSE BLD STRIP.AUTO-MCNC: 195 MG/DL (ref 65–100)
SERVICE CMNT-IMP: ABNORMAL
SERVICE CMNT-IMP: ABNORMAL

## 2020-08-09 PROCEDURE — 74011250636 HC RX REV CODE- 250/636: Performed by: INTERNAL MEDICINE

## 2020-08-09 PROCEDURE — 82962 GLUCOSE BLOOD TEST: CPT

## 2020-08-09 PROCEDURE — 94760 N-INVAS EAR/PLS OXIMETRY 1: CPT

## 2020-08-09 PROCEDURE — 74011250637 HC RX REV CODE- 250/637: Performed by: HOSPITALIST

## 2020-08-09 PROCEDURE — 74011250636 HC RX REV CODE- 250/636: Performed by: PSYCHIATRY & NEUROLOGY

## 2020-08-09 PROCEDURE — 74011250637 HC RX REV CODE- 250/637: Performed by: INTERNAL MEDICINE

## 2020-08-09 PROCEDURE — 65660000000 HC RM CCU STEPDOWN

## 2020-08-09 PROCEDURE — 74011000258 HC RX REV CODE- 258: Performed by: PSYCHIATRY & NEUROLOGY

## 2020-08-09 RX ADMIN — MELATONIN 3 MG: at 21:03

## 2020-08-09 RX ADMIN — GABAPENTIN 800 MG: 100 CAPSULE ORAL at 21:03

## 2020-08-09 RX ADMIN — PANTOPRAZOLE SODIUM 40 MG: 40 TABLET, DELAYED RELEASE ORAL at 11:23

## 2020-08-09 RX ADMIN — KETOROLAC TROMETHAMINE 30 MG: 30 INJECTION, SOLUTION INTRAMUSCULAR at 21:03

## 2020-08-09 RX ADMIN — DULOXETINE HYDROCHLORIDE 60 MG: 30 CAPSULE, DELAYED RELEASE ORAL at 11:23

## 2020-08-09 RX ADMIN — GABAPENTIN 800 MG: 100 CAPSULE ORAL at 11:22

## 2020-08-09 RX ADMIN — SODIUM CHLORIDE 1000 MG: 900 INJECTION, SOLUTION INTRAVENOUS at 13:08

## 2020-08-09 RX ADMIN — KETOROLAC TROMETHAMINE 30 MG: 30 INJECTION, SOLUTION INTRAMUSCULAR at 13:16

## 2020-08-09 RX ADMIN — CLONAZEPAM 0.5 MG: 0.5 TABLET ORAL at 21:03

## 2020-08-09 RX ADMIN — Medication 10 ML: at 21:11

## 2020-08-09 RX ADMIN — CLONAZEPAM 0.5 MG: 0.5 TABLET ORAL at 11:21

## 2020-08-09 NOTE — PROGRESS NOTES
Hospitalist Progress Note    NAME: Shakeel Mack   :  1975   MRN:  [id number]     Admit date: 2020    Today's date: 20    PCP: MD Azam Ramires M.D. Cell [phone]      Assessment / Plan:  MS exacerbation POA  Relapsing remitting MS POA  Solumedrol 1000mg daily X 5 days, day 4  MRI brain multiple new enhancing active supratentorial demyelinating plaques on a            background of extensive, chronic supratentorial demyelinating disease  MRI C spine Unchanged multilevel areas of abnormal signal in the cord consistent with         demyelinating disease. No evidence of active demyelination. Unchanged mild spondylosis as above. Neurology consulted by ED. Insulin sliding scale. PT/OT consults  Last dose of solumedrol in Am, start discharge planning    Constipation POA   Miralax  Lactulose     Stress/anxiety/fIbromyalgia POA  Cont cymbalta/klonapin/gabapentin home meds     GERD, continue ppi    Current tobacco use, discussed health reasons to quit. Obesity POA Body mass index is 34.33 kg/m². Code Status: full    Surrogate Decision Maker: son age 32, Brayan Martinez [phone], currently getting divorce.  Mother ([phone].      DVT Prophylaxis: scd's  GI Prophylaxis: not indicated     Baseline: has fianace/ INDEPENDENT       Subjective:     Chief Complaint / Reason for Physician Visit f/u MS excerbation  \"feeling a bit better\". Discussed with RN events overnight.    Continues on solumedrol  Trouble getting a IV, in now    Review of Systems:  Symptom Y/N Comments  Symptom Y/N Comments   Fever/Chills n   Chest Pain n    Poor Appetite    Edema     Cough n   Abdominal Pain n    Sputum    Joint Pain     SOB/BOBO n   Headache     Nausea/vomit n   Tolerating PT/OT     Diarrhea    Tolerating Diet y    Constipation y   Other       Could NOT obtain due to:      Objective:     VITALS:   Last 24hrs VS reviewed since prior progress note. Most recent are:  Patient Vitals for the past 24 hrs:   Temp Pulse Resp BP SpO2   08/09/20 1530 97.8 °F (36.6 °C) 62 18 130/70 99 %   08/09/20 1308 97.8 °F (36.6 °C) 60 18 138/71 99 %   08/09/20 0651 97.6 °F (36.4 °C) (!) 47 17 147/73 98 %   08/08/20 2253 98.2 °F (36.8 °C) (!) 53 17 148/78 96 %   08/08/20 1904 97.3 °F (36.3 °C) (!) 58 18 (!) 153/93 100 %       Intake/Output Summary (Last 24 hours) at 8/9/2020 1708  Last data filed at 8/9/2020 1703  Gross per 24 hour   Intake 600 ml   Output    Net 600 ml        Wt Readings from Last 12 Encounters:   08/06/20 90.7 kg (200 lb)   04/03/20 90.7 kg (200 lb)   03/23/20 90.7 kg (200 lb)   03/17/20 87.4 kg (192 lb 9.6 oz)   03/08/20 90.7 kg (200 lb)   03/02/20 89.6 kg (197 lb 9.6 oz)   02/25/20 90.8 kg (200 lb 2.8 oz)   02/17/20 90.7 kg (200 lb)   02/12/20 90.7 kg (200 lb)   02/10/20 89.8 kg (198 lb)   01/14/20 89.2 kg (196 lb 11.2 oz)   12/19/19 90 kg (198 lb 8 oz)       PHYSICAL EXAM:  General: WD, WN. Alert, cooperative, no acute distress    EENT:  PERRL. Anicteric sclerae. MMM  Resp:  CTA bilaterally, no wheezing or rales. No accessory muscle use  CV:  Regular  rhythm,  No edema  GI:  Soft, Non distended, Non tender. +Bowel sounds, no rebound  Neurologic:  Alert and oriented X 3, normal speech, diffuse weakness 4/5    Speech slow  Psych:   Good insight. Not anxious nor agitated  Skin:  No rashes.   No jaundice    Reviewed most current lab test results and cultures  YES  Reviewed most current radiology test results   YES  Review and summation of old records today    NO  Reviewed patient's current orders and MAR    YES  PMH/ reviewed - no change compared to H&P  ________________________________________________________________________  Care Plan discussed with:    Comments   Patient x    Family      RN x    Care Manager     Consultant                        Multidiciplinary team rounds were held today with , nursing, pharmacist and clinical coordinator. Patient's plan of care was discussed; medications were reviewed and discharge planning was addressed. ________________________________________________________________________      Comments   >50% of visit spent in counseling and coordination of care     ________________________________________________________________________  Elvia Pérez MD     Procedures: see electronic medical records for all procedures/Xrays and details which were not copied into this note but were reviewed prior to creation of Plan. LABS:  I reviewed today's most current labs and imaging studies.   Pertinent labs include:  Recent Labs     08/08/20  0401 08/07/20 0345   WBC 11.7* 4.2   HGB 12.3 12.0   HCT 35.7 35.0    323     Recent Labs     08/08/20  0401 08/07/20  0345     --    K 3.7  --      --    CO2 25  --    *  --    BUN 18  --    CREA 0.83  --    CA 8.7  --    MG  --  1.7   ALB  --  3.2*   TBILI  --  0.6   ALT  --  15

## 2020-08-10 LAB
ALBUMIN SERPL-MCNC: 3.3 G/DL (ref 3.5–5)
ALBUMIN/GLOB SERPL: 1.1 {RATIO} (ref 1.1–2.2)
ALP SERPL-CCNC: 48 U/L (ref 45–117)
ALT SERPL-CCNC: 14 U/L (ref 12–78)
ANION GAP SERPL CALC-SCNC: 1 MMOL/L (ref 5–15)
AST SERPL-CCNC: 9 U/L (ref 15–37)
BASOPHILS # BLD: 0 K/UL (ref 0–0.1)
BASOPHILS NFR BLD: 0 % (ref 0–1)
BILIRUB SERPL-MCNC: 0.5 MG/DL (ref 0.2–1)
BUN SERPL-MCNC: 23 MG/DL (ref 6–20)
BUN/CREAT SERPL: 38 (ref 12–20)
CALCIUM SERPL-MCNC: 8.2 MG/DL (ref 8.5–10.1)
CHLORIDE SERPL-SCNC: 109 MMOL/L (ref 97–108)
CO2 SERPL-SCNC: 29 MMOL/L (ref 21–32)
CREAT SERPL-MCNC: 0.6 MG/DL (ref 0.55–1.02)
DIFFERENTIAL METHOD BLD: ABNORMAL
EOSINOPHIL # BLD: 0 K/UL (ref 0–0.4)
EOSINOPHIL NFR BLD: 0 % (ref 0–7)
ERYTHROCYTE [DISTWIDTH] IN BLOOD BY AUTOMATED COUNT: 12.7 % (ref 11.5–14.5)
GLOBULIN SER CALC-MCNC: 3.1 G/DL (ref 2–4)
GLUCOSE BLD STRIP.AUTO-MCNC: 117 MG/DL (ref 65–100)
GLUCOSE BLD STRIP.AUTO-MCNC: 123 MG/DL (ref 65–100)
GLUCOSE BLD STRIP.AUTO-MCNC: 134 MG/DL (ref 65–100)
GLUCOSE BLD STRIP.AUTO-MCNC: 145 MG/DL (ref 65–100)
GLUCOSE SERPL-MCNC: 127 MG/DL (ref 65–100)
HCT VFR BLD AUTO: 35 % (ref 35–47)
HEALTH STATUS, XMCV2T: NORMAL
HGB BLD-MCNC: 11.7 G/DL (ref 11.5–16)
IMM GRANULOCYTES # BLD AUTO: 0.1 K/UL (ref 0–0.04)
IMM GRANULOCYTES NFR BLD AUTO: 1 % (ref 0–0.5)
LYMPHOCYTES # BLD: 0.4 K/UL (ref 0.8–3.5)
LYMPHOCYTES NFR BLD: 8 % (ref 12–49)
MCH RBC QN AUTO: 32 PG (ref 26–34)
MCHC RBC AUTO-ENTMCNC: 33.4 G/DL (ref 30–36.5)
MCV RBC AUTO: 95.6 FL (ref 80–99)
MONOCYTES # BLD: 0.1 K/UL (ref 0–1)
MONOCYTES NFR BLD: 2 % (ref 5–13)
NEUTS SEG # BLD: 5 K/UL (ref 1.8–8)
NEUTS SEG NFR BLD: 90 % (ref 32–75)
NRBC # BLD: 0 K/UL (ref 0–0.01)
NRBC BLD-RTO: 0 PER 100 WBC
PLATELET # BLD AUTO: 271 K/UL (ref 150–400)
PMV BLD AUTO: 10.1 FL (ref 8.9–12.9)
POTASSIUM SERPL-SCNC: 3.8 MMOL/L (ref 3.5–5.1)
PROT SERPL-MCNC: 6.4 G/DL (ref 6.4–8.2)
RBC # BLD AUTO: 3.66 M/UL (ref 3.8–5.2)
SARS-COV-2, COV2: NOT DETECTED
SERVICE CMNT-IMP: ABNORMAL
SODIUM SERPL-SCNC: 139 MMOL/L (ref 136–145)
SOURCE, COVRS: NORMAL
SPECIMEN SOURCE, FCOV2M: NORMAL
SPECIMEN TYPE, XMCV1T: NORMAL
WBC # BLD AUTO: 5.6 K/UL (ref 3.6–11)

## 2020-08-10 PROCEDURE — 97166 OT EVAL MOD COMPLEX 45 MIN: CPT | Performed by: OCCUPATIONAL THERAPIST

## 2020-08-10 PROCEDURE — 74011250636 HC RX REV CODE- 250/636: Performed by: PSYCHIATRY & NEUROLOGY

## 2020-08-10 PROCEDURE — 82962 GLUCOSE BLOOD TEST: CPT

## 2020-08-10 PROCEDURE — 74011000258 HC RX REV CODE- 258: Performed by: PSYCHIATRY & NEUROLOGY

## 2020-08-10 PROCEDURE — 80053 COMPREHEN METABOLIC PANEL: CPT

## 2020-08-10 PROCEDURE — 65660000000 HC RM CCU STEPDOWN

## 2020-08-10 PROCEDURE — 87635 SARS-COV-2 COVID-19 AMP PRB: CPT

## 2020-08-10 PROCEDURE — 85025 COMPLETE CBC W/AUTO DIFF WBC: CPT

## 2020-08-10 PROCEDURE — 97162 PT EVAL MOD COMPLEX 30 MIN: CPT

## 2020-08-10 PROCEDURE — 97530 THERAPEUTIC ACTIVITIES: CPT | Performed by: OCCUPATIONAL THERAPIST

## 2020-08-10 PROCEDURE — 94760 N-INVAS EAR/PLS OXIMETRY 1: CPT

## 2020-08-10 PROCEDURE — 74011250637 HC RX REV CODE- 250/637: Performed by: HOSPITALIST

## 2020-08-10 PROCEDURE — 74011636637 HC RX REV CODE- 636/637: Performed by: HOSPITALIST

## 2020-08-10 PROCEDURE — 74011250636 HC RX REV CODE- 250/636: Performed by: INTERNAL MEDICINE

## 2020-08-10 PROCEDURE — 36415 COLL VENOUS BLD VENIPUNCTURE: CPT

## 2020-08-10 PROCEDURE — 97116 GAIT TRAINING THERAPY: CPT

## 2020-08-10 PROCEDURE — 74011250637 HC RX REV CODE- 250/637: Performed by: INTERNAL MEDICINE

## 2020-08-10 RX ADMIN — CLONAZEPAM 0.5 MG: 0.5 TABLET ORAL at 22:26

## 2020-08-10 RX ADMIN — PANTOPRAZOLE SODIUM 40 MG: 40 TABLET, DELAYED RELEASE ORAL at 09:11

## 2020-08-10 RX ADMIN — MELATONIN 3 MG: at 22:28

## 2020-08-10 RX ADMIN — GABAPENTIN 800 MG: 100 CAPSULE ORAL at 22:26

## 2020-08-10 RX ADMIN — KETOROLAC TROMETHAMINE 30 MG: 30 INJECTION, SOLUTION INTRAMUSCULAR at 09:25

## 2020-08-10 RX ADMIN — Medication 10 ML: at 22:27

## 2020-08-10 RX ADMIN — Medication 5 ML: at 13:42

## 2020-08-10 RX ADMIN — INSULIN LISPRO 2 UNITS: 100 INJECTION, SOLUTION INTRAVENOUS; SUBCUTANEOUS at 16:22

## 2020-08-10 RX ADMIN — DULOXETINE HYDROCHLORIDE 60 MG: 30 CAPSULE, DELAYED RELEASE ORAL at 09:12

## 2020-08-10 RX ADMIN — SODIUM CHLORIDE 1000 MG: 900 INJECTION, SOLUTION INTRAVENOUS at 09:26

## 2020-08-10 RX ADMIN — ACETAMINOPHEN 650 MG: 325 TABLET ORAL at 11:00

## 2020-08-10 RX ADMIN — POLYETHYLENE GLYCOL 3350 17 G: 17 POWDER, FOR SOLUTION ORAL at 09:12

## 2020-08-10 RX ADMIN — Medication 10 ML: at 11:01

## 2020-08-10 RX ADMIN — CLONAZEPAM 0.5 MG: 0.5 TABLET ORAL at 09:10

## 2020-08-10 RX ADMIN — GABAPENTIN 800 MG: 100 CAPSULE ORAL at 09:11

## 2020-08-10 NOTE — PROGRESS NOTES
Attempted to see pt for OT services. Pt was having medications administered by nursing at bedside. Will defer, but continue to follow.

## 2020-08-10 NOTE — PROGRESS NOTES
SHOLA:    Possible Inpatient Rehab  [employer] Auth  2nd  Medicare Letter  Medical Transport      CM verified pt's d/c with pt's nurse. It was reported that pt has one more dosage of steroids, prior to d/c.  CM informed that therapy will work with pt to determine pt's baseline. If inpatient rehab is being recommended, CM will discuss with pt her options and send referrals to [facility] and/or [facility]. CM contacted [facility] to verify if they will accept pt's insurance at this time. CM informed by [facility] that they are not currently accepting pt's with [employer] insuance. CM sent referral to [facility] Ryan Coleman pending). CM will continue to follow.     Miguelito Luna, MSW, [facility]

## 2020-08-10 NOTE — PROGRESS NOTES
Bedside shift change report given to Jermaine RN (oncoming nurse) by Robin Santiago RN (offgoing nurse). Report included the following information  Zone Phone:   [phone]      Significant changes during shift:  none        Patient Information    Lucía James  53 y.o.  8/6/2020  1:24 PM by Mojgan Walker MD. Lucía James was admitted from home    Problem List    Patient Active Problem List    Diagnosis Date Noted    Multiple sclerosis exacerbation ([address] 08/06/2020    Facial droop 03/23/2020    Exacerbation of multiple sclerosis ([address] 03/09/2020    Left-sided weakness 03/08/2020    Severe obesity ([address] 10/03/2018    Constipation 07/17/2015    Body aches 12/11/2014    Back pain 09/07/2012    Chronic pain 08/17/2012    MS (multiple sclerosis) ([address] 08/17/2012    Decreased hearing 01/31/2011    Acute pharyngitis 01/26/2011    Anxiety 08/25/2010    Blood pressure elevated 08/25/2010    Tobacco abuse 08/25/2010     Past Medical History:   Diagnosis Date    Body aches 12/11/2014    Chronic pain     Depression     GERD (gastroesophageal reflux disease)     Headache(784.0)     History of mammogram never before    MS (multiple sclerosis) ([address]     Neurological disorder     Multiple Sclerosis    Pap smear for cervical cancer screening 2008    Psychiatric disorder     anxiety    Psychotic disorder ([address]          Core Measures:    CVA: No  CHF:No  PNA: No    Post Op Surgical (If Applicable):     Number times ambulated in hallway past shift:  none  Number of times OOB to chair past shift:   1    Activity Status:    OOB to Chair Yes  Ambulated this shift No   Bed Rest No    Supplemental O2: (If Applicable)    NC Yes  NRB No  Venti-mask No  On 2 Liters/min      LINES AND DRAINS:    DVT prophylaxis:    DVT prophylaxis Med- No  DVT prophylaxis SCD or CONG- No     Wounds: (If Applicable)    Wounds- No    Location     Patient Safety:    Falls Score Total Score: 6  Safety Level_______  Bed Alarm On? Yes  Sitter? No    Plan for upcoming shift: safety         Discharge Plan: Yes, rehab when ready   Active Consults:  IP CONSULT TO NEUROLOGY  IP CONSULT TO HOSPITALIST

## 2020-08-10 NOTE — PROGRESS NOTES
Problem: Falls - Risk of  Goal: *Absence of Falls  Description: Document Emelia Brown Fall Risk and appropriate interventions in the flowsheet.   Outcome: Progressing Towards Goal  Note: Fall Risk Interventions:  Mobility Interventions: Bed/chair exit alarm, Patient to call before getting OOB, Utilize walker, cane, or other assistive device, Utilize gait belt for transfers/ambulation         Medication Interventions: Bed/chair exit alarm, Patient to call before getting OOB, Teach patient to arise slowly, Utilize gait belt for transfers/ambulation    Elimination Interventions: Bed/chair exit alarm, Call light in reach, Patient to call for help with toileting needs, Toilet paper/wipes in reach, Toileting schedule/hourly rounds    History of Falls Interventions: Bed/chair exit alarm, Door open when patient unattended, Utilize gait belt for transfer/ambulation         Problem: Patient Education: Go to Patient Education Activity  Goal: Patient/Family Education  Outcome: Progressing Towards Goal

## 2020-08-10 NOTE — PROGRESS NOTES
Problem: Self Care Deficits Care Plan (Adult)  Goal: *Acute Goals and Plan of Care (Insert Text)  Description: FUNCTIONAL STATUS PRIOR TO ADMISSION: ambulating with rollator walker, assist from boyfriend to go up steps, does not perform IADLs but was a  and would like to get back to cooking, sits on shower chair to bathe but has fallen getting out of tub, performed ADLS otherwise on her own    [address]: The patient lived with boyfriend to provide assist.    Occupational Therapy Goals:  Initiated 8/10/2020  1. Patient will perform grooming standing with modified independence within 7 days. 2. Patient will perform toileting with modified independence within 7 days. 3. Patient will perform upper body dressing and lower body dressing with modified independence within 7 days. 4. Patient will be independent with home exercise program within 7 days. 5. Patient will improve dynamic standing balance to modified independence within 7 days. 6. Patient will transfer from toilet with modified independence using the least restrictive device and appropriate durable medical equipment within 7 days. Outcome: Not Met   OCCUPATIONAL THERAPY EVALUATION  Patient: Yeyo Ortiz (25 y.o. female)  Date: 8/10/2020  Primary Diagnosis: Multiple sclerosis exacerbation ([facility] [G35]  Anxiety [F41.9]        Precautions:   Fall, Bed Alarm    ASSESSMENT  Based on the objective data described below, the patient presents with weakness, decreased coordination bilaterally in UE (gross/fine motor), decreased standing balance which impacts her independence with ADLS. Speech is slow and slurred and recommend SLP consult as this is new. Pt reports 4 recent exacerbations of her MS and on this admit pt has new brain demyelination. Min assist needed for ADLs and mobility with standing balance deficits. Pt has fallen x2 while in the hospital due to weakness and is at risk for further falls.   She is motivated to be more independent and engaged with the therapy process. Pt would benefit from inpatient rehab at discharge due to recent decline, fall risk and her motivation to actively participate. Pt has the potential to be more independent with aggressive rehab. Current Level of Function Impacting Discharge (ADLs/self-care): SBA bed mobility, min assist mobility short distance with RW    ADL Assessment:  Feeding: Setup    Oral Facial Hygiene/Grooming: Setup    Bathing: Minimum assistance    Upper Body Dressing: Minimum assistance    Lower Body Dressing: Minimum assistance    Toileting: Minimum assistance    Functional Outcome Measure: The patient scored 50/100 on the barthel outcome measure which is indicative of moderate decline in mobility and ADLs. Other factors to consider for discharge: had two falls due to weakness in the hospital, has had multiple recent MS exacerbations which has caused pt to be less independent than baseline     Patient will benefit from skilled therapy intervention to address the above noted impairments. PLAN :  Recommendations and Planned Interventions: self care training, functional mobility training, therapeutic exercise, balance training, therapeutic activities, endurance activities, neuromuscular re-education, patient education, home safety training, and family training/education    Frequency/Duration: Patient will be followed by occupational therapy 5 times a week to address goals. Recommendation for discharge: (in order for the patient to meet his/her long term goals)  Therapy 3 hours per day 5-7 days per week    This discharge recommendation:  Has been made in collaboration with the attending provider and/or case management    IF patient discharges home will need the following DME: gait belt       SUBJECTIVE:   Patient stated I have had some recent relapses and it has made me even weaker than I was.   I want to be able to take care of myself and get back to cooking. I am a .     OBJECTIVE DATA SUMMARY:   HISTORY:   Past Medical History:   Diagnosis Date    Body aches 12/11/2014    Chronic pain     Depression     GERD (gastroesophageal reflux disease)     Headache(784.0)     History of mammogram never before    MS (multiple sclerosis) ([facility]     Neurological disorder     Multiple Sclerosis    Pap smear for cervical cancer screening 2008    Psychiatric disorder     anxiety    Psychotic disorder [facility])      Past Surgical History:   Procedure Laterality Date    COLONOSCOPY N/A 2/28/2018    COLONOSCOPY performed by Elias Bence, MD at [facility] ENDOSCOPY    HX CHOLECYSTECTOMY      HX GYN      3 c-sections    HX HEENT      bilateral ear tube surgery    HX HERNIA REPAIR      HX OTHER SURGICAL      R inguinal hernia repair       Expanded or extensive additional review of patient history:     Home Situation  Home Environment: Private residence  # Steps to Enter: 2  Rails to Enter: No  One/Two Story Residence: One story  Living Alone: No  Support Systems: Spouse/Significant Other/Partner  Patient Expects to be Discharged to[de-identified] Private residence  Current DME Used/Available at Home: rachelle Morin, Kenji Gabriel, rollator, [address] [facility] chair, Grab bars(suction cup)  Tub or Shower Type: Tub/Shower combination    Hand dominance: Right    EXAMINATION OF PERFORMANCE DEFICITS:  Cognitive/Behavioral Status:  Neurologic State: Alert  Orientation Level: Oriented X4  Cognition: Follows commands  Perception: Appears intact  Perseveration: No perseveration noted  Safety/Judgement: Awareness of environment; Fall prevention;Home safety; Insight into deficits    Hearing:   Auditory  Auditory Impairment: None    Vision/Perceptual:                           Acuity: Within Defined Limits         Range of Motion:    AROM: Generally decreased, functional  PROM: Within functional limits                      Strength:    Strength: Generally decreased, functional                Coordination:  Coordination: Generally decreased, functional  Fine Motor Skills-Upper: Left Impaired;Right Impaired    Gross Motor Skills-Upper: Left Impaired;Right Impaired    Tone & Sensation:    Tone: Normal  Sensation: Impaired(numbness in bilateral hands)                      Balance:  Sitting: Intact  Standing: Impaired  Standing - Static: Fair  Standing - Dynamic : Fair    Functional Mobility and Transfers for ADLs:  Bed Mobility:  Rolling: Stand-by assistance  Supine to Sit: Stand-by assistance  Scooting: Contact guard assistance    Transfers:  Sit to Stand: Minimum assistance  Stand to Sit: Minimum assistance  Bed to Chair: Minimum assistance(with RW)  Bathroom Mobility: Minimum assistance  Toilet Transfer : Minimum assistance    ADL Assessment:  Feeding: Setup    Oral Facial Hygiene/Grooming: Setup    Bathing: Minimum assistance    Upper Body Dressing: Minimum assistance    Lower Body Dressing: Minimum assistance    Toileting: Minimum assistance                ADL Intervention and task modifications: Focused on general mobility and transfers today. Min assist needed for transfers and mobility with RW today. Cognitive Retraining  Safety/Judgement: Awareness of environment; Fall prevention;Home safety; Insight into deficits      Functional Measure:  Barthel Index:    Bathin  Bladder: 10  Bowels: 10  Groomin  Dressin  Feedin  Mobility: 5  Stairs: 0  Toilet Use: 5  Transfer (Bed to Chair and Back): 10  Total: 50/100        The Barthel ADL Index: Guidelines  1. The index should be used as a record of what a patient does, not as a record of what a patient could do. 2. The main aim is to establish degree of independence from any help, physical or verbal, however minor and for whatever reason. 3. The need for supervision renders the patient not independent. 4. A patient's performance should be established using the best available evidence.  Asking the patient, friends/relatives and nurses are the usual sources, but direct observation and common sense are also important. However direct testing is not needed. 5. Usually the patient's performance over the preceding 24-48 hours is important, but occasionally longer periods will be relevant. 6. Middle categories imply that the patient supplies over 50 per cent of the effort. 7. Use of aids to be independent is allowed. Carlos Noguera., Barthel, D.W. (2509). Functional evaluation: the Barthel Index. [facility] (14)2. LOUIS Tamayo, Riddhi Lam., Sasha George., Marta Familia, [facility] (1999). Measuring the change indisability after inpatient rehabilitation; comparison of the responsiveness of the Barthel Index and Functional [place]rington Measure. Journal of Neurology, Neurosurgery, and Psychiatry, 66(4), 777-883. TUNDE Garcia, BETTY Moss, & Ceci Rushing, M.A. (2004.) Assessment of post-stroke quality of life in cost-effectiveness studies: The usefulness of the Barthel Index and the EuroQoL-5D. Quality of Life Research, 15, 127-02         Occupational Therapy Evaluation Charge Determination   History Examination Decision-Making   MEDIUM Complexity : Expanded review of history including physical, cognitive and psychosocial  history  MEDIUM Complexity : 3-5 performance deficits relating to physical, cognitive , or psychosocial skils that result in activity limitations and / or participation restrictions MEDIUM Complexity : Patient may present with comorbidities that affect occupational performnce. Miniml to moderate modification of tasks or assistance (eg, physical or verbal ) with assesment(s) is necessary to enable patient to complete evaluation       Based on the above components, the patient evaluation is determined to be of the following complexity level: MEDIUM      Activity Tolerance:   Fair and requires rest breaks  Please refer to the flowsheet for vital signs taken during this treatment.     After treatment patient left in no apparent distress:    Sitting in chair, Call bell within reach, and Bed / chair alarm activated    COMMUNICATION/EDUCATION:   The patients plan of care was discussed with: Physical therapist, Registered nurse, and patient . Home safety education was provided and the patient/caregiver indicated understanding., Patient/family have participated as able in goal setting and plan of care. , and Patient/family agree to work toward stated goals and plan of care. This patients plan of care is appropriate for delegation to [facility].     Thank you for this referral.  Brandon Morales, OTR/L  Time Calculation: 24 mins

## 2020-08-10 NOTE — PROGRESS NOTES
Problem: Mobility Impaired (Adult and Pediatric)  Goal: *Acute Goals and Plan of Care (Insert Text)  Description: FUNCTIONAL STATUS PRIOR TO ADMISSION: Patient was independent and active without use of DME. Patient was independent for basic and instrumental ADLs. Has experienced progressive decline over the past 6 months, requiring increased assistance from SO and needing to use rollator. +Fall history. HOME SUPPORT PRIOR TO ADMISSION: The patient lived with SO who provided increased assistance. Physical Therapy Goals  Initiated 8/10/2020  1. Patient will move from supine to sit and sit to supine , scoot up and down, and roll side to side in bed with independence within 7 day(s). 2.  Patient will transfer from bed to chair and chair to bed with modified independence using the least restrictive device within 7 day(s). 3.  Patient will perform sit to stand with modified independence within 7 day(s). 4.  Patient will ambulate with modified independence for 150 feet with the least restrictive device within 7 day(s). 5.  Patient will ascend/descend 3 stairs with 0 handrail(s) with minimal assistance/contact guard assist within 7 day(s). Outcome: Not Met   PHYSICAL THERAPY EVALUATION  Patient: Carissa Chapman (33 y.o. female)  Date: 8/10/2020  Primary Diagnosis: [facility] [G35]  Anxiety [F41.9]        Precautions:  Fall, Bed Alarm    ASSESSMENT  Based on the objective data described below, the patient presents with impaired functional mobility and decreased independence secondary to impaired balance, impaired gait mechanics, generalized weakness (RLE weaker than LLE), decreased coordination, limited endurance, and decreased activity tolerance following admission for MS exacerbation. Received supine in bed and agreeable to PT evaluation. Pt reports progressive decline over the past 6 months, now needing significant assist from SO and using rollator for short distances.  She currently requires min A x 1-2 for transfers and gait training with RW support. Needed VCs and education on safe hand placement with use of RW during transfers. Gait is unsteady with discontinuous steps, step-to gait pattern, widened THADDEUS, and poor step clearance and step length EUNICE throughout ambulation. Pt was left sitting in bedside chair with all needs met, RN aware, and chair alarm on following therapy session. Instructed patient on calling nursing prior to mobilizing. Recommend patient discharge to IP rehab to address functional impairments as noted above as patient is a high fall risk and with significant decline from baseline mobility. Current Level of Function Impacting Discharge (mobility/balance): min A    Functional Outcome Measure: The patient scored 50/100 on the Barthel Index outcome measure which is indicative of 50% impairment. Other factors to consider for discharge: increased risk for falls; decline from baseline mobility; +fall history     Patient will benefit from skilled therapy intervention to address the above noted impairments. PLAN :  Recommendations and Planned Interventions: bed mobility training, transfer training, gait training, therapeutic exercises, patient and family training/education, and therapeutic activities      Frequency/Duration: Patient will be followed by physical therapy:  5 times a week to address goals. Recommendation for discharge: (in order for the patient to meet his/her long term goals)  Therapy 3 hours per day 5-7 days per week    This discharge recommendation:  Has been made in collaboration with the attending provider and/or case management    IF patient discharges home will need the following DME: to be determined (TBD)         SUBJECTIVE:   Patient stated I like to cook.     OBJECTIVE DATA SUMMARY:   HISTORY:    Past Medical History:   Diagnosis Date    Body aches 12/11/2014    Chronic pain     Depression     GERD (gastroesophageal reflux disease)     Headache(784.0)     History of mammogram never before    MS (multiple sclerosis) ([facility]     Neurological disorder     Multiple Sclerosis    Pap smear for cervical cancer screening 2008    Psychiatric disorder     anxiety    Psychotic disorder [facility])      Past Surgical History:   Procedure Laterality Date    COLONOSCOPY N/A 2/28/2018    COLONOSCOPY performed by Brayan Chow MD at [facility] ENDOSCOPY    HX CHOLECYSTECTOMY      HX GYN      3 c-sections    HX HEENT      bilateral ear tube surgery    HX HERNIA REPAIR      HX OTHER SURGICAL      R inguinal hernia repair       Personal factors and/or comorbidities impacting plan of care: chronic pain; MS; anxiety    Home Situation  Home Environment: Private residence  # Steps to Enter: 2  Rails to Enter: No  One/Two Story Residence: One story  Living Alone: No  Support Systems: Spouse/Significant Other/Partner  Patient Expects to be Discharged to[de-identified] Private residence  Current DME Used/Available at Home: [address], rolling, [address], rollator, [employer] chair, Grab bars(suction cup)  Tub or Shower Type: Tub/Shower combination    EXAMINATION/PRESENTATION/DECISION MAKING:   Critical Behavior:  Neurologic State: Alert  Orientation Level: Oriented X4  Cognition: Follows commands  Safety/Judgement: Awareness of environment, Fall prevention, Home safety, Insight into deficits  Hearing:   Auditory  Auditory Impairment: None  Skin:  Intact  Edema: None  Range Of Motion:  AROM: Generally decreased, functional           PROM: Within functional limits           Strength:    Strength: Generally decreased, functional                    Tone & Sensation:   Tone: Normal              Sensation: Impaired(numbness in bilateral hands)               Coordination:  Coordination: Generally decreased, functional  Vision:   Acuity: Within Defined Limits  Functional Mobility:  Bed Mobility:  Rolling: Stand-by assistance  Supine to Sit: Stand-by assistance     Scooting: Contact guard assistance  Transfers:  Sit to Stand: Minimum assistance  Stand to Sit: Minimum assistance        Bed to Chair: Minimum assistance(with RW)              Balance:   Sitting: Intact  Standing: Impaired  Standing - Static: Fair  Standing - Dynamic : Fair  Ambulation/Gait Training:  Distance (ft): 25 Feet (ft)  Assistive Device: Gait belt;Walker, rolling  Ambulation - Level of Assistance: Minimal assistance; Additional time; Adaptive equipment     Gait Description (WDL): Exceptions to WDL  Gait Abnormalities: Decreased step clearance; Ataxic; Step to gait;Trunk sway increased        Base of Support: Widened     Speed/Heena: Slow;Shuffled  Step Length: Left shortened;Right shortened            Functional Measure:  Barthel Index:    Bathin  Bladder: 10  Bowels: 10  Groomin  Dressin  Feedin  Mobility: 5  Stairs: 0  Toilet Use: 5  Transfer (Bed to Chair and Back): 10  Total: 50/100       The Barthel ADL Index: Guidelines  1. The index should be used as a record of what a patient does, not as a record of what a patient could do. 2. The main aim is to establish degree of independence from any help, physical or verbal, however minor and for whatever reason. 3. The need for supervision renders the patient not independent. 4. A patient's performance should be established using the best available evidence. Asking the patient, friends/relatives and nurses are the usual sources, but direct observation and common sense are also important. However direct testing is not needed. 5. Usually the patient's performance over the preceding 24-48 hours is important, but occasionally longer periods will be relevant. 6. Middle categories imply that the patient supplies over 50 per cent of the effort. 7. Use of aids to be independent is allowed. Carlos Noguera., Barthel, D.W. (7402). Functional evaluation: the Barthel Index. [address] [facility] (14)2. LOUIS Tamayo, Riddhi Lam., Sasha George.Marta, [address] (). Measuring the change indisability after inpatient rehabilitation; comparison of the responsiveness of the Barthel Index and Functional [place] Measure. Journal of Neurology, Neurosurgery, and Psychiatry, 664), 955-081. TUNDE Blue, BETTY Moss, & Iliana Lazo M.A. (2004.) Assessment of post-stroke quality of life in cost-effectiveness studies: The usefulness of the Barthel Index and the EuroQoL-5D. Quality of Life Research, 15, 403-64           Physical Therapy Evaluation Charge Determination   History Examination Presentation Decision-Making   HIGH Complexity :3+ comorbidities / personal factors will impact the outcome/ POC  HIGH Complexity : 4+ Standardized tests and measures addressing body structure, function, activity limitation and / or participation in recreation  MEDIUM Complexity : Evolving with changing characteristics  Other outcome measures Barthel Index  MEDIUM      Based on the above components, the patient evaluation is determined to be of the following complexity level: MEDIUM    Pain Rating:  No pain complaints    Activity Tolerance:   Fair and requires rest breaks  Please refer to the flowsheet for vital signs taken during this treatment. After treatment patient left in no apparent distress:   Sitting in chair, Call bell within reach, and Bed / chair alarm activated    COMMUNICATION/EDUCATION:   The patients plan of care was discussed with: Physical therapist, Occupational therapist, Registered nurse, Physician, and Case management. Fall prevention education was provided and the patient/caregiver indicated understanding., Patient/family have participated as able in goal setting and plan of care. , and Patient/family agree to work toward stated goals and plan of care.     Thank you for this referral.  Mehul Trinh, PT, DPT   Time Calculation: 24 mins

## 2020-08-11 LAB
ANION GAP SERPL CALC-SCNC: 3 MMOL/L (ref 5–15)
BASOPHILS # BLD: 0 K/UL (ref 0–0.1)
BASOPHILS NFR BLD: 0 % (ref 0–1)
BUN SERPL-MCNC: 28 MG/DL (ref 6–20)
BUN/CREAT SERPL: 23 (ref 12–20)
CALCIUM SERPL-MCNC: 8.2 MG/DL (ref 8.5–10.1)
CHLORIDE SERPL-SCNC: 107 MMOL/L (ref 97–108)
CO2 SERPL-SCNC: 29 MMOL/L (ref 21–32)
CREAT SERPL-MCNC: 1.23 MG/DL (ref 0.55–1.02)
DIFFERENTIAL METHOD BLD: NORMAL
EOSINOPHIL # BLD: 0 K/UL (ref 0–0.4)
EOSINOPHIL NFR BLD: 0 % (ref 0–7)
ERYTHROCYTE [DISTWIDTH] IN BLOOD BY AUTOMATED COUNT: 12.6 % (ref 11.5–14.5)
GLUCOSE BLD STRIP.AUTO-MCNC: 105 MG/DL (ref 65–100)
GLUCOSE BLD STRIP.AUTO-MCNC: 113 MG/DL (ref 65–100)
GLUCOSE BLD STRIP.AUTO-MCNC: 123 MG/DL (ref 65–100)
GLUCOSE BLD STRIP.AUTO-MCNC: 97 MG/DL (ref 65–100)
GLUCOSE SERPL-MCNC: 105 MG/DL (ref 65–100)
HCT VFR BLD AUTO: 35.9 % (ref 35–47)
HGB BLD-MCNC: 12.3 G/DL (ref 11.5–16)
IMM GRANULOCYTES # BLD AUTO: 0 K/UL (ref 0–0.04)
IMM GRANULOCYTES NFR BLD AUTO: 0 % (ref 0–0.5)
LYMPHOCYTES # BLD: 1.4 K/UL (ref 0.8–3.5)
LYMPHOCYTES NFR BLD: 20 % (ref 12–49)
MCH RBC QN AUTO: 31.9 PG (ref 26–34)
MCHC RBC AUTO-ENTMCNC: 34.3 G/DL (ref 30–36.5)
MCV RBC AUTO: 93.2 FL (ref 80–99)
MONOCYTES # BLD: 0.6 K/UL (ref 0–1)
MONOCYTES NFR BLD: 8 % (ref 5–13)
NEUTS SEG # BLD: 5.1 K/UL (ref 1.8–8)
NEUTS SEG NFR BLD: 72 % (ref 32–75)
NRBC # BLD: 0 K/UL (ref 0–0.01)
NRBC BLD-RTO: 0 PER 100 WBC
PLATELET # BLD AUTO: 269 K/UL (ref 150–400)
PMV BLD AUTO: 10.1 FL (ref 8.9–12.9)
POTASSIUM SERPL-SCNC: 3.2 MMOL/L (ref 3.5–5.1)
RBC # BLD AUTO: 3.85 M/UL (ref 3.8–5.2)
SERVICE CMNT-IMP: ABNORMAL
SERVICE CMNT-IMP: NORMAL
SODIUM SERPL-SCNC: 139 MMOL/L (ref 136–145)
WBC # BLD AUTO: 7.2 K/UL (ref 3.6–11)

## 2020-08-11 PROCEDURE — 74011250636 HC RX REV CODE- 250/636: Performed by: INTERNAL MEDICINE

## 2020-08-11 PROCEDURE — 97116 GAIT TRAINING THERAPY: CPT | Performed by: PHYSICAL THERAPIST

## 2020-08-11 PROCEDURE — 74011250637 HC RX REV CODE- 250/637: Performed by: INTERNAL MEDICINE

## 2020-08-11 PROCEDURE — 94760 N-INVAS EAR/PLS OXIMETRY 1: CPT

## 2020-08-11 PROCEDURE — 77030038269 HC DRN EXT URIN PURWCK BARD -A

## 2020-08-11 PROCEDURE — 36415 COLL VENOUS BLD VENIPUNCTURE: CPT

## 2020-08-11 PROCEDURE — 74011250637 HC RX REV CODE- 250/637: Performed by: HOSPITALIST

## 2020-08-11 PROCEDURE — 77010033678 HC OXYGEN DAILY

## 2020-08-11 PROCEDURE — 65660000000 HC RM CCU STEPDOWN

## 2020-08-11 PROCEDURE — 82962 GLUCOSE BLOOD TEST: CPT

## 2020-08-11 PROCEDURE — 85025 COMPLETE CBC W/AUTO DIFF WBC: CPT

## 2020-08-11 PROCEDURE — 80048 BASIC METABOLIC PNL TOTAL CA: CPT

## 2020-08-11 PROCEDURE — 97535 SELF CARE MNGMENT TRAINING: CPT | Performed by: OCCUPATIONAL THERAPIST

## 2020-08-11 RX ADMIN — GABAPENTIN 800 MG: 100 CAPSULE ORAL at 21:39

## 2020-08-11 RX ADMIN — PANTOPRAZOLE SODIUM 40 MG: 40 TABLET, DELAYED RELEASE ORAL at 09:12

## 2020-08-11 RX ADMIN — Medication 10 ML: at 06:45

## 2020-08-11 RX ADMIN — ACETAMINOPHEN 650 MG: 325 TABLET ORAL at 10:54

## 2020-08-11 RX ADMIN — Medication 5 ML: at 13:10

## 2020-08-11 RX ADMIN — CLONAZEPAM 0.5 MG: 0.5 TABLET ORAL at 09:12

## 2020-08-11 RX ADMIN — POLYETHYLENE GLYCOL 3350 17 G: 17 POWDER, FOR SOLUTION ORAL at 09:13

## 2020-08-11 RX ADMIN — MELATONIN 3 MG: at 21:39

## 2020-08-11 RX ADMIN — ACETAMINOPHEN 650 MG: 325 TABLET ORAL at 17:04

## 2020-08-11 RX ADMIN — DULOXETINE HYDROCHLORIDE 60 MG: 30 CAPSULE, DELAYED RELEASE ORAL at 09:13

## 2020-08-11 RX ADMIN — GABAPENTIN 800 MG: 100 CAPSULE ORAL at 09:12

## 2020-08-11 RX ADMIN — CLONAZEPAM 0.5 MG: 0.5 TABLET ORAL at 21:39

## 2020-08-11 RX ADMIN — Medication 10 ML: at 21:39

## 2020-08-11 RX ADMIN — KETOROLAC TROMETHAMINE 30 MG: 30 INJECTION, SOLUTION INTRAMUSCULAR at 00:38

## 2020-08-11 NOTE — PROGRESS NOTES
* No surgery found *  * No surgery found *  Bedside shift change report given to Gerardo Motta RN (oncoming nurse) by ALEJANDRO Dean (offgoing nurse). Report included the following information SBAR and Kardex. Zone Phone:   [phone]      Significant changes during shift:  10/10 HA pain. Relieved by toradol x1. Unable to obtain morning labs. PICC team notified. Patient Information    Pamela Canchola  53 y.o.  8/6/2020  1:24 PM by Elvia Carney MD. Pamela Canchola was admitted from Home    Problem List    Patient Active Problem List    Diagnosis Date Noted    Multiple sclerosis exacerbation ([facility] 08/06/2020    Facial droop 03/23/2020    Exacerbation of multiple sclerosis ([address] 03/09/2020    Left-sided weakness 03/08/2020    Severe obesity ([address] 10/03/2018    Constipation 07/17/2015    Body aches 12/11/2014    Back pain 09/07/2012    Chronic pain 08/17/2012    MS (multiple sclerosis) ([facility] 08/17/2012    Decreased hearing 01/31/2011    Acute pharyngitis 01/26/2011    Anxiety 08/25/2010    Blood pressure elevated 08/25/2010    Tobacco abuse 08/25/2010     Past Medical History:   Diagnosis Date    Body aches 12/11/2014    Chronic pain     Depression     GERD (gastroesophageal reflux disease)     Headache(784.0)     History of mammogram never before    MS (multiple sclerosis) ([address]     Neurological disorder     Multiple Sclerosis    Pap smear for cervical cancer screening 2008    Psychiatric disorder     anxiety    Psychotic disorder ([facility]          Core Measures:    CVA: No No  CHF:No No  PNA:No No      Activity Status:    OOB to Chair No  Ambulated this shift No   Bed Rest Yes      DVT prophylaxis:    DVT prophylaxis Med- Yes  DVT prophylaxis SCD or CONG- No     Patient Safety:    Falls Score Total Score: 6  Safety Level_______  Bed Alarm On? Yes  Sitter?  No    Plan for upcoming shift: safety         Discharge Plan: Yes REHAB     Active Consults:  IP CONSULT TO NEUROLOGY  IP CONSULT TO HOSPITALIST

## 2020-08-11 NOTE — PROGRESS NOTES
Unable to obtain patient's morning labs, after several attempts.  Left a detailed message for the PICC team.

## 2020-08-11 NOTE — PROGRESS NOTES
Problem: Falls - Risk of  Goal: *Absence of Falls  Description: Document Abeba Reyes Fall Risk and appropriate interventions in the flowsheet.   Outcome: Progressing Towards Goal  Note: Fall Risk Interventions:  Mobility Interventions: Bed/chair exit alarm, Patient to call before getting OOB, Utilize walker, cane, or other assistive device, Utilize gait belt for transfers/ambulation         Medication Interventions: Bed/chair exit alarm, Patient to call before getting OOB, Teach patient to arise slowly, Utilize gait belt for transfers/ambulation    Elimination Interventions: Bed/chair exit alarm, Call light in reach, Patient to call for help with toileting needs, Toilet paper/wipes in reach, Toileting schedule/hourly rounds    History of Falls Interventions: Bed/chair exit alarm, Door open when patient unattended, Utilize gait belt for transfer/ambulation

## 2020-08-11 NOTE — PROGRESS NOTES
Problem: Self Care Deficits Care Plan (Adult)  Goal: *Acute Goals and Plan of Care (Insert Text)  Description: FUNCTIONAL STATUS PRIOR TO ADMISSION: ambulating with rollator walker, assist from boyfriend to go up steps, does not perform IADLs but was a  and would like to get back to cooking, sits on shower chair to bathe but has fallen getting out of tub, performed ADLS otherwise on her own    [address]: The patient lived with boyfriend to provide assist.    Occupational Therapy Goals:  Initiated 8/10/2020  1. Patient will perform grooming standing with modified independence within 7 days. 2. Patient will perform toileting with modified independence within 7 days. 3. Patient will perform upper body dressing and lower body dressing with modified independence within 7 days. 4. Patient will be independent with home exercise program within 7 days. 5. Patient will improve dynamic standing balance to modified independence within 7 days. 6. Patient will transfer from toilet with modified independence using the least restrictive device and appropriate durable medical equipment within 7 days. Outcome: Progressing Towards Goal   OCCUPATIONAL THERAPY TREATMENT  Patient: Shakeel Mack (7 y.o. female)  Date: 8/11/2020  Diagnosis: Multiple sclerosis exacerbation ([facility] [G35]  Anxiety [F41.9]   <principal problem not specified>       Precautions: Fall, Bed Alarm  Chart, occupational therapy assessment, plan of care, and goals were reviewed. ASSESSMENT  Patient continues with skilled OT services and is progressing towards goals. Pt was ringing for assist upon arrival and wanted to brush her teeth. CGA to min assist overall for ADL mobility. Performed standing at sink to brush teeth but without one hand support pt needed initial CGA and digressed to min assist due to weakness.   Min assist needed for reaching forward out of base of support to retrieve grooming items from shelf next to sink in bathroom. Anterior LOB noted consistently with reaching forward for supplies today. Pt was able to stand for the duration of the grooming tasks and mobilize back to chair with RW but needed increasing assist with mobility (min assist) due weakness. Continue to recommend inpatient rehab and pt can tolerate 3 hours of aggressive inpatient rehab. Current Level of Function Impacting Discharge (ADLs): CGA to min assist for mobility and balance/transfers,   Grooming  Brushing Teeth: Contact guard assistance;Minimum assistance(standing at sink ~10 minutes decreased balance as progressed)    Lower Body Dressing Assistance  Underpants: Contact guard assistance(seated to thread crossed leg, CGA to pull over hips)    Other factors to consider for discharge: high fall risk, motivated to regain her independence, new exacerbation of her MS with decline in function due to this, has fallen multiple times in the hospital due to weakness         PLAN :  Patient continues to benefit from skilled intervention to address the above impairments. Continue treatment per established plan of care. to address goals. Recommend with staff: to bathroom for ADLS, discontinue pure wick    Recommend next OT session: ADLs, mobility, balance    Recommendation for discharge: (in order for the patient to meet his/her long term goals)  Therapy 3 hours per day 5-7 days per week    This discharge recommendation:  Has been made in collaboration with the attending provider and/or case management    IF patient discharges home will need the following DME: none       SUBJECTIVE:   Patient stated I really want to brush my teeth. This feels so good. I am getting a little tired. I think I need to go sit back down.     OBJECTIVE DATA SUMMARY:   Cognitive/Behavioral Status:  Neurologic State: Alert  Orientation Level: Oriented X4  Cognition: Follows commands  Perception: Appears intact  Perseveration: No perseveration noted  Safety/Judgement: Awareness of environment; Fall prevention    Functional Mobility and Transfers for ADLs:  Bed Mobility:  Rolling: Stand-by assistance  Supine to Sit: Stand-by assistance  Scooting: Stand-by assistance; Additional time    Transfers:  Sit to Stand: Minimum assistance;Assist x1  Functional Transfers  Bathroom Mobility: Minimum assistance;Contact guard assistance(with RW)       Balance:  Sitting: Intact  Standing: Impaired  Standing - Static: Constant support; Fair  Standing - Dynamic : Constant support; Fair    ADL Intervention:       Grooming  Brushing Teeth: Contact guard assistance;Minimum assistance(standing at sink ~10 minutes decreased balance as progressed)    Lower Body Dressing Assistance  Underpants: Contact guard assistance(seated to thread crossed leg, CGA to pull over hips)         Cognitive Retraining  Safety/Judgement: Awareness of environment; Fall prevention        Pain:  Reported head ache (nurse aware) HA was 8/10    Activity Tolerance:   Good  Please refer to the flowsheet for vital signs taken during this treatment. After treatment patient left in no apparent distress:   Sitting in chair, Call bell within reach, Bed / chair alarm activated, and Caregiver / family present    COMMUNICATION/COLLABORATION:   The patients plan of care was discussed with: Physical therapist, Registered nurse, and patient and her boyfriend .      Orion Cornea, OTR/L  Time Calculation: 23 mins

## 2020-08-11 NOTE — PROGRESS NOTES
Problem: Falls - Risk of  Goal: *Absence of Falls  Description: Document Sabine Nava Fall Risk and appropriate interventions in the flowsheet.   Outcome: Progressing Towards Goal  Note: Fall Risk Interventions:  Mobility Interventions: Bed/chair exit alarm, Patient to call before getting OOB         Medication Interventions: Bed/chair exit alarm, Patient to call before getting OOB    Elimination Interventions: Bed/chair exit alarm, Call light in reach    History of Falls Interventions: Bed/chair exit alarm, Door open when patient unattended         Problem: Patient Education: Go to Patient Education Activity  Goal: Patient/Family Education  Outcome: Progressing Towards Goal

## 2020-08-11 NOTE — PROGRESS NOTES
Problem: Mobility Impaired (Adult and Pediatric)  Goal: *Acute Goals and Plan of Care (Insert Text)  Description: FUNCTIONAL STATUS PRIOR TO ADMISSION: Patient was independent and active without use of DME. Patient was independent for basic and instrumental ADLs. Has experienced progressive decline over the past 6 months, requiring increased assistance from SO and needing to use rollator. +Fall history. HOME SUPPORT PRIOR TO ADMISSION: The patient lived with SO who provided increased assistance. Physical Therapy Goals  Initiated 8/10/2020  1. Patient will move from supine to sit and sit to supine , scoot up and down, and roll side to side in bed with independence within 7 day(s). 2.  Patient will transfer from bed to chair and chair to bed with modified independence using the least restrictive device within 7 day(s). 3.  Patient will perform sit to stand with modified independence within 7 day(s). 4.  Patient will ambulate with modified independence for 150 feet with the least restrictive device within 7 day(s). 5.  Patient will ascend/descend 3 stairs with 0 handrail(s) with minimal assistance/contact guard assist within 7 day(s). Outcome: Progressing Towards Goal   PHYSICAL THERAPY TREATMENT  Patient: Raji Saba (3 y.o. female)  Date: 8/11/2020  Diagnosis: [facility] [G35]  Anxiety [F41.9]   <principal problem not specified>       Precautions: Fall, Bed Alarm  Chart, physical therapy assessment, plan of care and goals were reviewed. ASSESSMENT  Patient continues with skilled PT services and is progressing towards goals. Patient continues to need some physical assist for transfers and initial steadying when standing. Overall fair balance and able to ambulate approx 20 feet with RW and CGA-Margaux. Slow, shuffling gait and difficulty advancing R LE at times. She is well below her baseline level of function and is at high risk for falls.   Recommend inpt rehab setting to progress patient to more independent level of function. Other factors to consider for discharge: at risk for falls, well below functional baseline, limited assistance at home         PLAN :  Patient continues to benefit from skilled intervention to address the above impairments. Continue treatment per established plan of care. to address goals. Recommendation for discharge: (in order for the patient to meet his/her long term goals)  Therapy 3 hours per day 5-7 days per week      IF patient discharges home will need the following DME: patient owns DME required for discharge       SUBJECTIVE:   Patient stated I really can't wait to get my legs working again.     OBJECTIVE DATA SUMMARY:   Critical Behavior:  Neurologic State: Alert  Orientation Level: Oriented X4  Cognition: Follows commands  Safety/Judgement: Awareness of environment, Fall prevention, Home safety, Insight into deficits  Functional Mobility Training:  Bed Mobility:  Rolling: Stand-by assistance  Supine to Sit: Stand-by assistance     Scooting: Stand-by assistance; Additional time        Transfers:  Sit to Stand: Minimum assistance;Assist x1  Stand to Sit: Minimum assistance;Assist x1                             Balance:  Sitting: Intact  Standing: Impaired  Standing - Static: Constant support; Fair  Standing - Dynamic : Constant support; Fair  Ambulation/Gait Training:  Distance (ft): 20 Feet (ft)  Assistive Device: Gait belt;Walker, rolling  Ambulation - Level of Assistance: Minimal assistance;Assist x1        Gait Abnormalities: Decreased step clearance;Shuffling gait        Base of Support: Center of gravity altered; Widened     Speed/Heena: Pace decreased (<100 feet/min); Shuffled; Slow  Step Length: Left shortened;Right shortened         Therapeutic Exercises:   Worked on seated HEP:  Marching  Hip add  Hip abd  LAQ  Pain Rating:  No c/o pain    Activity Tolerance:   Fair and requires rest breaks  Please refer to the flowsheet for vital signs taken during this treatment. After treatment patient left in no apparent distress:   Sitting in chair, Call bell within reach, and Bed / chair alarm activated    COMMUNICATION/COLLABORATION:   The patients plan of care was discussed with: Physical therapist, Occupational therapist, and Registered nurse.      Vito Couch PT, DPT   Time Calculation: 18 mins

## 2020-08-11 NOTE — PROGRESS NOTES
* No surgery found *  * No surgery found *  Bedside shift change report given to Jermaine RN (oncoming nurse) by Grace Macedo RN (offgoing nurse). Report included the following information SBAR and Kardex.     Zone Phone:   [phone]        Significant changes during shift:  PICC team came for blood draw.         Patient Information     Diane Mark  53 y.o.  8/6/2020  1:24 PM by Joseph Urban MD. Diane Mark was admitted from Home     Problem List          Patient Active Problem List     Diagnosis Date Noted    Multiple sclerosis exacerbation ([address] 08/06/2020    Facial droop 03/23/2020    Exacerbation of multiple sclerosis ([address] 03/09/2020    Left-sided weakness 03/08/2020    Severe obesity ([address] 10/03/2018    Constipation 07/17/2015    Body aches 12/11/2014    Back pain 09/07/2012    Chronic pain 08/17/2012    MS (multiple sclerosis) ([address] 08/17/2012    Decreased hearing 01/31/2011    Acute pharyngitis 01/26/2011    Anxiety 08/25/2010    Blood pressure elevated 08/25/2010    Tobacco abuse 08/25/2010          Past Medical History:   Diagnosis Date    Body aches 12/11/2014    Chronic pain      Depression      GERD (gastroesophageal reflux disease)      Headache(784.0)      History of mammogram never before    MS (multiple sclerosis) ([address]      Neurological disorder       Multiple Sclerosis    Pap smear for cervical cancer screening 2008    Psychiatric disorder       anxiety    Psychotic disorder ([address]             Core Measures:     CVA: No No  CHF:No No  PNA:No No        Activity Status:     OOB to Chair Yes  Ambulated this shift No   Bed Rest Yes        DVT prophylaxis:     DVT prophylaxis Med- Yes  DVT prophylaxis SCD or CONG- No      Patient Safety:     Falls Score Total Score: 6  Safety Level_______  Bed Alarm On? Yes  Sitter? No     Plan for upcoming shift: safety            Discharge Plan: Yes REHAB.  Waiting on approval      Active Consults:  IP CONSULT TO HOSPITALIST

## 2020-08-11 NOTE — PROGRESS NOTES
SHOLA:    Inpatient Rehab  [employer] Auth  COVID negative test  2nd IM Medicare Letter      CM informed that pt accepted to [facility] inpatient rehab. CM informed that pt's clinicals was sent to [facility] to begin auth for inpatient rehab. If pt [place] is received then pt can transition to rehab facility. Pt will require COVID test at the time of d/c. Pt will require EMTALA provided by pt's nurse and MD. Pt will require 2nd IM Medicare Letter and Medical Transport at the time of d/c. CM will explore other therapy options if pt is not accepted to rehab facility.       Sammy Ruelas, MSW, [address] [place] [address]

## 2020-08-12 VITALS
BODY MASS INDEX: 34.15 KG/M2 | OXYGEN SATURATION: 100 % | HEIGHT: 64 IN | HEART RATE: 72 BPM | TEMPERATURE: 98.3 F | WEIGHT: 200 LBS | DIASTOLIC BLOOD PRESSURE: 88 MMHG | SYSTOLIC BLOOD PRESSURE: 146 MMHG | RESPIRATION RATE: 16 BRPM

## 2020-08-12 LAB
GLUCOSE BLD STRIP.AUTO-MCNC: 80 MG/DL (ref 65–100)
GLUCOSE BLD STRIP.AUTO-MCNC: 87 MG/DL (ref 65–100)
SERVICE CMNT-IMP: NORMAL
SERVICE CMNT-IMP: NORMAL

## 2020-08-12 PROCEDURE — 74011250637 HC RX REV CODE- 250/637: Performed by: HOSPITALIST

## 2020-08-12 PROCEDURE — 74011250637 HC RX REV CODE- 250/637: Performed by: INTERNAL MEDICINE

## 2020-08-12 PROCEDURE — 82962 GLUCOSE BLOOD TEST: CPT

## 2020-08-12 PROCEDURE — 94760 N-INVAS EAR/PLS OXIMETRY 1: CPT

## 2020-08-12 PROCEDURE — 77010033678 HC OXYGEN DAILY

## 2020-08-12 RX ADMIN — GABAPENTIN 800 MG: 100 CAPSULE ORAL at 08:37

## 2020-08-12 RX ADMIN — CLONAZEPAM 0.5 MG: 0.5 TABLET ORAL at 08:38

## 2020-08-12 RX ADMIN — ACETAMINOPHEN 650 MG: 325 TABLET ORAL at 08:00

## 2020-08-12 RX ADMIN — Medication 10 ML: at 06:14

## 2020-08-12 RX ADMIN — PANTOPRAZOLE SODIUM 40 MG: 40 TABLET, DELAYED RELEASE ORAL at 07:55

## 2020-08-12 RX ADMIN — POLYETHYLENE GLYCOL 3350 17 G: 17 POWDER, FOR SOLUTION ORAL at 08:38

## 2020-08-12 RX ADMIN — DULOXETINE HYDROCHLORIDE 60 MG: 30 CAPSULE, DELAYED RELEASE ORAL at 08:37

## 2020-08-12 NOTE — PROGRESS NOTES
* No surgery found *  * No surgery found *  Bedside shift change report given to Melissa Hicks RN (oncoming nurse) by ALEJANDRO Dean (offgoing nurse). Report included the following information SBAR and Kardex. Zone Phone:   [phone]      Significant changes during shift:  none        Patient Information    Stephen Oseguera  53 y.o.  8/6/2020  1:24 PM by Gertrude Marc MD. Stephen Oseguera was admitted from Home    Problem List    Patient Active Problem List    Diagnosis Date Noted    Multiple sclerosis exacerbation ([address] 08/06/2020    Facial droop 03/23/2020    Exacerbation of multiple sclerosis ([address] 03/09/2020    Left-sided weakness 03/08/2020    Severe obesity ([address] 10/03/2018    Constipation 07/17/2015    Body aches 12/11/2014    Back pain 09/07/2012    Chronic pain 08/17/2012    MS (multiple sclerosis) ([address] 08/17/2012    Decreased hearing 01/31/2011    Acute pharyngitis 01/26/2011    Anxiety 08/25/2010    Blood pressure elevated 08/25/2010    Tobacco abuse 08/25/2010     Past Medical History:   Diagnosis Date    Body aches 12/11/2014    Chronic pain     Depression     GERD (gastroesophageal reflux disease)     Headache(784.0)     History of mammogram never before    MS (multiple sclerosis) ([address]     Neurological disorder     Multiple Sclerosis    Pap smear for cervical cancer screening 2008    Psychiatric disorder     anxiety    Psychotic disorder ([address]          Core Measures:    CVA: No No  CHF:No No  PNA:No No    Activity Status:    OOB to Chair No  Ambulated this shift No   Bed Rest No      DVT prophylaxis:    DVT prophylaxis Med- Yes  DVT prophylaxis SCD or CONG- No       Patient Safety:    Falls Score Total Score: 6  Safety Level_______  Bed Alarm On? Yes  Sitter?  No    Plan for upcoming shift: safety         Discharge Plan: Yes [facility]     Active Consults:  IP CONSULT TO NEUROLOGY  IP CONSULT TO HOSPITALIST

## 2020-08-12 NOTE — DISCHARGE SUMMARY
Hospitalist Discharge Summary     Patient ID:  Yeyo Ortiz  [id number]  62 y.o.  1975 8/6/2020    PCP on record: Twan Junior MD    Admit date: 8/6/2020  Discharge date and time: 8/12/2020    DISCHARGE DIAGNOSIS:    See below    CONSULTATIONS:  IP CONSULT TO NEUROLOGY  IP CONSULT TO HOSPITALIST    Excerpted HPI from H&P of William Trejo MD:  Aaron Riz[age] y.o. female with past medical history as documented below presents to the ED with c/o of 2 weeks of progressive lower extremity weakness bilateral as well as progressive fatigue, lethargy and multiple falls.  Patient states that now he is having trouble speaking and articulating. Jarvis Brantley was last admitted back in March 2020 for MS exacerbation.  Patient states that her neurologist is Dr. Leoncio Kilgore. Fidelina Olvera normally takes gabapentin and Tecfidera.  Patient denies any history of stroke. Pt denies any other alleviating or exacerbating factors. Additionally, pt specifically denies any recent fever, chills, headache, nausea, vomiting, abdominal pain, CP, SOB, lightheadedness, dizziness, numbness, lower extremity swelling, heart palpitations, urinary sxs, diarrhea, constipation, melena, hematochezia, cough, or congestion. ______________________________________________________________________  DISCHARGE SUMMARY/HOSPITAL COURSE:  for full details see H&P, daily progress notes, labs, consult notes. MS exacerbation POA  Relapsing remitting MS POA  Ground level falls in hospital  -completed solumedrol 1000mg daily X 5 days  -cont Tecfidera  -PT/OT     MRI brain:  1. Multiple new enhancing active supratentorial demyelinating plaques on a  background of extensive, chronic supratentorial demyelinating disease. 2. Stable mild parenchymal volume loss.     MRI C-spine:  1. Unchanged multilevel areas of abnormal signal in the cord consistent with  demyelinating disease. No evidence of active demyelination.   2. Unchanged mild spondylosis as above     -appreciate neurology consult  -completed steroids above and will f/u with Dr Greg Fernandes OP  -PT/OT  -fall 8/8 in hospital x 2. Fall precautions. OOB with assistance only  -plan for DC to SNF when bed available, pt medically stable for DC  -cont SSI     Constipation POA   Miralax  Lactulose     Stress/anxiety/fIbromyalgia POA  Cont cymbalta/klonapin/gabapentin home meds     GERD, continue ppi     Current tobacco use, discussed health reasons to quit.     Obesity POA Body mass index is 34.33 kg/m².    _______________________________________________________________________  Patient seen and examined by me on discharge day. Pertinent Findings:  Gen:    Not in distress  Chest: Clear lungs  CVS:   Regular rhythm. No edema  Abd:  Soft, not distended, not tender  Neuro:  Alert, oriented x 3  _______________________________________________________________________  DISCHARGE MEDICATIONS:     Discharge Medication List as of 8/12/2020  1:12 PM      CONTINUE these medications which have NOT CHANGED    Details   gabapentin (NEURONTIN) 400 mg capsule Take 2 Caps by mouth two (2) times a day. Max Daily Amount: 1,600 mg. Take 2 capsules 2 times a day, do not exceed 1600 mg daily, Normal, Disp-60 Cap,R-0      cyclobenzaprine (FLEXERIL) 10 mg tablet Take 1 Tab by mouth three (3) times daily as needed for Muscle Spasm(s). , Normal, Disp-90 Tab,R-2      !! ibuprofen (MOTRIN) 200 mg tablet Take  by mouth every six (6) hours as needed for Pain. Take three tablets every 6 hours as needed by mouth , Historical Med      clonazePAM (KlonoPIN) 0.5 mg tablet Take 1 Tab by mouth two (2) times a day. Max Daily Amount: 1 mg., Normal, Disp-60 Tab, R-0      melatonin 3 mg tablet Take 1 Tab by mouth nightly as needed for Insomnia., Normal, Disp-30 Tab, R-4      DULoxetine (CYMBALTA) 60 mg capsule Take 1 Cap by mouth daily.  Indications: neuropathic pain, Print, Disp-30 Cap, R-1      senna-docusate (PERICOLACE) 8.6-50 mg per tablet Take 2 Tabs by mouth daily. , Normal, Disp-15 Tab, R-0      !! ibuprofen (MOTRIN) 600 mg tablet Take 1 Tab by mouth every six (6) hours as needed for Pain., Normal, Disp-20 Tab, R-0      dimethyl fumarate (Tecfidera) 240 mg cpDR Take 240 mg by mouth two (2) times a day., Normal, Disp-120 Cap, R-3      LORazepam (ATIVAN) 1 mg tablet Take 1 Tab by mouth every twelve (12) hours as needed for Anxiety (q12h prn). Max Daily Amount: 2 mg., Print, Disp-40 Tab, R-0       !! - Potential duplicate medications found. Please discuss with provider. Patient Follow Up Instructions:    Activity: PT/OT Eval and Treat  Diet: Resume previous diet  Wound Care: None needed    Follow-up as below    Follow-up Information     Follow up With Specialties Details Why Contact Z[facility]    Lauri Morning, [address]   [address]  [phone]          ________________________________________________________________    Risk of deterioration: Moderate    Condition at Discharge:  Stable  __________________________________________________________________    Disposition  IP Rehab    ____________________________________________________________________    Code Status: Full Code  ___________________________________________________________________      Total time in minutes spent coordinating this discharge (includes going over instructions, follow-up, prescriptions, and preparing report for sign off to her PCP) :  35 minutes    Signed:  Can Daniels DO

## 2020-08-12 NOTE — PROGRESS NOTES
Report given to Audelia Simon RN at [facility]. Receiving nurse at [facility] has not been assigned yet. Patient is scheduled to be transported out via [facility] at 1:00pm. EMTALA has been completed.

## 2020-08-12 NOTE — PROGRESS NOTES
Problem: Falls - Risk of  Goal: *Absence of Falls  Description: Document Walter Love Fall Risk and appropriate interventions in the flowsheet.   Outcome: Progressing Towards Goal  Note: Fall Risk Interventions:  Mobility Interventions: Bed/chair exit alarm, Patient to call before getting OOB         Medication Interventions: Bed/chair exit alarm, Patient to call before getting OOB    Elimination Interventions: Bed/chair exit alarm, Call light in reach    History of Falls Interventions: Bed/chair exit alarm, Door open when patient unattended         Problem: Patient Education: Go to Patient Education Activity  Goal: Patient/Family Education  Outcome: Progressing Towards Goal

## 2020-08-12 NOTE — PROGRESS NOTES
[facility]OLA:    Inpatient Rehab  [employer] Auth  COVID negative test  2nd  Medicare Letter  [address]    CM informed by [facility] liaision that pt has been approved by [facility] for auth to go to inpatient rehab. CM informed that pt can transition to [facility] today at 1:00pm.  CM informed MD of the following and will complete d/c order. CM informed that pts covid test was negative. CM informed pt of the following. Pt agreeable to d/c and transition to inpatient rehab. Pt was provided with 2nd  Medicare Letter (signed) placed in chart. Pt's nurse was provided with call report and transport packet. Pt's nurse and MD aware of EMTALA. CM completed the needs of the pt at this time.     Edgard Wolfe, MSW, [address] [place] [address]

## 2020-08-12 NOTE — PROGRESS NOTES
Problem: Falls - Risk of  Goal: *Absence of Falls  Description: Document Sabine Nava Fall Risk and appropriate interventions in the flowsheet.   Outcome: Progressing Towards Goal  Note: Fall Risk Interventions:  Mobility Interventions: Bed/chair exit alarm, Patient to call before getting OOB, Utilize walker, cane, or other assistive device, Utilize gait belt for transfers/ambulation         Medication Interventions: Bed/chair exit alarm, Patient to call before getting OOB, Teach patient to arise slowly, Utilize gait belt for transfers/ambulation    Elimination Interventions: Bed/chair exit alarm, Call light in reach, Patient to call for help with toileting needs, Toilet paper/wipes in reach, Toileting schedule/hourly rounds    History of Falls Interventions: Bed/chair exit alarm, Door open when patient unattended, Utilize gait belt for transfer/ambulation         Problem: Patient Education: Go to Patient Education Activity  Goal: Patient/Family Education  Outcome: Progressing Towards Goal     Problem: Patient Education: Go to Patient Education Activity  Goal: Patient/Family Education  Outcome: Resolved/Met     Problem: Patient Education: Go to Patient Education Activity  Goal: Patient/Family Education  Outcome: Resolved/Met

## 2020-08-12 NOTE — PROGRESS NOTES
Hospitalist Progress Note    NAME: Jessie Silva   :  1975   MRN:  [id number]       Assessment / Plan:  MS exacerbation POA  Relapsing remitting MS POA  Ground level falls in hospital  -completed solumedrol 1000mg daily X 5 days    MRI brain:  1. Multiple new enhancing active supratentorial demyelinating plaques on a  background of extensive, chronic supratentorial demyelinating disease. 2. Stable mild parenchymal volume loss. MRI C-spine:  1. Unchanged multilevel areas of abnormal signal in the cord consistent with  demyelinating disease. No evidence of active demyelination. 2. Unchanged mild spondylosis as above    -appreciate neurology consult  -completed steroids above and will f/u with Dr Dominic Silvestre OP  -PT/OT  -fall  in hospital x 2. Fall precautions. OOB with assistance only  -plan for DC to SNF when bed available, pt medically stable for DC  -cont SSI    Constipation POA   Miralax  Lactulose     Stress/anxiety/fIbromyalgia POA  Cont cymbalta/klonapin/gabapentin home meds     GERD, continue ppi     Current tobacco use, discussed health reasons to quit.     Obesity POA Body mass index is 34.33 kg/m².     Code Status: full     Surrogate Decision Maker: son age 32, Diana Harper [phone], currently getting divorce.  Mother ([address]      DVT Prophylaxis: scd's  GI Prophylaxis: not indicated     Baseline: has fianace/ INDEPENDENT     Subjective:     Chief Complaint / Reason for Physician Visit  No new complaints    Discussed with RN events overnight. Review of Systems:  Symptom Y/N Comments  Symptom Y/N Comments   Fever/Chills n   Chest Pain n    Poor Appetite n   Edema     Cough n   Abdominal Pain n    Sputum n   Joint Pain     SOB/BOBO n   Pruritis/Rash     Nausea/vomit n   Tolerating PT/OT     Diarrhea n   Tolerating Diet y    Constipation n   Other       Could NOT obtain due to:      Objective:     VITALS:   Last 24hrs VS reviewed since prior progress note. Most recent are:  Patient Vitals for the past 24 hrs:   Temp Pulse Resp BP SpO2   08/12/20 0643 97.7 °F (36.5 °C) (!) 57 18 142/77 99 %   08/12/20 0320 97.4 °F (36.3 °C) 62 16 133/79 97 %   08/11/20 2328 97.4 °F (36.3 °C) 61 18 138/76 98 %   08/11/20 1924 98.2 °F (36.8 °C) 65 20 (!) 135/92 98 %   08/11/20 1444 98.2 °F (36.8 °C) 76 18 137/86 100 %   08/11/20 1140 98.3 °F (36.8 °C) 81 18 120/80 96 %       Intake/Output Summary (Last 24 hours) at 8/12/2020 0911  Last data filed at 8/12/2020 0749  Gross per 24 hour   Intake 480 ml   Output    Net 480 ml        PHYSICAL EXAM:  General: WD, WN. Alert, cooperative, no acute distress    EENT:  EOMI. Anicteric sclerae. MMM  Resp:  CTA bilaterally, no wheezing or rales. No accessory muscle use  CV:  Regular  rhythm,  No edema  GI:  Soft, Non distended, Non tender.  +Bowel sounds  Neurologic:  Alert and oriented X 3, normal speech,   Psych:   Not anxious nor agitated  Skin:  No rashes. No jaundice    Reviewed most current lab test results and cultures  YES  Reviewed most current radiology test results   YES  Review and summation of old records today    NO  Reviewed patient's current orders and MAR    YES  PMH/ reviewed - no change compared to H&P  ________________________________________________________________________  Care Plan discussed with:    Comments   Patient x    Family  x    RN x    Care Manager x    Consultant                        Multidiciplinary team rounds were held today with , nursing, pharmacist and clinical coordinator. Patient's plan of care was discussed; medications were reviewed and discharge planning was addressed.      ________________________________________________________________________  Total NON critical care TIME:  30   Minutes    Total CRITICAL CARE TIME Spent:   Minutes non procedure based      Comments   >50% of visit spent in counseling and coordination of care ________________________________________________________________________  Chiquita Myriam, DO     Procedures: see electronic medical records for all procedures/Xrays and details which were not copied into this note but were reviewed prior to creation of Plan. LABS:  I reviewed today's most current labs and imaging studies.   Pertinent labs include:  Recent Labs     08/11/20  1543 08/10/20  0424   WBC 7.2 5.6   HGB 12.3 11.7   HCT 35.9 35.0    271     Recent Labs     08/11/20  1543 08/10/20  0424    139   K 3.2* 3.8    109*   CO2 29 29   * 127*   BUN 28* 23*   CREA 1.23* 0.60   CA 8.2* 8.2*   ALB  --  3.3*   TBILI  --  0.5   ALT  --  14       Signed: Hortensia Johnson DO

## 2020-08-13 ENCOUNTER — PATIENT OUTREACH (OUTPATIENT)
Dept: CASE MANAGEMENT | Age: 45
End: 2020-08-13

## 2020-08-17 NOTE — PROGRESS NOTES
Hospitalist Progress Note    NAME: Cameron Magallon   :  1975   MRN:  [id number]     Admit date: 2020    Today's date: 08/10/20    PCP: MD Renata Saleem M.D. Cell [phone]      Assessment / Plan:  MS exacerbation POA  Relapsing remitting MS POA  Solumedrol 1000mg daily X 5 days, completing 5 days  MRI brain multiple new enhancing active supratentorial demyelinating plaques on a            background of extensive, chronic supratentorial demyelinating disease  MRI C spine Unchanged multilevel areas of abnormal signal in the cord consistent with         demyelinating disease. No evidence of active demyelination. Unchanged mild spondylosis as above. Neurology consulted by ED. Insulin sliding scale. PT/OT consults  Last dose of solumedrol today, start discharge planning    Constipation POA   Miralax  Lactulose     Stress/anxiety/fIbromyalgia POA  Cont cymbalta/klonapin/gabapentin home meds     GERD, continue ppi    Current tobacco use, discussed health reasons to quit. Obesity POA Body mass index is 34.33 kg/m². Code Status: full    Surrogate Decision Maker: son age 32, Ava Esposito [phone], currently getting divorce.  Mother ([phone].      DVT Prophylaxis: scd's  GI Prophylaxis: not indicated     Baseline: has fianace/ INDEPENDENT       Subjective:     Chief Complaint / Reason for Physician Visit f/u MS excerbation  \"No complaints\". Discussed with RN events overnight.    Getting last dose of solumedrol  Strength feels better    Review of Systems:  Symptom Y/N Comments  Symptom Y/N Comments   Fever/Chills n   Chest Pain n    Poor Appetite    Edema     Cough n   Abdominal Pain n    Sputum    Joint Pain     SOB/BOBO n   Headache     Nausea/vomit n   Tolerating PT/OT     Diarrhea    Tolerating Diet y    Constipation y   Other       Could NOT obtain due to:      Objective:     VITALS:   Last 24hrs VS reviewed since prior progress note. Wt Readings from Last 12 Encounters:   08/06/20 90.7 kg (200 lb)   04/03/20 90.7 kg (200 lb)   03/23/20 90.7 kg (200 lb)   03/17/20 87.4 kg (192 lb 9.6 oz)   03/08/20 90.7 kg (200 lb)   03/02/20 89.6 kg (197 lb 9.6 oz)   02/25/20 90.8 kg (200 lb 2.8 oz)   02/17/20 90.7 kg (200 lb)   02/12/20 90.7 kg (200 lb)   02/10/20 89.8 kg (198 lb)   01/14/20 89.2 kg (196 lb 11.2 oz)   12/19/19 90 kg (198 lb 8 oz)       PHYSICAL EXAM:  General: WD, WN. Alert, cooperative, no acute distress    EENT:  PERRL. Anicteric sclerae. MMM  Resp:  CTA bilaterally, no wheezing or rales. No accessory muscle use  CV:  Regular  rhythm,  No edema  GI:  Soft, Non distended, Non tender. +Bowel sounds, no rebound  Neurologic:  Alert and oriented X 3, normal speech, diffuse weakness 4/5    Speech slow  Psych:   Good insight. Not anxious nor agitated  Skin:  No rashes. No jaundice    Reviewed most current lab test results and cultures  YES  Reviewed most current radiology test results   YES  Review and summation of old records today    NO  Reviewed patient's current orders and MAR    YES  PMH/SH reviewed - no change compared to H&P  ________________________________________________________________________  Care Plan discussed with:    Comments   Patient x    Family      RN x    Care Manager     Consultant                        Multidiciplinary team rounds were held today with , nursing, pharmacist and clinical coordinator. Patient's plan of care was discussed; medications were reviewed and discharge planning was addressed.      ________________________________________________________________________      Comments   >50% of visit spent in counseling and coordination of care     ________________________________________________________________________  Carolyn Arevalo MD     Procedures: see electronic medical records for all procedures/Xrays and details which were not copied into this note but were reviewed prior to creation of Plan. LABS:  I reviewed today's most current labs and imaging studies.

## 2020-08-18 ENCOUNTER — TELEPHONE (OUTPATIENT)
Dept: NEUROLOGY | Age: 45
End: 2020-08-18

## 2020-08-18 NOTE — TELEPHONE ENCOUNTER
----- Message from Ishaan Hinojosa sent at 2020 11:06 AM EDT -----  Regarding: ANGELES/TELEPHONE  Contact: [phone]  General Message/Vendor Call     Patient's first and last name:  Tyesha Reeder    :  1975    Caller's first and last name:  Mary Grace Pulido    Reason for call: Name of home care agency     Callback required yes/no and why: Yes  Best contact number(s): [phone]    Details to clarify the request: Mary Grace Pulido from [facility] would like to know the name of the [address] that the patient used in the past. A detail message can be left.

## 2020-08-20 NOTE — TELEPHONE ENCOUNTER
Spoke with marua and informed her that she had used [facility] in the past for home health

## 2020-08-21 ENCOUNTER — PATIENT OUTREACH (OUTPATIENT)
Dept: CASE MANAGEMENT | Age: 45
End: 2020-08-21

## 2020-08-24 ENCOUNTER — TELEPHONE (OUTPATIENT)
Dept: NEUROLOGY | Age: 45
End: 2020-08-24

## 2020-08-24 NOTE — TELEPHONE ENCOUNTER
Left VM for patient she needed to R/S her Friday August 28th appt as Dr Florian Das will not be in the office.

## 2020-08-27 ENCOUNTER — TELEPHONE (OUTPATIENT)
Dept: NEUROLOGY | Age: 45
End: 2020-08-27

## 2020-08-27 NOTE — TELEPHONE ENCOUNTER
Spoke with physical therapist and she stated this was the 2nd visit with patient. Patient fell Monday backiing up to sit on sofa. No injuries. Physical therapist gets there today and patients roommate was throwing things and very agitated. Roommate remained outside during visit upon request.    Physical therapist believes she needs an order for a medical social worker. Gave verbal to do so.

## 2020-08-28 ENCOUNTER — PATIENT OUTREACH (OUTPATIENT)
Dept: CASE MANAGEMENT | Age: 45
End: 2020-08-28

## 2020-08-28 NOTE — PROGRESS NOTES
[address] [place] [address] Dr Discharge Follow-Up      Date/Time:  2020 9:21 AM    Patient was admitted to [facility] on 20 and discharged to [facility] on 20. The patients discharge diagnosis was Multiple sclerosis exacerbation. Patient was discharge on 20 from [facility]. The discharge summary from the post acute facilty was not available at the time of outreach. Patient was contacted within 6 business days of discharge from the post acute facility. LPN Care Coordinator contacted the patient by telephone to perform post hospital discharge follow up. Verified name and  with patient as identifiers. Provided introduction to self, and explanation of the LPN Care Coordinator role. Patient received post acute facility discharge instructions. LPN Care Coordinator reviewed discharge instructions and red flags with patient who verbalized understanding. Patient given an opportunity to ask questions and does not have any further questions or concerns at this time. The patient agrees to contact the PCP office for questions related to their healthcare. LPN Care Coordinator provided contact information for future reference. Advance Care Planning:   Does patient have an Advance Directive:  not on file     [facility] orders at discharge: PT, OT, [address]: [facility]  Date of initial visit: 20    [facility] ordered at discharge: hospital bed, IV medication, life vest, nebulizer, tube feed/supplies, ostomy supplies, oxygen, wound care supplies, none  [address]: NA   Durable Medical Equipment received: NA    Medication(s):   The post acute facility medication discharge list was not available for this call.      [facility] follow up appointment(s):   Future Appointments   Date Time Provider Naveen Martins   10/12/2020  1:00 PM MD ROCHELLE Menon     PCP- 20 2:15pm    Non-[facility] follow up appointment(s): NA  [facility]:  information provided as a resource

## 2020-08-31 ENCOUNTER — HOSPITAL ENCOUNTER (INPATIENT)
Age: 45
LOS: 2 days | Discharge: HOME | DRG: 058 | End: 2020-09-05
Attending: EMERGENCY MEDICINE | Admitting: INTERNAL MEDICINE
Payer: MEDICARE

## 2020-08-31 ENCOUNTER — APPOINTMENT (OUTPATIENT)
Dept: MRI IMAGING | Age: 45
DRG: 058 | End: 2020-08-31
Attending: EMERGENCY MEDICINE
Payer: MEDICARE

## 2020-08-31 DIAGNOSIS — F41.9 ANXIETY: Primary | ICD-10-CM

## 2020-08-31 DIAGNOSIS — R52 BODY ACHES: ICD-10-CM

## 2020-08-31 DIAGNOSIS — G35 MULTIPLE SCLEROSIS EXACERBATION (HCC): ICD-10-CM

## 2020-08-31 DIAGNOSIS — G35 MS (MULTIPLE SCLEROSIS) (HCC): ICD-10-CM

## 2020-08-31 LAB
ALBUMIN SERPL-MCNC: 3.3 G/DL (ref 3.5–5)
ALBUMIN/GLOB SERPL: 1.1 {RATIO} (ref 1.1–2.2)
ALP SERPL-CCNC: 47 U/L (ref 45–117)
ALT SERPL-CCNC: 17 U/L (ref 12–78)
AMPHET UR QL SCN: NEGATIVE
ANION GAP SERPL CALC-SCNC: 2 MMOL/L (ref 5–15)
APPEARANCE UR: CLEAR
AST SERPL-CCNC: 13 U/L (ref 15–37)
BARBITURATES UR QL SCN: NEGATIVE
BASOPHILS # BLD: 0 K/UL (ref 0–0.1)
BASOPHILS NFR BLD: 1 % (ref 0–1)
BENZODIAZ UR QL: POSITIVE
BILIRUB SERPL-MCNC: 0.2 MG/DL (ref 0.2–1)
BILIRUB UR QL: NEGATIVE
BUN SERPL-MCNC: 11 MG/DL (ref 6–20)
BUN/CREAT SERPL: 12 (ref 12–20)
CALCIUM SERPL-MCNC: 8.5 MG/DL (ref 8.5–10.1)
CANNABINOIDS UR QL SCN: POSITIVE
CHLORIDE SERPL-SCNC: 108 MMOL/L (ref 97–108)
CK SERPL-CCNC: 144 U/L (ref 26–192)
CO2 SERPL-SCNC: 29 MMOL/L (ref 21–32)
COCAINE UR QL SCN: NEGATIVE
COLOR UR: NORMAL
CREAT SERPL-MCNC: 0.91 MG/DL (ref 0.55–1.02)
DIFFERENTIAL METHOD BLD: ABNORMAL
DRUG SCRN COMMENT,DRGCM: ABNORMAL
EOSINOPHIL # BLD: 0.1 K/UL (ref 0–0.4)
EOSINOPHIL NFR BLD: 2 % (ref 0–7)
ERYTHROCYTE [DISTWIDTH] IN BLOOD BY AUTOMATED COUNT: 12.9 % (ref 11.5–14.5)
ETHANOL SERPL-MCNC: <10 MG/DL
GLOBULIN SER CALC-MCNC: 2.9 G/DL (ref 2–4)
GLUCOSE SERPL-MCNC: 83 MG/DL (ref 65–100)
GLUCOSE UR STRIP.AUTO-MCNC: NEGATIVE MG/DL
HCG SERPL QL: NEGATIVE
HCT VFR BLD AUTO: 34.3 % (ref 35–47)
HGB BLD-MCNC: 11.4 G/DL (ref 11.5–16)
HGB UR QL STRIP: NEGATIVE
IMM GRANULOCYTES # BLD AUTO: 0 K/UL (ref 0–0.04)
IMM GRANULOCYTES NFR BLD AUTO: 0 % (ref 0–0.5)
KETONES UR QL STRIP.AUTO: NEGATIVE MG/DL
LEUKOCYTE ESTERASE UR QL STRIP.AUTO: NEGATIVE
LYMPHOCYTES # BLD: 1 K/UL (ref 0.8–3.5)
LYMPHOCYTES NFR BLD: 27 % (ref 12–49)
MCH RBC QN AUTO: 32.1 PG (ref 26–34)
MCHC RBC AUTO-ENTMCNC: 33.2 G/DL (ref 30–36.5)
MCV RBC AUTO: 96.6 FL (ref 80–99)
METHADONE UR QL: NEGATIVE
MONOCYTES # BLD: 0.2 K/UL (ref 0–1)
MONOCYTES NFR BLD: 5 % (ref 5–13)
NEUTS SEG # BLD: 2.6 K/UL (ref 1.8–8)
NEUTS SEG NFR BLD: 65 % (ref 32–75)
NITRITE UR QL STRIP.AUTO: NEGATIVE
NRBC # BLD: 0 K/UL (ref 0–0.01)
NRBC BLD-RTO: 0 PER 100 WBC
OPIATES UR QL: NEGATIVE
PCP UR QL: NEGATIVE
PH UR STRIP: 6 [PH] (ref 5–8)
PLATELET # BLD AUTO: 296 K/UL (ref 150–400)
PMV BLD AUTO: 9.9 FL (ref 8.9–12.9)
POTASSIUM SERPL-SCNC: 4 MMOL/L (ref 3.5–5.1)
PROT SERPL-MCNC: 6.2 G/DL (ref 6.4–8.2)
PROT UR STRIP-MCNC: NEGATIVE MG/DL
RBC # BLD AUTO: 3.55 M/UL (ref 3.8–5.2)
SODIUM SERPL-SCNC: 139 MMOL/L (ref 136–145)
SP GR UR REFRACTOMETRY: 1.01 (ref 1–1.03)
TROPONIN I SERPL-MCNC: <0.05 NG/ML
UROBILINOGEN UR QL STRIP.AUTO: 0.2 EU/DL (ref 0.2–1)
WBC # BLD AUTO: 3.9 K/UL (ref 3.6–11)

## 2020-08-31 PROCEDURE — 84703 CHORIONIC GONADOTROPIN ASSAY: CPT

## 2020-08-31 PROCEDURE — 81003 URINALYSIS AUTO W/O SCOPE: CPT

## 2020-08-31 PROCEDURE — 72156 MRI NECK SPINE W/O & W/DYE: CPT

## 2020-08-31 PROCEDURE — 96375 TX/PRO/DX INJ NEW DRUG ADDON: CPT

## 2020-08-31 PROCEDURE — 74011250636 HC RX REV CODE- 250/636: Performed by: EMERGENCY MEDICINE

## 2020-08-31 PROCEDURE — 96365 THER/PROPH/DIAG IV INF INIT: CPT

## 2020-08-31 PROCEDURE — 85025 COMPLETE CBC W/AUTO DIFF WBC: CPT

## 2020-08-31 PROCEDURE — 82550 ASSAY OF CK (CPK): CPT

## 2020-08-31 PROCEDURE — 84484 ASSAY OF TROPONIN QUANT: CPT

## 2020-08-31 PROCEDURE — 87040 BLOOD CULTURE FOR BACTERIA: CPT

## 2020-08-31 PROCEDURE — 36415 COLL VENOUS BLD VENIPUNCTURE: CPT

## 2020-08-31 PROCEDURE — 70553 MRI BRAIN STEM W/O & W/DYE: CPT

## 2020-08-31 PROCEDURE — 74011250637 HC RX REV CODE- 250/637: Performed by: EMERGENCY MEDICINE

## 2020-08-31 PROCEDURE — 80307 DRUG TEST PRSMV CHEM ANLYZR: CPT

## 2020-08-31 PROCEDURE — 74011250636 HC RX REV CODE- 250/636: Performed by: INTERNAL MEDICINE

## 2020-08-31 PROCEDURE — 99218 HC RM OBSERVATION: CPT

## 2020-08-31 PROCEDURE — A9575 INJ GADOTERATE MEGLUMI 0.1ML: HCPCS | Performed by: EMERGENCY MEDICINE

## 2020-08-31 PROCEDURE — 80053 COMPREHEN METABOLIC PANEL: CPT

## 2020-08-31 PROCEDURE — 74011250637 HC RX REV CODE- 250/637: Performed by: INTERNAL MEDICINE

## 2020-08-31 PROCEDURE — 99223 1ST HOSP IP/OBS HIGH 75: CPT | Performed by: PSYCHIATRY & NEUROLOGY

## 2020-08-31 PROCEDURE — 96366 THER/PROPH/DIAG IV INF ADDON: CPT

## 2020-08-31 PROCEDURE — 74011000258 HC RX REV CODE- 258: Performed by: EMERGENCY MEDICINE

## 2020-08-31 PROCEDURE — 99285 EMERGENCY DEPT VISIT HI MDM: CPT

## 2020-08-31 RX ORDER — CYCLOBENZAPRINE HCL 10 MG
10 TABLET ORAL
Status: DISCONTINUED | OUTPATIENT
Start: 2020-08-31 | End: 2020-09-05 | Stop reason: HOSPADM

## 2020-08-31 RX ORDER — ENOXAPARIN SODIUM 100 MG/ML
40 INJECTION SUBCUTANEOUS DAILY
Status: DISCONTINUED | OUTPATIENT
Start: 2020-09-01 | End: 2020-09-05 | Stop reason: HOSPADM

## 2020-08-31 RX ORDER — LORAZEPAM 2 MG/ML
1 INJECTION INTRAMUSCULAR ONCE
Status: COMPLETED | OUTPATIENT
Start: 2020-08-31 | End: 2020-08-31

## 2020-08-31 RX ORDER — CYCLOBENZAPRINE HCL 10 MG
10 TABLET ORAL
Status: COMPLETED | OUTPATIENT
Start: 2020-08-31 | End: 2020-08-31

## 2020-08-31 RX ORDER — SODIUM CHLORIDE 0.9 % (FLUSH) 0.9 %
5-40 SYRINGE (ML) INJECTION AS NEEDED
Status: DISCONTINUED | OUTPATIENT
Start: 2020-08-31 | End: 2020-09-05 | Stop reason: HOSPADM

## 2020-08-31 RX ORDER — ONDANSETRON 2 MG/ML
4 INJECTION INTRAMUSCULAR; INTRAVENOUS
Status: DISCONTINUED | OUTPATIENT
Start: 2020-08-31 | End: 2020-09-05 | Stop reason: HOSPADM

## 2020-08-31 RX ORDER — POLYETHYLENE GLYCOL 3350 17 G/17G
17 POWDER, FOR SOLUTION ORAL DAILY PRN
Status: DISCONTINUED | OUTPATIENT
Start: 2020-08-31 | End: 2020-09-05 | Stop reason: HOSPADM

## 2020-08-31 RX ORDER — PROMETHAZINE HYDROCHLORIDE 25 MG/1
12.5 TABLET ORAL
Status: DISCONTINUED | OUTPATIENT
Start: 2020-08-31 | End: 2020-09-05 | Stop reason: HOSPADM

## 2020-08-31 RX ORDER — LANOLIN ALCOHOL/MO/W.PET/CERES
3 CREAM (GRAM) TOPICAL
Status: DISCONTINUED | OUTPATIENT
Start: 2020-08-31 | End: 2020-09-05 | Stop reason: HOSPADM

## 2020-08-31 RX ORDER — CLONAZEPAM 0.5 MG/1
0.5 TABLET ORAL 2 TIMES DAILY
Status: DISCONTINUED | OUTPATIENT
Start: 2020-08-31 | End: 2020-08-31

## 2020-08-31 RX ORDER — DULOXETIN HYDROCHLORIDE 30 MG/1
60 CAPSULE, DELAYED RELEASE ORAL DAILY
Status: DISCONTINUED | OUTPATIENT
Start: 2020-09-01 | End: 2020-09-05 | Stop reason: HOSPADM

## 2020-08-31 RX ORDER — ACETAMINOPHEN 325 MG/1
650 TABLET ORAL
Status: DISCONTINUED | OUTPATIENT
Start: 2020-08-31 | End: 2020-09-05 | Stop reason: HOSPADM

## 2020-08-31 RX ORDER — SODIUM CHLORIDE 0.9 % (FLUSH) 0.9 %
5-40 SYRINGE (ML) INJECTION EVERY 8 HOURS
Status: DISCONTINUED | OUTPATIENT
Start: 2020-08-31 | End: 2020-09-05 | Stop reason: HOSPADM

## 2020-08-31 RX ORDER — ACETAMINOPHEN 650 MG/1
650 SUPPOSITORY RECTAL
Status: DISCONTINUED | OUTPATIENT
Start: 2020-08-31 | End: 2020-09-05 | Stop reason: HOSPADM

## 2020-08-31 RX ORDER — GADOTERATE MEGLUMINE 376.9 MG/ML
20 INJECTION INTRAVENOUS
Status: COMPLETED | OUTPATIENT
Start: 2020-08-31 | End: 2020-08-31

## 2020-08-31 RX ADMIN — CYCLOBENZAPRINE HYDROCHLORIDE 10 MG: 10 TABLET, FILM COATED ORAL at 13:38

## 2020-08-31 RX ADMIN — LORAZEPAM 1 MG: 2 INJECTION INTRAMUSCULAR; INTRAVENOUS at 18:40

## 2020-08-31 RX ADMIN — SODIUM CHLORIDE 1000 MG: 900 INJECTION, SOLUTION INTRAVENOUS at 14:45

## 2020-08-31 RX ADMIN — GADOTERATE MEGLUMINE 20 ML: 376.9 INJECTION INTRAVENOUS at 19:55

## 2020-08-31 RX ADMIN — GABAPENTIN 800 MG: 100 CAPSULE ORAL at 21:24

## 2020-08-31 RX ADMIN — Medication 10 ML: at 21:26

## 2020-08-31 RX ADMIN — SODIUM CHLORIDE 500 ML: 900 INJECTION, SOLUTION INTRAVENOUS at 14:09

## 2020-08-31 NOTE — CONSULTS
Consult  REFERRED BY:  Gosia Hager MD    CHIEF COMPLAINT: Bilateral leg weakness      Subjective: Nicolas Pickering is a 53 y.o. right-handed  female seen as a new patient to me, at the request of Dr. Fabian Coy the ER physician for evaluation of 2 to 3-day history of increasing weakness in her legs, and having several falls and can no longer walk and has been admitted to the hospital with an exacerbation of her MS multiple sclerosis. Her just in the hospital about 2 weeks ago and then discharged with a week of rehab, and got home about a week ago and now is back in the hospital.  She was given IV steroids, and sent to rehab, and was found to have 5 new lesions on her brain MRI scan in first part of August but no new lesions in the cervical spine. She is getting an MRI of her cervical spine and brain, and I added on a thoracic spine in addition. She was on Tecfidera, but apparently was told to hold that medication and was supposed to see her neurologist Dr. Camila Clarke this week to be started on a new agent. She says she can only walk with a walker after she went to rehab. She denies any type of infection that could precipitate a pseudo-exacerbation. She is not had any fever, meningismus, head injury, back injury, and no major increase in any spinal pain, and no change in her bowel and bladder function. She takes clonazepam at thing for anxiety, Flexeril for muscle spasms, supposed to be on Tecfidera 240 mg twice a day, Cymbalta 60 mg a day, gabapentin 400 mg capsules taking 2 capsules twice a day, takes ibuprofen, Ativan every 12 hours as needed for anxiety, melatonin and laxatives with her bowels.     Past Medical History:   Diagnosis Date    Body aches 12/11/2014    Chronic pain     Depression     GERD (gastroesophageal reflux disease)     Headache(784.0)     History of mammogram never before    MS (multiple sclerosis) (HCC)     Neurological disorder     Multiple Sclerosis    Pap smear for cervical cancer screening 2008    Psychiatric disorder     anxiety    Psychotic disorder [facility])       Past Surgical History:   Procedure Laterality Date    COLONOSCOPY N/A 2/28/2018    COLONOSCOPY performed by Dakota Sultana MD at [facility] ENDOSCOPY    HX CHOLECYSTECTOMY      HX GYN      3 c-sections    HX HEENT      bilateral ear tube surgery    HX HERNIA REPAIR      HX OTHER SURGICAL      R inguinal hernia repair     Family History   Problem Relation Age of Onset    Heart Attack Father         tripple bypass    Cancer Father         lung    COPD Father     Diabetes Mother         type 2    Heart Disease Mother         heart disease    Diabetes Paternal Grandmother     No Known Problems Sister     No Known Problems Brother     No Known Problems Child       Social History     Tobacco Use    Smoking status: Current Every Day Smoker     Packs/day: 0.50     Years: 17.00     Pack years: 8.50     Types: Cigarettes    Smokeless tobacco: Never Used    Tobacco comment: 1 pack every 3 days.    Substance Use Topics    Alcohol use: No         Current Facility-Administered Medications:     sodium chloride (NS) flush 5-40 mL, 5-40 mL, IntraVENous, Q8H, Pooja Valentino MD    sodium chloride (NS) flush 5-40 mL, 5-40 mL, IntraVENous, PRN, Pooja Jesus MD    acetaminophen (TYLENOL) tablet 650 mg, 650 mg, Oral, Q6H PRN **OR** acetaminophen (TYLENOL) suppository 650 mg, 650 mg, Rectal, Q6H PRN, Pooja Valentino MD    polyethylene glycol (MIRALAX) packet 17 g, 17 g, Oral, DAILY PRN, Pooja Jesus MD    promethazine (PHENERGAN) tablet 12.5 mg, 12.5 mg, Oral, Q6H PRN **OR** ondansetron (ZOFRAN) injection 4 mg, 4 mg, IntraVENous, Q6H PRN, Lorie Vera MD    [START ON 9/1/2020] enoxaparin (LOVENOX) injection 40 mg, 40 mg, SubCUTAneous, DAILY, Pooja Valentino MD    [START ON 9/1/2020] methylPREDNISolone ((Solu-MEDROL) 1,000 mg in 0.9% sodium chloride (MBP/ADV) 100 mL, 1 g, IntraVENous, Q24H, Ceferino Gentile MD    cyclobenzaprine (FLEXERIL) tablet 10 mg, 10 mg, Oral, TID PRN, Ceferino Gentile MD    [START ON 9/1/2020] DULoxetine (CYMBALTA) capsule 60 mg, 60 mg, Oral, DAILY, Brett Valentino MD    gabapentin (NEURONTIN) capsule 800 mg, 800 mg, Oral, BID, Brett Valentino MD    melatonin tablet 3 mg, 3 mg, Oral, QHS PRN, Ceferino Gentile MD    Current Outpatient Medications:     gabapentin (NEURONTIN) 400 mg capsule, Take 2 Caps by mouth two (2) times a day. Max Daily Amount: 1,600 mg. Take 2 capsules 2 times a day, do not exceed 1600 mg daily, Disp: 60 Cap, Rfl: 0    dimethyl fumarate (Tecfidera) 240 mg cpDR, Take 240 mg by mouth two (2) times a day., Disp: 120 Cap, Rfl: 3    cyclobenzaprine (FLEXERIL) 10 mg tablet, Take 1 Tab by mouth three (3) times daily as needed for Muscle Spasm(s). , Disp: 90 Tab, Rfl: 2    ibuprofen (MOTRIN) 200 mg tablet, Take  by mouth every six (6) hours as needed for Pain. Take three tablets every 6 hours as needed by mouth , Disp: , Rfl:     clonazePAM (KlonoPIN) 0.5 mg tablet, Take 1 Tab by mouth two (2) times a day. Max Daily Amount: 1 mg. (Patient taking differently: Take 0.5 mg by mouth three (3) times daily.), Disp: 60 Tab, Rfl: 0    melatonin 3 mg tablet, Take 1 Tab by mouth nightly as needed for Insomnia. (Patient taking differently: Take 3 mg by mouth nightly as needed for Insomnia. Indications: Pt state she takes 5 mg tab nightly.), Disp: 30 Tab, Rfl: 4    LORazepam (ATIVAN) 1 mg tablet, Take 1 Tab by mouth every twelve (12) hours as needed for Anxiety (q12h prn). Max Daily Amount: 2 mg., Disp: 40 Tab, Rfl: 0    DULoxetine (CYMBALTA) 60 mg capsule, Take 1 Cap by mouth daily. Indications: neuropathic pain, Disp: 30 Cap, Rfl: 1    senna-docusate (PERICOLACE) 8.6-50 mg per tablet, Take 2 Tabs by mouth daily. , Disp: 15 Tab, Rfl: 0    ibuprofen (MOTRIN) 600 mg tablet, Take 1 Tab by mouth every six (6) hours as needed for Pain.  (Patient taking differently: Take 200 mg by mouth.), Disp: 20 Tab, Rfl: 0        Allergies   Allergen Reactions    Morphine Rash      MRI Results (most recent):  Results from Hospital Encounter encounter on 08/06/20   MRI CERV SPINE W WO CONT    Addendum Addendum: Pre and postcontrast imaging was performed with 20 mL Dotarem      Adela Corea MD 8/7/2020  8:44 AM          Narrative PRELIMINARY REPORT    MRI Cervical Spine shows multilevel spinal cord plaques without significant  change since 3/8/2020. There is no spinal stenosis. Preliminary report was provided by Dr. Bia Hernandez, the on-call radiologist, at 2036  hours. Final report to follow. END PRELIMINARY REPORT      EXAM: MRI CERV SPINE W WO CONT    INDICATION: MS exacebation, weakness. COMPARISON: 3/8/2020    TECHNIQUE: MR imaging of the cervical spine was performed using the following  sequences: sagittal T1, T2, STIR;  axial T2, T1.     CONTRAST:  None. FINDINGS:    There is normal alignment of the cervical spine. Vertebral body heights are  maintained. Marrow signal is normal.    Multiple areas of increased T2/STIR signal are seen throughout the cervical cord  without significant change. There is no abnormal enhancement to suggest active  demyelination. The paraspinal soft tissues are within normal limits. C2-C3: Mild central disc osteophyte complex. No significant spinal stenosis or  neural foraminal narrowing. C3-C4: No herniation or stenosis. C4-C5: No herniation or stenosis. C5-C6: Minimal broad-based disc osteophyte complex without significant spinal  stenosis or neural foraminal narrowing. C6-C7: Mild broad-based disc osteophyte complex without significant spinal  stenosis. There is unchanged mild left neural foraminal narrowing. C7-T1: No significant spinal stenosis. There is unchanged mild left neural  foraminal narrowing. Impression IMPRESSION:    1.  Unchanged multilevel areas of abnormal signal in the cord consistent with  demyelinating disease. No evidence of active demyelination. 2. Unchanged mild spondylosis as above. Results from Hospital Encounter encounter on 08/06/20   MRI CERV SPINE W WO CONT    Addendum Addendum: Pre and postcontrast imaging was performed with 20 mL Dotarem      Paola Laws MD 8/7/2020  8:44 AM          Narrative PRELIMINARY REPORT    MRI Cervical Spine shows multilevel spinal cord plaques without significant  change since 3/8/2020. There is no spinal stenosis. Preliminary report was provided by Dr. Qamar Delgado, the on-call radiologist, at 2036  hours. Final report to follow. END PRELIMINARY REPORTEXAM: MRI CERV SPINE W WO CONT    INDICATION: MS exacebation, weakness. COMPARISON: 3/8/2020    TECHNIQUE: MR imaging of the cervical spine was performed using the following  sequences: sagittal T1, T2, STIR;  axial T2, T1.     CONTRAST:  None. FINDINGS:    There is normal alignment of the cervical spine. Vertebral body heights are  maintained. Marrow signal is normal.    Multiple areas of increased T2/STIR signal are seen throughout the cervical cord  without significant change. There is no abnormal enhancement to suggest active  demyelination. The paraspinal soft tissues are within normal limits. C2-C3: Mild central disc osteophyte complex. No significant spinal stenosis or  neural foraminal narrowing. C3-C4: No herniation or stenosis. C4-C5: No herniation or stenosis. C5-C6: Minimal broad-based disc osteophyte complex without significant spinal  stenosis or neural foraminal narrowing. C6-C7: Mild broad-based disc osteophyte complex without significant spinal  stenosis. There is unchanged mild left neural foraminal narrowing. C7-T1: No significant spinal stenosis. There is unchanged mild left neural  foraminal narrowing. Impression IMPRESSION:    1. Unchanged multilevel areas of abnormal signal in the cord consistent with  demyelinating disease.  No evidence of active demyelination. 2. Unchanged mild spondylosis as above. Review of Systems:  A comprehensive review of systems was negative except for: Constitutional: positive for fatigue and malaise  Musculoskeletal: positive for myalgias, arthralgias, stiff joints and muscle weakness  Neurological: positive for speech problems, paresthesia, coordination problems, gait problems and weakness   Vitals:    08/31/20 1700 08/31/20 1715 08/31/20 1730 08/31/20 1800   BP: 115/72 120/70 118/70 124/69   Pulse: (!) 49 (!) 48 (!) 49 (!) 56   Resp: 14 12 13 14   Temp:       SpO2: 100% 100% 100% 99%   Weight:         Objective:     I      NEUROLOGICAL EXAM:    Appearance: The patient is poorly developed and nourished, provides a coherent history and is in no acute distress. Mental Status: Oriented to time, place and person, and the president, cognitive function is slow and abnormal and speech is fluent and no aphasia or dysarthria. Mood and affect appropriate. Cranial Nerves:   Intact visual fields. Fundi are benign, discs are poorly seen. SHEFALI, EOM's full, no nystagmus, no ptosis. Facial sensation is normal. Corneal reflexes are not tested. Facial movement is asymmetric. Hearing is normal bilaterally. Palate is midline with normal sternocleidomastoid and trapezius muscles are normal. Tongue is midline. Neck without meningismus or bruits  Temporal arteries are not tender or enlarged  TMJ areas are not tender on palpation   Motor:   Patient has spastic quadriplegia, strength about 4/5 in the right arm and 3/5 in the left arm, and about 2/5 in the right leg and 3/5 in the left leg. She has right-sided weakness greater than left. Reflexes:   Deep tendon reflexes 2+/4 on the left and and 3+/4 on the right  No babinski or clonus present   Sensory:   Normal to touch, pinprick and vibration and temperature decreased in both feet. DSS is intact   Gait:  Not testable   Tremor:   No tremor noted.    Cerebellar: Not testable cerebellar signs present on Romberg and tandem testing and finger-nose-finger exam.   Neurovascular:  Normal heart sounds and regular rhythm, peripheral pulses intact, and no carotid bruits. Assessment:   [unfilled]  Active Problems:    Multiple sclerosis exacerbation ([facility] [address] (8/6/2020)        Plan:     Patient with probable new exacerbation of her MS, so we will give another course of IV steroids, and see if we can get her better, and then reimage her to see where the damages and if anything else needs to be done. With the amount of disease she had she probably would be a good candidate for Ocrevus or Mavenclad or Tysabri amongst others. We will follow her closely, continue excellent medical care as you are, and we will give 3 days of IV Solu-Medrol at the beginning therapy.     Signed By: Romaine Saucedo MD     August 31, 2020       CC: Fran Clemens MD  FAX: [phone]  3 PM

## 2020-08-31 NOTE — H&P
Hospitalist Admission Note    NAME: Conception Queta   :  1975   MRN:  [id number]     Date/Time:  2020 6:02 PM    Patient PCP: Queta Erickson MD  ______________________________________________________________________  Given the patient's current clinical presentation, I have a high level of concern for decompensation if discharged from the emergency department. Complex decision making was performed, which includes reviewing the patient's available past medical records, laboratory results, and x-ray films. My assessment of this patient's clinical condition and my plan of care is as follows. Assessment / Plan:  Patient is a 70-year-old  woman with a past medical history of multiple sclerosis being admitted for an exacerbation    Acute exacerbation of relapsing remitting multiple sclerosis, POA  Acute weakness and inability to stand secondary to above, POA  Patient discharged a couple of weeks ago after treatment for multiple sclerosis exacerbation   Patient continues to have multiple falls  Patient presented today with severe worsening of her bilateral lower extremity weakness, and mild worsening of bilateral upper arm weakness.  MRI reads:  -----C spine: Unchanged multilevel areas of abnormal signal in the cord consistent with  demyelinating disease. No evidence of active demyelination. Unchanged mild spondylosis as above.  -----Brain: Multiple new enhancing active supratentorial demyelinating plaques on a  background of extensive, chronic supratentorial demyelinating disease. Stable mild parenchymal volume loss.   ER discussed with patient's neurologist, will admit and start IV Solu-Medrol  Solu-Medrol started in ER, will order for tomorrow start date  Neurochecks  Admit to neuro floor    Anxiety/depression, POA  Acute Encephalopathy, POA  Patient is on chronic benzos for anxiety and depression  She is currently going through divorce which she does not want, likely exacerbating the above   Patient appears to be very sedated and is providing a poor Hx as a result. Mostly concerned she has been taking too much benzo. We will order UDS/EtOH  Holding benzos tonight due to patient being overly sedated   Evaluated MRI for the above  Obtaining blood cultures as infection can cause encephalopathy     Constipation, POA  Continue bowel regimen    Tobacco use, POA  Patient did not want to discuss tobacco cessation      Code Status: Full  Surrogate Decision Maker: son age 32, Sameer Camacho [phone], currently getting divorce.  Mother ([phone] Melody Yeung      DVT Prophylaxis: scd's  GI Prophylaxis: not indicated     Baseline: has fianace/ INDEPENDENT      Subjective:   CHIEF COMPLAINT: Weakness    HISTORY OF PRESENT ILLNESS:     Manuel Godinez is a 53 y.o. occasion woman with a past medical history of relapsing remitting multiple sclerosis who is resenting for weakness. Patient was discharged approximately 2 weeks ago. She states she was feeling better at the time of discharge, but she has had acute worsening of her bilateral lower extremity weakness, and her bilateral upper extremities are mildly weak compared to baseline. She has chronic weakness of her upper extremities, but the are worse than baseline. She talked to her neurologist to recommend that she comes into the ER for evaluation. She endorses she is continued to have intermittent mechanical falls, and that she is unable to walk. She has no other complaints at this time. Patient states she has not taken any of her medications in over 24 hours. Specifically asked due to patient seeing overly sedated and difficulty answering questions. On discussion with nursing patient had taken what she thought was a cyclobenzaprine. We were asked to admit for work up and evaluation of the above problems.      Past Medical History:   Diagnosis Date    Body aches 12/11/2014    Chronic pain     Depression     GERD (gastroesophageal reflux disease)     Headache(784.0)     History of mammogram never before    MS (multiple sclerosis) (HCC)     Neurological disorder     Multiple Sclerosis    Pap smear for cervical cancer screening 2008    Psychiatric disorder     anxiety    Psychotic disorder [facility])         Past Surgical History:   Procedure Laterality Date    COLONOSCOPY N/A 2/28/2018    COLONOSCOPY performed [facility] Mary Marcos MD at [facility]OSCOPY    HX CHOLECYSTECTOMY      HX GYN      3 c-sections    HX HEENT      bilateral ear tube surgery    HX HERNIA REPAIR      HX OTHER SURGICAL      R inguinal hernia repair       Social History     Tobacco Use    Smoking status: Current Every Day Smoker     Packs/day: 0.50     Years: 17.00     Pack years: 8.50     Types: Cigarettes    Smokeless tobacco: Never Used    Tobacco comment: 1 pack every 3 days. Substance Use Topics    Alcohol use: No        Family History   Problem Relation Age of Onset    Heart Attack Father         tripple bypass    Cancer Father         lung    COPD Father     Diabetes Mother         type 2    Heart Disease Mother         heart disease    Diabetes Paternal Grandmother     No Known Problems Sister     No Known Problems Brother     No Known Problems Child    None known  Allergies   Allergen Reactions    Morphine Rash        Prior to Admission medications    Medication Sig Start Date End Date Taking? Authorizing Provider   gabapentin (NEURONTIN) 400 mg capsule Take 2 Caps by mouth two (2) times a day. Max Daily Amount: 1,600 mg. Take 2 capsules 2 times a day, do not exceed 1600 mg daily 7/27/20   Marysol Jones MD   dimethyl fumarate (Tecfidera) 240 mg cpDR Take 240 mg by mouth two (2) times a day. 6/10/20   Marysol Jones MD   cyclobenzaprine (FLEXERIL) 10 mg tablet Take 1 Tab by mouth three (3) times daily as needed for Muscle Spasm(s).  6/10/20   Marysol Jones MD ibuprofen (MOTRIN) 200 mg tablet Take  by mouth every six (6) hours as needed for Pain. Take three tablets every 6 hours as needed by mouth     Provider, Lucille   clonazePAM (KlonoPIN) 0.5 mg tablet Take 1 Tab by mouth two (2) times a day. Max Daily Amount: 1 mg. Patient taking differently: Take 0.5 mg by mouth three (3) times daily. 3/28/20   Erick Kirkland MD   melatonin 3 mg tablet Take 1 Tab by mouth nightly as needed for Insomnia. Patient taking differently: Take 3 mg by mouth nightly as needed for Insomnia. Indications: Pt state she takes 5 mg tab nightly. 3/28/20   Erick Kirkland MD   LORazepam (ATIVAN) 1 mg tablet Take 1 Tab by mouth every twelve (12) hours as needed for Anxiety (q12h prn). Max Daily Amount: 2 mg. 3/17/20   Wesley Mancia MD   DULoxetine (CYMBALTA) 60 mg capsule Take 1 Cap by mouth daily. Indications: neuropathic pain 3/2/20   Wesley Mancia MD   senna-docusate (PERICOLACE) 8.6-50 mg per tablet Take 2 Tabs by mouth daily. 1/14/20   Wesley Mancia MD   ibuprofen (MOTRIN) 600 mg tablet Take 1 Tab by mouth every six (6) hours as needed for Pain. Patient taking differently: Take 200 mg by mouth. 12/22/18   Rajinder Coreasma       REVIEW OF SYSTEMS:     I am not able to complete the review of systems because:    The patient is intubated and sedated    The patient has altered mental status due to his acute medical problems    The patient has baseline aphasia from prior stroke(s)    The patient has baseline dementia and is not reliable historian    The patient is in acute medical distress and unable to provide information           Total of 12 systems reviewed as follows:       POSITIVE= underlined text  Negative = text not underlined  General:  fever, chills, sweats, generalized weakness, weight loss/gain,      loss of appetite   Eyes:    blurred vision, eye pain, loss of vision, double vision  ENT:    rhinorrhea, pharyngitis   Respiratory:   cough, sputum production, SOB, BOBO, wheezing, pleuritic pain   Cardiology:   chest pain, palpitations, orthopnea, PND, edema, syncope   Gastrointestinal:  abdominal pain , N/V, diarrhea, dysphagia, constipation, bleeding   Genitourinary:  frequency, urgency, dysuria, hematuria, incontinence   Muskuloskeletal :  arthralgia, myalgia, back pain  Hematology:  easy bruising, nose or gum bleeding, lymphadenopathy   Dermatological: rash, ulceration, pruritis, color change / jaundice  Endocrine:   hot flashes or polydipsia   Neurological:  headache, dizziness, confusion, focal weakness, paresthesia,     Speech difficulties, memory loss, gait difficulty  Psychological: Feelings of anxiety, depression, agitation    Objective:   VITALS:    Visit Vitals  /70   Pulse (!) 49   Temp 98.4 °F (36.9 °C)   Resp 13   Wt 90.7 kg (200 lb)   SpO2 100%   BMI 34.33 kg/m²       PHYSICAL EXAM:    General:    Alert, Appears mildly confused. cooperative, no distress, appears stated age. HEENT: Atraumatic, anicteric sclerae, pink conjunctivae     No oral ulcers, mucosa moist, throat clear, dentition fair  Neck:  Supple, symmetrical,  thyroid: non tender  Lungs:   Clear to auscultation bilaterally. No Wheezing or Rhonchi. No rales. Chest wall:  No tenderness  No Accessory muscle use. Heart:   Regular  rhythm,  No  murmur   No edema  Abdomen:   Soft, non-tender. Not distended. Bowel sounds normal  Extremities: No cyanosis. No clubbing,      Skin turgor normal, Capillary refill normal, Radial dial pulse 2+  Skin:     Not pale. Not Jaundiced  No rashes   Psych:  Poor insight. Not depressed. Not anxious or agitated. Seems sedated, irritable when woken  Neurologic: EOMs intact. No facial asymmetry. No aphasia but does have + slurred speech. Symmetrical strength, Sensation grossly intact.  Alert and oriented X 4.     _______________________________________________________________________  Care Plan discussed with:    Comments   Patient x    Family      RN x    Care Manager                    Consultant:      _______________________________________________________________________  Expected  Disposition:   Home with Family x   HH/PT/OT/RN    SNF/LTC    COLLETTE    ________________________________________________________________________  TOTAL TIME:  61 Minutes    Critical Care Provided     Minutes non procedure based      Comments    x Reviewed previous records   >50% of visit spent in counseling and coordination of care x Discussion with patient and/or family and questions answered       ________________________________________________________________________  Signed: Karen Guan MD    Procedures: see electronic medical records for all procedures/Xrays and details which were not copied into this note but were reviewed prior to creation of Plan. LAB DATA REVIEWED:    Recent Results (from the past 24 hour(s))   CBC WITH AUTOMATED DIFF    Collection Time: 08/31/20  1:54 PM   Result Value Ref Range    WBC 3.9 3.6 - 11.0 K/uL    RBC 3.55 (L) 3.80 - 5.20 M/uL    HGB 11.4 (L) 11.5 - 16.0 g/dL    HCT 34.3 (L) 35.0 - 47.0 %    MCV 96.6 80.0 - 99.0 FL    MCH 32.1 26.0 - 34.0 PG    MCHC 33.2 30.0 - 36.5 g/dL    RDW 12.9 11.5 - 14.5 %    PLATELET 535 269 - 845 K/uL    MPV 9.9 8.9 - 12.9 FL    NRBC 0.0 0  WBC    ABSOLUTE NRBC 0.00 0.00 - 0.01 K/uL    NEUTROPHILS 65 32 - 75 %    LYMPHOCYTES 27 12 - 49 %    MONOCYTES 5 5 - 13 %    EOSINOPHILS 2 0 - 7 %    BASOPHILS 1 0 - 1 %    IMMATURE GRANULOCYTES 0 0.0 - 0.5 %    ABS. NEUTROPHILS 2.6 1.8 - 8.0 K/UL    ABS. LYMPHOCYTES 1.0 0.8 - 3.5 K/UL    ABS. MONOCYTES 0.2 0.0 - 1.0 K/UL    ABS. EOSINOPHILS 0.1 0.0 - 0.4 K/UL    ABS. BASOPHILS 0.0 0.0 - 0.1 K/UL    ABS. IMM.  GRANS. 0.0 0.00 - 0.04 K/UL    DF AUTOMATED     METABOLIC PANEL, COMPREHENSIVE    Collection Time: 08/31/20  1:54 PM   Result Value Ref Range    Sodium 139 136 - 145 mmol/L    Potassium 4.0 3.5 - 5.1 mmol/L    Chloride 108 97 - 108 mmol/L    CO2 29 21 - 32 mmol/L    Anion gap 2 (L) 5 - 15 mmol/L    Glucose 83 65 - 100 mg/dL    BUN 11 6 - 20 MG/DL    Creatinine 0.91 0.55 - 1.02 MG/DL    BUN/Creatinine ratio 12 12 - 20      GFR est AA >60 >60 ml/min/1.73m2    GFR est non-AA >60 >60 ml/min/1.73m2    Calcium 8.5 8.5 - 10.1 MG/DL    Bilirubin, total 0.2 0.2 - 1.0 MG/DL    ALT (SGPT) 17 12 - 78 U/L    AST (SGOT) 13 (L) 15 - 37 U/L    Alk.  phosphatase 47 45 - 117 U/L    Protein, total 6.2 (L) 6.4 - 8.2 g/dL    Albumin 3.3 (L) 3.5 - 5.0 g/dL    Globulin 2.9 2.0 - 4.0 g/dL    A-G Ratio 1.1 1.1 - 2.2     TROPONIN I    Collection Time: 08/31/20  1:54 PM   Result Value Ref Range    Troponin-I, Qt. <0.05 <0.05 ng/mL   URINALYSIS W/ RFLX MICROSCOPIC    Collection Time: 08/31/20  1:54 PM   Result Value Ref Range    Color YELLOW/STRAW      Appearance CLEAR CLEAR      Specific gravity 1.011 1.003 - 1.030      pH (UA) 6.0 5.0 - 8.0      Protein Negative NEG mg/dL    Glucose Negative NEG mg/dL    Ketone Negative NEG mg/dL    Bilirubin Negative NEG      Blood Negative NEG      Urobilinogen 0.2 0.2 - 1.0 EU/dL    Nitrites Negative NEG      Leukocyte Esterase Negative NEG     CK    Collection Time: 08/31/20  1:54 PM   Result Value Ref Range     26 - 192 U/L   HCG QL SERUM    Collection Time: 08/31/20  1:54 PM   Result Value Ref Range    HCG, Ql. Negative NEG

## 2020-08-31 NOTE — PROGRESS NOTES

## 2020-08-31 NOTE — ED NOTES
1. Have you been to the ER, urgent care clinic since your last visit? Hospitalized since your last visit? no    2. Have you seen or consulted any other health care providers outside of the [address] [place] [address] since your last visit? Include any pap smears or colon screening.  Dr Sherryle Chamber Bedside and Verbal shift change report given to Angel Delvalle rn (oncoming nurse) by Velia Calderon RN  (offgoing nurse). Report included the following information SBAR, recent results, ed summary.

## 2020-08-31 NOTE — ED PROVIDER NOTES
EMERGENCY DEPARTMENT HISTORY AND PHYSICAL EXAM      Date: 8/31/2020  Patient Name: Yany Gunter    History of Presenting Illness     Chief Complaint   Patient presents with    Fall    Lethargy       History Provided By: Patient and EMS    HPI: Yany Gunter, 53 y.o. female presents to the ED with cc of multiple falls. The patient has a history of multiple sclerosis. She states that she has been out of the hospital for a week and has fallen 4 times since then. She did not sustain any injuries, but her physical therapist asked her to call her neurologist about these falls. She was sent in for further evaluation. She has chronic bilateral arm pain which she says is a 6 out of 10 in severity. Her right leg is weaker than the left at baseline. She denies any new numbness or tingling. She denies headache, chest pain, cough, fever, chills or shortness of breath. She denies vomiting, diarrhea or abdominal pain. She is not currently on steroids. There are no other complaints, changes, or physical findings at this time. PCP: Kristen Carter MD    No current facility-administered medications on file prior to encounter. Current Outpatient Medications on File Prior to Encounter   Medication Sig Dispense Refill    gabapentin (NEURONTIN) 400 mg capsule Take 2 Caps by mouth two (2) times a day. Max Daily Amount: 1,600 mg. Take 2 capsules 2 times a day, do not exceed 1600 mg daily 60 Cap 0    dimethyl fumarate (Tecfidera) 240 mg cpDR Take 240 mg by mouth two (2) times a day. 120 Cap 3    cyclobenzaprine (FLEXERIL) 10 mg tablet Take 1 Tab by mouth three (3) times daily as needed for Muscle Spasm(s). 90 Tab 2    ibuprofen (MOTRIN) 200 mg tablet Take  by mouth every six (6) hours as needed for Pain. Take three tablets every 6 hours as needed by mouth       clonazePAM (KlonoPIN) 0.5 mg tablet Take 1 Tab by mouth two (2) times a day. Max Daily Amount: 1 mg.  (Patient taking differently: Take 0.5 mg by mouth three (3) times daily.) 60 Tab 0    melatonin 3 mg tablet Take 1 Tab by mouth nightly as needed for Insomnia. (Patient taking differently: Take 3 mg by mouth nightly as needed for Insomnia. Indications: Pt state she takes 5 mg tab nightly.) 30 Tab 4    LORazepam (ATIVAN) 1 mg tablet Take 1 Tab by mouth every twelve (12) hours as needed for Anxiety (q12h prn). Max Daily Amount: 2 mg. 40 Tab 0    DULoxetine (CYMBALTA) 60 mg capsule Take 1 Cap by mouth daily. Indications: neuropathic pain 30 Cap 1    senna-docusate (PERICOLACE) 8.6-50 mg per tablet Take 2 Tabs by mouth daily. 15 Tab 0    ibuprofen (MOTRIN) 600 mg tablet Take 1 Tab by mouth every six (6) hours as needed for Pain.  (Patient taking differently: Take 200 mg by mouth.) 20 Tab 0       Past History     Past Medical History:  Past Medical History:   Diagnosis Date    Body aches 12/11/2014    Chronic pain     Depression     GERD (gastroesophageal reflux disease)     Headache(784.0)     History of mammogram never before    MS [facility]) ([facility] [address]     Neurological disorder     Multiple Sclerosis    Pap smear for cervical cancer screening 2008    Psychiatric disorder     anxiety    Psychotic disorder [facility])        Past Surgical History:  Past Surgical History:   Procedure Laterality Date    COLONOSCOPY N/A 2/28/2018    COLONOSCOPY performed by Thuan Fontanez MD at [facility]"" ENDOSCOPY    HX CHOLECYSTECTOMY      HX GYN      3 c-sections    HX HEENT      bilateral ear tube surgery    HX HERNIA REPAIR      HX OTHER SURGICAL      R inguinal hernia repair       Family History:  Family History   Problem Relation Age of Onset    Heart Attack Father         tripple bypass    Cancer Father         lung    COPD Father     Diabetes Mother         type 2    Heart Disease Mother         heart disease    Diabetes Paternal Grandmother     No Known Problems Sister     No Known Problems Brother     No Known Problems Child        Social History:  Social History     Tobacco Use    Smoking status: Current Every Day Smoker     Packs/day: 0.50     Years: 17.00     Pack years: 8.50     Types: Cigarettes    Smokeless tobacco: Never Used    Tobacco comment: 1 pack every 3 days. Substance Use Topics    Alcohol use: No    Drug use: No       Allergies: Allergies   Allergen Reactions    Morphine Rash         Review of Systems   Review of Systems   Constitutional: Negative for fever. HENT: Negative for congestion. Eyes: Negative. Respiratory: Negative for shortness of breath. Cardiovascular: Negative for chest pain. Gastrointestinal: Negative for abdominal pain. Endocrine: Negative for heat intolerance. Genitourinary: Negative for dysuria. Musculoskeletal: Negative for back pain. Skin: Negative for rash. Allergic/Immunologic: Negative for immunocompromised state. Neurological: Positive for weakness. Negative for numbness and headaches. Hematological: Does not bruise/bleed easily. Psychiatric/Behavioral: Negative. All other systems reviewed and are negative. Physical Exam   Physical Exam  Vitals signs and nursing note reviewed. Constitutional:       General: She is not in acute distress. Appearance: She is well-developed. HENT:      Head: Normocephalic. Eyes:      Extraocular Movements: Extraocular movements intact. Neck:      Musculoskeletal: Normal range of motion and neck supple. Cardiovascular:      Rate and Rhythm: Normal rate and regular rhythm. Heart sounds: Normal heart sounds. Pulmonary:      Effort: Pulmonary effort is normal.      Breath sounds: Normal breath sounds. Abdominal:      General: Bowel sounds are normal.      Palpations: Abdomen is soft. Tenderness: There is no abdominal tenderness. Musculoskeletal: Normal range of motion. Skin:     General: Skin is warm and dry. Neurological:      Mental Status: She is alert and oriented to person, place, and time. Comments: Right lower extremity strength 3 out of 5, left lower extremity strength 4-5, sensory intact   Psychiatric:         Mood and Affect: Mood normal.         Behavior: Behavior normal.         Diagnostic Study Results     Labs -     Recent Results (from the past 12 hour(s))   CBC WITH AUTOMATED DIFF    Collection Time: 08/31/20  1:54 PM   Result Value Ref Range    WBC 3.9 3.6 - 11.0 K/uL    RBC 3.55 (L) 3.80 - 5.20 M/uL    HGB 11.4 (L) 11.5 - 16.0 g/dL    HCT 34.3 (L) 35.0 - 47.0 %    MCV 96.6 80.0 - 99.0 FL    MCH 32.1 26.0 - 34.0 PG    MCHC 33.2 30.0 - 36.5 g/dL    RDW 12.9 11.5 - 14.5 %    PLATELET 859 098 - 172 K/uL    MPV 9.9 8.9 - 12.9 FL    NRBC 0.0 0  WBC    ABSOLUTE NRBC 0.00 0.00 - 0.01 K/uL    NEUTROPHILS 65 32 - 75 %    LYMPHOCYTES 27 12 - 49 %    MONOCYTES 5 5 - 13 %    EOSINOPHILS 2 0 - 7 %    BASOPHILS 1 0 - 1 %    IMMATURE GRANULOCYTES 0 0.0 - 0.5 %    ABS. NEUTROPHILS 2.6 1.8 - 8.0 K/UL    ABS. LYMPHOCYTES 1.0 0.8 - 3.5 K/UL    ABS. MONOCYTES 0.2 0.0 - 1.0 K/UL    ABS. EOSINOPHILS 0.1 0.0 - 0.4 K/UL    ABS. BASOPHILS 0.0 0.0 - 0.1 K/UL    ABS. IMM. GRANS. 0.0 0.00 - 0.04 K/UL    DF AUTOMATED     METABOLIC PANEL, COMPREHENSIVE    Collection Time: 08/31/20  1:54 PM   Result Value Ref Range    Sodium 139 136 - 145 mmol/L    Potassium 4.0 3.5 - 5.1 mmol/L    Chloride 108 97 - 108 mmol/L    CO2 29 21 - 32 mmol/L    Anion gap 2 (L) 5 - 15 mmol/L    Glucose 83 65 - 100 mg/dL    BUN 11 6 - 20 MG/DL    Creatinine 0.91 0.55 - 1.02 MG/DL    BUN/Creatinine ratio 12 12 - 20      GFR est AA >60 >60 ml/min/1.73m2    GFR est non-AA >60 >60 ml/min/1.73m2    Calcium 8.5 8.5 - 10.1 MG/DL    Bilirubin, total 0.2 0.2 - 1.0 MG/DL    ALT (SGPT) 17 12 - 78 U/L    AST (SGOT) 13 (L) 15 - 37 U/L    Alk.  phosphatase 47 45 - 117 U/L    Protein, total 6.2 (L) 6.4 - 8.2 g/dL    Albumin 3.3 (L) 3.5 - 5.0 g/dL    Globulin 2.9 2.0 - 4.0 g/dL    A-G Ratio 1.1 1.1 - 2.2     TROPONIN I    Collection Time: 08/31/20  1:54 PM Result Value Ref Range    Troponin-I, Qt. <0.05 <0.05 ng/mL   URINALYSIS W/ RFLX MICROSCOPIC    Collection Time: 08/31/20  1:54 PM   Result Value Ref Range    Color YELLOW/STRAW      Appearance CLEAR CLEAR      Specific gravity 1.011 1.003 - 1.030      pH (UA) 6.0 5.0 - 8.0      Protein Negative NEG mg/dL    Glucose Negative NEG mg/dL    Ketone Negative NEG mg/dL    Bilirubin Negative NEG      Blood Negative NEG      Urobilinogen 0.2 0.2 - 1.0 EU/dL    Nitrites Negative NEG      Leukocyte Esterase Negative NEG     CK    Collection Time: 08/31/20  1:54 PM   Result Value Ref Range     26 - 192 U/L   HCG QL SERUM    Collection Time: 08/31/20  1:54 PM   Result Value Ref Range    HCG, Ql. Negative NEG         Radiologic Studies -   MRI BRAIN W WO CONT    (Results Pending)     CT Results  (Last 48 hours)    None        CXR Results  (Last 48 hours)    None          Medical Decision Making   I am the first provider for this patient. I reviewed the vital signs, available nursing notes, past medical history, past surgical history, family history and social history. Vital Signs-Reviewed the patient's vital signs. Patient Vitals for the past 12 hrs:   Temp Pulse Resp BP SpO2   08/31/20 1258 98.4 °F (36.9 °C) 62 16 107/73 100 %           Records Reviewed: Nursing Notes, Old Medical Records, Ambulance Run Sheet, Previous Radiology Studies and Previous Laboratory Studies    Provider Notes (Medical Decision Making):   Ms. exacerbation, electrolyte abnormality, dehydration, chronic pain, anemia    ED Course:   Initial assessment performed. The patients presenting problems have been discussed, and they are in agreement with the care plan formulated and outlined with them. I have encouraged them to ask questions as they arise throughout their visit. Consult note:    I spoke to Dr. Kali Carmen, neurology, regarding the patient.   He recommends an MRI brain and C-spine, high-dose steroids, and admission by the hospitalist.    Consult note: The patient is being admitted by the hospitalist.             Critical Care Time:   0    Disposition:  admit  {  DISCHARGE PLAN:  1. Current Discharge Medication List        2. Follow-up Information    None       3. Return to ED if worse     Diagnosis     Clinical Impression:   1. Multiple sclerosis exacerbation ([facility] [address]    2. MS (multiple sclerosis) ([facility]        Attestations:    Russ Fong MD    Please note that this dictation was completed with [employer], the computer voice recognition software. Quite often unanticipated grammatical, syntax, homophones, and other interpretive errors are inadvertently transcribed by the computer software. Please disregard these errors. Please excuse any errors that have escaped final proofreading. Thank you.

## 2020-08-31 NOTE — ED TRIAGE NOTES
Hx MS- 5 GLF this week d/t increased weakness. Denies Loc/blood thinner. PT neurologist wanted her to come in for evaluation.

## 2020-09-01 ENCOUNTER — APPOINTMENT (OUTPATIENT)
Dept: MRI IMAGING | Age: 45
DRG: 058 | End: 2020-09-01
Attending: PSYCHIATRY & NEUROLOGY
Payer: MEDICARE

## 2020-09-01 LAB
ALBUMIN SERPL-MCNC: 3 G/DL (ref 3.5–5)
ALBUMIN/GLOB SERPL: 1 {RATIO} (ref 1.1–2.2)
ALP SERPL-CCNC: 47 U/L (ref 45–117)
ALT SERPL-CCNC: 16 U/L (ref 12–78)
ANION GAP SERPL CALC-SCNC: 2 MMOL/L (ref 5–15)
APTT IMM NP PPP: ABNORMAL SEC
APTT PPP: 21.4 SEC (ref 22.1–32)
AST SERPL-CCNC: 6 U/L (ref 15–37)
BASOPHILS # BLD: 0 K/UL (ref 0–0.1)
BASOPHILS NFR BLD: 0 % (ref 0–1)
BILIRUB SERPL-MCNC: 0.5 MG/DL (ref 0.2–1)
BUN SERPL-MCNC: 12 MG/DL (ref 6–20)
BUN/CREAT SERPL: 12 (ref 12–20)
CALCIUM SERPL-MCNC: 8.5 MG/DL (ref 8.5–10.1)
CHLORIDE SERPL-SCNC: 111 MMOL/L (ref 97–108)
CO2 SERPL-SCNC: 27 MMOL/L (ref 21–32)
CREAT SERPL-MCNC: 1 MG/DL (ref 0.55–1.02)
DIFFERENTIAL METHOD BLD: ABNORMAL
EOSINOPHIL # BLD: 0 K/UL (ref 0–0.4)
EOSINOPHIL NFR BLD: 0 % (ref 0–7)
ERYTHROCYTE [DISTWIDTH] IN BLOOD BY AUTOMATED COUNT: 12.6 % (ref 11.5–14.5)
GLOBULIN SER CALC-MCNC: 3.1 G/DL (ref 2–4)
GLUCOSE SERPL-MCNC: 132 MG/DL (ref 65–100)
HCT VFR BLD AUTO: 35 % (ref 35–47)
HGB BLD-MCNC: 11.6 G/DL (ref 11.5–16)
IMM GRANULOCYTES # BLD AUTO: 0 K/UL (ref 0–0.04)
IMM GRANULOCYTES NFR BLD AUTO: 1 % (ref 0–0.5)
LYMPHOCYTES # BLD: 0.4 K/UL (ref 0.8–3.5)
LYMPHOCYTES NFR BLD: 9 % (ref 12–49)
MAGNESIUM SERPL-MCNC: 2 MG/DL (ref 1.6–2.4)
MCH RBC QN AUTO: 31.8 PG (ref 26–34)
MCHC RBC AUTO-ENTMCNC: 33.1 G/DL (ref 30–36.5)
MCV RBC AUTO: 95.9 FL (ref 80–99)
MONOCYTES # BLD: 0 K/UL (ref 0–1)
MONOCYTES NFR BLD: 1 % (ref 5–13)
NEUTS SEG # BLD: 3.7 K/UL (ref 1.8–8)
NEUTS SEG NFR BLD: 89 % (ref 32–75)
NRBC # BLD: 0 K/UL (ref 0–0.01)
NRBC BLD-RTO: 0 PER 100 WBC
PLATELET # BLD AUTO: 307 K/UL (ref 150–400)
PMV BLD AUTO: 10.2 FL (ref 8.9–12.9)
POTASSIUM SERPL-SCNC: 3.9 MMOL/L (ref 3.5–5.1)
PROT SERPL-MCNC: 6.1 G/DL (ref 6.4–8.2)
PTT MIXING STUDY,CMS2: ABNORMAL
RBC # BLD AUTO: 3.65 M/UL (ref 3.8–5.2)
RBC MORPH BLD: ABNORMAL
SODIUM SERPL-SCNC: 140 MMOL/L (ref 136–145)
WBC # BLD AUTO: 4.1 K/UL (ref 3.6–11)

## 2020-09-01 PROCEDURE — 74011250637 HC RX REV CODE- 250/637: Performed by: NURSE PRACTITIONER

## 2020-09-01 PROCEDURE — 74011250636 HC RX REV CODE- 250/636: Performed by: INTERNAL MEDICINE

## 2020-09-01 PROCEDURE — 94760 N-INVAS EAR/PLS OXIMETRY 1: CPT

## 2020-09-01 PROCEDURE — 85025 COMPLETE CBC W/AUTO DIFF WBC: CPT

## 2020-09-01 PROCEDURE — 83735 ASSAY OF MAGNESIUM: CPT

## 2020-09-01 PROCEDURE — 74011000258 HC RX REV CODE- 258: Performed by: INTERNAL MEDICINE

## 2020-09-01 PROCEDURE — 36415 COLL VENOUS BLD VENIPUNCTURE: CPT

## 2020-09-01 PROCEDURE — 96372 THER/PROPH/DIAG INJ SC/IM: CPT

## 2020-09-01 PROCEDURE — 74011250637 HC RX REV CODE- 250/637: Performed by: INTERNAL MEDICINE

## 2020-09-01 PROCEDURE — 80053 COMPREHEN METABOLIC PANEL: CPT

## 2020-09-01 PROCEDURE — 72157 MRI CHEST SPINE W/O & W/DYE: CPT

## 2020-09-01 PROCEDURE — 96376 TX/PRO/DX INJ SAME DRUG ADON: CPT

## 2020-09-01 PROCEDURE — 85730 THROMBOPLASTIN TIME PARTIAL: CPT

## 2020-09-01 PROCEDURE — 99218 HC RM OBSERVATION: CPT

## 2020-09-01 PROCEDURE — 99233 SBSQ HOSP IP/OBS HIGH 50: CPT | Performed by: PSYCHIATRY & NEUROLOGY

## 2020-09-01 PROCEDURE — 96375 TX/PRO/DX INJ NEW DRUG ADDON: CPT

## 2020-09-01 RX ORDER — DOCUSATE SODIUM 100 MG/1
100 CAPSULE, LIQUID FILLED ORAL DAILY
Status: DISCONTINUED | OUTPATIENT
Start: 2020-09-02 | End: 2020-09-05 | Stop reason: HOSPADM

## 2020-09-01 RX ORDER — CLONAZEPAM 0.5 MG/1
0.5 TABLET ORAL 2 TIMES DAILY
Status: DISCONTINUED | OUTPATIENT
Start: 2020-09-01 | End: 2020-09-05 | Stop reason: HOSPADM

## 2020-09-01 RX ORDER — GADOTERATE MEGLUMINE 376.9 MG/ML
18 INJECTION INTRAVENOUS
Status: COMPLETED | OUTPATIENT
Start: 2020-09-01 | End: 2020-09-01

## 2020-09-01 RX ORDER — LORAZEPAM 1 MG/1
1 TABLET ORAL ONCE
Status: COMPLETED | OUTPATIENT
Start: 2020-09-01 | End: 2020-09-01

## 2020-09-01 RX ADMIN — ENOXAPARIN SODIUM 40 MG: 40 INJECTION SUBCUTANEOUS at 09:57

## 2020-09-01 RX ADMIN — GADOTERATE MEGLUMINE 18 ML: 376.9 INJECTION INTRAVENOUS at 21:00

## 2020-09-01 RX ADMIN — Medication 10 ML: at 21:28

## 2020-09-01 RX ADMIN — CLONAZEPAM 0.5 MG: 0.5 TABLET ORAL at 17:31

## 2020-09-01 RX ADMIN — CYCLOBENZAPRINE 10 MG: 10 TABLET, FILM COATED ORAL at 19:42

## 2020-09-01 RX ADMIN — SODIUM CHLORIDE 1000 MG: 900 INJECTION, SOLUTION INTRAVENOUS at 16:37

## 2020-09-01 RX ADMIN — Medication 10 ML: at 14:27

## 2020-09-01 RX ADMIN — GABAPENTIN 800 MG: 100 CAPSULE ORAL at 09:57

## 2020-09-01 RX ADMIN — GABAPENTIN 800 MG: 100 CAPSULE ORAL at 17:31

## 2020-09-01 RX ADMIN — ACETAMINOPHEN 650 MG: 325 TABLET ORAL at 05:41

## 2020-09-01 RX ADMIN — DULOXETINE HYDROCHLORIDE 60 MG: 30 CAPSULE, DELAYED RELEASE ORAL at 09:57

## 2020-09-01 RX ADMIN — LORAZEPAM 1 MG: 1 TABLET ORAL at 19:39

## 2020-09-01 NOTE — PROGRESS NOTES
Comprehensive Nutrition Assessment    Type and Reason for Visit: Initial, Positive nutrition screen    Nutrition Recommendations/Plan:   · Continue regular diet  · Recommend ONS to promote intake  · Please document % meals and supplements consumed in flowsheet I/O's under intake   · CBW is pt stated, identical to last several wts. Please obtain new measured wt for optimal nutrition assessment. · Please consider bowel regimen (no miralax) if constipation continues  · Monitor PO intake, wt, labs, POC    Nutrition Assessment:      Pt seen for MST trigger. Chart reviewed. Med noted for multiple sclerosis exacerbation. PMHx: anxiety, depression. Current diet: regular. Pt d/c 2 weeks ago and reports decreased appetite since last hospitalization. She estimates she has lost 10lbs over the past 2 weeks. RD unable to verify loss with wt hx in EMR. CBW is pt stated and identical to last recorded wt. Same wt recorded over the past several months (? accuracy). Will order new measured wt for accurate assessment. Pt also reports eating less at home because she cannot cook for herself. Her boyfriend cooks for her. Pt reports constipation, LBM 2 days ago. She requested a bowel regimen, but not miralax because it upsets her stomach. She reports prune juice helps too; RD ordered prune juice to come with lunch & dinner trays. RD will order ONS to promote intake. Wt Readings from Last 10 Encounters:   08/31/20 90.7 kg (200 lb)   08/06/20 90.7 kg (200 lb)   04/03/20 90.7 kg (200 lb)   03/23/20 90.7 kg (200 lb)   03/17/20 87.4 kg (192 lb 9.6 oz)   03/08/20 90.7 kg (200 lb)   03/02/20 89.6 kg (197 lb 9.6 oz)   02/25/20 90.8 kg (200 lb 2.8 oz)   02/17/20 90.7 kg (200 lb)   02/12/20 90.7 kg (200 lb)   ]  Malnutrition Assessment:  Malnutrition Status:     UTD    Estimated Daily Nutrient Needs:  Energy (kcal):  1838 kcal (BMR 1532 x 1. 2AF)  Protein (g):  73-91g (0.8-1.0g/kg)       Fluid (ml/day):  1900ml    Nutrition Related Findings: Meds: solu-medrol. Labs: reviewed. Wounds:    None       Current Nutrition Therapies:   DIET REGULAR  DIET ONE TIME MESSAGE    Anthropometric Measures:  · Height:  5' 4\" (162.6 cm)  · Current Body Wt:  90.7 kg (199 lb 15.3 oz)   · Admission Body Wt:       · Usual Body Wt:        · Ideal Body Wt:  120 lbs:  166.6 %   · Adjusted Body Weight:   ; Weight Adjustment for: No adjustment   · Adjusted BMI:       · BMI Category:  Obese class 1 (BMI 30.0-34. 9)       Nutrition Diagnosis:   · Inadequate protein-energy intake related to (decreased ability to consume sufficient energy to meet EEN) as evidenced by (decreased appetite and decreased intake d/t inability to cook for self)      Nutrition Interventions:   Food and/or Nutrient Delivery: Continue current diet, Start oral nutrition supplement  Nutrition Education and Counseling: No recommendations at this time  Coordination of Nutrition Care: Continued inpatient monitoring    Goals:  Intake >50% meals and supplements next 1-3 days       Nutrition Monitoring and Evaluation:   Behavioral-Environmental Outcomes:    Food/Nutrient Intake Outcomes: Food and nutrient intake, Supplement intake  Physical Signs/Symptoms Outcomes: Biochemical data, Weight    Discharge Planning:    Continue oral nutrition supplement, Continue current diet     Electronically signed by Luann Butcher RD on 9/1/2020 at 1:59 PM    Contact: ext [address] [place] [address] Street Page [phone]

## 2020-09-01 NOTE — PROGRESS NOTES
Oncology End of Shift Note      Bedside shift change report given to [facility], RN (incoming nurse) by Britt Joaquin (outgoing nurse) on [facility]. Report included the following information SBAR, Kardex, Intake/Output and MAR. Shift Summary: Pt remained stable throughout shift. Scheduled meds given, no PRN meds given, education provided. Hourly rounding completed, IV flushes well. Pt is unable to go out in the courtyard anymore per José Luis Holder, NP, pt stated she wanted some fresh air and Dr. Shikha Mcconnell placed an order for her to go out there and she was seen smoking. I informed her that there is no smoking on the hospital grounds, she verbalized understanding. Pt is able to turns self, pt x1 assist to the bedside commode, no bm today. Issues for Physician to Address:       Patient on Cardiac Monitoring?     [] Yes  [x] No    Rhythm:      Samy Scale:     Samy Score: [place]

## 2020-09-01 NOTE — PROGRESS NOTES
Consult  REFERRED BY:  Jonnathan Freedman MD    CHIEF COMPLAINT: Bilateral leg weakness      Subjective: Shaaron Sacks is a 53 y.o. right-handed  female seen at the request of Dr. Richard Springer the ER physician for evaluation of 2 to 3-day history of increasing weakness in her legs, and having several falls and can no longer walk and has been admitted to the hospital with an exacerbation of her MS multiple sclerosis. Patient was just in the hospital about 2 weeks ago and then discharged with a week of rehab, and got home about a week ago and now is back in the hospital.  She was given IV steroids, and sent to rehab, and was found to have 5 new lesions on her brain MRI scan in first part of August but no new lesions in the cervical spine. Her MRI scan of the brain did show 3 new lesions in the right cerebral hemisphere, but her cervical spine did not show any new lesions. Her MRI of the thoracic spine is pending still. She was on Tecfidera, but apparently was told to hold that medication and was supposed to see her neurologist Dr. Nicolasa Munson this week to be started on a new agent. She says she can only walk with a walker after she went to rehab. She denies any type of infection that could precipitate a pseudo-exacerbation. She is not had any fever, meningismus, head injury, back injury, and no major increase in any spinal pain, and no change in her bowel and bladder function. She takes clonazepam at thing for anxiety, Flexeril for muscle spasms, supposed to be on Tecfidera 240 mg twice a day, Cymbalta 60 mg a day, gabapentin 400 mg capsules taking 2 capsules twice a day, takes ibuprofen, Ativan every 12 hours as needed for anxiety, melatonin and laxatives with her bowels. Patient's leg weakness is about the same may be a little improved already on the steroids and the arms are stable.     Past Medical History:   Diagnosis Date    Body aches 12/11/2014    Chronic pain     Depression     GERD (gastroesophageal reflux disease)     Headache(784.0)     History of mammogram never before    MS [facility] sclerosis) ([facility] [address]     Neurological disorder     Multiple Sclerosis    Pap smear for cervical cancer screening 2008    Psychiatric disorder     anxiety    Psychotic disorder [facility])       Past Surgical History:   Procedure Laterality Date    COLONOSCOPY N/A 2/28/2018    COLONOSCOPY performed by [facility]imal [facility]bell [facility] at [facility] ENDOSCOPY    HX CHOLECYSTECTOMY      HX GYN      3 c-sections    HX HEENT      bilateral ear tube surgery    HX HERNIA REPAIR      HX OTHER SURGICAL      R inguinal hernia repair     Family History   Problem Relation Age of Onset    Heart Attack Father         tripple bypass    Cancer Father         lung    COPD Father     Diabetes Mother         type 2    Heart Disease Mother         heart disease    Diabetes Paternal Grandmother     No Known Problems Sister     No Known Problems Brother     No Known Problems Child       Social History     Tobacco Use    Smoking status: Current Every Day Smoker     Packs/day: 0.50     Years: 17.00     Pack years: 8.50     Types: Cigarettes    Smokeless tobacco: Never Used    Tobacco comment: 1 pack every 3 days.    Substance Use Topics    Alcohol use: No         Current Facility-Administered Medications:     clonazePAM (KlonoPIN) tablet 0.5 mg, 0.5 mg, Oral, BID, Xochitl Yates, NP    sodium chloride (NS) flush 5-40 mL, 5-40 mL, IntraVENous, Q8H, Aviva Valentino MD, 10 mL at 09/01/20 1427    sodium chloride (NS) flush 5-40 mL, 5-40 mL, IntraVENous, PRN, Ivonne Puente MD    acetaminophen (TYLENOL) tablet 650 mg, 650 mg, Oral, Q6H PRN, 650 mg at 09/01/20 0541 **OR** acetaminophen (TYLENOL) suppository 650 mg, 650 mg, Rectal, Q6H PRN, Ivonne Puente MD    polyethylene glycol (MIRALAX) packet 17 g, 17 g, Oral, DAILY PRN, Ivonne Puente MD    promethazine (PHENERGAN) tablet 12.5 mg, 12.5 mg, Oral, Q6H PRN **OR** ondansetron (ZOFRAN) injection 4 mg, 4 mg, IntraVENous, Q6H PRN, Mahamed Valentino MD    enoxaparin (LOVENOX) injection 40 mg, 40 mg, SubCUTAneous, DAILY, Mahamed Valentino MD, 40 mg at 09/01/20 0957    methylPREDNISolone ((Solu-MEDROL) 1,000 mg in 0.9% sodium chloride (MBP/ADV) 100 mL, 1 g, IntraVENous, Q24H, Mahamed Valentino MD, Last Rate: 100 mL/hr at 09/01/20 1637, 1,000 mg at 09/01/20 1637    cyclobenzaprine (FLEXERIL) tablet 10 mg, 10 mg, Oral, TID PRN, Antionette Haas MD    DULoxetine (CYMBALTA) capsule 60 mg, 60 mg, Oral, DAILY, Mahamed Valentino MD, 60 mg at 09/01/20 0957    gabapentin (NEURONTIN) capsule 800 mg, 800 mg, Oral, BID, Mahamed Valentino MD, 800 mg at 09/01/20 0957    melatonin tablet 3 mg, 3 mg, Oral, QHS PRN, Antionette Haas MD        Allergies   Allergen Reactions    Morphine Rash      MRI Results (most recent):  Results from Hospital Encounter encounter on 08/06/20   MRI CERV SPINE W WO CONT    Addendum Addendum: Pre and postcontrast imaging was performed with 20 mL Dotarem      Yeyo Escobar MD 8/7/2020  8:44 AM          Narrative PRELIMINARY REPORT    MRI Cervical Spine shows multilevel spinal cord plaques without significant  change since 3/8/2020. There is no spinal stenosis. Preliminary report was provided by Dr. Reyna Anderson, the on-call radiologist, at 2036  hours. Final report to follow. END PRELIMINARY REPORT      EXAM: MRI CERV SPINE W WO CONT    INDICATION: MS exacebation, weakness. COMPARISON: 3/8/2020    TECHNIQUE: MR imaging of the cervical spine was performed using the following  sequences: sagittal T1, T2, STIR;  axial T2, T1.     CONTRAST:  None. FINDINGS:    There is normal alignment of the cervical spine. Vertebral body heights are  maintained. Marrow signal is normal.    Multiple areas of increased T2/STIR signal are seen throughout the cervical cord  without significant change.  There is no abnormal enhancement to suggest active  demyelination. The paraspinal soft tissues are within normal limits. C2-C3: Mild central disc osteophyte complex. No significant spinal stenosis or  neural foraminal narrowing. C3-C4: No herniation or stenosis. C4-C5: No herniation or stenosis. C5-C6: Minimal broad-based disc osteophyte complex without significant spinal  stenosis or neural foraminal narrowing. C6-C7: Mild broad-based disc osteophyte complex without significant spinal  stenosis. There is unchanged mild left neural foraminal narrowing. C7-T1: No significant spinal stenosis. There is unchanged mild left neural  foraminal narrowing. Impression IMPRESSION:    1. Unchanged multilevel areas of abnormal signal in the cord consistent with  demyelinating disease. No evidence of active demyelination. 2. Unchanged mild spondylosis as above. Results from Hospital Encounter encounter on 08/06/20   MRI CERV SPINE W WO CONT    Addendum Addendum: Pre and postcontrast imaging was performed with 20 mL Dotarem      Jenifer Mathew MD 8/7/2020  8:44 AM          Narrative PRELIMINARY REPORT    MRI Cervical Spine shows multilevel spinal cord plaques without significant  change since 3/8/2020. There is no spinal stenosis. Preliminary report was provided by Dr. Melvern Saint, the on-call radiologist, at 2036  hours. Final report to follow. END PRELIMINARY REPORTEXAM: MRI CERV SPINE W WO CONT    INDICATION: MS exacebation, weakness. COMPARISON: 3/8/2020    TECHNIQUE: MR imaging of the cervical spine was performed using the following  sequences: sagittal T1, T2, STIR;  axial T2, T1.     CONTRAST:  None. FINDINGS:    There is normal alignment of the cervical spine. Vertebral body heights are  maintained. Marrow signal is normal.    Multiple areas of increased T2/STIR signal are seen throughout the cervical cord  without significant change. There is no abnormal enhancement to suggest active  demyelination.     The paraspinal soft tissues are within normal limits. C2-C3: Mild central disc osteophyte complex. No significant spinal stenosis or  neural foraminal narrowing. C3-C4: No herniation or stenosis. C4-C5: No herniation or stenosis. C5-C6: Minimal broad-based disc osteophyte complex without significant spinal  stenosis or neural foraminal narrowing. C6-C7: Mild broad-based disc osteophyte complex without significant spinal  stenosis. There is unchanged mild left neural foraminal narrowing. C7-T1: No significant spinal stenosis. There is unchanged mild left neural  foraminal narrowing. Impression IMPRESSION:    1. Unchanged multilevel areas of abnormal signal in the cord consistent with  demyelinating disease. No evidence of active demyelination. 2. Unchanged mild spondylosis as above. Review of Systems:  A comprehensive review of systems was negative except for: Constitutional: positive for fatigue and malaise  Musculoskeletal: positive for myalgias, arthralgias, stiff joints and muscle weakness  Neurological: positive for speech problems, paresthesia, coordination problems, gait problems and weakness   Vitals:    08/31/20 2320 09/01/20 0735 09/01/20 0814 09/01/20 1353   BP: 99/69  112/70    Pulse: 62  70    Resp: 14  14    Temp: 97.5 °F (36.4 °C)  98.4 °F (36.9 °C)    SpO2: 95% 95% 98%    Weight:       Height:    5' 4\" (1.626 m)     Objective:     I      NEUROLOGICAL EXAM:    Appearance: The patient is poorly developed and nourished, provides a coherent history and is in no acute distress. Mental Status: Oriented to time, place and person, and the president, cognitive function is slow and abnormal and speech is fluent and no aphasia or dysarthria. Mood and affect appropriate. Cranial Nerves:   Intact visual fields. Fundi are benign, discs are poorly seen. SHEFALI, EOM's full, no nystagmus, no ptosis. Facial sensation is normal. Corneal reflexes are not tested.  Facial movement is asymmetric. Hearing is normal bilaterally. Palate is midline with normal sternocleidomastoid and trapezius muscles are normal. Tongue is midline. Neck without meningismus or bruits  Temporal arteries are not tender or enlarged  TMJ areas are not tender on palpation   Motor:   Patient has spastic quadriplegia, strength about 4/5 in the right arm and 3/5 in the left arm, and about 2/5 in the right leg and 3/5 in the left leg. She has right-sided weakness greater than left. Reflexes:   Deep tendon reflexes 2+/4 on the left and and 3+/4 on the right  No babinski or clonus present   Sensory:   Normal to touch, pinprick and vibration and temperature decreased in both feet. DSS is intact   Gait:  Not testable   Tremor:   No tremor noted. Cerebellar:  Not testable cerebellar signs present on Romberg and tandem testing and finger-nose-finger exam.   Neurovascular:  Normal heart sounds and regular rhythm, peripheral pulses intact, and no carotid bruits. Assessment:   [unfilled]  Active Problems:    Multiple sclerosis exacerbation ([facility] [address] (8/6/2020)        Plan:     Patient with probable new exacerbation of her MS, so we will give another course of IV steroids, and see if we can get her better, and she probably needs to go to rehab again if she agrees, so we will consult . With the amount of disease she had she probably would be a good candidate for Ocrevus or Mavenclad or Tysabri amongst others. We will follow her closely, continue excellent medical care as you are, and we will give 3 days of IV Solu-Medrol as the beginning therapy.     Signed By: Mert Cuenca MD     September 1, 2020       CC: Destin Clay MD  FAX: [phone]    10 AM

## 2020-09-01 NOTE — ED NOTES
TRANSFER - OUT REPORT:    Verbal report given to [facility] RN(name) on Jesus Feliz  being transferred to Oncology(unit) for routine progression of care       Report consisted of patients Situation, Background, Assessment and   Recommendations(SBAR). Information from the following report(s) SBAR, ED Summary, Procedure Summary and MAR was reviewed with the receiving nurse. Lines:   Peripheral IV 08/31/20 Left Antecubital (Active)   Site Assessment Clean, dry, & intact 08/31/20 1409   Phlebitis Assessment 0 08/31/20 1409   Infiltration Assessment 0 08/31/20 1409   Dressing Status Clean, dry, & intact 08/31/20 1409        Opportunity for questions and clarification was provided.       Patient transported with:   [employer]

## 2020-09-01 NOTE — PROGRESS NOTES
Bedside and Verbal shift change report given to Jacey ALLEN (oncoming nurse) by Thomas Mondragon RN (offgoing nurse). Report included the following information SBAR, Kardex, MAR and Recent Results. Patient rested well this night sleeping at long intervals.

## 2020-09-01 NOTE — PROGRESS NOTES
Problem: Pressure Injury - Risk of  Goal: *Prevention of pressure injury  Description: Document Samy Scale and appropriate interventions in the flowsheet. Outcome: Progressing Towards Goal  Note: Pressure Injury Interventions:  Sensory Interventions: Assess changes in LOC, Turn and reposition approx. every two hours (pillows and wedges if needed)    Moisture Interventions: Absorbent underpads    Activity Interventions: Increase time out of bed    Mobility Interventions: HOB 30 degrees or less, Turn and reposition approx. every two hours(pillow and wedges)    Nutrition Interventions: Document food/fluid/supplement intake    Friction and Shear Interventions: HOB 30 degrees or less                Problem: Falls - Risk of  Goal: *Absence of Falls  Description: Document David Fall Risk and appropriate interventions in the flowsheet. Outcome: Progressing Towards Goal  Note: Fall Risk Interventions:  Mobility Interventions: Communicate number of staff needed for ambulation/transfer         Medication Interventions: Patient to call before getting OOB    Elimination Interventions: Patient to call for help with toileting needs    History of Falls Interventions:  Investigate reason for fall

## 2020-09-01 NOTE — PROGRESS NOTES
Hospitalist Progress Note    NAME: Tani Hunt   :  1975   MRN:  [id number]     I reviewed pertinent labs and imaging, and discussed /agreed on the plan of care with Dr. Paty Tian. Patient is a 49-year-old  woman with a past medical history of multiple sclerosis being admitted for an exacerbation  Assessment / Plan:  Acute Exacerbation of relapsing remitting MS POA  Acute weakness and inability to stand secondary to  MS POA    Recently discharged after treatment for MS exacerbation    She continues with multiple falls     Complains of worsening bilateral lower extremity weakness   MRI reads:  -----Brain: Multiple new enhancing active supratentorial demyelinating plaques on a  background of extensive, chronic supratentorial demyelinating disease. Stable mild parenchymal volume loss. MRI of cervical spine: IMPRESSION:    1. Stable extensive, patchy chronic demyelinating plaques throughout the  cervical and upper thoracic cord. No evidence of active demyelination. MRI of Thoracic spine pending   continue solu-medrol    Continue clonazepam home dose    Blood cultures pending    Continue Neurontin BID  Anxiety/ Depression POA    Continue home medications  Tobacco Use POA    Not interested in smoking cessation  Chronic Constipation    Continue Miralax     Continue docusate daily            30.0 - 39.9 Obese / Body mass index is 34.33 kg/m². Code status: Full  Prophylaxis: SCD's  Recommended Disposition:  PT, OT, RN     Subjective:     Chief Complaint / Reason for Physician Visit  Complains of increased weakness. MRI of thoracic spine pending. Family at bedside. Discussed with RN events overnight.      Review of Systems:  Symptom Y/N Comments  Symptom Y/N Comments   Fever/Chills n   Chest Pain n    Poor Appetite n   Edema n    Cough    Abdominal Pain     Sputum n   Joint Pain n    SOB/BOBO n   Pruritis/Rash n    Nausea/vomit n   Tolerating PT/OT     Diarrhea n   Tolerating Diet y Constipation n   Other       Could NOT obtain due to:      Objective:     VITALS:   Last 24hrs VS reviewed since prior progress note. Most recent are:  Patient Vitals for the past 24 hrs:   Temp Pulse Resp BP SpO2   09/01/20 1736 98.5 °F (36.9 °C) 75 16 115/72 98 %   09/01/20 0814 98.4 °F (36.9 °C) 70 14 112/70 98 %   09/01/20 0735     95 %   08/31/20 2320 97.5 °F (36.4 °C) 62 14 99/69 95 %   08/31/20 2055 98 °F (36.7 °C) 66 14 128/80 99 %       Intake/Output Summary (Last 24 hours) at 9/1/2020 1855  Last data filed at 9/1/2020 8097  Gross per 24 hour   Intake 0 ml   Output    Net 0 ml        PHYSICAL EXAM:  General: Alert, cooperative, no acute distress. obese   EENT:  EOMI. Anicteric sclerae. MMM  Resp:  CTA bilaterally, no wheezing or rales. No accessory muscle use  CV:  Regular  rhythm,  No edema  GI:  Soft, Non distended, Non tender.  +Bowel sounds  Neurologic:  Alert and oriented X 3, normal speech,   Psych:   Good insight. Not anxious nor agitated  Skin:  No rashes. No jaundice    Reviewed most current lab test results and cultures  YES  Reviewed most current radiology test results   YES  Review and summation of old records today    NO  Reviewed patient's current orders and MAR    YES  PMH/ reviewed - no change compared to H&P  ________________________________________________________________________  Care Plan discussed with:    Comments   Patient y    Family  y    RN y    Care Manager     Consultant                        Multidiciplinary team rounds were held today with , nursing, pharmacist and clinical coordinator. Patient's plan of care was discussed; medications were reviewed and discharge planning was addressed.      ________________________________________________________________________    ________________________________________________________________________  Aaron Hewitt NP     Procedures: see electronic medical records for all procedures/Xrays and details which were not copied into this note but were reviewed prior to creation of Plan. LABS:  I reviewed today's most current labs and imaging studies.   Pertinent labs include:  Recent Labs     09/01/20  0446 08/31/20  1354   WBC 4.1 3.9   HGB 11.6 11.4*   HCT 35.0 34.3*    296     Recent Labs     09/01/20  0446 08/31/20  1354    139   K 3.9 4.0   * 108   CO2 27 29   * 83   BUN 12 11   CREA 1.00 0.91   CA 8.5 8.5   MG 2.0  --    ALB 3.0* 3.3*   TBILI 0.5 0.2   ALT 16 17       Signed: Piedad Mckeon NP

## 2020-09-02 PROCEDURE — 74011000258 HC RX REV CODE- 258: Performed by: INTERNAL MEDICINE

## 2020-09-02 PROCEDURE — 87635 SARS-COV-2 COVID-19 AMP PRB: CPT

## 2020-09-02 PROCEDURE — 96376 TX/PRO/DX INJ SAME DRUG ADON: CPT

## 2020-09-02 PROCEDURE — A9575 INJ GADOTERATE MEGLUMI 0.1ML: HCPCS | Performed by: INTERNAL MEDICINE

## 2020-09-02 PROCEDURE — 74011250637 HC RX REV CODE- 250/637: Performed by: INTERNAL MEDICINE

## 2020-09-02 PROCEDURE — 99232 SBSQ HOSP IP/OBS MODERATE 35: CPT | Performed by: PSYCHIATRY & NEUROLOGY

## 2020-09-02 PROCEDURE — 99218 HC RM OBSERVATION: CPT

## 2020-09-02 PROCEDURE — 74011250636 HC RX REV CODE- 250/636: Performed by: INTERNAL MEDICINE

## 2020-09-02 PROCEDURE — 74011250637 HC RX REV CODE- 250/637: Performed by: NURSE PRACTITIONER

## 2020-09-02 PROCEDURE — 96372 THER/PROPH/DIAG INJ SC/IM: CPT

## 2020-09-02 PROCEDURE — 94760 N-INVAS EAR/PLS OXIMETRY 1: CPT

## 2020-09-02 RX ADMIN — Medication 10 ML: at 05:18

## 2020-09-02 RX ADMIN — DULOXETINE HYDROCHLORIDE 60 MG: 30 CAPSULE, DELAYED RELEASE ORAL at 09:09

## 2020-09-02 RX ADMIN — Medication 10 ML: at 15:44

## 2020-09-02 RX ADMIN — SODIUM CHLORIDE 1000 MG: 900 INJECTION, SOLUTION INTRAVENOUS at 15:45

## 2020-09-02 RX ADMIN — DOCUSATE SODIUM 100 MG: 100 CAPSULE, LIQUID FILLED ORAL at 09:09

## 2020-09-02 RX ADMIN — GABAPENTIN 800 MG: 100 CAPSULE ORAL at 09:09

## 2020-09-02 RX ADMIN — GABAPENTIN 800 MG: 100 CAPSULE ORAL at 17:06

## 2020-09-02 RX ADMIN — CLONAZEPAM 0.5 MG: 0.5 TABLET ORAL at 09:08

## 2020-09-02 RX ADMIN — MELATONIN 3 MG: at 21:23

## 2020-09-02 RX ADMIN — Medication 10 ML: at 21:23

## 2020-09-02 RX ADMIN — ENOXAPARIN SODIUM 40 MG: 40 INJECTION SUBCUTANEOUS at 09:08

## 2020-09-02 RX ADMIN — CLONAZEPAM 0.5 MG: 0.5 TABLET ORAL at 17:06

## 2020-09-02 NOTE — PROGRESS NOTES
Oncology End of Shift Note      Bedside shift change report given to Mecca Camargo RN (incoming nurse) by Zee Amaya (outgoing nurse) on [facility]. Report included the following information SBAR, Kardex, Intake/Output and MAR. Shift Summary: Pt remainde stable throughout shif. Scheduled meds given, no PRN meds given, education provided. Hourly rounding completed and IV flushes well. Pt is till unable to go out to the courtyard (per Maribeth's last note). Pt has a purse at the bedside that contains prescribed meds as well as OTC meds including Melatonin. I informed her that she is not to take any outside meds unless the doctor adds them to her MAR. She put them away and verbalized understanding. Reminded her while night shift nurse was present and she verbalized understanding to her as well. Pt can turn self and is a x1 assist to the bedside commode. 2 cups of warmed prune juice and colace were given today, still no BM today. Pt awaiting rehab placement. Issues for Physician to Address:       Patient on Cardiac Monitoring?     [] Yes  [x] No    Rhythm:      Samy Scale:     Samy Score: 14        If Samy Scale less than 15   Patient Mobility Ordered  PT turns self          Zee Amaya

## 2020-09-02 NOTE — PROGRESS NOTES
Problem: Pressure Injury - Risk of  Goal: *Prevention of pressure injury  Description: Document Samy Scale and appropriate interventions in the flowsheet. Outcome: Progressing Towards Goal  Note: Pressure Injury Interventions:  Sensory Interventions: Assess changes in LOC, Turn and reposition approx. every two hours (pillows and wedges if needed)    Moisture Interventions: Absorbent underpads, Apply protective barrier, creams and emollients    Activity Interventions: Increase time out of bed    Mobility Interventions: HOB 30 degrees or less, Turn and reposition approx. every two hours(pillow and wedges)    Nutrition Interventions: Document food/fluid/supplement intake    Friction and Shear Interventions: HOB 30 degrees or less, Apply protective barrier, creams and emollients                Problem: Falls - Risk of  Goal: *Absence of Falls  Description: Document David Fall Risk and appropriate interventions in the flowsheet.   Outcome: Progressing Towards Goal  Note: Fall Risk Interventions:  Mobility Interventions: Communicate number of staff needed for ambulation/transfer         Medication Interventions: Patient to call before getting OOB    Elimination Interventions: Patient to call for help with toileting needs, Call light in reach    History of Falls Interventions: Room close to nurse's station

## 2020-09-02 NOTE — PROGRESS NOTES
SHOLA:   1) Rehab (1.IPR 2. SNF 3. Home with HH and f.u appts)  2) Pt will need a negative COVID-19 test within 48 hours of discharge   3) Pt will need isnurance authorization at time of discharge   4) PT will need state and SRINIVASAN obs letters at time of discharge   5) CM will attempt to meet with pt to complete AMD prior to discharge   6) CM will check the status of a UAI prior to discharge     CM Consult Noted-   2:47 PM- [facility] is out of network with pt's insurance. [facility] is willing to accept pending PT/OT notes. CM noted no PT/OT consults- CM placed verbal orders from attending. 10:00 AM- Pt is a 53year old,  female, readmitted under observation status with MS exacerbation. Pt was alert and oriented when meeting with CM, confirming address, emergency contact and PCP. PT states she lives with her friend as she just  and her  kicked her out. Pt states she has a rolling walker, rollator and wheelchair. Pt states she does not drive and has her friends to drive her as needed. Pt states she has had HH (curently open with them, unsure of the name) and has been to [facility] (was recently discharged from there). Pt states she has no problems affording or accessing medications- has [facility] Medicaid and [employer] Medicare to assist. Pt will likely require [facility] transportation at time of discharge. CM spoke with pt regarding AMD- pt states she does not have any and is still legally  to her ex-. Pt started crying and got worked up stating she does not want him to make any decisions for her or know she is even here. CM advised her she was safe and we could place her on private to take extra precautions, pt agreeable, RN and floor RN Lead notified. CM did note recommendation regarding IPR- pt states she just discharged from [facility] and would like to go back.  CM advised her she may not get authorized to go again and inquired about SNF or home with [facility]- pt started crying again and requested CM send to all IPR in the local area. Referrals sent to [facility], [facility] and [address] [place] [address]- attending agreeable. CM spoke with attending regarding PT/OT consults and a negative COVID-19 test prior to discharge.      LATASHA Sesay, [address]   [phone]

## 2020-09-02 NOTE — PROGRESS NOTES
Bedside and Verbal shift change report given to ALEJANDRO Mari (oncoming nurse) by Rodney Lowry RN (offgoing nurse). Report included the following information SBAR, Kardex and Quality Measures.

## 2020-09-02 NOTE — PROGRESS NOTES
Hospitalist Progress Note    NAME: Blanche Gutierrez   :  1975   MRN:  [id number]     I reviewed pertinent labs and imaging, and discussed /agreed on the plan of care with Dr. Kristian Boothe. Pending discharge to SNF, Covid-19 pending  Assessment / Plan:  Acute Exacerbation of relapsing remitting MS POA  Acute weakness and inability to stand secondary to  MS POA    Recently discharged after treatment for MS exacerbation    She continues with multiple falls     Complains of worsening bilateral lower extremity weakness     MRI reads:  -----Brain: Multiple new enhancing active supratentorial demyelinating plaques on a  background of extensive, chronic supratentorial demyelinating disease. Stable mild parenchymal volume loss. MRI of cervical spine: IMPRESSION:    1. Stable extensive, patchy chronic demyelinating plaques throughout the  cervical and upper thoracic cord. No evidence of active demyelination. MRI of Thoracic spine 20: IMPRESSION:  1. Progressive multifocal areas of cord signal abnormality within the lower  cervical and thoracic cord. None demonstrate postcontrast enhancement. continue solu-medrol    Continue clonazepam home dose    Blood cultures pending    Continue Neurontin BID    Appreciate Neurology in put  Anxiety/ Depression POA    Continue home medications  Tobacco Use POA    Not interested in smoking cessation  Chronic Constipation    Continue Miralax     Continue docusate daily      30.0 - 39.9 Obese / Body mass index is 34.33 kg/m². Code status: Full  Prophylaxis: SCD's  Recommended Disposition: [facility]/Rehab     Subjective:     Chief Complaint / Reason for Physician Visit  Patient resting at this time. She states she feels about the same. COVID pending. .  Discussed with RN events overnight.      Review of Systems:  Symptom Y/N Comments  Symptom Y/N Comments   Fever/Chills n   Chest Pain n    Poor Appetite n   Edema n    Cough    Abdominal Pain     Sputum    Joint Pain     SOB/BOBO n Pruritis/Rash n    Nausea/vomit n   Tolerating PT/OT y    Diarrhea n   Tolerating Diet y    Constipation n   Other       Could NOT obtain due to:      Objective:     VITALS:   Last 24hrs VS reviewed since prior progress note. Most recent are:  Patient Vitals for the past 24 hrs:   Temp Pulse Resp BP SpO2   09/02/20 1810 98.6 °F (37 °C) 60 16 (!) 132/97 97 %   09/02/20 0754 98.1 °F (36.7 °C) 68 14 123/73 96 %   09/02/20 0414 98 °F (36.7 °C) 80 16 148/68 96 %   09/01/20 2330 97.6 °F (36.4 °C) 72 16 113/82 97 %   09/01/20 2126 99.3 °F (37.4 °C) 77 16 119/68 95 %       Intake/Output Summary (Last 24 hours) at 9/2/2020 1813  Last data filed at 9/2/2020 0754  Gross per 24 hour   Intake 100 ml   Output    Net 100 ml        PHYSICAL EXAM:   General:          Alert, cooperative, no acute distress. obese   EENT:              EOMI. Anicteric sclerae. MMM  Resp:               CTA bilaterally, no wheezing or rales. No accessory muscle use  CV:                  Regular  rhythm,  No edema  GI:                   Soft, Non distended, Non tender.  +Bowel sounds  Neurologic:      Alert and oriented X 3, normal speech,   Psych:             Good insight. Not anxious nor agitated  Skin:                No rashes. No jaundice    Reviewed most current lab test results and cultures  YES  Reviewed most current radiology test results   YES  Review and summation of old records today    NO  Reviewed patient's current orders and MAR    YES  PMH/SH reviewed - no change compared to H&P  ________________________________________________________________________  Care Plan discussed with:      Comments   Patient y     Family  y     RN y     Care Manager       Consultant                           Multidiciplinary team rounds were held today with , nursing, pharmacist and clinical coordinator. Patient's plan of care was discussed; medications were reviewed and discharge planning was addressed. ________________________________________________________________________     ________________________________________________________________________  Cristina Hernandez NP      Procedures: see electronic medical records for all procedures/Xrays and details which were not copied into this note but were reviewed prior to creation of Plan.       LABS:  I reviewed today's most current labs and imaging studies. Pertinent labs include:       Recent Labs     09/01/20 0446 08/31/20  1354   WBC 4.1 3.9   HGB 11.6 11.4*   HCT 35.0 34.3*    296          Recent Labs     09/01/20 0446 08/31/20  1354    139   K 3.9 4.0   * 108   CO2 27 29   * 83   BUN 12 11   CREA 1.00 0.91   CA 8.5 8.5   MG 2.0  --    ALB 3.0* 3.3*   TBILI 0.5 0.2   ALT 16 17        Signed: Cristina Hernandez NP                   Re  ________________________________________________________________________  Cristina Hernandez NP     Procedures: see electronic medical records for all procedures/Xrays and details which were not copied into this note but were reviewed prior to creation of Plan. LABS:  I reviewed today's most current labs and imaging studies.   Pertinent labs include:  Recent Labs     09/01/20 0446 08/31/20  1354   WBC 4.1 3.9   HGB 11.6 11.4*   HCT 35.0 34.3*    296     Recent Labs     09/01/20  0446 08/31/20  1354    139   K 3.9 4.0   * 108   CO2 27 29   * 83   BUN 12 11   CREA 1.00 0.91   CA 8.5 8.5   MG 2.0  --    ALB 3.0* 3.3*   TBILI 0.5 0.2   ALT 16 17       Signed: Cristina Hernandez, NP

## 2020-09-03 LAB
ALBUMIN SERPL-MCNC: 3.1 G/DL (ref 3.5–5)
ALBUMIN/GLOB SERPL: 1 {RATIO} (ref 1.1–2.2)
ALP SERPL-CCNC: 44 U/L (ref 45–117)
ALT SERPL-CCNC: 13 U/L (ref 12–78)
ANION GAP SERPL CALC-SCNC: 2 MMOL/L (ref 5–15)
AST SERPL-CCNC: 8 U/L (ref 15–37)
BILIRUB SERPL-MCNC: 0.2 MG/DL (ref 0.2–1)
BUN SERPL-MCNC: 22 MG/DL (ref 6–20)
BUN/CREAT SERPL: 30 (ref 12–20)
CALCIUM SERPL-MCNC: 8.6 MG/DL (ref 8.5–10.1)
CHLORIDE SERPL-SCNC: 111 MMOL/L (ref 97–108)
CO2 SERPL-SCNC: 27 MMOL/L (ref 21–32)
COVID-19, XGCOVT: NOT DETECTED
CREAT SERPL-MCNC: 0.73 MG/DL (ref 0.55–1.02)
ERYTHROCYTE [DISTWIDTH] IN BLOOD BY AUTOMATED COUNT: 12.8 % (ref 11.5–14.5)
GLOBULIN SER CALC-MCNC: 3.1 G/DL (ref 2–4)
GLUCOSE SERPL-MCNC: 133 MG/DL (ref 65–100)
HCT VFR BLD AUTO: 33.2 % (ref 35–47)
HEALTH STATUS, XMCV2T: NORMAL
HGB BLD-MCNC: 10.9 G/DL (ref 11.5–16)
MCH RBC QN AUTO: 32 PG (ref 26–34)
MCHC RBC AUTO-ENTMCNC: 32.8 G/DL (ref 30–36.5)
MCV RBC AUTO: 97.4 FL (ref 80–99)
NRBC # BLD: 0 K/UL (ref 0–0.01)
NRBC BLD-RTO: 0 PER 100 WBC
PLATELET # BLD AUTO: 276 K/UL (ref 150–400)
PMV BLD AUTO: 10.4 FL (ref 8.9–12.9)
POTASSIUM SERPL-SCNC: 3.9 MMOL/L (ref 3.5–5.1)
PROT SERPL-MCNC: 6.2 G/DL (ref 6.4–8.2)
RBC # BLD AUTO: 3.41 M/UL (ref 3.8–5.2)
SODIUM SERPL-SCNC: 140 MMOL/L (ref 136–145)
SOURCE, COVRS: NORMAL
SPECIMEN SOURCE, FCOV2M: NORMAL
SPECIMEN TYPE, XMCV1T: NORMAL
WBC # BLD AUTO: 5.7 K/UL (ref 3.6–11)

## 2020-09-03 PROCEDURE — 96372 THER/PROPH/DIAG INJ SC/IM: CPT

## 2020-09-03 PROCEDURE — 74011250636 HC RX REV CODE- 250/636: Performed by: INTERNAL MEDICINE

## 2020-09-03 PROCEDURE — 97165 OT EVAL LOW COMPLEX 30 MIN: CPT

## 2020-09-03 PROCEDURE — 74011250637 HC RX REV CODE- 250/637: Performed by: NURSE PRACTITIONER

## 2020-09-03 PROCEDURE — 85027 COMPLETE CBC AUTOMATED: CPT

## 2020-09-03 PROCEDURE — 99218 HC RM OBSERVATION: CPT

## 2020-09-03 PROCEDURE — 94760 N-INVAS EAR/PLS OXIMETRY 1: CPT

## 2020-09-03 PROCEDURE — 36415 COLL VENOUS BLD VENIPUNCTURE: CPT

## 2020-09-03 PROCEDURE — 97535 SELF CARE MNGMENT TRAINING: CPT

## 2020-09-03 PROCEDURE — 74011250637 HC RX REV CODE- 250/637: Performed by: INTERNAL MEDICINE

## 2020-09-03 PROCEDURE — 65270000029 HC RM PRIVATE

## 2020-09-03 PROCEDURE — 97161 PT EVAL LOW COMPLEX 20 MIN: CPT

## 2020-09-03 PROCEDURE — 74011000258 HC RX REV CODE- 258: Performed by: INTERNAL MEDICINE

## 2020-09-03 PROCEDURE — 99232 SBSQ HOSP IP/OBS MODERATE 35: CPT | Performed by: PSYCHIATRY & NEUROLOGY

## 2020-09-03 PROCEDURE — 80053 COMPREHEN METABOLIC PANEL: CPT

## 2020-09-03 PROCEDURE — 97116 GAIT TRAINING THERAPY: CPT

## 2020-09-03 RX ORDER — SORBITOL SOLUTION 70 %
30 SOLUTION, ORAL MISCELLANEOUS DAILY PRN
Status: DISCONTINUED | OUTPATIENT
Start: 2020-09-03 | End: 2020-09-05 | Stop reason: HOSPADM

## 2020-09-03 RX ADMIN — MELATONIN 3 MG: at 23:12

## 2020-09-03 RX ADMIN — Medication 10 ML: at 14:58

## 2020-09-03 RX ADMIN — GABAPENTIN 800 MG: 100 CAPSULE ORAL at 09:12

## 2020-09-03 RX ADMIN — GABAPENTIN 800 MG: 100 CAPSULE ORAL at 17:41

## 2020-09-03 RX ADMIN — Medication 10 ML: at 23:12

## 2020-09-03 RX ADMIN — CLONAZEPAM 0.5 MG: 0.5 TABLET ORAL at 09:11

## 2020-09-03 RX ADMIN — DOCUSATE SODIUM 100 MG: 100 CAPSULE, LIQUID FILLED ORAL at 09:11

## 2020-09-03 RX ADMIN — DULOXETINE HYDROCHLORIDE 60 MG: 30 CAPSULE, DELAYED RELEASE ORAL at 09:11

## 2020-09-03 RX ADMIN — CLONAZEPAM 0.5 MG: 0.5 TABLET ORAL at 17:41

## 2020-09-03 RX ADMIN — ACETAMINOPHEN 650 MG: 325 TABLET ORAL at 17:30

## 2020-09-03 RX ADMIN — Medication 10 ML: at 05:32

## 2020-09-03 RX ADMIN — SODIUM CHLORIDE 1000 MG: 900 INJECTION, SOLUTION INTRAVENOUS at 14:58

## 2020-09-03 RX ADMIN — ENOXAPARIN SODIUM 40 MG: 40 INJECTION SUBCUTANEOUS at 09:12

## 2020-09-03 NOTE — PROGRESS NOTES
Consult  REFERRED BY:  Jaguar Enciso MD    CHIEF COMPLAINT: Bilateral leg weakness      Subjective: Jackelyn Kaur is a 53 y.o. right-handed  female seen at the request of Dr. Earlene Hay the ER physician for evaluation of 2 to 3-day history of increasing weakness in her legs, and having several falls and can no longer walk and has been admitted to the hospital with an exacerbation of her MS multiple sclerosis. Patient was just in the hospital about 2 weeks ago and then discharged with a week of rehab, and got home about a week ago and now is back in the hospital.  She was given IV steroids, and sent to rehab, and was found to have 5 new lesions on her brain MRI scan in first part of August but no new lesions in the cervical spine. Her MRI scan of the brain did show 3 new lesions in the right cerebral hemisphere, but her cervical spine did not show any new lesions. Her MRI of the thoracic spine is pending still. She was on Tecfidera, but apparently was told to hold that medication and was supposed to see her neurologist Dr. Shoaib Slade this week to be started on a new agent. She says she can only walk with a walker after she went to rehab. She denies any type of infection that could precipitate a pseudo-exacerbation. She is not had any fever, meningismus, head injury, back injury, and no major increase in any spinal pain, and no change in her bowel and bladder function. She takes clonazepam at thing for anxiety, Flexeril for muscle spasms, supposed to be on Tecfidera 240 mg twice a day, Cymbalta 60 mg a day, gabapentin 400 mg capsules taking 2 capsules twice a day, takes ibuprofen, Ativan every 12 hours as needed for anxiety, melatonin and laxatives with her bowels. Patient's leg weakness is about the same may be a little improved already on the steroids and the arms are stable.   Patient advised not to smoke, and she had some reservations about going to a rehab facility, but we tried to reassure her that this is not a nursing home and she is going to just for therapy that she did before should be no problem. [facility] and she is happy. She will check back with Dr. Frank Gonzales as soon as she is on rehab to decide on disease modifying drugs    Past Medical History:   Diagnosis Date    Body aches 12/11/2014    Chronic pain     Depression     GERD (gastroesophageal reflux disease)     Headache(784.0)     History of mammogram never before    MS [facility] ([facility] [address]     Neurological disorder     Multiple Sclerosis    Pap smear for cervical cancer screening 2008    Psychiatric disorder     anxiety    Psychotic disorder [facility])       Past Surgical History:   Procedure Laterality Date    COLONOSCOPY N/A 2/28/2018    COL[facility] ENDOSCOPY    HX CHOLECYSTECTOMY      HX GYN      3 c-sections    HX HEENT      bilateral ear tube surgery    HX HERNIA REPAIR      HX OTHER SURGICAL      R inguinal hernia repair     Family History   Problem Relation Age of Onset    Heart Attack Father         tripple bypass    Cancer Father         lung    COPD Father     Diabetes Mother         type 2    Heart Disease Mother         heart disease    Diabetes Paternal Grandmother     No Known Problems Sister     No Known Problems Brother     No Known Problems Child       Social History     Tobacco Use    Smoking status: Current Every Day Smoker     Packs/day: 0.50     Years: 17.00     Pack years: 8.50     Types: Cigarettes    Smokeless tobacco: Never Used    Tobacco comment: 1 pack every 3 days.    Substance Use Topics    Alcohol use: No         Current Facility-Administered Medications:     clonazePAM (KlonoPIN) tablet 0.5 mg, 0.5 mg, Oral, BID, Zora Homans, Darlene F, NP, 0.5 mg at 09/03/20 0911    docusate sodium (COLACE) capsule 100 mg, 100 mg, Oral, DAILY, Tenzin WILD NP, 100 mg at 09/03/20 0911    sodium chloride (NS) flush 5-40 mL, 5-40 mL, IntraVENous, Q8H, Adilene Valentino MD, 10 mL at 09/03/20 1458    sodium chloride (NS) flush 5-40 mL, 5-40 mL, IntraVENous, PRN, Henry Lopez MD    acetaminophen (TYLENOL) tablet 650 mg, 650 mg, Oral, Q6H PRN, 650 mg at 09/01/20 0541 **OR** acetaminophen (TYLENOL) suppository 650 mg, 650 mg, Rectal, Q6H PRN, Henry Lopez MD    polyethylene glycol (MIRALAX) packet 17 g, 17 g, Oral, DAILY PRN, Henry Lopez MD    promethazine (PHENERGAN) tablet 12.5 mg, 12.5 mg, Oral, Q6H PRN **OR** ondansetron (ZOFRAN) injection 4 mg, 4 mg, IntraVENous, Q6H PRN, Rosales Ortiz NP    enoxaparin (LOVENOX) injection 40 mg, 40 mg, SubCUTAneous, DAILY, Adilene Valentino MD, 40 mg at 09/03/20 0912    methylPREDNISolone ((Solu-MEDROL) 1,000 mg in 0.9% sodium chloride (MBP/ADV) 100 mL, 1 g, IntraVENous, Q24H, Adilene Valentino MD, Last Rate: 100 mL/hr at 09/03/20 1458, 1,000 mg at 09/03/20 1458    cyclobenzaprine (FLEXERIL) tablet 10 mg, 10 mg, Oral, TID PRN, Henry Lopez MD, 10 mg at 09/01/20 1942    DULoxetine (CYMBALTA) capsule 60 mg, 60 mg, Oral, DAILY, Adilene Valentino MD, 60 mg at 09/03/20 0911    gabapentin (NEURONTIN) capsule 800 mg, 800 mg, Oral, BID, Henry Lopez MD, 800 mg at 09/03/20 0912    melatonin tablet 3 mg, 3 mg, Oral, QHS PRN, Henry Lopez MD, 3 mg at 09/02/20 2123        Allergies   Allergen Reactions    Morphine Rash      MRI Results (most recent):  Results from Hospital Encounter encounter on 08/06/20   MRI CERV SPINE W WO CONT    Addendum Addendum: Pre and postcontrast imaging was performed with 20 mL Dotarem      Miriam Laird MD 8/7/2020  8:44 AM          Narrative PRELIMINARY REPORT    MRI Cervical Spine shows multilevel spinal cord plaques without significant  change since 3/8/2020. There is no spinal stenosis. Preliminary report was provided by Dr. Dung Patel, the on-call radiologist, at 2036  hours. Final report to follow.     END PRELIMINARY REPORT      EXAM: MRI CERV SPINE W WO CONT    INDICATION: MS exacebation, weakness. COMPARISON: 3/8/2020    TECHNIQUE: MR imaging of the cervical spine was performed using the following  sequences: sagittal T1, T2, STIR;  axial T2, T1.     CONTRAST:  None. FINDINGS:    There is normal alignment of the cervical spine. Vertebral body heights are  maintained. Marrow signal is normal.    Multiple areas of increased T2/STIR signal are seen throughout the cervical cord  without significant change. There is no abnormal enhancement to suggest active  demyelination. The paraspinal soft tissues are within normal limits. C2-C3: Mild central disc osteophyte complex. No significant spinal stenosis or  neural foraminal narrowing. C3-C4: No herniation or stenosis. C4-C5: No herniation or stenosis. C5-C6: Minimal broad-based disc osteophyte complex without significant spinal  stenosis or neural foraminal narrowing. C6-C7: Mild broad-based disc osteophyte complex without significant spinal  stenosis. There is unchanged mild left neural foraminal narrowing. C7-T1: No significant spinal stenosis. There is unchanged mild left neural  foraminal narrowing. Impression IMPRESSION:    1. Unchanged multilevel areas of abnormal signal in the cord consistent with  demyelinating disease. No evidence of active demyelination. 2. Unchanged mild spondylosis as above. Results from Hospital Encounter encounter on 08/06/20   MRI CERV SPINE W WO CONT    Addendum Addendum: Pre and postcontrast imaging was performed with 20 mL Dotarem      Kylie Edwards MD 8/7/2020  8:44 AM          Narrative PRELIMINARY REPORT    MRI Cervical Spine shows multilevel spinal cord plaques without significant  change since 3/8/2020. There is no spinal stenosis. Preliminary report was provided by Dr. Anuradha Arrieta, the on-call radiologist, at 2036  hours. Final report to follow.     END PRELIMINARY REPORTEXAM: MRI CERV SPINE W WO CONT    INDICATION: MS exacebation, weakness. COMPARISON: 3/8/2020    TECHNIQUE: MR imaging of the cervical spine was performed using the following  sequences: sagittal T1, T2, STIR;  axial T2, T1.     CONTRAST:  None. FINDINGS:    There is normal alignment of the cervical spine. Vertebral body heights are  maintained. Marrow signal is normal.    Multiple areas of increased T2/STIR signal are seen throughout the cervical cord  without significant change. There is no abnormal enhancement to suggest active  demyelination. The paraspinal soft tissues are within normal limits. C2-C3: Mild central disc osteophyte complex. No significant spinal stenosis or  neural foraminal narrowing. C3-C4: No herniation or stenosis. C4-C5: No herniation or stenosis. C5-C6: Minimal broad-based disc osteophyte complex without significant spinal  stenosis or neural foraminal narrowing. C6-C7: Mild broad-based disc osteophyte complex without significant spinal  stenosis. There is unchanged mild left neural foraminal narrowing. C7-T1: No significant spinal stenosis. There is unchanged mild left neural  foraminal narrowing. Impression IMPRESSION:    1. Unchanged multilevel areas of abnormal signal in the cord consistent with  demyelinating disease. No evidence of active demyelination. 2. Unchanged mild spondylosis as above.               Review of Systems:  A comprehensive review of systems was negative except for: Constitutional: positive for fatigue and malaise  Musculoskeletal: positive for myalgias, arthralgias, stiff joints and muscle weakness  Neurological: positive for speech problems, paresthesia, coordination problems, gait problems and weakness   Vitals:    09/02/20 1810 09/02/20 2004 09/03/20 0236 09/03/20 0908   BP: (!) 132/97 123/81 119/72 129/68   Pulse: 60 60 63 60   Resp: 16 18 18    Temp: 98.6 °F (37 °C) 98.2 °F (36.8 °C) 98.6 °F (37 °C) 98 °F (36.7 °C)   SpO2: 97% 98% 97% 97% Weight:       Height:         Objective:     I      NEUROLOGICAL EXAM:    Appearance: The patient is poorly developed and nourished, provides a coherent history and is in no acute distress. Mental Status: Oriented to time, place and person, and the president, cognitive function is slow and abnormal and speech is fluent and no aphasia or dysarthria. Mood and affect appropriate. Cranial Nerves:   Intact visual fields. Fundi are benign, discs are poorly seen. SHEFALI, EOM's full, no nystagmus, no ptosis. Facial sensation is normal. Corneal reflexes are not tested. Facial movement is asymmetric. Hearing is normal bilaterally. Palate is midline with normal sternocleidomastoid and trapezius muscles are normal. Tongue is midline. Neck without meningismus or bruits  Temporal arteries are not tender or enlarged  TMJ areas are not tender on palpation   Motor:   Patient has spastic quadriplegia, strength about 4/5 in the right arm and 3/5 in the left arm, and about 2/5 in the right leg and 3/5 in the left leg. She has right-sided weakness greater than left. Reflexes:   Deep tendon reflexes 2+/4 on the left and and 3+/4 on the right  No babinski or clonus present   Sensory:   Normal to touch, pinprick and vibration and temperature decreased in both feet. DSS is intact   Gait:  Not testable   Tremor:   No tremor noted. Cerebellar:  Not testable cerebellar signs present on Romberg and tandem testing and finger-nose-finger exam.   Neurovascular:  Normal heart sounds and regular rhythm, peripheral pulses intact, and no carotid bruits. Assessment:   [unfilled]  Active Problems:    Multiple sclerosis exacerbation ([facility] [address] (8/6/2020)        Plan:     Patient with probable new exacerbation of her MS, so we will give another course of IV steroids, and see if we can get her better, and she probably needs to go to rehab again if she agrees, so we will consult .   With the amount of disease she had she probably would be a good candidate for Ocrevus or Mavenclad or Tysabri amongst others. We will follow her closely, continue excellent medical care as you are, and we will give 3 days of IV Solu-Medrol as the beginning therapy. Patient's leg weakness is about the same may be a little improved already on the steroids and the arms are stable. Patient advised not to smoke, and she had some reservations about going to a rehab facility, but we tried to reassure her that this is not a nursing home and she is going to just for therapy that she did before should be no problem. [facility] and she is happy. She will check back with Dr. Glory Arriola as soon as she is on rehab to decide on disease modifying drugs  I will follow as needed for now.     Signed By: Clement Vallejo MD     September 3, 2020       CC: Higinio Sandifer, MD  FAX: [phone]    10 AM

## 2020-09-03 NOTE — PROGRESS NOTES
Consult  REFERRED BY:  Itzel Perry MD    CHIEF COMPLAINT: Bilateral leg weakness      Subjective: Dean Bowens is a 53 y.o. right-handed  female seen at the request of Dr. Libra Appiah the ER physician for evaluation of 2 to 3-day history of increasing weakness in her legs, and having several falls and can no longer walk and has been admitted to the hospital with an exacerbation of her MS multiple sclerosis. Patient was just in the hospital about 2 weeks ago and then discharged with a week of rehab, and got home about a week ago and now is back in the hospital.  She was given IV steroids, and sent to rehab, and was found to have 5 new lesions on her brain MRI scan in first part of August but no new lesions in the cervical spine. Her MRI scan of the brain did show 3 new lesions in the right cerebral hemisphere, but her cervical spine did not show any new lesions. Her MRI of the thoracic spine is pending still. She was on Tecfidera, but apparently was told to hold that medication and was supposed to see her neurologist Dr. Ambrose Navarro this week to be started on a new agent. She says she can only walk with a walker after she went to rehab. She denies any type of infection that could precipitate a pseudo-exacerbation. She is not had any fever, meningismus, head injury, back injury, and no major increase in any spinal pain, and no change in her bowel and bladder function. She takes clonazepam at thing for anxiety, Flexeril for muscle spasms, supposed to be on Tecfidera 240 mg twice a day, Cymbalta 60 mg a day, gabapentin 400 mg capsules taking 2 capsules twice a day, takes ibuprofen, Ativan every 12 hours as needed for anxiety, melatonin and laxatives with her bowels. Patient's leg weakness is about the same may be a little improved already on the steroids and the arms are stable.   Patient advised not to smoke, and she had some reservations about going to a rehab facility, but we tried to reassure her that this is not a nursing home and she is going to just for therapy that she did before should be no problem. Past Medical History:   Diagnosis Date    Body aches 12/11/2014    Chronic pain     Depression     GERD (gastroesophageal reflux disease)     Headache(784.0)     History of mammogram never before    MS [facility] sclerosis) ([facility] [address]     Neurological disorder     Multiple Sclerosis    Pap smear for cervical cancer screening 2008    Psychiatric disorder     anxiety    Psychotic disorder [facility])       Past Surgical History:   Procedure Laterality Date    COLONOSCOPY N/A 2/28/2018    COLONOSCOPY performed by Lilibeth Machado MD at [facility]COPY    HX CHOLECYSTECTOMY      HX GYN      3 c-sections    HX HEENT      bilateral ear tube surgery    HX HERNIA REPAIR      HX OTHER SURGICAL      R inguinal hernia repair     Family History   Problem Relation Age of Onset    Heart Attack Father         tripple bypass    Cancer Father         lung    COPD Father     Diabetes Mother         type 2    Heart Disease Mother         heart disease    Diabetes Paternal Grandmother     No Known Problems Sister     No Known Problems Brother     No Known Problems Child       Social History     Tobacco Use    Smoking status: Current Every Day Smoker     Packs/day: 0.50     Years: 17.00     Pack years: 8.50     Types: Cigarettes    Smokeless tobacco: Never Used    Tobacco comment: 1 pack every 3 days.    Substance Use Topics    Alcohol use: No         Current Facility-Administered Medications:     clonazePAM (KlonoPIN) tablet 0.5 mg, 0.5 mg, Oral, BID, Jillene Chamber, Melissa F, NP, 0.5 mg at 09/02/20 1706    docusate sodium (COLACE) capsule 100 mg, 100 mg, Oral, DAILY, Yates, Melissa F, NP, 100 mg at 09/02/20 0909    sodium chloride (NS) flush 5-40 mL, 5-40 mL, IntraVENous, Q8H, Katerina Valentino MD, 10 mL at 09/02/20 1544    sodium chloride (NS) flush 5-40 mL, 5-40 mL, IntraVENous, PRN, Gabrielle Hearing, MD    acetaminophen (TYLENOL) tablet 650 mg, 650 mg, Oral, Q6H PRN, 650 mg at 09/01/20 0541 **OR** acetaminophen (TYLENOL) suppository 650 mg, 650 mg, Rectal, Q6H PRN, Sam Valentino MD    polyethylene glycol (MIRALAX) packet 17 g, 17 g, Oral, DAILY PRN, Sam Francis MD    promethazine (PHENERGAN) tablet 12.5 mg, 12.5 mg, Oral, Q6H PRN **OR** ondansetron (ZOFRAN) injection 4 mg, 4 mg, IntraVENous, Q6H PRN, Sam Valentino MD    enoxaparin (LOVENOX) injection 40 mg, 40 mg, SubCUTAneous, DAILY, Sam Valentino MD, 40 mg at 09/02/20 0908    methylPREDNISolone ((Solu-MEDROL) 1,000 mg in 0.9% sodium chloride (MBP/ADV) 100 mL, 1 g, IntraVENous, Q24H, Sam Valentino MD, Last Rate: 100 mL/hr at 09/02/20 1545, 1,000 mg at 09/02/20 1545    cyclobenzaprine (FLEXERIL) tablet 10 mg, 10 mg, Oral, TID PRN, Ryan Canseco MD, 10 mg at 09/01/20 1942    DULoxetine (CYMBALTA) capsule 60 mg, 60 mg, Oral, DAILY, Ryan Canseco MD, 60 mg at 09/02/20 0909    gabapentin (NEURONTIN) capsule 800 mg, 800 mg, Oral, BID, Sam Valentino MD, 800 mg at 09/02/20 1706    melatonin tablet 3 mg, 3 mg, Oral, QHS PRN, Ryan Canseco MD        Allergies   Allergen Reactions    Morphine Rash      MRI Results (most recent):  Results from Hospital Encounter encounter on 08/06/20   MRI CERV SPINE W WO CONT    Addendum Addendum: Pre and postcontrast imaging was performed with 20 mL Dotarem      Con Victor MD 8/7/2020  8:44 AM          Narrative PRELIMINARY REPORT    MRI Cervical Spine shows multilevel spinal cord plaques without significant  change since 3/8/2020. There is no spinal stenosis. Preliminary report was provided by Dr. Deandre Barcenas, the on-call radiologist, at 2036  hours. Final report to follow. END PRELIMINARY REPORT      EXAM: MRI CERV SPINE W WO CONT    INDICATION: MS exacebation, weakness.     COMPARISON: 3/8/2020    TECHNIQUE: MR imaging of the cervical spine was performed using the following  sequences: sagittal T1, T2, STIR;  axial T2, T1.     CONTRAST:  None. FINDINGS:    There is normal alignment of the cervical spine. Vertebral body heights are  maintained. Marrow signal is normal.    Multiple areas of increased T2/STIR signal are seen throughout the cervical cord  without significant change. There is no abnormal enhancement to suggest active  demyelination. The paraspinal soft tissues are within normal limits. C2-C3: Mild central disc osteophyte complex. No significant spinal stenosis or  neural foraminal narrowing. C3-C4: No herniation or stenosis. C4-C5: No herniation or stenosis. C5-C6: Minimal broad-based disc osteophyte complex without significant spinal  stenosis or neural foraminal narrowing. C6-C7: Mild broad-based disc osteophyte complex without significant spinal  stenosis. There is unchanged mild left neural foraminal narrowing. C7-T1: No significant spinal stenosis. There is unchanged mild left neural  foraminal narrowing. Impression IMPRESSION:    1. Unchanged multilevel areas of abnormal signal in the cord consistent with  demyelinating disease. No evidence of active demyelination. 2. Unchanged mild spondylosis as above. Results from Hospital Encounter encounter on 08/06/20   MRI CERV SPINE W WO CONT    Addendum Addendum: Pre and postcontrast imaging was performed with 20 mL Dotarem      Jarret Rodríguez MD 8/7/2020  8:44 AM          Narrative PRELIMINARY REPORT    MRI Cervical Spine shows multilevel spinal cord plaques without significant  change since 3/8/2020. There is no spinal stenosis. Preliminary report was provided by Dr. Anthony Cheng, the on-call radiologist, at 2036  hours. Final report to follow. END PRELIMINARY REPORTEXAM: MRI CERV SPINE W WO CONT    INDICATION: MS exacebation, weakness.     COMPARISON: 3/8/2020    TECHNIQUE: MR imaging of the cervical spine was performed using the following  sequences: sagittal T1, T2, STIR;  axial T2, T1.     CONTRAST:  None. FINDINGS:    There is normal alignment of the cervical spine. Vertebral body heights are  maintained. Marrow signal is normal.    Multiple areas of increased T2/STIR signal are seen throughout the cervical cord  without significant change. There is no abnormal enhancement to suggest active  demyelination. The paraspinal soft tissues are within normal limits. C2-C3: Mild central disc osteophyte complex. No significant spinal stenosis or  neural foraminal narrowing. C3-C4: No herniation or stenosis. C4-C5: No herniation or stenosis. C5-C6: Minimal broad-based disc osteophyte complex without significant spinal  stenosis or neural foraminal narrowing. C6-C7: Mild broad-based disc osteophyte complex without significant spinal  stenosis. There is unchanged mild left neural foraminal narrowing. C7-T1: No significant spinal stenosis. There is unchanged mild left neural  foraminal narrowing. Impression IMPRESSION:    1. Unchanged multilevel areas of abnormal signal in the cord consistent with  demyelinating disease. No evidence of active demyelination. 2. Unchanged mild spondylosis as above. Review of Systems:  A comprehensive review of systems was negative except for: Constitutional: positive for fatigue and malaise  Musculoskeletal: positive for myalgias, arthralgias, stiff joints and muscle weakness  Neurological: positive for speech problems, paresthesia, coordination problems, gait problems and weakness   Vitals:    09/02/20 0414 09/02/20 0754 09/02/20 1810 09/02/20 2004   BP: 148/68 123/73 (!) 132/97 123/81   Pulse: 80 68 60 60   Resp: 16 14 16 18   Temp: 98 °F (36.7 °C) 98.1 °F (36.7 °C) 98.6 °F (37 °C) 98.2 °F (36.8 °C)   SpO2: 96% 96% 97% 98%   Weight:       Height:         Objective:     I      NEUROLOGICAL EXAM:    Appearance:   The patient is poorly developed and nourished, provides a coherent history and is in no acute distress. Mental Status: Oriented to time, place and person, and the president, cognitive function is slow and abnormal and speech is fluent and no aphasia or dysarthria. Mood and affect appropriate. Cranial Nerves:   Intact visual fields. Fundi are benign, discs are poorly seen. SHEFLAI, EOM's full, no nystagmus, no ptosis. Facial sensation is normal. Corneal reflexes are not tested. Facial movement is asymmetric. Hearing is normal bilaterally. Palate is midline with normal sternocleidomastoid and trapezius muscles are normal. Tongue is midline. Neck without meningismus or bruits  Temporal arteries are not tender or enlarged  TMJ areas are not tender on palpation   Motor:   Patient has spastic quadriplegia, strength about 4/5 in the right arm and 3/5 in the left arm, and about 2/5 in the right leg and 3/5 in the left leg. She has right-sided weakness greater than left. Reflexes:   Deep tendon reflexes 2+/4 on the left and and 3+/4 on the right  No babinski or clonus present   Sensory:   Normal to touch, pinprick and vibration and temperature decreased in both feet. DSS is intact   Gait:  Not testable   Tremor:   No tremor noted. Cerebellar:  Not testable cerebellar signs present on Romberg and tandem testing and finger-nose-finger exam.   Neurovascular:  Normal heart sounds and regular rhythm, peripheral pulses intact, and no carotid bruits. Assessment:   [unfilled]  Active Problems:    Multiple sclerosis exacerbation ([facility] [address] (8/6/2020)        Plan:     Patient with probable new exacerbation of her MS, so we will give another course of IV steroids, and see if we can get her better, and she probably needs to go to rehab again if she agrees, so we will consult . With the amount of disease she had she probably would be a good candidate for Ocrevus or Mavenclad or Tysabri amongst others.   We will follow her closely, continue excellent medical care as you are, and we will give 3 days of IV Solu-Medrol as the beginning therapy. Patient's leg weakness is about the same may be a little improved already on the steroids and the arms are stable. Patient advised not to smoke, and she had some reservations about going to a rehab facility, but we tried to reassure her that this is not a nursing home and she is going to just for therapy that she did before should be no problem.       Signed By: Elzbieta Lemus MD     September 2, 2020       CC: Rebel Mccormack MD  FAX: [phone]    10 AM

## 2020-09-03 NOTE — PROGRESS NOTES
Problem: Mobility Impaired (Adult and Pediatric)  Goal: *Acute Goals and Plan of Care (Insert Text)  Description: FUNCTIONAL STATUS PRIOR TO ADMISSION: Pt states she lives in a one story apartment with her fihernando, 2 step entry w/o rail. She states that she went home from rehab ~1.5 weeks ago and initially did ok but then declined and has had at least 5 falls. She states her legs just give out on her. She states that she had been able to complete her own ADLs at a seated level and used a tub transfer bench for bathing transfers. She states she used a rollator for amb. HOME SUPPORT PRIOR TO ADMISSION: The patient lived with stan but did not require assist initially after rehab but then declined with falls. Physical Therapy Goals  Initiated 9/3/2020  1. Patient will move from supine to sit and sit to supine , scoot up and down, and roll side to side in bed with independence within 7 day(s). 2.  Patient will transfer from bed to chair and chair to bed with modified independence using the least restrictive device within 7 day(s). 3.  Patient will perform sit to stand with modified independence within 7 day(s). 4.  Patient will ambulate with modified independence for 150 feet with the least restrictive device within 7 day(s). 5.  Patient will ascend/descend 2 stairs with without handrail(s) with minimal assistance/contact guard assist within 7 day(s). Outcome: Not Met   PHYSICAL THERAPY EVALUATION  Patient: Nery Felix (89 y.o. female)  Date: 9/3/2020  Primary Diagnosis: Multiple sclerosis exacerbation ([address] [G35]  Multiple sclerosis exacerbation ([address] [G35]        Precautions: fall      ASSESSMENT  Based on the objective data described below, the patient presents with decreased strength, balance, activity tolerance with MS exacerbation resulting in decreased functional mobility and gait below her recent baseline.     Current Level of Function Impacting Discharge (mobility/balance): Pt is CGA to min A for bed mobility. She is min A for transfers to and with RW. She amb with RW with min/CGA of 2. Her standing balance is fair with definite need for UE support on walker or legs against bed when adjusting her pants. She is able to bridge well in bed for donning of brief but needs assist with the brief itself. Functional Outcome Measure: The patient scored 55/100 on the barthel outcome measure which is indicative of 45% functional impairment. Other factors to consider for discharge: Had improved to modified independence; responded well to rehab previously, medical management for MS     Patient will benefit from skilled therapy intervention to address the above noted impairments. PLAN :  Recommendations and Planned Interventions: bed mobility training, transfer training, gait training, therapeutic exercises, neuromuscular re-education, patient and family training/education, and therapeutic activities      Frequency/Duration: Patient will be followed by physical therapy:  5 times a week to address goals. Recommendation for discharge: (in order for the patient to meet his/her long term goals)  Therapy 3 hours per day 5-7 days per week    This discharge recommendation:  Has been made in collaboration with the attending provider and/or case management    IF patient discharges home will need the following DME: patient owns DME required for discharge         SUBJECTIVE:   Patient stated My legs just started giving out.     OBJECTIVE DATA SUMMARY:   HISTORY:    Past Medical History:   Diagnosis Date    Body aches 12/11/2014    Chronic pain     Depression     GERD (gastroesophageal reflux disease)     Headache(784.0)     History of mammogram never before    MS (multiple sclerosis) ([facility] [address]     Neurological disorder     Multiple Sclerosis    Pap smear for cervical cancer screening 2008    Psychiatric disorder     anxiety    Psychotic disorder [facility])      Past Surgical History:   Procedure Laterality Date COLONOSCOPY N/A 2/28/2018    COLONOSCOPY performed by Jeanette Mane MD at [facility] ENDOSCOPY    HX CHOLECYSTECTOMY      HX GYN      3 c-sections    HX HEENT      bilateral ear tube surgery    HX HERNIA REPAIR      HX OTHER SURGICAL      R inguinal hernia repair       Personal factors and/or comorbidities impacting plan of care: MS    Home Situation  Home Environment: Apartment  # Steps to Enter: 2  Rails to Enter: No  One/Two Story Residence: One story  Living Alone: No  Support Systems: Spouse/Significant Other/Partner  Patient Expects to be Discharged to[de-identified] Rehabilitation facility  Current DME Used/Available at Home: Tub transfer bench, Shower chair, Wheelchair, Walker, rolling, Walker, rollator, Grab bars  Tub or Shower Type: Tub/Shower combination    EXAMINATION/PRESENTATION/DECISION MAKING:   Critical Behavior:  Neurologic State: Alert  Orientation Level: Oriented X4  Cognition: Follows commands     Hearing: Auditory  Auditory Impairment: Hard of hearing, bilateral    Range Of Motion:  AROM: Within functional limits(except for L ankle; eversion/inversion weak/lax)                       Strength:    Strength: Generally decreased, functional                    Tone & Sensation:                  Sensation: (numbness/tingling fingers)               Coordination:  Coordination: Generally decreased, functional       Functional Mobility:  Bed Mobility:  Rolling: Modified independent; Other (comment)(rail)  Supine to Sit: Contact guard assistance;Minimum assistance  Sit to Supine: Supervision  Scooting: Supervision; Other (comment)(rail assist)  Transfers:  Sit to Stand: Minimum assistance  Stand to Sit: Minimum assistance        Bed to Chair: Minimum assistance; Other (comment)(RW)              Balance:   Sitting: Intact  Standing: Impaired  Standing - Static: Fair; Other (comment)(with RW)  Standing - Dynamic : Fair; Other (comment)(with RW)  Ambulation/Gait Training:  Distance (ft): 10 Feet (ft)  Assistive Device: Gait belt;Walker, rolling  Ambulation - Level of Assistance: Contact guard assistance;Minimal assistance;Assist x2        Gait Abnormalities: Foot drop;Decreased step clearance;Trunk sway increased; Other(Partial foot drop R but mainly med/lateral weakness/laxity)        Base of Support: Widened     Speed/Heena: Slow;Pace decreased (<100 feet/min)  Step Length: Right shortened;Left shortened  Swing Pattern: Right asymmetrical;Other (comment)(toe out with cues for incr step length)               Functional Measure:  Barthel Index:    Bathin  Bladder: 10  Bowels: 10  Groomin  Dressing: 10  Feeding: 10  Mobility: 0  Stairs: 0  Toilet Use: 5  Transfer (Bed to Chair and Back): 5  Total: 55/100       The Barthel ADL Index: Guidelines  1. The index should be used as a record of what a patient does, not as a record of what a patient could do. 2. The main aim is to establish degree of independence from any help, physical or verbal, however minor and for whatever reason. 3. The need for supervision renders the patient not independent. 4. A patient's performance should be established using the best available evidence. Asking the patient, friends/relatives and nurses are the usual sources, but direct observation and common sense are also important. However direct testing is not needed. 5. Usually the patient's performance over the preceding 24-48 hours is important, but occasionally longer periods will be relevant. 6. Middle categories imply that the patient supplies over 50 per cent of the effort. 7. Use of aids to be independent is allowed. Marah Bains., Barthel, DJanetW. (8046). Functional evaluation: the Barthel Index. [facility] (14)2. LOUIS Kern, Rhonda Ramirez., Bud Zimmerman., Whit Boston, [address] [place] [address] (). Measuring the change indisability after inpatient rehabilitation; comparison of the responsiveness of the Barthel Index and Functional [place] Measure.  Journal of Neurology, Neurosurgery, and Psychiatry, 664), 013-260. TUNDE Gallardo, BETTY Moss, & Adrianne Sharma M.A. (2004.) Assessment of post-stroke quality of life in cost-effectiveness studies: The usefulness of the Barthel Index and the EuroQoL-5D. Quality of Life Research, 15, 830-15           Physical Therapy Evaluation Charge Determination   History Examination Presentation Decision-Making   HIGH Complexity :3+ comorbidities / personal factors will impact the outcome/ POC  HIGH Complexity : 4+ Standardized tests and measures addressing body structure, function, activity limitation and / or participation in recreation  LOW Complexity : Stable, uncomplicated  LOW Complexity : FOTO score of       Based on the above components, the patient evaluation is determined to be of the following complexity level: LOW     Pain Rating:  No c/o    Activity Tolerance:   Fair  Please refer to the flowsheet for vital signs taken during this treatment. After treatment patient left in no apparent distress:   Supine in bed, Call bell within reach, and Side rails x 3    COMMUNICATION/EDUCATION:   The patients plan of care was discussed with: Occupational therapist, Registered nurse, and Case management. Fall prevention education was provided and the patient/caregiver indicated understanding., Patient/family have participated as able in goal setting and plan of care. , and Patient/family agree to work toward stated goals and plan of care.     Thank you for this referral.  Armen Fernandez, PT   Time Calculation: 27 mins

## 2020-09-03 NOTE — PROGRESS NOTES
Hospitalist Progress Note    NAME: Lori White   :  1975   MRN:  [id number]     I reviewed pertinent labs and imaging, and discussed /agreed on the plan of care with Dr. Ondina Nicole. Patient is waiting insurance authorization to rehab. Assessment / Plan:  Acute Exacerbation of relapsing remitting MS POA  Acute weakness and inability to stand secondary to [address] Neurology input    Recently discharged after treatment for MS exacerbation  Kory Ayers is to be discharged to Rehab for further PT/OT    Insurance Authorization pending    COVID-19 pending prior to discharge    Up with walker and PT this morning    Lower extremity weakness with slight improvement this am     MRI reads:  -----Brain: Multiple new enhancing active supratentorial demyelinating plaques on a  background of extensive, chronic supratentorial demyelinating disease. Stable mild parenchymal volume loss.   MRI of cervical spine: IMPRESSION:    1. Stable extensive, patchy chronic demyelinating plaques throughout the  cervical and upper thoracic cord. No evidence of active demyelination. MRI of Thoracic spine 20: IMPRESSION:  1. Progressive multifocal areas of cord signal abnormality within the lower  cervical and thoracic cord. None demonstrate postcontrast enhancement.   continue solu-medrol    Continue clonazepam home dose    Blood cultures pending    Continue Neurontin BID  Anxiety/ Depression POA    Continue home medications  Tobacco Use POA    Not interested in smoking cessation  Chronic Constipation    Continue Miralax     Continue docusate daily      30.0 - 39.9 Obese / Body mass index is 34.33 kg/m². Code status: Full  Prophylaxis: SCD's  Recommended Disposition: SAH/Rehab     Subjective:     Chief Complaint / Reason for Physician Visit  Patient is more alert today. She states she did get up with a walker and PT today. She is scheduled to go to rehab. Insurance authorization pending.   Discussed with RN events overnight. Review of Systems:  Symptom Y/N Comments  Symptom Y/N Comments   Fever/Chills n   Chest Pain n    Poor Appetite n   Edema y generalized   Cough n   Abdominal Pain n    Sputum n   Joint Pain     SOB/BOBO n   Pruritis/Rash n    Nausea/vomit n   Tolerating PT/OT     Diarrhea n   Tolerating Diet y    Constipation n   Other       Could NOT obtain due to:      Objective:     VITALS:   Last 24hrs VS reviewed since prior progress note. Most recent are:  Patient Vitals for the past 24 hrs:   Temp Pulse Resp BP SpO2   09/03/20 0908 98 °F (36.7 °C) 60  129/68 97 %   09/03/20 0236 98.6 °F (37 °C) 63 18 119/72 97 %   09/02/20 2004 98.2 °F (36.8 °C) 60 18 123/81 98 %   09/02/20 1810 98.6 °F (37 °C) 60 16 (!) 132/97 97 %     No intake or output data in the 24 hours ending 09/03/20 1651     PHYSICAL EXAM:  General:  Alert, cooperative, no acute distress    EENT:   Anicteric sclerae. normocephalic  Resp:  CTA bilaterally, no wheezing or rales. No accessory muscle use  CV:  Regular  rhythm,  No edema  GI:  Soft, Non distended, Non tender.  +Bowel sounds  Neurologic:  Alert and oriented X 3, normal speech,   Psych:   Good insight. Not anxious nor agitated  Skin:  No rashes. No jaundice    Reviewed most current lab test results and cultures  YES  Reviewed most current radiology test results   YES  Review and summation of old records today    NO  Reviewed patient's current orders and MAR    YES  PMH/ reviewed - no change compared to H&P  ________________________________________________________________________  Care Plan discussed with:    Comments   Patient y    Family  y    RN y    Care Manager y    Consultant                        Multidiciplinary team rounds were held today with , nursing, pharmacist and clinical coordinator. Patient's plan of care was discussed; medications were reviewed and discharge planning was addressed. ________________________________________________________________________      ________________________________________________________________________  Waldemar Arzate NP     Procedures: see electronic medical records for all procedures/Xrays and details which were not copied into this note but were reviewed prior to creation of Plan. LABS:  I reviewed today's most current labs and imaging studies.   Pertinent labs include:  Recent Labs     09/03/20  0529 09/01/20  0446   WBC 5.7 4.1   HGB 10.9* 11.6   HCT 33.2* 35.0    307     Recent Labs     09/03/20 0529 09/01/20  0446    140   K 3.9 3.9   * 111*   CO2 27 27   * 132*   BUN 22* 12   CREA 0.73 1.00   CA 8.6 8.5   MG  --  2.0   ALB 3.1* 3.0*   TBILI 0.2 0.5   ALT 13 16       Signed: Waldemar Arzate NP

## 2020-09-03 NOTE — PROGRESS NOTES
Problem: Self Care Deficits Care Plan (Adult)  Goal: *Acute Goals and Plan of Care (Insert Text)  Description:   FUNCTIONAL STATUS PRIOR TO ADMISSION: Patient was modified independent using a rollator for functional mobility. Patient completed ADLs in sitting with modified independence and total assist for instrumental ADLs. HOME SUPPORT: The patient lived with stan who assists with IADLs. Occupational Therapy Goals  Initiated 9/3/2020  1. Patient will perform grooming with modified independence within 7 day(s). 2.  Patient will perform upper body dressing with modified independence within 7 day(s). 3.  Patient will perform lower body dressing with modified independence within 7 day(s). 4.  Patient will perform toilet transfers with supervision/set-up within 7 day(s). 5.  Patient will perform all aspects of toileting with supervision/set-up within 7 day(s). Outcome: Progressing Towards Goal   OCCUPATIONAL THERAPY EVALUATION  Patient: Martha Nathan (56 y.o. female)  Date: 9/3/2020  Primary Diagnosis: Multiple sclerosis exacerbation ([facility] [address] [G35]  Multiple sclerosis exacerbation ([facility] [address] [G35]       Precautions: Fall       ASSESSMENT  Based on the objective data described below, the patient presents with decreased independence with ADLs and functional mobility secondary to decreased endurance and fatigue. Patient is participating below her prior modified independent level of function. Overall, patient requires min assist x2 to modified independent with functional mobility/transfers and set-up to supervision with ADLs while seated EOB. Patient reported she's had multiple falls in the past and uses a rollator around her apartment. Patient was educated on energy conservation techniques to preserve energy throughout her day. Patient would benefit from continued skilled OT services.      Current Level of Function Impacting Discharge (ADLs/self-care): Patient currently requires set-up to supervision when completing ADLs while seated. Patient required min assist x2 to modified independence with functional mobility/trasnfers. Functional Outcome Measure: The patient scored 55 on the Barthel Index outcome measure which is indicative of 45% functional impairment. Other factors to consider for discharge: Patient tolerated inpatient rehab prior to admission, lives at home with fiance who is also limited with medical factors     Patient will benefit from skilled therapy intervention to address the above noted impairments. PLAN :  Recommendations and Planned Interventions: self care training, functional mobility training, therapeutic exercise, balance training, endurance activities, patient education and home safety training    Frequency/Duration: Patient will be followed by occupational therapy 3 times a week to address goals.     Recommendation for discharge: (in order for the patient to meet his/her long term goals)  Therapy 3 hours per day 5-7 days per week    This discharge recommendation:  Has not yet been discussed the attending provider and/or case management    IF patient discharges home will need the following DME: patient owns DME required for discharge       SUBJECTIVE:   Patient stated I want to put my own clothes on.    OBJECTIVE DATA SUMMARY:   HISTORY:   Past Medical History:   Diagnosis Date    Body aches 12/11/2014    Chronic pain     Depression     GERD (gastroesophageal reflux disease)     Headache(784.0)     History of mammogram never before    [facility] ([facility] [address]     Neurological disorder     Multiple Sclerosis    Pap smear for cervical cancer screening 2008    Psychiatric disorder     anxiety    Psychotic disorder [facility])      Past Surgical History:   Procedure Laterality Date    COLONOSCOPY N/A 2/28/2018    COLONOSCOPY performed by Roro Maldonado MD at [facility] ENDOSCOPY    HX CHOLECYSTECTOMY      HX GYN      3 c-sections    HX HEENT      bilateral ear tube surgery    HX HERNIA REPAIR      HX OTHER SURGICAL      R inguinal hernia repair       Expanded or extensive additional review of patient history:     Home Situation  Home Environment: Apartment  # Steps to Enter: 2  Rails to Enter: No  One/Two Story Residence: One story  Living Alone: No  Support Systems: Spouse/Significant Other/Partner  Patient Expects to be Discharged to[de-identified] Rehabilitation facility  Current DME Used/Available at Home: Tub transfer bench, Shower chair, Wheelchair, Walker, rolling, Walker, rollator, Grab bars  Tub or Shower Type: Tub/Shower combination    Hand dominance: Right    EXAMINATION OF PERFORMANCE DEFICITS:  Cognitive/Behavioral Status:  Neurologic State: Alert  Orientation Level: Oriented X4  Cognition: Follows commands    Hearing: Auditory  Auditory Impairment: Hard of hearing, bilateral      Range of Motion:  AROM: Within functional limits(except for L ankle; eversion/inversion weak/lax)    Strength:  Strength: Generally decreased, functional    Coordination:  Coordination: Generally decreased, functional  Fine Motor Skills-Upper: Left Intact; Right Intact    Gross Motor Skills-Upper: Left Intact; Right Intact    Tone & Sensation:  Sensation: (numbness/tingling fingers)    Balance:  Sitting: Intact  Standing: Impaired  Standing - Static: Fair; Other (comment)(with RW)  Standing - Dynamic : Fair; Other (comment)(with RW)    Functional Mobility and Transfers for ADLs:  Bed Mobility:  Rolling: Modified independent; Other (comment)(rail)  Supine to Sit: Contact guard assistance;Minimum assistance  Sit to Supine: Supervision  Scooting: Supervision; Other (comment)(rail assist)    Transfers:  Sit to Stand: Minimum assistance  Stand to Sit: Minimum assistance  Bed to Chair: Minimum assistance; Other (comment)(RW)    ADL Assessment:  Feeding: Independent    Oral Facial Hygiene/Grooming: Setup; Other (comment)(while seated)    Bathing: Supervision    Upper Body Dressing: Setup;Supervision    Lower Body Dressing: Setup;Supervision    Toileting: Supervision      Functional Measure:  Barthel Index:    Bathin  Bladder: 10  Bowels: 10  Groomin  Dressing: 10  Feeding: 10  Mobility: 0  Stairs: 0  Toilet Use: 5  Transfer (Bed to Chair and Back): 5  Total: 55/100        The Barthel ADL Index: Guidelines  1. The index should be used as a record of what a patient does, not as a record of what a patient could do. 2. The main aim is to establish degree of independence from any help, physical or verbal, however minor and for whatever reason. 3. The need for supervision renders the patient not independent. 4. A patient's performance should be established using the best available evidence. Asking the patient, friends/relatives and nurses are the usual sources, but direct observation and common sense are also important. However direct testing is not needed. 5. Usually the patient's performance over the preceding 24-48 hours is important, but occasionally longer periods will be relevant. 6. Middle categories imply that the patient supplies over 50 per cent of the effort. 7. Use of aids to be independent is allowed. Princess Price., Barthel, D.W. (0233). Functional evaluation: the Barthel Index. [facility] (14)2. Contreras Black irvin Kamille, KUSUMF, Mara cShreiber., Christ Morelos., [place], [address] [place] [address] (). Measuring the change indisability after inpatient rehabilitation; comparison of the responsiveness of the Barthel Index and Functional Gerlach Measure. Journal of Neurology, Neurosurgery, and Psychiatry, 66(4), 898-157. Jatinder Alfaro, [place].LIVAN, BETTY Moss, & Raj Aguillon M.A. (2004.) Assessment of post-stroke quality of life in cost-effectiveness studies: The usefulness of the Barthel Index and the EuroQoL-5D.  Quality of Life Research, 13, 148-02           Based on the above components, the patient evaluation is determined to be of the following complexity level: LOW   Pain Rating:  Patient did not complain of pain    Activity Tolerance:   Fair  Please refer to the flowsheet for vital signs taken during this treatment. After treatment patient left in no apparent distress:    Supine in bed, Call bell within reach and Side rails x 3    COMMUNICATION/EDUCATION:   The patients plan of care was discussed with: Physical therapist.     Home safety education was provided and the patient/caregiver indicated understanding. and Patient/family agree to work toward stated goals and plan of care. This patients plan of care is appropriate for delegation to [facility].     Thank you for this referral.  Sharma Seip, OT  Time Calculation: 20 mins

## 2020-09-04 LAB
ALBUMIN SERPL-MCNC: 3 G/DL (ref 3.5–5)
ALBUMIN/GLOB SERPL: 1 {RATIO} (ref 1.1–2.2)
ALP SERPL-CCNC: 42 U/L (ref 45–117)
ALT SERPL-CCNC: 14 U/L (ref 12–78)
ANION GAP SERPL CALC-SCNC: 5 MMOL/L (ref 5–15)
AST SERPL-CCNC: 6 U/L (ref 15–37)
BILIRUB SERPL-MCNC: 0.5 MG/DL (ref 0.2–1)
BUN SERPL-MCNC: 26 MG/DL (ref 6–20)
BUN/CREAT SERPL: 37 (ref 12–20)
CALCIUM SERPL-MCNC: 8.5 MG/DL (ref 8.5–10.1)
CHLORIDE SERPL-SCNC: 109 MMOL/L (ref 97–108)
CO2 SERPL-SCNC: 24 MMOL/L (ref 21–32)
CREAT SERPL-MCNC: 0.7 MG/DL (ref 0.55–1.02)
ERYTHROCYTE [DISTWIDTH] IN BLOOD BY AUTOMATED COUNT: 12.8 % (ref 11.5–14.5)
GLOBULIN SER CALC-MCNC: 3.1 G/DL (ref 2–4)
GLUCOSE SERPL-MCNC: 137 MG/DL (ref 65–100)
HCT VFR BLD AUTO: 32.9 % (ref 35–47)
HGB BLD-MCNC: 11.1 G/DL (ref 11.5–16)
MCH RBC QN AUTO: 32.3 PG (ref 26–34)
MCHC RBC AUTO-ENTMCNC: 33.7 G/DL (ref 30–36.5)
MCV RBC AUTO: 95.6 FL (ref 80–99)
NRBC # BLD: 0 K/UL (ref 0–0.01)
NRBC BLD-RTO: 0 PER 100 WBC
PLATELET # BLD AUTO: 286 K/UL (ref 150–400)
PMV BLD AUTO: 10.5 FL (ref 8.9–12.9)
POTASSIUM SERPL-SCNC: 3.7 MMOL/L (ref 3.5–5.1)
PROT SERPL-MCNC: 6.1 G/DL (ref 6.4–8.2)
RBC # BLD AUTO: 3.44 M/UL (ref 3.8–5.2)
SODIUM SERPL-SCNC: 138 MMOL/L (ref 136–145)
WBC # BLD AUTO: 4.9 K/UL (ref 3.6–11)

## 2020-09-04 PROCEDURE — 74011250637 HC RX REV CODE- 250/637: Performed by: NURSE PRACTITIONER

## 2020-09-04 PROCEDURE — 74011250636 HC RX REV CODE- 250/636: Performed by: INTERNAL MEDICINE

## 2020-09-04 PROCEDURE — 74011250637 HC RX REV CODE- 250/637: Performed by: INTERNAL MEDICINE

## 2020-09-04 PROCEDURE — 85027 COMPLETE CBC AUTOMATED: CPT

## 2020-09-04 PROCEDURE — 74011000258 HC RX REV CODE- 258: Performed by: INTERNAL MEDICINE

## 2020-09-04 PROCEDURE — 36415 COLL VENOUS BLD VENIPUNCTURE: CPT

## 2020-09-04 PROCEDURE — 97535 SELF CARE MNGMENT TRAINING: CPT

## 2020-09-04 PROCEDURE — 97116 GAIT TRAINING THERAPY: CPT

## 2020-09-04 PROCEDURE — 97530 THERAPEUTIC ACTIVITIES: CPT

## 2020-09-04 PROCEDURE — 65270000029 HC RM PRIVATE

## 2020-09-04 PROCEDURE — 80053 COMPREHEN METABOLIC PANEL: CPT

## 2020-09-04 PROCEDURE — 94760 N-INVAS EAR/PLS OXIMETRY 1: CPT

## 2020-09-04 RX ORDER — ALPRAZOLAM 0.25 MG/1
0.25 TABLET ORAL ONCE
Status: COMPLETED | OUTPATIENT
Start: 2020-09-04 | End: 2020-09-04

## 2020-09-04 RX ADMIN — CLONAZEPAM 0.5 MG: 0.5 TABLET ORAL at 08:48

## 2020-09-04 RX ADMIN — DOCUSATE SODIUM 100 MG: 100 CAPSULE, LIQUID FILLED ORAL at 08:48

## 2020-09-04 RX ADMIN — Medication 10 ML: at 21:33

## 2020-09-04 RX ADMIN — ALPRAZOLAM 0.25 MG: 0.25 TABLET ORAL at 17:21

## 2020-09-04 RX ADMIN — GABAPENTIN 800 MG: 100 CAPSULE ORAL at 08:49

## 2020-09-04 RX ADMIN — SODIUM CHLORIDE 1000 MG: 900 INJECTION, SOLUTION INTRAVENOUS at 13:57

## 2020-09-04 RX ADMIN — CYCLOBENZAPRINE 10 MG: 10 TABLET, FILM COATED ORAL at 20:37

## 2020-09-04 RX ADMIN — MELATONIN 3 MG: at 20:37

## 2020-09-04 RX ADMIN — CLONAZEPAM 0.5 MG: 0.5 TABLET ORAL at 17:22

## 2020-09-04 RX ADMIN — DULOXETINE HYDROCHLORIDE 60 MG: 30 CAPSULE, DELAYED RELEASE ORAL at 08:49

## 2020-09-04 RX ADMIN — GABAPENTIN 800 MG: 100 CAPSULE ORAL at 17:21

## 2020-09-04 RX ADMIN — ENOXAPARIN SODIUM 40 MG: 40 INJECTION SUBCUTANEOUS at 08:50

## 2020-09-04 NOTE — PROGRESS NOTES
Nutrition Assessment     Type and Reason for Visit: Reassess    Nutrition Recommendations/Plan:   · Continue regular diet + ONS as ordered. Nutrition Assessment:      9/4:  Chart reviewed; MS exacerbation on admission. Discharging plan in progress, recently denied for rehab per insurance. Wt hx stable. No new nutrition needs identified at this time. No data found. Last Weight Metric  Weight Loss Metrics 8/31/2020 8/6/2020 4/3/2020 3/23/2020 3/17/2020 3/8/2020 3/2/2020   Today's Wt 200 lb 200 lb 200 lb 200 lb 192 lb 9.6 oz 200 lb 197 lb 9.6 oz   BMI 34.33 kg/m2 34.33 kg/m2 33.8 kg/m2 34.33 kg/m2 33.06 kg/m2 34.33 kg/m2 33.39 kg/m2     9/1: Pt seen for MST trigger. Chart reviewed. Med noted for multiple sclerosis exacerbation. PMHx: anxiety, depression. Current diet: regular. Pt d/c 2 weeks ago and reports decreased appetite since last hospitalization. She estimates she has lost 10lbs over the past 2 weeks. RD unable to verify loss with wt hx in EMR. CBW is pt stated and identical to last recorded wt. Same wt recorded over the past several months (? accuracy). Will order new measured wt for accurate assessment. Pt also reports eating less at home because she cannot cook for herself. Her boyfriend cooks for her. Pt reports constipation, LBM 2 days ago. She requested a bowel regimen, but not miralax because it upsets her stomach. She reports prune juice helps too; RD ordered prune juice to come with lunch & dinner trays. RD will order ONS to promote intake. Estimated Daily Nutrient Needs:  Energy (kcal):  1838 kcal (BMR 1532 x 1. 2AF)  Protein (g):  73-91g (0.8-1.0g/kg)       Fluid (ml/day):  1900ml    Nutrition Related Findings:  Meds: solu-medrol. Labs: reviewed.       Current Nutrition Therapies:  DIET REGULAR  DIET ONE TIME MESSAGE  DIET NUTRITIONAL SUPPLEMENTS Dinner; Ensure [employer]  DIET NUTRITIONAL SUPPLEMENTS Breakfast; Ensure Clear    Anthropometric Measures:  · Height:  5' 4\" (162.6 cm)  · Current Body Wt:  90.7 kg (199 lb 15.3 oz)  · BMI: 34.3    Nutrition Diagnosis:   · Inadequate protein-energy intake related to (decreased ability to consume sufficient energy to meet EEN) as evidenced by (decreased appetite and decreased intake d/t inability to cook for self)    Nutrition Intervention:  Food and/or Nutrient Delivery: Continue current diet, Continue oral nutrition supplement  Nutrition Education and Counseling: No recommendations at this time  Coordination of Nutrition Care: No recommendation at this time    Goals:  Continue PO >50% of meals next 3-5 days       Nutrition Monitoring and Evaluation:   Behavioral-Environmental Outcomes:    Food/Nutrient Intake Outcomes: Food and nutrient intake, Supplement intake  Physical Signs/Symptoms Outcomes: Biochemical data, Weight    Discharge Planning:    Continue oral nutrition supplement, Continue current diet     Electronically signed by Stew Cao RD on 9/4/2020 at 3:34 PM

## 2020-09-04 NOTE — PROGRESS NOTES
Problem: Pressure Injury - Risk of  Goal: *Prevention of pressure injury  Description: Document Samy Scale and appropriate interventions in the flowsheet. Outcome: Progressing Towards Goal  Note: Pressure Injury Interventions:  Sensory Interventions: Keep linens dry and wrinkle-free, Maintain/enhance activity level    Moisture Interventions: Absorbent underpads, Apply protective barrier, creams and emollients    Activity Interventions: PT/OT evaluation    Mobility Interventions: Float heels, HOB 30 degrees or less    Nutrition Interventions: Document food/fluid/supplement intake    Friction and Shear Interventions: Foam dressings/transparent film/skin sealants, Apply protective barrier, creams and emollients                Problem: Falls - Risk of  Goal: *Absence of Falls  Description: Document David Fall Risk and appropriate interventions in the flowsheet.   Outcome: Progressing Towards Goal  Note: Fall Risk Interventions:  Mobility Interventions: Bed/chair exit alarm, Patient to call before getting OOB         Medication Interventions: Assess postural VS orthostatic hypotension, Evaluate medications/consider consulting pharmacy    Elimination Interventions: Patient to call for help with toileting needs    History of Falls Interventions: Bed/chair exit alarm, Door open when patient unattended

## 2020-09-04 NOTE — PROGRESS NOTES
Problem: Mobility Impaired (Adult and Pediatric)  Goal: *Acute Goals and Plan of Care (Insert Text)  Description: FUNCTIONAL STATUS PRIOR TO ADMISSION: Pt states she lives in a one story apartment with her fihernando, 2 step entry w/o rail. She states that she went home from rehab ~1.5 weeks ago and initially did ok but then declined and has had at least 5 falls. She states her legs just give out on her. She states that she had been able to complete her own ADLs at a seated level and used a tub transfer bench for bathing transfers. She states she used a rollator for amb. HOME SUPPORT PRIOR TO ADMISSION: The patient lived with stan but did not require assist initially after rehab but then declined with falls. Physical Therapy Goals  Initiated 9/3/2020  1. Patient will move from supine to sit and sit to supine , scoot up and down, and roll side to side in bed with independence within 7 day(s). 2.  Patient will transfer from bed to chair and chair to bed with modified independence using the least restrictive device within 7 day(s). 3.  Patient will perform sit to stand with modified independence within 7 day(s). 4.  Patient will ambulate with modified independence for 150 feet with the least restrictive device within 7 day(s). 5.  Patient will ascend/descend 2 stairs with without handrail(s) with minimal assistance/contact guard assist within 7 day(s). Outcome: Progressing Towards Goal   PHYSICAL THERAPY TREATMENT  Patient: Yany Gunter (87 y.o. female)  Date: 9/4/2020  Diagnosis: Multiple sclerosis exacerbation ([facility] [address] [G35]  Multiple sclerosis exacerbation ([facility] [address] Conor Sharma   <principal problem not specified>       Precautions:    Chart, physical therapy assessment, plan of care and goals were reviewed. ASSESSMENT  Patient continues with skilled PT services and is progressing towards goals. Pt presents with decreased strength and safety. Pt performed bed mobility at CGA/supervision.  Pt able to stand at min A/CGA with cueing for hand placement. Pt ambulated 65ft with RW at min A/CGA. Pt requiring constant cueing to improved for clearance and to stay within walker with turns. Pt requiring several stand rest break during ambulation but with improved mobility tolerance. Pt reported feeling little better today. Pt continues to requiring assistance due to decreased safety and weakness. Current Level of Function Impacting Discharge (mobility/balance): bed mobility at CGA/supervision, ambulation at min A/CGA. Other factors to consider for discharge: decreased safety awareness         PLAN :  Patient continues to benefit from skilled intervention to address the above impairments. Continue treatment per established plan of care. to address goals. Recommendation for discharge: (in order for the patient to meet his/her long term goals)  Rehab     This discharge recommendation:  Has been made in collaboration with the attending provider and/or case management    IF patient discharges home will need the following DME: rolling walker       SUBJECTIVE:   Patient stated I feel a little better today.     OBJECTIVE DATA SUMMARY:   Critical Behavior:  Neurologic State: Alert  Orientation Level: Oriented X4  Cognition: Appropriate decision making, Appropriate for age attention/concentration, Appropriate safety awareness, Follows commands     Functional Mobility Training:  Bed Mobility:  Rolling: Modified independent  Supine to Sit: Contact guard assistance  Sit to Supine: Supervision; Additional time  Scooting: Supervision        Transfers:  Sit to Stand: Minimum assistance;Contact guard assistance  Stand to Sit: Minimum assistance                             Balance:  Sitting: Intact  Standing: Impaired  Standing - Static: Fair;Constant support  Standing - Dynamic : Poor;Fair;Constant support  Ambulation/Gait Training:  Distance (ft): 60 Feet (ft)  Assistive Device: Gait belt;Walker, rolling  Ambulation - Level of Assistance: Contact guard assistance;Minimal assistance        Gait Abnormalities: Decreased step clearance; Foot drop; Step to gait(Toe outing >on RLE)        Base of Support: Widened     Speed/Heena: Slow;Shuffled  Step Length: Right shortened;Left shortened        Pain Rating:  Pt with no complaints of pain     Activity Tolerance:   Fair and requires rest breaks  Please refer to the flowsheet for vital signs taken during this treatment. After treatment patient left in no apparent distress:   Supine in bed, Call bell within reach, and Side rails x 3    COMMUNICATION/COLLABORATION:   The patients plan of care was discussed with: Registered nurse.      Jacob Tomlinson PTA   Time Calculation: 24 mins
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
## 2020-09-04 NOTE — PROGRESS NOTES
Oncology End of Shift Note      Bedside shift change report given to Rayne Urban RN (incoming nurse) by Susie Anthony (outgoing nurse) on [facility]. Report included the following information SBAR, Kardex, Intake/Output and MAR. Shift Summary: Pt remained stable throughout shift. Scheduled meds given, PRN meds given anxiety, education provided. Hourly rounding completed, pts IV flushes well, pt did not c/o pain during shift. Pt is up x1 assist to the bathroom but does not call, PT/OT today worked with pt and she remained steady on her feet with walker. Sm bm today. Issues for Physician to Address:       Patient on Cardiac Monitoring?     [] Yes  [x] No    Rhythm:     Samy Scale:     Samy Score: 44 Laurel Shaver

## 2020-09-04 NOTE — PROGRESS NOTES
SHOLA:   1) Rehab (1.IPR 2. SNF 3. Home with HH and f.u appts)  2) Pt will need a negative COVID-19 test within 48 hours of discharge   3) Pt will need isnurance authorization at time of discharge   4) PT will need state and [place] obs letters at time of discharge   5) CM will attempt to meet with pt to complete AMD prior to discharge   6) CM will check the status of a [facility]AI prior to discharge    9:02 AM- [facility] approved and started insurance authorization yesterday 9/3/2020.      Bonnie Espinosa, MSW, [address] [facility]   [phone]

## 2020-09-04 NOTE — PROGRESS NOTES
Problem: Self Care Deficits Care Plan (Adult)  Goal: *Acute Goals and Plan of Care (Insert Text)  Description:   FUNCTIONAL STATUS PRIOR TO ADMISSION: Patient was modified independent using a rollator for functional mobility. Patient completed ADLs in sitting with modified independence and total assist for instrumental ADLs. HOME SUPPORT: The patient lived with stan who assists with IADLs. Occupational Therapy Goals  Initiated 9/3/2020  1. Patient will perform grooming with supervision/set-up within 7 day(s). 2.  Patient will perform upper body dressing with modified independence within 7 day(s). 3.  Patient will perform lower body dressing with modified independence within 7 day(s). 4.  Patient will perform toilet transfers with supervision/set-up within 7 day(s). 5.  Patient will perform all aspects of toileting with supervision/set-up within 7 day(s). Outcome: Progressing Towards Goal   OCCUPATIONAL THERAPY TREATMENT  Patient: Leila Daniels (6 y.o. female)  Date: 9/4/2020  Diagnosis: Multiple sclerosis exacerbation ([facility] [address] [G35]  Multiple sclerosis exacerbation ([facility] [address] Layton Ren   <principal problem not specified>       Precautions:  fall  Chart, occupational therapy assessment, plan of care, and goals were reviewed. ASSESSMENT  Patient continues with skilled OT services and is progressing towards goals. Patient presents with decreased independence with ADLs and functional mobility secondary to decreased activity tolerance and strength. Patient was fully dressed and supine in bed when OT arrived for session. Patient was agreeable and pleasant. Overall, patient requires min assist to modified independence with functional mobility/transfers and max assist to supervision for ADLs. Patient brushed teeth while standing at sink and washed face sitting EOB. Patient required verbal cues for safety during activities and required 2 rest breaks secondary to fatigue.  Patient would continue to benefit from skilled acute OT services to increase activity tolerance. Current Level of Function Impacting Discharge (ADLs): Patient's current level of function requires min assist to modified independence for functional transfers/mobility and max assist to supervision for ADLs. Other factors to consider for discharge: Patient requires cues for safety awareness when completing functional mobility and ADLs. PLAN :  Patient continues to benefit from skilled intervention to address the above impairments. Continue treatment per established plan of care. to address goals. Recommendation for discharge: (in order for the patient to meet his/her long term goals)  Therapy up to 5 days/week in SNF setting    This discharge recommendation:  Has not yet been discussed the attending provider and/or case management    IF patient discharges home will need the following DME: patient owns DME required for discharge       SUBJECTIVE:   Patient stated I'm feeling good.     OBJECTIVE DATA SUMMARY:   Cognitive/Behavioral Status:  Neurologic State: Alert  Orientation Level: Oriented X4  Cognition: Appropriate decision making; Appropriate for age attention/concentration; Appropriate safety awareness    Functional Mobility and Transfers for ADLs:  Bed Mobility:  Rolling: Modified independent  Supine to Sit: Supervision  Sit to Supine: Contact guard assistance  Scooting: Supervision    Transfers:  Sit to Stand: Contact guard assistance  Functional Transfers  Bathroom Mobility: Contact guard assistance; Minimum assistance  Toilet Transfer : Minimum assistance  Cues: Verbal cues provided  Adaptive Equipment: Walker (comment)(rolling walker)  Balance:  Sitting: Intact  Standing: Impaired  Standing - Static: Fair;Constant support  Standing - Dynamic : Fair;Constant support    ADL Intervention:  Grooming  Grooming Assistance: Set-up; Contact guard assistance  Position Performed: Standing;Seated edge of bed(at sink)  Washing Face: Set-up(sitting EOB)  Brushing Teeth: Contact guard assistance;Set-up(standing at sink)  Cues: Verbal cues provided(cues for safety)    Lower Body Dressing Assistance  Socks: Set-up; Supervision  Shoes with Cloth Laces: Maximum assistance  Position Performed: Long sitting on bed    Pain:  Patient did not c/o pain     Activity Tolerance:   Fair  Please refer to the flowsheet for vital signs taken during this treatment. After treatment patient left in no apparent distress:   Supine in bed, Call bell within reach, Caregiver / family present and Side rails x 3    COMMUNICATION/COLLABORATION:   The patients plan of care was discussed with: Physical therapy assistant and Registered nurse.      Beny Anguiano OTR/L  Time Calculation: 28 mins

## 2020-09-04 NOTE — PROGRESS NOTES
Hospitalist Progress Note    NAME: Kiesha Romeo   :  1975   MRN:  [id number]     I reviewed pertinent labs and imaging, and discussed /agreed on the plan of care with Dr. Justus Corbett. Assessment / Plan:  Acute Exacerbation of relapsing remitting MS POA  Acute weakness and inability to stand secondary to [address] Neurology input    Recently discharged after treatment for MS exacerbation  Devante Shah is to be discharged to Rehab for further PT/OT    Insurance Authorization pending    COVID-19 pending prior to discharge    Up with walker and PT this morning    Lower extremity weakness with slight improvement this am     MRI reads:  -----Brain: Multiple new enhancing active supratentorial demyelinating plaques on a  background of extensive, chronic supratentorial demyelinating disease. Stable mild parenchymal volume loss.   MRI of cervical spine: IMPRESSION:    1. Stable extensive, patchy chronic demyelinating plaques throughout the  cervical and upper thoracic cord. No evidence of active demyelination. MRI of Thoracic spine 20: IMPRESSION:  1. Progressive multifocal areas of cord signal abnormality within the lower  cervical and thoracic cord. None demonstrate postcontrast enhancement.   continue solu-medrol    Continue clonazepam home dose    Blood cultures pending    Continue Neurontin BID  Anxiety/ Depression POA    Continue home medications  Tobacco Use POA    Not interested in smoking cessation  Chronic Constipation    Continue Miralax     Continue docusate daily      30.0 - 39.9 Obese / Body mass index is 34.33 kg/m². Code status: Full  Prophylaxis: SCD's  Recommended Disposition: TBD     Subjective:     Chief Complaint / Reason for Physician Visit  Patient is tearful and concerned about her disease process and family situation. She does live on a limited income. Discussed with RN events overnight.      Review of Systems:  Symptom Y/N Comments  Symptom Y/N Comments   Fever/Chills n Chest Pain n    Poor Appetite n   Edema y generalized   Cough n   Abdominal Pain     Sputum n   Joint Pain     SOB/BOBO n   Pruritis/Rash n    Nausea/vomit n   Tolerating PT/OT y    Diarrhea n   Tolerating Diet y    Constipation n   Other       Could NOT obtain due to:      Objective:     VITALS:   Last 24hrs VS reviewed since prior progress note. Most recent are:  Patient Vitals for the past 24 hrs:   Temp Pulse Resp BP SpO2   09/04/20 1510 98.5 °F (36.9 °C) (!) 58 18 116/72 98 %   09/04/20 0807 98.1 °F (36.7 °C) (!) 51 18 120/75 98 %   09/03/20 2312 98.3 °F (36.8 °C) (!) 58 18 113/68 96 %   09/03/20 2000 98 °F (36.7 °C) 63 18 (!) 146/95 97 %       Intake/Output Summary (Last 24 hours) at 9/4/2020 1850  Last data filed at 9/4/2020 0807  Gross per 24 hour   Intake 200 ml   Output    Net 200 ml        PHYSICAL EXAM:  General:  Alert, cooperative, no acute distress    EENT:  EOMI. Anicteric sclerae. MMM  Resp:  CTA bilaterally, no wheezing or rales. No accessory muscle use  CV:  Regular  rhythm,  No edema  GI:  Soft, Non distended, Non tender.  +Bowel sounds  Neurologic:  Alert and oriented X 3, normal speech,   Psych:   Good insight. +anxious not agitated, tearful  Skin:  No rashes. No jaundice    Reviewed most current lab test results and cultures  YES  Reviewed most current radiology test results   YES  Review and summation of old records today    NO  Reviewed patient's current orders and MAR    YES  PMH/SH reviewed - no change compared to H&P  ________________________________________________________________________  Care Plan discussed with:    Comments   Patient y    Family  y    RN y    Care Manager y    Consultant                        Multidiciplinary team rounds were held today with , nursing, pharmacist and clinical coordinator. Patient's plan of care was discussed; medications were reviewed and discharge planning was addressed. ________________________________________________________________________    ________________________________________________________________________  Waldemar Arzate NP     Procedures: see electronic medical records for all procedures/Xrays and details which were not copied into this note but were reviewed prior to creation of Plan. LABS:  I reviewed today's most current labs and imaging studies.   Pertinent labs include:  Recent Labs     09/04/20 0444 09/03/20  0529   WBC 4.9 5.7   HGB 11.1* 10.9*   HCT 32.9* 33.2*    276     Recent Labs     09/04/20 0444 09/03/20  0529    140   K 3.7 3.9   * 111*   CO2 24 27   * 133*   BUN 26* 22*   CREA 0.70 0.73   CA 8.5 8.6   ALB 3.0* 3.1*   TBILI 0.5 0.2   ALT 14 13       Signed: Waldemar Arzate NP

## 2020-09-05 VITALS
DIASTOLIC BLOOD PRESSURE: 85 MMHG | HEART RATE: 86 BPM | HEIGHT: 64 IN | TEMPERATURE: 98.2 F | OXYGEN SATURATION: 100 % | RESPIRATION RATE: 18 BRPM | BODY MASS INDEX: 34.15 KG/M2 | SYSTOLIC BLOOD PRESSURE: 140 MMHG | WEIGHT: 200 LBS

## 2020-09-05 LAB
ALBUMIN SERPL-MCNC: 2.9 G/DL (ref 3.5–5)
ALBUMIN/GLOB SERPL: 1 {RATIO} (ref 1.1–2.2)
ALP SERPL-CCNC: 39 U/L (ref 45–117)
ALT SERPL-CCNC: 25 U/L (ref 12–78)
ANION GAP SERPL CALC-SCNC: 2 MMOL/L (ref 5–15)
AST SERPL-CCNC: 7 U/L (ref 15–37)
BILIRUB SERPL-MCNC: 0.3 MG/DL (ref 0.2–1)
BUN SERPL-MCNC: 23 MG/DL (ref 6–20)
BUN/CREAT SERPL: 34 (ref 12–20)
CALCIUM SERPL-MCNC: 8.7 MG/DL (ref 8.5–10.1)
CHLORIDE SERPL-SCNC: 109 MMOL/L (ref 97–108)
CO2 SERPL-SCNC: 26 MMOL/L (ref 21–32)
CREAT SERPL-MCNC: 0.67 MG/DL (ref 0.55–1.02)
ERYTHROCYTE [DISTWIDTH] IN BLOOD BY AUTOMATED COUNT: 12.6 % (ref 11.5–14.5)
GLOBULIN SER CALC-MCNC: 3 G/DL (ref 2–4)
GLUCOSE SERPL-MCNC: 150 MG/DL (ref 65–100)
HCT VFR BLD AUTO: 32.8 % (ref 35–47)
HGB BLD-MCNC: 11.2 G/DL (ref 11.5–16)
MCH RBC QN AUTO: 32.3 PG (ref 26–34)
MCHC RBC AUTO-ENTMCNC: 34.1 G/DL (ref 30–36.5)
MCV RBC AUTO: 94.5 FL (ref 80–99)
NRBC # BLD: 0 K/UL (ref 0–0.01)
NRBC BLD-RTO: 0 PER 100 WBC
PLATELET # BLD AUTO: 294 K/UL (ref 150–400)
PMV BLD AUTO: 10.7 FL (ref 8.9–12.9)
POTASSIUM SERPL-SCNC: 3.9 MMOL/L (ref 3.5–5.1)
PROT SERPL-MCNC: 5.9 G/DL (ref 6.4–8.2)
RBC # BLD AUTO: 3.47 M/UL (ref 3.8–5.2)
SODIUM SERPL-SCNC: 137 MMOL/L (ref 136–145)
WBC # BLD AUTO: 5.5 K/UL (ref 3.6–11)

## 2020-09-05 PROCEDURE — 74011250636 HC RX REV CODE- 250/636: Performed by: INTERNAL MEDICINE

## 2020-09-05 PROCEDURE — 74011250637 HC RX REV CODE- 250/637: Performed by: NURSE PRACTITIONER

## 2020-09-05 PROCEDURE — 74011250637 HC RX REV CODE- 250/637: Performed by: INTERNAL MEDICINE

## 2020-09-05 PROCEDURE — 94760 N-INVAS EAR/PLS OXIMETRY 1: CPT

## 2020-09-05 PROCEDURE — 36415 COLL VENOUS BLD VENIPUNCTURE: CPT

## 2020-09-05 PROCEDURE — 80053 COMPREHEN METABOLIC PANEL: CPT

## 2020-09-05 PROCEDURE — 85027 COMPLETE CBC AUTOMATED: CPT

## 2020-09-05 RX ORDER — ALPRAZOLAM 0.25 MG/1
0.25 TABLET ORAL
Qty: 6 TAB | Refills: 0 | Status: SHIPPED | OUTPATIENT
Start: 2020-09-05 | End: 2020-09-28 | Stop reason: SDUPTHER

## 2020-09-05 RX ORDER — PREDNISONE 10 MG/1
TABLET ORAL
Qty: 20 TAB | Refills: 0 | Status: ON HOLD | OUTPATIENT
Start: 2020-09-05 | End: 2020-11-27

## 2020-09-05 RX ORDER — DOCUSATE SODIUM 100 MG/1
100 CAPSULE, LIQUID FILLED ORAL DAILY
Qty: 30 CAP | Refills: 2 | Status: SHIPPED | OUTPATIENT
Start: 2020-09-06 | End: 2020-12-05

## 2020-09-05 RX ADMIN — GABAPENTIN 800 MG: 100 CAPSULE ORAL at 09:47

## 2020-09-05 RX ADMIN — CLONAZEPAM 0.5 MG: 0.5 TABLET ORAL at 09:46

## 2020-09-05 RX ADMIN — ENOXAPARIN SODIUM 40 MG: 40 INJECTION SUBCUTANEOUS at 09:47

## 2020-09-05 RX ADMIN — Medication 10 ML: at 05:26

## 2020-09-05 RX ADMIN — DULOXETINE HYDROCHLORIDE 60 MG: 30 CAPSULE, DELAYED RELEASE ORAL at 09:46

## 2020-09-05 RX ADMIN — DOCUSATE SODIUM 100 MG: 100 CAPSULE, LIQUID FILLED ORAL at 09:46

## 2020-09-05 NOTE — PROGRESS NOTES
Bedside and Verbal shift change report given to Orlando Rashid RN (oncoming nurse) by Evans Wright RN (offgoing nurse). Report included the following information SBAR, Kardex and Intake/Output.

## 2020-09-05 NOTE — PROGRESS NOTES
SHOLA  Home to apartment with stan to assist; outpatient therapy to be arranged by patient;   IPR was not approved by patient's insurance company; Patient does not want to go to a SNF; Patient feels that home health does not help her because of the short distance that they have to work with in her small one bedroom apartment. She thinks that ouptatient therapy may work best for her and she will have to and from car transport provided by her fiance. Transportation to be provided by [email or web address] [facility]. Patient voiced concern that she may not have enough medicines to last her through the weekend. This concern was relayed to Sabiha Mckay NP who will prescribe medicines that fall within her realm of practice. She will try to prescribe enough of patient's other medicines until patient can get refills for those from her other physicians on Tuesday, 9/8, after the 3 day holiday weekend.     Harsha Rasmussen RN  Care Manager  Ext [phone]

## 2020-09-05 NOTE — DISCHARGE SUMMARY
Hospitalist Discharge Summary     Patient ID:  Tani Hunt  [id number]  29 y.o.  1975 8/31/2020    PCP on record: [place] Romeo MD    Admit date: 8/31/2020  Discharge date and time: 9/5/2020    DISCHARGE DIAGNOSIS:   Multiple Sclerosis exacerbation POA  Generalized anxiety disorder POA  Tobacco abuse POA  Chronic Constipation POA  Severe Obesity secondary to inactivity r/t impaired mobility POA      CONSULTATIONS:  IP CONSULT TO NEUROLOGY  IP CONSULT TO NEUROLOGY    Excerpted HPI from H&P of Ophelia Chavez MD:    Ekta Pereira is a 53 y.o. occasion woman with a past medical history of relapsing remitting multiple sclerosis who is resenting for weakness. Patient was discharged approximately 2 weeks ago. She states she was feeling better at the time of discharge, but she has had acute worsening of her bilateral lower extremity weakness, and her bilateral upper extremities are mildly weak compared to baseline. She has chronic weakness of her upper extremities, but the are worse than baseline. She talked to her neurologist to recommend that she comes into the ER for evaluation. She endorses she is continued to have intermittent mechanical falls, and that she is unable to walk. She has no other complaints at this time.     Patient states she has not taken any of her medications in over 24 hours. Specifically asked due to patient seeing overly sedated and difficulty answering questions. On discussion with nursing patient had taken what she thought was a cyclobenzaprine. ______________________________________________________________________  DISCHARGE SUMMARY/HOSPITAL COURSE:  for full details see H&P, daily progress notes, labs, consult notes. The patient is a pleasant 53year old female admitted with Multiple Sclerosis exacerbation. She was recently discharged two weeks ago but returned with worsening leg weakness.  She was seen by Neurology for continue care and management. She was started on IV solu-medrol and PT/OT evaluation. She was recommended for in patient rehab but insurance denied authorization. She was given the opportunity to go to SNF but declined. She also declined Home Health PT/OT because she states she lives in a small apartment and it is not conducive for therapy. She has agreed to out patient Physical therapy. She is followed by Dr. Dyann Goltz as out patient. She has been cleared for discharge home with out patient Physical therapy. Discussed with patient.     MRI of cervical spine: IMPRESSION:    1. Stable extensive, patchy chronic demyelinating plaques throughout the  cervical and upper thoracic cord. No evidence of active demyelination. MRI of Thoracic spine 9/1/20: IMPRESSION:  1. Progressive multifocal areas of cord signal abnormality within the lower     Multiple Sclerosis exacerbation POA: baseline, follow up with Dr. Dyann Goltz, continue current medications. PT as out patient    Generalized anxiety disorder POA: Continue home medications    Tobacco abuse POA: discussed nicotine cessation    Chronic Constipation POA:  Miralax daily, docusate daily    Severe Obesity secondary to inactivity r/t impaired mobility POA  _______________________________________________________________________  Patient seen and examined by me on discharge day. Pertinent Findings:  Gen:    Not in distress, pleasant  Chest: Clear lungs  CVS:   Regular rhythm. No edema  Abd:  Soft, not distended, not tender  Neuro:  Alert & Oriented x 4, not anxious  _______________________________________________________________________  DISCHARGE MEDICATIONS:   Current Discharge Medication List      START taking these medications    Details   docusate sodium (COLACE) 100 mg capsule Take 1 Cap by mouth daily for 90 days.   Qty: 30 Cap, Refills: 2      predniSONE (DELTASONE) 10 mg tablet 40 mg daily x 3 days  30 mg daily x 2 days  20 mg daily x 3 days  10 mg daily x 2 days  Then stop  Qty: 20 Tab, Refills: 0      ALPRAZolam (Xanax) 0.25 mg tablet Take 1 Tab by mouth two (2) times daily as needed for Anxiety. Max Daily Amount: 0.5 mg. Indications: anxiousness associated with depression  Qty: 6 Tab, Refills: 0    Associated Diagnoses: Anxiety         CONTINUE these medications which have NOT CHANGED    Details   gabapentin (NEURONTIN) 400 mg capsule Take 2 Caps by mouth two (2) times a day. Max Daily Amount: 1,600 mg. Take 2 capsules 2 times a day, do not exceed 1600 mg daily  Qty: 60 Cap, Refills: 0    Associated Diagnoses: Chronic pain syndrome; MS (multiple sclerosis) ([facility] [address] Tobacco abuse; Anxiety; Adjustment disorder with mixed anxiety and depressed mood; Severe episode of recurrent major depressive disorder, without psychotic features ([facility]      dimethyl fumarate (Tecfidera) 240 mg cpDR Take 240 mg by mouth two (2) times a day. Qty: 120 Cap, Refills: 3    Associated Diagnoses: MS (multiple sclerosis) ([facility] [address] Body aches; Chronic pain syndrome; Benzodiazepine dependence (HCC)      cyclobenzaprine (FLEXERIL) 10 mg tablet Take 1 Tab by mouth three (3) times daily as needed for Muscle Spasm(s). Qty: 90 Tab, Refills: 2      !! ibuprofen (MOTRIN) 200 mg tablet Take  by mouth every six (6) hours as needed for Pain. Take three tablets every 6 hours as needed by mouth       clonazePAM (KlonoPIN) 0.5 mg tablet Take 1 Tab by mouth two (2) times a day. Max Daily Amount: 1 mg. Qty: 60 Tab, Refills: 0    Associated Diagnoses: MS (multiple sclerosis) (HCC)      melatonin 3 mg tablet Take 1 Tab by mouth nightly as needed for Insomnia. Qty: 30 Tab, Refills: 4      DULoxetine (CYMBALTA) 60 mg capsule Take 1 Cap by mouth daily. Indications: neuropathic pain  Qty: 30 Cap, Refills: 1    Associated Diagnoses: Chronic pain syndrome; MS (multiple sclerosis) ([facility] [address] Tobacco abuse      senna-docusate (PERICOLACE) 8.6-50 mg per tablet Take 2 Tabs by mouth daily.   Qty: 15 Tab, Refills: 0    Associated Diagnoses: Slow transit constipation      !! ibuprofen (MOTRIN) 600 mg tablet Take 1 Tab by mouth every six (6) hours as needed for Pain. Qty: 20 Tab, Refills: 0       !! - Potential duplicate medications found. Please discuss with provider. STOP taking these medications       LORazepam (ATIVAN) 1 mg tablet Comments:   Reason for Stopping:                 Patient Follow Up Instructions: Activity: Activity as tolerated  Diet: Diabetic Diet  Wound Care: None needed    Follow-up with Dr. Maxi Larkin in 4 days.   Follow-up tests/labs none  Follow-up Information     Follow up With Specialties Details Why Contact Info    Elias Mcdermott MD Neurology In 4 days hospital follow, medication adjustments [id number]      [place], [address]   [facility] [address]  [phone]          ________________________________________________________________    Risk of deterioration: Moderate    Condition at Discharge:  Stable  __________________________________________________________________    Disposition  Home with out patient physical therapy    ____________________________________________________________________    Code Status: Full Code  ___________________________________________________________________      Total time in minutes spent coordinating this discharge (includes going over instructions, follow-up, prescriptions, and preparing report for sign off to her PCP) :  45 minutes    Signed:  Sigrid Meredith NP

## 2020-09-05 NOTE — PROGRESS NOTES
I have reviewed discharge instructions with the PATIENT PARENT GUARDIAN: patient. The patient verbalized understanding. Discharge medications reviewed with patient and appropriate educational materials and side effects teaching were provided. Follow-up appointments reviewed. Opportunity for questions and clarification was provided. Venous access removed without difficulty. Patient's belongings gathered and sent with patient. Patient is ready for discharge.      Labette Healtho

## 2020-09-05 NOTE — PROGRESS NOTES
Problem: Falls - Risk of  Goal: *Absence of Falls  Description: Document Chris Click Fall Risk and appropriate interventions in the flowsheet.   Outcome: Progressing Towards Goal  Note: Fall Risk Interventions:  Mobility Interventions: Bed/chair exit alarm         Medication Interventions: Patient to call before getting OOB    Elimination Interventions: Patient to call for help with toileting needs    History of Falls Interventions: Bed/chair exit alarm

## 2020-09-06 LAB
BACTERIA SPEC CULT: NORMAL
BACTERIA SPEC CULT: NORMAL
SERVICE CMNT-IMP: NORMAL
SERVICE CMNT-IMP: NORMAL

## 2020-09-07 ENCOUNTER — PATIENT OUTREACH (OUTPATIENT)
Dept: CASE MANAGEMENT | Age: 45
End: 2020-09-07

## 2020-09-07 NOTE — PROGRESS NOTES
Patient was admitted to [facility] on 8-31-20 and discharged on 9-5-20 for:    DISCHARGE DIAGNOSIS:   Multiple Sclerosis exacerbation POA  Generalized anxiety disorder POA  Tobacco abuse POA  Chronic Constipation POA  Severe Obesity secondary to inactivity r/t impaired mobility POA    Patient was contacted within one business day of discharge. Discharge Challenges to be reviewed by the provider:   Additional needs identified to be addressed with provider:  yes  -  Patient reports decreased mobility that exacerbated prior to her most recent admission and states decreased mobility is not improving. Patient states her level of anxiety is increasing. Patient reports eight falls in the last six months and twelve falls in the last twelve months, states she did not sustain any traumatic injury with these falls and did not seek medical attention.  - Patient states she is not currently taking Tecfidera 240mg twice a day. 'Not Taking' notation made on today's medication reconciliation. Discussed COVID-19 related testing which was available at this time. Test results were negative. Patient informed of results, if available? yes   Method of communication with provider : chart routing       Component      Latest Ref Rng & Units 9/5/2020 9/4/2020 9/3/2020           3:19 AM  4:44 AM  5:29 AM   Chloride      97 - 108 mmol/L 109 (H) 109 (H) 111 (H)     Component      Latest Ref Rng & Units 9/5/2020 9/4/2020 9/3/2020           3:19 AM  4:44 AM  5:29 AM   Anion gap      5 - 15 mmol/L 2 (L) 5 2 (L)   Glucose      65 - 100 mg/dL 150 (H) 137 (H) 133 (H)   BUN      6 - 20 MG/DL 23 (H) 26 (H) 22 (H)     Component      Latest Ref Rng & Units 9/5/2020 9/4/2020 9/3/2020           3:19 AM  4:44 AM  5:29 AM   BUN/Creatinine ratio      12 - 20   34 (H) 37 (H) 30 (H)     Component      Latest Ref Rng & Units 9/5/2020 9/4/2020 9/3/2020           3:19 AM  4:44 AM  5:29 AM   AST      15 - 37 U/L 7 (L) 6 (L) 8 (L)   Alk. phosphatase      45 - 117 U/L 39 (L) 42 (L) 44 (L)   Protein, total      6.4 - 8.2 g/dL 5.9 (L) 6.1 (L) 6.2 (L)   Albumin      3.5 - 5.0 g/dL 2.9 (L) 3.0 (L) 3.1 (L)     Component      Latest Ref Rng & Units 2020 2020 9/3/2020           3:19 AM  4:44 AM  5:29 AM   A-G Ratio      1.1 - 2.2   1.0 (L) 1.0 (L) 1.0 (L)     Component      Latest Ref Rng & Units 2020 2020 9/3/2020           3:19 AM  4:44 AM  5:29 AM   RBC      3.80 - 5.20 M/uL 3.47 (L) 3.44 (L) 3.41 (L)   HGB      11.5 - 16.0 g/dL 11.2 (L) 11.1 (L) 10.9 (L)   HCT      35.0 - 47.0 % 32.8 (L) 32.9 (L) 33.2 (L)     Component      Latest Ref Rng & Units 2020           6:29 PM   BENZODIAZEPINES      NEG   Positive (A)     Component      Latest Ref Rng & Units 2020           6:29 PM   THC (TH-CANNABINOL)      NEG   Positive (A)     Component      Latest Ref Rng & Units 2020           4:46 AM   aPTT      22.1 - 32.0 sec 21.4 (L)     Advance Care Planning:   Does patient have an Advance Directive:  currently not on file; education provided     Was this a readmission? yes   Patient stated reason for the admission: \"My MS flared-up. \"  Patients top risk factors for readmission: medical condition  Interventions to address risk factors: Education provided regarding MS exacerbation, falls, and safety. Patient verbalized an understanding. Care Transition Nurse (CTN) contacted the patient by telephone to perform post hospital discharge assessment. Verified name and  with patient as identifiers. Provided introduction to self, and explanation of the CTN role. CTN reviewed discharge instructions, medical action plan and red flags with patient who verbalized understanding. Patient given an opportunity to ask questions and does not have any further questions or concerns at this time. The patient agrees to contact the PCP office for questions related to their healthcare.      Medication reconciliation was performed with patient, who verbalizes understanding of administration of home medications. Advised obtaining a 90-day supply of all daily and as-needed medications. Referral to Pharm D needed: no     Home Health/Outpatient orders at discharge: [address]: n/a  Date of initial visit: n/a  Patient states she plans to call [facility] to schedule outpatient PT appointments on Tuesday, 9-8-20 (today is Labor Day holiday). Patient states order will be provided by Dr. Brionna Hyde. Durable Medical Equipment ordered at discharge: none  [facility] received: n/a    Covid Risk Education    Patient has following risk factors of: multiple sclerosis. Education provided regarding infection prevention, and signs and symptoms of COVID-19 and when to seek medical attention with patient who verbalized understanding. Discussed exposure protocols and quarantine From CDC: Are you at higher risk for severe illness?  and given an opportunity for questions and concerns. The patient agrees to contact the COVID-19 hotline [phone] or PCP office for questions related to COVID-19. For more information on steps you can take to protect yourself, see CDC's How to Protect Yourself     Patient/family/caregiver given information for [employer] and agrees to enroll no  Patient's preferred e-mail: declines  Patient's preferred phone number: declines    Discussed follow-up appointments. If no appointment was previously scheduled, appointment scheduling offered: yes  [facility] follow up appointment(s):   Future Appointments   Date Time Provider Naveen Martins   10/12/2020  1:00 PM [facility]     [facility] follow up appointment(s): Please see below for appointments. Plan for follow-up call in 10-14 days based on severity of symptoms and risk factors. CTN provided contact information for future needs. Goals      Attends follow-up appointments as directed.       9-7-20: Patient states she will call the offices of Dr. Shalom Toney and Dr. Vinod Hernadez. Ashrafi/PCP tomorrow, 9-8-20, to schedule follow-up appointments (today is Labor Day holiday). Patient is aware that she needs neuro follow-up appointment within 4 days of discharge, per discharge instructions. Patient states she will call [facility] to schedule outpatient PT appointments tomorrow, 9-8-20, and patient states PT order will be provided by Dr. Shalom Toney. Patient states her Link [employer], is currently providing transportation to/from appointments. Pearle Mcardle Understands red flags post discharge. 9-7-20: Red flags of MS exacerbation reviewed with patient and patient verbalizes an understanding. Patient reports decreased mobility that exacerbated prior to her most recent admission and states decreased mobility is not improving. Patient states her level of anxiety is increasing. Patient reports eight falls in the last six months and twelve falls in the last twelve months, states she did not sustain any traumatic injury with these falls and did not seek medical attention. Care Transitions Nurse will review red flags again on next phone conversation with patient.  Daren Asif

## 2020-09-07 NOTE — Clinical Note
Patient plans to call office Tuesday, 9-8-20, to schedule SHOLA appointment. May need order for outpatient PT at [place] [address].

## 2020-09-22 ENCOUNTER — TELEPHONE (OUTPATIENT)
Dept: NEUROLOGY | Age: 45
End: 2020-09-22

## 2020-09-22 NOTE — TELEPHONE ENCOUNTER
Pt called to request a refill on her Gabapentin. She is out of medication and said that she is in pain.

## 2020-09-24 DIAGNOSIS — F43.23 ADJUSTMENT DISORDER WITH MIXED ANXIETY AND DEPRESSED MOOD: ICD-10-CM

## 2020-09-24 DIAGNOSIS — Z72.0 TOBACCO ABUSE: ICD-10-CM

## 2020-09-24 DIAGNOSIS — F41.9 ANXIETY: ICD-10-CM

## 2020-09-24 DIAGNOSIS — G89.4 CHRONIC PAIN SYNDROME: ICD-10-CM

## 2020-09-24 DIAGNOSIS — F33.2 SEVERE EPISODE OF RECURRENT MAJOR DEPRESSIVE DISORDER, WITHOUT PSYCHOTIC FEATURES (HCC): ICD-10-CM

## 2020-09-24 DIAGNOSIS — G35 MS (MULTIPLE SCLEROSIS) (HCC): ICD-10-CM

## 2020-09-24 NOTE — TELEPHONE ENCOUNTER
Script called in to pharmacy and left on voicemail.  Per verbal order from Dr. Jef Bains    Please sign off on

## 2020-09-24 NOTE — TELEPHONE ENCOUNTER
----- Message from Cassia Stern sent at 9/24/2020 10:42 AM EDT -----  Regarding: Hamzah Damon Message/Vendor Calls    Caller's first and last name: Patient       Reason for call:Gabapentin       Callback required yes/no and why: Yes about refill       Best contact number(s): [phone]      Details to clarify the request: Patient would like a call back about her request to have a refill on Rx \" gabapentin. \" Patient put in request two days ago and has not heard back.        Cassia Stern

## 2020-09-24 NOTE — TELEPHONE ENCOUNTER
Pt called the office, she is still waiting for her Gabapentin rx to be refilled. She is out of medication.

## 2020-09-28 ENCOUNTER — OFFICE VISIT (OUTPATIENT)
Dept: NEUROLOGY | Age: 45
End: 2020-09-28
Payer: MEDICARE

## 2020-09-28 VITALS
RESPIRATION RATE: 18 BRPM | HEART RATE: 83 BPM | BODY MASS INDEX: 31.76 KG/M2 | OXYGEN SATURATION: 98 % | DIASTOLIC BLOOD PRESSURE: 80 MMHG | TEMPERATURE: 98.2 F | SYSTOLIC BLOOD PRESSURE: 124 MMHG | HEIGHT: 64 IN | WEIGHT: 186 LBS

## 2020-09-28 DIAGNOSIS — F33.2 SEVERE EPISODE OF RECURRENT MAJOR DEPRESSIVE DISORDER, WITHOUT PSYCHOTIC FEATURES (HCC): ICD-10-CM

## 2020-09-28 DIAGNOSIS — Z72.0 TOBACCO ABUSE: ICD-10-CM

## 2020-09-28 DIAGNOSIS — Z11.59 ENCOUNTER FOR SCREENING FOR OTHER VIRAL DISEASES: ICD-10-CM

## 2020-09-28 DIAGNOSIS — G35 MS (MULTIPLE SCLEROSIS) (HCC): Primary | ICD-10-CM

## 2020-09-28 DIAGNOSIS — G89.4 CHRONIC PAIN SYNDROME: ICD-10-CM

## 2020-09-28 DIAGNOSIS — F43.23 ADJUSTMENT DISORDER WITH MIXED ANXIETY AND DEPRESSED MOOD: ICD-10-CM

## 2020-09-28 DIAGNOSIS — G37.9 DEMYELINATING DISEASE (HCC): ICD-10-CM

## 2020-09-28 DIAGNOSIS — F41.9 ANXIETY: ICD-10-CM

## 2020-09-28 PROCEDURE — 99215 OFFICE O/P EST HI 40 MIN: CPT | Performed by: PSYCHIATRY & NEUROLOGY

## 2020-09-28 PROCEDURE — G8427 DOCREV CUR MEDS BY ELIG CLIN: HCPCS | Performed by: PSYCHIATRY & NEUROLOGY

## 2020-09-28 PROCEDURE — G8432 DEP SCR NOT DOC, RNG: HCPCS | Performed by: PSYCHIATRY & NEUROLOGY

## 2020-09-28 PROCEDURE — G8417 CALC BMI ABV UP PARAM F/U: HCPCS | Performed by: PSYCHIATRY & NEUROLOGY

## 2020-09-28 PROCEDURE — 1111F DSCHRG MED/CURRENT MED MERGE: CPT | Performed by: PSYCHIATRY & NEUROLOGY

## 2020-09-28 RX ORDER — GABAPENTIN 400 MG/1
800 CAPSULE ORAL 2 TIMES DAILY
Qty: 60 CAP | Refills: 5 | OUTPATIENT
Start: 2020-09-28 | End: 2020-09-28 | Stop reason: SDUPTHER

## 2020-09-28 RX ORDER — ALPRAZOLAM 0.25 MG/1
0.25 TABLET ORAL
Qty: 6 TAB | Refills: 0 | Status: SHIPPED | OUTPATIENT
Start: 2020-09-28 | End: 2021-03-30

## 2020-09-28 RX ORDER — GABAPENTIN 400 MG/1
800 CAPSULE ORAL 2 TIMES DAILY
Qty: 240 CAP | Refills: 5 | Status: SHIPPED | OUTPATIENT
Start: 2020-09-28 | End: 2020-10-07 | Stop reason: SDUPTHER

## 2020-09-28 RX ORDER — DULOXETIN HYDROCHLORIDE 60 MG/1
60 CAPSULE, DELAYED RELEASE ORAL DAILY
Qty: 90 CAP | Refills: 5 | Status: SHIPPED | OUTPATIENT
Start: 2020-09-28 | End: 2021-03-23 | Stop reason: SDUPTHER

## 2020-09-28 NOTE — LETTER
9/28/20 Patient: Cameron Magallon YOB: 1975 Date of Visit: 9/28/2020 Alina Sargent MD 
[address] [place] [id number] VIA In Basket Dear Alina Sargent MD, Thank you for referring Ms. Claudell Arias to [address] [place] [address] for evaluation. My notes for this consultation are attached. If you have questions, please do not hesitate to call me. I look forward to following your patient along with you. Sincerely, Alaina Engle NP

## 2020-09-28 NOTE — PATIENT INSTRUCTIONS
We can get you started on the medication called Mavenclad Written information has been given for you to review I needed to get blood work done I will get the MRI scan of your brain Use your alprazolam prior to the MRI scan to help keep you, Ominous see you back in the office in 2 months by then you should have completed with any luck at all your first 2 months of the [facility]

## 2020-09-30 ENCOUNTER — TELEPHONE (OUTPATIENT)
Dept: NEUROLOGY | Age: 45
End: 2020-09-30

## 2020-10-01 ENCOUNTER — TELEPHONE (OUTPATIENT)
Dept: NEUROLOGY | Age: 45
End: 2020-10-01

## 2020-10-01 NOTE — TELEPHONE ENCOUNTER
----- Message from Naty Jennings sent at 10/1/2020  2:50 PM EDT -----  Regarding: YENIFER Chan/Telephone  Caller's first and last name: Noristrudy Hernandez,  with [address]  Reason for call: Correct Key Code with CoverMyMeds  Callback required yes/no and why: N  Best contact number(s): [phone]  Details to clarify the request: Noris Hernandez called to update the Pt.'s chart with the correct key code for CoverMyMeds. The correct key code is [id number]. Please call Noris Hernandez if there are any questions or concerns or if clarification is needed.

## 2020-10-02 LAB
ALBUMIN SERPL-MCNC: 4.1 G/DL (ref 3.8–4.8)
ALBUMIN/GLOB SERPL: 1.8 {RATIO} (ref 1.2–2.2)
ALP SERPL-CCNC: 57 IU/L (ref 39–117)
ALT SERPL-CCNC: 6 IU/L (ref 0–32)
AST SERPL-CCNC: 12 IU/L (ref 0–40)
BASOPHILS # BLD AUTO: 0 X10E3/UL (ref 0–0.2)
BASOPHILS NFR BLD AUTO: 1 %
BILIRUB SERPL-MCNC: 0.3 MG/DL (ref 0–1.2)
BUN SERPL-MCNC: 8 MG/DL (ref 6–24)
BUN/CREAT SERPL: 10 (ref 9–23)
CALCIUM SERPL-MCNC: 9.3 MG/DL (ref 8.7–10.2)
CHLORIDE SERPL-SCNC: 107 MMOL/L (ref 96–106)
CO2 SERPL-SCNC: 20 MMOL/L (ref 20–29)
CREAT SERPL-MCNC: 0.77 MG/DL (ref 0.57–1)
EOSINOPHIL # BLD AUTO: 0.1 X10E3/UL (ref 0–0.4)
EOSINOPHIL NFR BLD AUTO: 4 %
ERYTHROCYTE [DISTWIDTH] IN BLOOD BY AUTOMATED COUNT: 12.7 % (ref 11.7–15.4)
GLOBULIN SER CALC-MCNC: 2.3 G/DL (ref 1.5–4.5)
GLUCOSE SERPL-MCNC: 80 MG/DL (ref 65–99)
HBV CORE AB SERPL QL IA: NEGATIVE
HBV CORE IGM SERPL QL IA: NEGATIVE
HBV E AB SERPL QL IA: NEGATIVE
HBV E AG SERPL QL IA: NEGATIVE
HBV SURFACE AB SER QL: NON REACTIVE
HBV SURFACE AG SERPL QL IA: NEGATIVE
HCT VFR BLD AUTO: 37 % (ref 34–46.6)
HGB BLD-MCNC: 12.3 G/DL (ref 11.1–15.9)
IMM GRANULOCYTES # BLD AUTO: 0 X10E3/UL (ref 0–0.1)
IMM GRANULOCYTES NFR BLD AUTO: 0 %
JCPYV DNA SPEC QL NAA+PROBE: NEGATIVE
LYMPHOCYTES # BLD AUTO: 0.9 X10E3/UL (ref 0.7–3.1)
LYMPHOCYTES NFR BLD AUTO: 25 %
MCH RBC QN AUTO: 31.1 PG (ref 26.6–33)
MCHC RBC AUTO-ENTMCNC: 33.2 G/DL (ref 31.5–35.7)
MCV RBC AUTO: 94 FL (ref 79–97)
MONOCYTES # BLD AUTO: 0.2 X10E3/UL (ref 0.1–0.9)
MONOCYTES NFR BLD AUTO: 6 %
NEUTROPHILS # BLD AUTO: 2.4 X10E3/UL (ref 1.4–7)
NEUTROPHILS NFR BLD AUTO: 64 %
PLATELET # BLD AUTO: 280 X10E3/UL (ref 150–450)
POTASSIUM SERPL-SCNC: 4.2 MMOL/L (ref 3.5–5.2)
PROT SERPL-MCNC: 6.4 G/DL (ref 6–8.5)
RBC # BLD AUTO: 3.95 X10E6/UL (ref 3.77–5.28)
SODIUM SERPL-SCNC: 140 MMOL/L (ref 134–144)
VZV IGG SER IA-ACNC: 2387 INDEX
WBC # BLD AUTO: 3.8 X10E3/UL (ref 3.4–10.8)

## 2020-10-05 ENCOUNTER — TELEPHONE (OUTPATIENT)
Dept: NEUROLOGY | Age: 45
End: 2020-10-05

## 2020-10-05 ENCOUNTER — TRANSCRIBE ORDER (OUTPATIENT)
Dept: NEUROLOGY | Age: 45
End: 2020-10-05

## 2020-10-05 NOTE — TELEPHONE ENCOUNTER
Attempted PA for Gabapentin using her [employer] card - ran [employer] - active. Message returned: Eligibility could not be verified for this patient - patient not found. Please review patient information and re-submit. Unable to proceed.

## 2020-10-06 ENCOUNTER — TELEPHONE (OUTPATIENT)
Dept: NEUROLOGY | Age: 45
End: 2020-10-06

## 2020-10-06 NOTE — TELEPHONE ENCOUNTER
Sent Gabapentin PA to [facility] w/ 9/28/20 office notes via CMM - marked to Rus.      Gary CLARK ([address] [facility])  Covered: Retail, Mail Order Unknown: Specialty, Long-Term Care               Member ID: [id number] 1975 - F     Group ID: [id number]      Group Name: [id number], [id number]    Use As Primary Coverage  DELGADO, THERESA D - [employer] ([employer])  Covered: Retail, Mail Order Unknown: Specialty, Long-Term Care               Member ID: [id number] 1975 - F     Group ID:  [id number] [facility], [place] [id number]    Use As Primary Coverage

## 2020-10-07 ENCOUNTER — TELEPHONE (OUTPATIENT)
Dept: NEUROLOGY | Age: 45
End: 2020-10-07

## 2020-10-07 DIAGNOSIS — F33.2 SEVERE EPISODE OF RECURRENT MAJOR DEPRESSIVE DISORDER, WITHOUT PSYCHOTIC FEATURES (HCC): ICD-10-CM

## 2020-10-07 DIAGNOSIS — Z72.0 TOBACCO ABUSE: ICD-10-CM

## 2020-10-07 DIAGNOSIS — F41.9 ANXIETY: ICD-10-CM

## 2020-10-07 DIAGNOSIS — G89.4 CHRONIC PAIN SYNDROME: ICD-10-CM

## 2020-10-07 DIAGNOSIS — F43.23 ADJUSTMENT DISORDER WITH MIXED ANXIETY AND DEPRESSED MOOD: ICD-10-CM

## 2020-10-07 DIAGNOSIS — G35 MS (MULTIPLE SCLEROSIS) (HCC): Primary | ICD-10-CM

## 2020-10-07 RX ORDER — CLADRIBINE 10 MG/1
10 TABLET ORAL DAILY
Qty: 8 TAB | Refills: 1
Start: 2020-10-07 | End: 2020-12-22

## 2020-10-07 RX ORDER — GABAPENTIN 400 MG/1
800 CAPSULE ORAL 4 TIMES DAILY
Qty: 240 CAP | Refills: 5 | Status: ON HOLD | OUTPATIENT
Start: 2020-10-07 | End: 2020-12-02 | Stop reason: SDUPTHER

## 2020-10-07 NOTE — TELEPHONE ENCOUNTER
Gabapentin PA sent to [address] [place] [address] via [employer]han. Status is pending.        [facility]  Drug  Gabapentin 400MG capsules  Form  [address] [place] [address] Electronic PA Form

## 2020-10-07 NOTE — TELEPHONE ENCOUNTER
[facility] approved by Yobani Grimm. Good to 12/31/2021. - 8 tabs for first month and 7 tabs for 2nd month's cycle. Confirmed w/ [employer] rep, \"Taty-иван\" call ref [id number] the Donata Punter covers med globally, not based on number of tablets. Faxed to Ms ZANY OX, [employer] and our designated [employer] liaison, Rey Rios along w/ ins card, demo sheet, patient consent form, start form. Scanned to Media.

## 2020-10-07 NOTE — TELEPHONE ENCOUNTER
----- Message from Dereck Joshi sent at 10/7/2020  9:58 AM EDT -----  Regarding: Dr Maricruz Camejo  Pt is calling to check on the statue of her refill request to [facility] on [address] [place] [address]. rd, pt is out of her medicine, please call pt at [phone]

## 2020-10-12 ENCOUNTER — OFFICE VISIT (OUTPATIENT)
Dept: NEUROLOGY | Age: 45
End: 2020-10-12
Payer: MEDICARE

## 2020-10-12 VITALS
OXYGEN SATURATION: 98 % | HEART RATE: 79 BPM | WEIGHT: 187 LBS | DIASTOLIC BLOOD PRESSURE: 80 MMHG | BODY MASS INDEX: 31.92 KG/M2 | HEIGHT: 64 IN | SYSTOLIC BLOOD PRESSURE: 122 MMHG

## 2020-10-12 DIAGNOSIS — Z72.0 TOBACCO ABUSE: ICD-10-CM

## 2020-10-12 DIAGNOSIS — F41.9 ANXIETY: ICD-10-CM

## 2020-10-12 DIAGNOSIS — G35 MS (MULTIPLE SCLEROSIS) (HCC): Primary | ICD-10-CM

## 2020-10-12 DIAGNOSIS — F32.1 CURRENT MODERATE EPISODE OF MAJOR DEPRESSIVE DISORDER WITHOUT PRIOR EPISODE (HCC): ICD-10-CM

## 2020-10-12 PROCEDURE — G8427 DOCREV CUR MEDS BY ELIG CLIN: HCPCS | Performed by: PSYCHIATRY & NEUROLOGY

## 2020-10-12 PROCEDURE — G8432 DEP SCR NOT DOC, RNG: HCPCS | Performed by: PSYCHIATRY & NEUROLOGY

## 2020-10-12 PROCEDURE — 99214 OFFICE O/P EST MOD 30 MIN: CPT | Performed by: PSYCHIATRY & NEUROLOGY

## 2020-10-12 PROCEDURE — G8417 CALC BMI ABV UP PARAM F/U: HCPCS | Performed by: PSYCHIATRY & NEUROLOGY

## 2020-10-12 NOTE — PROGRESS NOTES
Chief Complaint: Multiple sclerosis    Retruns for a follow up visit. She was seen by Estee Phipps NP. She lostan MRI  her MRI scripts and will get     Assesment and Plan  1. Multiple sclerosis  Will continue with plan to start her on [address]. She is off tecfidera (new lesion)  She failed copaxone therapy (new lesions)  Start mavenclad    2. Anxiety   Referral to psychiatry    3. Fibromyaglia   Continue gabapentin  Currently prescribed under the care of Dr. Andres Salmeron    4. Tobacco use  conitnues to smoke discussed the importance quitting. Allergies  Morphine     Medications  Current Outpatient Medications   Medication Sig    cladribine,multiple sclerosis, (Mavenclad, 8 tablet pack,) 10 mg tab Take 10 mg by mouth daily. Take as directed  Indications: relapsing form of multiple sclerosis    gabapentin (NEURONTIN) 400 mg capsule Take 2 Caps by mouth four (4) times daily. Max Daily Amount: 3,200 mg.    ALPRAZolam (Xanax) 0.25 mg tablet Take 1 Tab by mouth two (2) times daily as needed for Anxiety. Max Daily Amount: 0.5 mg. Indications: anxiousness associated with depression    DULoxetine (CYMBALTA) 60 mg capsule Take 1 Cap by mouth daily. Indications: neuropathic pain    docusate sodium (COLACE) 100 mg capsule Take 1 Cap by mouth daily for 90 days.  ibuprofen (MOTRIN) 200 mg tablet Take  by mouth every six (6) hours as needed for Pain. Take three tablets every 6 hours as needed by mouth     clonazePAM (KlonoPIN) 0.5 mg tablet Take 1 Tab by mouth two (2) times a day. Max Daily Amount: 1 mg.  melatonin 3 mg tablet Take 1 Tab by mouth nightly as needed for Insomnia. (Patient taking differently: Take 3 mg by mouth nightly as needed for Insomnia. Indications: Pt state she takes 5 mg tab nightly.)    senna-docusate (PERICOLACE) 8.6-50 mg per tablet Take 2 Tabs by mouth daily.  ibuprofen (MOTRIN) 600 mg tablet Take 1 Tab by mouth every six (6) hours as needed for Pain.  (Patient taking differently: Take 200 mg by mouth.)    predniSONE (DELTASONE) 10 mg tablet 40 mg daily x 3 days  30 mg daily x 2 days  20 mg daily x 3 days  10 mg daily x 2 days  Then stop    cyclobenzaprine (FLEXERIL) 10 mg tablet Take 1 Tab by mouth three (3) times daily as needed for Muscle Spasm(s). No current facility-administered medications for this visit. Medical History  Past Medical History:   Diagnosis Date    Body aches 12/11/2014    Chronic pain     Depression     GERD (gastroesophageal reflux disease)     Headache(784.0)     History of mammogram never before    MS [facility] sclerosis) ([facility]     Neurological disorder     Multiple Sclerosis    Pap smear for cervical cancer screening 2008    Psychiatric disorder     anxiety    Psychotic disorder ([facility]      Review of Systems   Constitutional: Negative for chills and fever. HENT: Negative for ear pain. Eyes: Negative for pain and discharge. Respiratory: Negative for cough and hemoptysis. Cardiovascular: Negative for chest pain and claudication. Gastrointestinal: Negative for constipation and diarrhea. Genitourinary: Negative for flank pain and hematuria. Musculoskeletal: Positive for back pain, myalgias and neck pain. Skin: Negative for itching and rash. Neurological: Positive for dizziness. Negative for tingling and headaches. Endo/Heme/Allergies: Negative for environmental allergies. Does not bruise/bleed easily. Psychiatric/Behavioral: Negative for depression and hallucinations. The patient is nervous/anxious. Exam:    Visit Vitals  /80   Pulse 79   Ht 5' 4\" (1.626 m)   Wt 187 lb (84.8 kg)   SpO2 98%   BMI 32.10 kg/m²      General: Well developed, well nourished.  Patient in no distress   Head: Normocephalic, atraumatic, anicteric sclera   Neck Normal ROM, No thyromegally   Lungs:  Normal effort   Ext: No pedal edema   Skin: Supple no rash     NeurologicExam:  Mental Status: Alert and oriented to person place and time Speech: Fluent no aphasia or has scanning speech. Cranial Nerves:  II - XII Intact left facial droop   Motor:  Subtle right motor deficits. Normal bulk. Sensory:   Symmetric and intact   Gait:  Gait is cane dependant   Tremor:   No tremor noted. Cerebellar:  Coordination intact. Neurovascular: No carotid bruits. No JVD       Imaging    CT Results (most recent):  Results from Hospital Encounter encounter on 08/06/20   CT HEAD WO CONT    Narrative EXAMINATION:  CT HEAD WO CONT    CLINICAL INFORMATION:  MS flare  COMPARISON:  3/23/2020   TECHNIQUE: Routine axial head CT was performed. IV contrast was not  administered. Sagittal and coronal reconstructions were generated. CT dose reduction was achieved through use of a standardized protocol tailored  for this examination and automatic exposure control for dose modulation. FINDINGS:  No acute infarct, hemorrhage or mass. VENTRICULAR SYSTEM:  Normal for age. BASAL CISTERNS:  Patent. BRAIN PARENCHYMA:  Multiple hypodense lesions, consistent with the diagnosis of  multiple sclerosis. New area of vasogenic edema in the left inferior parietal  lobe, extending to the left periatrial region. Additional new area of vasogenic  edema in the right parietal white matter. Findings suspicious for areas of acute  demyelination. MIDLINE SHIFT:  None. CALVARIUM/ SKULL BASE: Intact. PARANASAL SINUSES AND MASTOID AIR CELLS: Clear. VISUALIZED ORBITS: No significant abnormalities. SELLA: No enlargement. Impression IMPRESSION:      White matter lesions consistent with the patient's diagnosis of multiple  sclerosis. At least 2 new areas of vasogenic edema when compared to March, 2020,  suspicious for acute demyelination.          MRI Results (most recent):  Results from [place] encounter on 08/31/20   MRI [facility] W WO CONT    Narrative *PRELIMINARY REPORT*    MR thoracic spine demonstrates multiple foci of increased signal intensity  thoracic cord consistent with history of demyelination without evidence of  enhancement. Preliminary report was provided by Dr. Conrado Regan, the on-call radiologist, at [phone]    Final report to follow. Exam: MRI thoracic spine. Comparison 3/8/2020 and 2/10/2020 Sequences include  sagittal and axial T1 and T2-weighted images. Sagittal STIR. After the  intravenous administration of 18 mL of Dotarem sagittal and axial T1-weighted  images were obtained. FINDINGS: Alignment of the thoracic spine is normal. Chronic superior endplate  Schmorl's node T11. No fracture or marrow replacement. There are multifocal  areas of cord signal abnormality throughout the lower cervical and thoracic  cord. These have increased in size and number since the prior study none  demonstrate postcontrast enhancement. . Multiple small protrusions. No  significant stenosis. . Probable hemangioma of segment 7 unchanged. Impression IMPRESSION:  1. Progressive multifocal areas of cord signal abnormality within the lower  cervical and thoracic cord. None demonstrate postcontrast enhancement.    I                Lab Review    Lab Results   Component Value Date/Time    WBC 3.8 09/29/2020 03:08 PM    HCT 37.0 09/29/2020 03:08 PM    HGB 12.3 09/29/2020 03:08 PM    PLATELET 726 34/75/9061 03:08 PM       Lab Results   Component Value Date/Time    Sodium 140 09/29/2020 03:08 PM    Potassium 4.2 09/29/2020 03:08 PM    Chloride 107 (H) 09/29/2020 03:08 PM    CO2 20 09/29/2020 03:08 PM    Glucose 80 09/29/2020 03:08 PM    BUN 8 09/29/2020 03:08 PM    Creatinine 0.77 09/29/2020 03:08 PM    Calcium 9.3 09/29/2020 03:08 PM       Lab Results   Component Value Date/Time    Cholesterol, total 222 (H) 03/09/2020 02:49 AM    HDL Cholesterol 71 03/09/2020 02:49 AM    LDL, calculated 143.4 (H) 03/09/2020 02:49 AM    VLDL, calculated 7.6 03/09/2020 02:49 AM    Triglyceride 38 03/09/2020 02:49 AM    CHOL/HDL Ratio 3.1 03/09/2020 02:49 AM

## 2020-10-22 ENCOUNTER — TELEPHONE (OUTPATIENT)
Dept: NEUROLOGY | Age: 45
End: 2020-10-22

## 2020-10-22 NOTE — TELEPHONE ENCOUNTER
----- Message from Mahamed Saavedra sent at 10/22/2020  2:51 PM EDT -----  Regarding: YENIFER Chan/Telephone  General Message/Vendor Calls    Caller's first and last name:      Reason for call: Antwon Bui is reporting that the new medication prescribed for her MS is not working and is requesting to be prescribed an alternative medication      Callback required yes/no and why: yes      Best contact number(s):[phone]      Details to clarify the request:      Mahamed Saavedra

## 2020-10-23 ENCOUNTER — HOSPITAL ENCOUNTER (EMERGENCY)
Age: 45
Discharge: HOME | End: 2020-10-23
Attending: EMERGENCY MEDICINE
Payer: MEDICARE

## 2020-10-23 ENCOUNTER — APPOINTMENT (OUTPATIENT)
Dept: CT IMAGING | Age: 45
End: 2020-10-23
Attending: PHYSICIAN ASSISTANT
Payer: MEDICARE

## 2020-10-23 VITALS
RESPIRATION RATE: 16 BRPM | WEIGHT: 180 LBS | SYSTOLIC BLOOD PRESSURE: 126 MMHG | BODY MASS INDEX: 29.99 KG/M2 | TEMPERATURE: 98.6 F | HEIGHT: 65 IN | OXYGEN SATURATION: 97 % | HEART RATE: 99 BPM | DIASTOLIC BLOOD PRESSURE: 97 MMHG

## 2020-10-23 DIAGNOSIS — G35 MULTIPLE SCLEROSIS (HCC): Primary | ICD-10-CM

## 2020-10-23 DIAGNOSIS — F41.1 ANXIETY STATE: ICD-10-CM

## 2020-10-23 DIAGNOSIS — S00.03XA CONTUSION OF SCALP, INITIAL ENCOUNTER: ICD-10-CM

## 2020-10-23 DIAGNOSIS — D70.9 NEUTROPENIA, UNSPECIFIED TYPE (HCC): ICD-10-CM
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
## 2020-10-23 LAB
ALBUMIN SERPL-MCNC: 3.8 G/DL (ref 3.5–5)
ALBUMIN/GLOB SERPL: 1.2 {RATIO} (ref 1.1–2.2)
ALP SERPL-CCNC: 65 U/L (ref 45–117)
ALT SERPL-CCNC: 16 U/L (ref 12–78)
ANION GAP SERPL CALC-SCNC: 11 MMOL/L (ref 5–15)
AST SERPL-CCNC: 17 U/L (ref 15–37)
BASOPHILS # BLD: 0 K/UL (ref 0–0.1)
BASOPHILS NFR BLD: 1 % (ref 0–1)
BILIRUB SERPL-MCNC: 0.5 MG/DL (ref 0.2–1)
BUN SERPL-MCNC: 12 MG/DL (ref 6–20)
BUN/CREAT SERPL: 11 (ref 12–20)
CALCIUM SERPL-MCNC: 8.9 MG/DL (ref 8.5–10.1)
CHLORIDE SERPL-SCNC: 104 MMOL/L (ref 97–108)
CO2 SERPL-SCNC: 26 MMOL/L (ref 21–32)
CREAT SERPL-MCNC: 1.05 MG/DL (ref 0.55–1.02)
DIFFERENTIAL METHOD BLD: ABNORMAL
EOSINOPHIL # BLD: 0.2 K/UL (ref 0–0.4)
EOSINOPHIL NFR BLD: 11 % (ref 0–7)
ERYTHROCYTE [DISTWIDTH] IN BLOOD BY AUTOMATED COUNT: 13.2 % (ref 11.5–14.5)
GLOBULIN SER CALC-MCNC: 3.3 G/DL (ref 2–4)
GLUCOSE SERPL-MCNC: 76 MG/DL (ref 65–100)
HCT VFR BLD AUTO: 36.6 % (ref 35–47)
HGB BLD-MCNC: 12.5 G/DL (ref 11.5–16)
IMM GRANULOCYTES # BLD AUTO: 0 K/UL (ref 0–0.04)
IMM GRANULOCYTES NFR BLD AUTO: 1 % (ref 0–0.5)
LYMPHOCYTES # BLD: 0.3 K/UL (ref 0.8–3.5)
LYMPHOCYTES NFR BLD: 23 % (ref 12–49)
MCH RBC QN AUTO: 31.9 PG (ref 26–34)
MCHC RBC AUTO-ENTMCNC: 34.2 G/DL (ref 30–36.5)
MCV RBC AUTO: 93.4 FL (ref 80–99)
MONOCYTES # BLD: 0.1 K/UL (ref 0–1)
MONOCYTES NFR BLD: 7 % (ref 5–13)
NEUTS SEG # BLD: 0.8 K/UL (ref 1.8–8)
NEUTS SEG NFR BLD: 57 % (ref 32–75)
NRBC # BLD: 0 K/UL (ref 0–0.01)
NRBC BLD-RTO: 0 PER 100 WBC
PLATELET # BLD AUTO: 159 K/UL (ref 150–400)
PMV BLD AUTO: 9.7 FL (ref 8.9–12.9)
POTASSIUM SERPL-SCNC: 4.1 MMOL/L (ref 3.5–5.1)
PROT SERPL-MCNC: 7.1 G/DL (ref 6.4–8.2)
RBC # BLD AUTO: 3.92 M/UL (ref 3.8–5.2)
RBC MORPH BLD: ABNORMAL
SODIUM SERPL-SCNC: 141 MMOL/L (ref 136–145)
WBC # BLD AUTO: 1.4 K/UL (ref 3.6–11)

## 2020-10-23 PROCEDURE — 85025 COMPLETE CBC W/AUTO DIFF WBC: CPT

## 2020-10-23 PROCEDURE — 99284 EMERGENCY DEPT VISIT MOD MDM: CPT

## 2020-10-23 PROCEDURE — 74011250637 HC RX REV CODE- 250/637: Performed by: PHYSICIAN ASSISTANT

## 2020-10-23 PROCEDURE — 80053 COMPREHEN METABOLIC PANEL: CPT

## 2020-10-23 PROCEDURE — 36415 COLL VENOUS BLD VENIPUNCTURE: CPT

## 2020-10-23 PROCEDURE — 99285 EMERGENCY DEPT VISIT HI MDM: CPT

## 2020-10-23 PROCEDURE — 70450 CT HEAD/BRAIN W/O DYE: CPT

## 2020-10-23 RX ORDER — LORAZEPAM 1 MG/1
1 TABLET ORAL
Status: COMPLETED | OUTPATIENT
Start: 2020-10-23 | End: 2020-10-23

## 2020-10-23 RX ADMIN — LORAZEPAM 1 MG: 1 TABLET ORAL at 11:24
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
## 2020-10-23 NOTE — DISCHARGE INSTRUCTIONS
Patient Education        Multiple Sclerosis (MS): Care Instructions  Your Care Instructions  Multiple sclerosis, also called MS, is a disease that can affect the brain, spinal cord, and nerves to the eyes. MS can cause problems with muscle control and strength, vision, balance, feeling, and thinking. Whatever your symptoms are, taking medicine correctly and following your doctor's advice for home care can help you maintain your quality of life. Follow-up care is a key part of your treatment and safety. Be sure to make and go to all appointments, and call your doctor if you are having problems. It's also a good idea to know your test results and keep a list of the medicines you take. How can you care for yourself at home? General care  · Take your medicines exactly as prescribed. Call your doctor if you think you are having a problem with your medicine. · Use a cane, walker, or scooter if your doctor suggests it. · Keep doing your normal activities as much as you can. · If you have problems urinating, press or tap your bladder area to help start urine flow. If you have trouble controlling your urine, plan your fluid intake and activities so that a toilet will be available when you need it. · Spend time with family and friends. Join a support group for people with MS if you want extra help. · Depression is common with this condition. Tell your doctor if you have trouble sleeping, are eating too much or are not hungry, or feel sad or tearful all the time. Depression can be treated with medicine and counseling. Diet and exercise  · Eat a balanced diet. · If you have problems swallowing, change how and what you eat:  ? Try thick drinks, such as milk shakes. They are easier to swallow than other fluids. ? Do not eat foods that crumble easily. These can cause choking. ? Use a  to prepare food. Soft foods need less chewing.   ? Eat small meals often so that you do not get tired from eating larger meals.  · Get exercise on most days. Work with your doctor to set up a program of walking, swimming, or other exercise that you are able to do. A physical therapist can teach you exercises if you cannot walk but can move your limbs and trunk. Or you can do exercises to help with coordination and balance. You can help improve muscle stiffness by doing exercises while lying in certain positions. When should you call for help? Call your doctor now or seek immediate medical care if:    · You have a change in symptoms.     · You fall or have another injury.     · You have symptoms of a urinary infection. For example:  ? You have blood or pus in your urine. ? You have pain in your back just below your rib cage. This is called flank pain. ? You have a fever, chills, or body aches. ? It hurts to urinate. ? You have groin or belly pain. Watch closely for changes in your health, and be sure to contact your doctor if:    · You want more information about MS or medicines.     · You have questions about alternative treatments. Do not use any other treatments without talking to your doctor first.   Where can you learn more? Go to http://www.gray.com/  Enter C065 in the search box to learn more about \"Multiple Sclerosis (MS): Care Instructions. \"  Current as of: November 20, 2019               Content Version: 12.6  © 7529-1264 [employer]. Care instructions adapted under license by [employer] (which disclaims liability or warranty for this information). If you have questions about a medical condition or this instruction, always ask your healthcare professional. [address] any warranty or liability for your use of this information.

## 2020-10-23 NOTE — PROGRESS NOTES
Date of previous inpatient admission/ ED visit? What brought the patient back to ED? Did patient decline recommended services during last admission/ ED visit (if yes, what)? Has patient seen a provider since their last inpatient admission/ED visit (if yes, when)? PCP: First and Last name:Tatiana Chan   Name of Practice: Neurology clinic at [facility]   Are you a current patient: Yes/No:  Yes   Approximate date of last visit: 9/28/20    CM Interventions: Patient presented in ED with complaint of worsening symptoms from multiple sclerosis. CM agreed to assistance patient with scheduling a follow up appointment with her neurologist.  Patient has an appointment with her neurologist on 11/30/20 at 8:00 am.  CM called neurologist and was informed that their computer was down and they were unable to determine if another appointment could be made. CM informed them of the need for follow up to her ED visit today and requested a sooner appointment. They will call patient within 24 business hours to provide a closer appointment if one is available.

## 2020-10-23 NOTE — ED NOTES
Patient called for her own ride. Patient (s)  given copy of dc instructions and 0 script(s). Patient alert and oriented and in no acute distress. Patient discharged home ambulatory with friend/family. Patient taken to car in wheelchair where she maneuvered from chair to car. Patient expressed concern about our not giving her anything. States she called neurologists office and first appt available is in December.   Spoke with case management who will attempt to get a sooner appointment and contact patient no
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
## 2020-11-25 ENCOUNTER — HOSPITAL ENCOUNTER (INPATIENT)
Age: 45
LOS: 7 days | Discharge: NURSING, REHAB OR LONG-TERM CARE | DRG: 059 | End: 2020-12-02
Attending: EMERGENCY MEDICINE | Admitting: INTERNAL MEDICINE
Payer: MEDICARE

## 2020-11-25 DIAGNOSIS — N39.0 URINARY TRACT INFECTION WITHOUT HEMATURIA, SITE UNSPECIFIED: ICD-10-CM

## 2020-11-25 DIAGNOSIS — R29.898 WEAKNESS OF BOTH LOWER EXTREMITIES: ICD-10-CM

## 2020-11-25 DIAGNOSIS — G35 MULTIPLE SCLEROSIS EXACERBATION (HCC): Primary | ICD-10-CM

## 2020-11-25 DIAGNOSIS — F41.9 ANXIETY: ICD-10-CM
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
## 2020-11-25 DIAGNOSIS — F33.2 SEVERE EPISODE OF RECURRENT MAJOR DEPRESSIVE DISORDER, WITHOUT PSYCHOTIC FEATURES (HCC): ICD-10-CM

## 2020-11-25 DIAGNOSIS — G35 MS (MULTIPLE SCLEROSIS) (HCC): ICD-10-CM

## 2020-11-25 DIAGNOSIS — M79.7 FIBROMYALGIA: ICD-10-CM

## 2020-11-25 DIAGNOSIS — Z72.0 TOBACCO ABUSE: ICD-10-CM

## 2020-11-25 DIAGNOSIS — F43.23 ADJUSTMENT DISORDER WITH MIXED ANXIETY AND DEPRESSED MOOD: ICD-10-CM

## 2020-11-25 DIAGNOSIS — G89.4 CHRONIC PAIN SYNDROME: ICD-10-CM

## 2020-11-25 DIAGNOSIS — R29.6 RECURRENT FALLS: ICD-10-CM

## 2020-11-25 PROBLEM — D72.819 LEUKOPENIA: Status: ACTIVE | Noted: 2020-11-25

## 2020-11-25 PROCEDURE — 74011250637 HC RX REV CODE- 250/637: Performed by: INTERNAL MEDICINE

## 2020-11-25 PROCEDURE — 99285 EMERGENCY DEPT VISIT HI MDM: CPT

## 2020-11-25 PROCEDURE — 65660000000 HC RM CCU STEPDOWN

## 2020-11-25 RX ORDER — ONDANSETRON 2 MG/ML
4 INJECTION INTRAMUSCULAR; INTRAVENOUS
Status: DISCONTINUED | OUTPATIENT
Start: 2020-11-25 | End: 2020-12-02 | Stop reason: HOSPADM

## 2020-11-25 RX ORDER — SODIUM CHLORIDE 0.9 % (FLUSH) 0.9 %
5-40 SYRINGE (ML) INJECTION AS NEEDED
Status: DISCONTINUED | OUTPATIENT
Start: 2020-11-25 | End: 2020-12-02 | Stop reason: HOSPADM

## 2020-11-25 RX ORDER — ACETAMINOPHEN 325 MG/1
650 TABLET ORAL
Status: DISCONTINUED | OUTPATIENT
Start: 2020-11-25 | End: 2020-12-02 | Stop reason: HOSPADM

## 2020-11-25 RX ORDER — ACETAMINOPHEN 650 MG/1
650 SUPPOSITORY RECTAL
Status: DISCONTINUED | OUTPATIENT
Start: 2020-11-25 | End: 2020-12-02 | Stop reason: HOSPADM

## 2020-11-25 RX ORDER — CYCLOBENZAPRINE HCL 10 MG
10 TABLET ORAL
Status: DISCONTINUED | OUTPATIENT
Start: 2020-11-25 | End: 2020-12-02 | Stop reason: HOSPADM

## 2020-11-25 RX ORDER — DOCUSATE SODIUM 100 MG/1
100 CAPSULE, LIQUID FILLED ORAL DAILY
Status: DISCONTINUED | OUTPATIENT
Start: 2020-11-26 | End: 2020-12-02 | Stop reason: HOSPADM

## 2020-11-25 RX ORDER — PROMETHAZINE HYDROCHLORIDE 25 MG/1
12.5 TABLET ORAL
Status: DISCONTINUED | OUTPATIENT
Start: 2020-11-25 | End: 2020-12-02 | Stop reason: HOSPADM

## 2020-11-25 RX ORDER — ENOXAPARIN SODIUM 100 MG/ML
40 INJECTION SUBCUTANEOUS DAILY
Status: DISCONTINUED | OUTPATIENT
Start: 2020-11-26 | End: 2020-12-02 | Stop reason: HOSPADM

## 2020-11-25 RX ORDER — DULOXETIN HYDROCHLORIDE 30 MG/1
60 CAPSULE, DELAYED RELEASE ORAL DAILY
Status: DISCONTINUED | OUTPATIENT
Start: 2020-11-26 | End: 2020-12-02 | Stop reason: HOSPADM

## 2020-11-25 RX ORDER — SODIUM CHLORIDE 0.9 % (FLUSH) 0.9 %
5-40 SYRINGE (ML) INJECTION EVERY 8 HOURS
Status: DISCONTINUED | OUTPATIENT
Start: 2020-11-25 | End: 2020-12-02 | Stop reason: HOSPADM

## 2020-11-25 RX ORDER — POLYETHYLENE GLYCOL 3350 17 G/17G
17 POWDER, FOR SOLUTION ORAL DAILY PRN
Status: DISCONTINUED | OUTPATIENT
Start: 2020-11-25 | End: 2020-12-02 | Stop reason: HOSPADM

## 2020-11-25 RX ORDER — AMOXICILLIN 250 MG
2 CAPSULE ORAL DAILY
Status: DISCONTINUED | OUTPATIENT
Start: 2020-11-26 | End: 2020-12-02 | Stop reason: HOSPADM

## 2020-11-25 RX ORDER — CLONAZEPAM 0.5 MG/1
0.5 TABLET ORAL 2 TIMES DAILY
Status: DISCONTINUED | OUTPATIENT
Start: 2020-11-26 | End: 2020-12-02 | Stop reason: HOSPADM

## 2020-11-25 RX ORDER — LANOLIN ALCOHOL/MO/W.PET/CERES
6 CREAM (GRAM) TOPICAL
Status: DISCONTINUED | OUTPATIENT
Start: 2020-11-25 | End: 2020-12-02 | Stop reason: HOSPADM

## 2020-11-25 RX ADMIN — ACETAMINOPHEN 650 MG: 325 TABLET ORAL at 23:19

## 2020-11-25 RX ADMIN — CYCLOBENZAPRINE 10 MG: 10 TABLET, FILM COATED ORAL at 23:19

## 2020-11-26 LAB
ALBUMIN SERPL-MCNC: 3.4 G/DL (ref 3.5–5)
ALBUMIN/GLOB SERPL: 0.9 {RATIO} (ref 1.1–2.2)
ALP SERPL-CCNC: 146 U/L (ref 45–117)
ALT SERPL-CCNC: 84 U/L (ref 12–78)
ANION GAP SERPL CALC-SCNC: 6 MMOL/L (ref 5–15)
APPEARANCE UR: CLEAR
AST SERPL-CCNC: 17 U/L (ref 15–37)
BACTERIA URNS QL MICRO: NEGATIVE /HPF
BASOPHILS # BLD: 0 K/UL (ref 0–0.1)
BASOPHILS NFR BLD: 0 % (ref 0–1)
BILIRUB SERPL-MCNC: 0.2 MG/DL (ref 0.2–1)
BILIRUB UR QL: NEGATIVE
BUN SERPL-MCNC: 8 MG/DL (ref 6–20)
BUN/CREAT SERPL: 10 (ref 12–20)
CALCIUM SERPL-MCNC: 9.9 MG/DL (ref 8.5–10.1)
CHLORIDE SERPL-SCNC: 106 MMOL/L (ref 97–108)
CO2 SERPL-SCNC: 25 MMOL/L (ref 21–32)
COLOR UR: ABNORMAL
CREAT SERPL-MCNC: 0.77 MG/DL (ref 0.55–1.02)
DIFFERENTIAL METHOD BLD: ABNORMAL
EOSINOPHIL # BLD: 0 K/UL (ref 0–0.4)
EOSINOPHIL NFR BLD: 0 % (ref 0–7)
EPITH CASTS URNS QL MICRO: ABNORMAL /LPF
ERYTHROCYTE [DISTWIDTH] IN BLOOD BY AUTOMATED COUNT: 14.3 % (ref 11.5–14.5)
GLOBULIN SER CALC-MCNC: 3.9 G/DL (ref 2–4)
GLUCOSE SERPL-MCNC: 158 MG/DL (ref 65–100)
GLUCOSE UR STRIP.AUTO-MCNC: NEGATIVE MG/DL
HCT VFR BLD AUTO: 35.7 % (ref 35–47)
HGB BLD-MCNC: 12.1 G/DL (ref 11.5–16)
HGB UR QL STRIP: ABNORMAL
IMM GRANULOCYTES # BLD AUTO: 0 K/UL (ref 0–0.04)
IMM GRANULOCYTES NFR BLD AUTO: 0 % (ref 0–0.5)
KETONES UR QL STRIP.AUTO: NEGATIVE MG/DL
LEUKOCYTE ESTERASE UR QL STRIP.AUTO: NEGATIVE
LYMPHOCYTES # BLD: 0.2 K/UL (ref 0.8–3.5)
LYMPHOCYTES NFR BLD: 6 % (ref 12–49)
MCH RBC QN AUTO: 32.2 PG (ref 26–34)
MCHC RBC AUTO-ENTMCNC: 33.9 G/DL (ref 30–36.5)
MCV RBC AUTO: 94.9 FL (ref 80–99)
MONOCYTES # BLD: 0 K/UL (ref 0–1)
MONOCYTES NFR BLD: 1 % (ref 5–13)
MUCOUS THREADS URNS QL MICRO: ABNORMAL /LPF
NEUTS SEG # BLD: 3.3 K/UL (ref 1.8–8)
NEUTS SEG NFR BLD: 92 % (ref 32–75)
NITRITE UR QL STRIP.AUTO: POSITIVE
NRBC # BLD: 0 K/UL (ref 0–0.01)
NRBC BLD-RTO: 0 PER 100 WBC
PH UR STRIP: 7 [PH] (ref 5–8)
PLATELET # BLD AUTO: 256 K/UL (ref 150–400)
PMV BLD AUTO: 9.4 FL (ref 8.9–12.9)
POTASSIUM SERPL-SCNC: 4.8 MMOL/L (ref 3.5–5.1)
PROT SERPL-MCNC: 7.3 G/DL (ref 6.4–8.2)
PROT UR STRIP-MCNC: NEGATIVE MG/DL
RBC # BLD AUTO: 3.76 M/UL (ref 3.8–5.2)
RBC #/AREA URNS HPF: ABNORMAL /HPF (ref 0–5)
SODIUM SERPL-SCNC: 137 MMOL/L (ref 136–145)
SP GR UR REFRACTOMETRY: 1.01 (ref 1–1.03)
UA: UC IF INDICATED,UAUC: ABNORMAL
UROBILINOGEN UR QL STRIP.AUTO: 0.2 EU/DL (ref 0.2–1)
WBC # BLD AUTO: 3.6 K/UL (ref 3.6–11)
WBC URNS QL MICRO: ABNORMAL /HPF (ref 0–4)

## 2020-11-26 PROCEDURE — 74011000258 HC RX REV CODE- 258: Performed by: INTERNAL MEDICINE

## 2020-11-26 PROCEDURE — 81001 URINALYSIS AUTO W/SCOPE: CPT

## 2020-11-26 PROCEDURE — 74011250637 HC RX REV CODE- 250/637: Performed by: INTERNAL MEDICINE

## 2020-11-26 PROCEDURE — 85025 COMPLETE CBC W/AUTO DIFF WBC: CPT

## 2020-11-26 PROCEDURE — 74011250636 HC RX REV CODE- 250/636: Performed by: INTERNAL MEDICINE

## 2020-11-26 PROCEDURE — 65660000000 HC RM CCU STEPDOWN

## 2020-11-26 PROCEDURE — 87086 URINE CULTURE/COLONY COUNT: CPT

## 2020-11-26 PROCEDURE — 99223 1ST HOSP IP/OBS HIGH 75: CPT | Performed by: PSYCHIATRY & NEUROLOGY

## 2020-11-26 PROCEDURE — 80053 COMPREHEN METABOLIC PANEL: CPT

## 2020-11-26 PROCEDURE — 36415 COLL VENOUS BLD VENIPUNCTURE: CPT

## 2020-11-26 PROCEDURE — 93005 ELECTROCARDIOGRAM TRACING: CPT

## 2020-11-26 RX ORDER — DIAZEPAM 5 MG/1
5 TABLET ORAL ONCE
Status: ACTIVE | OUTPATIENT
Start: 2020-11-26 | End: 2020-11-26

## 2020-11-26 RX ORDER — HYDROCODONE BITARTRATE AND ACETAMINOPHEN 5; 325 MG/1; MG/1
1 TABLET ORAL
Status: DISCONTINUED | OUTPATIENT
Start: 2020-11-26 | End: 2020-11-28

## 2020-11-26 RX ORDER — SODIUM CHLORIDE 9 MG/ML
75 INJECTION, SOLUTION INTRAVENOUS CONTINUOUS
Status: DISCONTINUED | OUTPATIENT
Start: 2020-11-26 | End: 2020-11-27

## 2020-11-26 RX ADMIN — ACETAMINOPHEN 650 MG: 325 TABLET ORAL at 08:10

## 2020-11-26 RX ADMIN — ENOXAPARIN SODIUM 40 MG: 40 INJECTION SUBCUTANEOUS at 08:10

## 2020-11-26 RX ADMIN — HYDROCODONE BITARTRATE AND ACETAMINOPHEN 1 TABLET: 5; 325 TABLET ORAL at 20:17

## 2020-11-26 RX ADMIN — GABAPENTIN 800 MG: 100 CAPSULE ORAL at 08:11

## 2020-11-26 RX ADMIN — HYDROCODONE BITARTRATE AND ACETAMINOPHEN 1 TABLET: 5; 325 TABLET ORAL at 15:02

## 2020-11-26 RX ADMIN — Medication 10 ML: at 21:21

## 2020-11-26 RX ADMIN — GABAPENTIN 800 MG: 100 CAPSULE ORAL at 00:10

## 2020-11-26 RX ADMIN — CYCLOBENZAPRINE 10 MG: 10 TABLET, FILM COATED ORAL at 12:41

## 2020-11-26 RX ADMIN — CLONAZEPAM 0.5 MG: 0.5 TABLET ORAL at 17:05

## 2020-11-26 RX ADMIN — Medication 10 ML: at 00:19

## 2020-11-26 RX ADMIN — SODIUM CHLORIDE 1000 MG: 900 INJECTION INTRAVENOUS at 00:11

## 2020-11-26 RX ADMIN — DULOXETINE HYDROCHLORIDE 60 MG: 30 CAPSULE, DELAYED RELEASE ORAL at 12:38

## 2020-11-26 RX ADMIN — CLONAZEPAM 0.5 MG: 0.5 TABLET ORAL at 08:10

## 2020-11-26 RX ADMIN — GABAPENTIN 800 MG: 100 CAPSULE ORAL at 20:17

## 2020-11-26 RX ADMIN — SODIUM CHLORIDE 75 ML/HR: 900 INJECTION, SOLUTION INTRAVENOUS at 13:53

## 2020-11-26 RX ADMIN — CEFTRIAXONE 1 G: 1 INJECTION, POWDER, FOR SOLUTION INTRAMUSCULAR; INTRAVENOUS at 17:06

## 2020-11-26 NOTE — H&P
Hospitalist Admission Note    NAME: Kiesha Romeo   :  1975   MRN:  [id number]     Date/Time:  2020 11:02 PM    Patient PCP: Abby Fagan MD  ______________________________________________________________________  Given the patient's current clinical presentation, I have a high level of concern for decompensation if discharged from the emergency department. Complex decision making was performed, which includes reviewing the patient's available past medical records, laboratory results, and x-ray films. My assessment of this patient's clinical condition and my plan of care is as follows. Assessment / Plan:  Multiple sclerosis exacerbation vs worsening Advancing MS  Recurrent falls due to weakness due to above  Admit to neuro floor  Solumedrol 1gm Q24 hours  Neurology consult  Check MRI brain, C-Spine and T Spine  PT/OT  Recurrent admissions with fall > Cm consult for LTC after some rehab  Pt presented to [facility] ED from [facility] ED, labs/images in the paper chart    UTI (Noted at [facility] ED)  Will repeat UA with reflex cultures here  Given IV rocephin at [facility] ED, will place Q24 hours here  Follow cultures from [facility] ED and Here    Anxiety DO  Fibromyalgia  Chronic pain  Continue Home meds    Code Status: FUll    DVT Prophylaxis: Lovenox    Baseline: Recurrent falls      Subjective:   CHIEF COMPLAINT: falls    HISTORY OF PRESENT ILLNESS:     Sissy Min is a 50 y.o.  female who presents with recurrent falls. As per patient, she is been feeling week for past several weeks to months and has been having recurrent falls on regular basis. She reported recurrent admissions for multiple sclerosis exacerbation and discharge to rehab but without much improvement. Pt denies any fever, chills, nausea, vomiting, diarrhea, chest pain, cough, shortness of breath. She does report less frequent urination.  Pt presented to [facility] ED where found to have UTI and given IV rocephin and transferred to [facility]. We were asked to admit for work up and evaluation of the above problems. Past Medical History:   Diagnosis Date    Body aches 12/11/2014    Chronic pain     Depression     Headache(784.0)     History of mammogram never before    MS (multiple sclerosis) ([address]     Neurological disorder     Multiple Sclerosis    Pap smear for cervical cancer screening 2008    Psychiatric disorder     anxiety    Psychotic disorder [facility])         Past Surgical History:   Procedure Laterality Date    COLONOSCOPY N/A 2/28/2018    COLONOSCOPY performed by Roly Diggs MD at [facility] ENDOSCOPY    HX CHOLECYSTECTOMY      HX GYN      3 c-sections    HX HEENT      bilateral ear tube surgery    HX HERNIA REPAIR      HX OTHER SURGICAL      R inguinal hernia repair       Social History     Tobacco Use    Smoking status: Current Every Day Smoker     Packs/day: 0.50     Years: 17.00     Pack years: 8.50     Types: Cigarettes    Smokeless tobacco: Never Used    Tobacco comment: 1 pack every 3 days. Substance Use Topics    Alcohol use: No        Family History   Problem Relation Age of Onset    Heart Attack Father         tripple bypass    Cancer Father         lung    COPD Father     Diabetes Mother         type 2    Heart Disease Mother         heart disease    Diabetes Paternal Grandmother     No Known Problems Sister     No Known Problems Brother     No Known Problems Child      Allergies   Allergen Reactions    Morphine Rash        Prior to Admission medications    Medication Sig Start Date End Date Taking? Authorizing Provider   cladribine,multiple sclerosis, ([employer], 8 tablet pack,) 10 mg tab Take 10 mg by mouth daily. Take as directed  Indications: relapsing form of multiple sclerosis 10/7/20   Bubba Carney NP   gabapentin (NEURONTIN) 400 mg capsule Take 2 Caps by mouth four (4) times daily.  Max Daily Amount: 3,200 mg. 10/7/20   Bubba Carney NP   ALPRAZolam (Xanax) 0.25 mg tablet Take 1 Tab by mouth two (2) times daily as needed for Anxiety. Max Daily Amount: 0.5 mg. Indications: anxiousness associated with depression 9/28/20   Sita ProYENIFER   DULoxetine (CYMBALTA) 60 mg capsule Take 1 Cap by mouth daily. Indications: neuropathic pain 9/28/20   Sita Pro NP   docusate sodium (COLACE) 100 mg capsule Take 1 Cap by mouth daily for 90 days. 9/6/20 12/5/20  Ewelina Larson NP   predniSONE (DELTASONE) 10 mg tablet 40 mg daily x 3 days  30 mg daily x 2 days  20 mg daily x 3 days  10 mg daily x 2 days  Then stop 9/5/20   Ewelina Larson NP   cyclobenzaprine (FLEXERIL) 10 mg tablet Take 1 Tab by mouth three (3) times daily as needed for Muscle Spasm(s). 6/10/20   Duncan Car MD   ibuprofen (MOTRIN) 200 mg tablet Take  by mouth every six (6) hours as needed for Pain. Take three tablets every 6 hours as needed by mouth     Provider, Lucille   clonazePAM (KlonoPIN) 0.5 mg tablet Take 1 Tab by mouth two (2) times a day. Max Daily Amount: 1 mg. 3/28/20   Helga Murphy MD   melatonin 3 mg tablet Take 1 Tab by mouth nightly as needed for Insomnia. Patient taking differently: Take 3 mg by mouth nightly as needed for Insomnia. Indications: Pt state she takes 5 mg tab nightly. 3/28/20   Helga Murphy MD   senna-docusate (PERICOLACE) 8.6-50 mg per tablet Take 2 Tabs by mouth daily. 1/14/20   Kiesha Stock MD   ibuprofen (MOTRIN) 600 mg tablet Take 1 Tab by mouth every six (6) hours as needed for Pain. Patient taking differently: Take 200 mg by mouth. 12/22/18   June García       REVIEW OF SYSTEMS:     I am not able to complete the review of systems because:    The patient is intubated and sedated    The patient has altered mental status due to his acute medical problems    The patient has baseline aphasia from prior stroke(s)    The patient has baseline dementia and is not reliable historian    The patient is in acute medical distress and unable to provide information           Total of 12 systems reviewed as follows:       POSITIVE= underlined text  Negative = text not underlined  General:  fever, chills, sweats, generalized weakness, weight loss/gain,      loss of appetite   Eyes:    blurred vision, eye pain, loss of vision, double vision  ENT:    rhinorrhea, pharyngitis   Respiratory:   cough, sputum production, SOB, BOBO, wheezing, pleuritic pain   Cardiology:   chest pain, palpitations, orthopnea, PND, edema, syncope   Gastrointestinal:  abdominal pain , N/V, diarrhea, dysphagia, constipation, bleeding   Genitourinary:  frequency, urgency, dysuria, hematuria, incontinence   Muskuloskeletal :  arthralgia, myalgia, back pain  Hematology:  easy bruising, nose or gum bleeding, lymphadenopathy   Dermatological: rash, ulceration, pruritis, color change / jaundice  Endocrine:   hot flashes or polydipsia   Neurological:  headache, dizziness, confusion, focal weakness, paresthesia,     Speech difficulties, memory loss, gait difficulty  Psychological: Feelings of anxiety, depression, agitation    Objective:   VITALS:    Visit Vitals  BP (!) 145/91 (BP 1 Location: Left arm)   Pulse 60   Temp 98.6 °F (37 °C)   Resp 18   Ht 5' 4\" (1.626 m)   Wt 83.4 kg (183 lb 13.8 oz)   SpO2 99%   BMI 31.56 kg/m²       PHYSICAL EXAM:    General:    Alert, cooperative, no distress, appears stated age. HEENT: Atraumatic, anicteric sclerae, pink conjunctivae     No oral ulcers, mucosa moist, throat clear, dentition fair  Neck:  Supple, symmetrical,  thyroid: non tender  Lungs:   Clear to auscultation bilaterally. No Wheezing or Rhonchi. No rales. Chest wall:  No tenderness  No Accessory muscle use. Heart:   Regular  rhythm,  No  murmur   No edema  Abdomen:   Soft, non-tender. Not distended. Bowel sounds normal  Extremities: No cyanosis. No clubbing,      Skin turgor normal, Capillary refill normal, Radial dial pulse 2+  Skin:     Not pale.   Not Jaundiced  No rashes Psych:  Good insight. Not depressed. Not anxious or agitated. Neurologic: EOMs intact. No facial asymmetry. Slow speech. All extremities weakness, Sensation grossly intact. Alert and oriented X 4.     _______________________________________________________________________  Care Plan discussed with:    Comments   Patient y    Family      RN y    Care Manager                    Consultant:      _______________________________________________________________________  Expected  Disposition:   Home with Family    HH/PT/OT/RN    SNF/LTC y   [de-identified]    ________________________________________________________________________  TOTAL TIME: 61 Minutes    Critical Care Provided     Minutes non procedure based      Comments    y Reviewed previous records   >50% of visit spent in counseling and coordination of care y Discussion with patient and questions answered       ________________________________________________________________________  Signed: Lori Casarez MD    Procedures: see electronic medical records for all procedures/Xrays and details which were not copied into this note but were reviewed prior to creation of Plan. LAB DATA REVIEWED:    No results found for this or any previous visit (from the past 24 hour(s)).

## 2020-11-26 NOTE — ED NOTES
TRANSFER - OUT REPORT:    Verbal report given to Rob (name) on Read Ladan  being transferred to Neuro (unit) for routine progression of care       Report consisted of patients Situation, Background, Assessment and   Recommendations(SBAR). Information from the following report(s) SBAR, ED Summary, [facility] and Recent Results was reviewed with the receiving nurse. Lines:   Peripheral IV 11/26/20 Left Wrist (Active)       Peripheral IV 11/26/20 Right Antecubital (Active)        Opportunity for questions and clarification was provided.       Patient transported with:   [facility]

## 2020-11-26 NOTE — ED NOTES
Pt is a transfer of care from [facility]. Pt's has c/o MS exacerbation and also UA lab showed possible UTI. Pt reports that her PCP discontinued all her MS meds and her symptoms slurred speech and increased extremity weakness started 2 days PTA. Pt stated that she also had a fall from couch at home. Dr. Lilliam Gonzalez assessed Pt on arrival.    2240: admitting hospitalist Dr. Mary Red at bedside.
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
## 2020-11-26 NOTE — ED NOTES
TRANSFER - IN REPORT:    Verbal report received from Clemente (name) on [facility]. Report consisted of patients Situation, Background, Assessment and   Recommendations(SBAR). Information from the following report(s) SBAR and ED Summary was reviewed with the receiving nurse. Opportunity for questions and clarification was provided. 0819 Pt resting in stretcher quietly, watching tv, no complaints at this time, will continue to monitor     0900 Pt sleeping in stretcher, pt remains on the monitor x 3, will continue to monitor     1000 Provided pt w/ breakfast tray     1030  at bedside evaluating pt     1200  Pt resting in POC on cellphone will continue to monitor     1258 This RN notice an change in pt HR  From this morning Pt HR sitting around the 120s and is not febrile.  Notified MD Carlito Reyes Cleverly at bedside evaluating pt, Per Eric Cleverly obtain and EKG and start continuous fluids

## 2020-11-27 ENCOUNTER — APPOINTMENT (OUTPATIENT)
Dept: MRI IMAGING | Age: 45
DRG: 059 | End: 2020-11-27
Attending: INTERNAL MEDICINE
Payer: MEDICARE

## 2020-11-27 LAB
ANION GAP SERPL CALC-SCNC: 5 MMOL/L (ref 5–15)
ATRIAL RATE: 115 BPM
BACTERIA SPEC CULT: NORMAL
BUN SERPL-MCNC: 16 MG/DL (ref 6–20)
BUN/CREAT SERPL: 19 (ref 12–20)
CALCIUM SERPL-MCNC: 8.6 MG/DL (ref 8.5–10.1)
CALCULATED P AXIS, ECG09: 24 DEGREES
CALCULATED R AXIS, ECG10: -18 DEGREES
CALCULATED T AXIS, ECG11: 50 DEGREES
CHLORIDE SERPL-SCNC: 111 MMOL/L (ref 97–108)
CO2 SERPL-SCNC: 23 MMOL/L (ref 21–32)
CREAT SERPL-MCNC: 0.83 MG/DL (ref 0.55–1.02)
DIAGNOSIS, 93000: NORMAL
GLUCOSE SERPL-MCNC: 127 MG/DL (ref 65–100)
P-R INTERVAL, ECG05: 172 MS
POTASSIUM SERPL-SCNC: 4.1 MMOL/L (ref 3.5–5.1)
Q-T INTERVAL, ECG07: 312 MS
QRS DURATION, ECG06: 74 MS
QTC CALCULATION (BEZET), ECG08: 431 MS
SERVICE CMNT-IMP: NORMAL
SODIUM SERPL-SCNC: 139 MMOL/L (ref 136–145)
TSH SERPL DL<=0.05 MIU/L-ACNC: 0.36 UIU/ML (ref 0.36–3.74)
VENTRICULAR RATE, ECG03: 115 BPM

## 2020-11-27 PROCEDURE — 97116 GAIT TRAINING THERAPY: CPT

## 2020-11-27 PROCEDURE — 80048 BASIC METABOLIC PNL TOTAL CA: CPT

## 2020-11-27 PROCEDURE — 97530 THERAPEUTIC ACTIVITIES: CPT

## 2020-11-27 PROCEDURE — 72156 MRI NECK SPINE W/O & W/DYE: CPT

## 2020-11-27 PROCEDURE — 74011250636 HC RX REV CODE- 250/636: Performed by: INTERNAL MEDICINE

## 2020-11-27 PROCEDURE — 74011250637 HC RX REV CODE- 250/637: Performed by: INTERNAL MEDICINE

## 2020-11-27 PROCEDURE — 70553 MRI BRAIN STEM W/O & W/DYE: CPT

## 2020-11-27 PROCEDURE — 74011000258 HC RX REV CODE- 258: Performed by: INTERNAL MEDICINE

## 2020-11-27 PROCEDURE — 99233 SBSQ HOSP IP/OBS HIGH 50: CPT | Performed by: PSYCHIATRY & NEUROLOGY

## 2020-11-27 PROCEDURE — 72157 MRI CHEST SPINE W/O & W/DYE: CPT

## 2020-11-27 PROCEDURE — 97166 OT EVAL MOD COMPLEX 45 MIN: CPT

## 2020-11-27 PROCEDURE — 97163 PT EVAL HIGH COMPLEX 45 MIN: CPT

## 2020-11-27 PROCEDURE — 65660000000 HC RM CCU STEPDOWN

## 2020-11-27 PROCEDURE — 97535 SELF CARE MNGMENT TRAINING: CPT

## 2020-11-27 PROCEDURE — 84443 ASSAY THYROID STIM HORMONE: CPT

## 2020-11-27 PROCEDURE — 36415 COLL VENOUS BLD VENIPUNCTURE: CPT

## 2020-11-27 PROCEDURE — A9575 INJ GADOTERATE MEGLUMI 0.1ML: HCPCS | Performed by: INTERNAL MEDICINE

## 2020-11-27 RX ORDER — LORAZEPAM 2 MG/ML
1 INJECTION INTRAMUSCULAR
Status: COMPLETED | OUTPATIENT
Start: 2020-11-27 | End: 2020-11-27

## 2020-11-27 RX ORDER — GADOTERATE MEGLUMINE 376.9 MG/ML
17 INJECTION INTRAVENOUS
Status: COMPLETED | OUTPATIENT
Start: 2020-11-27 | End: 2020-11-27

## 2020-11-27 RX ADMIN — Medication 10 ML: at 21:55

## 2020-11-27 RX ADMIN — Medication 10 ML: at 05:31

## 2020-11-27 RX ADMIN — GABAPENTIN 800 MG: 100 CAPSULE ORAL at 21:55

## 2020-11-27 RX ADMIN — CLONAZEPAM 0.5 MG: 0.5 TABLET ORAL at 08:41

## 2020-11-27 RX ADMIN — DULOXETINE HYDROCHLORIDE 60 MG: 30 CAPSULE, DELAYED RELEASE ORAL at 08:41

## 2020-11-27 RX ADMIN — LORAZEPAM 1 MG: 2 INJECTION INTRAMUSCULAR; INTRAVENOUS at 09:12

## 2020-11-27 RX ADMIN — CEFTRIAXONE 1 G: 1 INJECTION, POWDER, FOR SOLUTION INTRAMUSCULAR; INTRAVENOUS at 16:04

## 2020-11-27 RX ADMIN — HYDROCODONE BITARTRATE AND ACETAMINOPHEN 1 TABLET: 5; 325 TABLET ORAL at 21:55

## 2020-11-27 RX ADMIN — ENOXAPARIN SODIUM 40 MG: 40 INJECTION SUBCUTANEOUS at 08:56

## 2020-11-27 RX ADMIN — CLONAZEPAM 0.5 MG: 0.5 TABLET ORAL at 18:02

## 2020-11-27 RX ADMIN — SODIUM CHLORIDE 1000 MG: 900 INJECTION INTRAVENOUS at 08:35

## 2020-11-27 RX ADMIN — GADOTERATE MEGLUMINE 17 ML: 376.9 INJECTION INTRAVENOUS at 10:41

## 2020-11-27 RX ADMIN — ACETAMINOPHEN 650 MG: 325 TABLET ORAL at 16:04

## 2020-11-27 RX ADMIN — HYDROCODONE BITARTRATE AND ACETAMINOPHEN 1 TABLET: 5; 325 TABLET ORAL at 12:29

## 2020-11-27 RX ADMIN — GABAPENTIN 800 MG: 100 CAPSULE ORAL at 08:41

## 2020-11-27 NOTE — PROGRESS NOTES
Hospitalist Progress Note    NAME: Nelly Thurman   :  1975   MRN:  [id number]       Assessment / Plan:  Multiple sclerosis exacerbation  Recurrent falls due to weakness due to above  -Pending MRI of the brain and also spine. Will give ativan before MRI due to claustrophobia  -Continue Solu-Medrol 1g iv until . Blood sugar under control.  -Neuro following   -Continue PT OT       UTI (Noted at [facility] ED)  -urine cx pending. Continue empiric Rocephin. Adjust antibiotics based on culture results     Anxiety DO  Fibromyalgia  Chronic pain  Continue Home klonopin, Cymbalta, Neurontin. Code Status: FUll     DVT Prophylaxis: Lovenox     Baseline: Recurrent falls      Body mass index is 31.56 kg/m². Subjective:     Chief Complaint / Reason for Physician Visit  Still reports weakness of both arms and legs  HR is better controlled today    Review of Systems:  Symptom Y/N Comments  Symptom Y/N Comments   Fever/Chills    Chest Pain     Poor Appetite    Edema     Cough    Abdominal Pain     Sputum    Joint Pain     SOB/BOBO    Pruritis/Rash     Nausea/vomit    Tolerating PT/OT     Diarrhea    Tolerating Diet     Constipation    Other       Could NOT obtain due to:      Objective:     VITALS:   Last 24hrs VS reviewed since prior progress note.  Most recent are:  Patient Vitals for the past 24 hrs:   Temp Pulse Resp BP SpO2   20 0859    (!) 144/93    20 0754 97.8 °F (36.6 °C) 83 20 (!) 153/104 98 %   20 0400 97.5 °F (36.4 °C) 95 16 130/87 95 %   20 0000 97.8 °F (36.6 °C) (!) 117 16 110/84 96 %   20 2000 98.4 °F (36.9 °C) (!) 116 18 129/85 98 %   20 1441 98.8 °F (37.1 °C) (!) 120 21 (!) 152/99 98 %   20 1400  (!) 124 22 (!) 131/96 99 %   20 1330  (!) 124 20 (!) 127/94 98 %   20 1253 98.7 °F (37.1 °C) (!) 119 21  98 %   20 1251  (!) 122 25  100 %   20 1244  (!) 123 18  99 %   20 1230   17 (!) 139/93 99 %   20 1222  (!) 122 20  99 %   11/26/20 1000 98 °F (36.7 °C) 97 16 (!) 137/91 96 %       Intake/Output Summary (Last 24 hours) at 11/27/2020 0906  Last data filed at 11/26/2020 2155  Gross per 24 hour   Intake 1082.5 ml   Output    Net 1082.5 ml        PHYSICAL EXAM:  General: WD, WN. Alert, cooperative, no acute distress    EENT:  EOMI. Anicteric sclerae. MMM  Resp:  CTA bilaterally, no wheezing or rales. No accessory muscle use  CV:  Regular  rhythm,  No edema  GI:  Soft, Non distended, Non tender.  +Bowel sounds  Neurologic:  Alert and oriented X 3, normal speech,   Psych:   Good insight. Not anxious nor agitated  Skin:  No rashes.   No jaundice    Reviewed most current lab test results and cultures  YES  Reviewed most current radiology test results   YES  Review and summation of old records today    NO  Reviewed patient's current orders and MAR    YES  PMH/SH reviewed - no change compared to H&P          Current Facility-Administered Medications:     LORazepam (ATIVAN) injection 1 mg, 1 mg, IntraVENous, ONCE PRN, Ag Chaudhari MD    0.9% sodium chloride infusion, 75 mL/hr, IntraVENous, CONTINUOUS, Ag Chaudhari MD, Last Rate: 75 mL/hr at 11/26/20 1353, 75 mL/hr at 11/26/20 1353    HYDROcodone-acetaminophen (NORCO) 5-325 mg per tablet 1 Tab, 1 Tab, Oral, Q6H PRN, Malka Montgomery MD, 1 Tab at 11/26/20 2017    methylprednisoLONE (Solu-MEDROL) 1 gm in 100 ml NS MBP, 1,000 mg, IntraVENous, DAILY, Ren Roman MD, Last Rate: 100 mL/hr at 11/27/20 0835, 1,000 mg at 11/27/20 0835    sodium chloride (NS) flush 5-40 mL, 5-40 mL, IntraVENous, Q8H, Ren Roman MD, 10 mL at 11/27/20 0531    sodium chloride (NS) flush 5-40 mL, 5-40 mL, IntraVENous, PRN, Ren Roman MD    acetaminophen (TYLENOL) tablet 650 mg, 650 mg, Oral, Q6H PRN, 650 mg at 11/26/20 0810 **OR** acetaminophen (TYLENOL) suppository 650 mg, 650 mg, Rectal, Q6H PRN, Ren Roman MD    polyethylene glycol (MIRALAX) packet 17 g, 17 g, Oral, DAILY PRN, Sari Jacobson MD    promethazine (PHENERGAN) tablet 12.5 mg, 12.5 mg, Oral, Q6H PRN **OR** ondansetron (ZOFRAN) injection 4 mg, 4 mg, IntraVENous, Q6H PRN, Ren Roman MD    enoxaparin (LOVENOX) injection 40 mg, 40 mg, SubCUTAneous, DAILY, Ren Roman MD, 40 mg at 11/27/20 0856    cefTRIAXone (ROCEPHIN) 1 g in 0.9% sodium chloride (MBP/ADV) 50 mL MBP, 1 g, IntraVENous, Q24H, Wenceslao Chaudhari MD, Last Rate: 100 mL/hr at 11/26/20 1706, 1 g at 11/26/20 1706    . PHARMACY TO SUBSTITUTE PER PROTOCOL (Reordered from: cladribine,multiple sclerosis, (Mavenclad, 8 tablet pack,) 10 mg tab), , , Per Protocol, Ren Ramey MD    clonazePAM (KlonoPIN) tablet 0.5 mg, 0.5 mg, Oral, BID, Ren Roman MD, 0.5 mg at 11/27/20 0841    cyclobenzaprine (FLEXERIL) tablet 10 mg, 10 mg, Oral, TID PRN, Ren Ramey MD, 10 mg at 11/26/20 1241    docusate sodium (COLACE) capsule 100 mg, 100 mg, Oral, DAILY, Ren Roman MD    DULoxetine (CYMBALTA) capsule 60 mg, 60 mg, Oral, DAILY, Ren Roman MD, 60 mg at 11/27/20 0841    melatonin tablet 6 mg, 6 mg, Oral, QHS PRN, Ren Roman MD    senna-docusate (PERICOLACE) 8.6-50 mg per tablet 2 Tab, 2 Tab, Oral, DAILY, Ren Roman MD    gabapentin (NEURONTIN) capsule 800 mg, 800 mg, Oral, BID, Ren Roman MD, 800 mg at 11/27/20 2422  ________________________________________________________________________  Care Plan discussed with:    Comments   Patient y    Family      RN y    Care Manager     Consultant                        Multidiciplinary team rounds were held today with , nursing, pharmacist and clinical coordinator. Patient's plan of care was discussed; medications were reviewed and discharge planning was addressed.      ________________________________________________________________________  Total NON critical care TIME:  35    Minutes    Total CRITICAL CARE TIME Spent:   Minutes non procedure based      Comments   >50% of visit spent in counseling and coordination of care     ________________________________________________________________________  Bert Green MD     Procedures: see electronic medical records for all procedures/Xrays and details which were not copied into this note but were reviewed prior to creation of Plan. LABS:  I reviewed today's most current labs and imaging studies.   Pertinent labs include:  Recent Labs     11/26/20  0600   WBC 3.6   HGB 12.1   HCT 35.7        Recent Labs     11/27/20  0330 11/26/20  0747    137   K 4.1 4.8   * 106   CO2 23 25   * 158*   BUN 16 8   CREA 0.83 0.77   CA 8.6 9.9   ALB  --  3.4*   TBILI  --  0.2   ALT  --  84*       Signed: Bert Green MD

## 2020-11-27 NOTE — PROGRESS NOTES
End of Shift Note    Bedside shift change report given to  (oncoming nurse) by Karissa James (offgoing nurse).   Report included the following information SBAR, Kardex, ED Summary and Intake/Output    Shift worked:  days   Shift summary and any significant changes:    MRI done, pt complained of generalized pain given [address]       Concerns for physician to address:  none   Zone phone for oncoming shift:   [phone]     Patient Information  Shaaron Sacks  50 y.o.  11/25/2020  9:50 PM by Kate Dunham MD. Shaaron Sacks was admitted from Home    Problem List  Patient Active Problem List    Diagnosis Date Noted    Leukopenia 11/25/2020    UTI (urinary tract infection) 11/25/2020    Multiple sclerosis exacerbation ([address] 08/06/2020    Facial droop 03/23/2020    Exacerbation of multiple sclerosis ([address] 03/09/2020    Left-sided weakness 03/08/2020    Severe obesity ([address] 10/03/2018    Constipation 07/17/2015    Body aches 12/11/2014    Back pain 09/07/2012    Fibromyalgia 08/17/2012    MS (multiple sclerosis) ([address] 08/17/2012    Decreased hearing 01/31/2011    Acute pharyngitis 01/26/2011    Anxiety 08/25/2010    Blood pressure elevated 08/25/2010    Tobacco abuse 08/25/2010     Past Medical History:   Diagnosis Date    Body aches 12/11/2014    Chronic pain     Depression     Headache(784.0)     History of mammogram never before    MS (multiple sclerosis) ([address]     Neurological disorder     Multiple Sclerosis    Pap smear for cervical cancer screening 2008    Psychiatric disorder     anxiety    Psychotic disorder ([address]        Core Measures:  CVA: No No  CHF:No No  PNA:No No    Activity:  Activity Level: Bed Rest  Number times ambulated in hallways past shift: 1  Number of times OOB to chair past shift: 1    Cardiac:   Cardiac Monitoring: Yes      Cardiac Rhythm: Sinus tachycardia    Access:   Current line(s): PIV       Genitourinary:   Urinary status: voiding    Respiratory:   O2 Device: Room air  Chronic home O2 use?: NO  Incentive spirometer at bedside: NO       GI:     Current diet:  DIET REGULAR  Passing flatus: YES  Tolerating current diet: YES       Pain Management:   Patient states pain is manageable on current regimen: YES    Skin:  Samy Score: 17  Interventions: increase time out of bed and internal/external urinary devices    Patient Safety:  Fall Score:  Total Score: 4  Interventions: bed/chair alarm, assistive device (walker, cane, etc), gripper socks, pt to call before getting OOB and stay with me (per policy)  High Fall Risk: Yes    DVT prophylaxis:  DVT prophylaxis Med- Yes  DVT prophylaxis SCD or CONG- No     Wounds: (If Applicable)  Wounds- No  Location     Active Consults:  IP CONSULT TO HOSPITALIST  IP CONSULT TO NEUROLOGY    Length of Stay:  Expected LOS: 3d 14h  Actual LOS: 2  Discharge Plan: Yes MICK Mccarthy

## 2020-11-27 NOTE — PROGRESS NOTES
Problem: Self Care Deficits Care Plan (Adult)  Goal: *Acute Goals and Plan of Care (Insert Text)  Description:   FUNCTIONAL STATUS PRIOR TO ADMISSION: Patient reports she has experienced functional decline over the last 3 weeks secondary to stopping her medications per PCP. Patient has required additional support and assistance with all ADLs and has had several falls over the last few weeks. Patient reports prior to stopping medication, she was able to complete ADLs with min assist for LB and set-up/supervision to mod I for the rest. Patient's fiance assists with all IADLs. HOME SUPPORT: The patient lived with fiance who provided assistance and support when needed but had increased assistance over last 3 weeks. Occupational Therapy Goals  Initiated 11/27/2020  1. Patient will perform grooming standing at sink with contact guard assist within 7 day(s). 2.  Patient will perform upper body dressing with supervision/set-up within 7 day(s). 3.  Patient will perform lower body dressing with moderate assistance  within 7 day(s). 4.  Patient will perform toilet transfers with contact guard assist within 7 day(s). 5.  Patient will perform all aspects of toileting with minimal assistance within 7 day(s). Outcome: Not Met   OCCUPATIONAL THERAPY EVALUATION  Patient: Praveena Butt (53 y.o. female)  Date: 11/27/2020  Primary Diagnosis: Multiple sclerosis exacerbation ([facility] [G35]        Precautions: Fall, Skin, Bed Alarm    ASSESSMENT  Based on the objective data described below, the patient presents with decreased activity tolerance and functional mobility secondary to MS exacerbation, general weakness (LUE > RUE, RLE> LLE), impaired sitting and standing balance, and decreased endurance. Patient is functioning below her min assist to mod I baseline for self-care and functional mobility and reports functional decline over last 3 weeks.  Overall, patient is min assist to max assist for self-care CGA/min assist x2 to min assist x2 for functional mobility using RW. Patient reports visual changes over last 3 weeks with impaired far vision. Patient would benefit from skilled OT services during acute hospital stay with discharge to rehab. Current Level of Function Impacting Discharge (ADLs/self-care): min assist to max assist for ADLs. Functional Outcome Measure: The patient scored 30 on the Barthel Index outcome measure which is indicative of 70% functional impairment. Other factors to consider for discharge: Fall risk, requires assist and supervision. Patient will benefit from skilled therapy intervention to address the above noted impairments. PLAN :  Recommendations and Planned Interventions: self care training, functional mobility training, therapeutic exercise, balance training, therapeutic activities, endurance activities, patient education, home safety training, and family training/education    Frequency/Duration: Patient will be followed by occupational therapy 4 times a week to address goals. Recommendation for discharge: (in order for the patient to meet his/her long term goals)  Therapy 3 hours per day 5-7 days per week    This discharge recommendation:  Has not yet been discussed the attending provider and/or case management    IF patient discharges home will need the following DME: TBD        SUBJECTIVE:   Patient stated I've just gotten so much weaker over the last 3 weeks.     OBJECTIVE DATA SUMMARY:   HISTORY:   Past Medical History:   Diagnosis Date    Body aches 12/11/2014    Chronic pain     Depression     Headache(784.0)     History of mammogram never before    MS (multiple sclerosis) [facility]     Neurological disorder     Multiple Sclerosis    Pap smear for cervical cancer screening 2008    Psychiatric disorder     anxiety    Psychotic disorder [facility])      Past Surgical History:   Procedure Laterality Date    COLONOSCOPY N/A 2/28/2018    COLONOSCOPY performed by Sondra Baumgarten Victorino Kocher, MD at [facility] ENDOSCOPY    HX CHOLECYSTECTOMY      HX GYN      3 c-sections    HX HEENT      bilateral ear tube surgery    HX HERNIA REPAIR      HX OTHER SURGICAL      R inguinal hernia repair       Expanded or extensive additional review of patient history:     Home Situation  Home Environment: Apartment  # Steps to Enter: 2  Rails to Enter: No  Wheelchair Ramp: No  One/Two Story Residence: One story  Living Alone: No  Support Systems: Spouse/Significant Other/Partner  Patient Expects to be Discharged to[de-identified] Apartment  Current DME Used/Available at Home: Commode, bedside  Tub or Shower Type: Tub/Shower combination    Hand dominance: Right    EXAMINATION OF PERFORMANCE DEFICITS:  Cognitive/Behavioral Status:  Neurologic State: Alert  Orientation Level: Oriented X4  Cognition: Appropriate decision making; Appropriate for age attention/concentration; Appropriate safety awareness; Follows commands  Perception: Appears intact  Perseveration: No perseveration noted  Safety/Judgement: Fall prevention; Awareness of environment;Good awareness of safety precautions    Hearing: Auditory  Auditory Impairment: None    Vision/Perceptual:    Acuity: Impaired far vision      Range of Motion:  AROM: Generally decreased, functional  PROM: Generally decreased, functional    Strength:  Strength: Generally decreased, functional(RLE weaker than LLE; 3-/5 to 3+/5 grossly); LUE weaker than RUE; 3/5 to 4/5)    Coordination:  Coordination: Generally decreased, functional  Fine Motor Skills-Upper: Left Impaired;Right Intact    Gross Motor Skills-Upper: Left Impaired;Right Intact    Tone & Sensation:  Sensation: Intact    Balance:  Sitting: Impaired  Sitting - Static: Fair (occasional)  Sitting - Dynamic: Fair (occasional)  Standing: Impaired  Standing - Static: Fair;Constant support  Standing - Dynamic : Constant support;Poor    Functional Mobility and Transfers for ADLs:  Bed Mobility:  Rolling: Minimum assistance; Adaptive equipment(bed rail)  Supine to Sit: Minimum assistance; Adaptive equipment; Additional time(bed rail)  Scooting: Minimum assistance; Moderate assistance; Additional time    Transfers:  Sit to Stand: Minimum assistance;Assist x2  Stand to Sit: Minimum assistance;Contact guard assistance;Assist x2  Bed to Chair: Contact guard assistance;Minimum assistance;Assist x2;Adaptive equipment(RW)  Bathroom Mobility: Contact guard assistance;Minimum assistance(x2; RW)  Toilet Transfer : Minimum assistance; Additional time; Adaptive equipment(RW; grab bar)    ADL Assessment:  Feeding: Minimum assistance  Oral Facial Hygiene/Grooming: Minimum assistance  Bathing: Maximum assistance  Upper Body Dressing: Minimum assistance  Lower Body Dressing: Maximum assistance  Toileting: Moderate assistance      ADL Intervention and task modifications:  Grooming  Position Performed: Standing;Seated in chair(at sink)  Washing Face: Set-up; Supervision  Washing Hands: Minimum assistance  Brushing/Combing Hair: Minimum assistance  Cues: Physical assistance; Tactile cues provided;Verbal cues provided    Lower Body Dressing Assistance  Socks: Maximum assistance  Leg Crossed Method Used: Yes  Position Performed: Seated in chair  Cues: Doff;Don;Physical assistance; Tactile cues provided;Verbal cues provided    Toileting  Clothing Management: Maximum assistance  Cues: Verbal cues provided; Tactile cues provided    Cognitive Retraining  Safety/Judgement: Fall prevention; Awareness of environment;Good awareness of safety precautions    Functional Measure:  Barthel Index:    Bathin  Bladder: 5  Bowels: 5  Groomin  Dressin  Feedin  Mobility: 0  Stairs: 0  Toilet Use: 5  Transfer (Bed to Chair and Back): 5  Total: 30/100        The Barthel ADL Index: Guidelines  1. The index should be used as a record of what a patient does, not as a record of what a patient could do.   2. The main aim is to establish degree of independence from any help, physical or verbal, however minor and for whatever reason. 3. The need for supervision renders the patient not independent. 4. A patient's performance should be established using the best available evidence. Asking the patient, friends/relatives and nurses are the usual sources, but direct observation and common sense are also important. However direct testing is not needed. 5. Usually the patient's performance over the preceding 24-48 hours is important, but occasionally longer periods will be relevant. 6. Middle categories imply that the patient supplies over 50 per cent of the effort. 7. Use of aids to be independent is allowed. Caridad Rose., Barthel, D.W. ([id number]). Functional evaluation: the Barthel Index. [address] [facility] (14)2. [facility] Simental, Natalie Davis., Mau Warren., Miranda, [address] (1999). Measuring the change indisability after inpatient rehabilitation; comparison of the responsiveness of the Barthel Index and Functional [place] Measure. Journal of Neurology, Neurosurgery, and Psychiatry, 66(4), 407-522. MARCO A AcuñaJEVARISTO, BETTY Moss, & Lotus Sellers M.A. (2004.) Assessment of post-stroke quality of life in cost-effectiveness studies: The usefulness of the Barthel Index and the EuroQoL-5D. Quality of Life Research, 13, 825-81      Based on the above components, the patient evaluation is determined to be of the following complexity level: MEDIUM  Pain Rating:  Patient c/o 6/10 head ache. Activity Tolerance:   Fair and requires rest breaks    After treatment patient left in no apparent distress:    Sitting in chair, Call bell within reach, Bed / chair alarm activated, and Caregiver / family present    COMMUNICATION/EDUCATION:   The patients plan of care was discussed with: Physical therapist and Registered nurse. Home safety education was provided and the patient/caregiver indicated understanding., Patient/family have participated as able in goal setting and plan of care. , and Patient/family agree to work toward stated goals and plan of care. This patients plan of care is appropriate for delegation to SHANNEN.     Thank you for this referral.  Austin Sepulveda, OTR/L  Time Calculation: 41 mins

## 2020-11-27 NOTE — PROGRESS NOTES
Hospitalist Progress Note    NAME: Allegra German   :  1975   MRN:  [id number]       Assessment / Plan:  Multiple sclerosis exacerbation  Recurrent falls due to weakness due to above  -Pending MRI of the brain and also spine  -Continue Solu-Medrol  -Appreciate recommendations from neurology  -Continue PT OT       UTI (Noted at [facility] ED)  -Send urine culture results. Continue empiric Rocephin. Adjust antibiotics based on culture results     Anxiety DO  Fibromyalgia  Chronic pain  Continue Home meds     Code Status: FUll     DVT Prophylaxis: Lovenox     Baseline: Recurrent falls      Body mass index is 31.56 kg/m². Subjective:     Chief Complaint / Reason for Physician Visit  Continues to report weakness of both extremities along with numbness  Has borderline tachycardia    Review of Systems:  Symptom Y/N Comments  Symptom Y/N Comments   Fever/Chills    Chest Pain     Poor Appetite    Edema     Cough    Abdominal Pain     Sputum    Joint Pain     SOB/BOBO    Pruritis/Rash     Nausea/vomit    Tolerating PT/OT     Diarrhea    Tolerating Diet     Constipation    Other       Could NOT obtain due to:      Objective:     VITALS:   Last 24hrs VS reviewed since prior progress note.  Most recent are:  Patient Vitals for the past 24 hrs:   Temp Pulse Resp BP SpO2   20 98.4 °F (36.9 °C) (!) 116 18 129/85 98 %   20 1441 98.8 °F (37.1 °C) (!) 120 21 (!) 152/99 98 %   20 1400  (!) 124 22 (!) 131/96 99 %   20 1330  (!) 124 20 (!) 127/94 98 %   20 1253 98.7 °F (37.1 °C) (!) 119 21  98 %   20 1251  (!) 122 25  100 %   20 1244  (!) 123 18  99 %   20 1230   17 (!) 139/93 99 %   20 1222  (!) 122 20  99 %   20 1000 98 °F (36.7 °C) 97 16 (!) 137/91 96 %   20 0900  (!) 105 18 130/89 97 %   20 0745  99 16 (!) 146/87 97 %   20 0743     98 %   20 0715 97.7 °F (36.5 °C) (!) 103 18 129/82 98 %   20 0615  100 16 127/89 99 %   11/26/20 0545  87 16 134/87 98 %   11/26/20 0400  91 17 (!) 150/97 95 %   11/26/20 0300  83 17 131/84 91 %   11/26/20 0245  82 16 130/82 92 %   11/26/20 0230  87 17 134/88 95 %   11/26/20 0145  79 21 (!) 137/92 98 %   11/26/20 0130  76 19 138/89 100 %   11/26/20 0045  78 19 (!) 136/91 100 %   11/25/20 2228     99 %       Intake/Output Summary (Last 24 hours) at 11/26/2020 2227  Last data filed at 11/26/2020 2155  Gross per 24 hour   Intake 1082.5 ml   Output    Net 1082.5 ml        PHYSICAL EXAM:  General: WD, WN. Alert, cooperative, no acute distress    EENT:  EOMI. Anicteric sclerae. MMM  Resp:  CTA bilaterally, no wheezing or rales. No accessory muscle use  CV:  Regular  rhythm,  No edema  GI:  Soft, Non distended, Non tender.  +Bowel sounds  Neurologic:  Alert and oriented X 3, normal speech,   Psych:   Good insight. Not anxious nor agitated  Skin:  No rashes.   No jaundice    Reviewed most current lab test results and cultures  YES  Reviewed most current radiology test results   YES  Review and summation of old records today    NO  Reviewed patient's current orders and MAR    YES  PMH/SH reviewed - no change compared to H&P          Current Facility-Administered Medications:     diazePAM (VALIUM) tablet 5 mg, 5 mg, Oral, ONCE, Eric Singleton, DO    0.9% sodium chloride infusion, 75 mL/hr, IntraVENous, CONTINUOUS, Wenceslao Chaudhari MD, Last Rate: 75 mL/hr at 11/26/20 1353, 75 mL/hr at 11/26/20 1353    HYDROcodone-acetaminophen (NORCO) 5-325 mg per tablet 1 Tab, 1 Tab, Oral, Q6H PRN, Fela Mendieta MD, 1 Tab at 11/26/20 2017    methylprednisoLONE (Solu-MEDROL) 1 gm in 100 ml NS MBP, 1,000 mg, IntraVENous, DAILY, Ren Roman MD, Last Rate: 100 mL/hr at 11/26/20 0011, 1,000 mg at 11/26/20 0011    sodium chloride (NS) flush 5-40 mL, 5-40 mL, IntraVENous, Q8H, Ren Roman MD, 10 mL at 11/26/20 2121    sodium chloride (NS) flush 5-40 mL, 5-40 mL, IntraVENous, PRN, Anis, Ren, MD    acetaminophen (TYLENOL) tablet 650 mg, 650 mg, Oral, Q6H PRN, 650 mg at 11/26/20 0810 **OR** acetaminophen (TYLENOL) suppository 650 mg, 650 mg, Rectal, Q6H PRN, Ren Roman MD    polyethylene glycol (MIRALAX) packet 17 g, 17 g, Oral, DAILY PRN, Ren Roman MD    promethazine (PHENERGAN) tablet 12.5 mg, 12.5 mg, Oral, Q6H PRN **OR** ondansetron (ZOFRAN) injection 4 mg, 4 mg, IntraVENous, Q6H PRN, Ren Roman MD    enoxaparin (LOVENOX) injection 40 mg, 40 mg, SubCUTAneous, DAILY, Ren Roman MD, 40 mg at 11/26/20 0810    cefTRIAXone (ROCEPHIN) 1 g in 0.9% sodium chloride (MBP/ADV) 50 mL MBP, 1 g, IntraVENous, Q24H, Wenceslao Chaudhari MD, Last Rate: 100 mL/hr at 11/26/20 1706, 1 g at 11/26/20 1706    . PHARMACY TO SUBSTITUTE PER PROTOCOL (Reordered from: cladribine,multiple sclerosis, (Mavenclad, 8 tablet pack,) 10 mg tab), , , Per Protocol, Ren Malave MD    clonazePAM (KlonoPIN) tablet 0.5 mg, 0.5 mg, Oral, BID, Ren Roman MD, 0.5 mg at 11/26/20 1705    cyclobenzaprine (FLEXERIL) tablet 10 mg, 10 mg, Oral, TID PRN, Ren Malave MD, 10 mg at 11/26/20 1241    docusate sodium (COLACE) capsule 100 mg, 100 mg, Oral, DAILY, Ren Roman MD    DULoxetine (CYMBALTA) capsule 60 mg, 60 mg, Oral, DAILY, Ren Roman MD, 60 mg at 11/26/20 1238    melatonin tablet 6 mg, 6 mg, Oral, QHS PRN, Ren Roman MD    senna-docusate (PERICOLACE) 8.6-50 mg per tablet 2 Tab, 2 Tab, Oral, DAILY, Ren Roman MD    gabapentin (NEURONTIN) capsule 800 mg, 800 mg, Oral, BID, Ren Roman MD, 800 mg at 11/26/20 2017  ________________________________________________________________________  Care Plan discussed with:    Comments   Patient yy    Family      RN y    Care Manager     Consultant                        Multidiciplinary team rounds were held today with , nursing, pharmacist and clinical coordinator.   Patient's plan of care was discussed; medications were reviewed and discharge planning was addressed. ________________________________________________________________________  Total NON critical care TIME:  35    Minutes    Total CRITICAL CARE TIME Spent:   Minutes non procedure based      Comments   >50% of visit spent in counseling and coordination of care     ________________________________________________________________________  Andree Vega MD     Procedures: see electronic medical records for all procedures/Xrays and details which were not copied into this note but were reviewed prior to creation of Plan. LABS:  I reviewed today's most current labs and imaging studies.   Pertinent labs include:  Recent Labs     11/26/20  0600   WBC 3.6   HGB 12.1   HCT 35.7        Recent Labs     11/26/20  0747      K 4.8      CO2 25   *   BUN 8   CREA 0.77   CA 9.9   ALB 3.4*   TBILI 0.2   ALT 84*       Signed: Andree Vega MD

## 2020-11-27 NOTE — PROGRESS NOTES
Problem: Mobility Impaired (Adult and Pediatric)  Goal: *Acute Goals and Plan of Care (Insert Text)  Description: FUNCTIONAL STATUS PRIOR TO ADMISSION: Patient was minimum assistance using a rollator for functional mobility. Patient required minimal assistance for basic and instrumental ADLs. HOME SUPPORT PRIOR TO ADMISSION: The patient lived with fiance who assisted her as needed for ADLs and mobility. Physical Therapy Goals  Initiated 11/27/2020  1. Patient will move from supine to sit and sit to supine  and roll side to side in bed with minimum assist within 7 day(s). 2.  Patient will transfer from bed to chair and chair to bed with contact guard assist using the least restrictive device within 7 day(s). 3.  Patient will perform sit to stand with minimal assistance/contact guard assist within 7 day(s). 4.  Patient will ambulate with minimal assistance/contact guard assist for 45 feet with the least restrictive device within 7 day(s). 5.  Patient will ascend/descend 2 stairs with 1 handrail(s) with minimal assistance/contact guard assist within 7 day(s). Outcome: Not Met   PHYSICAL THERAPY EVALUATION  Patient: Savannah Mason (12 y.o. female)  Date: 11/27/2020  Primary Diagnosis: Multiple sclerosis exacerbation ([facility] [address] [G35]        Precautions:   Fall, Skin, Bed Alarm    ASSESSMENT  Based on the objective data described below, the patient presents with generalized weakness with LLE<RLE and RUE<LUE, decreased balance and impaired mobility skills following admission for MS exacerbation on 11/25/20. She was able to transfer out of bed and ambulation from bed to toilet with assistance x 2 using RW. Gait was slow, shuffled with small step lengths. She was then assisted to a beside recliner to stay up out of bed for about an hour. She was having recurrent falls at home and remains a high risk for falls due to weakness, poor standing balance and impaired gait skills.  Recommend rehab prior to returning home.    Current Level of Function Impacting Discharge (mobility/balance): min a supine to sit with extra time; min a x 2 sit to standing; min/cg a x 2 bed to chair with rw; min/cg assist x 2 ambulation with rw 15 feet x 2. Functional Outcome Measure: The patient scored 30/100 on the Barthel outcome measure which is indicative of 70% functional impairment. Other factors to consider for discharge: recurrent falls at home and continued high risk for falls; lives with Fiance who has CP; new exacerbation of MS with decline in strength and mobility. Patient will benefit from skilled therapy intervention to address the above noted impairments. PLAN :  Recommendations and Planned Interventions: bed mobility training, transfer training, gait training, therapeutic exercises, neuromuscular re-education, patient and family training/education, and therapeutic activities      Frequency/Duration: Patient will be followed by physical therapy:  5 times a week to address goals. Recommendation for discharge: (in order for the patient to meet his/her long term goals)  Therapy 3 hours per day 5-7 days per week    This discharge recommendation:  Has been made in collaboration with the attending provider and/or case management    IF patient discharges home will need the following DME: patient owns DME required for discharge       SUBJECTIVE:   Patient stated I was doing fine a few month ago until my new doctor took me off all my medications.     OBJECTIVE DATA SUMMARY:   HISTORY:    Past Medical History:   Diagnosis Date    Body aches 12/11/2014    Chronic pain     Depression     Headache(784.0)     History of mammogram never before    MS (multiple sclerosis) ([facility] [address]     Neurological disorder     Multiple Sclerosis    Pap smear for cervical cancer screening 2008    Psychiatric disorder     anxiety    Psychotic disorder [facility])      Past Surgical History:   Procedure Laterality Date    COLONOSCOPY N/A 2/28/2018 COLONOSCOPY performed by Roro Maldonado MD at [facility] ENDOSCOPY    HX CHOLECYSTECTOMY      HX GYN      3 c-sections    HX HEENT      bilateral ear tube surgery    HX HERNIA REPAIR      HX OTHER SURGICAL      R inguinal hernia repair       Personal factors and/or comorbidities impacting plan of care: MS, chronic pain; high fall risk    Home Situation  Home Environment: Apartment  # Steps to Enter: 2  Rails to Enter: No  Wheelchair Ramp: No  One/Two Story Residence: One story  Living Alone: No  Support Systems: Spouse/Significant Other/Partner  Patient Expects to be Discharged to[de-identified] Apartment  Current DME Used/Available at Home: Com[employer], bedside  Tub or Shower Type: Tub/Shower combination    EXAMINATION/PRESENTATION/DECISION MAKING:   Critical Behavior:  Neurologic State: Alert  Orientation Level: Oriented X4  Cognition: Appropriate decision making, Appropriate for age attention/concentration, Appropriate safety awareness, Follows commands  Safety/Judgement: Fall prevention, Awareness of environment, Good awareness of safety precautions  Hearing: Auditory  Auditory Impairment: None  Skin:  no findings  Edema: none  Range Of Motion:  AROM: Generally decreased, functional           PROM: Generally decreased, functional           Strength:    Strength: Generally decreased, functional(RLE weaker than LLE; 3-/5 to 3+/5 grossly)                    Tone & Sensation:                  Sensation: Intact               Coordination:  Coordination: Generally decreased, functional  Vision:   Acuity: Impaired far vision  Functional Mobility:  Bed Mobility:  Rolling: Minimum assistance; Adaptive equipment(bed rail)  Supine to Sit: Minimum assistance; Adaptive equipment; Additional time(bed rail)     Scooting: Minimum assistance; Moderate assistance; Additional time  Transfers:  Sit to Stand: Minimum assistance;Assist x2  Stand to Sit: Minimum assistance;Contact guard assistance;Assist x2        Bed to Chair: Contact guard assistance;Minimum assistance;Assist x2;Adaptive equipment(RW)              Balance:   Sitting: Impaired  Sitting - Static: Fair (occasional)  Sitting - Dynamic: Fair (occasional)  Standing: Impaired  Standing - Static: Fair;Constant support  Standing - Dynamic : Constant support;Poor  Ambulation/Gait Training:  Distance (ft): 30 Feet (ft)(15x2 )  Assistive Device: Gait belt;Walker, rolling  Ambulation - Level of Assistance: Contact guard assistance;Minimal assistance;Assist x2     Gait Description (WDL): Exceptions to WDL  Gait Abnormalities: Decreased step clearance; Path deviations; Shuffling gait; Step to gait;Trunk sway increased  Right Side Weight Bearing: Full  Left Side Weight Bearing: Full  Base of Support: Widened;Center of gravity altered(slight posterior cog)     Speed/Heena: Slow;Shuffled  Step Length: Left shortened;Right shortened  Swing Pattern: Left asymmetrical;Right asymmetrical(excessive hip ER R>L)     Interventions: Safety awareness training; Tactile cues; Verbal cues(cues to improve foot placement/positioning)       Functional Measure:  Barthel Index:    Bathin  Bladder: 5  Bowels: 5  Groomin  Dressin  Feedin  Mobility: 0  Stairs: 0  Toilet Use: 5  Transfer (Bed to Chair and Back): 5  Total: 30/100       The Barthel ADL Index: Guidelines  1. The index should be used as a record of what a patient does, not as a record of what a patient could do. 2. The main aim is to establish degree of independence from any help, physical or verbal, however minor and for whatever reason. 3. The need for supervision renders the patient not independent. 4. A patient's performance should be established using the best available evidence. Asking the patient, friends/relatives and nurses are the usual sources, but direct observation and common sense are also important. However direct testing is not needed.   5. Usually the patient's performance over the preceding 24-48 hours is important, but occasionally longer periods will be relevant. 6. Middle categories imply that the patient supplies over 50 per cent of the effort. 7. Use of aids to be independent is allowed. Neita Saver., Barthel, D.W. ([id number]). Functional evaluation: the Barthel Index. [address] [facility] (14)2. Ricarda LOUIS Dduley [place] Holts., Michelle Gann., [place], [address] [facility] (1999). Measuring the change indisability after inpatient rehabilitation; comparison of the responsiveness of the Barthel Index and Functional [place] Measure. Journal of Neurology, Neurosurgery, and Psychiatry, 66(4), 867-437. Hilario Cifuentes, N.[place].A, BETTY Moss, & Nathan Ortiz M.A. (2004.) Assessment of post-stroke quality of life in cost-effectiveness studies: The usefulness of the Barthel Index and the EuroQoL-5D. Quality of Life Research, 15, 023-57            Physical Therapy Evaluation Charge Determination   History Examination Presentation Decision-Making   HIGH Complexity :3+ comorbidities / personal factors will impact the outcome/ POC  HIGH Complexity : 4+ Standardized tests and measures addressing body structure, function, activity limitation and / or participation in recreation  HIGH Complexity : Unstable and unpredictable characteristics  Other outcome measures Barthel  HIGH       Based on the above components, the patient evaluation is determined to be of the following complexity level: HIGH       Activity Tolerance:   Fair and requires rest breaks    After treatment patient left in no apparent distress:   Sitting in chair, Call bell within reach, and Caregiver / family present    COMMUNICATION/EDUCATION:   The patients plan of care was discussed with: Occupational therapist, Registered nurse, and Case management. Fall prevention education was provided and the patient/caregiver indicated understanding., Patient/family have participated as able in goal setting and plan of care. , and Patient/family agree to work toward stated goals and plan of care.     Thank you for this referral.  Filomena Buckner, PT   Time Calculation: 42 mins
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
## 2020-11-28 LAB
ANION GAP SERPL CALC-SCNC: 2 MMOL/L (ref 5–15)
BUN SERPL-MCNC: 18 MG/DL (ref 6–20)
BUN/CREAT SERPL: 28 (ref 12–20)
CALCIUM SERPL-MCNC: 8.4 MG/DL (ref 8.5–10.1)
CHLORIDE SERPL-SCNC: 109 MMOL/L (ref 97–108)
CO2 SERPL-SCNC: 26 MMOL/L (ref 21–32)
CREAT SERPL-MCNC: 0.64 MG/DL (ref 0.55–1.02)
GLUCOSE SERPL-MCNC: 126 MG/DL (ref 65–100)
POTASSIUM SERPL-SCNC: 4.3 MMOL/L (ref 3.5–5.1)
SODIUM SERPL-SCNC: 137 MMOL/L (ref 136–145)

## 2020-11-28 PROCEDURE — 74011250637 HC RX REV CODE- 250/637: Performed by: INTERNAL MEDICINE

## 2020-11-28 PROCEDURE — 74011000258 HC RX REV CODE- 258: Performed by: INTERNAL MEDICINE

## 2020-11-28 PROCEDURE — 80048 BASIC METABOLIC PNL TOTAL CA: CPT

## 2020-11-28 PROCEDURE — 65660000000 HC RM CCU STEPDOWN

## 2020-11-28 PROCEDURE — 74011250636 HC RX REV CODE- 250/636: Performed by: INTERNAL MEDICINE

## 2020-11-28 PROCEDURE — 99233 SBSQ HOSP IP/OBS HIGH 50: CPT | Performed by: PSYCHIATRY & NEUROLOGY

## 2020-11-28 RX ORDER — HYDROCODONE BITARTRATE AND ACETAMINOPHEN 5; 325 MG/1; MG/1
2 TABLET ORAL
Status: DISCONTINUED | OUTPATIENT
Start: 2020-11-28 | End: 2020-12-02 | Stop reason: HOSPADM

## 2020-11-28 RX ORDER — FAMOTIDINE 20 MG/1
20 TABLET, FILM COATED ORAL 2 TIMES DAILY
Status: DISCONTINUED | OUTPATIENT
Start: 2020-11-28 | End: 2020-12-02 | Stop reason: HOSPADM

## 2020-11-28 RX ADMIN — FAMOTIDINE 20 MG: 20 TABLET, FILM COATED ORAL at 17:25

## 2020-11-28 RX ADMIN — Medication 10 ML: at 17:28

## 2020-11-28 RX ADMIN — ENOXAPARIN SODIUM 40 MG: 40 INJECTION SUBCUTANEOUS at 08:46

## 2020-11-28 RX ADMIN — CLONAZEPAM 0.5 MG: 0.5 TABLET ORAL at 17:25

## 2020-11-28 RX ADMIN — MELATONIN 6 MG: at 21:33

## 2020-11-28 RX ADMIN — DOCUSATE SODIUM 50MG AND SENNOSIDES 8.6MG 2 TABLET: 8.6; 5 TABLET, FILM COATED ORAL at 08:46

## 2020-11-28 RX ADMIN — GABAPENTIN 800 MG: 100 CAPSULE ORAL at 21:33

## 2020-11-28 RX ADMIN — Medication 10 ML: at 21:34

## 2020-11-28 RX ADMIN — DULOXETINE HYDROCHLORIDE 60 MG: 30 CAPSULE, DELAYED RELEASE ORAL at 08:47

## 2020-11-28 RX ADMIN — HYDROCODONE BITARTRATE AND ACETAMINOPHEN 2 TABLET: 5; 325 TABLET ORAL at 17:24

## 2020-11-28 RX ADMIN — GABAPENTIN 800 MG: 100 CAPSULE ORAL at 08:46

## 2020-11-28 RX ADMIN — HYDROCODONE BITARTRATE AND ACETAMINOPHEN 1 TABLET: 5; 325 TABLET ORAL at 08:46

## 2020-11-28 RX ADMIN — CLONAZEPAM 0.5 MG: 0.5 TABLET ORAL at 08:45

## 2020-11-28 RX ADMIN — SODIUM CHLORIDE 1000 MG: 900 INJECTION INTRAVENOUS at 08:47

## 2020-11-28 RX ADMIN — FAMOTIDINE 20 MG: 20 TABLET, FILM COATED ORAL at 11:54

## 2020-11-28 NOTE — PROGRESS NOTES
Comprehensive Nutrition Assessment    Type and Reason for Visit: Initial, Positive nutrition screen    Nutrition Recommendations/Plan:   Continue Regular diet as tolerated  RD to add Ensure and Magic Cup daily    Nutrition Assessment:   Pt admitted with MS exacerbation. PMH: MS. CAMACHO triggered for wt loss. Chart reviewed, pastoral care in with pt at time of attempted visit. Per EMR it does appear she has had 8% wt loss over the past 2 months. Unable to confirm appetite or complete NFPE, will attempt upon F/U. For now will add PO supplements BID and monitor acceptance. Wt Readings from Last 10 Encounters:   11/25/20 83.4 kg (183 lb 13.8 oz)   10/23/20 81.6 kg (180 lb)   10/12/20 84.8 kg (187 lb)   09/28/20 84.4 kg (186 lb)   08/31/20 90.7 kg (200 lb)   08/06/20 90.7 kg (200 lb)   04/03/20 90.7 kg (200 lb)   03/23/20 90.7 kg (200 lb)   03/17/20 87.4 kg (192 lb 9.6 oz)   03/08/20 90.7 kg (200 lb)       Malnutrition Assessment:  Malnutrition Status:     TBD upon F/U      Estimated Daily Nutrient Needs:  Energy (kcal): MSJ 1750 (1454 x 1.2); Weight Used for Energy Requirements: Current  Protein (g): 67-88g (0.8-1gPro/kg); Weight Used for Protein Requirements:    Fluid (ml/day): 1800mL; Method Used for Fluid Requirements: 1 ml/kcal      Nutrition Related Findings:  Meds: colace, pepcid, medrol, pericolace. Edema: +1 pitting-BLE. BM PTA      Wounds:    None       Current Nutrition Therapies:  DIET REGULAR  DIET NUTRITIONAL SUPPLEMENTS Lunch; Ensure Enlive  DIET NUTRITIONAL SUPPLEMENTS Dinner; Magic Cups    Anthropometric Measures:  · Height:  5' 4\" (162.6 cm)  · Current Body Wt:  83.4 kg (183 lb 13.8 oz)   · Admission Body Wt:       · Usual Body Wt:  90.7 kg (200 lb)(September 2020)  · Ideal Body Wt:  120 lbs:  153.2 %   · BMI Category:  Obese class 1 (BMI 30.0-34. 9)       Nutrition Diagnosis:   · Inadequate protein-energy intake related to other (specify)(poor appetite) as evidenced by weight loss      Nutrition Interventions:   Food and/or Nutrient Delivery: Continue current diet, Start oral nutrition supplement  Nutrition Education and Counseling: No recommendations at this time  Coordination of Nutrition Care: Continue to monitor while inpatient    Goals:  Pt will consume >50% of meals/supplements in 2-4 days.        Nutrition Monitoring and Evaluation:   Behavioral-Environmental Outcomes: None identified  Food/Nutrient Intake Outcomes: Food and nutrient intake, Supplement intake  Physical Signs/Symptoms Outcomes: Biochemical data, Fluid status or edema, Weight, GI status, Nutrition focused physical findings    Discharge Planning:    Continue current diet, Continue oral nutrition supplement     Electronically signed by Maria Alejandra Oseguera RD, [address]  on 11/28/2020 at 3:31 PM    Contact: [phone]

## 2020-11-28 NOTE — PROGRESS NOTES
Spiritual Care Assessment/Progress Note  [facility]      NAME: Shruthi Birch      MRN: [id number]  AGE: 50 y.o.  SEX: female  Episcopal Affiliation: Cristina Ortiz   Language: English     11/28/2020     Total Time (in minutes): 61     Spiritual Assessment begun in [facility] through conversation with:         [x]Patient        [] Family    [] Friend(s)        Reason for Consult: Advance medical directive consult     Spiritual beliefs: (Please include comment if needed)     [x] Identifies with a sudhakar tradition:         [] Supported by a sudhakar community:            [] Claims no spiritual orientation:           [] Seeking spiritual identity:                [] Adheres to an individual form of spirituality:           [] Not able to assess:                           Identified resources for coping:      [] Prayer                               [] Music                  [] Guided Imagery     [x] Family/friends                 [] Pet visits     [] Devotional reading                         [] Unknown     [] Other:                                              Interventions offered during this visit: (See comments for more details)    Patient Interventions: Advance medical directive completed, Affirmation of emotions/emotional suffering, Catharsis/review of pertinent events in supportive environment, Coping skills reviewed/reinforced, Initial visit, Initial/Spiritual assessment, patient floor, Guidance concerning next steps/process to be expected, Life review/legacy, Normalization of emotional/spiritual concerns         Plan of Care:     [] Support spiritual and/or cultural needs    [] Support AMD and/or advance care planning process      [] Support grieving process   [] Coordinate Rites and/or Rituals    [] Coordination with community clergy   [] No spiritual needs identified at this time   [] Detailed Plan of Care below (See Comments)  [] Make referral to Music Therapy  [] Make referral to Pet Therapy     [] Make referral to Addiction services  [] Make referral to [facility]  [] Make referral to Spiritual Care Partner  [] No future visits requested        [x] Follow up upon further referrals     Comments: Responded to request for an Advance Medical Directive (AMD) consult on [address]. Consulted with patients nurse. Reviewed the AMD form in detail and patient articulated a desire to complete, but time in order to contact family/friends to verify their willingness to serve as agents. Pt articulated not knowing when she would have this verified and advised to page as needed. Provided empathetic listening and pastoral presence discussing family dynamics and past emotional trauma. Affirmed desire to be in community with others and feelings of isolation within hospital system.     MAE Mack 1 Provider   Paging Service [phone])
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
## 2020-11-28 NOTE — ACP (ADVANCE CARE PLANNING)
Responded to request for an Advance Medical Directive (AMD) consult on [address]. Consulted with patients nurse. Reviewed the AMD form in detail and patient articulated a desire to complete, but time in order to contact family/friends to verify their willingness to serve as agents. Pt articulated not knowing when she would have this verified and advised to page as needed.     Edu 1 MAE Nova 1 Provider   Paging Service [phone]

## 2020-11-28 NOTE — PROGRESS NOTES
pt complaining of headache states she has had it for 3 days and the Norco is not helping with the pain, rates it 8 out of 10.  MD notified  @11:42 MD ordered to increase the NORCO dose to 2 tablets

## 2020-11-28 NOTE — PROGRESS NOTES
End of Shift Note    Bedside shift change report given to (oncoming nurse) by Cecile Mccarthy (offgoing nurse).   Report included the following information SBAR, Kardex, ED Summary and Intake/Output    Shift worked:  days   Shift summary and any significant changes:    Complained of [address] not controlling headache pain MD increased dose to 2 tabs, spoke with pastoral care regarding advanced directive       Concerns for physician to address:  none   Zone phone for oncoming shift:   [phone]     Patient Information  Naima Hendrix  50 y.o.  11/25/2020  9:50 PM by Yaniv Oconnell MD. Naima Hendrix was admitted from Home    Problem List  Patient Active Problem List    Diagnosis Date Noted    Leukopenia 11/25/2020    UTI (urinary tract infection) 11/25/2020    Multiple sclerosis exacerbation ([address] 08/06/2020    Facial droop 03/23/2020    Exacerbation of multiple sclerosis ([address] 03/09/2020    Left-sided weakness 03/08/2020    Severe obesity ([address] 10/03/2018    Constipation 07/17/2015    Body aches 12/11/2014    Back pain 09/07/2012    Fibromyalgia 08/17/2012    MS (multiple sclerosis) ([address] 08/17/2012    Decreased hearing 01/31/2011    Acute pharyngitis 01/26/2011    Anxiety 08/25/2010    Blood pressure elevated 08/25/2010    Tobacco abuse 08/25/2010     Past Medical History:   Diagnosis Date    Body aches 12/11/2014    Chronic pain     Depression     Headache(784.0)     History of mammogram never before    MS (multiple sclerosis) ([address]     Neurological disorder     Multiple Sclerosis    Pap smear for cervical cancer screening 2008    Psychiatric disorder     anxiety    Psychotic disorder ([address]        Core Measures:  CVA: No No  CHF:No No  PNA:No No    Activity:  Activity Level: Bed Rest  Number times ambulated in hallways past shift: 1  Number of times OOB to chair past shift: 1    Cardiac:   Cardiac Monitoring: Yes      Cardiac Rhythm: Normal sinus rhythm    Access:   Current line(s): PIV Genitourinary:   Urinary status: voiding    Respiratory:   O2 Device: Room air  Chronic home O2 use?: NO  Incentive spirometer at bedside: NO       GI:     Current diet:  DIET REGULAR  Passing flatus: YES  Tolerating current diet: YES       Pain Management:   Patient states pain is manageable on current regimen: YES    Skin:  Samy Score: 17  Interventions: increase time out of bed and internal/external urinary devices    Patient Safety:  Fall Score:  Total Score: 4  Interventions: bed/chair alarm, assistive device (walker, cane, etc), gripper socks, pt to call before getting OOB and stay with me (per policy)  High Fall Risk: Yes    DVT prophylaxis:  DVT prophylaxis Med- Yes  DVT prophylaxis SCD or CONG- No     Wounds: (If Applicable)  Wounds- No  Location     Active Consults:  IP CONSULT TO HOSPITALIST  IP CONSULT TO NEUROLOGY    Length of Stay:  Expected LOS: 3d 14h  Actual LOS: 3  Discharge Plan: Yes TBD      Isatu Bullion

## 2020-11-29 LAB
ANION GAP SERPL CALC-SCNC: 5 MMOL/L (ref 5–15)
BUN SERPL-MCNC: 20 MG/DL (ref 6–20)
BUN/CREAT SERPL: 34 (ref 12–20)
CALCIUM SERPL-MCNC: 9 MG/DL (ref 8.5–10.1)
CHLORIDE SERPL-SCNC: 108 MMOL/L (ref 97–108)
CO2 SERPL-SCNC: 26 MMOL/L (ref 21–32)
CREAT SERPL-MCNC: 0.58 MG/DL (ref 0.55–1.02)
ERYTHROCYTE [DISTWIDTH] IN BLOOD BY AUTOMATED COUNT: 14.2 % (ref 11.5–14.5)
GLUCOSE SERPL-MCNC: 130 MG/DL (ref 65–100)
HCT VFR BLD AUTO: 31.1 % (ref 35–47)
HGB BLD-MCNC: 10.5 G/DL (ref 11.5–16)
MCH RBC QN AUTO: 31.8 PG (ref 26–34)
MCHC RBC AUTO-ENTMCNC: 33.8 G/DL (ref 30–36.5)
MCV RBC AUTO: 94.2 FL (ref 80–99)
NRBC # BLD: 0 K/UL (ref 0–0.01)
NRBC BLD-RTO: 0 PER 100 WBC
PLATELET # BLD AUTO: 293 K/UL (ref 150–400)
PMV BLD AUTO: 9.2 FL (ref 8.9–12.9)
POTASSIUM SERPL-SCNC: 4 MMOL/L (ref 3.5–5.1)
RBC # BLD AUTO: 3.3 M/UL (ref 3.8–5.2)
SODIUM SERPL-SCNC: 139 MMOL/L (ref 136–145)
WBC # BLD AUTO: 6.1 K/UL (ref 3.6–11)

## 2020-11-29 PROCEDURE — 74011250637 HC RX REV CODE- 250/637: Performed by: INTERNAL MEDICINE

## 2020-11-29 PROCEDURE — 85027 COMPLETE CBC AUTOMATED: CPT

## 2020-11-29 PROCEDURE — 65660000000 HC RM CCU STEPDOWN

## 2020-11-29 PROCEDURE — 74011250636 HC RX REV CODE- 250/636: Performed by: INTERNAL MEDICINE

## 2020-11-29 PROCEDURE — 36415 COLL VENOUS BLD VENIPUNCTURE: CPT

## 2020-11-29 PROCEDURE — 80048 BASIC METABOLIC PNL TOTAL CA: CPT

## 2020-11-29 PROCEDURE — 74011000258 HC RX REV CODE- 258: Performed by: INTERNAL MEDICINE

## 2020-11-29 RX ADMIN — HYDROCODONE BITARTRATE AND ACETAMINOPHEN 2 TABLET: 5; 325 TABLET ORAL at 18:08

## 2020-11-29 RX ADMIN — GABAPENTIN 800 MG: 100 CAPSULE ORAL at 21:34

## 2020-11-29 RX ADMIN — CLONAZEPAM 0.5 MG: 0.5 TABLET ORAL at 18:08

## 2020-11-29 RX ADMIN — MELATONIN 6 MG: at 21:34

## 2020-11-29 RX ADMIN — ACETAMINOPHEN 650 MG: 325 TABLET ORAL at 03:54

## 2020-11-29 RX ADMIN — Medication 10 ML: at 18:09

## 2020-11-29 RX ADMIN — FAMOTIDINE 20 MG: 20 TABLET, FILM COATED ORAL at 18:08

## 2020-11-29 RX ADMIN — Medication 10 ML: at 21:34

## 2020-11-29 RX ADMIN — ENOXAPARIN SODIUM 40 MG: 40 INJECTION SUBCUTANEOUS at 09:32

## 2020-11-29 RX ADMIN — DOCUSATE SODIUM 50MG AND SENNOSIDES 8.6MG 2 TABLET: 8.6; 5 TABLET, FILM COATED ORAL at 09:27

## 2020-11-29 RX ADMIN — FAMOTIDINE 20 MG: 20 TABLET, FILM COATED ORAL at 09:27

## 2020-11-29 RX ADMIN — DOCUSATE SODIUM 100 MG: 100 CAPSULE, LIQUID FILLED ORAL at 09:29

## 2020-11-29 RX ADMIN — Medication 10 ML: at 06:18

## 2020-11-29 RX ADMIN — DULOXETINE HYDROCHLORIDE 60 MG: 30 CAPSULE, DELAYED RELEASE ORAL at 09:27

## 2020-11-29 RX ADMIN — CLONAZEPAM 0.5 MG: 0.5 TABLET ORAL at 09:27

## 2020-11-29 RX ADMIN — HYDROCODONE BITARTRATE AND ACETAMINOPHEN 2 TABLET: 5; 325 TABLET ORAL at 09:27

## 2020-11-29 RX ADMIN — SODIUM CHLORIDE 1000 MG: 900 INJECTION INTRAVENOUS at 09:29

## 2020-11-29 RX ADMIN — GABAPENTIN 800 MG: 100 CAPSULE ORAL at 09:27
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
## 2020-11-29 NOTE — PROGRESS NOTES
Problem: Falls - Risk of  Goal: *Absence of Falls  Description: Document Bonilla Frederick Fall Risk and appropriate interventions in the flowsheet. Outcome: Progressing Towards Goal  Note: Fall Risk Interventions:  Mobility Interventions: Bed/chair exit alarm         Medication Interventions: Bed/chair exit alarm    Elimination Interventions: Bed/chair exit alarm, Call light in reach, Patient to call for help with toileting needs    History of Falls Interventions: Consult care management for discharge planning, Door open when patient unattended, Evaluate medications/consider consulting pharmacy         Problem: Patient Education: Go to Patient Education Activity  Goal: Patient/Family Education  Outcome: Progressing Towards Goal     Problem: Pressure Injury - Risk of  Goal: *Prevention of pressure injury  Description: Document Samy Scale and appropriate interventions in the flowsheet.   Outcome: Progressing Towards Goal  Note: Pressure Injury Interventions:  Sensory Interventions: Assess changes in LOC    Moisture Interventions: Absorbent underpads    Activity Interventions: Increase time out of bed    Mobility Interventions: Float heels    Nutrition Interventions: Document food/fluid/supplement intake, Offer support with meals,snacks and hydration    Friction and Shear Interventions: Minimize layers                Problem: Patient Education: Go to Patient Education Activity  Goal: Patient/Family Education  Outcome: Progressing Towards Goal     Problem: Patient Education: Go to Patient Education Activity  Goal: Patient/Family Education  Outcome: Progressing Towards Goal

## 2020-11-29 NOTE — PROGRESS NOTES
Hospitalist Progress Note    NAME: Lori White   :  1975   MRN:  [id number]     Is a 49-year-old female with history of multiple sclerosis presented to hospital with worsening weakness and was noted to have multiple sclerosis exacerbation and was started on high-dose steroids. She is scheduled to complete her steroids and will go to [facility] on . Assessment / Plan:  Multiple sclerosis exacerbation  Recurrent falls due to weakness due to above  -MRI of the brain shows new lesions  -Continue Solu-Medrol 1g iv until . Blood sugar under control.  -Neuro following   -[facility] consulted       UTI (Noted at [facility] ED)  -urine cx negative. Rocephin stopped.     Anxiety DO  Fibromyalgia  Chronic pain  Continue Home klonopin, Cymbalta, Neurontin. Disposition:  Discharge to [facility] on       Code Status: FUll     DVT Prophylaxis: Lovenox     Baseline: Recurrent falls      Body mass index is 31.56 kg/m². Subjective:     Chief Complaint / Reason for Physician Visit  She continues to report pain. She also has weakness in both arms        Review of Systems:  Symptom Y/N Comments  Symptom Y/N Comments   Fever/Chills    Chest Pain     Poor Appetite    Edema     Cough    Abdominal Pain     Sputum    Joint Pain     SOB/BOBO    Pruritis/Rash     Nausea/vomit    Tolerating PT/OT     Diarrhea    Tolerating Diet     Constipation    Other       Could NOT obtain due to:      Objective:     VITALS:   Last 24hrs VS reviewed since prior progress note.  Most recent are:  Patient Vitals for the past 24 hrs:   Temp Pulse Resp BP SpO2   20 1539 98.2 °F (36.8 °C) 81 16 (!) 148/87 94 %   20 1100 97.9 °F (36.6 °C) 84 18 123/76 96 %   20 0800 97.8 °F (36.6 °C) 68 20 (!) 126/91 95 %   20 0242 97.7 °F (36.5 °C) 66 16 128/81 96 %   20 0022 98.2 °F (36.8 °C) 80 16 126/84 96 %   20 1952 98 °F (36.7 °C) 96 14 Jacobo Polanco ) 149/90 96 %       Intake/Output Summary (Last 24 hours) at 11/28/2020 1921  Last data filed at 11/28/2020 1437  Gross per 24 hour   Intake    Output 850 ml   Net -850 ml        PHYSICAL EXAM:  General: WD, WN. Alert, cooperative, no acute distress    EENT:  EOMI. Anicteric sclerae. MMM  Resp:  CTA bilaterally, no wheezing or rales. No accessory muscle use  CV:  Regular  rhythm,  No edema  GI:  Soft, Non distended, Non tender.  +Bowel sounds  Neurologic:  Alert and oriented X 3, normal speech,   Psych:   Not anxious nor agitated  Skin:  No rashes.   No jaundice    Reviewed most current lab test results and cultures  YES  Reviewed most current radiology test results   YES  Review and summation of old records today    NO  Reviewed patient's current orders and MAR    YES  PMH/SH reviewed - no change compared to H&P          Current Facility-Administered Medications:     famotidine (PEPCID) tablet 20 mg, 20 mg, Oral, BID, Ralph Chaudhari MD, 20 mg at 11/28/20 1725    HYDROcodone-acetaminophen (NORCO) 5-325 mg per tablet 2 Tab, 2 Tab, Oral, Q6H PRN, Jo Ann Van MD, 2 Tab at 11/28/20 1724    influenza vaccine 2020-21 (6 mos+)(PF) (FLUARIX/FLULAVAL/FLUZONE QUAD) injection 0.5 mL, 0.5 mL, IntraMUSCular, PRIOR TO DISCHARGE, Ralph Chaudhari MD    methylprednisoLONE (Solu-MEDROL) 1 gm in 100 ml NS MBP, 1,000 mg, IntraVENous, DAILY, Ren Roman MD, Last Rate: 100 mL/hr at 11/28/20 0847, 1,000 mg at 11/28/20 0847    sodium chloride (NS) flush 5-40 mL, 5-40 mL, IntraVENous, Q8H, Ren Roman MD, 10 mL at 11/28/20 1728    sodium chloride (NS) flush 5-40 mL, 5-40 mL, IntraVENous, PRN, Ren Roman MD    acetaminophen (TYLENOL) tablet 650 mg, 650 mg, Oral, Q6H PRN, 650 mg at 11/27/20 1604 **OR** acetaminophen (TYLENOL) suppository 650 mg, 650 mg, Rectal, Q6H PRN, Ren Roman MD    polyethylene glycol (MIRALAX) packet 17 g, 17 g, Oral, DAILY PRN, Ren Roman MD    promethazine (PHENERGAN) tablet 12.5 mg, 12.5 mg, Oral, Q6H PRN **OR** ondansetron (ZOFRAN) injection 4 mg, 4 mg, IntraVENous, Q6H PRN, Ren Roman MD    enoxaparin (LOVENOX) injection 40 mg, 40 mg, SubCUTAneous, DAILY, Ren Roman MD, 40 mg at 11/28/20 0846    . PHARMACY TO SUBSTITUTE PER PROTOCOL (Reordered from: cladribine,multiple sclerosis, (Mavenclad, 8 tablet pack,) 10 mg tab), , , Per Protocol, Ren Farrell MD    clonazePAM (KlonoPIN) tablet 0.5 mg, 0.5 mg, Oral, BID, Ren Roman MD, 0.5 mg at 11/28/20 1725    cyclobenzaprine (FLEXERIL) tablet 10 mg, 10 mg, Oral, TID PRN, Ren Farrell MD, 10 mg at 11/26/20 1241    docusate sodium (COLACE) capsule 100 mg, 100 mg, Oral, DAILY, Ren Roman MD    DULoxetine (CYMBALTA) capsule 60 mg, 60 mg, Oral, DAILY, Ren Roman MD, 60 mg at 11/28/20 0847    melatonin tablet 6 mg, 6 mg, Oral, QHS PRN, Ren Roman MD    senna-docusate (PERICOLACE) 8.6-50 mg per tablet 2 Tab, 2 Tab, Oral, DAILY, Ren Roman MD, 2 Tab at 11/28/20 0846    gabapentin (NEURONTIN) capsule 800 mg, 800 mg, Oral, BID, Ren Roman MD, 800 mg at 11/28/20 4126  ________________________________________________________________________  Care Plan discussed with:    Comments   Patient y    Family      RN y    Care Manager     Consultant                        Multidiciplinary team rounds were held today with , nursing, pharmacist and clinical coordinator. Patient's plan of care was discussed; medications were reviewed and discharge planning was addressed.      ________________________________________________________________________  Total NON critical care TIME:  35    Minutes    Total CRITICAL CARE TIME Spent:   Minutes non procedure based      Comments   >50% of visit spent in counseling and coordination of care     ________________________________________________________________________  Elton De Paz MD     Procedures: see electronic medical records for all procedures/Xrays and details which were not copied into this note but were reviewed prior to creation of Plan. LABS:  I reviewed today's most current labs and imaging studies.   Pertinent labs include:  Recent Labs     11/26/20  0600   WBC 3.6   HGB 12.1   HCT 35.7        Recent Labs     11/28/20  0156 11/27/20  0330 11/26/20  0747    139 137   K 4.3 4.1 4.8   * 111* 106   CO2 26 23 25   * 127* 158*   BUN 18 16 8   CREA 0.64 0.83 0.77   CA 8.4* 8.6 9.9   ALB  --   --  3.4*   TBILI  --   --  0.2   ALT  --   --  84*       Signed: Britt Escalera MD

## 2020-11-29 NOTE — PROGRESS NOTES
End of Shift Note    Bedside shift change report given to Felix pelaez RN (oncoming nurse) by Samson Aguilar RN (offgoing nurse). Report included the following information SBAR, Kardex and Quality Measures    Shift worked: 7 PM - 7 AM   Shift summary and any significant changes:      Medicated x 1 for c/o headache. Concerns for physician to address:  None   Zone phone for oncoming shift:   [phone]     Patient Information  Nicolas Pickering  50 y.o.  11/25/2020  9:50 PM by Yareli Doan MD. Nicolas Pickering was admitted from Home    Problem List  Patient Active Problem List    Diagnosis Date Noted    Leukopenia 11/25/2020    UTI (urinary tract infection) 11/25/2020    Multiple sclerosis exacerbation ([address] 08/06/2020    Facial droop 03/23/2020    Exacerbation of multiple sclerosis ([address] 03/09/2020    Left-sided weakness 03/08/2020    Severe obesity ([address] 10/03/2018    Constipation 07/17/2015    Body aches 12/11/2014    Back pain 09/07/2012    Fibromyalgia 08/17/2012    MS (multiple sclerosis) ([address] 08/17/2012    Decreased hearing 01/31/2011    Acute pharyngitis 01/26/2011    Anxiety 08/25/2010    Blood pressure elevated 08/25/2010    Tobacco abuse 08/25/2010     Past Medical History:   Diagnosis Date    Body aches 12/11/2014    Chronic pain     Depression     Headache(784.0)     History of mammogram never before    MS (multiple sclerosis) ([address]     Neurological disorder     Multiple Sclerosis    Pap smear for cervical cancer screening 2008    Psychiatric disorder     anxiety    Psychotic disorder ([address]        Core Measures:  CVA: No No  CHF:No No  PNA:No No    Activity:  Activity Level: Bed Rest  Number times ambulated in hallways past shift: 0  Number of times OOB to chair past shift: 0    Cardiac:   Cardiac Monitoring: Yes      Cardiac Rhythm: Normal sinus rhythm    Access:   Current line(s): PIV   Central Line? No Placement date 0 Reason Medically Necessary 0  PICC LINE?  No Placement date 0Reason Medically Necessary 0    Genitourinary:   Urinary status: voiding and external catheter  Urinary Catheter? No Placement Date 0 Reason Medically Necessary 0    Respiratory:   O2 Device: Room air  Chronic home O2 use?: NO  Incentive spirometer at bedside: NO       GI:  Last Bowel Movement Date: (Before admission)  Current diet:  DIET REGULAR  DIET NUTRITIONAL SUPPLEMENTS Lunch; [employer]  DIET NUTRITIONAL SUPPLEMENTS Dinner; [employer]  Passing flatus: YES  Tolerating current diet: YES       Pain Management:   Patient states pain is manageable on current regimen: YES    Skin:  Samy Score: 17  Interventions: increase time out of bed    Patient Safety:  Fall Score:  Total Score: 4  Interventions: bed/chair alarm, gripper socks and pt to call before getting OOB  High Fall Risk: Yes    DVT prophylaxis:  DVT prophylaxis Med- No  DVT prophylaxis SCD or CONG- No     Wounds: (If Applicable)  Wounds- No  Location 0    Active Consults:  IP CONSULT TO HOSPITALIST  IP CONSULT TO NEUROLOGY    Length of Stay:  Expected LOS: 3d 14h  Actual LOS: 4  Discharge Plan: No TBD      Karina Franz RN

## 2020-11-29 NOTE — PROGRESS NOTES
Progress Note      Pt Name  Nelly Thurman   Date of Birth 1975   Medical Record Number  [id number]      Age  50 y.o. PCP Madhavi Quevedo MD   Admit date:  11/25/2020    Room Number  5249/38  @ [facility]   Date of Service  11/29/2020     Admission Diagnoses:  MS exacerbation      Admission Summary:  \" Ian Crockett is a 50 y.o.  female who presents with recurrent falls. As per patient, she is been feeling week for past several weeks to months and has been having recurrent falls on regular basis. She reported recurrent admissions for multiple sclerosis exacerbation and discharge to rehab but without much improvement. Pt denies any fever, chills, nausea, vomiting, diarrhea, chest pain, cough, shortness of breath. She does report less frequent urination. Pt presented to [facility] ED where found to have UTI and given IV rocephin and transferred to [address] [place] [address]. \"     Assessment and plan:     Multiple sclerosis exacerbation  Recurrent falls due to weakness due to above  -MRI of the brain shows new lesions  -Continue Solu-Medrol 1g iv until 11/30.   -She will need placement in at [address] [facility]  UTI (Noted at [facility] ED) suspected   -urine cx negative. Rocephin stopped.     Anxiety disorder   Fibromyalgia  Chronic pain  Continue Home klonopin, Cymbalta, Neurontin.        Body mass index is 31.56 kg/m².           CODE STATUS   Full     Functional Status  Pt resides in [facility] side of [place] with a male friend. She was able to ambulate with the help of walker/rollator/kaity prior to admission.      Surrogate decision maker:        Prophylaxis   Lovenox   Discharge Plan:  [facility]/Rehab,     Misc   Benefit:  Payor: [employer] MEDICARE / Plan: [address] HMO / Product Type: Managed Care Medicare /    Isolation :  There are currently no Active Isolations   ADT status:  INPATIENT      Query   None noted today    Prognosis      Social issues  Date  Comment     11/29/20 3:49 PM No family or visitor here with the patient today                     Subjective Data     \"I am still hurting all over \"  Review of Systems - History obtained from the patient  Respiratory ROS: no cough, shortness of breath, or wheezing  Cardiovascular ROS: no chest pain or dyspnea on exertion  Musculoskeletal ROS: positive for - non-specific pain   Neurological ROS: positive for - weakness    Objective Data       Comments  Pleasant chronically ill person. Lying in bed in no distress      Patient Vitals for the past 24 hrs:   BP   11/29/20 1508 (!) 153/98   11/29/20 1109 133/80   11/29/20 0729 130/83   11/29/20 0403 (!) 145/90   11/29/20 0017 (!) 140/82   11/28/20 2012 138/83      Patient Vitals for the past 24 hrs:   Pulse   11/29/20 1508 91   11/29/20 1109 77   11/29/20 0729 68   11/29/20 0403 66   11/29/20 0017 76   11/28/20 2012 88      Patient Vitals for the past 24 hrs:   Resp   11/29/20 1508 18   11/29/20 1109 20   11/29/20 0729 20   11/29/20 0403 16   11/29/20 0017 16   11/28/20 2012 16      Patient Vitals for the past 24 hrs:   Temp   11/29/20 1508 98 °F (36.7 °C)   11/29/20 1109 97.7 °F (36.5 °C)   11/29/20 0729 97.7 °F (36.5 °C)   11/29/20 0403 98.2 °F (36.8 °C)   11/29/20 0017 98.5 °F (36.9 °C)   11/28/20 2012 97.9 °F (36.6 °C)        SpO2 Readings from Last 6 Encounters:   11/29/20 93%   10/23/20 97%   10/12/20 98%   09/28/20 98%   09/05/20 100%   08/12/20 100%          O2 Device: Room air Body mass index is 31.56 kg/m².  -  Wt Readings from Last 10 Encounters:   11/25/20 83.4 kg (183 lb 13.8 oz)   10/23/20 81.6 kg (180 lb)   10/12/20 84.8 kg (187 lb)   09/28/20 84.4 kg (186 lb)   08/31/20 90.7 kg (200 lb)   08/06/20 90.7 kg (200 lb)   04/03/20 90.7 kg (200 lb)   03/23/20 90.7 kg (200 lb)   03/17/20 87.4 kg (192 lb 9.6 oz)   03/08/20 90.7 kg (200 lb)        Physical Exam:             General:  Alert, cooperative,   Well  noursished,   well developed,   appears older than stated age    Ears/Eyes:  Hearing intact  Sclera anicteric. Pupils equal   Mouth/Throat:  Mucous membranes normal pink and moist     Neck:     Lungs:  Trachea midline  Chest excursion symmetrical   Auscultation B/L Symmetrical with   Vesicular breath sounds          CVS:  Regular rate and rhythm   no  murmur,   No click, rub or gallop  S1 normal   S2 normal      Abdomen:    Soft, non-tender  Bowel sounds normal     Extremities:    No cyanosis, jaundice  No edema noted   No sign of DVT/cord like lesion on palpation  No sign of acute trauma . Skin:  Sallow   Skin color, texture, turgor normal. no acute rash or lesions    Lymph nodes:     Musculoskeletal Muscle bulk B/L symmetrical   Neuro Speech slurred - possibly at her baseline   Cranial nerves are intact,   motor movement b/l symmetrical,  Sensory evaluation b/l symmetrical    Psych:  Alert and oriented,   normal mood & affect          Medications reviewed     Current Facility-Administered Medications   Medication Dose Route Frequency    famotidine (PEPCID) tablet 20 mg  20 mg Oral BID    HYDROcodone-acetaminophen (NORCO) 5-325 mg per tablet 2 Tab  2 Tab Oral Q6H PRN    influenza vaccine 2020-21 (6 mos+)(PF) (FLUARIX/FLULAVAL/FLUZONE QUAD) injection 0.5 mL  0.5 mL IntraMUSCular PRIOR TO DISCHARGE    methylprednisoLONE (Solu-MEDROL) 1 gm in 100 ml NS MBP  1,000 mg IntraVENous DAILY    sodium chloride (NS) flush 5-40 mL  5-40 mL IntraVENous Q8H    sodium chloride (NS) flush 5-40 mL  5-40 mL IntraVENous PRN    acetaminophen (TYLENOL) tablet 650 mg  650 mg Oral Q6H PRN    Or    acetaminophen (TYLENOL) suppository 650 mg  650 mg Rectal Q6H PRN    polyethylene glycol (MIRALAX) packet 17 g  17 g Oral DAILY PRN    promethazine (PHENERGAN) tablet 12.5 mg  12.5 mg Oral Q6H PRN    Or    ondansetron (ZOFRAN) injection 4 mg  4 mg IntraVENous Q6H PRN    enoxaparin (LOVENOX) injection 40 mg  40 mg SubCUTAneous DAILY    . PHARMACY TO SUBSTITUTE PER PROTOCOL (Reordered from: cladribine,multiple sclerosis, ([employer], 8 tablet pack,) 10 mg tab)    Per Protocol    clonazePAM (KlonoPIN) tablet 0.5 mg  0.5 mg Oral BID    cyclobenzaprine (FLEXERIL) tablet 10 mg  10 mg Oral TID PRN    docusate sodium (COLACE) capsule 100 mg  100 mg Oral DAILY    DULoxetine (CYMBALTA) capsule 60 mg  60 mg Oral DAILY    melatonin tablet 6 mg  6 mg Oral QHS PRN    senna-docusate (PERICOLACE) 8.6-50 mg per tablet 2 Tab  2 Tab Oral DAILY    gabapentin (NEURONTIN) capsule 800 mg  800 mg Oral BID       Relevant other informations: Other medical conditions listed in [facility] problem list section; all of these and other pertinent data were taken into consideration when treatment plan is developed and customized to this patient's unique overall circumstances and needs. We have reviewed available old medical records within the constraints of this admission process. Data Review:   Recent Days:  All Micro Results     Procedure Component Value Units Date/Time    CULTURE, URINE [[id number]] Collected:  11/26/20 1244    Order Status:  Completed Specimen:  Urine from Clean catch Updated:  11/27/20 2434     Special Requests: NO SPECIAL REQUESTS        Culture result: No growth (<1,000 CFU/ML)             Recent Labs     11/29/20  0233   WBC 6.1   HGB 10.5*   HCT 31.1*        Recent Labs     11/29/20  0233 11/28/20  0156 11/27/20  0330    137 139   K 4.0 4.3 4.1    109* 111*   CO2 26 26 23   * 126* 127*   BUN 20 18 16   CREA 0.58 0.64 0.83   CA 9.0 8.4* 8.6      Lab Results   Component Value Date/Time    TSH 0.36 11/27/2020 03:30 AM            Care Plan discussed with:Patient/Family and Nurse   Other medical conditions are listed in the active hospital problem list section; these and other pertinent data were taken into consideration when the treatment plan was developed and customized to this patient's unique overall circumstances and needs.      High complexity decision making was performed for this patient who is at high risk for decompensation with multiple organ involvement. Today total floor/unit time was 25 minutes while caring for this patient and greater than 50% of that time was spent with patient (and/or family) coordinating patients clinical issues; this includes time spent during multidisciplinary rounds.         La Robin MD MPH FACP    11/29/2020

## 2020-11-29 NOTE — PROGRESS NOTES
End of Shift Note    Bedside shift change report given to [address] [place] [address] (oncoming nurse) by Naomi Borjas (offgoing nurse). Report included the following information SBAR, Kardex and Quality Measures    Shift worked: days   Shift summary and any significant changes:      Medicated x 2 for c/o headache & arm pain. New IV placed       Concerns for physician to address:  None   Zone phone for oncoming shift:   [phone]     Patient Information  Yoseph Boss  50 y.o.  11/25/2020  9:50 PM by Rika Mckee MD. Yoseph Boss was admitted from Home    Problem List  Patient Active Problem List    Diagnosis Date Noted    Leukopenia 11/25/2020    UTI (urinary tract infection) 11/25/2020    Multiple sclerosis exacerbation ([address] 08/06/2020    Facial droop 03/23/2020    Exacerbation of multiple sclerosis ([address] 03/09/2020    Left-sided weakness 03/08/2020    Severe obesity ([address] 10/03/2018    Constipation 07/17/2015    Body aches 12/11/2014    Back pain 09/07/2012    Fibromyalgia 08/17/2012    MS (multiple sclerosis) ([address] 08/17/2012    Decreased hearing 01/31/2011    Acute pharyngitis 01/26/2011    Anxiety 08/25/2010    Blood pressure elevated 08/25/2010    Tobacco abuse 08/25/2010     Past Medical History:   Diagnosis Date    Body aches 12/11/2014    Chronic pain     Depression     Headache(784.0)     History of mammogram never before    MS (multiple sclerosis) ([address]     Neurological disorder     Multiple Sclerosis    Pap smear for cervical cancer screening 2008    Psychiatric disorder     anxiety    Psychotic disorder ([address]        Core Measures:  CVA: No No  CHF:No No  PNA:No No    Activity:  Activity Level: Bed Rest  Number times ambulated in hallways past shift: 0  Number of times OOB to chair past shift: 0    Cardiac:   Cardiac Monitoring: Yes      Cardiac Rhythm: Normal sinus rhythm    Access:   Current line(s): PIV   Central Line? No Placement date 0 Reason Medically Necessary 0  PICC LINE?  No Placement date 0Reason Medically Necessary 0    Genitourinary:   Urinary status: voiding and external catheter  Urinary Catheter? No Placement Date 0 Reason Medically Necessary 0    Respiratory:   O2 Device: Room air  Chronic home O2 use?: NO  Incentive spirometer at bedside: NO       GI:  Last Bowel Movement Date: (unknown)  Current diet:  DIET REGULAR  DIET NUTRITIONAL SUPPLEMENTS Lunch; Ensure [employer]  DIET NUTRITIONAL SUPPLEMENTS Dinner; Magic Cups  Passing flatus: YES  Tolerating current diet: YES       Pain Management:   Patient states pain is manageable on current regimen: YES    Skin:  Samy Score: 17  Interventions: increase time out of bed    Patient Safety:  Fall Score:  Total Score: 4  Interventions: bed/chair alarm, gripper socks and pt to call before getting OOB  High Fall Risk: Yes    DVT prophylaxis:  DVT prophylaxis Med- No  DVT prophylaxis SCD or CONG- No     Wounds: (If Applicable)  Wounds- No  Location 0    Active Consults:  IP CONSULT TO HOSPITALIST  IP CONSULT TO NEUROLOGY    Length of Stay:  Expected LOS: 3d 14h  Actual LOS: 4  Discharge Plan: No TBD      Jhonny Flores

## 2020-11-30 PROCEDURE — 74011250637 HC RX REV CODE- 250/637: Performed by: INTERNAL MEDICINE

## 2020-11-30 PROCEDURE — 74011250636 HC RX REV CODE- 250/636: Performed by: INTERNAL MEDICINE

## 2020-11-30 PROCEDURE — 97530 THERAPEUTIC ACTIVITIES: CPT

## 2020-11-30 PROCEDURE — 2709999900 HC NON-CHARGEABLE SUPPLY

## 2020-11-30 PROCEDURE — 97116 GAIT TRAINING THERAPY: CPT

## 2020-11-30 PROCEDURE — 74011000258 HC RX REV CODE- 258: Performed by: INTERNAL MEDICINE

## 2020-11-30 PROCEDURE — 77030038269 HC DRN EXT URIN PURWCK BARD -A

## 2020-11-30 PROCEDURE — 65660000000 HC RM CCU STEPDOWN

## 2020-11-30 RX ADMIN — FAMOTIDINE 20 MG: 20 TABLET, FILM COATED ORAL at 08:39

## 2020-11-30 RX ADMIN — FAMOTIDINE 20 MG: 20 TABLET, FILM COATED ORAL at 17:26

## 2020-11-30 RX ADMIN — CLONAZEPAM 0.5 MG: 0.5 TABLET ORAL at 08:39

## 2020-11-30 RX ADMIN — SODIUM CHLORIDE 1000 MG: 900 INJECTION INTRAVENOUS at 08:46

## 2020-11-30 RX ADMIN — CLONAZEPAM 0.5 MG: 0.5 TABLET ORAL at 17:26

## 2020-11-30 RX ADMIN — ENOXAPARIN SODIUM 40 MG: 40 INJECTION SUBCUTANEOUS at 08:38

## 2020-11-30 RX ADMIN — ACETAMINOPHEN 650 MG: 325 TABLET ORAL at 20:15

## 2020-11-30 RX ADMIN — Medication 10 ML: at 21:25

## 2020-11-30 RX ADMIN — HYDROCODONE BITARTRATE AND ACETAMINOPHEN 2 TABLET: 5; 325 TABLET ORAL at 13:50

## 2020-11-30 RX ADMIN — MELATONIN 6 MG: at 21:24

## 2020-11-30 RX ADMIN — GABAPENTIN 800 MG: 100 CAPSULE ORAL at 20:14

## 2020-11-30 RX ADMIN — Medication 10 ML: at 05:32

## 2020-11-30 RX ADMIN — Medication 10 ML: at 13:47

## 2020-11-30 RX ADMIN — DULOXETINE HYDROCHLORIDE 60 MG: 30 CAPSULE, DELAYED RELEASE ORAL at 08:38

## 2020-11-30 RX ADMIN — DOCUSATE SODIUM 50MG AND SENNOSIDES 8.6MG 2 TABLET: 8.6; 5 TABLET, FILM COATED ORAL at 08:39

## 2020-11-30 RX ADMIN — GABAPENTIN 800 MG: 100 CAPSULE ORAL at 08:38

## 2020-11-30 NOTE — PROGRESS NOTES
End of Shift Note    Bedside shift change report given to Miki Martinez (oncoming nurse) by Consuelo Martin RN (offgoing nurse). Report included the following information SBAR, Kardex, MAR and Quality Measures    Shift worked:  3pm - 7 PM   Shift summary and any significant changes:     up in chair by PT       Concerns for physician to address: none. Zone phone for oncoming shift:   [phone]     Patient Information  Raissa Acevedo  50 y.o.  11/25/2020  9:50 PM by Clarissa Gore MD. Raissa Acevedo was admitted from Home    Problem List  Patient Active Problem List    Diagnosis Date Noted    Leukopenia 11/25/2020    UTI (urinary tract infection) 11/25/2020    Multiple sclerosis exacerbation ([address] 08/06/2020    Facial droop 03/23/2020    Exacerbation of multiple sclerosis ([address] 03/09/2020    Left-sided weakness 03/08/2020    Severe obesity ([address] 10/03/2018    Constipation 07/17/2015    Body aches 12/11/2014    Back pain 09/07/2012    Fibromyalgia 08/17/2012    MS (multiple sclerosis) ([address] 08/17/2012    Decreased hearing 01/31/2011    Acute pharyngitis 01/26/2011    Anxiety 08/25/2010    Blood pressure elevated 08/25/2010    Tobacco abuse 08/25/2010     Past Medical History:   Diagnosis Date    Body aches 12/11/2014    Chronic pain     Depression     Headache(784.0)     History of mammogram never before    MS (multiple sclerosis) ([address]     Neurological disorder     Multiple Sclerosis    Pap smear for cervical cancer screening 2008    Psychiatric disorder     anxiety    Psychotic disorder ([address]        Core Measures:  CVA: No No  CHF:No No  PNA:No No    Activity:  Activity Level: Up with Assistance  Number times ambulated in hallways past shift: 0  Number of times OOB to chair past shift: 1    Cardiac:   Cardiac Monitoring: Yes      Cardiac Rhythm: Normal sinus rhythm, Sinus tachycardia    Access:   Current line(s): PIV   Central Line?  No Placement date 0 Reason Medically Necessary 0  PICC LINE? No Placement date 0Reason Medically Necessary 0    Genitourinary:   Urinary status: voiding and external catheter  Urinary Catheter? No Placement Date 0 Reason Medically Necessary 0    Respiratory:   O2 Device: Room air  Chronic home O2 use?: NO  Incentive spirometer at bedside: NO       GI:  Last Bowel Movement Date: (unknown over a week)  Current diet:  DIET REGULAR  DIET NUTRITIONAL SUPPLEMENTS Lunch; Ensure [employer]  DIET NUTRITIONAL SUPPLEMENTS Dinner; Magic Cups  Passing flatus: YES  Tolerating current diet: YES       Pain Management:   Patient states pain is manageable on current regimen: YES    Skin:  Samy Score: 17  Interventions: PT/OT consult and internal/external urinary devices gripper socks    Patient Safety:  Fall Score:  Total Score: 4  Interventions: bed/chair alarm and pt to call before getting OOB  High Fall Risk: Yes    DVT prophylaxis:  DVT prophylaxis Med- Yes  DVT prophylaxis SCD or CONG- No     Wounds: (If Applicable)  Wounds- No  Location 0    Active Consults:  IP CONSULT TO NEUROLOGY    Length of Stay:  Expected LOS: 3d 14h  Actual LOS: 5  Discharge Plan: No TBD      Tod Kaminski RN

## 2020-11-30 NOTE — PROGRESS NOTES
Problem: Falls - Risk of  Goal: *Absence of Falls  Description: Document Karen An Fall Risk and appropriate interventions in the flowsheet. Outcome: Progressing Towards Goal  Note: Fall Risk Interventions:  Mobility Interventions: Bed/chair exit alarm, Patient to call before getting OOB         Medication Interventions: Bed/chair exit alarm, Patient to call before getting OOB    Elimination Interventions: Bed/chair exit alarm, Call light in reach    History of Falls Interventions: Bed/chair exit alarm, Consult care management for discharge planning         Problem: Patient Education: Go to Patient Education Activity  Goal: Patient/Family Education  Outcome: Progressing Towards Goal     Problem: Pressure Injury - Risk of  Goal: *Prevention of pressure injury  Description: Document Samy Scale and appropriate interventions in the flowsheet.   Outcome: Progressing Towards Goal  Note: Pressure Injury Interventions:  Sensory Interventions: Assess changes in LOC    Moisture Interventions: Absorbent underpads, Apply protective barrier, creams and emollients    Activity Interventions: PT/OT evaluation, Pressure redistribution bed/mattress(bed type)    Mobility Interventions: Assess need for specialty bed, Pressure redistribution bed/mattress (bed type)    Nutrition Interventions: Document food/fluid/supplement intake, Offer support with meals,snacks and hydration    Friction and Shear Interventions: HOB 30 degrees or less, Lift sheet, Minimize layers                Problem: Patient Education: Go to Patient Education Activity  Goal: Patient/Family Education  Outcome: Progressing Towards Goal     Problem: Patient Education: Go to Patient Education Activity  Goal: Patient/Family Education  Outcome: Progressing Towards Goal

## 2020-11-30 NOTE — ACP (ADVANCE CARE PLANNING)
Responded to request from Ancillary Consult for an Advance Medical Directive (AMD) consult on [facility]. Consulted with patients nurse. Reviewed the AMD form in detail, and assisted patient in filling out and completing the AMD form. Patient wants Nati (mother at [phone]) as primary decision maker and Beverly Garcia (partner at [phone]) as secondary decision maker . Discussed living will section and patient chose to complete section. Made copies of AMD and returned the original to patient. Gave one copy to RN for AMD to be scanned into patients chart by placing it in physical chart.     Edu 1 MAE [address] 1 Provider  [facility] [phone])

## 2020-11-30 NOTE — PROGRESS NOTES
End of Shift Note    Bedside shift change report [facility] [address] [facility] by Mansi Mariee RN (offgoing nurse). Report included the following information PEREZ, Sara, MAR and Quality Measures    Shift worked:  7 AM - 7 PM   Shift summary and any significant changes:     None       Concerns for physician to address:  Patient continues to refuse meds for bowel. Patient states she will be embarrassed if she have BM on herself.    Zone phone for oncoming shift:   [phone]     Patient Information  Iam Turner  50 y.o.  11/25/2020  9:50 PM by Danielle Mejia MD. Iam Turner was admitted from Home    Problem List  Patient Active Problem List    Diagnosis Date Noted    Leukopenia 11/25/2020    UTI (urinary tract infection) 11/25/2020    Multiple sclerosis exacerbation ([address] 08/06/2020    Facial droop 03/23/2020    Exacerbation of multiple sclerosis ([address] 03/09/2020    Left-sided weakness 03/08/2020    Severe obesity ([address] 10/03/2018    Constipation 07/17/2015    Body aches 12/11/2014    Back pain 09/07/2012    Fibromyalgia 08/17/2012    MS (multiple sclerosis) ([address] 08/17/2012    Decreased hearing 01/31/2011    Acute pharyngitis 01/26/2011    Anxiety 08/25/2010    Blood pressure elevated 08/25/2010    Tobacco abuse 08/25/2010     Past Medical History:   Diagnosis Date    Body aches 12/11/2014    Chronic pain     Depression     Headache(784.0)     History of mammogram never before    MS (multiple sclerosis) ([address]     Neurological disorder     Multiple Sclerosis    Pap smear for cervical cancer screening 2008    Psychiatric disorder     anxiety    Psychotic disorder ([address]        Core Measures:  CVA: No No  CHF:No No  PNA:No No    Activity:  Activity Level: Bed Rest  Number times ambulated in hallways past shift: 0  Number of times OOB to chair past shift: 0    Cardiac:   Cardiac Monitoring: Yes      Cardiac Rhythm: Normal sinus rhythm    Access:   Current line(s): PIV   Central Line? No Placement date 0 Reason Medically Necessary 0  PICC LINE? No Placement date 0Reason Medically Necessary 0    Genitourinary:   Urinary status: voiding and external catheter  Urinary Catheter? No Placement Date 0 Reason Medically Necessary 0    Respiratory:   O2 Device: Room air  Chronic home O2 use?: NO  Incentive spirometer at bedside: NO       GI:  Last Bowel Movement Date: (unknown and patient refusing bowel meds. )  Current diet:  DIET REGULAR  DIET NUTRITIONAL SUPPLEMENTS Lunch; Ensure [employer]  DIET NUTRITIONAL SUPPLEMENTS Dinner; Magic Cups  Passing flatus: YES  Tolerating current diet: YES       Pain Management:   Patient states pain is manageable on current regimen: YES    Skin:  Samy Score: 18  Interventions: PT/OT consult and internal/external urinary devices gripper socks    Patient Safety:  Fall Score:  Total Score: 4  Interventions: bed/chair alarm and pt to call before getting OOB  High Fall Risk: Yes    DVT prophylaxis:  DVT prophylaxis Med- Yes  DVT prophylaxis SCD or CONG- No     Wounds: (If Applicable)  Wounds- No  Location 0    Active Consults:  IP CONSULT TO NEUROLOGY    Length of Stay:  Expected LOS: 3d 14h  Actual LOS: 5  Discharge Plan: No TBD      Karina Franz RN

## 2020-11-30 NOTE — PROGRESS NOTES
Provided pastoral presence as pt processed fears surrounding mortality and God-image. Responded to request from Ancillary Consult for an Advance Medical Directive (AMD) consult on [facility]. Consulted with patients nurse. Reviewed the AMD form in detail, and assisted patient in filling out and completing the AMD form. Patient wants Nati (mother at [phone]) as primary decision maker and Agueda Hickey (partner at [phone]) as secondary decision maker . Discussed living will section and patient chose to complete section. Made copies of AMD and returned the original to patient. Gave one copy to RN for AMD to be scanned into patients chart by placing it in physical chart.     Edu 1 MAE Nova 1 Provider   Paging Service [phone])

## 2020-11-30 NOTE — PROGRESS NOTES
Hospitalist Progress Note    NAME: Raissa Acevedo   :  1975   MRN:  [id number]       Assessment / Plan:    Multiple sclerosis exacerbation  Recurrent falls due to weakness due to above  -MRI of the brain shows new lesions  S/p  Solu-Medrol 1g iv until .   -She will need placement in at [address] [place] [address] (Noted at [facility] ED) suspected   -urine cx negative.  Rocephin stopped.     Anxiety disorder   Fibromyalgia  Chronic pain  Continue Home klonopin, Cymbalta, Neurontin.         Body mass index is 31.56 kg/m². 30.0 - 39.9 Obese / Body mass index is 31.56 kg/m². Code status: Full  Prophylaxis: Lovenox  Recommended Disposition: Home w/Family     Subjective:     Chief Complaint / Reason for Physician Visit  FU MS . Still c/o persistent weakness LUE   Discussed with RN events overnight. Review of Systems:  Symptom Y/N Comments  Symptom Y/N Comments   Fever/Chills n   Chest Pain n    Poor Appetite    Edema     Cough n   Abdominal Pain n    Sputum    Joint Pain     SOB/BOBO n   Pruritis/Rash     Nausea/vomit n   Tolerating PT/OT     Diarrhea    Tolerating Diet y    Constipation    Other       Could NOT obtain due to:      Objective:     VITALS:   Last 24hrs VS reviewed since prior progress note. Most recent are:  Patient Vitals for the past 24 hrs:   Temp Pulse Resp BP SpO2   20 1526 97.8 °F (36.6 °C) (!) 104 20 (!) 136/95 96 %   20 1130 99.5 °F (37.5 °C) 84 18 (!) 140/87 97 %   20 0850 97.8 °F (36.6 °C) 88 20 (!) 155/95 98 %   20 0306 97.9 °F (36.6 °C) 64 18 (!) 143/87 92 %   20 2351 98.4 °F (36.9 °C) 73 18 (!) 143/85 96 %   20 1924 98.3 °F (36.8 °C) 85 18 133/88 97 %     No intake or output data in the 24 hours ending 20 1728     I had a face to face encounter and independently examined this patient on 2020, as outlined below:  PHYSICAL EXAM:  General: WD, WN. Alert, cooperative, no acute distress    EENT:  EOMI. Anicteric sclerae. MMM  Resp:  CTA bilaterally, no wheezing or rales. No accessory muscle use  CV:  Regular  rhythm,  No edema  GI:  Soft, Non distended, Non tender. +Bowel sounds  Neurologic:  Alert and oriented X 3, normal speech,   Psych:   Good insight. Not anxious nor agitated  Skin:  No rashes. No jaundice    Reviewed most current lab test results and cultures  YES  Reviewed most current radiology test results   YES  Review and summation of old records today    NO  Reviewed patient's current orders and MAR    YES  PMH/SH reviewed - no change compared to H&P  ________________________________________________________________________  Care Plan discussed with:    Comments   Patient x    Family      RN x    Care Manager     Consultant                        Multidiciplinary team rounds were held today with , nursing, pharmacist and clinical coordinator. Patient's plan of care was discussed; medications were reviewed and discharge planning was addressed. ________________________________________________________________________  Total NON critical care TIME 35   Minutes    Total CRITICAL CARE TIME Spent:   Minutes non procedure based      Comments   >50% of visit spent in counseling and coordination of care     ________________________________________________________________________  Nishi Moy MD     Procedures: see electronic medical records for all procedures/Xrays and details which were not copied into this note but were reviewed prior to creation of Plan. LABS:  I reviewed today's most current labs and imaging studies.   Pertinent labs include:  Recent Labs     11/29/20 0233   WBC 6.1   HGB 10.5*   HCT 31.1*        Recent Labs     11/29/20 0233 11/28/20  0156    137   K 4.0 4.3    109*   CO2 26 26   * 126*   BUN 20 18   CREA 0.58 0.64   CA 9.0 8.4*       Signed: Nishi Moy MD

## 2020-11-30 NOTE — PROGRESS NOTES
Bedside shift change report given to Jhoan Fernandes RN by [facility]. Report included the following information SBAR, ED Summary, Intake/Output, MAR, Recent Results and Cardiac Rhythm NSR.

## 2020-11-30 NOTE — PROGRESS NOTES
Problem: Mobility Impaired (Adult and Pediatric)  Goal: *Acute Goals and Plan of Care (Insert Text)  Description: FUNCTIONAL STATUS PRIOR TO ADMISSION: Patient was minimum assistance using a rollator for functional mobility. Patient required minimal assistance for basic and instrumental ADLs. HOME SUPPORT PRIOR TO ADMISSION: The patient lived with stan who assisted her as needed for ADLs and mobility. Physical Therapy Goals  Initiated 11/27/2020  1. Patient will move from supine to sit and sit to supine  and roll side to side in bed with minimum assist within 7 day(s). 2.  Patient will transfer from bed to chair and chair to bed with contact guard assist using the least restrictive device within 7 day(s). 3.  Patient will perform sit to stand with minimal assistance/contact guard assist within 7 day(s). 4.  Patient will ambulate with minimal assistance/contact guard assist for 45 feet with the least restrictive device within 7 day(s). 5.  Patient will ascend/descend 2 stairs with 1 handrail(s) with minimal assistance/contact guard assist within 7 day(s). Outcome: Progressing Towards Goal   PHYSICAL THERAPY TREATMENT  Patient: Savannah Mason (49 y.o. female)  Date: 11/30/2020  Diagnosis: Multiple sclerosis exacerbation ([facility] [address] [facility]   <principal problem not specified>       Precautions: Fall, Skin, Bed Alarm  Chart, physical therapy assessment, plan of care and goals were reviewed. ASSESSMENT  Patient continues with skilled PT services and is progressing towards goals. Needing less assistance for bed mobility, transfers and ambulation compared to last session. Finished last dose of Solumedrol today. Reports less pain in legs. Weakness is mixed with RLE>LLE and LUE>RUE. Standing balance reactions are delayed with ankle and hip strategies increasing patients risk for falls which remains high at this time.      Current Level of Function Impacting Discharge (mobility/balance): sba supine to sit; min a sit to stand; cg assist bed to chair with RW; cg assist ambulation with RW. Other factors to consider for discharge: good home support; good potential for further functional improvement with intensive rehab         PLAN :  Patient continues to benefit from skilled intervention to address the above impairments. Continue treatment per established plan of care. to address goals. Recommendation for discharge: (in order for the patient to meet his/her long term goals)  Therapy 3 hours per day 5-7 days per week    This discharge recommendation:  Has been made in collaboration with the attending provider and/or case management    IF patient discharges home will need the following DME: wheelchair if she does not have one       SUBJECTIVE:   Patient stated I haven't been out of bed all weekend.     OBJECTIVE DATA SUMMARY:   Critical Behavior:  Neurologic State: Alert  Orientation Level: Oriented X4  Cognition: Appropriate decision making, Appropriate for age attention/concentration, Appropriate safety awareness, Follows commands  Safety/Judgement: Awareness of environment, Fall prevention, Good awareness of safety precautions(RLE and LUE weakness)  Functional Mobility Training:  Bed Mobility:  Rolling: Stand-by assistance  Supine to Sit: Stand-by assistance     Scooting: Contact guard assistance        Transfers:  Sit to Stand: Minimum assistance  Stand to Sit: Minimum assistance        Bed to Chair: Contact guard assistance; Adaptive equipment; Additional time(RW)                    Balance:  Sitting: Impaired  Sitting - Static: Good (unsupported)  Sitting - Dynamic: Fair (occasional)  Standing: Impaired  Standing - Static: Good;Constant support  Standing - Dynamic : Fair;Constant support  Ambulation/Gait Training:  Distance (ft): 35 Feet (ft)  Assistive Device: Gait belt;Walker, rolling  Ambulation - Level of Assistance: Contact guard assistance        Gait Abnormalities: Decreased step clearance; Path deviations; Step to gait; Shuffling gait;Trunk sway increased  Right Side Weight Bearing: Full  Left Side Weight Bearing: Full  Base of Support: Widened     Speed/Heena: Slow  Step Length: Right shortened;Left shortened  Swing Pattern: Right asymmetrical;Left asymmetrical(increased R hip external rotation)     Interventions: Safety awareness training;Verbal cues; Tactile cues        Therapeutic Exercises:   Glut sets; quad sets, heel slides    Pain Rating:  No complaints    Activity Tolerance:   Good and SpO2 stable on RA    After treatment patient left in no apparent distress:   Sitting in chair, Call bell within reach, and Caregiver / family present    COMMUNICATION/COLLABORATION:   The patients plan of care was discussed with: Registered nurse, Physician, and Case management.      Elizabeth Anglin, PT   Time Calculation: 24 mins

## 2020-12-01 LAB
GLUCOSE BLD STRIP.AUTO-MCNC: 100 MG/DL (ref 65–100)
GLUCOSE BLD STRIP.AUTO-MCNC: 106 MG/DL (ref 65–100)
GLUCOSE BLD STRIP.AUTO-MCNC: 117 MG/DL (ref 65–100)
GLUCOSE BLD STRIP.AUTO-MCNC: 149 MG/DL (ref 65–100)
SERVICE CMNT-IMP: ABNORMAL
SERVICE CMNT-IMP: NORMAL

## 2020-12-01 PROCEDURE — 82962 GLUCOSE BLOOD TEST: CPT

## 2020-12-01 PROCEDURE — 65660000000 HC RM CCU STEPDOWN

## 2020-12-01 PROCEDURE — 74011250637 HC RX REV CODE- 250/637: Performed by: INTERNAL MEDICINE

## 2020-12-01 PROCEDURE — 97535 SELF CARE MNGMENT TRAINING: CPT

## 2020-12-01 PROCEDURE — 74011250636 HC RX REV CODE- 250/636: Performed by: INTERNAL MEDICINE

## 2020-12-01 PROCEDURE — 99232 SBSQ HOSP IP/OBS MODERATE 35: CPT | Performed by: PSYCHIATRY & NEUROLOGY

## 2020-12-01 PROCEDURE — 87635 SARS-COV-2 COVID-19 AMP PRB: CPT

## 2020-12-01 PROCEDURE — 77030038269 HC DRN EXT URIN PURWCK BARD -A

## 2020-12-01 PROCEDURE — 97116 GAIT TRAINING THERAPY: CPT

## 2020-12-01 RX ADMIN — CYCLOBENZAPRINE 10 MG: 10 TABLET, FILM COATED ORAL at 17:29

## 2020-12-01 RX ADMIN — CLONAZEPAM 0.5 MG: 0.5 TABLET ORAL at 08:25

## 2020-12-01 RX ADMIN — MELATONIN 6 MG: at 20:21

## 2020-12-01 RX ADMIN — HYDROCODONE BITARTRATE AND ACETAMINOPHEN 2 TABLET: 5; 325 TABLET ORAL at 13:52

## 2020-12-01 RX ADMIN — DOCUSATE SODIUM 50MG AND SENNOSIDES 8.6MG 2 TABLET: 8.6; 5 TABLET, FILM COATED ORAL at 08:25

## 2020-12-01 RX ADMIN — GABAPENTIN 800 MG: 100 CAPSULE ORAL at 08:25

## 2020-12-01 RX ADMIN — ACETAMINOPHEN 650 MG: 325 TABLET ORAL at 06:14

## 2020-12-01 RX ADMIN — GABAPENTIN 800 MG: 100 CAPSULE ORAL at 20:21

## 2020-12-01 RX ADMIN — Medication 10 ML: at 06:16

## 2020-12-01 RX ADMIN — DULOXETINE HYDROCHLORIDE 60 MG: 30 CAPSULE, DELAYED RELEASE ORAL at 08:25

## 2020-12-01 RX ADMIN — ENOXAPARIN SODIUM 40 MG: 40 INJECTION SUBCUTANEOUS at 08:26

## 2020-12-01 RX ADMIN — FAMOTIDINE 20 MG: 20 TABLET, FILM COATED ORAL at 08:25

## 2020-12-01 RX ADMIN — CLONAZEPAM 0.5 MG: 0.5 TABLET ORAL at 17:29

## 2020-12-01 RX ADMIN — DOCUSATE SODIUM 100 MG: 100 CAPSULE, LIQUID FILLED ORAL at 08:25

## 2020-12-01 RX ADMIN — Medication 10 ML: at 14:00

## 2020-12-01 RX ADMIN — Medication 10 ML: at 20:24

## 2020-12-01 RX ADMIN — HYDROCODONE BITARTRATE AND ACETAMINOPHEN 2 TABLET: 5; 325 TABLET ORAL at 19:31

## 2020-12-01 RX ADMIN — FAMOTIDINE 20 MG: 20 TABLET, FILM COATED ORAL at 17:29

## 2020-12-01 NOTE — PROGRESS NOTES
End of Shift Note    Bedside shift change report given to ALEJANDRO Fofana  (oncoming nurse) by Abdirizak Dean RN (offgoing nurse). Report included the following information SBAR, Kardex, MAR and Recent Results    Shift worked:  7am-7pm     Shift summary and any significant changes:     COVID-19 test administered and sent to lab     Concerns for physician to address:  None     Zone phone for oncoming shift:   [phone]       Activity:  Activity Level: Up with Assistance  Number times ambulated in hallways past shift: 1  Number of times OOB to chair past shift: 1    Cardiac:   Cardiac Monitoring: Yes      Cardiac Rhythm: Normal sinus rhythm    Access:   Current line(s): PIV     Genitourinary:   Urinary status: incontinent    Respiratory:   O2 Device: Room air  Chronic home O2 use?: NO  Incentive spirometer at bedside: NO     GI:  Last Bowel Movement Date: (Prior to admission)  Current diet:  DIET REGULAR  DIET NUTRITIONAL SUPPLEMENTS Lunch; [employer]  DIET NUTRITIONAL SUPPLEMENTS Dinner; [employer]  Passing flatus: YES  Tolerating current diet: YES       Pain Management:   Patient states pain is manageable on current regimen: YES    Skin:  Samy Score: 18  Interventions: increase time out of bed and internal/external urinary devices    Patient Safety:  Fall Score:  Total Score: 4  Interventions: bed/chair alarm and pt to call before getting OOB  High Fall Risk: Yes    Length of Stay:  Expected LOS: 3d 14h  Actual LOS: 6      Abdirizak Dean RN

## 2020-12-01 NOTE — PROGRESS NOTES
Problem: Self Care Deficits Care Plan (Adult)  Goal: *Acute Goals and Plan of Care (Insert Text)  Description:   FUNCTIONAL STATUS PRIOR TO ADMISSION: Patient reports she has experienced functional decline over the last 3 weeks secondary to stopping her medications per PCP. Patient has required additional support and assistance with all ADLs and has had several falls over the last few weeks. Patient reports prior to stopping medication, she was able to complete ADLs with min assist for LB and set-up/supervision to mod I for the rest. Patient's fiance assists with all IADLs. HOME SUPPORT: The patient lived with stan who provided assistance and support when needed but had increased assistance over last 3 weeks. Occupational Therapy Goals  Initiated 11/27/2020  1. Patient will perform grooming standing at sink with contact guard assist within 7 day(s). 2.  Patient will perform upper body dressing with supervision/set-up within 7 day(s). 3.  Patient will perform lower body dressing with moderate assistance  within 7 day(s). 4.  Patient will perform toilet transfers with contact guard assist within 7 day(s). 5.  Patient will perform all aspects of toileting with minimal assistance within 7 day(s). Outcome: Progressing Towards Goal   OCCUPATIONAL THERAPY TREATMENT  Patient: Savannah Mason (78 y.o. female)  Date: 12/1/2020  Diagnosis: Multiple sclerosis exacerbation ([facility] [address] [facility]   <principal problem not specified>       Precautions: Fall, Skin, Bed Alarm  Chart, occupational therapy assessment, plan of care, and goals were reviewed. ASSESSMENT  Patient continues with skilled OT services and is progressing towards goals. Patient able to follow all 1 step familiar commands with increased time and occasional vc,  Skills decrease with unfamiliar commands and multi-step commands. Patient with decreased dynamic sitting balance in unsupported sitting with UE use.  Not attentive when loosing balance, usually back and to the R, but just holds on heavily to bedside rail rather than self correct; Once given verbal and tactile cues, able to assist with balance restoration. Current Level of Function Impacting Discharge (ADLs): set up to min/mod A UE ADLs; Mod A-Max A LE ADLs; mod-max A functional mobility    Other factors to consider for discharge: reports she is 30-50% declined from her normal baseline status for ADLs and functional mobility         PLAN :  Patient continues to benefit from skilled intervention to address the above impairments. Continue treatment per established plan of care. to address goals. Recommend with staff: up to chair for all meals    Recommend next OT session: MoCA; UE ADLs seated in chair    Recommendation for discharge: (in order for the patient to meet his/her long term goals)  Therapy 3 hours per day 5-7 days per week    This discharge recommendation:  Has been made in collaboration with the attending provider and/or case management    IF patient discharges home will need the following DME: TBA       SUBJECTIVE:   Patient stated I feel depressed that I can't do my normal just get up and go! I want to get my strength back.     OBJECTIVE DATA SUMMARY:   Cognitive/Behavioral Status:  Neurologic State: Alert  Orientation Level: Oriented X4(but concrete; decreased carryover of new information)  Cognition: Follows commands;Decreased attention/concentration; Impaired decision making(recommend further cog assessment; impaired problem solving )  Perception: Appears intact(not formally assessed)  Perseveration: Perseverates during conversation(regarding topics already reviewed; plesantly repetitive)  Safety/Judgement: Awareness of environment; Fall prevention;Decreased insight into deficits    Functional Mobility and Transfers for ADLs:  Bed Mobility:  Rolling: Moderate assistance  Supine to Sit: Moderate assistance  Sit to Supine: Minimum assistance; Moderate assistance  Scooting: Moderate assistance    Transfers:  Sit to Stand: Minimum assistance; Moderate assistance; Additional time          Balance:  Sitting: Impaired; Without support  Sitting - Static: Fair (occasional)  Sitting - Dynamic: Fair (occasional)  Standing: Impaired; With support  Standing - Static: Constant support; Fair  Standing - Dynamic : Constant support;Fair;Poor(able to side step max verbal and tactile cues, 2 steps only)    ADL Intervention:         Toileting  Toileting Assistance: Maximum assistance  Clothing Management: Maximum assistance    Cognitive Retraining  Attention to Task: Single task  Maintains Attention For (Time): Greater than 10 minutes  Following Commands: Follows one step commands/directions(unable to follow 2 step; tactile cues for unfamiliar command)  Safety/Judgement: Awareness of environment; Fall prevention;Decreased insight into deficits        Pain:  7/10 B UE; Nsg notified     Activity Tolerance:   Fair and SpO2 stable on RA    After treatment patient left in no apparent distress:   Supine in bed, Call bell within reach, Bed / chair alarm activated, and Side rails x 3    COMMUNICATION/COLLABORATION:   The patients plan of care was discussed with: Physical therapist and Registered nurse.      Gloria Lynch OTR/L  Time Calculation: 33 mins

## 2020-12-01 NOTE — PROGRESS NOTES
[facility] Plan:    * SNF ([address] [place] [address]) pending insurance auth    > Referral sent via Allscripts to [facility] was declined; facility not in network with pt's insurance  > CM initiated [facility] auth 12/1/2020; updated PT/OT notes needed until auth obtained  > Pt will need COVID-19 test completed per placement protocol; MD to order  > CM to secure medical transport for d/c  > 2nd IM prior to d/c    1:15 PM: CM requested COVID-19 test in IDR in anticipation for d/c; MD ordered, RN to complete. CM faxed clinical documentation to Marce Winston ([phone]) in effort to initiate [facility] pending at this time. CM will report updates as they become available. 11:24 AM: CM received update that [facility] accepted referral for short-term rehab intervention at d/c. CM met with pt at bedside to check in and discuss updates related to [facility] plan. CM informed pt that [facility] declined referral due to being out of network with her insurance. CM also informed pt that [facility] accepted referrals. Pt verbalized understanding and requested to move forward with [address] [place] [address] at d/c. CM will initiate [employer] authorization today and request COVID-19 test per placement protocol. Initial note: CM reviewed pt's chart and noted updates prior to moving forward with d/c planning. CM checked on the status of referral sent via Allscripts to [facility]; referral declined, [facility] not in network with pt's insurance. Referrals sent via [address] to [address] [facility] [facility] and [address] [place] [address] are currently pending review. CM will meet with pt at bedside to provide updates and answer f/u questions as needed. Pt will need a COVID-19 test completed once placement is secured. CM will continue to follow & report updates as they become available.     Creston Galeazzi, MSW  Care Manager, [facility]

## 2020-12-01 NOTE — PROGRESS NOTES
Hospitalist Progress Note    NAME: Shaaron Sacks   :  1975   MRN:  [id number]       Assessment / Plan:    Multiple sclerosis exacerbation  Recurrent falls due to weakness due to above  -MRI of the brain shows new lesions  S/p  Solu-Medrol 1g iv until .   -She will need placement in at [address] [place] [address] (Noted at [facility] ED) suspected   -urine cx negative.  Rocephin stopped.     Anxiety disorder   Fibromyalgia  Chronic pain  Continue Home klonopin, Cymbalta, Neurontin.         Body mass index is 31.56 kg/m². 30.0 - 39.9 Obese / Body mass index is 31.56 kg/m². Code status: Full  Prophylaxis: Lovenox  Recommended Disposition: Home w/Family     Subjective:     Chief Complaint / Reason for Physician Visit  FU MS . Awaiting rehab    Discussed with RN events overnight. Review of Systems:  Symptom Y/N Comments  Symptom Y/N Comments   Fever/Chills n   Chest Pain n    Poor Appetite    Edema     Cough n   Abdominal Pain n    Sputum    Joint Pain     SOB/BOBO n   Pruritis/Rash     Nausea/vomit n   Tolerating PT/OT     Diarrhea    Tolerating Diet y    Constipation    Other       Could NOT obtain due to:      Objective:     VITALS:   Last 24hrs VS reviewed since prior progress note. Most recent are:  Patient Vitals for the past 24 hrs:   Temp Pulse Resp BP SpO2   20 0733 98.6 °F (37 °C) (!) 57 16 138/83 97 %   20 0422 97.9 °F (36.6 °C) (!) 59 16 128/85 96 %   20 2357 98 °F (36.7 °C) 67 20 135/88 96 %   20 2026 97.9 °F (36.6 °C) 87 17 (!) 138/93 94 %   20 1526 97.8 °F (36.6 °C) (!) 104 20 (!) 136/95 96 %   20 1130 99.5 °F (37.5 °C) 84 18 (!) 140/87 97 %       Intake/Output Summary (Last 24 hours) at 2020 0955  Last data filed at 2020 0631  Gross per 24 hour   Intake    Output 750 ml   Net -750 ml        I had a face to face encounter and independently examined this patient on 2020, as outlined below:  PHYSICAL EXAM:  General: WD, WN.  Alert, cooperative, no acute distress    EENT:  EOMI. Anicteric sclerae. MMM  Resp:  CTA bilaterally, no wheezing or rales. No accessory muscle use  CV:  Regular  rhythm,  No edema  GI:  Soft, Non distended, Non tender. +Bowel sounds  Neurologic:  Alert and oriented X 3, normal speech,   Psych:   Good insight. Not anxious nor agitated  Skin:  No rashes. No jaundice    Reviewed most current lab test results and cultures  YES  Reviewed most current radiology test results   YES  Review and summation of old records today    NO  Reviewed patient's current orders and MAR    YES  PMH/SH reviewed - no change compared to H&P  ________________________________________________________________________  Care Plan discussed with:    Comments   Patient x    Family      RN x    Care Manager     Consultant                        Multidiciplinary team rounds were held today with , nursing, pharmacist and clinical coordinator. Patient's plan of care was discussed; medications were reviewed and discharge planning was addressed. ________________________________________________________________________  Total NON critical care TIME 35   Minutes    Total CRITICAL CARE TIME Spent:   Minutes non procedure based      Comments   >50% of visit spent in counseling and coordination of care     ________________________________________________________________________  Flor Castañeda MD     Procedures: see electronic medical records for all procedures/Xrays and details which were not copied into this note but were reviewed prior to creation of Plan. LABS:  I reviewed today's most current labs and imaging studies.   Pertinent labs include:  Recent Labs     11/29/20  0233   WBC 6.1   HGB 10.5*   HCT 31.1*        Recent Labs     11/29/20  0233      K 4.0      CO2 26   *   BUN 20   CREA 0.58   CA 9.0       Signed: Flor Castañeda MD

## 2020-12-01 NOTE — PROGRESS NOTES
Problem: Mobility Impaired (Adult and Pediatric)  Goal: *Acute Goals and Plan of Care (Insert Text)  Description: FUNCTIONAL STATUS PRIOR TO ADMISSION: Patient was minimum assistance using a rollator for functional mobility. Patient required minimal assistance for basic and instrumental ADLs. HOME SUPPORT PRIOR TO ADMISSION: The patient lived with fiance who assisted her as needed for ADLs and mobility. Physical Therapy Goals  Initiated 11/27/2020  1. Patient will move from supine to sit and sit to supine  and roll side to side in bed with minimum assist within 7 day(s). 2.  Patient will transfer from bed to chair and chair to bed with contact guard assist using the least restrictive device within 7 day(s). 3.  Patient will perform sit to stand with minimal assistance/contact guard assist within 7 day(s). 4.  Patient will ambulate with minimal assistance/contact guard assist for 45 feet with the least restrictive device within 7 day(s). 5.  Patient will ascend/descend 2 stairs with 1 handrail(s) with minimal assistance/contact guard assist within 7 day(s). Outcome: Progressing Towards Goal   PHYSICAL THERAPY TREATMENT  Patient: Chely Degroot (10 y.o. female)  Date: 12/1/2020  Diagnosis: Multiple sclerosis exacerbation ([facility] [address] Cassius Velasquez   <principal problem not specified>       Precautions: Fall, Skin, Bed Alarm  Chart, physical therapy assessment, plan of care and goals were reviewed. ASSESSMENT  Patient continues with skilled PT services and is progressing towards goals. Pt alert, eager to go to rehab, presents with slurred speech. Reports burning in bilat UEs, agrees to ambulate. Pt required increased time to complete functional tasks due to slow movement; demo good effort throughout session. Pt tolerated amb in room with RW. Demo very slow gait speed with decreased floor clearance R>L. Pt returned to bed at end of session due to not comfortable sitting in chair.      Pt remains below functional baseline with strong motivation to participate in rehab process and maximize functional independence. Recommend follow up IPR at d/c. Current Level of Function Impacting Discharge (mobility/balance): bed mob CGA, transfer CGA, amb with RW CGA    Other factors to consider for discharge: good family support         PLAN :  Patient continues to benefit from skilled intervention to address the above impairments. Continue treatment per established plan of care. to address goals. Recommendation for discharge: (in order for the patient to meet his/her long term goals)  Therapy 3 hours per day 5-7 days per week    This discharge recommendation:  Has been made in collaboration with the attending provider and/or case management    IF patient discharges home will need the following DME: patient owns DME required for discharge       SUBJECTIVE:   Patient stated I'm ready to get to rehab.     OBJECTIVE DATA SUMMARY:   Critical Behavior:  Neurologic State: Alert  Orientation Level: Oriented X4(but concrete; decreased carryover of new information)  Cognition: Follows commands, Decreased attention/concentration, Impaired decision making(recommend further cog assessment; impaired problem solving )  Safety/Judgement: Awareness of environment, Fall prevention, Decreased insight into deficits  Functional Mobility Training:  Bed Mobility:  Rolling: Moderate assistance  Supine to Sit: Contact guard assistance; Additional time;Bed Modified(use of bed rail, R sidelying to sit)  Sit to Supine: Stand-by assistance(use of bed rail)  Scooting: Stand-by assistance; Additional time(for scoot to EOB and scooting in supine)        Transfers:  Sit to Stand: Minimum assistance  Stand to Sit: Contact guard assistance                             Balance:  Sitting: Impaired  Sitting - Static: Good (unsupported)  Sitting - Dynamic: Fair (occasional)(tendency to lean to posterior)  Standing: Impaired; With support  Standing - Static: Fair;Good  Standing - Dynamic : Fair  Ambulation/Gait Training:  Distance (ft): 20 Feet (ft)  Assistive Device: Gait belt;Walker, rolling  Ambulation - Level of Assistance: Contact guard assistance; Additional time        Gait Abnormalities: Decreased step clearance;Trunk sway increased; Path deviations        Base of Support: Widened     Speed/Heena: Slow  Step Length: Left shortened;Right shortened        Pain Rating:  Pt reports burning in bilat UEs    Activity Tolerance:   Fair    After treatment patient left in no apparent distress:   Supine in bed, Call bell within reach, Bed / chair alarm activated, and Side rails x 3    COMMUNICATION/COLLABORATION:   The patients plan of care was discussed with: Occupational therapist and Registered nurse.      Sunitha Centeno, PT   Time Calculation: 24 mins

## 2020-12-01 NOTE — PROGRESS NOTES
End of Shift Note     Bedside shift change report given to Amy Dangelo RN (oncoming nurse) by Jovan Baca RN (offgoing nurse). Report included the following information SBAR, Kardex, MAR and Quality Measures     Shift worked:  7 A - 7 PM   Shift summary and any significant changes:     Tylenol x2 for pain   Concerns for physician to address: none. Zone phone for oncoming shift:   [phone]      Patient Information  Nery Felix  50 y.o.  11/25/2020  9:50 PM by Kathy King MD. Nery Felix was admitted from Home     Problem List       Patient Active Problem List     Diagnosis Date Noted    Leukopenia 11/25/2020    UTI (urinary tract infection) 11/25/2020    Multiple sclerosis exacerbation ([address] 08/06/2020    Facial droop 03/23/2020    Exacerbation of multiple sclerosis ([address] 03/09/2020    Left-sided weakness 03/08/2020    Severe obesity ([address] 10/03/2018    Constipation 07/17/2015    Body aches 12/11/2014    Back pain 09/07/2012    Fibromyalgia 08/17/2012    MS (multiple sclerosis) ([address] 08/17/2012    Decreased hearing 01/31/2011    Acute pharyngitis 01/26/2011    Anxiety 08/25/2010    Blood pressure elevated 08/25/2010    Tobacco abuse 08/25/2010           Past Medical History:   Diagnosis Date    Body aches 12/11/2014    Chronic pain      Depression      Headache(784.0)      History of mammogram never before    MS (multiple sclerosis) ([address]      Neurological disorder       Multiple Sclerosis    Pap smear for cervical cancer screening 2008    Psychiatric disorder       anxiety    Psychotic disorder ([facility] [address]           Core Measures:  CVA: No No  CHF:No No  PNA:No No     Activity:  Activity Level: Up with Assistance  Number times ambulated in hallways past shift: 0  Number of times OOB to chair past shift: 1     Cardiac:   Cardiac Monitoring: Yes      Cardiac Rhythm: Normal sinus rhythm, Sinus tachycardia     Access:   Current line(s): PIV   Central Line?  No Placement date 0 Reason Medically Necessary 0  PICC LINE? No Placement date 0Reason Medically Necessary 0     Genitourinary:   Urinary status: voiding and external catheter  Urinary Catheter? No Placement Date 0 Reason Medically Necessary 0     Respiratory:   O2 Device: Room air  Chronic home O2 use?: NO  Incentive spirometer at bedside: NO     GI:  Last Bowel Movement Date: (unknown over a week)  Current diet:  DIET REGULAR  DIET NUTRITIONAL SUPPLEMENTS Lunch; [employer]  DIET NUTRITIONAL SUPPLEMENTS Dinner; [employer]  Passing flatus: YES  Tolerating current diet: YES     Pain Management:   Patient states pain is manageable on current regimen: YES     Skin:  Samy Score: 17  Interventions: PT/OT consult and internal/external urinary devices gripper socks    Patient Safety:  Fall Score: Total Score: 4  Interventions: bed/chair alarm and pt to call before getting OOB  High Fall Risk:  Yes     DVT prophylaxis:  DVT prophylaxis Med- Yes  DVT prophylaxis SCD or CONG- No      Wounds: (If Applicable)  Wounds- No  Location 0     Active Consults:  IP CONSULT TO NEUROLOGY     Length of Stay:  Expected LOS: 3d 14h  Actual LOS: 5  Discharge Plan: No TBD

## 2020-12-01 NOTE — PROGRESS NOTES
Problem: Falls - Risk of  Goal: *Absence of Falls  Description: Document Stefan Gonzalez Fall Risk and appropriate interventions in the flowsheet. Outcome: Progressing Towards Goal  Note: Fall Risk Interventions:  Mobility Interventions: Bed/chair exit alarm, Communicate number of staff needed for ambulation/transfer         Medication Interventions: Bed/chair exit alarm    Elimination Interventions: Call light in reach, Bed/chair exit alarm, Patient to call for help with toileting needs    History of Falls Interventions: Bed/chair exit alarm, Utilize gait belt for transfer/ambulation         Problem: Pressure Injury - Risk of  Goal: *Prevention of pressure injury  Description: Document Samy Scale and appropriate interventions in the flowsheet.   Outcome: Progressing Towards Goal  Note: Pressure Injury Interventions:  Sensory Interventions: Assess changes in LOC, Avoid rigorous massage over bony prominences, Keep linens dry and wrinkle-free, Maintain/enhance activity level    Moisture Interventions: Absorbent underpads, Apply protective barrier, creams and emollients, Check for incontinence Q2 hours and as needed, Internal/External urinary devices    Activity Interventions: PT/OT evaluation    Mobility Interventions: PT/OT evaluation    Nutrition Interventions: Document food/fluid/supplement intake    Friction and Shear Interventions: HOB 30 degrees or less, Lift sheet, Minimize layers                Problem: Patient Education: Go to Patient Education Activity  Goal: Patient/Family Education  Outcome: Progressing Towards Goal

## 2020-12-01 NOTE — PROGRESS NOTES
.  Chief Complaint: Multiple sclerosis    Feels better. Looking forward to going to rehabilitation. Focusing on becoming more independent. Will address her MS medications in a month. Assesment and Plan  1. Multiple sclerosis  Completed pulsatile steroid therapy    Will address DMD outpatient  [facility] 2.  Right-sided weakness  Continue PT and OT     2. Anxiety   Continue clonazepam which will help with the spasticity    3. Fibromyaglia   Continue gabapentin    4. Tobacco use  conitnues to smoke discussed the importance quit. Allergies  Morphine     Medications  Current Facility-Administered Medications   Medication Dose Route Frequency    famotidine (PEPCID) tablet 20 mg  20 mg Oral BID    HYDROcodone-acetaminophen (NORCO) 5-325 mg per tablet 2 Tab  2 Tab Oral Q6H PRN    influenza vaccine 2020-21 (6 mos+)(PF) (FLUARIX/FLULAVAL/FLUZONE QUAD) injection 0.5 mL  0.5 mL IntraMUSCular PRIOR TO DISCHARGE    sodium chloride (NS) flush 5-40 mL  5-40 mL IntraVENous Q8H    sodium chloride (NS) flush 5-40 mL  5-40 mL IntraVENous PRN    acetaminophen (TYLENOL) tablet 650 mg  650 mg Oral Q6H PRN    Or    acetaminophen (TYLENOL) suppository 650 mg  650 mg Rectal Q6H PRN    polyethylene glycol (MIRALAX) packet 17 g  17 g Oral DAILY PRN    promethazine (PHENERGAN) tablet 12.5 mg  12.5 mg Oral Q6H PRN    Or    ondansetron (ZOFRAN) injection 4 mg  4 mg IntraVENous Q6H PRN    enoxaparin (LOVENOX) injection 40 mg  40 mg SubCUTAneous DAILY    . PHARMACY TO SUBSTITUTE PER PROTOCOL (Reordered from: cladribine,multiple sclerosis, ([employer], 8 tablet pack,) 10 mg tab)    Per Protocol    clonazePAM (KlonoPIN) tablet 0.5 mg  0.5 mg Oral BID    cyclobenzaprine (FLEXERIL) tablet 10 mg  10 mg Oral TID PRN    docusate sodium (COLACE) capsule 100 mg  100 mg Oral DAILY    DULoxetine (CYMBALTA) capsule 60 mg  60 mg Oral DAILY    melatonin tablet 6 mg  6 mg Oral QHS PRN    senna-docusate (PERICOLACE) 8.6-50 mg per tablet 2 Tab  2 Tab Oral DAILY    gabapentin (NEURONTIN) capsule 800 mg  800 mg Oral BID        Medical History  Past Medical History:   Diagnosis Date    Body aches 12/11/2014    Chronic pain     Depression     Headache(784.0)     History of mammogram never before    MS (multiple sclerosis) ([facility] [address]     Neurological disorder     Multiple Sclerosis    Pap smear for cervical cancer screening 2008    Psychiatric disorder     anxiety    Psychotic disorder ([facility] [address]      Review of Systems   Constitutional: Negative for chills and fever. HENT: Negative for ear pain. Eyes: Negative for pain and discharge. Respiratory: Negative for cough and hemoptysis. Cardiovascular: Negative for chest pain and claudication. Gastrointestinal: Negative for constipation and diarrhea. Genitourinary: Negative for flank pain and hematuria. Musculoskeletal: Positive for back pain, myalgias and neck pain. Skin: Negative for itching and rash. Neurological: Positive for speech change. Negative for tingling and headaches. Endo/Heme/Allergies: Negative for environmental allergies. Does not bruise/bleed easily. Psychiatric/Behavioral: Negative for depression and hallucinations. The patient is nervous/anxious. Exam:    Visit Vitals  /89 (BP 1 Location: Left arm, BP Patient Position: At rest)   Pulse 70   Temp 97.8 °F (36.6 °C)   Resp 18   Ht 5' 4\" (1.626 m)   Wt 183 lb 13.8 oz (83.4 kg)   LMP 11/23/2020   SpO2 96%   BMI 31.56 kg/m²      General: Well developed, well nourished. Head: Normocephalic, atraumatic, anicteric sclera   Neck Normal ROM   Lungs:  Clear to auscultation   Cardiac: Regular rate and rhythm with no murmurs. Abd: Bowel sounds were audible   Ext: No pedal edema   Skin: Supple no rash     NeurologicExam:  Mental Status: Alert and oriented to person place and time   Speech:  Mildly dysarthric no aphasia.    Cranial Nerves:  II - XII Intact with the exception of right facial droop   Motor:   left side weakness 4-5 upper extremity. Bilateral hip flexion weakness   Reflexes:   Deep tendon reflexes exaggerated on the right   Sensory:   diminished sensation on the left . Tremor:   No tremor noted. Cerebellar:  Coordination intact. Neurovascular: No carotid bruits. No JVD       Imaging      MRI Results (most recent):  11/27/2020  IMPRESSION:   1. New enhancing active demyelinating plaques in the right frontal  periventricular white matter and left occipital periventricular white matter. Suspected additional punctate new enhancing active demyelinating plaque in the  right occipital periventricular white matter. 2. Interval increase in size of a nonenhancing demyelinating plaque in the right  posterior frontal subcortical white matter. 3. Otherwise stable extensive confluent white matter lesions with an appearance  and pattern consistent with demyelinating disease. Multiple previously seen  enhancing demyelinating plaques have otherwise resolved. 4. Partially visualized demyelinating disease in the upper cervical cord. 5. Stable mild parenchymal volume loss.     Lab Review    Lab Results   Component Value Date/Time    WBC 6.1 11/29/2020 02:33 AM    HCT 31.1 (L) 11/29/2020 02:33 AM    HGB 10.5 (L) 11/29/2020 02:33 AM    PLATELET 121 72/00/3378 02:33 AM       Lab Results   Component Value Date/Time    Sodium 139 11/29/2020 02:33 AM    Potassium 4.0 11/29/2020 02:33 AM    Chloride 108 11/29/2020 02:33 AM    CO2 26 11/29/2020 02:33 AM    Glucose 130 (H) 11/29/2020 02:33 AM    BUN 20 11/29/2020 02:33 AM    Creatinine 0.58 11/29/2020 02:33 AM    Calcium 9.0 11/29/2020 02:33 AM       Lab Results   Component Value Date/Time    Cholesterol, total 222 (H) 03/09/2020 02:49 AM    HDL Cholesterol 71 03/09/2020 02:49 AM    LDL, calculated 143.4 (H) 03/09/2020 02:49 AM    VLDL, calculated 7.6 03/09/2020 02:49 AM    Triglyceride 38 03/09/2020 02:49 AM    CHOL/HDL Ratio 3.1 03/09/2020 02:49 AM

## 2020-12-02 VITALS
RESPIRATION RATE: 18 BRPM | OXYGEN SATURATION: 100 % | WEIGHT: 183.86 LBS | BODY MASS INDEX: 31.39 KG/M2 | DIASTOLIC BLOOD PRESSURE: 76 MMHG | TEMPERATURE: 97.5 F | HEART RATE: 95 BPM | SYSTOLIC BLOOD PRESSURE: 108 MMHG | HEIGHT: 64 IN

## 2020-12-02 LAB
GLUCOSE BLD STRIP.AUTO-MCNC: 77 MG/DL (ref 65–100)
HEALTH STATUS, XMCV2T: NORMAL
SARS-COV-2, COV2: NOT DETECTED
SERVICE CMNT-IMP: NORMAL
SOURCE, COVRS: NORMAL
SPECIMEN SOURCE, FCOV2M: NORMAL
SPECIMEN TYPE, XMCV1T: NORMAL

## 2020-12-02 PROCEDURE — 90471 IMMUNIZATION ADMIN: CPT

## 2020-12-02 PROCEDURE — 74011250636 HC RX REV CODE- 250/636: Performed by: INTERNAL MEDICINE

## 2020-12-02 PROCEDURE — 97116 GAIT TRAINING THERAPY: CPT

## 2020-12-02 PROCEDURE — 77030038269 HC DRN EXT URIN PURWCK BARD -A

## 2020-12-02 PROCEDURE — 74011250637 HC RX REV CODE- 250/637: Performed by: INTERNAL MEDICINE

## 2020-12-02 PROCEDURE — 90686 IIV4 VACC NO PRSV 0.5 ML IM: CPT | Performed by: INTERNAL MEDICINE

## 2020-12-02 PROCEDURE — 82962 GLUCOSE BLOOD TEST: CPT

## 2020-12-02 RX ORDER — FAMOTIDINE 20 MG/1
20 TABLET, FILM COATED ORAL 2 TIMES DAILY
Qty: 60 TAB | Refills: 0 | Status: SHIPPED
Start: 2020-12-02 | End: 2020-12-17 | Stop reason: CLARIF

## 2020-12-02 RX ORDER — GABAPENTIN 400 MG/1
800 CAPSULE ORAL 2 TIMES DAILY
Qty: 20 CAP | Refills: 0 | Status: SHIPPED | OUTPATIENT
Start: 2020-12-02 | End: 2020-12-07

## 2020-12-02 RX ADMIN — ENOXAPARIN SODIUM 40 MG: 40 INJECTION SUBCUTANEOUS at 08:22

## 2020-12-02 RX ADMIN — INFLUENZA VIRUS VACCINE 0.5 ML: 15; 15; 15; 15 SUSPENSION INTRAMUSCULAR at 14:55

## 2020-12-02 RX ADMIN — DOCUSATE SODIUM 100 MG: 100 CAPSULE, LIQUID FILLED ORAL at 08:22

## 2020-12-02 RX ADMIN — Medication 10 ML: at 14:00

## 2020-12-02 RX ADMIN — ACETAMINOPHEN 650 MG: 325 TABLET ORAL at 03:27

## 2020-12-02 RX ADMIN — HYDROCODONE BITARTRATE AND ACETAMINOPHEN 2 TABLET: 5; 325 TABLET ORAL at 08:21

## 2020-12-02 RX ADMIN — DOCUSATE SODIUM 50MG AND SENNOSIDES 8.6MG 2 TABLET: 8.6; 5 TABLET, FILM COATED ORAL at 08:23

## 2020-12-02 RX ADMIN — HYDROCODONE BITARTRATE AND ACETAMINOPHEN 2 TABLET: 5; 325 TABLET ORAL at 14:55

## 2020-12-02 RX ADMIN — CLONAZEPAM 0.5 MG: 0.5 TABLET ORAL at 08:21

## 2020-12-02 RX ADMIN — DULOXETINE HYDROCHLORIDE 60 MG: 30 CAPSULE, DELAYED RELEASE ORAL at 08:22

## 2020-12-02 RX ADMIN — CYCLOBENZAPRINE 10 MG: 10 TABLET, FILM COATED ORAL at 11:46

## 2020-12-02 RX ADMIN — GABAPENTIN 800 MG: 100 CAPSULE ORAL at 08:21

## 2020-12-02 RX ADMIN — FAMOTIDINE 20 MG: 20 TABLET, FILM COATED ORAL at 08:21

## 2020-12-02 NOTE — PROGRESS NOTES
Problem: Falls - Risk of  Goal: *Absence of Falls  Description: Document Greg Ayden Fall Risk and appropriate interventions in the flowsheet. Outcome: Progressing Towards Goal  Note: Fall Risk Interventions:  Mobility Interventions: Communicate number of staff needed for ambulation/transfer, Bed/chair exit alarm, PT Consult for mobility concerns, Utilize walker, cane, or other assistive device         Medication Interventions: Bed/chair exit alarm    Elimination Interventions: Call light in reach, Bed/chair exit alarm, Patient to call for help with toileting needs    History of Falls Interventions: Bed/chair exit alarm         Problem: Patient Education: Go to Patient Education Activity  Goal: Patient/Family Education  Outcome: Progressing Towards Goal     Problem: Pressure Injury - Risk of  Goal: *Prevention of pressure injury  Description: Document Samy Scale and appropriate interventions in the flowsheet. Outcome: Progressing Towards Goal  Note: Pressure Injury Interventions:  Sensory Interventions: Avoid rigorous massage over bony prominences, Turn and reposition approx. every two hours (pillows and wedges if needed), Monitor skin under medical devices    Moisture Interventions: Absorbent underpads, Apply protective barrier, creams and emollients, Internal/External urinary devices, Maintain skin hydration (lotion/cream)    Activity Interventions: PT/OT evaluation    Mobility Interventions: PT/OT evaluation, Turn and reposition approx.  every two hours(pillow and wedges)    Nutrition Interventions: Offer support with meals,snacks and hydration    Friction and Shear Interventions: HOB 30 degrees or less, Lift sheet, Minimize layers

## 2020-12-02 NOTE — PROGRESS NOTES
SHOLA Plan:     * SNF ([address] [place] [address])      > Call report to [address] [place] [address] at d/c ([phone]); pt assigned to room 416A  > CM initiated [facility] auth 12/1/2020; [facility] successfully processed  > [facility] transport secured for 4:00 PM; PCS completed, copy on chart  > COVID-19 test completed 12/1/2020; results negative    2:14 PM: CM faxed pt's d/c summary to [address] [place] [address] for reference (f: [phone]). 2:00 PM: CM met with pt at bedside to check in and review plan for d/c re: SNF ([address] [place] [address]). CM confirmed pt is agreeable to d/c plan. CM also confirmed pt is agreeable to utilizing medical transport at d/c. CM informed pt of [facility] transport scheduled for 4PM; pt verbalized understanding. CM inquired if pt had any additional questions, concerns, or needs related to d/c; pt declined. CM briefly discussed pt's plan for after rehab; per pt, she plans to return to the address on file ([address]) with her fiance (Farzad Sotelo). Medicare pt has received, reviewed, and provided verbal consent for 2nd IM letter informing them of their right to appeal the discharge. Copy has been placed on pt bedside chart. No further CM needs identified. CM notified pt's nurse of d/c.    1:31 PM: CM spoke with MD and confirmed pt is medically stable for d/c today. MD agreeable to d/c plan. CM secured [facility] transport for 4:00 PM; PCS completed, copy on chart. CM will meet with pt at bedside to check in and review SHOLA plan for d/c.    12:25 PM: CM contacted [facility] ([phone]) to receive update on the status of pt's authorization. NORTH spoke with Sherita Fry who reported [facility] successfully processed; [facility] reference #: [id number]. NORTH contacted [address] [place] [address] ([phone]) to discuss pt's transition to facility. NORTH spoke with admission's liaison Anita Yeboah who confirmed facility can accept pt at d/c today. CM placed number for report & pt's room assignment in [facility] plan detailed above.  CM will page attending MD to confirm pt is medically stable for d/c today. CM will secure medical transport for d/c. Initial note: CM reviewed pt's chart and discussed updates in IDR prior to moving forward with d/c planning. As of 12/2/2020 at 12:00 PM, pt's [facility] [employer] authorization is still pending. CM will contact [facility] directly to receive update on the status of authorization. CM noted pt's COVID-19 test was completed 12/1/2020 per placement protocol; results negative. CM will enter delay in d/c to reflect current barriers preventing pt from transitioning out of [facility]. CM will continue to follow & report updates as they become available. Care Management Interventions  PCP Verified by CM:  Yes  Palliative Care Criteria Met (RRAT>21 & CHF Dx)?: No  Mode of Transport at Discharge: [facility] Time of Discharge: 1600  Transition of Care Consult (CM Consult): SNF  Partner SNF: Yes  Discharge Durable Medical Equipment: No  Health Maintenance Reviewed: Yes  Physical Therapy Consult: Yes  Occupational Therapy Consult: Yes  Speech Therapy Consult: Yes  Current Support Network: Own Home  Confirm Follow Up Transport: Family  The Plan for Transition of Care is Related to the Following Treatment Goals : SNF  The Patient and/or Patient Representative was Provided with a Choice of Provider and Agrees with the Discharge Plan?: Yes  Name of the Patient Representative Who was Provided with a Choice of Provider and Agrees with the Discharge Plan: patient  Freedom of Choice List was Provided with Basic Dialogue that Supports the Patient's Individualized Plan of Care/Goals, Treatment Preferences and Shares the Quality Data Associated with the Providers?: Yes  The [employer] Information Provided?: No  Discharge Location  Discharge Placement: Skilled nursing facility([address])     Transition of Care Plan to SNF/Rehab    SNF/Rehab Transition:  Patient has been accepted to [address] [place] [address] and meets criteria for admission. Patient will transported by [facility] and expected to leave at 4:00 PM on 12/2/2020    Communication to Patient/Family:  Met with patient and confirmed that they are agreeable to the transition plan. Communication to SNF/Rehab:  Bedside RN has been notified to update the transition plan to the facility and call report: [phone]  Discharge information has been updated on the AVS.     Discharge instructions were fax'd to facility at: [phone]    Nursing Please include all hard scripts for controlled substances, med rec and dc summary, and AVS in packet. Reviewed and confirmed with [address] [place] [address] that they can manage the patient care needs for the following:     Jacy Glad with (X) only those applicable:    Medication:  [x]  Medications will be available at the facility  []  IV Antibiotics   [x]  Controlled Substance - hard copy to be sent with patient   []  Weekly Labs   Documents:  [x] Hard RX  [x] MAR  [x] Kardex  [x] AVS  [x]Transfer Summary  [x]Discharge   Equipment:  []  CPAP/BiPAP  []  Wound Vacuum  []  Weir or Urinary Device  []  PICC/Central Line  []  Nebulizer  []  Ventilator   Treatment:  []Isolation (for MRSA, VRE, etc.)  []Surgical Drain Management  []Tracheostomy Care  []Dressing Changes  []Dialysis with transportation and chair time. []PEG Care  []Oxygen  []Daily Weights for Heart Failure   Dietary:  []Any diet limitations  []Tube Feedings   []Total Parenteral Management (TPN)   Eligible for Medicaid Long Term Services and Supports  Yes:  [x] Eligible for medical assistance or will become eligible within 180 days - pt has active Medicaid coverage  [] Provider/Patient and/or support system has requested screening. [] UAI copy provided to patient or responsible party  [] UAI unavailable at discharge will send once processed to SNF provider.   [] UAI unavailable at discharged mailed to patient  No:   [] Private pay and is not financially eligible for Medicaid within the next 180 days. [] Reside out-of-state.   [] A residents of a state owned/operated facility that is licensed  by [address] or [facility]  [] Enrollment in [facility] hospice services  [] [address] [place] [address]  [] Patient /Family declines to have screening completed or provide financial information for screening     Financial Resources:  Medicaid    [] Initiated and application pending   [x] Full coverage     Advanced Care Plan:  []Surrogate Decision Maker of Care  []POA  [x]Communicated Code Status: FULL   Other       Latrice Guerrero, [address] [facility], [address] [facility]

## 2020-12-02 NOTE — PROGRESS NOTES
Problem: Falls - Risk of  Goal: *Absence of Falls  Description: Document Ivette Kiddcurtis Fall Risk and appropriate interventions in the flowsheet. Outcome: Resolved/Met  Note: Fall Risk Interventions:  Mobility Interventions: Bed/chair exit alarm, Communicate number of staff needed for ambulation/transfer         Medication Interventions: Evaluate medications/consider consulting pharmacy    Elimination Interventions: Bed/chair exit alarm, Patient to call for help with toileting needs, Toileting schedule/hourly rounds    History of Falls Interventions: Bed/chair exit alarm, Evaluate medications/consider consulting pharmacy         Problem: Patient Education: Go to Patient Education Activity  Goal: Patient/Family Education  Outcome: Resolved/Met     Problem: Pressure Injury - Risk of  Goal: *Prevention of pressure injury  Description: Document Samy Scale and appropriate interventions in the flowsheet.   Outcome: Resolved/Met  Note: Pressure Injury Interventions:  Sensory Interventions: Assess changes in LOC, Check visual cues for pain, Keep linens dry and wrinkle-free    Moisture Interventions: Absorbent underpads, Apply protective barrier, creams and emollients    Activity Interventions: Increase time out of bed    Mobility Interventions: HOB 30 degrees or less, Pressure redistribution bed/mattress (bed type)    Nutrition Interventions: Offer support with meals,snacks and hydration    Friction and Shear Interventions: HOB 30 degrees or less, Lift sheet, Minimize layers                Problem: Patient Education: Go to Patient Education Activity  Goal: Patient/Family Education  Outcome: Resolved/Met     Problem: Patient Education: Go to Patient Education Activity  Goal: Patient/Family Education  Outcome: Resolved/Met

## 2020-12-02 NOTE — PROGRESS NOTES
TRANSFER - OUT REPORT:    Verbal report given to MARIANELA CHAND [facility] (name) on Martha Nathan  being transferred to [facility] (unit) for routine progression of care       Report consisted of patients Situation, Background, Assessment and   Recommendations(SBAR). Information from the following report(s) SBAR, Kardex, [facility] and Recent Results was reviewed with the receiving nurse. Lines:       Opportunity for questions and clarification was provided.

## 2020-12-02 NOTE — PROGRESS NOTES
Problem: Mobility Impaired (Adult and Pediatric)  Goal: *Acute Goals and Plan of Care (Insert Text)  Description: FUNCTIONAL STATUS PRIOR TO ADMISSION: Patient was minimum assistance using a rollator for functional mobility. Patient required minimal assistance for basic and instrumental ADLs. HOME SUPPORT PRIOR TO ADMISSION: The patient lived with fihernando who assisted her as needed for ADLs and mobility. Physical Therapy Goals  Initiated 11/27/2020  1. Patient will move from supine to sit and sit to supine  and roll side to side in bed with minimum assist within 7 day(s). 2.  Patient will transfer from bed to chair and chair to bed with contact guard assist using the least restrictive device within 7 day(s). 3.  Patient will perform sit to stand with minimal assistance/contact guard assist within 7 day(s). 4.  Patient will ambulate with minimal assistance/contact guard assist for 45 feet with the least restrictive device within 7 day(s). 5.  Patient will ascend/descend 2 stairs with 1 handrail(s) with minimal assistance/contact guard assist within 7 day(s). Outcome: Progressing Towards Goal    PHYSICAL THERAPY TREATMENT  Patient: Allegra German (39 y.o. female)  Date: 12/2/2020  Diagnosis: Multiple sclerosis exacerbation ([facility] [address] Delmy Darby   <principal problem not specified>       Precautions: Fall, Skin, Bed Alarm  Chart, physical therapy assessment, plan of care and goals were reviewed. ASSESSMENT  Patient continues with skilled PT services and is progressing towards goals. Pt was received in supine and cleared by nursing to mobilize. She was very fatigued today but agreeable to work with therapy. All mobility required increased time to perform tasks. She was able to come to the EOB. Stood with RW and ambulate around the bed and back. She was returned to supine and requesting to have an hour of uninterrupted rest. Nursing aware.       Current Level of Function Impacting Discharge (mobility/balance): CGA    Other factors to consider for discharge: MS         PLAN :  Patient continues to benefit from skilled intervention to address the above impairments. Continue treatment per established plan of care. to address goals. Recommendation for discharge: (in order for the patient to meet his/her long term goals)  Therapy up to 5 days/week in SNF setting    This discharge recommendation:  Has been made in collaboration with the attending provider and/or case management    IF patient discharges home will need the following DME: to be determined (TBD)       SUBJECTIVE:   Patient stated i am so sorry honey.     OBJECTIVE DATA SUMMARY:   Critical Behavior:  Neurologic State: Alert  Orientation Level: Oriented X4  Cognition: Appropriate decision making, Appropriate for age attention/concentration, Appropriate safety awareness, Follows commands  Safety/Judgement: Awareness of environment, Fall prevention, Decreased insight into deficits  Functional Mobility Training:  Bed Mobility:  Rolling: Contact guard assistance  Supine to Sit: Contact guard assistance     Scooting: Contact guard assistance        Transfers:  Sit to Stand: Contact guard assistance  Stand to Sit: Contact guard assistance                             Balance:  Sitting: Intact  Standing: Impaired  Standing - Static: Fair;Constant support  Standing - Dynamic : Fair;Constant support  Ambulation/Gait Training:  Distance (ft): 30 Feet (ft)  Assistive Device: Gait belt;Walker, rolling  Ambulation - Level of Assistance: Contact guard assistance; Additional time        Gait Abnormalities: Decreased step clearance;Trunk sway increased; Path deviations        Base of Support: Widened     Speed/Heena: Pace decreased (<100 feet/min); Shuffled  Step Length: Left shortened;Right shortened       Activity Tolerance:   Fair    After treatment patient left in no apparent distress:   Supine in bed and Call bell within reach    COMMUNICATION/COLLABORATION:   The patients plan of care was discussed with: Occupational therapist and Registered nurse.      Charline Joy PT, DPT   Time Calculation: 23 mins

## 2020-12-02 NOTE — DISCHARGE SUMMARY
Hospitalist Discharge Summary     Patient ID:  Viviana Webster  [id number]  50 y.o.  1975 11/25/2020    PCP on record: Tereza Boucher MD    Admit date: 11/25/2020  Discharge date and time: 12/2/2020    DISCHARGE DIAGNOSIS:  Multiple sclerosis exacerbation  Recurrent falls due to weakness due to above  -UTI (Noted at [address]  Fibromyalgia  Chronic pain      CONSULTATIONS:  IP CONSULT TO NEUROLOGY    Excerpted HPI from H&P of Dagmar Hawkins MD:  Ned Quintana is a 50 y.o.  female who presents with recurrent falls. As per patient, she is been feeling week for past several weeks to months and has been having recurrent falls on regular basis. She reported recurrent admissions for multiple sclerosis exacerbation and discharge to rehab but without much improvement. Pt denies any fever, chills, nausea, vomiting, diarrhea, chest pain, cough, shortness of breath. She does report less frequent urination. Pt presented to [facility] ED where found to have UTI and given IV rocephin and transferred to [facility].      We were asked to admit for work up and evaluation of the above problems. ______________________________________________________________________  DISCHARGE SUMMARY/HOSPITAL COURSE:  for full details see H&P, daily progress notes, labs, consult notes. Multiple sclerosis exacerbation  Recurrent falls due to weakness due to above  -MRI of the brain shows new lesions  S/p  Solu-Medrol 1g iv until 11/30. Going to [id number] [place] [address] Road     [facility]  -urine cx negative.  Rocephin stopped.     Anxiety disorder   Fibromyalgia  Chronic pain  Continue Home klonopin, Cymbalta, Neurontin.     _______________________________________________________________________  Patient seen and examined by me on discharge day. Pertinent Findings:  Gen:    Not in distress  Chest: Clear lungs  CVS:   Regular rhythm.   No edema  Abd:  Soft, not distended, not tender  Neuro:  Alert, orientedx4  _______________________________________________________________________  DISCHARGE MEDICATIONS:   Current Discharge Medication List      START taking these medications    Details   famotidine (PEPCID) 20 mg tablet Take 1 Tab by mouth two (2) times a day for 30 days. Qty: 60 Tab, Refills: 0         CONTINUE these medications which have CHANGED    Details   gabapentin (NEURONTIN) 400 mg capsule Take 2 Caps by mouth two (2) times a day for 5 days. Max Daily Amount: 1,600 mg. Qty: 20 Cap, Refills: 0    Associated Diagnoses: Chronic pain syndrome; MS (multiple sclerosis) ([facility] [address] Tobacco abuse; Anxiety; Adjustment disorder with mixed anxiety and depressed mood; Severe episode of recurrent major depressive disorder, without psychotic features ([address]         CONTINUE these medications which have NOT CHANGED    Details   cladribine,multiple sclerosis, ([employer], 8 tablet pack,) 10 mg tab Take 10 mg by mouth daily. Take as directed  Indications: relapsing form of multiple sclerosis  Qty: 8 Tab, Refills: 1    Associated Diagnoses: MS (multiple sclerosis) ([facility])      ALPRAZolam (Xanax) 0.25 mg tablet Take 1 Tab by mouth two (2) times daily as needed for Anxiety. Max Daily Amount: 0.5 mg. Indications: anxiousness associated with depression  Qty: 6 Tab, Refills: 0    Associated Diagnoses: Anxiety      DULoxetine (CYMBALTA) 60 mg capsule Take 1 Cap by mouth daily. Indications: neuropathic pain  Qty: 90 Cap, Refills: 5    Associated Diagnoses: Chronic pain syndrome; MS (multiple sclerosis) ([facility] [address] Tobacco abuse      docusate sodium (COLACE) 100 mg capsule Take 1 Cap by mouth daily for 90 days. Qty: 30 Cap, Refills: 2      cyclobenzaprine (FLEXERIL) 10 mg tablet Take 1 Tab by mouth three (3) times daily as needed for Muscle Spasm(s). Qty: 90 Tab, Refills: 2      ibuprofen (MOTRIN) 200 mg tablet Take  by mouth every six (6) hours as needed for Pain.  Take three tablets every 6 hours as needed by mouth       melatonin 3 mg tablet Take 1 Tab by mouth nightly as needed for Insomnia. Qty: 30 Tab, Refills: 4      senna-docusate (PERICOLACE) 8.6-50 mg per tablet Take 2 Tabs by mouth daily. Qty: 15 Tab, Refills: 0    Associated Diagnoses: Slow transit constipation               Patient Follow Up Instructions:    Activity: Activity as tolerated  Diet: Regular Diet  Wound Care: None needed        Follow-up Information     Follow up With Specialties Details Why Luis BRUNNER [facility]   [address]    Kristen Gayer, [address]   [address]  [phone]          ________________________________________________________________    Risk of deterioration: Moderate    Condition at Discharge:  Stable  __________________________________________________________________    Disposition  SNF/LTC    ____________________________________________________________________    Code Status: Full Code  ___________________________________________________________________      Total time in minutes spent coordinating this discharge (includes going over instructions, follow-up, prescriptions, and preparing report for sign off to her PCP) :  >30 minutes    Signed:  Orion Forde MD

## 2020-12-02 NOTE — DISCHARGE INSTRUCTIONS
HOSPITALIST DISCHARGE INSTRUCTIONS    NAME: Viviana Webster   :  1975   MRN:  [id number]     Date/Time:  2020 1:19 PM    ADMIT DATE: 2020   DISCHARGE DATE: 2020     Attending Physician: Rosaura Rich MD    DISCHARGE DIAGNOSIS:  Multiple sclerosis exacerbation  Recurrent falls due to weakness due to above  -UTI (Noted at [address]  Fibromyalgia  Chronic pain    Medications: Per above medication reconciliation. Pain Management: per above medications    Recommended diet: Regular Diet    Recommended activity: Activity as tolerated    Wound care: None    Indwelling devices:  None    Supplemental Oxygen: None    Required Lab work: Per SNF routine    Glucose management:  None    Code status: Full        Outside physician follow up: Follow-up Information     Follow up With Specialties Details Why Luis BRUNNER [facility]   [address], [address] [place] [address]  [phone]                 Skilled nursing facility/ SNF MD responsible for above on discharge. Information obtained by :  I understand that if any problems occur once I am at home I am to contact my physician. I understand and acknowledge receipt of the instructions indicated above.                                                                                                                                            Physician's or R.N.'s Signature                                                                  Date/Time                                                                                                                                              Patient or Repres

## 2020-12-02 NOTE — PROGRESS NOTES
End of Shift Note     Bedside shift change report given to Dani Fabiola RN  (oncoming nurse) by Avis Lyles RN (offgoing nurse). Report included the following information SBAR, Kardex, MAR and Recent Results     Shift worked:  7P-7A      Shift summary and any significant changes:     Pain medication given twice for burning in bilateral upper extremeties      Concerns for physician to address:  None      Zone phone for oncoming shift:   [phone]         Activity:  Activity Level: Up with Assistance  Number times ambulated in hallways past shift: 0  Number of times OOB to chair past shift: 0     Cardiac:   Cardiac Monitoring: Yes      Cardiac Rhythm: Normal sinus rhythm     Access:   Current line(s): PIV      Genitourinary:   Urinary status: Purewick, incontinent     Respiratory:   O2 Device: Room air  Chronic home O2 use?: NO  Incentive spirometer at bedside: NO  GI:  Last Bowel Movement Date: (Prior to admission)  Current diet:  DIET REGULAR  DIET NUTRITIONAL SUPPLEMENTS Lunch; [employer]  DIET NUTRITIONAL SUPPLEMENTS Dinner; [employer]  Passing flatus: YES  Tolerating current diet: YES     Pain Management:   Patient states pain is manageable on current regimen: YES     Skin:  Samy Score: 18  Interventions: increase time out of bed and internal/external urinary devices    Patient Safety:  Fall Score: Total Score: 4  Interventions: bed/chair alarm and pt to call before getting OOB  High Fall Risk:  Yes     Length of Stay:  Expected LOS: 3d 14h  Actual LOS: 6

## 2020-12-03 ENCOUNTER — PATIENT OUTREACH (OUTPATIENT)
Dept: CASE MANAGEMENT | Age: 45
End: 2020-12-03

## 2020-12-04 ENCOUNTER — TELEPHONE (OUTPATIENT)
Dept: NEUROLOGY | Age: 45
End: 2020-12-04

## 2020-12-07 ENCOUNTER — TELEPHONE (OUTPATIENT)
Dept: NEUROLOGY | Age: 45
End: 2020-12-07

## 2020-12-07 DIAGNOSIS — G35 MS (MULTIPLE SCLEROSIS) (HCC): Primary | ICD-10-CM

## 2020-12-07 DIAGNOSIS — R26.9 GAIT ABNORMALITY: ICD-10-CM

## 2020-12-07 NOTE — TELEPHONE ENCOUNTER
Marlee Puente from [address] called the office and in order for them to provide financial assistance for home health for the patient, she is requesting a note to confirm the pt's MS diagnosis and a rx for home health.        Fax # [phone]

## 2020-12-10 ENCOUNTER — TELEPHONE (OUTPATIENT)
Dept: NEUROLOGY | Age: 45
End: 2020-12-10

## 2020-12-10 NOTE — TELEPHONE ENCOUNTER
I called the [address] at [phone] and found that this is in fact an [address]   Will you please order home health for the patient and I can send with her last office visit

## 2020-12-16 ENCOUNTER — APPOINTMENT (OUTPATIENT)
Dept: MRI IMAGING | Age: 45
DRG: 059 | End: 2020-12-16
Attending: EMERGENCY MEDICINE
Payer: MEDICARE

## 2020-12-16 ENCOUNTER — HOSPITAL ENCOUNTER (INPATIENT)
Age: 45
LOS: 6 days | Discharge: NURSING, REHAB OR LONG-TERM CARE | DRG: 059 | End: 2020-12-22
Attending: EMERGENCY MEDICINE | Admitting: HOSPITALIST
Payer: MEDICARE

## 2020-12-16 DIAGNOSIS — G35 MS (MULTIPLE SCLEROSIS) (HCC): ICD-10-CM

## 2020-12-16 DIAGNOSIS — G35 MULTIPLE SCLEROSIS EXACERBATION (HCC): Primary | ICD-10-CM

## 2020-12-16 DIAGNOSIS — F41.9 ANXIETY: ICD-10-CM

## 2020-12-16 LAB
ALBUMIN SERPL-MCNC: 3.2 G/DL (ref 3.5–5)
ALBUMIN/GLOB SERPL: 1 {RATIO} (ref 1.1–2.2)
ALP SERPL-CCNC: 66 U/L (ref 45–117)
ALT SERPL-CCNC: 18 U/L (ref 12–78)
ANION GAP SERPL CALC-SCNC: 3 MMOL/L (ref 5–15)
AST SERPL-CCNC: 14 U/L (ref 15–37)
BASOPHILS # BLD: 0 K/UL (ref 0–0.1)
BASOPHILS NFR BLD: 1 % (ref 0–1)
BILIRUB SERPL-MCNC: 0.3 MG/DL (ref 0.2–1)
BUN SERPL-MCNC: 11 MG/DL (ref 6–20)
BUN/CREAT SERPL: 13 (ref 12–20)
CALCIUM SERPL-MCNC: 9.1 MG/DL (ref 8.5–10.1)
CHLORIDE SERPL-SCNC: 110 MMOL/L (ref 97–108)
CO2 SERPL-SCNC: 26 MMOL/L (ref 21–32)
CREAT SERPL-MCNC: 0.82 MG/DL (ref 0.55–1.02)
DIFFERENTIAL METHOD BLD: ABNORMAL
EOSINOPHIL # BLD: 0.1 K/UL (ref 0–0.4)
EOSINOPHIL NFR BLD: 3 % (ref 0–7)
ERYTHROCYTE [DISTWIDTH] IN BLOOD BY AUTOMATED COUNT: 14.6 % (ref 11.5–14.5)
GLOBULIN SER CALC-MCNC: 3.2 G/DL (ref 2–4)
GLUCOSE SERPL-MCNC: 91 MG/DL (ref 65–100)
HCT VFR BLD AUTO: 33.3 % (ref 35–47)
HGB BLD-MCNC: 11.1 G/DL (ref 11.5–16)
IMM GRANULOCYTES # BLD AUTO: 0 K/UL (ref 0–0.04)
IMM GRANULOCYTES NFR BLD AUTO: 0 % (ref 0–0.5)
LYMPHOCYTES # BLD: 0.5 K/UL (ref 0.8–3.5)
LYMPHOCYTES NFR BLD: 15 % (ref 12–49)
MAGNESIUM SERPL-MCNC: 2.2 MG/DL (ref 1.6–2.4)
MCH RBC QN AUTO: 32.2 PG (ref 26–34)
MCHC RBC AUTO-ENTMCNC: 33.3 G/DL (ref 30–36.5)
MCV RBC AUTO: 96.5 FL (ref 80–99)
MONOCYTES # BLD: 0.3 K/UL (ref 0–1)
MONOCYTES NFR BLD: 8 % (ref 5–13)
NEUTS SEG # BLD: 2.6 K/UL (ref 1.8–8)
NEUTS SEG NFR BLD: 73 % (ref 32–75)
NRBC # BLD: 0 K/UL (ref 0–0.01)
NRBC BLD-RTO: 0 PER 100 WBC
PLATELET # BLD AUTO: 192 K/UL (ref 150–400)
PMV BLD AUTO: 9.6 FL (ref 8.9–12.9)
POTASSIUM SERPL-SCNC: 4.4 MMOL/L (ref 3.5–5.1)
PROT SERPL-MCNC: 6.4 G/DL (ref 6.4–8.2)
RBC # BLD AUTO: 3.45 M/UL (ref 3.8–5.2)
RBC MORPH BLD: ABNORMAL
SODIUM SERPL-SCNC: 139 MMOL/L (ref 136–145)
WBC # BLD AUTO: 3.5 K/UL (ref 3.6–11)

## 2020-12-16 PROCEDURE — 74011250636 HC RX REV CODE- 250/636: Performed by: EMERGENCY MEDICINE

## 2020-12-16 PROCEDURE — 72157 MRI CHEST SPINE W/O & W/DYE: CPT

## 2020-12-16 PROCEDURE — 72156 MRI NECK SPINE W/O & W/DYE: CPT

## 2020-12-16 PROCEDURE — 83735 ASSAY OF MAGNESIUM: CPT

## 2020-12-16 PROCEDURE — 85025 COMPLETE CBC W/AUTO DIFF WBC: CPT

## 2020-12-16 PROCEDURE — 36415 COLL VENOUS BLD VENIPUNCTURE: CPT

## 2020-12-16 PROCEDURE — 70553 MRI BRAIN STEM W/O & W/DYE: CPT

## 2020-12-16 PROCEDURE — 80053 COMPREHEN METABOLIC PANEL: CPT

## 2020-12-16 PROCEDURE — 83036 HEMOGLOBIN GLYCOSYLATED A1C: CPT

## 2020-12-16 PROCEDURE — 74011000258 HC RX REV CODE- 258: Performed by: EMERGENCY MEDICINE

## 2020-12-16 PROCEDURE — 65660000000 HC RM CCU STEPDOWN

## 2020-12-16 PROCEDURE — 74011250636 HC RX REV CODE- 250/636: Performed by: HOSPITALIST

## 2020-12-16 PROCEDURE — 99285 EMERGENCY DEPT VISIT HI MDM: CPT

## 2020-12-16 PROCEDURE — A9575 INJ GADOTERATE MEGLUMI 0.1ML: HCPCS | Performed by: HOSPITALIST

## 2020-12-16 PROCEDURE — 96374 THER/PROPH/DIAG INJ IV PUSH: CPT

## 2020-12-16 PROCEDURE — 96375 TX/PRO/DX INJ NEW DRUG ADDON: CPT

## 2020-12-16 RX ORDER — ACETAMINOPHEN 650 MG/1
650 SUPPOSITORY RECTAL
Status: DISCONTINUED | OUTPATIENT
Start: 2020-12-16 | End: 2020-12-22 | Stop reason: HOSPADM

## 2020-12-16 RX ORDER — SODIUM CHLORIDE 0.9 % (FLUSH) 0.9 %
5-40 SYRINGE (ML) INJECTION AS NEEDED
Status: DISCONTINUED | OUTPATIENT
Start: 2020-12-16 | End: 2020-12-22 | Stop reason: HOSPADM

## 2020-12-16 RX ORDER — POLYETHYLENE GLYCOL 3350 17 G/17G
17 POWDER, FOR SOLUTION ORAL DAILY PRN
Status: DISCONTINUED | OUTPATIENT
Start: 2020-12-16 | End: 2020-12-22 | Stop reason: HOSPADM

## 2020-12-16 RX ORDER — LORAZEPAM 2 MG/ML
1 INJECTION INTRAMUSCULAR AS NEEDED
Status: DISCONTINUED | OUTPATIENT
Start: 2020-12-16 | End: 2020-12-22 | Stop reason: HOSPADM

## 2020-12-16 RX ORDER — GADOTERATE MEGLUMINE 376.9 MG/ML
17 INJECTION INTRAVENOUS
Status: COMPLETED | OUTPATIENT
Start: 2020-12-16 | End: 2020-12-16

## 2020-12-16 RX ORDER — FENTANYL CITRATE 50 UG/ML
25 INJECTION, SOLUTION INTRAMUSCULAR; INTRAVENOUS
Status: COMPLETED | OUTPATIENT
Start: 2020-12-16 | End: 2020-12-16

## 2020-12-16 RX ORDER — ONDANSETRON 2 MG/ML
4 INJECTION INTRAMUSCULAR; INTRAVENOUS
Status: COMPLETED | OUTPATIENT
Start: 2020-12-16 | End: 2020-12-16

## 2020-12-16 RX ORDER — FAMOTIDINE 20 MG/1
20 TABLET, FILM COATED ORAL 2 TIMES DAILY
Status: DISCONTINUED | OUTPATIENT
Start: 2020-12-17 | End: 2020-12-22 | Stop reason: HOSPADM

## 2020-12-16 RX ORDER — DULOXETIN HYDROCHLORIDE 30 MG/1
60 CAPSULE, DELAYED RELEASE ORAL DAILY
Status: DISCONTINUED | OUTPATIENT
Start: 2020-12-17 | End: 2020-12-22 | Stop reason: HOSPADM

## 2020-12-16 RX ORDER — CYCLOBENZAPRINE HCL 10 MG
10 TABLET ORAL
Status: DISCONTINUED | OUTPATIENT
Start: 2020-12-16 | End: 2020-12-22 | Stop reason: HOSPADM

## 2020-12-16 RX ORDER — ACETAMINOPHEN 325 MG/1
650 TABLET ORAL
Status: DISCONTINUED | OUTPATIENT
Start: 2020-12-16 | End: 2020-12-22 | Stop reason: HOSPADM

## 2020-12-16 RX ORDER — PROMETHAZINE HYDROCHLORIDE 25 MG/1
12.5 TABLET ORAL
Status: DISCONTINUED | OUTPATIENT
Start: 2020-12-16 | End: 2020-12-22 | Stop reason: HOSPADM

## 2020-12-16 RX ORDER — SODIUM CHLORIDE 0.9 % (FLUSH) 0.9 %
5-40 SYRINGE (ML) INJECTION EVERY 8 HOURS
Status: DISCONTINUED | OUTPATIENT
Start: 2020-12-16 | End: 2020-12-22 | Stop reason: HOSPADM

## 2020-12-16 RX ORDER — ONDANSETRON 2 MG/ML
4 INJECTION INTRAMUSCULAR; INTRAVENOUS
Status: DISCONTINUED | OUTPATIENT
Start: 2020-12-16 | End: 2020-12-22 | Stop reason: HOSPADM

## 2020-12-16 RX ORDER — ALPRAZOLAM 0.25 MG/1
0.25 TABLET ORAL
Status: DISCONTINUED | OUTPATIENT
Start: 2020-12-16 | End: 2020-12-22 | Stop reason: HOSPADM

## 2020-12-16 RX ORDER — ENOXAPARIN SODIUM 100 MG/ML
40 INJECTION SUBCUTANEOUS DAILY
Status: DISCONTINUED | OUTPATIENT
Start: 2020-12-17 | End: 2020-12-22 | Stop reason: HOSPADM

## 2020-12-16 RX ADMIN — SODIUM CHLORIDE 1000 MG: 900 INJECTION INTRAVENOUS at 20:13

## 2020-12-16 RX ADMIN — LORAZEPAM 1 MG: 2 INJECTION INTRAMUSCULAR; INTRAVENOUS at 21:56

## 2020-12-16 RX ADMIN — SODIUM CHLORIDE 500 ML: 9 INJECTION, SOLUTION INTRAVENOUS at 20:08

## 2020-12-16 RX ADMIN — FENTANYL CITRATE 25 MCG: 50 INJECTION, SOLUTION INTRAMUSCULAR; INTRAVENOUS at 20:07

## 2020-12-16 RX ADMIN — GADOTERATE MEGLUMINE 17 ML: 376.9 INJECTION INTRAVENOUS at 23:30

## 2020-12-16 RX ADMIN — ONDANSETRON 4 MG: 2 INJECTION INTRAMUSCULAR; INTRAVENOUS at 20:07

## 2020-12-16 NOTE — PROGRESS NOTES
Asked to evaluate patient for PICC. Assessed both arms with an ultrasound. Both arms have small veins that are not suitable for either a Midline or PICC. Attempted to warm her lower arms with a hot pack but no veins are visible for PIV. We did an arterial stick for lab work. Notified the primary nurse.      Marilu EDDYN, RN, [facility] Vascular Access Team

## 2020-12-16 NOTE — PROGRESS NOTES

## 2020-12-16 NOTE — ED PROVIDER NOTES
EMERGENCY DEPARTMENT HISTORY AND PHYSICAL EXAM      Date: 12/16/2020  Patient Name: Edwin Casas    History of Presenting Illness     Chief Complaint   Patient presents with    Leg Pain     History of MS and having escalation of symptoms today    Arm Pain       History Provided By: Patient and EMS    HPI: Edwin Casas, 50 y.o. female presents to the ED with cc of extremity pain and weakness. Patient has a history of multiple sclerosis and was admitted on November 25 for bilateral lower extremity weakness. At that time, she showed active new demyelinating lesions on her brain MRI. According to the ED note, her hip strength was 4 out of 5 in her lower extremities bilaterally at that time. She states that she has not ambulated since that admission. She now also has pain associated with her symptoms over the last week and is also complaining of bilateral arm weakness and pain. The pains are an 8 out of 10 in severity. She denies any new numbness or tingling. She denies any new incontinence, back pain, chest pain, cough, fever or chills. There are no other complaints, changes, or physical findings at this time. PCP: Chandler Jain MD    No current facility-administered medications on file prior to encounter. Current Outpatient Medications on File Prior to Encounter   Medication Sig Dispense Refill    famotidine (PEPCID) 20 mg tablet Take 1 Tab by mouth two (2) times a day for 30 days. 60 Tab 0    cladribine,multiple sclerosis, (Mavenclad, 8 tablet pack,) 10 mg tab Take 10 mg by mouth daily. Take as directed  Indications: relapsing form of multiple sclerosis 8 Tab 1    ALPRAZolam (Xanax) 0.25 mg tablet Take 1 Tab by mouth two (2) times daily as needed for Anxiety. Max Daily Amount: 0.5 mg. Indications: anxiousness associated with depression 6 Tab 0    DULoxetine (CYMBALTA) 60 mg capsule Take 1 Cap by mouth daily.  Indications: neuropathic pain 90 Cap 5    cyclobenzaprine (FLEXERIL) 10 mg tablet Take 1 Tab by mouth three (3) times daily as needed for Muscle Spasm(s). 90 Tab 2    ibuprofen (MOTRIN) 200 mg tablet Take  by mouth every six (6) hours as needed for Pain. Take three tablets every 6 hours as needed by mouth       melatonin 3 mg tablet Take 1 Tab by mouth nightly as needed for Insomnia. (Patient taking differently: Take 3 mg by mouth nightly as needed for Insomnia. Indications: Pt state she takes 5 mg tab nightly.) 30 Tab 4    senna-docusate (PERICOLACE) 8.6-50 mg per tablet Take 2 Tabs by mouth daily. 15 Tab 0       Past History     Past Medical History:  Past Medical History:   Diagnosis Date    Body aches 12/11/2014    Chronic pain     Depression     Headache(784.0)     History of mammogram never before    MS (multiple sclerosis) [address] [address]     Neurological disorder     Multiple Sclerosis    Pap smear for cervical cancer screening 2008    Psychiatric disorder     anxiety    Psychotic disorder [facility])        Past Surgical History:  Past Surgical History:   Procedure Laterality Date    COLONOSCOPY N/A 2/28/2018    COLONOSCOPY performed by Justin Castellanos MD at [facility] ENDOSCOPY    HX CHOLECYSTECTOMY      HX GYN      3 c-sections    HX HEENT      bilateral ear tube surgery    HX HERNIA REPAIR      HX OTHER SURGICAL      R inguinal hernia repair       Family History:  Family History   Problem Relation Age of Onset    Heart Attack Father         tripple bypass    Cancer Father         lung    COPD Father     Diabetes Mother         type 2    Heart Disease Mother         heart disease    Diabetes Paternal Grandmother     No Known Problems Sister     No Known Problems Brother     No Known Problems Child        Social History:  Social History     Tobacco Use    Smoking status: Current Every Day Smoker     Packs/day: 0.50     Years: 17.00     Pack years: 8.50     Types: Cigarettes    Smokeless tobacco: Never Used    Tobacco comment: 1 pack every 3 days.    Substance Use Topics    Alcohol use: No    Drug use: No       Allergies: Allergies   Allergen Reactions    Morphine Rash         Review of Systems   Review of Systems   Constitutional: Negative for fever. HENT: Negative for congestion. Eyes: Negative. Respiratory: Negative for shortness of breath. Cardiovascular: Negative for chest pain. Gastrointestinal: Negative for abdominal pain. Endocrine: Negative for heat intolerance. Genitourinary: Negative. Musculoskeletal: Negative for back pain. Skin: Negative for rash. Allergic/Immunologic: Negative for immunocompromised state. Neurological: Positive for weakness. Hematological: Does not bruise/bleed easily. Psychiatric/Behavioral: Negative. All other systems reviewed and are negative. Physical Exam   Physical Exam  Vitals signs and nursing note reviewed. Constitutional:       General: She is not in acute distress. Appearance: She is well-developed. HENT:      Head: Normocephalic. Neck:      Musculoskeletal: Normal range of motion and neck supple. Cardiovascular:      Rate and Rhythm: Normal rate and regular rhythm. Pulses: Normal pulses. Heart sounds: Normal heart sounds. Pulmonary:      Effort: Pulmonary effort is normal.      Breath sounds: Normal breath sounds. Abdominal:      General: Bowel sounds are normal.      Palpations: Abdomen is soft. Tenderness: There is no abdominal tenderness. Musculoskeletal: Normal range of motion. General: No tenderness. Skin:     General: Skin is warm and dry. Neurological:      Mental Status: She is alert and oriented to person, place, and time. Motor: Weakness present.       Comments:  dysarthria, sensory symmetric, slight decreased left , left pronator drift, motor strength in legs is 2 out of 5   Psychiatric:         Mood and Affect: Mood normal.         Behavior: Behavior normal.         Diagnostic Study Results     Labs -     Recent Results (from the past 12 hour(s))   CBC WITH AUTOMATED DIFF    Collection Time: 12/16/20  4:41 PM   Result Value Ref Range    WBC 3.5 (L) 3.6 - 11.0 K/uL    RBC 3.45 (L) 3.80 - 5.20 M/uL    HGB 11.1 (L) 11.5 - 16.0 g/dL    HCT 33.3 (L) 35.0 - 47.0 %    MCV 96.5 80.0 - 99.0 FL    MCH 32.2 26.0 - 34.0 PG    MCHC 33.3 30.0 - 36.5 g/dL    RDW 14.6 (H) 11.5 - 14.5 %    PLATELET 416 754 - 459 K/uL    MPV 9.6 8.9 - 12.9 FL    NRBC 0.0 0  WBC    ABSOLUTE NRBC 0.00 0.00 - 0.01 K/uL    NEUTROPHILS 73 32 - 75 %    LYMPHOCYTES 15 12 - 49 %    MONOCYTES 8 5 - 13 %    EOSINOPHILS 3 0 - 7 %    BASOPHILS 1 0 - 1 %    IMMATURE GRANULOCYTES 0 0.0 - 0.5 %    ABS. NEUTROPHILS 2.6 1.8 - 8.0 K/UL    ABS. LYMPHOCYTES 0.5 (L) 0.8 - 3.5 K/UL    ABS. MONOCYTES 0.3 0.0 - 1.0 K/UL    ABS. EOSINOPHILS 0.1 0.0 - 0.4 K/UL    ABS. BASOPHILS 0.0 0.0 - 0.1 K/UL    ABS. IMM. GRANS. 0.0 0.00 - 0.04 K/UL    DF SMEAR SCANNED      RBC COMMENTS NORMOCYTIC, NORMOCHROMIC     METABOLIC PANEL, COMPREHENSIVE    Collection Time: 12/16/20  4:41 PM   Result Value Ref Range    Sodium 139 136 - 145 mmol/L    Potassium 4.4 3.5 - 5.1 mmol/L    Chloride 110 (H) 97 - 108 mmol/L    CO2 26 21 - 32 mmol/L    Anion gap 3 (L) 5 - 15 mmol/L    Glucose 91 65 - 100 mg/dL    BUN 11 6 - 20 MG/DL    Creatinine 0.82 0.55 - 1.02 MG/DL    BUN/Creatinine ratio 13 12 - 20      GFR est AA >60 >60 ml/min/1.73m2    GFR est non-AA >60 >60 ml/min/1.73m2    Calcium 9.1 8.5 - 10.1 MG/DL    Bilirubin, total 0.3 0.2 - 1.0 MG/DL    ALT (SGPT) 18 12 - 78 U/L    AST (SGOT) 14 (L) 15 - 37 U/L    Alk.  phosphatase 66 45 - 117 U/L    Protein, total 6.4 6.4 - 8.2 g/dL    Albumin 3.2 (L) 3.5 - 5.0 g/dL    Globulin 3.2 2.0 - 4.0 g/dL    A-G Ratio 1.0 (L) 1.1 - 2.2     MAGNESIUM    Collection Time: 12/16/20  4:41 PM   Result Value Ref Range    Magnesium 2.2 1.6 - 2.4 mg/dL       Radiologic Studies -   MRI BRAIN W WO CONT    (Results Pending)   MRI [facility] SPINE W WO CONT    (Results Pending)   MRI CERV SPINE W WO CONT    (Results Pending)     CT Results  (Last 48 hours)    None        CXR Results  (Last 48 hours)    None          Medical Decision Making   I am the first provider for this patient. I reviewed the vital signs, available nursing notes, past medical history, past surgical history, family history and social history. Vital Signs-Reviewed the patient's vital signs. Patient Vitals for the past 12 hrs:   Temp Pulse Resp BP SpO2   12/16/20 1508 97.8 °F (36.6 °C) 93 18 101/67 100 %     . Records Reviewed: Nursing Notes, Old Medical Records, Previous Radiology Studies and Previous Laboratory Studies    Provider Notes (Medical Decision Making):   Multiple sclerosis exacerbation, electrolyte abnormality, musculoskeletal pain, dehydration    ED Course:   Initial assessment performed. The patients presenting problems have been discussed, and they are in agreement with the care plan formulated and outlined with them. I have encouraged them to ask questions as they arise throughout their visit. Consult note:    I discussed case with Dr. Risa Tesfaye, neurology. She recommends MRI of the brain, C-spine and thoracic spine as well as 1 g of Solu-Medrol and admission. Consult note:    Patient is being admitted by Dr. Argenis Ortiz, hospitalist         Critical Care Time:   0    Disposition:  admit    DISCHARGE PLAN:  1. Current Discharge Medication List        2. Follow-up Information    None       3. Return to ED if worse     Diagnosis     Clinical Impression:   1. Multiple sclerosis exacerbation ([address]    2. MS (multiple sclerosis) ([address]    3. Anxiety        Attestations:    Daphne Dorantes MD    Please note that this dictation was completed with clinovo, the computer voice recognition software. Quite often unanticipated grammatical, syntax, homophones, and other interpretive errors are inadvertently transcribed by the computer software. Please disregard these errors.   Please excuse any errors that have escaped final proofreading. Thank you.

## 2020-12-16 NOTE — ED NOTES
At this point PICC team has evaluated the patient and stated they can not get a mid or picc line on this pt. The PICC team got the labs w/ an arterial stick. Pt has no access at this point provider aware and states the on coming provider will have to get the access. Care delyed for this reason.

## 2020-12-17 ENCOUNTER — PATIENT OUTREACH (OUTPATIENT)
Dept: CASE MANAGEMENT | Age: 45
End: 2020-12-17

## 2020-12-17 LAB
ALBUMIN SERPL-MCNC: 2.9 G/DL (ref 3.5–5)
ALBUMIN/GLOB SERPL: 0.9 {RATIO} (ref 1.1–2.2)
ALP SERPL-CCNC: 68 U/L (ref 45–117)
ALT SERPL-CCNC: 17 U/L (ref 12–78)
ANION GAP SERPL CALC-SCNC: 3 MMOL/L (ref 5–15)
APPEARANCE UR: ABNORMAL
AST SERPL-CCNC: 14 U/L (ref 15–37)
BASOPHILS # BLD: 0 K/UL (ref 0–0.1)
BASOPHILS NFR BLD: 0 % (ref 0–1)
BILIRUB SERPL-MCNC: 0.3 MG/DL (ref 0.2–1)
BILIRUB UR QL: NEGATIVE
BUN SERPL-MCNC: 12 MG/DL (ref 6–20)
BUN/CREAT SERPL: 13 (ref 12–20)
CALCIUM SERPL-MCNC: 8.7 MG/DL (ref 8.5–10.1)
CHLORIDE SERPL-SCNC: 108 MMOL/L (ref 97–108)
CO2 SERPL-SCNC: 24 MMOL/L (ref 21–32)
COLOR UR: ABNORMAL
CREAT SERPL-MCNC: 0.95 MG/DL (ref 0.55–1.02)
DIFFERENTIAL METHOD BLD: ABNORMAL
EOSINOPHIL # BLD: 0 K/UL (ref 0–0.4)
EOSINOPHIL NFR BLD: 0 % (ref 0–7)
ERYTHROCYTE [DISTWIDTH] IN BLOOD BY AUTOMATED COUNT: 14.6 % (ref 11.5–14.5)
EST. AVERAGE GLUCOSE BLD GHB EST-MCNC: 94 MG/DL
GLOBULIN SER CALC-MCNC: 3.1 G/DL (ref 2–4)
GLUCOSE SERPL-MCNC: 204 MG/DL (ref 65–100)
GLUCOSE UR STRIP.AUTO-MCNC: 500 MG/DL
HBA1C MFR BLD: 4.9 % (ref 4–5.6)
HCT VFR BLD AUTO: 34.1 % (ref 35–47)
HGB BLD-MCNC: 11.2 G/DL (ref 11.5–16)
HGB UR QL STRIP: NEGATIVE
IMM GRANULOCYTES # BLD AUTO: 0 K/UL (ref 0–0.04)
IMM GRANULOCYTES NFR BLD AUTO: 0 % (ref 0–0.5)
KETONES UR QL STRIP.AUTO: NEGATIVE MG/DL
LEUKOCYTE ESTERASE UR QL STRIP.AUTO: NEGATIVE
LYMPHOCYTES # BLD: 0.2 K/UL (ref 0.8–3.5)
LYMPHOCYTES NFR BLD: 5 % (ref 12–49)
MAGNESIUM SERPL-MCNC: 1.7 MG/DL (ref 1.6–2.4)
MCH RBC QN AUTO: 32.4 PG (ref 26–34)
MCHC RBC AUTO-ENTMCNC: 32.8 G/DL (ref 30–36.5)
MCV RBC AUTO: 98.6 FL (ref 80–99)
MONOCYTES # BLD: 0 K/UL (ref 0–1)
MONOCYTES NFR BLD: 1 % (ref 5–13)
NEUTS SEG # BLD: 3.4 K/UL (ref 1.8–8)
NEUTS SEG NFR BLD: 94 % (ref 32–75)
NITRITE UR QL STRIP.AUTO: NEGATIVE
NRBC # BLD: 0 K/UL (ref 0–0.01)
NRBC BLD-RTO: 0 PER 100 WBC
PH UR STRIP: 6.5 [PH] (ref 5–8)
PLATELET # BLD AUTO: 178 K/UL (ref 150–400)
PMV BLD AUTO: 9.7 FL (ref 8.9–12.9)
POTASSIUM SERPL-SCNC: 4.1 MMOL/L (ref 3.5–5.1)
PROT SERPL-MCNC: 6 G/DL (ref 6.4–8.2)
PROT UR STRIP-MCNC: NEGATIVE MG/DL
RBC # BLD AUTO: 3.46 M/UL (ref 3.8–5.2)
RBC MORPH BLD: ABNORMAL
SODIUM SERPL-SCNC: 135 MMOL/L (ref 136–145)
SP GR UR REFRACTOMETRY: 1.02 (ref 1–1.03)
UROBILINOGEN UR QL STRIP.AUTO: 0.2 EU/DL (ref 0.2–1)
WBC # BLD AUTO: 3.6 K/UL (ref 3.6–11)

## 2020-12-17 PROCEDURE — 77030038269 HC DRN EXT URIN PURWCK BARD -A

## 2020-12-17 PROCEDURE — 97161 PT EVAL LOW COMPLEX 20 MIN: CPT

## 2020-12-17 PROCEDURE — 85025 COMPLETE CBC W/AUTO DIFF WBC: CPT

## 2020-12-17 PROCEDURE — 97530 THERAPEUTIC ACTIVITIES: CPT

## 2020-12-17 PROCEDURE — 81003 URINALYSIS AUTO W/O SCOPE: CPT

## 2020-12-17 PROCEDURE — 97530 THERAPEUTIC ACTIVITIES: CPT | Performed by: OCCUPATIONAL THERAPIST

## 2020-12-17 PROCEDURE — 97166 OT EVAL MOD COMPLEX 45 MIN: CPT | Performed by: OCCUPATIONAL THERAPIST

## 2020-12-17 PROCEDURE — 83735 ASSAY OF MAGNESIUM: CPT

## 2020-12-17 PROCEDURE — 99223 1ST HOSP IP/OBS HIGH 75: CPT | Performed by: PSYCHIATRY & NEUROLOGY

## 2020-12-17 PROCEDURE — 74011000258 HC RX REV CODE- 258: Performed by: HOSPITALIST

## 2020-12-17 PROCEDURE — 80053 COMPREHEN METABOLIC PANEL: CPT

## 2020-12-17 PROCEDURE — 74011250637 HC RX REV CODE- 250/637: Performed by: HOSPITALIST

## 2020-12-17 PROCEDURE — 74011250636 HC RX REV CODE- 250/636: Performed by: HOSPITALIST

## 2020-12-17 PROCEDURE — 65660000000 HC RM CCU STEPDOWN

## 2020-12-17 PROCEDURE — 36415 COLL VENOUS BLD VENIPUNCTURE: CPT

## 2020-12-17 PROCEDURE — 2709999900 HC NON-CHARGEABLE SUPPLY

## 2020-12-17 RX ORDER — FAMOTIDINE 20 MG/1
20 TABLET, FILM COATED ORAL
COMMUNITY
End: 2021-03-30

## 2020-12-17 RX ORDER — LANOLIN ALCOHOL/MO/W.PET/CERES
15 CREAM (GRAM) TOPICAL
COMMUNITY

## 2020-12-17 RX ORDER — GABAPENTIN 400 MG/1
400 CAPSULE ORAL 3 TIMES DAILY
Status: ON HOLD | COMMUNITY
End: 2020-12-21 | Stop reason: SDUPTHER

## 2020-12-17 RX ADMIN — FAMOTIDINE 20 MG: 20 TABLET, FILM COATED ORAL at 21:00

## 2020-12-17 RX ADMIN — ALPRAZOLAM 0.25 MG: 0.25 TABLET ORAL at 09:00

## 2020-12-17 RX ADMIN — CYCLOBENZAPRINE 10 MG: 10 TABLET, FILM COATED ORAL at 05:29

## 2020-12-17 RX ADMIN — FAMOTIDINE 20 MG: 20 TABLET, FILM COATED ORAL at 08:25

## 2020-12-17 RX ADMIN — Medication 10 ML: at 13:47

## 2020-12-17 RX ADMIN — ACETAMINOPHEN 650 MG: 325 TABLET ORAL at 05:29

## 2020-12-17 RX ADMIN — ENOXAPARIN SODIUM 40 MG: 40 INJECTION SUBCUTANEOUS at 08:26

## 2020-12-17 RX ADMIN — SODIUM CHLORIDE 1000 MG: 900 INJECTION INTRAVENOUS at 09:51

## 2020-12-17 RX ADMIN — DULOXETINE HYDROCHLORIDE 60 MG: 30 CAPSULE, DELAYED RELEASE ORAL at 09:51

## 2020-12-17 RX ADMIN — ALPRAZOLAM 0.25 MG: 0.25 TABLET ORAL at 21:00

## 2020-12-17 RX ADMIN — Medication 10 ML: at 21:01

## 2020-12-17 NOTE — PROGRESS NOTES
Problem: Self Care Deficits Care Plan (Adult)  Goal: *Acute Goals and Plan of Care (Insert Text)  Description: FUNCTIONAL STATUS PRIOR TO ADMISSION: Patient reports she only received three days of rehab after last admission and has experienced increased weakness and falls since then. Patient reports she felt unsafe ambulating in the past few days and was using w/c. HOME SUPPORT: Patient lives with her boyfriend who provides assistance with ADLs. Occupational Therapy Goals  Initiated 12/17/2020  1. Patient will perform grooming seated edge of bed with contact guard assist within 7 day(s). 2.  Patient will perform upper body dressing with supervision/SBA within 7 day(s). 3.  Patient will perform toilet transfers with moderate assistance within 7 day(s). 4.  Patient will tolerate ADL/functional task seated edge of bed for 15 minutes with VSS and set-up within 7 day(s). 5.  Patient will participate in upper extremity therapeutic exercise/activities with Min verbal and visual cues for 10 minutes within 7 day(s). OCCUPATIONAL THERAPY EVALUATION  Patient: Savannah Mason (43 y.o. female)  Date: 12/17/2020  Primary Diagnosis: [facility] sclerosis exacerbation ([facility] [G35]        Precautions:  Fall    ASSESSMENT  Based on the objective data described below, the patient presents with deficits in self-care, primarily due to poor activity tolerance, decreased safety, impaired balance, significant weakness, and impaired functional mobility. She was also limited today with c/o new headache/dizziness once sitting up. Her BP was slightly elevated. Attempted to take it again after 2 minutes, but she was not able to tolerate further sitting edge of bed. Patient was tearful throughout. Her boyfriend assists some with ADL, but she is functioning below her baseline level and remains a high falls risk. Functional Outcome Measure:   The patient scored 25/100 on the Barthel Index outcome measure which is indicative of significant functional deficits. Patient will benefit from skilled therapy intervention to address the above noted impairments. PLAN :  Recommendations and Planned Interventions: self care training, functional mobility training, therapeutic exercise, balance training, therapeutic activities, cognitive retraining, endurance activities, neuromuscular re-education, patient education, home safety training, and family training/education    Frequency/Duration: Patient will be followed by occupational therapy 4 times a week to address goals. Recommendation for discharge: (in order for the patient to meet his/her long term goals)  Therapy 3 hours per day 5-7 days per week    This discharge recommendation:  Has been made in collaboration with the attending provider and/or case management    IF patient discharges home will need the following DME: TBD       SUBJECTIVE:   Patient stated (tearfully) They found 3 more lesions in my brain.     OBJECTIVE DATA SUMMARY:   HISTORY:   Past Medical History:   Diagnosis Date    Body aches 12/11/2014    Chronic pain     Depression     Headache(784.0)     History of mammogram never before    MS (multiple sclerosis) ([facility] [address]     Neurological disorder     Multiple Sclerosis    Pap smear for cervical cancer screening 2008    Psychiatric disorder     anxiety    Psychotic disorder [facility])      Past Surgical History:   Procedure Laterality Date    COLONOSCOPY N/A 2/28/2018    COLONOSCOPY performed by Nirav Coffey MD at [facility] ENDOSCOPY    HX CHOLECYSTECTOMY      HX GYN      3 c-sections    HX HEENT      bilateral ear tube surgery    HX HERNIA REPAIR      HX OTHER SURGICAL      R inguinal hernia repair       Expanded or extensive additional review of patient history:     Home Situation  Home Environment: Apartment  # Steps to Enter: 2  Rails to Enter: No  One/Two Story Residence: One story  Living Alone: No  Support Systems: Family member(s)  Patient Expects to be Discharged to[de-identified] Apartment  Current DME Used/Available at Home: Alejo Hammonds, rollator, Wheelchair, Walker, Grab bars, Tub transfer bench  Tub or Shower Type: Tub/Shower combination    Hand dominance: Right    EXAMINATION OF PERFORMANCE DEFICITS:  Cognitive/Behavioral Status:  Neurologic State: Alert  Orientation Level: Oriented X4  Cognition: Follows commands  Perception: Appears intact  Perseveration: No perseveration noted  Safety/Judgement: Awareness of environment; Insight into deficits;Home safety; Fall prevention    Hearing: Auditory  Auditory Impairment: None    Vision/Perceptual:    Not formally assessed; no acute changes reported. Range of Motion:  AROM: Generally decreased, functional                         Strength:  Strength: Generally decreased, functional                Coordination:  Coordination: Generally decreased, functional  Fine Motor Skills-Upper: Left Impaired;Right Impaired    Gross Motor Skills-Upper: Left Impaired;Right Impaired    Tone & Sensation:  Tone: Normal                         Balance:  Sitting: Impaired  Sitting - Static: Fair (occasional)  Standing: (not attempted)    Functional Mobility and Transfers for ADLs:  Bed Mobility:  Supine to Sit: Moderate assistance;Assist x2  Sit to Supine: Minimum assistance;Assist x2    Transfers:  Sit to Stand: (not attempted)  Toilet Transfer : (unsafe to attempt)    ADL Assessment:  Feeding: Setup;Supervision    Oral Facial Hygiene/Grooming: Moderate assistance    Bathing: Moderate assistance(simulated)    Upper Body Dressing: Moderate assistance    Lower Body Dressing: Total assistance    Toileting: Total assistance                Functional Measure:  Barthel Index:    Bathin  Bladder: 0  Bowels: 5  Groomin  Dressin  Feeding: 10  Mobility: 0  Stairs: 0  Toilet Use: 0  Transfer (Bed to Chair and Back): 0  Total: 25/100        The Barthel ADL Index: Guidelines  1.  The index should be used as a record of what a patient does, not as a record of what a patient could do. 2. The main aim is to establish degree of independence from any help, physical or verbal, however minor and for whatever reason. 3. The need for supervision renders the patient not independent. 4. A patient's performance should be established using the best available evidence. Asking the patient, friends/relatives and nurses are the usual sources, but direct observation and common sense are also important. However direct testing is not needed. 5. Usually the patient's performance over the preceding 24-48 hours is important, but occasionally longer periods will be relevant. 6. Middle categories imply that the patient supplies over 50 per cent of the effort. 7. Use of aids to be independent is allowed. Malena Wilks., Barthel, D.W. (9693). Functional evaluation: the Barthel Index. [address] [facility] (14)2. Alissa Herrera irvin LOUIS Simental, Tina Grimm., [place] Mehdi., [place], [address] (1999). Measuring the change indisability after inpatient rehabilitation; comparison of the responsiveness of the Barthel Index and Functional [place] Measure. Journal of Neurology, Neurosurgery, and Psychiatry, 66(4), 473-627. Stella Harding, N.J.A, BETTY Moss, & Florentino Gaitan M.A. (2004.) Assessment of post-stroke quality of life in cost-effectiveness studies: The usefulness of the Barthel Index and the EuroQoL-5D. Quality of Life Research, 15, 796-13        Occupational Therapy Evaluation Charge Determination   History Examination Decision-Making   MEDIUM Complexity : Expanded review of history including physical, cognitive and psychosocial  history  HIGH Complexity : 5 or more performance deficits relating to physical, cognitive , or psychosocial skils that result in activity limitations and / or participation restrictions MEDIUM Complexity : Patient may present with comorbidities that affect occupational performnce.  Miniml to moderate modification of tasks or assistance (eg, physical or verbal ) with assesment(s) is necessary to enable patient to complete evaluation       Based on the above components, the patient evaluation is determined to be of the following complexity level: MEDIUM  Pain Rating:  Headache onset when sitting edge of bed    Activity Tolerance:   Poor    After treatment patient left in no apparent distress:    Supine in bed and Call bell within reach    COMMUNICATION/EDUCATION:   The patients plan of care was discussed with: Physical therapist and Registered nurse. Patient/family agree to work toward stated goals and plan of care. This patients plan of care is appropriate for delegation to SHANNEN.     Thank you for this referral.  Anna Marie Billings, OTR/L  Time Calculation: 24 mins

## 2020-12-17 NOTE — PROGRESS NOTES
Physician Progress Note      Ty Domínguez  CSN #:                  [id number]  :                       1975  ADMIT DATE:       2020 2:54 PM  [address] [place] DATE:  RESPONDING  PROVIDER #:        Etienne Walker MD          QUERY TEXT:    Patient admitted with MS exacerbation. Noted documentation of history of depression in H&P. Please document in progress notes and discharge summary if you are treating/evaluating the following: The medical record reflects the following:  Risk Factors: 50 F w/hx: MS  Clinical Indicators: Per H&P: pmh: depression  Treatment: continuation of home dose Cymbalta    Please call with questions. Thank you. [email or web address]. [email or web address]  [phone]  Options provided:  -- Major depression, recurrent: mild  -- Major depression, recurrent: moderate  -- Major depression, single episode: mild  -- Major depression, single episode: moderate  -- History of depression only  -- Other - I will add my own diagnosis  -- Disagree - Not applicable / Not valid  -- Disagree - Clinically unable to determine / Unknown  -- Refer to Clinical Documentation Reviewer    PROVIDER RESPONSE TEXT:    This patent has recurrent major depression, current episode mild.     Query created by: Meg Trivedi on 2020 9:43 AM      Electronically signed by:  Etienne Walker MD 2020 1:34 PM sent B12

## 2020-12-17 NOTE — PROGRESS NOTES
Pharmacy Clarification of the Prior to Admission Medication Regimen Retrospective to the Admission Medication Reconciliation    The patient was interviewed regarding clarification of the prior to admission medication regimen. Patient was questioned regarding use of any other inhalers, topical products, over the counter medications, herbal medications, vitamin products or ophthalmic/nasal/otic medication use. Information Obtained From: query, patient    Recommendations/Findings: The following amendments were made to the patient's active medication list on file at [facility]:     1) Additions:   Gabapentin    2) Removals:       3) Changes:      4) Pertinent Pharmacy Findings:per patient she ran out of [facility] and has not been able to reorder. Her Cymbalta she is unsure of when she last took a dose. Updated patients preferred outpatient pharmacy to: [facility]       PTA medication list was corrected to the following:     Prior to Admission Medications   Prescriptions Last Dose Informant Taking? ALPRAZolam (Xanax) 0.25 mg tablet 12/16/2020 at Unknown time Self Yes   Sig: Take 1 Tab by mouth two (2) times daily as needed for Anxiety. Max Daily Amount: 0.5 mg. Indications: anxiousness associated with depression   DULoxetine (CYMBALTA) 60 mg capsule  Self Yes   Sig: Take 1 Cap by mouth daily. Indications: neuropathic pain   cladribine,multiple sclerosis, (Mavenclad, 8 tablet pack,) 10 mg tab 11/17/2020 at Unknown time Self Yes   Sig: Take 10 mg by mouth daily. Take as directed  Indications: relapsing form of multiple sclerosis   cyclobenzaprine (FLEXERIL) 10 mg tablet 12/16/2020 at Unknown time Self Yes   Sig: Take 1 Tab by mouth three (3) times daily as needed for Muscle Spasm(s).    famotidine (PEPCID) 20 mg tablet 12/16/2020 at Unknown time Self Yes   Sig: Take 20 mg by mouth two (2) times daily as needed for Heartburn.   gabapentin (NEURONTIN) 400 mg capsule 12/16/2020 at Unknown time Self Yes   Sig: Take 400 mg by mouth three (3) times daily. ibuprofen (MOTRIN) 200 mg tablet 12/16/2020 at Unknown time Self Yes   Sig: Take  by mouth every six (6) hours as needed for Pain. Take three tablets every 6 hours as needed by mouth    melatonin 3 mg tablet 12/14/2020 at Unknown time Self Yes   Sig: Take 15 mg by mouth nightly as needed for Insomnia. senna-docusate (PERICOLACE) 8.6-50 mg per tablet 12/14/2020 at Unknown time Self Yes   Sig: Take 2 Tabs by mouth daily.       Facility-Administered Medications: None        Thank you,  Kenna Howard CPHT  Medication History Pharmacy Technician

## 2020-12-17 NOTE — PROGRESS NOTES
Problem: Mobility Impaired (Adult and Pediatric)  Goal: *Acute Goals and Plan of Care (Insert Text)  Description: FUNCTIONAL STATUS PRIOR TO ADMISSION: Patient reports she only received three days of rehab after last admission and has experienced increased weakness and falls since then. Patient reports she felt unsafe ambulating in the past few days and was using w/c. HOME SUPPORT PRIOR TO ADMISSION: Patient lives with her boyfriend who provides assistance with ADLs. Physical Therapy Goals  Initiated 12/17/2020  1. Patient will move from supine to sit and sit to supine  in bed with minimal assistance/contact guard assist within 7 day(s). 2.  Patient will transfer from bed to chair and chair to bed with minimal assistance/contact guard assist using the least restrictive device within 7 day(s). 3.  Patient will perform sit to stand with minimal assistance/contact guard assist within 7 day(s). 4.  Patient will ambulate with minimal assistance/contact guard assist for 50 feet with the least restrictive device within 7 day(s). 5.  Patient will ascend/descend 2 stairs with  handrail(s) with minimal assistance/contact guard assist within 7 day(s). Outcome: Not Met   PHYSICAL THERAPY EVALUATION  Patient: Yoseph Boss (80 y.o. female)  Date: 12/17/2020  Primary Diagnosis: Multiple sclerosis exacerbation ([facility] [address] [G35]        Precautions:  Fall    ASSESSMENT  Based on the objective data described below, the patient presents with history of frequent falls, general weakness, impaired balance and overall poor tolerance of activity today. Patient received on [facility] in ED, agreeable to participate but is tearful and  reports significant fatigue,  speech slightly slurred. Patient is able to move bilateral LEs antigravity through small range, movements are slow and difficult to initiate.   Patient required mod assist x 2 to come to sit EOB and required constant light support, very flexed and required cues to keep eyes open. Patient reported dizziness and sudden headache, attempted to get BP but  not able to tolerate sitting for long enough and was assisted back to supine. Patient is below baseline and remains a high falls risk, will likely require rehab before returning home. Current Level of Function Impacting Discharge (mobility/balance): mod assist to sit, poor tolerance and did not attempt standing    Functional Outcome Measure: The patient scored 25/100 on the Barthel outcome measure which is indicative of severe functional impairment      Other factors to consider for discharge: high falls risk, may require increased assistance at home     Patient will benefit from skilled therapy intervention to address the above noted impairments. PLAN :  Recommendations and Planned Interventions: bed mobility training, transfer training, gait training, therapeutic exercises, patient and family training/education, and therapeutic activities      Frequency/Duration: Patient will be followed by physical therapy:  4 times a week to address goals. Recommendation for discharge: (in order for the patient to meet his/her long term goals)  Therapy 3 hours per day 5-7 days per week    This discharge recommendation:  A follow-up discussion with the attending provider and/or case management is planned    IF patient discharges home will need the following DME: patient owns DME required for discharge         SUBJECTIVE:   Patient stated I'm sorry, they found two more lesions in my brain.   very tearful    OBJECTIVE DATA SUMMARY:   HISTORY:    Past Medical History:   Diagnosis Date    Body aches 12/11/2014    Chronic pain     Depression     Headache(784.0)     History of mammogram never before    MS (multiple sclerosis) ([facility] [address]     Neurological disorder     Multiple Sclerosis    Pap smear for cervical cancer screening 2008    Psychiatric disorder     anxiety    Psychotic disorder [facility])      Past Surgical History:   Procedure Laterality Date    COLONOSCOPY N/A 2018    COLONOSCOPY performed by Thuan Fontanez MD at [facility] ENDOSCOPY    HX CHOLECYSTECTOMY      HX GYN      3 c-sections    HX HEENT      bilateral ear tube surgery    HX HERNIA REPAIR      HX OTHER SURGICAL      R inguinal hernia repair       Personal factors and/or comorbidities impacting plan of care: frequent hospital stays, anxiety    Home Situation  Home Environment: Apartment  # Steps to Enter: 2  Rails to Enter: No  One/Two Story Residence: One story  Living Alone: No  Support Systems: Family member(s)  Patient Expects to be Discharged to[de-identified] Apartment  Current DME Used/Available at Home: [employer], rollator, Wheelchair, Walker, Grab bars, Tub transfer bench  Tub or Shower Type: Tub/Shower combination    EXAMINATION/PRESENTATION/DECISION MAKING:   Critical Behavior:  Neurologic State: Alert, Appropriate for age, Eyes open spontaneously  Orientation Level: Appropriate for age, Oriented X4  Cognition: Appropriate decision making, Appropriate for age attention/concentration, Appropriate safety awareness, Follows commands     Hearing:   Auditory  Auditory Impairment: None  Range Of Motion:  AROM: Generally decreased, functional                       Strength:    Strength: Generally decreased, functional                    Tone & Sensation:   Tone: Normal                              Coordination:  Coordination: Generally decreased, functional  Vision:      Functional Mobility:  Bed Mobility:     Supine to Sit: Moderate assistance;Assist x2  Sit to Supine: Minimum assistance;Assist x2     Transfers:  Sit to Stand: (not attempted)                          Balance:   Sitting: Impaired  Sitting - Static: Fair (occasional)  Standing: (not attempted)  Ambulation/Gait Training:                                                         Stairs:              Functional Measure:  Barthel Index:    Bathin  Bladder: 0  Bowels: 5  Groomin  Dressin  Feeding: 10  Mobility: 0  Stairs: 0  Toilet Use: 0  Transfer (Bed to Chair and Back): 0  Total: 25/100       The Barthel ADL Index: Guidelines  1. The index should be used as a record of what a patient does, not as a record of what a patient could do. 2. The main aim is to establish degree of independence from any help, physical or verbal, however minor and for whatever reason. 3. The need for supervision renders the patient not independent. 4. A patient's performance should be established using the best available evidence. Asking the patient, friends/relatives and nurses are the usual sources, but direct observation and common sense are also important. However direct testing is not needed. 5. Usually the patient's performance over the preceding 24-48 hours is important, but occasionally longer periods will be relevant. 6. Middle categories imply that the patient supplies over 50 per cent of the effort. 7. Use of aids to be independent is allowed. Luca Kerns., BarthelSUNNY. (6323). Functional evaluation: the Barthel Index. [address] [facility] (14)2. Ina Partida irvin LOUIS Simental, Jennifer Yeh., Paul Alvarez., Sid [facility], [address] [place] [address] (1999). Measuring the change indisability after inpatient rehabilitation; comparison of the responsiveness of the Barthel Index and Functional Wayne Measure. Journal of Neurology, Neurosurgery, and Psychiatry, 66(4), 560-408. Shazia Long, N.J.A, BETTY Moss, & Ted Newell, M.A. (2004.) Assessment of post-stroke quality of life in cost-effectiveness studies: The usefulness of the Barthel Index and the EuroQoL-5D.  Quality of Life Research, 15, 747-77           Physical Therapy Evaluation Charge Determination   History Examination Presentation Decision-Making   MEDIUM  Complexity : 1-2 comorbidities / personal factors will impact the outcome/ POC  LOW Complexity : 1-2 Standardized tests and measures addressing body structure, function, activity limitation and / or participation in recreation  MEDIUM Complexity : Evolving with changing characteristics  MEDIUM Complexity : FOTO score of 26-74      Based on the above components, the patient evaluation is determined to be of the following complexity level: LOW     Pain Rating:      Activity Tolerance:   Poor    After treatment patient left in no apparent distress:   Supine in bed, Call bell within reach, and Side rails x 3    COMMUNICATION/EDUCATION:   The patients plan of care was discussed with: Occupational therapist and Registered nurse. Fall prevention education was provided and the patient/caregiver indicated understanding. and Patient/family agree to work toward stated goals and plan of care.     Thank you for this referral.  Maria M Ren, PT   Time Calculation: 24 mins

## 2020-12-17 NOTE — H&P
Hospitalist Admission Note    NAME: Jacquelin Peña   :  1975   MRN:  [id number]     Date/Time:  2020 11:15 PM    Patient PCP: Kiesha Stock MD  _____________________________________________________________________  Given the patient's current clinical presentation, I have a high level of concern for decompensation if discharged from the emergency department. Complex decision making was performed, which includes reviewing the patient's available past medical records, laboratory results, and x-ray films. My assessment of this patient's clinical condition and my plan of care is as follows. Assessment / Plan:  Multiple sclerosis exacerbation vs worsening Advancing MS  Recurrent falls due to weakness due to above  Admit to neuro floor  Solumedrol 1gm Q24 hours  Check MRI brain, C-Spine and T Spine  PT/OT  ER discussed with patient's neurologist, will admit and start IV Solu-Medrol  Neurochecks          Anxiety DO  Fibromyalgia  Chronic pain  Continue Home meds     Code Status: FUll     DVT Prophylaxis: Lovenox     Baseline: Recurrent falls  Eloisa [id number] Paulie [id number] Stefanie [phone]               Subjective:   CHIEF COMPLAINT:  Leg Pain/Arm Pain   History of MS and having escalation of symptoms today    HISTORY OF PRESENT ILLNESS:     Jacquelin Peña, 50 y.o. female presents to the ED with cc of extremity pain and weakness. Patient has a history of multiple sclerosis and was admitted on  for bilateral lower extremity weakness. At that time, she showed active new demyelinating lesions on her brain MRI. According to the ED note, her hip strength was 4 out of 5 in her lower extremities bilaterally at that time. She states that she is not ambulated since that admission. She now also has pain associated with her symptoms over the last week and is also complaining of bilateral arm weakness and pain. The pains are an 8 out of 10 in severity. She denies any new numbness or tingling. She denies any new incontinence, back pain, chest pain, cough, fever or chills. There are no other complaints, changes, or physical findings at this time. PCP: Queta Erickson MD     No current facility-administered medications on file prior to encounter       We were asked to admit for work up and evaluation of the above problems. Past Medical History:   Diagnosis Date    Body aches 12/11/2014    Chronic pain     Depression     Headache(784.0)     History of mammogram never before    [facility] sclerosis) ([facility] [address]     Neurological disorder     Multiple Sclerosis    Pap smear for cervical cancer screening 2008    Psychiatric disorder     anxiety    Psychotic disorder [facility])         Past Surgical History:   Procedure Laterality Date    COLONOSCOPY N/A 2/28/2018    COLONOSCOPY performed by Jareth Daigle MD at [facility]"" ENDOSCOPY    HX CHOLECYSTECTOMY      HX GYN      3 c-sections    HX HEENT      bilateral ear tube surgery    HX HERNIA REPAIR      HX OTHER SURGICAL      R inguinal hernia repair       Social History     Tobacco Use    Smoking status: Current Every Day Smoker     Packs/day: 0.50     Years: 17.00     Pack years: 8.50     Types: Cigarettes    Smokeless tobacco: Never Used    Tobacco comment: 1 pack every 3 days. Substance Use Topics    Alcohol use: No        Family History   Problem Relation Age of Onset    Heart Attack Father         tripple bypass    Cancer Father         lung    COPD Father     Diabetes Mother         type 2    Heart Disease Mother         heart disease    Diabetes Paternal Grandmother     No Known Problems Sister     No Known Problems Brother     No Known Problems Child      Allergies   Allergen Reactions    Morphine Rash        Prior to Admission medications    Medication Sig Start Date End Date Taking?  Authorizing Provider   famotidine (PEPCID) 20 mg tablet Take 1 Tab by mouth two (2) times a day for 30 days. 12/2/20 1/1/21  Hannah Sloan MD   cladribine,multiple sclerosis, ([employer], 8 tablet pack,) 10 mg tab Take 10 mg by mouth daily. Take as directed  Indications: relapsing form of multiple sclerosis 10/7/20   Zachary Robins NP   ALPRAZolam (Xanax) 0.25 mg tablet Take 1 Tab by mouth two (2) times daily as needed for Anxiety. Max Daily Amount: 0.5 mg. Indications: anxiousness associated with depression 9/28/20   Zachary Robins NP   DULoxetine (CYMBALTA) 60 mg capsule Take 1 Cap by mouth daily. Indications: neuropathic pain 9/28/20   Zachary Robins NP   cyclobenzaprine (FLEXERIL) 10 mg tablet Take 1 Tab by mouth three (3) times daily as needed for Muscle Spasm(s). 6/10/20   Annamaria Cervantes MD   ibuprofen (MOTRIN) 200 mg tablet Take  by mouth every six (6) hours as needed for Pain. Take three tablets every 6 hours as needed by mouth     Provider, Historical   melatonin 3 mg tablet Take 1 Tab by mouth nightly as needed for Insomnia. Patient taking differently: Take 3 mg by mouth nightly as needed for Insomnia. Indications: Pt state she takes 5 mg tab nightly. 3/28/20   Vamshi Rivero MD   senna-docusate (PERICOLACE) 8.6-50 mg per tablet Take 2 Tabs by mouth daily. 1/14/20   Jaguar Enciso MD       REVIEW OF SYSTEMS:     I am not able to complete the review of systems because: The patient is intubated and sedated    The patient has altered mental status due to his acute medical problems    The patient has baseline aphasia from prior stroke(s)    The patient has baseline dementia and is not reliable historian    The patient is in acute medical distress and unable to provide information           Constitutional: Negative for fever. HENT: Negative for congestion. Eyes: Negative. Respiratory: Negative for shortness of breath. Cardiovascular: Negative for chest pain. Gastrointestinal: Negative for abdominal pain.    Endocrine: Negative for heat intolerance. Genitourinary: Negative. Musculoskeletal: Negative for back pain. Skin: Negative for rash. Allergic/Immunologic: Negative for immunocompromised state. Neurological: Positive for weakness. Hematological: Does not bruise/bleed easily. Psychiatric/Behavioral: Negative. All other systems reviewed and are negative. Objective:   VITALS:    Visit Vitals  BP 99/67   Pulse 73   Temp 97.8 °F (36.6 °C)   Resp 11   Ht 5' 7\" (1.702 m)   Wt 81.6 kg (180 lb)   SpO2 100%   BMI 28.19 kg/m²       PHYSICAL EXAM:    Constitutional:       General: She is not in acute distress. Appearance: She is well-developed. HENT:      Head: Normocephalic. Neck:      Musculoskeletal: Normal range of motion and neck supple. Cardiovascular:      Rate and Rhythm: Normal rate and regular rhythm. Pulses: Normal pulses. Heart sounds: Normal heart sounds. Pulmonary:      Effort: Pulmonary effort is normal.      Breath sounds: Normal breath sounds. Abdominal:      General: Bowel sounds are normal.      Palpations: Abdomen is soft. Tenderness: There is no abdominal tenderness. Musculoskeletal: Normal range of motion. General: No tenderness. Skin:     General: Skin is warm and dry. Neurological:      Mental Status: She is alert and oriented to person, place, and time. Motor: Weakness present.       Comments:  dysarthria, sensory symmetric, slight decreased left , left pronator drift, motor strength in legs is 2 out of 5   Psychiatric:         Mood and Affect: Mood normal.         Behavior: Behavior normal.     _______________________________________________________________________  Care Plan discussed with:    Comments   Patient y    Family      RN y    Care Manager                    Consultant:  jovana Gonzalez md   _______________________________________________________________________  Expected  Disposition:   Home with Family y   HH/PT/OT/RN    SNF/LTC    COLLETTE ________________________________________________________________________  TOTAL TIME: 60   Minutes    Critical Care Provided     Minutes non procedure based      Comments    y Reviewed previous records   >50% of visit spent in counseling and coordination of care y Discussion with patient and/or family and questions answered       Given the patient's current clinical presentation, I have a high level of concern for decompensation if discharged from the ED. Complex decision making was performed which includes reviewing the patient's available past medical records, laboratory results, and Xray films. I have also directly communicated my plan and discussed this case with the involved ED physician.     ____________________________________________________________________  Blade Kemp MD    Procedures: see electronic medical records for all procedures/Xrays and details which were not copied into this note but were reviewed prior to creation of Plan. LAB DATA REVIEWED:    Recent Results (from the past 24 hour(s))   CBC WITH AUTOMATED DIFF    Collection Time: 12/16/20  4:41 PM   Result Value Ref Range    WBC 3.5 (L) 3.6 - 11.0 K/uL    RBC 3.45 (L) 3.80 - 5.20 M/uL    HGB 11.1 (L) 11.5 - 16.0 g/dL    HCT 33.3 (L) 35.0 - 47.0 %    MCV 96.5 80.0 - 99.0 FL    MCH 32.2 26.0 - 34.0 PG    MCHC 33.3 30.0 - 36.5 g/dL    RDW 14.6 (H) 11.5 - 14.5 %    PLATELET 079 133 - 949 K/uL    MPV 9.6 8.9 - 12.9 FL    NRBC 0.0 0  WBC    ABSOLUTE NRBC 0.00 0.00 - 0.01 K/uL    NEUTROPHILS 73 32 - 75 %    LYMPHOCYTES 15 12 - 49 %    MONOCYTES 8 5 - 13 %    EOSINOPHILS 3 0 - 7 %    BASOPHILS 1 0 - 1 %    IMMATURE GRANULOCYTES 0 0.0 - 0.5 %    ABS. NEUTROPHILS 2.6 1.8 - 8.0 K/UL    ABS. LYMPHOCYTES 0.5 (L) 0.8 - 3.5 K/UL    ABS. MONOCYTES 0.3 0.0 - 1.0 K/UL    ABS. EOSINOPHILS 0.1 0.0 - 0.4 K/UL    ABS. BASOPHILS 0.0 0.0 - 0.1 K/UL    ABS. IMM.  GRANS. 0.0 0.00 - 0.04 K/UL    DF SMEAR SCANNED      RBC COMMENTS NORMOCYTIC, NORMOCHROMIC METABOLIC PANEL, COMPREHENSIVE    Collection Time: 12/16/20  4:41 PM   Result Value Ref Range    Sodium 139 136 - 145 mmol/L    Potassium 4.4 3.5 - 5.1 mmol/L    Chloride 110 (H) 97 - 108 mmol/L    CO2 26 21 - 32 mmol/L    Anion gap 3 (L) 5 - 15 mmol/L    Glucose 91 65 - 100 mg/dL    BUN 11 6 - 20 MG/DL    Creatinine 0.82 0.55 - 1.02 MG/DL    BUN/Creatinine ratio 13 12 - 20      GFR est AA >60 >60 ml/min/1.73m2    GFR est non-AA >60 >60 ml/min/1.73m2    Calcium 9.1 8.5 - 10.1 MG/DL    Bilirubin, total 0.3 0.2 - 1.0 MG/DL    ALT (SGPT) 18 12 - 78 U/L    AST (SGOT) 14 (L) 15 - 37 U/L    Alk.  phosphatase 66 45 - 117 U/L    Protein, total 6.4 6.4 - 8.2 g/dL    Albumin 3.2 (L) 3.5 - 5.0 g/dL    Globulin 3.2 2.0 - 4.0 g/dL    A-G Ratio 1.0 (L) 1.1 - 2.2     MAGNESIUM    Collection Time: 12/16/20  4:41 PM   Result Value Ref Range    Magnesium 2.2 1.6 - 2.4 mg/dL

## 2020-12-17 NOTE — ED NOTES
Assumed care of patient, patient alert and oriented, patient reports wanting her morning medications, will check on that, messages sent to pharmacy. Patient sinus tach on the monitor, IV saline locked at this time. 0900-Breakfast tray given    10:20- Dr. Reynaldo Ordonez at the bedside    1040-Report    TRANSFER - OUT REPORT:    Verbal report given to [facility] RN(name) on Jesus Feliz  being transferred to Inpatient (unit) for routine progression of care       Report consisted of patients Situation, Background, Assessment and   Recommendations(SBAR). Information from the following report(s) SBAR, Kardex, ED Summary, Procedure Summary, Intake/Output, MAR, Recent Results, Med Rec Status and Cardiac Rhythm NSR was reviewed with the receiving nurse. Lines:   Peripheral IV 13/84/98 Right Basilic (Active)        Opportunity for questions and clarification was provided.       Patient transported with:   Joust

## 2020-12-17 NOTE — PROGRESS NOTES
Hospitalist Progress Note    NAME: Jennifer Olivares   :  1975   MRN:  [id number]       Assessment / Plan:  Multiple sclerosis exacerbation POA   Rapidly progressing/advancing MS POA  Causing Recurrent falls POA   due to Generalized weakness POA  MRI brain noted for new enhancing lesions in R frontal lobe  MRI C-Spine and T Spine- stable old lesions noted    Cont IV Solumedrol 1gm Q24 hours for now  IP Neurology consult awaited for further recommendations  IP PT/OT eval for DC planning-- will likely need IP rehab     Anxiety with Depression POA- mild  Fibromyalgia  Chronic pain  Continue Home meds  Xanax prn anxiety       Code Status: Full     DVT Prophylaxis: Lovenox     Baseline: Recurrent falls  Eloisa [phone] Paulie [phone] Stefanie [phone]      Recommended Disposition: SAH/Rehab likely     Subjective:     Chief Complaint / Reason for Physician Visit: F/U MS flare, gen weakness, falls at home  \"I am still the same\". Discussed with RN events overnight. Review of Systems:  Symptom Y/N Comments  Symptom Y/N Comments   Fever/Chills n   Chest Pain n    Poor Appetite n   Edema n    Cough n   Abdominal Pain n    Sputum n   Joint Pain n    SOB/BOBO n   Pruritis/Rash     Nausea/vomit n   Tolerating PT/OT  Weakness/Falls at home   Diarrhea    Tolerating Diet y    Constipation    Other       Could NOT obtain due to:      Objective:     VITALS:   Last 24hrs VS reviewed since prior progress note.  Most recent are:  Patient Vitals for the past 24 hrs:   Temp Pulse Resp BP SpO2   20 0530 98 °F (36.7 °C) 97 17 115/64 99 %   20 0430  88 17 105/63 99 %   20 0330 98.3 °F (36.8 °C) 91 12 105/61 97 %   20 0145  94 16 103/63 98 %   20 0130  91 15 109/65 99 %   20 0115  84 16 110/69 100 %   20 0100  90 16 115/69 100 %   20 0045  98 13 117/76 94 %   20 0030  91 17 114/76 100 %   20 2030  73 11 99/67 100 %   12/16/20 2015  77 15 97/63 99 %   12/16/20 2000  77 11 107/68 96 %   12/16/20 1945  74 13 117/60    12/16/20 1930  87 13 102/61 100 %   12/16/20 1900  81 13 102/64 100 %   12/16/20 1845  79 12 107/66 100 %   12/16/20 1830  83 13 102/65 100 %   12/16/20 1815  82 11 98/67 100 %   12/16/20 1800  78 13 107/70 100 %   12/16/20 1745  80 13 101/62 100 %   12/16/20 1730  78 13 108/72 100 %   12/16/20 1715  86 19 97/70 100 %   12/16/20 1600  81 15 104/68 100 %   12/16/20 1545  91 15 102/72 100 %   12/16/20 1508 97.8 °F (36.6 °C) 93 18 101/67 100 %       Intake/Output Summary (Last 24 hours) at 12/17/2020 0649  Last data filed at 12/17/2020 0554  Gross per 24 hour   Intake 240 ml   Output 300 ml   Net -60 ml        I had a face to face encounter and independently examined this patient on 12/17/2020, as outlined below:  PHYSICAL EXAM:  General: WD, WN. Alert, cooperative, no acute distress    EENT:  EOMI. Anicteric sclerae. MMM  Resp:  CTA bilaterally, no wheezing or rales. No accessory muscle use  CV:  Regular  rhythm,  No edema  GI:  Soft, Non distended, Non tender. +Bowel sounds  Neurologic:  Alert and oriented X 3, dysarthric speech noted +,  B/L LE weakness +, L hand weakness +  Psych:   Good insight. Not anxious nor agitated  Skin:  No rashes. No jaundice    Reviewed most current lab test results and cultures  YES  Reviewed most current radiology test results   YES  Review and summation of old records today    NO  Reviewed patient's current orders and MAR    YES  PMH/SH reviewed - no change compared to H&P  ________________________________________________________________________  Care Plan discussed with:    Comments   Patient x    Family      RN x    Care Manager     Consultant                        Multidiciplinary team rounds were held today with , nursing, pharmacist and clinical coordinator.   Patient's plan of care was discussed; medications were reviewed and discharge planning was addressed. ________________________________________________________________________  Total NON critical care TIME:  36   Minutes    Total CRITICAL CARE TIME Spent:   Minutes non procedure based      Comments   >50% of visit spent in counseling and coordination of care     ________________________________________________________________________  Kristian Steward MD     Procedures: see electronic medical records for all procedures/Xrays and details which were not copied into this note but were reviewed prior to creation of Plan. LABS:  I reviewed today's most current labs and imaging studies.   Pertinent labs include:  Recent Labs     12/17/20  0324 12/16/20  1641   WBC 3.6 3.5*   HGB 11.2* 11.1*   HCT 34.1* 33.3*    192     Recent Labs     12/17/20  0324 12/16/20  1641   * 139   K 4.1 4.4    110*   CO2 24 26   * 91   BUN 12 11   CREA 0.95 0.82   CA 8.7 9.1   MG 1.7 2.2   ALB 2.9* 3.2*   TBILI 0.3 0.3   ALT 17 18       Signed: Kristian Steward MD

## 2020-12-17 NOTE — PROGRESS NOTES
Problem: Falls - Risk of  Goal: *Absence of Falls  Description: Document Nickolas Rendon Fall Risk and appropriate interventions in the flowsheet.   Note: Fall Risk Interventions:  Mobility Interventions: Bed/chair exit alarm, Patient to call before getting OOB         Medication Interventions: Bed/chair exit alarm, Patient to call before getting OOB    Elimination Interventions: Bed/chair exit alarm, Call light in reach, Toileting schedule/hourly rounds

## 2020-12-17 NOTE — PROGRESS NOTES
End of Shift Note    Bedside shift change report given to Delores Millard (oncoming nurse) by Zia Barahona RN (offgoing nurse). Report included the following information SBAR, Kardex, MAR, Med Rec Status and Cardiac Rhythm nsr    Shift worked:  7am-7pm   Shift summary and any significant changes:    NEW ADMIT       Concerns for physician to address:  NONE   Zone phone for oncoming shift:   [phone]     Patient Information  Rafi Dill  50 y.o.  12/16/2020  2:54 PM by Leslie Nicholson MD. Rafi Dill was admitted from Home    Problem List  Patient Active Problem List    Diagnosis Date Noted    Leukopenia 11/25/2020    UTI (urinary tract infection) 11/25/2020    Multiple sclerosis exacerbation ([address] 08/06/2020    Facial droop 03/23/2020    Exacerbation of multiple sclerosis ([address] 03/09/2020    Left-sided weakness 03/08/2020    Severe obesity ([address] 10/03/2018    Constipation 07/17/2015    Body aches 12/11/2014    Back pain 09/07/2012    Fibromyalgia 08/17/2012    MS (multiple sclerosis) ([address] 08/17/2012    Decreased hearing 01/31/2011    Acute pharyngitis 01/26/2011    Anxiety 08/25/2010    Blood pressure elevated 08/25/2010    Tobacco abuse 08/25/2010     Past Medical History:   Diagnosis Date    Body aches 12/11/2014    Chronic pain     Depression     Headache(784.0)     History of mammogram never before    MS (multiple sclerosis) ([address]     Neurological disorder     Multiple Sclerosis    Pap smear for cervical cancer screening 2008    Psychiatric disorder     anxiety    Psychotic disorder ([address]        Core Measures:  CVA: No No  CHF:No No  PNA:No No    Activity:  Activity Level: Bed Rest  Number times ambulated in hallways past shift: 0  Number of times OOB to chair past shift: 0    Cardiac:   Cardiac Monitoring: Yes      Cardiac Rhythm: Normal sinus rhythm    Access:   Current line(s): PIV   Central Line? No   PICC LINE?  No     Genitourinary:   Urinary status: voiding and external catheter  Urinary Catheter? No    Respiratory:   O2 Device: Room air  Chronic home O2 use?: NO  Incentive spirometer at bedside: NO       GI:  Last Bowel Movement Date: 12/15/20  Current diet:  No diet orders on file  Passing flatus: YES  Tolerating current diet: YES       Pain Management:   Patient states pain is manageable on current regimen: YES    Skin:  Samy Score: 15  Interventions: increase time out of bed and PT/OT consult    Patient Safety:  Fall Score:  Total Score: 4  Interventions: bed/chair alarm, gripper socks and pt to call before getting OOB  High Fall Risk: Yes    DVT prophylaxis:  DVT prophylaxis Med- Yes  DVT prophylaxis SCD or CONG- No     Wounds: (If Applicable)  Wounds- No  Location     Active Consults:  IP CONSULT TO NEUROLOGY    Length of Stay:  Expected LOS: 3d 0h  Actual LOS: 1  Discharge Plan: No       Brianna Cho RN

## 2020-12-17 NOTE — PROGRESS NOTES
Problem: Pressure Injury - Risk of  Goal: *Prevention of pressure injury  Description: Document Samy Scale and appropriate interventions in the flowsheet.   Note: Pressure Injury Interventions:  Sensory Interventions: Assess changes in LOC    Moisture Interventions: Apply protective barrier, creams and emollients, Assess need for specialty bed    Activity Interventions: Increase time out of bed    Mobility Interventions: HOB 30 degrees or less, PT/OT evaluation    Nutrition Interventions: Document food/fluid/supplement intake
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
## 2020-12-17 NOTE — ED NOTES
Bedside and Verbal shift change report given to VikiRN (oncoming nurse) by Chika Mathews (offgoing nurse). Report included the following information SBAR, ED Summary, Intake/Output, MAR and Recent Results. 0530: Resting at this time. NAD.     5501: Bedside and Verbal shift change report given to [address] (oncoming nurse) by Hunter Quiroga (offgoing nurse). Report included the following information SBAR, ED Summary, Intake/Output, MAR and Recent Results.

## 2020-12-18 LAB
ANION GAP SERPL CALC-SCNC: 5 MMOL/L (ref 5–15)
BUN SERPL-MCNC: 18 MG/DL (ref 6–20)
BUN/CREAT SERPL: 23 (ref 12–20)
CALCIUM SERPL-MCNC: 8.8 MG/DL (ref 8.5–10.1)
CHLORIDE SERPL-SCNC: 111 MMOL/L (ref 97–108)
CO2 SERPL-SCNC: 22 MMOL/L (ref 21–32)
CREAT SERPL-MCNC: 0.77 MG/DL (ref 0.55–1.02)
ERYTHROCYTE [DISTWIDTH] IN BLOOD BY AUTOMATED COUNT: 14.5 % (ref 11.5–14.5)
GLUCOSE SERPL-MCNC: 163 MG/DL (ref 65–100)
HCT VFR BLD AUTO: 31.1 % (ref 35–47)
HGB BLD-MCNC: 10.5 G/DL (ref 11.5–16)
MCH RBC QN AUTO: 32.5 PG (ref 26–34)
MCHC RBC AUTO-ENTMCNC: 33.8 G/DL (ref 30–36.5)
MCV RBC AUTO: 96.3 FL (ref 80–99)
NRBC # BLD: 0 K/UL (ref 0–0.01)
NRBC BLD-RTO: 0 PER 100 WBC
PLATELET # BLD AUTO: 220 K/UL (ref 150–400)
PMV BLD AUTO: 9.8 FL (ref 8.9–12.9)
POTASSIUM SERPL-SCNC: 3.8 MMOL/L (ref 3.5–5.1)
RBC # BLD AUTO: 3.23 M/UL (ref 3.8–5.2)
SODIUM SERPL-SCNC: 138 MMOL/L (ref 136–145)
WBC # BLD AUTO: 11 K/UL (ref 3.6–11)

## 2020-12-18 PROCEDURE — 74011000258 HC RX REV CODE- 258: Performed by: HOSPITALIST

## 2020-12-18 PROCEDURE — 74011250637 HC RX REV CODE- 250/637: Performed by: INTERNAL MEDICINE

## 2020-12-18 PROCEDURE — 97530 THERAPEUTIC ACTIVITIES: CPT | Performed by: OCCUPATIONAL THERAPIST

## 2020-12-18 PROCEDURE — 36415 COLL VENOUS BLD VENIPUNCTURE: CPT

## 2020-12-18 PROCEDURE — 97116 GAIT TRAINING THERAPY: CPT

## 2020-12-18 PROCEDURE — 80048 BASIC METABOLIC PNL TOTAL CA: CPT

## 2020-12-18 PROCEDURE — 97535 SELF CARE MNGMENT TRAINING: CPT | Performed by: OCCUPATIONAL THERAPIST

## 2020-12-18 PROCEDURE — 85027 COMPLETE CBC AUTOMATED: CPT

## 2020-12-18 PROCEDURE — 74011250636 HC RX REV CODE- 250/636: Performed by: HOSPITALIST

## 2020-12-18 PROCEDURE — 74011250637 HC RX REV CODE- 250/637: Performed by: HOSPITALIST

## 2020-12-18 PROCEDURE — 65660000000 HC RM CCU STEPDOWN

## 2020-12-18 RX ADMIN — CYCLOBENZAPRINE 10 MG: 10 TABLET, FILM COATED ORAL at 21:53

## 2020-12-18 RX ADMIN — ALPRAZOLAM 0.25 MG: 0.25 TABLET ORAL at 22:01

## 2020-12-18 RX ADMIN — ENOXAPARIN SODIUM 40 MG: 40 INJECTION SUBCUTANEOUS at 08:07

## 2020-12-18 RX ADMIN — GABAPENTIN 400 MG: 300 CAPSULE ORAL at 17:06

## 2020-12-18 RX ADMIN — FAMOTIDINE 20 MG: 20 TABLET, FILM COATED ORAL at 17:06

## 2020-12-18 RX ADMIN — DULOXETINE HYDROCHLORIDE 60 MG: 30 CAPSULE, DELAYED RELEASE ORAL at 08:07

## 2020-12-18 RX ADMIN — FAMOTIDINE 20 MG: 20 TABLET, FILM COATED ORAL at 08:07

## 2020-12-18 RX ADMIN — GABAPENTIN 400 MG: 300 CAPSULE ORAL at 21:53

## 2020-12-18 RX ADMIN — GABAPENTIN 400 MG: 300 CAPSULE ORAL at 11:22

## 2020-12-18 RX ADMIN — ALPRAZOLAM 0.25 MG: 0.25 TABLET ORAL at 08:16

## 2020-12-18 RX ADMIN — SODIUM CHLORIDE 1000 MG: 900 INJECTION INTRAVENOUS at 08:16

## 2020-12-18 RX ADMIN — Medication 10 ML: at 21:54

## 2020-12-18 RX ADMIN — Medication 10 ML: at 17:06

## 2020-12-18 RX ADMIN — Medication 10 ML: at 05:50

## 2020-12-18 NOTE — PROGRESS NOTES
End of Shift Note    Bedside shift change report given to Nick Mccormick (oncoming nurse) by Brandon Riedel (offgoing nurse). Report included the following information SBAR, Kardex, MAR, Med Rec Status and Cardiac Rhythm nsr    Shift worked:  7am-7pm   Shift summary and any significant changes:    Seen by PT/ OT       Concerns for physician to address:  NONE   Zone phone for oncoming shift:   [phone]     Patient Information  Loreda Pill  50 y.o.  12/16/2020  2:54 PM by Blade Kemp MD. Loreda Pill was admitted from Home    Problem List  Patient Active Problem List    Diagnosis Date Noted    Leukopenia 11/25/2020    UTI (urinary tract infection) 11/25/2020    Multiple sclerosis exacerbation ([address] 08/06/2020    Facial droop 03/23/2020    Exacerbation of multiple sclerosis ([address] 03/09/2020    Left-sided weakness 03/08/2020    Severe obesity ([address] 10/03/2018    Constipation 07/17/2015    Body aches 12/11/2014    Back pain 09/07/2012    Fibromyalgia 08/17/2012    MS (multiple sclerosis) ([address] 08/17/2012    Decreased hearing 01/31/2011    Acute pharyngitis 01/26/2011    Anxiety 08/25/2010    Blood pressure elevated 08/25/2010    Tobacco abuse 08/25/2010     Past Medical History:   Diagnosis Date    Body aches 12/11/2014    Chronic pain     Depression     Headache(784.0)     History of mammogram never before    MS (multiple sclerosis) ([address]     Neurological disorder     Multiple Sclerosis    Pap smear for cervical cancer screening 2008    Psychiatric disorder     anxiety    Psychotic disorder ([address]        Core Measures:  CVA: No No  CHF:No No  PNA:No No    Activity:  Activity Level: Bed Rest  Number times ambulated in hallways past shift: 0  Number of times OOB to chair past shift: 0    Cardiac:   Cardiac Monitoring: Yes      Cardiac Rhythm: Normal sinus rhythm    Access:   Current line(s): PIV   Central Line? No   PICC LINE?  No     Genitourinary:   Urinary status: voiding and external catheter  Urinary Catheter? No    Respiratory:   O2 Device: Room air  Chronic home O2 use?: NO  Incentive spirometer at bedside: NO       GI:  Last Bowel Movement Date: 12/15/20  Current diet:  No diet orders on file  Passing flatus: YES  Tolerating current diet: YES       Pain Management:   Patient states pain is manageable on current regimen: YES    Skin:  Samy Score: 15  Interventions: increase time out of bed and PT/OT consult    Patient Safety:  Fall Score:  Total Score: 4  Interventions: bed/chair alarm, gripper socks and pt to call before getting OOB  High Fall Risk: Yes    DVT prophylaxis:  DVT prophylaxis Med- Yes  DVT prophylaxis SCD or CONG- No     Wounds: (If Applicable)  Wounds- No  Location     Active Consults:  IP CONSULT TO NEUROLOGY    Length of Stay:  Expected LOS: 3d 14h  Actual LOS: 2  Discharge Plan: No       Ramon Vazquez

## 2020-12-18 NOTE — PROGRESS NOTES
Problem: Self Care Deficits Care Plan (Adult)  Goal: *Acute Goals and Plan of Care (Insert Text)  Description: FUNCTIONAL STATUS PRIOR TO ADMISSION: Patient reports she only received three days of rehab after last admission and has experienced increased weakness and falls since then. Patient reports she felt unsafe ambulating in the past few days and was using w/c. HOME SUPPORT: Patient lives with her boyfriend who provides assistance with ADLs. Occupational Therapy Goals  Initiated 12/17/2020  1. Patient will perform grooming seated edge of bed with contact guard assist within 7 day(s). 2.  Patient will perform upper body dressing with supervision/SBA within 7 day(s). 3.  Patient will perform toilet transfers with moderate assistance within 7 day(s). 4.  Patient will tolerate ADL/functional task seated edge of bed for 15 minutes with VSS and set-up within 7 day(s). 5.  Patient will participate in upper extremity therapeutic exercise/activities with Min verbal and visual cues for 10 minutes within 7 day(s). Outcome: Progressing Towards Goal   OCCUPATIONAL THERAPY TREATMENT  Patient: Lori White (86 y.o. female)  Date: 12/18/2020  Diagnosis: Multiple sclerosis exacerbation ([facility] [address] Albertn Chalfont <principal problem not specified>       Precautions: Fall  Chart, occupational therapy assessment, plan of care, and goals were reviewed. ASSESSMENT  Patient continues with skilled OT services and is progressing towards goals. Pt was able to mobilize around the bed with RW with multiple losses of balance and minimal assist.  Pt has right hand tremor but BUE are functional.  Pt prefers to go home at discharge and reports that her boyfriend can and will assist.  She would like home health. Discussed that she currently needs assist at all times with transfers with RW and she verbalized understanding.   Pt does not have a gait belt and issued gait belt and educated pt on how to have boyfriend use this to assist with mobility. She does have a wheelchair and pt was educated to use wheelchair for mobility in her home and she voiced understanding. Pt has 2 steps to go into her house and she will need medical transport at discharge. Min assist needed for dynamic reaching standing with min assist to CGA for balance. Current Level of Function Impacting Discharge (ADLs): min assist for mobility with RW and dynamic standing balance    Other factors to consider for discharge:  fall risk         PLAN :  Patient continues to benefit from skilled intervention to address the above impairments. Continue treatment per established plan of care. to address goals. Recommend with staff: to bedside commode with assist     Recommend next OT session: LB ADLs, standing balance    Recommendation for discharge: (in order for the patient to meet his/her long term goals)  Occupational therapy at least 2 days/week in the home AND ensure assist and/or supervision for safety with all mobility and ADLS or rehab if this cannot be arranged    This discharge recommendation:  Has been made in collaboration with the attending provider and/or case management    IF patient discharges home will need the following DME: none       SUBJECTIVE:   Patient stated I want to go home. My boyfriend will help me.     OBJECTIVE DATA SUMMARY:   Cognitive/Behavioral Status:  Neurologic State: Alert  Orientation Level: Oriented X4                Functional Mobility and Transfers for ADLs:  Bed Mobility:  Rolling: Stand-by assistance  Supine to Sit: Stand-by assistance  Scooting: Stand-by assistance    Transfers:  Sit to Stand: Minimum assistance;Assist x2     Bed to Chair: Minimum assistance;Assist x1    Balance:  Sitting: Impaired  Sitting - Static: Good (unsupported)  Sitting - Dynamic: Fair (occasional)  Standing: Impaired; With support(RW)  Standing - Static: Fair;Constant support  Standing - Dynamic : Fair;Constant support    ADL Intervention:  See assessment      Pain:  0/10    Activity Tolerance:   Fair and requires rest breaks    After treatment patient left in no apparent distress:   Sitting in chair and Call bell within reach    COMMUNICATION/COLLABORATION:   The patients plan of care was discussed with: Physical therapist, Registered nurse, and patient .      Anam Luna OTR/L  Time Calculation: 25 mins

## 2020-12-18 NOTE — PROGRESS NOTES
End of Shift Note     Bedside shift change report given to Ramon(oncoming nurse) by Khadijah Aguayo RN (offgoing nurse). Report included the following information SBAR, Kardex, MAR, Med Rec Status and Cardiac Rhythm nsr     Shift worked:  1500 - 730am   Shift summary and any significant changes:     NEW ADMIT         Concerns for physician to address:  NONE   Zone phone for oncoming shift:   [phone]      Patient Information  Shruthi Birch  50 y.o.  12/16/2020  2:54 PM by Madhav Cosby MD. Shruthi Birch was admitted from Home     Problem List       Patient Active Problem List     Diagnosis Date Noted    Leukopenia 11/25/2020    UTI (urinary tract infection) 11/25/2020    Multiple sclerosis exacerbation ([address] 08/06/2020    Facial droop 03/23/2020    Exacerbation of multiple sclerosis ([address] 03/09/2020    Left-sided weakness 03/08/2020    Severe obesity ([address] 10/03/2018    Constipation 07/17/2015    Body aches 12/11/2014    Back pain 09/07/2012    Fibromyalgia 08/17/2012    MS (multiple sclerosis) ([address] 08/17/2012    Decreased hearing 01/31/2011    Acute pharyngitis 01/26/2011    Anxiety 08/25/2010    Blood pressure elevated 08/25/2010    Tobacco abuse 08/25/2010           Past Medical History:   Diagnosis Date    Body aches 12/11/2014    Chronic pain      Depression      Headache(784.0)      History of mammogram never before    MS (multiple sclerosis) ([address]      Neurological disorder       Multiple Sclerosis    Pap smear for cervical cancer screening 2008    Psychiatric disorder       anxiety    Psychotic disorder ([address]           Core Measures:  CVA: No No  CHF:No No  PNA:No No     Activity:  Activity Level: Bed Rest  Number times ambulated in hallways past shift: 0  Number of times OOB to chair past shift: 0     Cardiac:   Cardiac Monitoring: Yes      Cardiac Rhythm: Normal sinus rhythm     Access:   Current line(s): PIV   Central Line? No   PICC LINE?  No      Genitourinary:   Urinary status: voiding and external catheter  Urinary Catheter? No     Respiratory:   O2 Device: Room air  Chronic home O2 use?: NO  Incentive spirometer at bedside: NO     GI:  Last Bowel Movement Date: 12/15/20  Current diet:  No diet orders on file  Passing flatus: YES  Tolerating current diet: YES     Pain Management:   Patient states pain is manageable on current regimen: YES     Skin:  Samy Score: 15  Interventions: increase time out of bed and PT/OT consult    Patient Safety:  Fall Score: Total Score: 4  Interventions: bed/chair alarm, gripper socks and pt to call before getting OOB  High Fall Risk:  Yes     DVT prophylaxis:  DVT prophylaxis Med- Yes  DVT prophylaxis SCD or CONG- No      Wounds: (If Applicable)  Wounds- No  Location      Active Consults:  IP CONSULT TO NEUROLOGY     Length of Stay:  Expected LOS: 3d 0h  Actual LOS: 1  Discharge Plan: Yumiko bhakta RN

## 2020-12-18 NOTE — PROGRESS NOTES
Problem: Mobility Impaired (Adult and Pediatric)  Goal: *Acute Goals and Plan of Care (Insert Text)  Description: FUNCTIONAL STATUS PRIOR TO ADMISSION: Patient reports she only received three days of rehab after last admission and has experienced increased weakness and falls since then. Patient reports she felt unsafe ambulating in the past few days and was using w/c. HOME SUPPORT PRIOR TO ADMISSION: Patient lives with her boyfriend who provides assistance with ADLs. Physical Therapy Goals  Initiated 12/17/2020  1. Patient will move from supine to sit and sit to supine  in bed with minimal assistance/contact guard assist within 7 day(s). 2.  Patient will transfer from bed to chair and chair to bed with minimal assistance/contact guard assist using the least restrictive device within 7 day(s). 3.  Patient will perform sit to stand with minimal assistance/contact guard assist within 7 day(s). 4.  Patient will ambulate with minimal assistance/contact guard assist for 50 feet with the least restrictive device within 7 day(s). 5.  Patient will ascend/descend 2 stairs with  handrail(s) with minimal assistance/contact guard assist within 7 day(s). 12/18/2020 1451 by Susannah Olvera, PT  Outcome: Progressing Towards Goal   PHYSICAL THERAPY TREATMENT  Patient: Shruthi Birch (19 y.o. female)  Date: 12/18/2020  Diagnosis: Multiple sclerosis exacerbation ([facility] [address] Angelica Florian <principal problem not specified>       Precautions: Fall  Chart, physical therapy assessment, plan of care and goals were reviewed. ASSESSMENT  Patient continues with skilled PT services and is progressing towards goals, demonstrating improved strength, activity tolerance, and improved functional mobility. Pt able to assume seated position EOB w/ SBAx1. Remaining functional mobility occurred with minAx2 including sit<>stand transfer and ambulation w/ RW support.  Pt with significantly decreased gait speed in addition to step-to gait pattern. Mild buckling of BLEs noted. Pt experienced x2 minor LOB, requiring minAx1 for balance check. Pt fatigued quickly. Pt tolerated therapy session well however remains a large falls risk and should continue to have x2 person assist for all OOB mobility/ambulation. Pt refusing rehab at discharge, requesting to return home w/ assist of family and HHPT. Current Level of Function Impacting Discharge (mobility/balance): SBA w/ bed mobility, sit<>stand transfer w/ minAx2, ambulation w/ RW and minAx2     Other factors to consider for discharge: MS exacerbation, falls risk, caregiver burden         PLAN :  Patient continues to benefit from skilled intervention to address the above impairments. Continue treatment per established plan of care. to address goals. Recommendation for discharge: (in order for the patient to meet his/her long term goals)  Physical therapy at least 2 days/week in the home w/ 24hr assist of family - pt declining rehab    This discharge recommendation:  Has been made in collaboration with the attending provider and/or case management    IF patient discharges home will need the following DME: patient owns DME required for discharge       SUBJECTIVE:   Patient stated I am going to call [employer] to get a ramp into my house.     OBJECTIVE DATA SUMMARY:   Critical Behavior:  Neurologic State: Alert  Orientation Level: Oriented X4  Cognition: Follows commands, Appropriate safety awareness, Appropriate for age attention/concentration, Appropriate decision making  Safety/Judgement: Awareness of environment, Insight into deficits, Home safety, Fall prevention  Functional Mobility Training:  Bed Mobility:  Rolling: Stand-by assistance  Supine to Sit: Stand-by assistance     Scooting: Stand-by assistance        Transfers:  Sit to Stand: Minimum assistance;Assist x2  Stand to Sit: Contact guard assistance        Bed to Chair: Minimum assistance;Assist x2                    Balance:  Sitting: Impaired  Sitting - Static: Good (unsupported)  Sitting - Dynamic: Fair (occasional)  Standing: Impaired; With support(RW)  Standing - Static: Fair;Constant support  Standing - Dynamic : Fair;Constant support  Ambulation/Gait Training:  Distance (ft): 15 Feet (ft)  Assistive Device: Walker, rolling;Gait belt  Ambulation - Level of Assistance: Minimal assistance;Assist x1(2nd person for safety)        Gait Abnormalities: Decreased step clearance;Shuffling gait;Trunk sway increased              Speed/Heena: Slow;Shuffled  Step Length: Right shortened;Left shortened                              Pain Rating:  Denied complaints of pain    Activity Tolerance:   Good    After treatment patient left in no apparent distress:   Sitting in chair and Call bell within reach    COMMUNICATION/COLLABORATION:   The patients plan of care was discussed with: Occupational therapist and Registered nurse.      Sukh Lanza PT, DPT   Time Calculation: 17 mins

## 2020-12-18 NOTE — PROGRESS NOTES
Hospitalist Progress Note    NAME: Martha Nathan   :  1975   MRN:  [id number]       Assessment / Plan:  Multiple sclerosis exacerbation POA   Rapidly progressing/advancing MS POA  Causing Recurrent falls POA   due to Generalized weakness POA  MRI brain noted for new enhancing lesions in R frontal lobe  MRI C-Spine and T Spine- stable old lesions noted    Cont IV Solumedrol 1gm Q24 hours for now  IP Neurology consult noted- cont IV steroids x 5 days, then IP Rehab, OP follow up for different DMARD consideration  IP PT/OT eval for DC planning-- recommends IP rehab , will ask CM to start arrangements for it     Anxiety with Depression POA- mild  Fibromyalgia  Chronic pain  Continue Home meds  Cont Xanax prn anxiety  Resume Home neurontin       Code Status: Full     DVT Prophylaxis: Lovenox     Baseline: Recurrent falls  Eloisa [phone] Paulie [phone] Stefanie [phone]      Recommended Disposition: SAH/Rehab likely     Subjective:     Chief Complaint / Reason for Physician Visit: F/U MS flare, gen weakness, falls at home  \"I am ok\". Discussed with RN events overnight. Review of Systems:  Symptom Y/N Comments  Symptom Y/N Comments   Fever/Chills n   Chest Pain n    Poor Appetite n   Edema n    Cough n   Abdominal Pain n    Sputum n   Joint Pain n    SOB/BOBO n   Pruritis/Rash     Nausea/vomit n   Tolerating PT/OT  Weakness/Falls at home   Diarrhea    Tolerating Diet y    Constipation    Other       Could NOT obtain due to:      Objective:     VITALS:   Last 24hrs VS reviewed since prior progress note.  Most recent are:  Patient Vitals for the past 24 hrs:   Temp Pulse Resp BP SpO2   20 0743 97.8 °F (36.6 °C) (!) 104 18 112/84 99 %   20 0310 98.2 °F (36.8 °C) (!) 112 16 127/82 95 %   20 2321 98.3 °F (36.8 °C) (!) 114 20 110/71 94 %   20 1929 98.5 °F (36.9 °C) (!) 102 16 136/85 96 %   20 1629 98.1 °F (36.7 °C) 82 20 118/66 98 %   12/17/20 1157 98.2 °F (36.8 °C) 79 20 122/78 100 %     No intake or output data in the 24 hours ending 12/18/20 1801     I had a face to face encounter and independently examined this patient on 12/18/2020, as outlined below:  PHYSICAL EXAM:  General: WD, WN. Alert, cooperative, no acute distress    EENT:  EOMI. Anicteric sclerae. MMM  Resp:  CTA bilaterally, no wheezing or rales. No accessory muscle use  CV:  Regular  rhythm,  No edema  GI:  Soft, Non distended, Non tender. +Bowel sounds  Neurologic:  Alert and oriented X 3, dysarthric speech noted +,  B/L LE weakness +, L hand weakness +  Psych:   Good insight. tearful &  anxious +  Skin:  No rashes. No jaundice    Reviewed most current lab test results and cultures  YES  Reviewed most current radiology test results   YES  Review and summation of old records today    NO  Reviewed patient's current orders and MAR    YES  PMH/ reviewed - no change compared to H&P  ________________________________________________________________________  Care Plan discussed with:    Comments   Patient x    Family      RN x    Care Manager x    Consultant  x Dr Tammy Whiting (Neuro)                     Multidiciplinary team rounds were held today with , nursing, pharmacist and clinical coordinator. Patient's plan of care was discussed; medications were reviewed and discharge planning was addressed. ________________________________________________________________________  Total NON critical care TIME:  26   Minutes    Total CRITICAL CARE TIME Spent:   Minutes non procedure based      Comments   >50% of visit spent in counseling and coordination of care     ________________________________________________________________________  Clint Kumari MD     Procedures: see electronic medical records for all procedures/Xrays and details which were not copied into this note but were reviewed prior to creation of Plan.       LABS:  I reviewed today's most current labs and imaging studies.   Pertinent labs include:  Recent Labs     12/18/20  0308 12/17/20  0324 12/16/20  1641   WBC 11.0 3.6 3.5*   HGB 10.5* 11.2* 11.1*   HCT 31.1* 34.1* 33.3*    178 192     Recent Labs     12/18/20  0308 12/17/20  0324 12/16/20  1641    135* 139   K 3.8 4.1 4.4   * 108 110*   CO2 22 24 26   * 204* 91   BUN 18 12 11   CREA 0.77 0.95 0.82   CA 8.8 8.7 9.1   MG  --  1.7 2.2   ALB  --  2.9* 3.2*   TBILI  --  0.3 0.3   ALT  --  17 18       Signed: Linwood Núñez MD

## 2020-12-19 PROCEDURE — 65660000000 HC RM CCU STEPDOWN

## 2020-12-19 PROCEDURE — 74011250636 HC RX REV CODE- 250/636: Performed by: HOSPITALIST

## 2020-12-19 PROCEDURE — 74011250637 HC RX REV CODE- 250/637: Performed by: INTERNAL MEDICINE

## 2020-12-19 PROCEDURE — 74011250637 HC RX REV CODE- 250/637: Performed by: HOSPITALIST

## 2020-12-19 PROCEDURE — 74011250636 HC RX REV CODE- 250/636: Performed by: EMERGENCY MEDICINE

## 2020-12-19 PROCEDURE — 74011000258 HC RX REV CODE- 258: Performed by: HOSPITALIST

## 2020-12-19 RX ORDER — GABAPENTIN 300 MG/1
600 CAPSULE ORAL 3 TIMES DAILY
Status: DISCONTINUED | OUTPATIENT
Start: 2020-12-19 | End: 2020-12-22 | Stop reason: HOSPADM

## 2020-12-19 RX ADMIN — CYCLOBENZAPRINE 10 MG: 10 TABLET, FILM COATED ORAL at 21:52

## 2020-12-19 RX ADMIN — ALPRAZOLAM 0.25 MG: 0.25 TABLET ORAL at 21:52

## 2020-12-19 RX ADMIN — LORAZEPAM 1 MG: 2 INJECTION INTRAMUSCULAR; INTRAVENOUS at 21:52

## 2020-12-19 RX ADMIN — FAMOTIDINE 20 MG: 20 TABLET, FILM COATED ORAL at 17:34

## 2020-12-19 RX ADMIN — ENOXAPARIN SODIUM 40 MG: 40 INJECTION SUBCUTANEOUS at 09:05

## 2020-12-19 RX ADMIN — SODIUM CHLORIDE 1000 MG: 900 INJECTION INTRAVENOUS at 09:08

## 2020-12-19 RX ADMIN — Medication 10 ML: at 22:00

## 2020-12-19 RX ADMIN — GABAPENTIN 400 MG: 300 CAPSULE ORAL at 09:05

## 2020-12-19 RX ADMIN — GABAPENTIN 600 MG: 300 CAPSULE ORAL at 17:34

## 2020-12-19 RX ADMIN — ACETAMINOPHEN 650 MG: 325 TABLET ORAL at 09:15

## 2020-12-19 RX ADMIN — CYCLOBENZAPRINE 10 MG: 10 TABLET, FILM COATED ORAL at 09:15

## 2020-12-19 RX ADMIN — GABAPENTIN 600 MG: 300 CAPSULE ORAL at 21:45

## 2020-12-19 RX ADMIN — Medication 10 ML: at 21:45

## 2020-12-19 RX ADMIN — FAMOTIDINE 20 MG: 20 TABLET, FILM COATED ORAL at 09:05

## 2020-12-19 RX ADMIN — Medication 10 ML: at 05:56

## 2020-12-19 RX ADMIN — ALPRAZOLAM 0.25 MG: 0.25 TABLET ORAL at 09:15

## 2020-12-19 RX ADMIN — DULOXETINE HYDROCHLORIDE 60 MG: 30 CAPSULE, DELAYED RELEASE ORAL at 09:05

## 2020-12-19 NOTE — PROGRESS NOTES
SHOLA:   Inpatient rehab referrals placed 12/19/20  Listing of PCP needed as pt reports wanting a new PCP        Reason for Admission:  Pt is a 50year old female, admitted  12/16/20 for Multiple Sclerosis Exacerbation                   RUR Score:     16                Plan for utilizing home health:    Pt reports previous [facility] services but is unable to recall the name of the provider    PCP: First and Last name:   Dr. Nato Frye pt reports that she no longer sees this physician and is requesting information for another PCP                    Current Advanced Directive/Advance Care Plan: Pt reports that she has completed document during prior admission                         Transition of Care Plan:  Therapy is recommending Inpatient rehab and pt is onboard with therapies recommendation at this time. CM has sent a referral via [employer] to [facility] and to [address] [place] [address]. Pharmacy: [address] [place] [address]    DME: Wheelchair, walker, shower chair, grab bars, rollator      Pt reports that she resides in the home with her boyfriend. Prior to admission she had a friend that assisted her with ADL's within her home but the friend no longer assist. Pt reports that she performs her own ADL's as best as she can. Pt has [place] [employer] Medicaid through [facility] and could possible receive [address] if she has a MLTSS Screening packet completed ( [employer], 55,94,26) qualifies, and then selects agency or consumer directed care. CM encouraged patient to outreach her [address] [address] while within the community. CM dept at [facility] will also coordinate care with the pt's Medicaid plan as appropriate. Care Management Interventions  PCP Verified by CM: No  Physical Therapy Consult: Yes  Occupational Therapy Consult: Yes  Current Support Network: Luca Fam.  THE [facility], MSW  [address]

## 2020-12-19 NOTE — PROGRESS NOTES
Problem: Falls - Risk of  Goal: *Absence of Falls  Description: Document Isaiah Dominguez Fall Risk and appropriate interventions in the flowsheet. Outcome: Progressing Towards Goal  Note: Fall Risk Interventions:  Mobility Interventions: Bed/chair exit alarm, Communicate number of staff needed for ambulation/transfer         Medication Interventions: Evaluate medications/consider consulting pharmacy, Bed/chair exit alarm    Elimination Interventions: Call light in reach, Bed/chair exit alarm    History of Falls Interventions: Bed/chair exit alarm, Consult care management for discharge planning, Door open when patient unattended         Problem: Patient Education: Go to Patient Education Activity  Goal: Patient/Family Education  Outcome: Progressing Towards Goal     Problem: Pressure Injury - Risk of  Goal: *Prevention of pressure injury  Description: Document Samy Scale and appropriate interventions in the flowsheet.   Outcome: Progressing Towards Goal  Note: Pressure Injury Interventions:  Sensory Interventions: Assess need for specialty bed, Assess changes in LOC, Keep linens dry and wrinkle-free, Maintain/enhance activity level    Moisture Interventions: Absorbent underpads, Apply protective barrier, creams and emollients, Internal/External urinary devices, Limit adult briefs    Activity Interventions: Pressure redistribution bed/mattress(bed type)    Mobility Interventions: Float heels, HOB 30 degrees or less, Pressure redistribution bed/mattress (bed type), PT/OT evaluation    Nutrition Interventions: Document food/fluid/supplement intake    Friction and Shear Interventions: Minimize layers                Problem: Patient Education: Go to Patient Education Activity  Goal: Patient/Family Education  Outcome: Progressing Towards Goal     Problem: Patient Education: Go to Patient Education Activity  Goal: Patient/Family Education  Outcome: Progressing Towards Goal     Problem: Patient Education: Go to Patient Education Activity  Goal: Patient/Family Education  Outcome: Progressing Towards Goal

## 2020-12-19 NOTE — PROGRESS NOTES
End of Shift Note     Bedside shift change report given to Puma Canales (oncoming nurse) by Jan Villalba (offgoing nurse). Report included the following information SBAR, Kardex, MAR, Med Rec Status and Cardiac Rhythm nsr     Shift worked:  5pm - 730am   Shift summary and any significant changes:     Seen by PT/ OT         Concerns for physician to address:  NONE   Zone phone for oncoming shift:   [phone]      Patient Information  Yoseph Boss  50 y.o.  12/16/2020  2:54 PM by Martha Garcia MD. Yoseph Boss was admitted from Home     Problem List       Patient Active Problem List     Diagnosis Date Noted    Leukopenia 11/25/2020    UTI (urinary tract infection) 11/25/2020    Multiple sclerosis exacerbation ([address] 08/06/2020    Facial droop 03/23/2020    Exacerbation of multiple sclerosis ([address] 03/09/2020    Left-sided weakness 03/08/2020    Severe obesity ([address] 10/03/2018    Constipation 07/17/2015    Body aches 12/11/2014    Back pain 09/07/2012    Fibromyalgia 08/17/2012    MS (multiple sclerosis) ([address] 08/17/2012    Decreased hearing 01/31/2011    Acute pharyngitis 01/26/2011    Anxiety 08/25/2010    Blood pressure elevated 08/25/2010    Tobacco abuse 08/25/2010           Past Medical History:   Diagnosis Date    Body aches 12/11/2014    Chronic pain      Depression      Headache(784.0)      History of mammogram never before    MS (multiple sclerosis) ([address]      Neurological disorder       Multiple Sclerosis    Pap smear for cervical cancer screening 2008    Psychiatric disorder       anxiety    Psychotic disorder ([address]           Core Measures:  CVA: No No  CHF:No No  PNA:No No     Activity:  Activity Level: Bed Rest  Number times ambulated in hallways past shift: 0  Number of times OOB to chair past shift: 0     Cardiac:   Cardiac Monitoring: Yes      Cardiac Rhythm: Normal sinus rhythm     Access:   Current line(s): PIV   Central Line? No   PICC LINE?  No      Genitourinary:   Urinary status: voiding and external catheter  Urinary Catheter? No     Respiratory:   O2 Device: Room air  Chronic home O2 use?: NO  Incentive spirometer at bedside: NO     GI:  Last Bowel Movement Date: 12/15/20  Current diet:  No diet orders on file  Passing flatus: YES  Tolerating current diet: YES     Pain Management:   Patient states pain is manageable on current regimen: YES     Skin:  Samy Score: 15  Interventions: increase time out of bed and PT/OT consult    Patient Safety:  Fall Score: Total Score: 4  Interventions: bed/chair alarm, gripper socks and pt to call before getting OOB  High Fall Risk:  Yes     DVT prophylaxis:  DVT prophylaxis Med- Yes  DVT prophylaxis SCD or CONG- No      Wounds: (If Applicable)  Wounds- No  Location      Active Consults:  IP CONSULT TO NEUROLOGY     Length of Stay:  Expected LOS: 3d 14h  Actual LOS: 2  Discharge Plan: Yumiko Mccormick

## 2020-12-19 NOTE — PROGRESS NOTES
Hospitalist Progress Note    NAME: Briseida Luz   :  1975   MRN:  [id number]       Assessment / Plan:  Multiple sclerosis exacerbation POA   Rapidly progressing/advancing MS POA  Causing Recurrent falls POA   due to Generalized weakness POA  MRI brain noted for new enhancing lesions in R frontal lobe  MRI C-Spine and T Spine- stable old lesions noted    Cont IV Solumedrol 1gm Q24 hours for now  IP Neurology consult noted- cont IV steroids x 5 days, then likely ? IP Rehab , OP follow up for different DMARD consideration  IP PT/OT eval for DC planning-- recommends IP rehab versus HH with 24 hr supervision (if pt continues to refuse Rehab placement)  IP CM working on it     Anxiety with Depression POA- mild  Fibromyalgia  Chronic pain  Continue Home meds  Cont Xanax prn anxiety  Resume Home neurontin       Code Status: Full     DVT Prophylaxis: Lovenox     Baseline: Recurrent falls  Eloisa [phone] Paulie [phone] Stefanie [phone]      Recommended Disposition: SAH/Rehab likely     Subjective:     Chief Complaint / Reason for Physician Visit: F/U MS flare, gen weakness, falls at home  \"I am ok\". Discussed with RN events overnight. Review of Systems:  Symptom Y/N Comments  Symptom Y/N Comments   Fever/Chills n   Chest Pain n    Poor Appetite n   Edema n    Cough n   Abdominal Pain n    Sputum n   Joint Pain n    SOB/BOBO n   Pruritis/Rash     Nausea/vomit n   Tolerating PT/OT  Weakness/Falls at home   Diarrhea    Tolerating Diet y    Constipation    Other       Could NOT obtain due to:      Objective:     VITALS:   Last 24hrs VS reviewed since prior progress note.  Most recent are:  Patient Vitals for the past 24 hrs:   Temp Pulse Resp BP SpO2   20 0756 97.9 °F (36.6 °C) 77 18 132/88 98 %   20 0316 98 °F (36.7 °C) 66 18 118/88 98 %   20 2336 98.2 °F (36.8 °C) 94 18 136/82 96 %   20 1951 98 °F (36.7 °C) 95 18 132/86 97 % 12/18/20 1608 97.4 °F (36.3 °C) 98 18 (!) 142/89 100 %   12/18/20 1107 97.7 °F (36.5 °C) (!) 101 18 139/84 95 %       Intake/Output Summary (Last 24 hours) at 12/19/2020 0920  Last data filed at 12/18/2020 2337  Gross per 24 hour   Intake    Output 800 ml   Net -800 ml        I had a face to face encounter and independently examined this patient on 12/19/2020, as outlined below:  PHYSICAL EXAM:  General: WD, WN. Alert, cooperative, no acute distress    EENT:  EOMI. Anicteric sclerae. MMM  Resp:  CTA bilaterally, no wheezing or rales. No accessory muscle use  CV:  Regular  rhythm,  No edema  GI:  Soft, Non distended, Non tender. +Bowel sounds  Neurologic:  Alert and oriented X 3, dysarthric speech noted +,  B/L LE weakness +, L hand weakness +  Psych:   Good insight. tearful &  anxious +  Skin:  No rashes. No jaundice    Reviewed most current lab test results and cultures  YES  Reviewed most current radiology test results   YES  Review and summation of old records today    NO  Reviewed patient's current orders and MAR    YES  PMH/SH reviewed - no change compared to H&P  ________________________________________________________________________  Care Plan discussed with:    Comments   Patient x    Family      RN x    Care Manager x Vikas yesterday   Consultant                        Multidiciplinary team rounds were held today with , nursing, pharmacist and clinical coordinator. Patient's plan of care was discussed; medications were reviewed and discharge planning was addressed.      ________________________________________________________________________  Total NON critical care TIME:  26   Minutes    Total CRITICAL CARE TIME Spent:   Minutes non procedure based      Comments   >50% of visit spent in counseling and coordination of care     ________________________________________________________________________  Roc Handing, MD     Procedures: see electronic medical records for all procedures/Xrays and details which were not copied into this note but were reviewed prior to creation of Plan. LABS:  I reviewed today's most current labs and imaging studies.   Pertinent labs include:  Recent Labs     12/18/20 0308 12/17/20 0324 12/16/20  1641   WBC 11.0 3.6 3.5*   HGB 10.5* 11.2* 11.1*   HCT 31.1* 34.1* 33.3*    178 192     Recent Labs     12/18/20 0308 12/17/20 0324 12/16/20  1641    135* 139   K 3.8 4.1 4.4   * 108 110*   CO2 22 24 26   * 204* 91   BUN 18 12 11   CREA 0.77 0.95 0.82   CA 8.8 8.7 9.1   MG  --  1.7 2.2   ALB  --  2.9* 3.2*   TBILI  --  0.3 0.3   ALT  --  17 18       Signed: Cuco Olvera MD

## 2020-12-20 PROCEDURE — 74011250637 HC RX REV CODE- 250/637: Performed by: HOSPITALIST

## 2020-12-20 PROCEDURE — 74011000258 HC RX REV CODE- 258: Performed by: HOSPITALIST

## 2020-12-20 PROCEDURE — 65660000000 HC RM CCU STEPDOWN

## 2020-12-20 PROCEDURE — 74011250637 HC RX REV CODE- 250/637: Performed by: INTERNAL MEDICINE

## 2020-12-20 PROCEDURE — 74011250636 HC RX REV CODE- 250/636: Performed by: HOSPITALIST

## 2020-12-20 PROCEDURE — 2709999900 HC NON-CHARGEABLE SUPPLY

## 2020-12-20 PROCEDURE — 77030038269 HC DRN EXT URIN PURWCK BARD -A

## 2020-12-20 PROCEDURE — 74011250636 HC RX REV CODE- 250/636: Performed by: EMERGENCY MEDICINE

## 2020-12-20 RX ADMIN — LORAZEPAM 1 MG: 2 INJECTION INTRAMUSCULAR; INTRAVENOUS at 20:32

## 2020-12-20 RX ADMIN — DULOXETINE HYDROCHLORIDE 60 MG: 30 CAPSULE, DELAYED RELEASE ORAL at 09:30

## 2020-12-20 RX ADMIN — GABAPENTIN 600 MG: 300 CAPSULE ORAL at 17:35

## 2020-12-20 RX ADMIN — Medication 10 ML: at 17:36

## 2020-12-20 RX ADMIN — GABAPENTIN 600 MG: 300 CAPSULE ORAL at 21:28

## 2020-12-20 RX ADMIN — Medication 10 ML: at 21:28

## 2020-12-20 RX ADMIN — LORAZEPAM 1 MG: 2 INJECTION INTRAMUSCULAR; INTRAVENOUS at 09:30

## 2020-12-20 RX ADMIN — ENOXAPARIN SODIUM 40 MG: 40 INJECTION SUBCUTANEOUS at 09:30

## 2020-12-20 RX ADMIN — CYCLOBENZAPRINE 10 MG: 10 TABLET, FILM COATED ORAL at 20:33

## 2020-12-20 RX ADMIN — Medication 10 ML: at 05:02

## 2020-12-20 RX ADMIN — FAMOTIDINE 20 MG: 20 TABLET, FILM COATED ORAL at 09:30

## 2020-12-20 RX ADMIN — CYCLOBENZAPRINE 10 MG: 10 TABLET, FILM COATED ORAL at 17:34

## 2020-12-20 RX ADMIN — SODIUM CHLORIDE 1000 MG: 900 INJECTION INTRAVENOUS at 09:34

## 2020-12-20 RX ADMIN — ALPRAZOLAM 0.25 MG: 0.25 TABLET ORAL at 17:34

## 2020-12-20 RX ADMIN — ACETAMINOPHEN 650 MG: 325 TABLET ORAL at 09:30

## 2020-12-20 RX ADMIN — FAMOTIDINE 20 MG: 20 TABLET, FILM COATED ORAL at 17:35

## 2020-12-20 RX ADMIN — ALPRAZOLAM 0.25 MG: 0.25 TABLET ORAL at 09:30

## 2020-12-20 RX ADMIN — GABAPENTIN 600 MG: 300 CAPSULE ORAL at 09:30

## 2020-12-20 NOTE — PROGRESS NOTES
End of Shift Note     Bedside shift change report given to [facility] (oncoming nurse) by Bere Rodriguez (offgoing nurse). Report included the following information SBAR, Kardex, MAR, Med Rec Status and Cardiac Rhythm nsr     Shift worked:  7am - 1130pm   Shift summary and any significant changes:     Gabapentin increased to help with pain         Concerns for physician to address:  NONE   Zone phone for oncoming shift:   [phone]      Patient Information  Kiesha Romeo  50 y.o.  12/16/2020  2:54 PM by Art Jc MD. Kiesha Romeo was admitted from Home     Problem List       Patient Active Problem List     Diagnosis Date Noted    Leukopenia 11/25/2020    UTI (urinary tract infection) 11/25/2020    Multiple sclerosis exacerbation ([address] 08/06/2020    Facial droop 03/23/2020    Exacerbation of multiple sclerosis ([address] 03/09/2020    Left-sided weakness 03/08/2020    Severe obesity ([address] 10/03/2018    Constipation 07/17/2015    Body aches 12/11/2014    Back pain 09/07/2012    Fibromyalgia 08/17/2012    MS (multiple sclerosis) ([address] 08/17/2012    Decreased hearing 01/31/2011    Acute pharyngitis 01/26/2011    Anxiety 08/25/2010    Blood pressure elevated 08/25/2010    Tobacco abuse 08/25/2010           Past Medical History:   Diagnosis Date    Body aches 12/11/2014    Chronic pain      Depression      Headache(784.0)      History of mammogram never before    MS (multiple sclerosis) ([address]      Neurological disorder       Multiple Sclerosis    Pap smear for cervical cancer screening 2008    Psychiatric disorder       anxiety    Psychotic disorder ([address]           Core Measures:  CVA: No No  CHF:No No  PNA:No No     Activity:  Activity Level: Bed Rest  Number times ambulated in hallways past shift: 0  Number of times OOB to chair past shift: 0     Cardiac:   Cardiac Monitoring: Yes      Cardiac Rhythm: Normal sinus rhythm     Access:   Current line(s): PIV   Central Line? No   PICC LINE?  No    Genitourinary:   Urinary status: voiding and external catheter  Urinary Catheter? No     Respiratory:   O2 Device: Room air  Chronic home O2 use?: NO  Incentive spirometer at bedside: NO     GI:  Last Bowel Movement Date: 12/15/20  Current diet:  No diet orders on file  Passing flatus: YES  Tolerating current diet: YES     Pain Management:   Patient states pain is manageable on current regimen: YES     Skin:  Samy Score: 15  Interventions: increase time out of bed and PT/OT consult    Patient Safety:  Fall Score: Total Score: 4  Interventions: bed/chair alarm, gripper socks and pt to call before getting OOB  High Fall Risk:  Yes     DVT prophylaxis:  DVT prophylaxis Med- Yes  DVT prophylaxis SCD or CONG- No      Wounds: (If Applicable)  Wounds- No  Location      Active Consults:  IP CONSULT TO NEUROLOGY     Length of Stay:  Expected LOS: 3d 14h  Actual LOS: 2  Discharge Plan: Yumiko Priest

## 2020-12-20 NOTE — PROGRESS NOTES
Problem: Falls - Risk of  Goal: *Absence of Falls  Description: Document Ivette Jay Fall Risk and appropriate interventions in the flowsheet. Outcome: Progressing Towards Goal  Note: Fall Risk Interventions:  Mobility Interventions: Bed/chair exit alarm, Communicate number of staff needed for ambulation/transfer, Patient to call before getting OOB, PT Consult for mobility concerns         Medication Interventions: Bed/chair exit alarm, Assess postural VS orthostatic hypotension, Evaluate medications/consider consulting pharmacy, Patient to call before getting OOB, Teach patient to arise slowly    Elimination Interventions: Bed/chair exit alarm, Call light in reach, Patient to call for help with toileting needs    History of Falls Interventions: Bed/chair exit alarm, Room close to nurse's station, Door open when patient unattended         Problem: Patient Education: Go to Patient Education Activity  Goal: Patient/Family Education  Outcome: Progressing Towards Goal     Problem: Pressure Injury - Risk of  Goal: *Prevention of pressure injury  Description: Document Samy Scale and appropriate interventions in the flowsheet. Outcome: Progressing Towards Goal  Note: Pressure Injury Interventions:  Sensory Interventions: Assess changes in LOC, Assess need for specialty bed, Avoid rigorous massage over bony prominences, Float heels, Keep linens dry and wrinkle-free, Minimize linen layers, Monitor skin under medical devices    Moisture Interventions: Absorbent underpads, Apply protective barrier, creams and emollients, Internal/External urinary devices, Limit adult briefs, Maintain skin hydration (lotion/cream), Minimize layers, Moisture barrier    Activity Interventions: PT/OT evaluation, Assess need for specialty bed    Mobility Interventions: Float heels, Pressure redistribution bed/mattress (bed type), PT/OT evaluation, Turn and reposition approx.  every two hours(pillow and wedges)    Nutrition Interventions: Document food/fluid/supplement intake, Offer support with meals,snacks and hydration    Friction and Shear Interventions: Minimize layers                Problem: Patient Education: Go to Patient Education Activity  Goal: Patient/Family Education  Outcome: Progressing Towards Goal     Problem: Patient Education: Go to Patient Education Activity  Goal: Patient/Family Education  Outcome: Progressing Towards Goal     Problem: Patient Education: Go to Patient Education Activity  Goal: Patient/Family Education  Outcome: Progressing Towards Goal

## 2020-12-20 NOTE — PROGRESS NOTES
Hospitalist Progress Note    NAME: Tammie Mathews   :  1975   MRN:  [id number]       Assessment / Plan:  Multiple sclerosis exacerbation POA   Rapidly progressing/advancing MS POA  Causing Recurrent falls POA   due to Generalized weakness POA  MRI brain noted for new enhancing lesions in R frontal lobe  MRI C-Spine and T Spine- stable old lesions noted    Cont IV Solumedrol 1gm Q24 hours for now  IP Neurology consult noted- cont IV steroids x 5 days, then likely ? IP Rehab , OP follow up for different DMARD consideration  IP PT/OT eval for DC planning-- recommends IP rehab versus HH with 24 hr supervision (if pt continues to refuse Rehab placement)  IP CM working on it     Anxiety with Depression POA- mild  Fibromyalgia  Chronic pain  Continue Home meds  Cont Xanax prn anxiety  Resume Home neurontin- increased dose to 600mg , hold/adjust dose down as needed uf pt too drowsy       Code Status: Full     DVT Prophylaxis: Lovenox     Baseline: Recurrent falls  Eloisa [phone] Paulie [phone] Stefanie [phone]      Recommended Disposition: SAH/Rehab likely if pt agreeable     Subjective:     Chief Complaint / Reason for Physician Visit: F/U MS flare, gen weakness, falls at home  \"I am ok\". Discussed with RN events overnight. Review of Systems:  Symptom Y/N Comments  Symptom Y/N Comments   Fever/Chills n   Chest Pain n    Poor Appetite n   Edema n    Cough n   Abdominal Pain n    Sputum n   Joint Pain n    SOB/BOBO n   Pruritis/Rash     Nausea/vomit n   Tolerating PT/OT  Weakness/Falls at home   Diarrhea    Tolerating Diet y    Constipation    Other       Could NOT obtain due to:      Objective:     VITALS:   Last 24hrs VS reviewed since prior progress note.  Most recent are:  Patient Vitals for the past 24 hrs:   Temp Pulse Resp BP SpO2   20 0711 97.7 °F (36.5 °C) (!) 56 16 (!) 140/87 100 %   20 0317 98 °F (36.7 °C) (!) 58 18 130/82 95 % 12/19/20 2347 98.2 °F (36.8 °C) 60 18 136/84 96 %   12/19/20 1936 98 °F (36.7 °C) 94 18 131/85 98 %   12/19/20 1609 98 °F (36.7 °C) 92 18 136/84 97 %   12/19/20 1134 97.8 °F (36.6 °C) 79 18 132/83 99 %     No intake or output data in the 24 hours ending 12/20/20 8228     I had a face to face encounter and independently examined this patient on 12/20/2020, as outlined below:  PHYSICAL EXAM:  General: WD, WN. Alert, cooperative, no acute distress    EENT:  EOMI. Anicteric sclerae. MMM  Resp:  CTA bilaterally, no wheezing or rales. No accessory muscle use  CV:  Regular  rhythm,  No edema  GI:  Soft, Non distended, Non tender. +Bowel sounds  Neurologic:  Alert and oriented X 3, dysarthric speech noted +,  B/L LE weakness +, L hand weakness +  Psych:   Good insight., less anxious +  Skin:  No rashes. No jaundice    Reviewed most current lab test results and cultures  YES  Reviewed most current radiology test results   YES  Review and summation of old records today    NO  Reviewed patient's current orders and MAR    YES  PMH/SH reviewed - no change compared to H&P  ________________________________________________________________________  Care Plan discussed with:    Comments   Patient x    Family      RN x    Care Manager     Consultant                        Multidiciplinary team rounds were held today with , nursing, pharmacist and clinical coordinator. Patient's plan of care was discussed; medications were reviewed and discharge planning was addressed.      ________________________________________________________________________  Total NON critical care TIME:  26   Minutes    Total CRITICAL CARE TIME Spent:   Minutes non procedure based      Comments   >50% of visit spent in counseling and coordination of care     ________________________________________________________________________  Jamie Bey MD     Procedures: see electronic medical records for all procedures/Xrays and details which were not copied into this note but were reviewed prior to creation of Plan. LABS:  I reviewed today's most current labs and imaging studies.   Pertinent labs include:  Recent Labs     12/18/20  0308   WBC 11.0   HGB 10.5*   HCT 31.1*        Recent Labs     12/18/20  0308      K 3.8   *   CO2 22   *   BUN 18   CREA 0.77   CA 8.8       Signed: Lilia Ruiz MD

## 2020-12-20 NOTE — PROGRESS NOTES
End of Shift Note     Bedside shift change report given to Lelia Huerta (oncoming nurse) by Alfonzo Gordillo (offgoing nurse). Report included the following information SBAR, Kardex, MAR, Med Rec Status and Cardiac Rhythm nsr     Shift worked:  7am - 7pm   Shift summary and any significant changes:     Gabapentin increased to help with pain         Concerns for physician to address:  NONE   Zone phone for oncoming shift:   [phone]      Patient Information  Naima Hendrix  50 y.o.  12/16/2020  2:54 PM by Aron Thomson MD. Naima Hendrix was admitted from Home     Problem List       Patient Active Problem List     Diagnosis Date Noted    Leukopenia 11/25/2020    UTI (urinary tract infection) 11/25/2020    Multiple sclerosis exacerbation ([address] 08/06/2020    Facial droop 03/23/2020    Exacerbation of multiple sclerosis ([address] 03/09/2020    Left-sided weakness 03/08/2020    Severe obesity ([address] 10/03/2018    Constipation 07/17/2015    Body aches 12/11/2014    Back pain 09/07/2012    Fibromyalgia 08/17/2012    MS (multiple sclerosis) ([address] 08/17/2012    Decreased hearing 01/31/2011    Acute pharyngitis 01/26/2011    Anxiety 08/25/2010    Blood pressure elevated 08/25/2010    Tobacco abuse 08/25/2010           Past Medical History:   Diagnosis Date    Body aches 12/11/2014    Chronic pain      Depression      Headache(784.0)      History of mammogram never before    MS (multiple sclerosis) ([address]      Neurological disorder       Multiple Sclerosis    Pap smear for cervical cancer screening 2008    Psychiatric disorder       anxiety    Psychotic disorder ([address]           Core Measures:  CVA: No No  CHF:No No  PNA:No No     Activity:  Activity Level: Bed Rest  Number times ambulated in hallways past shift: 0  Number of times OOB to chair past shift: 0     Cardiac:   Cardiac Monitoring: Yes      Cardiac Rhythm: Normal sinus rhythm     Access:   Current line(s): PIV   Central Line? No   PICC LINE?  No    Genitourinary:   Urinary status: voiding and external catheter  Urinary Catheter? No     Respiratory:   O2 Device: Room air  Chronic home O2 use?: NO  Incentive spirometer at bedside: NO     GI:  Last Bowel Movement Date: 12/15/20  Current diet:  No diet orders on file  Passing flatus: YES  Tolerating current diet: YES     Pain Management:   Patient states pain is manageable on current regimen: YES     Skin:  Samy Score: 15  Interventions: increase time out of bed and PT/OT consult    Patient Safety:  Fall Score: Total Score: 4  Interventions: bed/chair alarm, gripper socks and pt to call before getting OOB  High Fall Risk:  Yes     DVT prophylaxis:  DVT prophylaxis Med- Yes  DVT prophylaxis SCD or CONG- No      Wounds: (If Applicable)  Wounds- No  Location      Active Consults:  IP CONSULT TO NEUROLOGY     Length of Stay:  Expected LOS: 3d 14h  Actual LOS: 2  Discharge Plan: No      Wisam Whitten

## 2020-12-20 NOTE — PROGRESS NOTES
End of Shift Note     Bedside shift change report given to [facility] (oncoming nurse) by Ernie (offgoing nurse).  Report included the following information SBAR, Kardex, MAR, Med Rec Status and Cardiac Rhythm nsr     Shift worked:  2300 - 1930   Shift summary and any significant changes:     Gabapentin increased to help with pain         Concerns for physician to address: Leo Sensing phone for oncoming shift:   [phone]      Patient Information  Tammie Mathews  50 y.o.  12/16/2020  2:54 PM by Mariah Patel, [address] [place] [address] admitted from Home     Problem List          Patient Active Problem List     Diagnosis Date Noted    Leukopenia 11/25/2020    UTI (urinary tract infection) 11/25/2020    Multiple sclerosis exacerbation ([address] 08/06/2020    Facial droop 03/23/2020    Exacerbation of multiple sclerosis ([address] 03/09/2020    Left-sided weakness 03/08/2020    Severe obesity ([address] 10/03/2018    Constipation 07/17/2015    Body aches 12/11/2014    Back pain 09/07/2012    Fibromyalgia 08/17/2012    MS (multiple sclerosis) ([address] 08/17/2012    Decreased hearing 01/31/2011    Acute pharyngitis 01/26/2011    Anxiety 08/25/2010    Blood pressure elevated 08/25/2010    Tobacco abuse 08/25/2010              Past Medical History:   Diagnosis Date    Body aches 12/11/2014    Chronic pain      Depression      Headache(784.0)      History of mammogram never before    MS (multiple sclerosis) ([address]      Neurological disorder       Multiple Sclerosis    Pap smear for cervical cancer screening 2008    Psychiatric disorder       anxiety    Psychotic disorder ([facility])           Core Measures:  CVA: No No  CHF:No No  PNA:No No     Activity:  Activity Level: Bed Rest  Number times ambulated in hallways past shift: 0  Number of times OOB to chair past shift: 0     Cardiac:   Cardiac Monitoring: Yes      Cardiac Rhythm: Normal sinus rhythm     Access:   Current line(s): PIV   Central Line? No   PICC LINE?  No    Genitourinary:   Urinary status: voiding and external catheter  Urinary Catheter? No     Respiratory:   O2 Device: Room air  Chronic home O2 use?: NO  Incentive spirometer at bedside: NO     GI:  Last Bowel Movement Date: 12/15/20  Current diet:  No diet orders on file  Passing flatus: YES  Tolerating current diet: YES     Pain Management:   Patient states pain is manageable on current regimen: YES     Skin:  Samy Score: 15  Interventions: increase time out of bed and PT/OT consult    Patient Safety:  Fall Score: Total Score: 4  Interventions: bed/chair alarm, gripper socks and pt to call before getting OOB  High Fall Risk:  Yes     DVT prophylaxis:  DVT prophylaxis Med- Yes  DVT prophylaxis SCD or CONG- No      Wounds: (If Applicable)  Wounds- No  Location      Active Consults:  IP CONSULT TO NEUROLOGY     Length of Stay:  Expected LOS: 3d 14h  Actual LOS: 2  Discharge Plan: Radha Urena

## 2020-12-21 PROCEDURE — 65660000000 HC RM CCU STEPDOWN

## 2020-12-21 PROCEDURE — 87635 SARS-COV-2 COVID-19 AMP PRB: CPT

## 2020-12-21 PROCEDURE — 97116 GAIT TRAINING THERAPY: CPT

## 2020-12-21 PROCEDURE — 74011250637 HC RX REV CODE- 250/637: Performed by: HOSPITALIST

## 2020-12-21 PROCEDURE — 74011250636 HC RX REV CODE- 250/636: Performed by: INTERNAL MEDICINE

## 2020-12-21 PROCEDURE — 74011250637 HC RX REV CODE- 250/637: Performed by: INTERNAL MEDICINE

## 2020-12-21 PROCEDURE — 74011000258 HC RX REV CODE- 258: Performed by: INTERNAL MEDICINE

## 2020-12-21 PROCEDURE — 97530 THERAPEUTIC ACTIVITIES: CPT

## 2020-12-21 PROCEDURE — 97535 SELF CARE MNGMENT TRAINING: CPT

## 2020-12-21 PROCEDURE — 74011250636 HC RX REV CODE- 250/636: Performed by: HOSPITALIST

## 2020-12-21 RX ORDER — GABAPENTIN 300 MG/1
600 CAPSULE ORAL 3 TIMES DAILY
Qty: 180 CAP | Refills: 0 | Status: SHIPPED
Start: 2020-12-21 | End: 2021-02-15 | Stop reason: DRUGHIGH

## 2020-12-21 RX ADMIN — GABAPENTIN 600 MG: 300 CAPSULE ORAL at 08:48

## 2020-12-21 RX ADMIN — Medication 5 ML: at 14:00

## 2020-12-21 RX ADMIN — GABAPENTIN 600 MG: 300 CAPSULE ORAL at 16:41

## 2020-12-21 RX ADMIN — FAMOTIDINE 20 MG: 20 TABLET, FILM COATED ORAL at 18:02

## 2020-12-21 RX ADMIN — FAMOTIDINE 20 MG: 20 TABLET, FILM COATED ORAL at 08:49

## 2020-12-21 RX ADMIN — ACETAMINOPHEN 650 MG: 325 TABLET ORAL at 08:48

## 2020-12-21 RX ADMIN — SODIUM CHLORIDE 1000 MG: 900 INJECTION INTRAVENOUS at 08:49

## 2020-12-21 RX ADMIN — Medication 10 ML: at 05:28

## 2020-12-21 RX ADMIN — CYCLOBENZAPRINE 10 MG: 10 TABLET, FILM COATED ORAL at 08:49

## 2020-12-21 RX ADMIN — DULOXETINE HYDROCHLORIDE 60 MG: 30 CAPSULE, DELAYED RELEASE ORAL at 08:48

## 2020-12-21 RX ADMIN — Medication 10 ML: at 21:04

## 2020-12-21 RX ADMIN — ACETAMINOPHEN 650 MG: 325 TABLET ORAL at 16:41

## 2020-12-21 RX ADMIN — ENOXAPARIN SODIUM 40 MG: 40 INJECTION SUBCUTANEOUS at 08:49

## 2020-12-21 RX ADMIN — ALPRAZOLAM 0.25 MG: 0.25 TABLET ORAL at 21:04

## 2020-12-21 RX ADMIN — GABAPENTIN 600 MG: 300 CAPSULE ORAL at 21:04

## 2020-12-21 NOTE — PROGRESS NOTES
[facility] Plan:    * in-pt rehab ([facility]) pending [employer] auth    > [facility] to initiate insurance auth today (12/21/2020) after updated PT/OT notes become available  > Pt will need COVID-19 test completed per placement protocol; last tested completed 12/1/2020  > CM to secure medical transport for d/c; EMTALA to be completed  > 2nd IM prior to d/c    9:35 AM: CM consulted with PT/OT & requested updates therapy notes for pt's insurance auth; PT/OT to complete. CM paged attending MD to relay updates regarding [facility] plan and requested COVID-19 test; CM waiting for call back at this point in time. CM will enter a delay in d/c to reflect the current barriers preventing pt from transitioning out of [facility]. Initial note: CM acknowledged d/c. CM reviewed pt's chart prior to moving forward with d/c planning. CM contacted [facility] liaison Abi Mt: [phone]) to check on the status of the referral sent 12/19/2020. Jagdish Holland reported that the [facility] has accepted pt for in-pt rehab pending the insurance auth. Leonardo reported [employer] can't be initiated over the weekend. Jagdihs Holland requested updated PT/OT notes prior to initiating insurance auth this morning; CM to relay request.    Per chart review, pt's last COVID-19 test was completed 12/1/2020; results were negative. Pt will need an updated COVID-19 test completed per placement protocol; CM to relay request to attending MD & RN. CM will meet with pt at bedside to discuss SNF placement as an alternative plan for d/c in the event [employer] declines in-pt rehab intervention. CM will continue to follow & report updates as they become available.     Akash Cotto, MSW  Care Manager, [address] [facility]

## 2020-12-21 NOTE — PROGRESS NOTES
Problem: Self Care Deficits Care Plan (Adult)  Goal: *Acute Goals and Plan of Care (Insert Text)  Description: FUNCTIONAL STATUS PRIOR TO ADMISSION: Patient reports she only received three days of rehab after last admission and has experienced increased weakness and falls since then. Patient reports she felt unsafe ambulating in the past few days and was using w/c. HOME SUPPORT: Patient lives with her boyfriend who provides assistance with ADLs. Occupational Therapy Goals  Initiated 12/17/2020  1. Patient will perform grooming seated edge of bed with contact guard assist within 7 day(s). 2.  Patient will perform upper body dressing with supervision/SBA within 7 day(s). 3.  Patient will perform toilet transfers with moderate assistance within 7 day(s). 4.  Patient will tolerate ADL/functional task seated edge of bed for 15 minutes with VSS and set-up within 7 day(s). 5.  Patient will participate in upper extremity therapeutic exercise/activities with Min verbal and visual cues for 10 minutes within 7 day(s). Outcome: Progressing Towards Goal   OCCUPATIONAL THERAPY TREATMENT  Patient: Iam Turner (16 y.o. female)  Date: 12/21/2020  Diagnosis: Multiple sclerosis exacerbation ([facility] Gillie Olszewski <principal problem not specified>       Precautions: Fall  Chart, occupational therapy assessment, plan of care, and goals were reviewed. ASSESSMENT  Patient continues with skilled OT services and is progressing towards goals. Pt demonstrates improving endurance with completing seated/standing aspects of her ADL routine this date, now mobilizing to the [facility] and bathroom for toileting needs with Min Assist x2 using RW with brief rest break on commode. She donned LB clothing items with only Min A in long sitting in bed prior to mobilizing, requiring some assist to bring items over b/l feet only prior to bridging hips to don over buttock partway.  She continues to be most limited d/t decreased static/dynamic standing balance and baseline decreased b/l hand strength/dexterity, yet reports feeling much weaker than baseline. Pt is an excellent candidate for intensive rehab up to 3 hours daily and is agreeable she would benefit from this level of rehab prior to discharging home. Pt is a high fall risk and would be unsafe to return home with only her boyfriends assist at D/C. Current Level of Function Impacting Discharge (ADLs): Setup to Max A with ADLs, Min Assist x2 with BSC/chair transfers     Other factors to consider for discharge: Fall risk         PLAN :  Patient continues to benefit from skilled intervention to address the above impairments. Continue treatment per established plan of care. to address goals. Recommend with staff: OOB to chair all meals, Encourage BSC for toileting needs, bathroom if able. Encourage Pt to assist with self-care task performance. Recommend next OT session: Continue POC    Recommendation for discharge: (in order for the patient to meet his/her long term goals)  Therapy 3 hours per day 5-7 days per week    This discharge recommendation:  Has been made in collaboration with the attending provider and/or case management    IF patient discharges home will need the following DME: walker: rolling. Gait belt was issued in prior session. SUBJECTIVE:   Patient stated I know I need rehab. \"    OBJECTIVE DATA SUMMARY:     Functional Mobility and Transfers for ADLs:  Bed Mobility:  Rolling: Stand-by assistance  Supine to Sit: Stand-by assistance  Scooting: Stand-by assistance    Transfers:  Sit to Stand: Minimum assistance;Assist x2     Bed to Chair: Minimum assistance;Assist x2    Balance:  Sitting: Impaired  Sitting - Static: Good (unsupported)  Sitting - Dynamic: Fair (occasional)  Standing: Impaired; With support(RW)  Standing - Static: Fair;Constant support  Standing - Dynamic : Fair;Constant support    ADL Intervention:     Provided multimodal cues to maximize Pts safety and independence with performing supine/seated/standing aspects of LB dressing prior to and during toileting this date. Pt requires ongoing cues to keep at least one hand on RW to maximize standing balance d/t posterior lean and difficulty maintaining balance if both hands are removed from RW frame. Educated Pt re: importance of mobilizing to [facility] for toileting needs to maximize her endurance vs using [place] and protective undergarment only. Grooming  Position Performed: Seated in chair  Washing Hands: Minimum assistance(hand wipes)    Lower Body Dressing Assistance  Protective Undergarmet: Maximum assistance(dons over buttock w/ x1 hand at a time d/t balance deficits)  Position Performed: Seated in chair;Standing  Cues: Don;Doff;Physical assistance; Tactile cues provided;Verbal cues provided(verbal cues to keep x1 hand on RW in standing for safety)    Toileting  Bladder Hygiene: Minimum assistance  Clothing Management: Maximum assistance  Cues: Physical assistance for pants up;Physical assistance for pants down;Verbal cues provided      Pain:  Pt denied c/o pain    Activity Tolerance:   Fair    After treatment patient left in no apparent distress:   Sitting in chair and Call bell within reach    COMMUNICATION/COLLABORATION:   The patients plan of care was discussed with: Physical therapist, Registered nurse, and Patient .      Nga Alejo OTR/L  Time Calculation: 42 mins

## 2020-12-21 NOTE — DISCHARGE INSTRUCTIONS
HOSPITALIST DISCHARGE INSTRUCTIONS    NAME: Tani Hunt   :  1975   MRN:  [id number]     Date/Time:  2020 8:39 AM    ADMIT DATE: 2020     DISCHARGE DATE: 2020     DISCHARGE DIAGNOSIS:  Multiple sclerosis exacerbation POA - s/p 5 days of Solumedrol 1g daily per recommendations, rehab now  Rapidly progressing/advancing MS POA - Outpatient follow up with Dr Hatch/neurology to start different/new DMAgent  Causing Recurrent falls POA   due to Generalized weakness POA- IP rehab as recommended  Anxiety with Depression POA- mild  Fibromyalgia - neurontin increased to 600 mg TID  Chronic pain  Full    Active Problems:    Multiple sclerosis exacerbation ([facility] (2020)         MEDICATIONS:  As per medication reconciliation  list  · It is important that you take the medication exactly as they are prescribed. · Keep your medication in the bottles provided by the pharmacist and keep a list of the medication names, dosages, and times to be taken in your wallet. · Do not take other medications without consulting your doctor. Pain Management: per above medications    What to do at Home    Recommended diet:  Cardiac Diet    Recommended activity: Activity as tolerated    If you have questions regarding the hospital related prescriptions or hospital related issues please call [facility] debbie Wise at [phone]. If you experience any of the following symptoms then please call your primary care physician or return to the emergency room if you cannot get hold of your doctor:  Fever, chills, nausea, vomiting, diarrhea, change in mentation, falling, bleeding, shortness of breath,     Follow Up:  PCP you are to call and set up an appointment to see them in 7-10 days. Dr Jenn Brooks (Neurologist) in 2 weeks for Further management plans of your MS disease      Information obtained by :  I understand that if any problems occur once I am at home I am to contact my physician.     I understand and acknowledge receipt of the instructions indicated above.                                                                                                                                            Physician's or R.N.'s Signature                                                                  Date/Time                                                                                                                                              Patient or Representative Signature                                                          Date/Time

## 2020-12-21 NOTE — DISCHARGE SUMMARY
Hospitalist Discharge Summary     Patient ID:  Yoseph Boss  [id number]  50 y.o.  1975 12/16/2020    PCP on record: Rebel Mccormack MD    Admit date: 12/16/2020  Discharge date and time: 12/21/2020    DISCHARGE DIAGNOSIS:    Multiple sclerosis exacerbation POA - s/p 5 days of Solumedrol 1g daily per recommendations, rehab now  Rapidly progressing/advancing MS POA - Outpatient follow up with Dr Hatch/neurology to start different/new DMAgent  Causing Recurrent falls POA   due to Generalized weakness POA- IP rehab as recommended  Anxiety with Depression POA- mild  Fibromyalgia - neurontin increased to 600 mg TID  Chronic pain  Full      CONSULTATIONS:  IP CONSULT TO NEUROLOGY    Excerpted HPI from H&P of Martha Garcia MD:  \"83 y.o. female presents to the ED with cc of extremity pain and weakness.  Patient has a history of multiple sclerosis and was admitted on November 25 for bilateral lower extremity weakness.  At that time, she showed active new demyelinating lesions on her brain MRI.  According to the ED note, her hip strength was 4 out of 5 in her lower extremities bilaterally at that time. Jozef Oscar states that she is not ambulated since that admission.  She now also has pain associated with her symptoms over the last week and is also complaining of bilateral arm weakness and pain.  The pains are an 8 out of 10 in severity.  She denies any new numbness or tingling.  She denies any new incontinence, back pain, chest pain, cough, fever or chills.     There are no other complaints, changes, or physical findings at this time\"    ______________________________________________________________________  DISCHARGE SUMMARY/HOSPITAL COURSE:  for full details see H&P, daily progress notes, labs, consult notes.      Multiple sclerosis exacerbation POA   Rapidly progressing/advancing MS POA  Causing Recurrent falls POA   due to Generalized weakness POA  MRI brain noted for new enhancing lesions in R frontal lobe  MRI C-Spine and T Spine- stable old lesions noted     Cont IV Solumedrol 1gm Q24 hours for now  IP Neurology consult noted- cont IV steroids x 5 days, then likely ? IP Rehab , OP follow up for different DMAgent consideration  IP PT/OT eval for DC planning-- recommends IP rehab versus HH with 24 hr supervision (if pt continues to refuse Rehab placement)  IP CM working on it     Anxiety with Depression POA- mild  Fibromyalgia  Chronic pain  Continue Home meds  Cont Xanax prn anxiety  Resume Home neurontin- increased dose to 600mg 12/19, hold/adjust dose down as needed uf pt too drowsy        Code Status: Full           _______________________________________________________________________  Patient seen and examined by me on discharge day. Pertinent Findings:  Gen:    Not in distress  Chest: Clear lungs  CVS:   Regular rhythm. No edema  Abd:  Soft, not distended, not tender  Neuro:  Alert, oriented x 3  _______________________________________________________________________  DISCHARGE MEDICATIONS:   Current Discharge Medication List      CONTINUE these medications which have CHANGED    Details   gabapentin (NEURONTIN) 300 mg capsule Take 2 Caps by mouth three (3) times daily. Max Daily Amount: 1,800 mg. Qty: 180 Cap, Refills: 0    Associated Diagnoses: Multiple sclerosis exacerbation ([address]         CONTINUE these medications which have NOT CHANGED    Details   melatonin 3 mg tablet Take 15 mg by mouth nightly as needed for Insomnia. famotidine (PEPCID) 20 mg tablet Take 20 mg by mouth two (2) times daily as needed for Heartburn. ALPRAZolam (Xanax) 0.25 mg tablet Take 1 Tab by mouth two (2) times daily as needed for Anxiety. Max Daily Amount: 0.5 mg. Indications: anxiousness associated with depression  Qty: 6 Tab, Refills: 0    Associated Diagnoses: Anxiety      DULoxetine (CYMBALTA) 60 mg capsule Take 1 Cap by mouth daily.  Indications: neuropathic pain  Qty: 90 Cap, Refills: 5    Associated Diagnoses: Chronic pain syndrome; MS (multiple sclerosis) ([address] Tobacco abuse      cyclobenzaprine (FLEXERIL) 10 mg tablet Take 1 Tab by mouth three (3) times daily as needed for Muscle Spasm(s). Qty: 90 Tab, Refills: 2      senna-docusate (PERICOLACE) 8.6-50 mg per tablet Take 2 Tabs by mouth daily. Qty: 15 Tab, Refills: 0    Associated Diagnoses: Slow transit constipation         STOP taking these medications       cladribine,multiple sclerosis, ([employer], 8 tablet pack,) 10 mg tab Comments:   Reason for Stopping:         ibuprofen (MOTRIN) 200 mg tablet Comments:   Reason for Stopping:                 Patient Follow Up Instructions: Activity: Activity as tolerated  Diet: Regular Diet    Follow-up with PCP  In 2 weeks , Neurology in 2 weeks.       Follow-up Information     Follow up With Specialties Details Why Contact Zahira Bae NP Neurology  Please call to schedule hospital follow up appointment [address] [place] [address], [address]   [facility] [phone]          ________________________________________________________________    Risk of deterioration: Low    Condition at Discharge:  Stable  __________________________________________________________________    Disposition  IP Rehab    ____________________________________________________________________    Code Status: Full Code  ___________________________________________________________________      Total time in minutes spent coordinating this discharge (includes going over instructions, follow-up, prescriptions, and preparing report for sign off to her PCP) :  >30 minutes    Signed:  Jamie Bey MD

## 2020-12-21 NOTE — PROGRESS NOTES
Hospitalist Progress Note    NAME: Shaaron Sacks   :  1975   MRN:  [id number]       Assessment / Plan:  Multiple sclerosis exacerbation POA   Rapidly progressing/advancing MS POA  Causing Recurrent falls POA   due to Generalized weakness POA  MRI brain noted for new enhancing lesions in R frontal lobe  MRI C-Spine and T Spine- stable old lesions noted    Cont IV Solumedrol 1gm Q24 hours for now- last dose today  IP Neurology consult noted- cont IV steroids x 5 days, then IP Rehab , OP follow up for different DMAagent consideration  IP PT/OT eval for DC planning-- recommends IP rehab- pt agreeable- CM working on insurance auth  Will order COVID test today       Anxiety with Depression POA- mild  Fibromyalgia  Chronic pain  Continue Home meds  Cont Xanax prn anxiety  Resume Home neurontin- increased dose to 600mg , hold/adjust dose down as needed uf pt too drowsy       Code Status: Full     DVT Prophylaxis: Lovenox     Baseline: Recurrent falls  Eloisa [phone] Paulie [phone] Stefanie [phone]      Recommended Disposition: [facility]/Rehab IP rehab when inusrance auth obtained, COVID test negative as required by Rehab per CM-- DC delayed due to these reasons. Subjective:     Chief Complaint / Reason for Physician Visit: F/U MS flare, gen weakness, falls at home  \"I am ok\". Discussed with RN events overnight. Review of Systems:  Symptom Y/N Comments  Symptom Y/N Comments   Fever/Chills n   Chest Pain n    Poor Appetite n   Edema n    Cough n   Abdominal Pain n    Sputum n   Joint Pain n    SOB/BOBO n   Pruritis/Rash     Nausea/vomit n   Tolerating PT/OT  Weakness/Falls at home   Diarrhea    Tolerating Diet y    Constipation    Other       Could NOT obtain due to:      Objective:     VITALS:   Last 24hrs VS reviewed since prior progress note.  Most recent are:  Patient Vitals for the past 24 hrs:   Temp Pulse Resp BP SpO2   20 0751 98.6 °F (37 °C) 70 18 (!) 147/89 99 %   12/21/20 0312 98.2 °F (36.8 °C) 66 16 (!) 143/89 98 %   12/20/20 2307 98.4 °F (36.9 °C) 68 16 138/88 94 %   12/20/20 2033 98.3 °F (36.8 °C) 65 16 (!) 149/80 98 %   12/20/20 1535 97.7 °F (36.5 °C) 89 16 130/86 97 %   12/20/20 1148 98.5 °F (36.9 °C) 74 14 126/87 98 %     No intake or output data in the 24 hours ending 12/21/20 0941     I had a face to face encounter and independently examined this patient on 12/21/2020, as outlined below:  PHYSICAL EXAM:  General: WD, WN. Alert, cooperative, no acute distress    EENT:  EOMI. Anicteric sclerae. MMM  Resp:  CTA bilaterally, no wheezing or rales. No accessory muscle use  CV:  Regular  rhythm,  No edema  GI:  Soft, Non distended, Non tender. +Bowel sounds  Neurologic:  Alert and oriented X 3, dysarthric speech noted +,  B/L LE weakness +, L hand weakness +  Psych:   Good insight., less anxious +  Skin:  No rashes. No jaundice    Reviewed most current lab test results and cultures  YES  Reviewed most current radiology test results   YES  Review and summation of old records today    NO  Reviewed patient's current orders and MAR    YES  PMH/ reviewed - no change compared to H&P  ________________________________________________________________________  Care Plan discussed with:    Comments   Patient x    Family      RN x    Care Manager x Vikas   Consultant                        Multidiciplinary team rounds were held today with , nursing, pharmacist and clinical coordinator. Patient's plan of care was discussed; medications were reviewed and discharge planning was addressed.      ________________________________________________________________________  Total NON critical care TIME:  26   Minutes    Total CRITICAL CARE TIME Spent:   Minutes non procedure based      Comments   >50% of visit spent in counseling and coordination of care     ________________________________________________________________________  Linwood Núñez MD Procedures: see electronic medical records for all procedures/Xrays and details which were not copied into this note but were reviewed prior to creation of Plan. LABS:  I reviewed today's most current labs and imaging studies. Pertinent labs include:  No results for input(s): WBC, HGB, HCT, PLT, HGBEXT, HCTEXT, PLTEXT, HGBEXT, HCTEXT, PLTEXT in the last 72 hours. No results for input(s): NA, K, CL, CO2, GLU, BUN, CREA, CA, MG, PHOS, ALB, TBIL, TBILI, ALT, INR, INREXT, INREXT in the last 72 hours.     No lab exists for component: SGOT    Signed: Adaline Epley, MD

## 2020-12-21 NOTE — PROGRESS NOTES
End of Shift Note     Bedside shift change report given to Lina (oncoming nurse) by Ernie (offgoing nurse).  Report included the following information SBAR, Kardex, MAR, Med Rec Status and Cardiac Rhythm nsr     Shift worked:  7pm - 7am   Shift summary and any significant changes:     Gabapentin increased to help with pain         Concerns for physician to address: Sonny Lund phone for oncoming shift:   [phone]      Patient Information  Jackelyn Kaur  50 y.o.  12/16/2020  2:54 PM by Charanjit Redman, [address] [place] [address] admitted from Home     Problem List          Patient Active Problem List     Diagnosis Date Noted    Leukopenia 11/25/2020    UTI (urinary tract infection) 11/25/2020    Multiple sclerosis exacerbation ([address] 08/06/2020    Facial droop 03/23/2020    Exacerbation of multiple sclerosis ([address] 03/09/2020    Left-sided weakness 03/08/2020    Severe obesity ([address] 10/03/2018    Constipation 07/17/2015    Body aches 12/11/2014    Back pain 09/07/2012    Fibromyalgia 08/17/2012    MS (multiple sclerosis) ([address] 08/17/2012    Decreased hearing 01/31/2011    Acute pharyngitis 01/26/2011    Anxiety 08/25/2010    Blood pressure elevated 08/25/2010    Tobacco abuse 08/25/2010              Past Medical History:   Diagnosis Date    Body aches 12/11/2014    Chronic pain      Depression      Headache(784.0)      History of mammogram never before    [facility] sclerosis) ([facility])      Neurological disorder       Multiple Sclerosis    Pap smear for cervical cancer screening 2008    Psychiatric disorder       anxiety    Psychotic disorder ([facility])           Core Measures:  CVA: No No  CHF:No No  PNA:No No     Activity:  Activity Level: Bed Rest  Number times ambulated in hallways past shift: 0  Number of times OOB to chair past shift: 0     Cardiac:   Cardiac Monitoring: Yes      Cardiac Rhythm: Normal sinus rhythm     Access:   Current line(s): PIV   Central Line? No   PICC LINE?  No    Genitourinary:   Urinary status: voiding and external catheter  Urinary Catheter? No     Respiratory:   O2 Device: Room air  Chronic home O2 use?: NO  Incentive spirometer at bedside: NO     GI:  Last Bowel Movement Date: 12/15/20  Current diet:  No diet orders on file  Passing flatus: YES  Tolerating current diet: YES     Pain Management:   Patient states pain is manageable on current regimen: YES     Skin:  Samy Score: 15  Interventions: increase time out of bed and PT/OT consult    Patient Safety:  Fall Score: Total Score: 4  Interventions: bed/chair alarm, gripper socks and pt to call before getting OOB  High Fall Risk:  Yes     DVT prophylaxis:  DVT prophylaxis Med- Yes  DVT prophylaxis SCD or CONG- No      Wounds: (If Applicable)  Wounds- No  Location      Active Consults:  IP CONSULT TO NEUROLOGY     Length of Stay:  Expected LOS: 3d 14h  Actual LOS: 2  Discharge Plan: Justin Bansal

## 2020-12-21 NOTE — PROGRESS NOTES
Problem: Mobility Impaired (Adult and Pediatric)  Goal: *Acute Goals and Plan of Care (Insert Text)  Description: FUNCTIONAL STATUS PRIOR TO ADMISSION: Patient reports she only received three days of rehab after last admission and has experienced increased weakness and falls since then. Patient reports she felt unsafe ambulating in the past few days and was using w/c. HOME SUPPORT PRIOR TO ADMISSION: Patient lives with her boyfriend who provides assistance with ADLs. Physical Therapy Goals  Initiated 12/17/2020  1. Patient will move from supine to sit and sit to supine  in bed with minimal assistance/contact guard assist within 7 day(s). 2.  Patient will transfer from bed to chair and chair to bed with minimal assistance/contact guard assist using the least restrictive device within 7 day(s). 3.  Patient will perform sit to stand with minimal assistance/contact guard assist within 7 day(s). 4.  Patient will ambulate with minimal assistance/contact guard assist for 50 feet with the least restrictive device within 7 day(s). 5.  Patient will ascend/descend 2 stairs with  handrail(s) with minimal assistance/contact guard assist within 7 day(s). Outcome: Progressing Towards Goal   PHYSICAL THERAPY TREATMENT  Patient: Lori White (73 y.o. female)  Date: 12/21/2020  Diagnosis: Multiple sclerosis exacerbation ([facility] Jordyn Patel <principal problem not specified>       Precautions: Fall  Chart, physical therapy assessment, plan of care and goals were reviewed. ASSESSMENT  Patient continues with skilled PT services and is progressing towards goals however continues to function well below her baseline modified independent level secondary to BLE weakness, altered gait pattern, impaired sitting and standing balance, impaired activity tolerance, and impaired endurance. Pt ambulated 10ft x2 w/ RW and minAx2, requiring seated rest break secondary to increased fatigue and poor activity tolerance.  Gait unsteady secondary to BLE weakness with mild buckling of BLEs noted. Gait slow and shuffled. Minimal step length bilaterally as well as decreased step height through RLE. RLE maintained in external rotation which is baseline, per pt report. Pt remains a large falls risk and is NOT safe to return home. Pt is extremely motivated and an excellent candidate for intensive rehab at discharge. Current Level of Function Impacting Discharge (mobility/balance): minAx2 w/ use of RW during ambulation    Other factors to consider for discharge: falls risk, MS exacerbation, caregiver burden, need for x2 person physical assist         PLAN :  Patient continues to benefit from skilled intervention to address the above impairments. Continue treatment per established plan of care. to address goals. Recommendation for discharge: (in order for the patient to meet his/her long term goals)  Therapy 3 hours per day 5-7 days per week    This discharge recommendation:  Has been made in collaboration with the attending provider and/or case management    IF patient discharges home will need the following DME: to be determined (TBD)       SUBJECTIVE:   Patient stated My legs aren't cooperating with me today.     OBJECTIVE DATA SUMMARY:   Critical Behavior:  Neurologic State: Alert  Orientation Level: Oriented X4  Cognition: Appropriate safety awareness, Appropriate for age attention/concentration, Appropriate decision making, Follows commands  Safety/Judgement: Awareness of environment, Insight into deficits, Home safety, Fall prevention  Functional Mobility Training:  Bed Mobility:  Rolling: Stand-by assistance  Supine to Sit: Stand-by assistance     Scooting: Stand-by assistance        Transfers:  Sit to Stand: Minimum assistance;Assist x2  Stand to Sit: Contact guard assistance        Bed to Chair: Minimum assistance;Assist x2                    Balance:  Sitting: Impaired  Sitting - Static: Good (unsupported)  Sitting - Dynamic: Fair (occasional)  Standing: Impaired; With support(RW)  Standing - Static: Fair;Constant support  Standing - Dynamic : Fair;Constant support  Ambulation/Gait Training:  Distance (ft): 20 Feet (ft)(10ft x2 w seated rest break b/t)  Assistive Device: Walker, rolling;Gait belt  Ambulation - Level of Assistance: Minimal assistance;Assist x2        Gait Abnormalities: Decreased step clearance;Shuffling gait;Trunk sway increased(external rotation RLE)              Speed/Heena: Slow;Shuffled  Step Length: Right shortened;Left shortened                               Pain Rating:  Denied complaints of pain    Activity Tolerance:   Fair, VSS however pt fatigues quickly    After treatment patient left in no apparent distress:   Sitting in chair and Call bell within reach    COMMUNICATION/COLLABORATION:   The patients plan of care was discussed with: Occupational therapist, Registered nurse, and Case management.      John Quiroga, PT, DPT   Time Calculation: 23 mins

## 2020-12-22 VITALS
HEART RATE: 79 BPM | DIASTOLIC BLOOD PRESSURE: 83 MMHG | BODY MASS INDEX: 28.25 KG/M2 | SYSTOLIC BLOOD PRESSURE: 136 MMHG | TEMPERATURE: 98.4 F | WEIGHT: 180 LBS | RESPIRATION RATE: 18 BRPM | HEIGHT: 67 IN | OXYGEN SATURATION: 98 %

## 2020-12-22 LAB
HEALTH STATUS, XMCV2T: NORMAL
SARS-COV-2, COV2: NOT DETECTED
SOURCE, COVRS: NORMAL
SPECIMEN SOURCE, FCOV2M: NORMAL
SPECIMEN TYPE, XMCV1T: NORMAL

## 2020-12-22 PROCEDURE — 74011250637 HC RX REV CODE- 250/637: Performed by: INTERNAL MEDICINE

## 2020-12-22 PROCEDURE — 74011250636 HC RX REV CODE- 250/636: Performed by: HOSPITALIST

## 2020-12-22 PROCEDURE — 74011250637 HC RX REV CODE- 250/637: Performed by: HOSPITALIST

## 2020-12-22 RX ADMIN — FAMOTIDINE 20 MG: 20 TABLET, FILM COATED ORAL at 09:45

## 2020-12-22 RX ADMIN — Medication 10 ML: at 06:24

## 2020-12-22 RX ADMIN — ENOXAPARIN SODIUM 40 MG: 40 INJECTION SUBCUTANEOUS at 09:46

## 2020-12-22 RX ADMIN — ALPRAZOLAM 0.25 MG: 0.25 TABLET ORAL at 12:49

## 2020-12-22 RX ADMIN — DULOXETINE HYDROCHLORIDE 60 MG: 30 CAPSULE, DELAYED RELEASE ORAL at 09:45

## 2020-12-22 RX ADMIN — GABAPENTIN 600 MG: 300 CAPSULE ORAL at 09:45

## 2020-12-22 NOTE — PROGRESS NOTES
[facility] discharged [facility] to [facility], all belongings accounted for & sent with pt. VSS.

## 2020-12-22 NOTE — PROGRESS NOTES
0730 Bedside shift change report given to [address]  (oncoming nurse) by Sana Boyd RN (offgoing nurse). Report included the following information SBAR, Kardex, ED Summary, MAR and Cardiac Rhythm NSR.

## 2020-12-22 NOTE — PROGRESS NOTES
Patient complaining of 8/10 pain in her BUE that she describes as \"feeling like they're on fire\".  Kiera Reed NP notified, she responded that this was patient's nerve pain and to give her the scheduled gabapentin and PRN xanex

## 2020-12-22 NOTE — PROGRESS NOTES
End of Shift Note    Bedside shift change report given to Rober Tello (oncoming nurse) by Prasanna Domingo (offgoing nurse). Report included the following information SBAR    Shift worked:  5150-4336   Shift summary and any significant changes:     PRN Xanex given x1; COVID negative        Concerns for physician to address:  None   Zone phone for oncoming shift:   [phone]     Patient Information  Lori White  50 y.o.  12/16/2020  2:54 PM by Kenneth Lema MD. Lori White was admitted from Home    Problem List  Patient Active Problem List    Diagnosis Date Noted    Leukopenia 11/25/2020    UTI (urinary tract infection) 11/25/2020    Multiple sclerosis exacerbation ([address] 08/06/2020    Facial droop 03/23/2020    Exacerbation of multiple sclerosis ([address] 03/09/2020    Left-sided weakness 03/08/2020    Severe obesity ([address] 10/03/2018    Constipation 07/17/2015    Body aches 12/11/2014    Back pain 09/07/2012    Fibromyalgia 08/17/2012    MS (multiple sclerosis) ([address] 08/17/2012    Decreased hearing 01/31/2011    Acute pharyngitis 01/26/2011    Anxiety 08/25/2010    Blood pressure elevated 08/25/2010    Tobacco abuse 08/25/2010     Past Medical History:   Diagnosis Date    Body aches 12/11/2014    Chronic pain     Depression     Headache(784.0)     History of mammogram never before    MS (multiple sclerosis) ([address]     Neurological disorder     Multiple Sclerosis    Pap smear for cervical cancer screening 2008    Psychiatric disorder     anxiety    Psychotic disorder ([address]        Core Measures:  CVA: No No  CHF:No No  PNA:No No    Activity:  Activity Level:  Up with Assistance  Number times ambulated in hallways past shift: 0  Number of times OOB to chair past shift: 0    Cardiac:   Cardiac Monitoring: Yes      Cardiac Rhythm: Normal sinus rhythm    Access:   Current line(s): PIV     Genitourinary:   Urinary status: external catheter    Respiratory:   O2 Device: Room air  Chronic home O2 use?: N/A  Incentive spirometer at bedside: N/A       GI:  Last Bowel Movement Date: 12/16/20  Current diet:  No diet orders on file  Passing flatus: YES  Tolerating current diet: YES       Pain Management:   Patient states pain is manageable on current regimen: YES    Skin:  Samy Score: 18  Interventions: limit briefs    Patient Safety:  Fall Score:  Total Score: 3  Interventions: bed/chair alarm  High Fall Risk: Yes    DVT prophylaxis:  DVT prophylaxis Med- No  DVT prophylaxis SCD or CONG- No     Wounds: (If Applicable)  Wounds- No  Location     Active Consults:  IP CONSULT TO NEUROLOGY    Length of Stay:  Expected LOS: 3d 14h  Actual LOS: 6  Discharge Plan: Yes; in patient rehab      [employer]

## 2020-12-22 NOTE — PROGRESS NOTES
Patient arrived via stretcher to discharge room without complaint. Pt resting comfortably awaiting [facility] transport.

## 2020-12-22 NOTE — DISCHARGE SUMMARY
Hospitalist Discharge Summary     Patient ID:  Tere Mosqueda  [id number]  50 y.o.  1975 12/16/2020    PCP on record: Sigrid Lozano MD    Admit date: 12/16/2020  Discharge date and time: 12/22/2020    DISCHARGE DIAGNOSIS:  Multiple sclerosis exacerbation POA  Rapidly progressing/advancing MS POA  Causing Recurrent falls POA   due to Generalized weakness POA  Anxiety with Depression POA  Fibromyalgia  Chronic pain    CONSULTATIONS:  IP CONSULT TO NEUROLOGY    Excerpted HPI from H&P of Blade Kemp MD:  \"46 y.o. female presents to the ED with cc of extremity pain and weakness.  Patient has a history of multiple sclerosis and was admitted on November 25 for bilateral lower extremity weakness.  At that time, she showed active new demyelinating lesions on her brain MRI.  According to the ED note, her hip strength was 4 out of 5 in her lower extremities bilaterally at that time. Yessica Galindo states that she is not ambulated since that admission.  She now also has pain associated with her symptoms over the last week and is also complaining of bilateral arm weakness and pain.  The pains are an 8 out of 10 in severity.  She denies any new numbness or tingling.  She denies any new incontinence, back pain, chest pain, cough, fever or chills.     There are no other complaints, changes, or physical findings at this time\"    ______________________________________________________________________  DISCHARGE SUMMARY/HOSPITAL COURSE:  for full details see H&P, daily progress notes, labs, consult notes. Multiple sclerosis exacerbation POA   Rapidly progressing/advancing MS POA  Causing Recurrent falls POA   due to Generalized weakness POA  MRI brain noted for new enhancing lesions in R frontal lobe  MRI C-Spine and T Spine- stable old lesions noted  IP Neurology consult noted- completed IV steroids x 5 days  Evaluated by PT/OT, recommended IP rehab. Pt accepted at [facility].   Will need to f/u with Dr Livier Moody to further discuss different management outpt.      Anxiety with Depression POA- mild  Fibromyalgia  Chronic pain  Continue Home meds  Cont Xanax prn anxiety  Resume Home neurontin- increased dose to 600mg 12/19. No reports of drowsiness during inpt stay with this new dose.         _______________________________________________________________________  Patient seen and examined by me on discharge day. Pertinent Findings:  Gen:    Not in distress  Chest: Clear lungs  CVS:   Regular rhythm. No edema  Abd:  Soft, not distended, not tender  Neuro:  Alert, oriented x 3  _______________________________________________________________________  DISCHARGE MEDICATIONS:   Current Discharge Medication List      CONTINUE these medications which have CHANGED    Details   gabapentin (NEURONTIN) 300 mg capsule Take 2 Caps by mouth three (3) times daily. Max Daily Amount: 1,800 mg. Qty: 180 Cap, Refills: 0    Associated Diagnoses: Multiple sclerosis exacerbation ([facility]         CONTINUE these medications which have NOT CHANGED    Details   melatonin 3 mg tablet Take 15 mg by mouth nightly as needed for Insomnia. famotidine (PEPCID) 20 mg tablet Take 20 mg by mouth two (2) times daily as needed for Heartburn. ALPRAZolam (Xanax) 0.25 mg tablet Take 1 Tab by mouth two (2) times daily as needed for Anxiety. Max Daily Amount: 0.5 mg. Indications: anxiousness associated with depression  Qty: 6 Tab, Refills: 0    Associated Diagnoses: Anxiety      DULoxetine (CYMBALTA) 60 mg capsule Take 1 Cap by mouth daily. Indications: neuropathic pain  Qty: 90 Cap, Refills: 5    Associated Diagnoses: Chronic pain syndrome; MS (multiple sclerosis) ([address] Tobacco abuse      cyclobenzaprine (FLEXERIL) 10 mg tablet Take 1 Tab by mouth three (3) times daily as needed for Muscle Spasm(s). Qty: 90 Tab, Refills: 2      senna-docusate (PERICOLACE) 8.6-50 mg per tablet Take 2 Tabs by mouth daily.   Qty: 15 Tab, Refills: 0    Associated Diagnoses: Slow transit constipation         STOP taking these medications       cladribine,multiple sclerosis, ([employer], 8 tablet pack,) 10 mg tab Comments:   Reason for Stopping:         ibuprofen (MOTRIN) 200 mg tablet Comments:   Reason for Stopping:                 Patient Follow Up Instructions: Activity: Activity as tolerated  Diet: Regular Diet    Follow-up with PCP  In 2 weeks , Neurology in 2 weeks. Follow-up Information     Follow up With Specialties Details Why Contact Info    Kaveh Gifford NP Neurology  Please call to schedule hospital follow up appointment [address], [address] In 3 days  [address]  [phone]      Alan Bellamy MD Neurology In 2 weeks For MS management plan [address]  [facility]  [phone]          ________________________________________________________________    Risk of deterioration: Low    Condition at Discharge:  Stable  __________________________________________________________________    Disposition  IP Rehab    ____________________________________________________________________    Code Status: Full Code  ___________________________________________________________________      Total time in minutes spent coordinating this discharge (includes going over instructions, follow-up, prescriptions, and preparing report for sign off to her PCP) :  >30 minutes    NOTE:  [de-identified] of DC summary written by Dr Nickie Figueroa.      Signed:  Zack Abebe MD

## 2020-12-22 NOTE — PROGRESS NOTES
Received notification from bedside RN about patient with regards to: patient complaining of arm pain described as 8/10, fire-like sensation on her arms  VS: 163/91, HR 74, RR 18, O2 sat 96% on RA    Intervention given: Give scheduled Gabapentin early with her prn Xanax

## 2020-12-22 NOTE — PROGRESS NOTES
SHOLA Plan:    * in-pt rehab ([facility])    > Call report to [facility] at the time of d/c ([phone])  > [facility] secured for 6:00 PM; PCS completed, copy on chart  > Accepting MD - Dr. Valorie Arauz; EMTALA to be completed  > COVID-19 test completed 12/21/2020; results negative    3:14 PM: CM contacted [facility] ([phone]) in attempt to secure earlier pick-up time for d/c; CM spoke with [address] representative Karma April) who reported that he is unable to accommodate a pick-up time earlier than 6:00 PM.     2:38 PM: CM received a call from [facility] representative reporting pt's ride has been delayed until 6:00 PM this evening. CM contacted [facility] liaison Shirley Navarro) to report update; Emilia Wheatley verbalized understanding & confirmed [facility] is still able to accommodate pt's arrival this evening. CM will enter delay in d/c to reflect current barriers preventing pt from transitioning out of [facility].    9:30 AM: CM met with pt at bedside to check in and review plan for d/c re: in-pt rehab ([facility]). CM confirmed pt is agreeable to the d/c plan. Pt reiterated that she will need medical transport secured for d/c; CM noted request. CM informed pt of [facility] secured for 1:45 PM; pt verbalized understanding. Pt requested for CM to provide a note card and/or sticky note with [facility]' address; CM completed request. CM inquired if pt had any questions, concerns, or needs related to d/c; pt declined. Medicare pt has received, reviewed, and provided verbal consent for 2nd IM letter informing them of their right to appeal the discharge. Copy has been placed on pt bedside chart. No further CM needs identified. CM notified pt's nurse of d/c.    9:17 AM: [facility] secured for 1:45 PM; PCS completed, copy on chart. Initial note: CM acknowledged d/c. CM reviewed pt's chart prior to moving forward with d/c planning. CM received update via [employer] that [facility] has received authorization for in-pt rehab. [facility] reported that they have a bed available today (12/22/2020) for placement. CM placed number for report and accepting MD in [facility] plan detailed above. Pt will need medical transport secured for d/c; CM actively working to secure [facility] transport. EMTALA will need to be completed; CM informed MD of EMTALA. CM will meet with pt at bedside to check in and review plan for d/c. Care Management Interventions  PCP Verified by CM:  Yes  Palliative Care Criteria Met (RRAT>21 & CHF Dx)?: No  Mode of Transport at Discharge: BLS([facility] transport)  Hospital Transport Time of Discharge: [facility] (CM Consult): Discharge Planning, Other(acute rehab)  Discharge Durable Medical Equipment: No  Physical Therapy Consult: Yes  Occupational Therapy Consult: Yes  Speech Therapy Consult: Yes  Current Support Network: Lives with Spouse  Confirm Follow Up Transport: Family  The Plan for Transition of Care is Related to the Following Treatment Goals : in-pt rehab  The Patient and/or Patient Representative was Provided with a Choice of Provider and Agrees with the Discharge Plan?: Yes  Name of the Patient Representative Who was Provided with a Choice of Provider and Agrees with the Discharge Plan: patient Joan Biggs  [place] of Choice List was Provided with Basic Dialogue that Supports the Patient's Individualized Plan of Care/Goals, Treatment Preferences and Shares the Quality Data Associated with the Providers?: Yes  [place] Resource Information Provided?: No  Discharge Location  Discharge Placement: Rehab hospital/unit acute([facility] )    Ruba De La Garza, [address] [place] [address]  [phone]

## 2020-12-22 NOTE — PROGRESS NOTES
Problem: Falls - Risk of  Goal: *Absence of Falls  Description: Document Albino Messing Fall Risk and appropriate interventions in the flowsheet. Outcome: Progressing Towards Goal  Note: Fall Risk Interventions:  Mobility Interventions: Bed/chair exit alarm         Medication Interventions: Bed/chair exit alarm    Elimination Interventions: Bed/chair exit alarm    History of Falls Interventions: Bed/chair exit alarm         Problem: Patient Education: Go to Patient Education Activity  Goal: Patient/Family Education  Outcome: Progressing Towards Goal     Problem: Pressure Injury - Risk of  Goal: *Prevention of pressure injury  Description: Document Samy Scale and appropriate interventions in the flowsheet.   Outcome: Progressing Towards Goal  Note: Pressure Injury Interventions:  Sensory Interventions: Assess changes in LOC    Moisture Interventions: Minimize layers    Activity Interventions: Assess need for specialty bed    Mobility Interventions: Assess need for specialty bed    Nutrition Interventions: Document food/fluid/supplement intake    Friction and Shear Interventions: Minimize layers

## 2020-12-22 NOTE — PROGRESS NOTES
Bedside and Verbal shift change report given to Gail Bowen (oncoming nurse) by Shyla Khoury RN (offgoing nurse). Report included the following information SBAR, Kardex, Intake/Output, MAR, Accordion, Recent Results and Med Rec Status.

## 2020-12-22 NOTE — PROGRESS NOTES
TRANSFER - OUT REPORT:    Verbal report given to [facility] RN(name) on Martha Nathan  being transferred to [address] (unit) for routine progression of care       Report consisted of patients Situation, Background, Assessment and   Recommendations(SBAR). Information from the following report(s) SBAR was reviewed with the receiving nurse. Lines:   Peripheral IV 12/21/20 Left;Posterior Hand (Active)   Site Assessment Clean, dry, & intact 12/22/20 0315   Phlebitis Assessment 0 12/22/20 0315   Infiltration Assessment 0 12/22/20 0315   Dressing Status Clean, dry, & intact 12/22/20 0315   Dressing Type Tape;Transparent 12/22/20 0315   Hub Color/Line Status Blue;Capped 12/22/20 0315   Alcohol Cap Used Yes 12/22/20 0315        Opportunity for questions and clarification was provided.       Patient transported with:   Patient's medications from home

## 2020-12-23 ENCOUNTER — PATIENT OUTREACH (OUTPATIENT)
Dept: CASE MANAGEMENT | Age: 45
End: 2020-12-23

## 2020-12-23 NOTE — PROGRESS NOTES
Transition of care outreach postponed for 7 days due to patient's discharge to inpatient rehab facility. Per EMR patient patient discharged to [facility]. Will continue to monitor patient progress.

## 2020-12-30 ENCOUNTER — PATIENT OUTREACH (OUTPATIENT)
Dept: CASE MANAGEMENT | Age: 45
End: 2020-12-30

## 2020-12-30 NOTE — PROGRESS NOTES
Transition of care outreach postponed for 7 days due to patient's discharge to inpatient rehab facility.   [facility] still admitted f/u 7d

## 2021-01-06 ENCOUNTER — VIRTUAL VISIT (OUTPATIENT)
Dept: FAMILY MEDICINE CLINIC | Age: 46
End: 2021-01-06
Payer: MEDICARE

## 2021-01-06 ENCOUNTER — PATIENT OUTREACH (OUTPATIENT)
Dept: CASE MANAGEMENT | Age: 46
End: 2021-01-06

## 2021-01-06 DIAGNOSIS — Z09 HOSPITAL DISCHARGE FOLLOW-UP: Primary | ICD-10-CM

## 2021-01-06 PROCEDURE — G8428 CUR MEDS NOT DOCUMENT: HCPCS | Performed by: FAMILY MEDICINE

## 2021-01-06 PROCEDURE — 99215 OFFICE O/P EST HI 40 MIN: CPT | Performed by: FAMILY MEDICINE

## 2021-01-06 PROCEDURE — 1111F DSCHRG MED/CURRENT MED MERGE: CPT | Performed by: FAMILY MEDICINE

## 2021-01-06 RX ORDER — LANOLIN ALCOHOL/MO/W.PET/CERES
9 CREAM (GRAM) TOPICAL
COMMUNITY
Start: 2020-08-21 | End: 2021-01-06 | Stop reason: SDUPTHER

## 2021-01-06 RX ORDER — CYCLOBENZAPRINE HCL 10 MG
10 TABLET ORAL
COMMUNITY
Start: 2021-01-01 | End: 2021-01-06 | Stop reason: SDUPTHER

## 2021-01-06 RX ORDER — FAMOTIDINE 20 MG/1
20 TABLET, FILM COATED ORAL
COMMUNITY
Start: 2021-01-01 | End: 2021-01-06 | Stop reason: SDUPTHER

## 2021-01-06 RX ORDER — POLYETHYLENE GLYCOL 3350 17 G/17G
17 POWDER, FOR SOLUTION ORAL
COMMUNITY
End: 2021-03-30

## 2021-01-06 RX ORDER — ACETAMINOPHEN 500 MG
500 TABLET ORAL
COMMUNITY
Start: 2020-08-21

## 2021-01-06 RX ORDER — GABAPENTIN 300 MG/1
900 CAPSULE ORAL
COMMUNITY
Start: 2021-01-01 | End: 2021-01-06 | Stop reason: SDUPTHER

## 2021-01-06 RX ORDER — BUSPIRONE HYDROCHLORIDE 5 MG/1
TABLET ORAL
COMMUNITY
Start: 2021-01-01 | End: 2021-03-30

## 2021-01-06 RX ORDER — NYSTATIN 100000 U/G
CREAM TOPICAL
COMMUNITY
Start: 2021-01-01

## 2021-01-06 RX ORDER — DULOXETIN HYDROCHLORIDE 60 MG/1
60 CAPSULE, DELAYED RELEASE ORAL
COMMUNITY
Start: 2020-08-21 | End: 2021-01-06 | Stop reason: SDUPTHER

## 2021-01-06 RX ORDER — ALPRAZOLAM 0.5 MG/1
0.5 TABLET ORAL
COMMUNITY
Start: 2021-01-01 | End: 2021-01-06 | Stop reason: SDUPTHER

## 2021-01-06 NOTE — PROGRESS NOTES
Chief Complaint   Patient presents with   [facility] Follow Up     1. Have you been to the ER, urgent care clinic since your last visit? Hospitalized since your last visit? Yes When: 12/16/20 Where: [address] [place] [address] Reason for visit: MS excerbation    2. Have you seen or consulted any other health care providers outside of the [address] [place] [address] since your last visit? Include any pap smears or colon screening. No    Call placed to pt. Verified patient with two type of identifiers.  admitted to [address] [place] [address] on 12/16/2020 for recurrent falls and weakness,  Dx with MS exacerbation,   Discharged to [facility] on 12/22/20, discharged from rehab on 1/4/20

## 2021-01-06 NOTE — Clinical Note
Good morning Maday,   I contacted [facility] regarding update of patient status. Ms Janet Posey was discharged from the facility.    Thank you,   [place]

## 2021-01-06 NOTE — PROGRESS NOTES
Outgoing call placed to [facility] regarding update of patient status. Spoke with [employer]. Patient identified utilizing 2 identifiers, introduced self and reason for the call. Jeri Campbell reports patient was discharged date not given.

## 2021-01-06 NOTE — PROGRESS NOTES
Virtual visit was disconnected multiple times tried to reconnect unfortunately the connection not functional\  Tried later on in the afternoon between [facility] but no answer we will try again  Virtual Video Transitional Care Management Progress Note    Patient: Nani Shafer  : 1975  PCP: Vick Suresh MD    Date of office visit: 2021   Date of admission: 20  Date of discharge: 20  Hospital: [facility]    Call initiated w/i 2 business dates of discharge: *No response documented in the last 14 days   Date of the most recent call to the patient: *No documented post hospital discharge outreach found in the last 14 days        IMPRESSION:   Stable cervical cord demyelinating disease burden. IMPRESSION:   Stable patchy chronic demyelinating plaques throughout the thoracic cord. There is no evidence of interval progression or of active demyelination. IMPRESSION:   1. New enhancing active demyelinating plaques demonstrated in the superior right   frontal lobe. Less conspicuous foci of demyelination in the right frontal periventricular   white matter and left occipital periventricular white matter and right occipital   periventricular white matter. Other foci of demyelination are either stable or   less conspicuous than on the previous study. 2. Otherwise stable extensive confluent white matter demyelinating disease   burden. 3. Partially visualized demyelinating disease in the upper cervical cord.         Patient with history of multiple emergency room visit the most recent discharge was not 2020 with a discharge diagnosis of multiple sclerosis exacerbation generalized weakness anxiety depression fibromyalgia and chronic pain, and history of multiple falls, as per emergency room visit note patient medical condition is advancing MRI showed new enhancing right frontal lobe lesions patient has been given IV steroid has also been treated with physical therapy occupational therapy patient currently seeing home health [facility] and under neurologist Dr. Ines Hudson treatment, patient currently on 600 mg gabapentin 3 times daily, Xanax 0.25 mg twice daily as needed Cymbalta 60 mg once daily Flexeril 10 mg 1 tablet 3 times daily as needed,   Patient was told to stop her multiple sclerosis medication and stop Motrin and patient was told to follow-up with primary care doctor and neurologist in 2 weeks to start of a new different disease modifying agent    Last visit with PCP was in May 2020, pt has been sent to neurologist and rheumatologist for further care,       Assessment/Plan:     Diagnoses and all orders for this visit:    1. Hospital discharge follow-up  -     VT DISCHARGE MEDS RECONCILED W/ CURRENT OUTPATIENT MED LIST      The complexity of medical decision making for this patient's transitional care is high   Follow-up and Dispositions    · Return in 1 month (on 2/6/2021), or if symptoms worsen or fail to improve. Subjective: Fidel Rodriguez is a 50 y.o. female presenting today for follow-up after hospital discharge. This encounter and supporting documentation was reviewed if available. Medication reconciliation was performed today. The main problem requiring admission was generalized weakness chronic pain multiple sclerosis exacerbation and generalized weakness. Complications during admission: none      Interval history/Current status: Stable today talk to the physical therapist as per [facility] patient is doing better,    Admitting symptoms have: improved      Medications marked \"taking\" at this time:  Home Medications    Medication Sig Start Date End Date Taking? Authorizing Provider   acetaminophen (TYLENOL) 500 mg tablet Take 500 mg by mouth every four (4) hours as needed.  8/21/20  Yes Provider, Historical   busPIRone (BUSPAR) 5 mg tablet TAKE 2 TABLETS BY MOUTH TWICE DAILY 1/1/21  Yes Provider, Historical nystatin (MYCOSTATIN) topical cream APPLY CREAM TOPICALLY TO AFFECTED AREA TWICE DAILY 1/1/21  Yes Provider, Historical   polyethylene glycol (Miralax) 17 gram packet Take 17 g by mouth two (2) times daily as needed for Constipation. Yes Provider, Historical   gabapentin (NEURONTIN) 300 mg capsule Take 2 Caps by mouth three (3) times daily. Max Daily Amount: 1,800 mg. 12/21/20  Yes La Nena Beth MD   melatonin 3 mg tablet Take 15 mg by mouth nightly as needed for Insomnia. Yes Provider, Historical   famotidine (PEPCID) 20 mg tablet Take 20 mg by mouth two (2) times daily as needed for Heartburn. Yes Provider, Historical   ALPRAZolam (Xanax) 0.25 mg tablet Take 1 Tab by mouth two (2) times daily as needed for Anxiety. Max Daily Amount: 0.5 mg. Indications: anxiousness associated with depression  Patient taking differently: Take 0.25 mg by mouth two (2) times a day. Indications: anxiousness associated with depression 9/28/20  Yes Nance Closs, NP   DULoxetine (CYMBALTA) 60 mg capsule Take 1 Cap by mouth daily. Indications: neuropathic pain 9/28/20  Yes Nance Closs, NP   cyclobenzaprine (FLEXERIL) 10 mg tablet Take 1 Tab by mouth three (3) times daily as needed for Muscle Spasm(s). 6/10/20  Yes Angelica An MD   ALPRAZolam José Luis Stapleton) 0.5 mg tablet 0.5 mg. 1/1/21 1/6/21  Provider, Historical   cyclobenzaprine (FLEXERIL) 10 mg tablet 10 mg. 1/1/21 1/6/21  Provider, Historical   DULoxetine (CYMBALTA) 60 mg capsule 60 mg. 8/21/20 1/6/21  Provider, Historical   famotidine (PEPCID) 20 mg tablet 20 mg. 1/1/21 1/6/21  Provider, Historical   gabapentin (NEURONTIN) 300 mg capsule 900 mg. 1/1/21 1/6/21  Provider, Historical   melatonin 3 mg tablet 9 mg. 8/21/20 1/6/21  Provider, Historical   senna-docusate (PERICOLACE) 8.6-50 mg per tablet Take 2 Tabs by mouth daily.  1/14/20   Rhea Sharpe MD        Review of Systems:        Constitutional: no chills and fever, obese, nad     HENT: no ear head pain and nosebleeds. No blurred vision, pain and discharge. Respiratory: no shortness of breath, wheezing cough nor sore throat. Cardiovascular: Has no chest pain, leg swelling ,and racing heart . Gastrointestinal: No constipation, diarrhea, nausea and vomiting. Genitourinary: No frequency. Musculoskeletal: Negative for joint pain. Skin: no itching, pimples or acne rash. Neurological: Negative for dizziness, no tremors  Psychiatric/Behavioral: Negative for depression has normal interest to do things and not depressed the patient is not nervous/anxious. Current Outpatient Medications   Medication Sig Dispense Refill    acetaminophen (TYLENOL) 500 mg tablet Take 500 mg by mouth every four (4) hours as needed.  busPIRone (BUSPAR) 5 mg tablet TAKE 2 TABLETS BY MOUTH TWICE DAILY      nystatin (MYCOSTATIN) topical cream APPLY CREAM TOPICALLY TO AFFECTED AREA TWICE DAILY      polyethylene glycol (Miralax) 17 gram packet Take 17 g by mouth two (2) times daily as needed for Constipation.  gabapentin (NEURONTIN) 300 mg capsule Take 2 Caps by mouth three (3) times daily. Max Daily Amount: 1,800 mg. 180 Cap 0    melatonin 3 mg tablet Take 15 mg by mouth nightly as needed for Insomnia.  famotidine (PEPCID) 20 mg tablet Take 20 mg by mouth two (2) times daily as needed for Heartburn.  ALPRAZolam (Xanax) 0.25 mg tablet Take 1 Tab by mouth two (2) times daily as needed for Anxiety. Max Daily Amount: 0.5 mg. Indications: anxiousness associated with depression (Patient taking differently: Take 0.25 mg by mouth two (2) times a day. Indications: anxiousness associated with depression) 6 Tab 0    DULoxetine (CYMBALTA) 60 mg capsule Take 1 Cap by mouth daily. Indications: neuropathic pain 90 Cap 5    cyclobenzaprine (FLEXERIL) 10 mg tablet Take 1 Tab by mouth three (3) times daily as needed for Muscle Spasm(s).  90 Tab 2    senna-docusate (PERICOLACE) 8.6-50 mg per tablet Take 2 Tabs by mouth daily. 15 Tab 0     Allergies   Allergen Reactions    Morphine Rash     Past Medical History:   Diagnosis Date    Body aches 12/11/2014    Chronic pain     Depression     Headache(784.0)     History of mammogram never before    MS (multiple sclerosis) ([facility] [address]     Neurological disorder     Multiple Sclerosis    Pap smear for cervical cancer screening 2008    Psychiatric disorder     anxiety    Psychotic disorder [facility])      Past Surgical History:   Procedure Laterality Date    COLONOSCOPY N/A 2/28/2018    COLONOSCOPY performed by Margarita Vergara [facility] at [facility] ENDOSCOPY    HX CHOLECYSTECTOMY      HX GYN      3 c-sections    HX HEENT      bilateral ear tube surgery    HX HERNIA REPAIR      HX OTHER SURGICAL      R inguinal hernia repair     Family History   Problem Relation Age of Onset    Heart Attack Father         tripple bypass    Cancer Father         lung    COPD Father     Diabetes Mother         type 2    Heart Disease Mother         heart disease    Diabetes Paternal Grandmother     No Known Problems Sister     No Known Problems Brother     No Known Problems Child      Social History     Tobacco Use    Smoking status: Current Every Day Smoker     Packs/day: 0.50     Years: 17.00     Pack years: 8.50     Types: Cigarettes    Smokeless tobacco: Never Used    Tobacco comment: 1 pack every 3 days.    Substance Use Topics    Alcohol use: No         Objective:     Patient-Reported Vitals 1/6/2021   Patient-Reported Weight 180lb      General: alert, cooperative, no distress   Mental  status: normal mood, behavior, speech, dress, motor activity, and thought processes, able to follow commands   HENT: NCAT   Neck: no visualized mass   Resp: no respiratory distress   Neuro: no gross deficits   Skin: no discoloration or lesions of concern on visible areas   Psychiatric: normal affect, consistent with stated mood, no evidence of hallucinations     Additional exam findings:   Patient today was told that MS is immune mediated demyelinating disease is a leading cause of disability and young adult and the correct treatment for it is disease modifying agents patient was told that the condition has relapsing and remitting characteristics which is deforming the relapses radiated for recovery or residual deficit upper recovery patient was also told that the patient needs immunization for hepatitis A hepatitis varicella-zoster COVID-19 and it is very important for the patient to get early treatment with disease modifying therapy patient was also told that, a request for Xanax and other tranquilizer they are not part of her therapy and they are not helpful in slowing the progression of her disease unfortunately patient disagreed and requesting such medication to be refilled and willing to only take benzodiazepine  We discussed the expected course, resolution and complications of the diagnosis(es) in detail. Medication risks, benefits, costs, interactions, and alternatives were discussed as indicated. I advised her to contact the office if her condition worsens, changes or fails to improve as anticipated. She expressed understanding with the diagnosis(es) and plan. Nani Shafer, who was evaluated through a synchronous (real-time) audio-video encounter and/or her healthcare decision maker, is aware that it is a billable service, with coverage as determined by her insurance carrier. She provided verbal consent to proceed: Yes, and patient identification was verified. It was conducted pursuant to the emergency declaration under the [address] [place] [address], [address] [place] [address] authority and the [employer] General Act. A caregiver was present when appropriate. Ability to conduct physical exam was limited. I was at home. The patient was at home.       Jessica Liang MD

## 2021-01-07 ENCOUNTER — TELEPHONE (OUTPATIENT)
Dept: NEUROLOGY | Age: 46
End: 2021-01-07

## 2021-01-07 ENCOUNTER — PATIENT OUTREACH (OUTPATIENT)
Dept: CASE MANAGEMENT | Age: 46
End: 2021-01-07

## 2021-01-07 NOTE — TELEPHONE ENCOUNTER
----- Message from Nellie Payton sent at 1/7/2021  9:16 AM EST -----  Regarding: Dr. Apolonia Bernal telephone  General Message/Vendor Calls    Caller's first and last name: Shannon Stone       Reason for call: Physical therapist is needing to speak with nurse       Callback required yes/no and why: yes      Best contact number(s): [phone]      Details to clarify the request: Pt just came out of the hospital and the physical therapist would like to speak to an nurse       Nellie Payton

## 2021-01-07 NOTE — PROGRESS NOTES
[address] [place] [address] Dr Discharge Follow-Up      Date/Time:  2021 1:52 PM    Patient was admitted to [facility] on 2020 and discharged to [facility] on 2020. The patients discharge diagnosis was exacerbation of Multiple Sclerosis. Patient was discharge on 2021 from [facility]. The discharge summary from the post acute facilty was not available at the time of outreach. Patient was contacted within 3   business days of discharge from the post acute facility. LPN Care Coordinator contacted the patient by telephone to perform post hospital discharge follow up. Verified name and  with patient as identifiers. Provided introduction to self, and explanation of the LPN Care Coordinator role. Patient received post acute facility discharge instructions. LPN Care Coordinator reviewed general discharge instructions and red flags with patient who verbalized understanding. Patient given an opportunity to ask questions and does not have any further questions or concerns at this time. The patient agrees to contact the PCP office for questions related to their healthcare. LPN Care Coordinator provided contact information for future reference. Advance Care Planning:   Does patient have an Advance Directive:  reviewed and current     Home Health orders at discharge: PT, SN, [address]  Date of initial visit: 2021    Durable Medical Equipment ordered at discharge: none  [address] received: n/a    Medication(s):   The post acute facility medication discharge list was not available for this call. Medication review was performed with patient, who verbalizes understanding of administration of home medications. There were no barriers to obtaining medications identified at this time.     [facility] follow up appointment(s):   Future Appointments   Date Time Provider Naveen Martins   2021 1:40 PM MD ROCHELLE Juárez   2/16/2021  3:20 PM MD ROCHELLE Juárez    patient report she has attended follow up appointment with PCP.   Non-[facility] follow up appointment(s): n/a  [facility]:  information provided as a resource

## 2021-01-13 ENCOUNTER — PATIENT OUTREACH (OUTPATIENT)
Dept: CASE MANAGEMENT | Age: 46
End: 2021-01-13

## 2021-01-13 ENCOUNTER — APPOINTMENT (OUTPATIENT)
Dept: GENERAL RADIOLOGY | Age: 46
End: 2021-01-13
Attending: EMERGENCY MEDICINE
Payer: MEDICARE

## 2021-01-13 ENCOUNTER — TELEPHONE (OUTPATIENT)
Dept: NEUROLOGY | Age: 46
End: 2021-01-13

## 2021-01-13 ENCOUNTER — HOSPITAL ENCOUNTER (EMERGENCY)
Age: 46
Discharge: HOME | End: 2021-01-14
Attending: EMERGENCY MEDICINE
Payer: MEDICARE

## 2021-01-13 ENCOUNTER — APPOINTMENT (OUTPATIENT)
Dept: CT IMAGING | Age: 46
End: 2021-01-13
Attending: EMERGENCY MEDICINE
Payer: MEDICARE

## 2021-01-13 VITALS
HEIGHT: 71 IN | HEART RATE: 68 BPM | TEMPERATURE: 98.1 F | SYSTOLIC BLOOD PRESSURE: 113 MMHG | RESPIRATION RATE: 15 BRPM | BODY MASS INDEX: 29.4 KG/M2 | WEIGHT: 210 LBS | OXYGEN SATURATION: 100 % | DIASTOLIC BLOOD PRESSURE: 64 MMHG

## 2021-01-13 DIAGNOSIS — R29.6 RECURRENT FALLS WHILE WALKING: Primary | ICD-10-CM

## 2021-01-13 DIAGNOSIS — L03.115 CELLULITIS OF RIGHT LOWER EXTREMITY: ICD-10-CM

## 2021-01-13 DIAGNOSIS — G35 H/O MULTIPLE SCLEROSIS (HCC): ICD-10-CM

## 2021-01-13 LAB
ALBUMIN SERPL-MCNC: 3.5 G/DL (ref 3.5–5)
ALBUMIN/GLOB SERPL: 1.1 {RATIO} (ref 1.1–2.2)
ALP SERPL-CCNC: 74 U/L (ref 45–117)
ALT SERPL-CCNC: 20 U/L (ref 12–78)
ANION GAP SERPL CALC-SCNC: 5 MMOL/L (ref 5–15)
AST SERPL-CCNC: 14 U/L (ref 15–37)
BASOPHILS # BLD: 0 K/UL (ref 0–0.1)
BASOPHILS NFR BLD: 1 % (ref 0–1)
BILIRUB SERPL-MCNC: 0.1 MG/DL (ref 0.2–1)
BUN SERPL-MCNC: 9 MG/DL (ref 6–20)
BUN/CREAT SERPL: 10 (ref 12–20)
CALCIUM SERPL-MCNC: 9.3 MG/DL (ref 8.5–10.1)
CHLORIDE SERPL-SCNC: 108 MMOL/L (ref 97–108)
CO2 SERPL-SCNC: 28 MMOL/L (ref 21–32)
CREAT SERPL-MCNC: 0.86 MG/DL (ref 0.55–1.02)
DIFFERENTIAL METHOD BLD: ABNORMAL
EOSINOPHIL # BLD: 0.2 K/UL (ref 0–0.4)
EOSINOPHIL NFR BLD: 5 % (ref 0–7)
ERYTHROCYTE [DISTWIDTH] IN BLOOD BY AUTOMATED COUNT: 14.3 % (ref 11.5–14.5)
GLOBULIN SER CALC-MCNC: 3.3 G/DL (ref 2–4)
GLUCOSE SERPL-MCNC: 84 MG/DL (ref 65–100)
HCT VFR BLD AUTO: 31.9 % (ref 35–47)
HGB BLD-MCNC: 10.5 G/DL (ref 11.5–16)
IMM GRANULOCYTES # BLD AUTO: 0 K/UL (ref 0–0.04)
IMM GRANULOCYTES NFR BLD AUTO: 0 % (ref 0–0.5)
LIPASE SERPL-CCNC: 159 U/L (ref 73–393)
LYMPHOCYTES # BLD: 0.6 K/UL (ref 0.8–3.5)
LYMPHOCYTES NFR BLD: 17 % (ref 12–49)
MCH RBC QN AUTO: 32.5 PG (ref 26–34)
MCHC RBC AUTO-ENTMCNC: 32.9 G/DL (ref 30–36.5)
MCV RBC AUTO: 98.8 FL (ref 80–99)
MONOCYTES # BLD: 0.4 K/UL (ref 0–1)
MONOCYTES NFR BLD: 12 % (ref 5–13)
NEUTS SEG # BLD: 2.4 K/UL (ref 1.8–8)
NEUTS SEG NFR BLD: 65 % (ref 32–75)
NRBC # BLD: 0 K/UL (ref 0–0.01)
NRBC BLD-RTO: 0 PER 100 WBC
PLATELET # BLD AUTO: 319 K/UL (ref 150–400)
PMV BLD AUTO: 9.1 FL (ref 8.9–12.9)
POTASSIUM SERPL-SCNC: 4.3 MMOL/L (ref 3.5–5.1)
PROT SERPL-MCNC: 6.8 G/DL (ref 6.4–8.2)
RBC # BLD AUTO: 3.23 M/UL (ref 3.8–5.2)
RBC MORPH BLD: ABNORMAL
SODIUM SERPL-SCNC: 141 MMOL/L (ref 136–145)
WBC # BLD AUTO: 3.6 K/UL (ref 3.6–11)

## 2021-01-13 PROCEDURE — 80053 COMPREHEN METABOLIC PANEL: CPT

## 2021-01-13 PROCEDURE — 81001 URINALYSIS AUTO W/SCOPE: CPT

## 2021-01-13 PROCEDURE — 73610 X-RAY EXAM OF ANKLE: CPT

## 2021-01-13 PROCEDURE — 70450 CT HEAD/BRAIN W/O DYE: CPT

## 2021-01-13 PROCEDURE — 83690 ASSAY OF LIPASE: CPT

## 2021-01-13 PROCEDURE — 74011000636 HC RX REV CODE- 636: Performed by: EMERGENCY MEDICINE

## 2021-01-13 PROCEDURE — 85025 COMPLETE CBC W/AUTO DIFF WBC: CPT

## 2021-01-13 PROCEDURE — 74177 CT ABD & PELVIS W/CONTRAST: CPT

## 2021-01-13 PROCEDURE — 72125 CT NECK SPINE W/O DYE: CPT

## 2021-01-13 PROCEDURE — 99283 EMERGENCY DEPT VISIT LOW MDM: CPT

## 2021-01-13 PROCEDURE — 36415 COLL VENOUS BLD VENIPUNCTURE: CPT

## 2021-01-13 RX ADMIN — IOPAMIDOL 100 ML: 755 INJECTION, SOLUTION INTRAVENOUS at 22:49

## 2021-01-13 NOTE — TELEPHONE ENCOUNTER
----- Message from Giovanni Purvis sent at 1/13/2021 10:41 AM EST -----  Regarding: YENIFER Smith  General Message/Vendor Calls    Caller's first and last name: Dorothy Hurd with [facility]      Reason for call: Jaoo Sims required yes/no and why: Yes, to clarify medication      Best contact number(s): [phone]      Details to clarify the request: [facility] calling to verify pt's therapy on her medication for Mavenclad 10mg on her 7 pack dose kit. She states they have been trying to get in touch with the office since December with no luck. She said pt  is calling them inquiring about it. Please advise.       Giovanni Purvis

## 2021-01-14 LAB
APPEARANCE UR: CLEAR
BACTERIA URNS QL MICRO: NEGATIVE /HPF
BILIRUB UR QL: NEGATIVE
COLOR UR: NORMAL
EPITH CASTS URNS QL MICRO: NORMAL /LPF
GLUCOSE UR STRIP.AUTO-MCNC: NEGATIVE MG/DL
HGB UR QL STRIP: NEGATIVE
KETONES UR QL STRIP.AUTO: NEGATIVE MG/DL
LEUKOCYTE ESTERASE UR QL STRIP.AUTO: NEGATIVE
NITRITE UR QL STRIP.AUTO: NEGATIVE
PH UR STRIP: 6.5 [PH] (ref 5–8)
PROT UR STRIP-MCNC: NEGATIVE MG/DL
RBC #/AREA URNS HPF: NORMAL /HPF (ref 0–5)
SP GR UR REFRACTOMETRY: 1.01 (ref 1–1.03)
UA: UC IF INDICATED,UAUC: NORMAL
UROBILINOGEN UR QL STRIP.AUTO: 0.2 EU/DL (ref 0.2–1)
WBC URNS QL MICRO: NORMAL /HPF (ref 0–4)

## 2021-01-14 RX ORDER — CEPHALEXIN 500 MG/1
500 CAPSULE ORAL 4 TIMES DAILY
Qty: 28 CAP | Refills: 0 | Status: SHIPPED | OUTPATIENT
Start: 2021-01-14 | End: 2021-01-21

## 2021-01-14 NOTE — PROGRESS NOTES
1/20/21 ACM attempted to contact patient and was able to speak to patient who asked ACM to call her back in 5 minutes. ACM called patient back and was only able to leave message for patient call back. ACM  Will attempted to contact patient again in 3-4 days. [facility]   1/14/20 ACM attempted to contact patient at numbers provided- left message for patient call back. [facility]  1/13/20 ACM attempted to contact patient -left message for patient call back.  LEROYB

## 2021-01-14 NOTE — ED PROVIDER NOTES
EMERGENCY DEPARTMENT HISTORY AND PHYSICAL EXAM     ----------------------------------------------------------------------------  Please note that this dictation was completed with [employer], the computer voice recognition software. Quite often unanticipated grammatical, syntax, homophones, and other interpretive errors are inadvertently transcribed by the computer software. Please disregard these errors. Please excuse any errors that have escaped final proofreading  ----------------------------------------------------------------------------      Date: 1/13/2021  Patient Name: Milagros Marquez    History of Presenting Illness     Chief Complaint   Patient presents with    Abdominal Pain     pt arrives via EMS from home after dispatch health saw the pt at home after several GLF last week related to [address]. pt is non ambulatory at baseline. pt denies N/V/D however states she is having RLQ pain.  Urinary Frequency    Ankle Pain     pt states she is having right ankle pain after GLF       History Provided By:  Patient    HPI: Milagros Marquez is a 50 y.o. female, with significant pmhx of multiple sclerosis, who presents via EMS to the ED with c/o recurrent falls at home. Patient reports over the last several days she has been having recurrent falls using her Rollator. Denies associated dizziness, chest pain, shortness of breath or loss of consciousness. Patient reports that her primary care doctor, Dr. Daniela iPnzon, had recently taken off of her \"all of her medications. \"  Patient notes having right ankle pain since her fall and some swelling. Also complains of diffuse abdominal pain and difficulty urinating. Patient also specifically denies any associated fevers, chills, CP, SOB, nausea, vomiting, diarrhea,  changes in BM,  headache. Social Hx: denies tobacco  denies EtOH , denies Illicit Drugs    There are no other complaints, changes, or physical findings at this time.      PCP: None    Allergies   Allergen Reactions    Morphine Rash       Current Outpatient Medications   Medication Sig Dispense Refill    cephALEXin (Keflex) 500 mg capsule Take 1 Cap by mouth four (4) times daily for 7 days. 28 Cap 0    acetaminophen (TYLENOL) 500 mg tablet Take 500 mg by mouth every four (4) hours as needed.  busPIRone (BUSPAR) 5 mg tablet TAKE 2 TABLETS BY MOUTH TWICE DAILY      nystatin (MYCOSTATIN) topical cream APPLY CREAM TOPICALLY TO AFFECTED AREA TWICE DAILY      polyethylene glycol (Miralax) 17 gram packet Take 17 g by mouth two (2) times daily as needed for Constipation.  gabapentin (NEURONTIN) 300 mg capsule Take 2 Caps by mouth three (3) times daily. Max Daily Amount: 1,800 mg. 180 Cap 0    melatonin 3 mg tablet Take 15 mg by mouth nightly as needed for Insomnia.  famotidine (PEPCID) 20 mg tablet Take 20 mg by mouth two (2) times daily as needed for Heartburn.  ALPRAZolam (Xanax) 0.25 mg tablet Take 1 Tab by mouth two (2) times daily as needed for Anxiety. Max Daily Amount: 0.5 mg. Indications: anxiousness associated with depression (Patient taking differently: Take 0.25 mg by mouth two (2) times a day. Indications: anxiousness associated with depression) 6 Tab 0    DULoxetine (CYMBALTA) 60 mg capsule Take 1 Cap by mouth daily. Indications: neuropathic pain 90 Cap 5    cyclobenzaprine (FLEXERIL) 10 mg tablet Take 1 Tab by mouth three (3) times daily as needed for Muscle Spasm(s). 90 Tab 2    senna-docusate (PERICOLACE) 8.6-50 mg per tablet Take 2 Tabs by mouth daily.  15 Tab 0       Past History     Past Medical History:  Past Medical History:   Diagnosis Date    Body aches 12/11/2014    Chronic pain     Depression     Headache(784.0)     History of mammogram never before    MS (multiple sclerosis) ([facility] [address]     Neurological disorder     Multiple Sclerosis    Pap smear for cervical cancer screening 2008    Psychiatric disorder     anxiety    Psychotic disorder [facility])        Past Surgical History:  Past Surgical History:   Procedure Laterality Date    COLONOSCOPY N/A 2/28/2018    COLONOSCOPY performed by Jewel Pickens MD at [facility] ENDOSCOPY    HX CHOLECYSTECTOMY      HX GYN      3 c-sections    HX HEENT      bilateral ear tube surgery    HX HERNIA REPAIR      HX OTHER SURGICAL      R inguinal hernia repair       Family History:  Family History   Problem Relation Age of Onset    Heart Attack Father         tripple bypass    Cancer Father         lung    COPD Father     Diabetes Mother         type 2    Heart Disease Mother         heart disease    Diabetes Paternal Grandmother     No Known Problems Sister     No Known Problems Brother     No Known Problems Child        Social History:  Social History     Tobacco Use    Smoking status: Current Every Day Smoker     Packs/day: 0.50     Years: 17.00     Pack years: 8.50     Types: Cigarettes    Smokeless tobacco: Never Used    Tobacco comment: 1 pack every 3 days. Substance Use Topics    Alcohol use: No    Drug use: No       Allergies: Allergies   Allergen Reactions    Morphine Rash         Review of Systems   Review of Systems   Constitutional: Negative. Negative for fever. Eyes: Negative. Respiratory: Negative. Negative for shortness of breath. Cardiovascular: Negative for chest pain. Gastrointestinal: Negative for abdominal pain, nausea and vomiting. Endocrine: Negative. Genitourinary: Negative. Negative for difficulty urinating, dysuria and hematuria. Musculoskeletal: Negative. Skin: Negative. Neurological: Negative. Psychiatric/Behavioral: Negative for suicidal ideas. All other systems reviewed and are negative. Physical Exam   Physical Exam  Vitals signs and nursing note reviewed. Constitutional:       General: She is not in acute distress. Appearance: She is well-developed. She is obese. She is not diaphoretic. HENT:      Head: Normocephalic and atraumatic.       Nose: Nose normal.   Eyes:      General: No scleral icterus. Conjunctiva/sclera: Conjunctivae normal.   Neck:      Musculoskeletal: Normal range of motion. Trachea: No tracheal deviation. Cardiovascular:      Rate and Rhythm: Normal rate and regular rhythm. Heart sounds: Normal heart sounds. No murmur. No friction rub. Pulmonary:      Effort: Pulmonary effort is normal. No respiratory distress. Breath sounds: Normal breath sounds. No stridor. No wheezing or rales. Abdominal:      General: Bowel sounds are normal. There is no distension. Palpations: Abdomen is soft. Tenderness: There is no abdominal tenderness. There is no rebound. Musculoskeletal: Normal range of motion. General: No tenderness. Skin:     General: Skin is warm and dry. Findings: Erythema present. No rash. Neurological:      Mental Status: She is alert and oriented to person, place, and time. Cranial Nerves: No cranial nerve deficit. Psychiatric:         Speech: Speech is slurred (Chronically). Behavior: Behavior normal.         Thought Content: Thought content normal.         Judgment: Judgment normal.           Diagnostic Study Results     Labs -     Recent Results (from the past 12 hour(s))   LIPASE    Collection Time: 01/13/21  9:21 PM   Result Value Ref Range    Lipase 159 73 - 221 U/L   METABOLIC PANEL, COMPREHENSIVE    Collection Time: 01/13/21  9:21 PM   Result Value Ref Range    Sodium 141 136 - 145 mmol/L    Potassium 4.3 3.5 - 5.1 mmol/L    Chloride 108 97 - 108 mmol/L    CO2 28 21 - 32 mmol/L    Anion gap 5 5 - 15 mmol/L    Glucose 84 65 - 100 mg/dL    BUN 9 6 - 20 MG/DL    Creatinine 0.86 0.55 - 1.02 MG/DL    BUN/Creatinine ratio 10 (L) 12 - 20      GFR est AA >60 >60 ml/min/1.73m2    GFR est non-AA >60 >60 ml/min/1.73m2    Calcium 9.3 8.5 - 10.1 MG/DL    Bilirubin, total 0.1 (L) 0.2 - 1.0 MG/DL    ALT (SGPT) 20 12 - 78 U/L    AST (SGOT) 14 (L) 15 - 37 U/L    Alk. phosphatase 74 45 - 117 U/L    Protein, total 6.8 6.4 - 8.2 g/dL    Albumin 3.5 3.5 - 5.0 g/dL    Globulin 3.3 2.0 - 4.0 g/dL    A-G Ratio 1.1 1.1 - 2.2     CBC WITH AUTOMATED DIFF    Collection Time: 01/13/21  9:21 PM   Result Value Ref Range    WBC 3.6 3.6 - 11.0 K/uL    RBC 3.23 (L) 3.80 - 5.20 M/uL    HGB 10.5 (L) 11.5 - 16.0 g/dL    HCT 31.9 (L) 35.0 - 47.0 %    MCV 98.8 80.0 - 99.0 FL    MCH 32.5 26.0 - 34.0 PG    MCHC 32.9 30.0 - 36.5 g/dL    RDW 14.3 11.5 - 14.5 %    PLATELET 852 978 - 311 K/uL    MPV 9.1 8.9 - 12.9 FL    NRBC 0.0 0  WBC    ABSOLUTE NRBC 0.00 0.00 - 0.01 K/uL    NEUTROPHILS 65 32 - 75 %    LYMPHOCYTES 17 12 - 49 %    MONOCYTES 12 5 - 13 %    EOSINOPHILS 5 0 - 7 %    BASOPHILS 1 0 - 1 %    IMMATURE GRANULOCYTES 0 0.0 - 0.5 %    ABS. NEUTROPHILS 2.4 1.8 - 8.0 K/UL    ABS. LYMPHOCYTES 0.6 (L) 0.8 - 3.5 K/UL    ABS. MONOCYTES 0.4 0.0 - 1.0 K/UL    ABS. EOSINOPHILS 0.2 0.0 - 0.4 K/UL    ABS. BASOPHILS 0.0 0.0 - 0.1 K/UL    ABS. IMM. GRANS. 0.0 0.00 - 0.04 K/UL    DF SMEAR SCANNED      RBC COMMENTS NORMOCYTIC, NORMOCHROMIC     URINALYSIS W/ REFLEX CULTURE    Collection Time: 01/13/21 11:52 PM    Specimen: Urine   Result Value Ref Range    Color YELLOW/STRAW      Appearance CLEAR CLEAR      Specific gravity 1.015 1.003 - 1.030      pH (UA) 6.5 5.0 - 8.0      Protein Negative NEG mg/dL    Glucose Negative NEG mg/dL    Ketone Negative NEG mg/dL    Bilirubin Negative NEG      Blood Negative NEG      Urobilinogen 0.2 0.2 - 1.0 EU/dL    Nitrites Negative NEG      Leukocyte Esterase Negative NEG      WBC 0-4 0 - 4 /hpf    RBC 0-5 0 - 5 /hpf    Epithelial cells FEW FEW /lpf    Bacteria Negative NEG /hpf    UA:UC IF INDICATED CULTURE NOT INDICATED BY UA RESULT CNI         Radiologic Studies -   CT ABD PELV W CONT   Final Result   IMPRESSION:   No acute abdominal or pelvic process identified      CT HEAD WO CONT   Final Result   Impression:    1.  No evidence of acute infarct or intracranial hemorrhage. 2. Periventricular white matter disease is similar to prior studies. CT SPINE CERV WO CONT   Final Result   IMPRESSION:      1. No acute osseous abnormality. 2. Multilevel degenerative spondylosis. XR ANKLE RT MIN 3 V   Final Result   IMPRESSION: Increased diffuse edema in the visualized lower extremity. Chronic   posttraumatic changes in the ankle and base of the fifth metatarsal..        CT Results  (Last 48 hours)               01/13/21 2247  CT ABD PELV W CONT Final result    Impression:  IMPRESSION:   No acute abdominal or pelvic process identified       Narrative:  EXAM: CT ABD PELV W CONT       INDICATION: RLQ pain       COMPARISON: 2/10/2020        CONTRAST: 100 mL of Isovue-370. TECHNIQUE:    Following the uneventful intravenous administration of contrast, thin axial   images were obtained through the abdomen and pelvis. Coronal and sagittal   reconstructions were generated. Oral contrast was not administered. CT dose   reduction was achieved through use of a standardized protocol tailored for this   examination and automatic exposure control for dose modulation. FINDINGS:    LOWER THORAX: Multiple healed right posterior rib deformities. LIVER: No mass. BILIARY TREE: Prior cholecystectomy CBD is not dilated. SPLEEN: within normal limits. PANCREAS: No mass or ductal dilatation. ADRENALS: Unremarkable. KIDNEYS: No mass, calculus, or hydronephrosis. STOMACH: Unremarkable. SMALL BOWEL: No dilatation or wall thickening. COLON: No dilatation or wall thickening. APPENDIX: Axial image 57. PERITONEUM: No ascites or pneumoperitoneum. RETROPERITONEUM: No lymphadenopathy or aortic aneurysm. REPRODUCTIVE ORGANS: Uterus is anteflexed. URINARY BLADDER: No mass or calculus. BONES: No destructive bone lesion. ABDOMINAL WALL: No mass or hernia.    ADDITIONAL COMMENTS: Mesh noted along the posterior aspect of the right lower   quadrant abdominal rectus. 01/13/21 2247  CT HEAD WO CONT Final result    Impression:  Impression:    1. No evidence of acute infarct or intracranial hemorrhage. 2. Periventricular white matter disease is similar to prior studies. Narrative: Indication:  recurrent falls, h/o MS        Comparison: CT October 2020       Findings: 5 mm axial images were obtained from the skull base through the   vertex. CT dose reduction was achieved through the use of a standardized protocol   tailored for this examination and automatic exposure control for dose   modulation. The ventricles and cortical sulci are prominent, compatible with age related   volume loss. There is no evidence of intracranial hemorrhage, mass, mass effect,   or acute infarct. There is periventricular white matter disease. No extra-axial   fluid collections are seen. The visualized paranasal sinuses and mastoid air   cells are clear. The orbital structures are unremarkable. No osseous   abnormalities are seen. 01/13/21 2247  CT SPINE CERV WO CONT Final result    Impression:  IMPRESSION:       1. No acute osseous abnormality. 2. Multilevel degenerative spondylosis. Narrative:  INDICATION:   reccurent falls       COMPARISON: None. TECHNIQUE:   Noncontrast axial CT imaging of the cervical spine was performed. Coronal and sagittal reconstructions were obtained. CT dose reduction was achieved through the use of a standardized protocol   tailored for this examination and automatic exposure control for dose   modulation. FINDINGS:       There is no evidence of acute osseous abnormality. There is no acute alignment   abnormality. Vertebral body heights are maintained. There is no appreciable   prevertebral soft tissue swelling or epidural hematoma. There is multilevel   degenerative spondylosis. There is no significant central canal stenosis.    Evaluation of the paraspinal soft tissues demonstrates no significant pathology. The visualized lung apices are clear. CXR Results  (Last 48 hours)    None            Medical Decision Making   I am the first provider for this patient. I reviewed the vital signs, available nursing notes, past medical history, past surgical history, family history and social history. Vital Signs-Reviewed the patient's vital signs. Patient Vitals for the past 12 hrs:   Temp Pulse Resp BP SpO2   01/13/21 1949 98.1 °F (36.7 °C) 68 15 113/64 100 %       Pulse Oximetry Analysis -100% % on RA, normal  Cardiac Monitor:   Rate: 68 bpm  Rhythm: Normal sinus rhythm      Provider Notes (Medical Decision Making):     DDX:  Progression of disease, intracranial bleed, C-spine injury, intra-abdominal pathology, diarrhea, obstruction, UTI, dehydration, electrolyte abnormality, ankle fracture, cellulitis    Plan:  Labs, UA, head CT, C-spine CT CT abdomen pelvis, ankle x-ray    Impression:  Recurrent falls while walking, lower extremity cellulitis    ED Course:   Initial assessment performed. The patients presenting problems have been discussed, and they are in agreement with the care plan formulated and outlined with them. I have encouraged them to ask questions as they arise throughout their visit. I reviewed the nursing notes and and vital signs from today's visit, as well as the electronic medical record system for any past medical records that were available that may contribute to the patients current condition, including noting previous case management and neurology visits for her MS    Nursing notes will be reviewed as they become available in realtime while the pt has been in the ED. Krysta Quarles MD    I personally reviewed/interpreted pt's imaging. Agree with official read by radiology as noted above. Krysta Quarles MD      12:46 AM  Progress note:  Pt noted to be feeling better, ready for discharge.  Discussed lab and imaging findings with pt, specifically noting no evidence of intracranial bleed, C-spine injury,. Pt will follow up with neurology at her previously assigned appointment as instructed. All questions have been answered, pt voiced understanding and agreement with plan. Abx were prescribed/given, pt advised that diarrhea and rash are possible side effects of the medications. Specific return precautions provided in addition to instructions for pt to return to the ED immediately should sx worsen at any time. Demian Pratt MD           Critical Care Time:     none      Diagnosis     Clinical Impression:   1. Recurrent falls while walking    2. H/O multiple sclerosis (HCC)    3. Cellulitis of right lower extremity        PLAN:  1. Current Discharge Medication List      START taking these medications    Details   cephALEXin (Keflex) 500 mg capsule Take 1 Cap by mouth four (4) times daily for 7 days. Qty: 28 Cap, Refills: 0           2. Follow-up Information     Follow up With Specialties Details Why Contact Info    Dianna Meek MD Neurology Go to  at your previously assigned appointment [facility]  [facility]  [phone]      [facility] EMERGENCY DEPT Emergency Medicine  If symptoms worsen [address] [place] [address] Drive  [address] [facility]  [phone]        Return to ED if worse     Disposition:  12:47 AM  The patient's results have been reviewed with family and/or caregiver. They verbally convey their understanding and agreement of the patient's signs, symptoms, diagnosis, treatment and prognosis and additionally agree to follow up as recommended in the discharge instructions or to return to the Emergency Room should the patient's condition change prior to their follow-up appointment. The family and/or caregiver verbally agrees with the care-plan and all of their questions have been answered.  The discharge instructions have also been provided to the them with educational information regarding the patient's diagnosis as well a list of reasons why the patient would want to return to the ER prior to their follow-up appointment should their condition change.   Billy House MD

## 2021-01-14 NOTE — DISCHARGE INSTRUCTIONS
Local Primary Care Physicians     [facility] Physicians [phone]   Sesar Burton, MD Hollie Heller MD     [facility] Community Doctors [phone]   Ariana Dumont, [facility]   Liberty Bradley, MD Julia Farnsworth, MD Radha [address] [phone]   Marcela Joshua, MD Donald Michel MD    [address] [facility] [phone]   MD Randolph Humphrey, MD Tali Stock, MD Nicole Hendricks MD     [facility] [phone]   XGVJ KOJLWZ XZ, MD Wesley Flynn, MD Génesis Reyes, NP     [address] [place] [address] [phone]   MD Anu Deleon, MD Kierra Mcfadden, MD Kamron Tapia, MD Isabela Ortiz, MD Clark Conrad, MD Rafa Hubbard MD     [address] [facility]   Ambika Song MD    [facility] [phone]   Adelina Peace, MD Kita Maki, NP   Bret Oglesby, MD Elaine Greenfield, MD Brayden Kelly, MD Guadalupe Salas MD     [address] [facility] [phone]   MD Narciso Medeiros, FNP   Maxine Sepulveda, NP   Lonnie Keane, MD Ida Briceño, MD Juan Barron MD    [facility] [phone]   Mirella Sotomayor, MD Lelo Munoz, DO Virgen Núñez, MD Ira Campbell, MD Yoshi Diamond MD     [address] [phone]   [facility], MD [place] [phone]   MD Luann Patterson MD Quintella Fellers, MD     [facility] Physicians [phone]   Stephanie Roman, MD Jori Mackey, MD Jung Campbell, MD Emili Louis, MD Alberto Bingham, NP   Enrike Verduzco MD     [address] [phone]   Hilliard Haller, MD Nolia Buerger, MD Purnell Ding, MD     [address] [facility] [phone]     Angella Jara MD   [facility] [place], RUPA Ray FNP Bonny Ship, PA-C Norman Chatters, MD Vonzella Magic, YENIFER Villa DO     Miscellaneous:     [facility] Departments    For adult and child immunizations, family planning, TB screening, STD testing and women's health services. [facility]: [place] [phone]     [facility] 25   657 [facility]   [address]   170 [facility]: [facility] 200 [facility] [phone]     [address] [facility]        [address] [address]    For primary care services, woman and child wellness, and some clinics providing specialty care. [facility] -- [facility]. [facility]5 [facility] [phone]  [address] [place] [address] 200 [facility] [address] [place] [address] [phone]  339 [facility] [address] [place] [address] [phone] [address]  [place] [address] [place] [address] 5850  Community  [phone]  [address] [place] [address] [phone]  [facility] 81 [facility] [phone]  [address] [phone]  [facility]: [facility] 700 maribel Torres [address] [place] [address], [address] [phone]    [address] [phone]  [facility] Outreach 5850  Community  [phone]  [employer]  [address], [address] ([email or web address]) [phone]       Sexual Health/Woman Wellness Clinics   For STD/HIV testing and treatment, pregnancy testing and services, men's health, birth control services, LGBT services, and hepatitis/HPV vaccine services. [facility] [employer] [address]. [facility] 1900 [facility] 300 [facility] [address] [phone]  [address] [place] [address] Rd, 5th floor [phone]  Pregnancy [facility] of 59 [facility] [address] [phone]       [address] [facility] [address] [place] [address]   [phone]  [place]   [phone]  Women, Infant and Children's Services: [address] [phone]      [address] [phone]  [facility]   [phone]  [address] [place] [address]   [phone]  [facility] Psychiatry     [phone]  [address] Crisis   1212 [facility] [phone]    Thank you for allowing us to provide you with excellent care today. We hope we addressed all of your concerns and needs. We strive to provide excellent quality care in the Emergency Department. Please rate us as excellent, as anything less than excellent does not meet our expectations. If you feel that you have not received excellent quality care or timely care, please ask to speak to the nurse manager. Please choose us in the future for your continued health care needs. The exam and treatment you received in the Emergency Department were for an urgent problem and are not intended as complete care. It is important that you follow up with a doctor, nurse practitioner, or physician assistant for ongoing care. If your symptoms become worse or you do not improve as expected and you are unable to reach your usual health care provider, you should return to the Emergency Department. We are available 24 hours a day. Take this sheet with you when you go to your follow-up visit. If you have any problem arranging the follow-up visit, contact the Emergency Department immediately. If a prescription has been provided, please have it filled as soon as possible to avoid a delay in treatment. Read the entire medication instruction sheet provided to you by the pharmacy.  If you have any questions or reservations about taking the medication due to side effects or interactions with other medications please call the ER or your primary care physician. Take this sheet with you when you go to your follow-up visit. Make an appointment with your family doctor or the physician you were referred to for follow-up of this visit, as this is mandatory follow-up. Return to the ER if you are unable to be seen or if you are unable to be seen in a timely manner. If you have any problem arranging the follow-up visit, contact the Emergency Department immediately.

## 2021-01-15 ENCOUNTER — TELEPHONE (OUTPATIENT)
Dept: NEUROLOGY | Age: 46
End: 2021-01-15

## 2021-01-25 ENCOUNTER — PATIENT OUTREACH (OUTPATIENT)
Dept: CASE MANAGEMENT | Age: 46
End: 2021-01-25

## 2021-01-27 ENCOUNTER — OFFICE VISIT (OUTPATIENT)
Dept: NEUROLOGY | Age: 46
End: 2021-01-27
Payer: MEDICARE

## 2021-01-27 ENCOUNTER — TELEPHONE (OUTPATIENT)
Dept: NEUROLOGY | Age: 46
End: 2021-01-27

## 2021-01-27 VITALS
DIASTOLIC BLOOD PRESSURE: 82 MMHG | OXYGEN SATURATION: 100 % | BODY MASS INDEX: 27.86 KG/M2 | HEART RATE: 83 BPM | RESPIRATION RATE: 16 BRPM | SYSTOLIC BLOOD PRESSURE: 122 MMHG | TEMPERATURE: 97.4 F | HEIGHT: 71 IN | WEIGHT: 199 LBS

## 2021-01-27 DIAGNOSIS — F32.1 CURRENT MODERATE EPISODE OF MAJOR DEPRESSIVE DISORDER WITHOUT PRIOR EPISODE (HCC): ICD-10-CM

## 2021-01-27 DIAGNOSIS — R26.9 GAIT ABNORMALITY: ICD-10-CM

## 2021-01-27 DIAGNOSIS — F41.9 ANXIETY: ICD-10-CM

## 2021-01-27 DIAGNOSIS — G35 MS (MULTIPLE SCLEROSIS) (HCC): Primary | ICD-10-CM

## 2021-01-27 DIAGNOSIS — Z72.0 TOBACCO ABUSE: ICD-10-CM

## 2021-01-27 DIAGNOSIS — F33.2 SEVERE EPISODE OF RECURRENT MAJOR DEPRESSIVE DISORDER, WITHOUT PSYCHOTIC FEATURES (HCC): ICD-10-CM

## 2021-01-27 DIAGNOSIS — F43.23 ADJUSTMENT DISORDER WITH MIXED ANXIETY AND DEPRESSED MOOD: ICD-10-CM

## 2021-01-27 DIAGNOSIS — Z91.199 NON-ADHERENCE TO MEDICAL TREATMENT: ICD-10-CM

## 2021-01-27 DIAGNOSIS — G89.4 CHRONIC PAIN SYNDROME: ICD-10-CM

## 2021-01-27 PROCEDURE — G8427 DOCREV CUR MEDS BY ELIG CLIN: HCPCS | Performed by: PSYCHIATRY & NEUROLOGY

## 2021-01-27 PROCEDURE — G8432 DEP SCR NOT DOC, RNG: HCPCS | Performed by: PSYCHIATRY & NEUROLOGY

## 2021-01-27 PROCEDURE — G8417 CALC BMI ABV UP PARAM F/U: HCPCS | Performed by: PSYCHIATRY & NEUROLOGY

## 2021-01-27 PROCEDURE — 99215 OFFICE O/P EST HI 40 MIN: CPT | Performed by: PSYCHIATRY & NEUROLOGY

## 2021-01-27 NOTE — PROGRESS NOTES
Ambulatory Care Management Note    Date/Time:  1/27/2021 1:04 PM    This patient was received as a referral from payor assigned   Ambulatory Care Manager outreached to patient today to offer care management services. Introduction to self and role of care manager provided. Patient accepted care management services at this time. Follow up call scheduled at this time. Patient has Ambulatory Care Manager's contact number for for any questions or concerns. Patient reports she has appointment today at 80 with neurologist to discuss her medications and follow up.  DMB

## 2021-01-27 NOTE — PROGRESS NOTES
[facility]  In Office FOLLOW-UP VISIT         Nader Waterman is a 50 y.o. female who presents today for the following:  Chief Complaint   Patient presents with    Multiple Sclerosis     rehab f/u         ASSESSMENT AND PLAN    Relapsing remitting multiple sclerosis    I am firmly convinced that her most recent relapse in December was due to interrupting the standard protocol for [facility]. She did her first month and did not do her second month on time and in fact the second month never got completed. I do not want her on medication that could potentially cause major disastrous side effects due to noncompliance therefore I think Aubagio is the best option for her. Paperwork was filled out will be sent into MS one-to-one    Regarding pain management issues she is to stay on her current medicines and I told her I am not prescribing the Xanax for her. She will follow up with Dr. Jefferson garcia going forward        ICD-10-CM ICD-9-CM    1. MS (multiple sclerosis) ([facility]  G35 340    2. Gait abnormality  R26.9 781.2    3. Anxiety  F41.9 300.00    4. Current moderate episode of major depressive disorder without prior episode ([facility])  F32.1 296.22    5. Tobacco abuse  Z72.0 305.1    6. Adjustment disorder with mixed anxiety and depressed mood  F43.23 309.28    7. Severe episode of recurrent major depressive disorder, without psychotic features ([facility]  F33.2 296.33    8. Chronic pain syndrome  G89.4 338.4    9. Non-adherence to medical treatment  Z91.19 V15.81          I attest that 40 minutes was spent on today's visit reviewing medical records and diagnostic testing deemed pertinent to this patient's care, along with direct time spent at patient's visit including the history, physical assessment and plan, discussing diagnosis and management along with documentation.       HPI  Historical Data  Patient is known to the practice and is previously seen by Dr. Smith Said  Neurologic diagnosis  Relapsing remitting multiple sclerosis  Prior DMT  Tecfidera  Copaxone     Anxiety  Under the care of Dr. Doreen Thomson  Uses clonazepam or Xanax     Fibromyalgia  Continues on gabapentin     Tobacco use      Other significant comorbid conditions/concerns  Anxiety and depression  Chronic pain    Pertinent diagnostic data  December 16, 2020 MRI of the brain with and without contrast  IMPRESSION:   1. New enhancing active demyelinating plaques demonstrated in the superior right  frontal lobe. Less conspicuous foci of demyelination in the right frontal periventricular  white matter and left occipital periventricular white matter and right occipital  periventricular white matter. Other foci of demyelination are either stable or  less conspicuous than on the previous study. 2. Otherwise stable extensive confluent white matter demyelinating disease  burden. 3. Partially visualized demyelinating disease in the upper cervical cord. December 16, 2020 MRI of the cervical spine with and without contrast  IMPRESSION:  Stable cervical cord demyelinating disease burden.     No evidence of active demyelination in the cervical cord. December 16, 2020 MRI of the thoracic spine with and without contrast  IMPRESSION:    Stable patchy chronic demyelinating plaques throughout the thoracic cord.     There is no evidence of interval progression or of active demyelination. November 27, 2020 MRI of the brain with and without contrast  IMPRESSION:   1. New enhancing active demyelinating plaques in the right frontal  periventricular white matter and left occipital periventricular white matter. Suspected additional punctate new enhancing active demyelinating plaque in the  right occipital periventricular white matter. 2. Interval increase in size of a nonenhancing demyelinating plaque in the right  posterior frontal subcortical white matter.   3. Otherwise stable extensive confluent white matter lesions with an appearance  and pattern consistent with demyelinating disease. Multiple previously seen  enhancing demyelinating plaques have otherwise resolved. 4. Partially visualized demyelinating disease in the upper cervical cord. 5. Stable mild parenchymal volume loss    November 27, 2020 MRI of the cervical cord with and without contrast  IMPRESSION:  IMPRESSION:  Unchanged chronic demyelination the cervical spinal cord. No new demyelination  or active demyelination. November 27, 2020 MRI of the thoracic cord with and without contrast  IMPRESSION:    1. Stable patchy chronic demyelinating plaques throughout the thoracic cord. No  evidence of active demyelination    Interim Data:     Since I last saw the patient back in September, she has been seen by Dr. Jefferson garcia as well as being mid to the hospital for MS exacerbation with enhancing lesions on MRI scan    Last JVC titer from September 29, 2020 was negative    Relapsing remitting multiple sclerosis  Prior DMT  Tecfidera  Copaxone  Mavenclad: 2 major relapses but pt also did not take second month dosing of year one   First month was in October   Still had not taken the 2nd month as of 12/10/2020  Because the first month \"made her feel bad\" as per phone note dated 12/10/2020 taken by QI Rosa    Patient is coming in wanting her Xanax and complaining of pain.     She stated she was walking when she was in rehab and now she cannot walk and is in the wheelchair all the time otherwise she falls down    She is not on any DMT therapy  I discussed the interruption of the Mavencl[employer] protocol and its probable results in her hospital admission for MS flareup    We discussed DMT options  She was interested in possibly doing Nadean Ronel  However I think Francies Meridian would be a better option for her due to its long half-life should she begin consistent with treatment and therapy          Allergies   Allergen Reactions    Morphine Rash       Current Outpatient Medications   Medication Sig   • busPIRone (BUSPAR) 5 mg tablet TAKE 2 TABLETS BY MOUTH TWICE DAILY   • nystatin (MYCOSTATIN) topical cream APPLY CREAM TOPICALLY TO AFFECTED AREA TWICE DAILY   • gabapentin (NEURONTIN) 300 mg capsule Take 2 Caps by mouth three (3) times daily. Max Daily Amount: 1,800 mg.   • melatonin 3 mg tablet Take 15 mg by mouth nightly as needed for Insomnia.   • DULoxetine (CYMBALTA) 60 mg capsule Take 1 Cap by mouth daily. Indications: neuropathic pain   • cyclobenzaprine (FLEXERIL) 10 mg tablet Take 1 Tab by mouth three (3) times daily as needed for Muscle Spasm(s).   • acetaminophen (TYLENOL) 500 mg tablet Take 500 mg by mouth every four (4) hours as needed.   • polyethylene glycol (Miralax) 17 gram packet Take 17 g by mouth two (2) times daily as needed for Constipation.   • famotidine (PEPCID) 20 mg tablet Take 20 mg by mouth two (2) times daily as needed for Heartburn.   • ALPRAZolam (Xanax) 0.25 mg tablet Take 1 Tab by mouth two (2) times daily as needed for Anxiety. Max Daily Amount: 0.5 mg. Indications: anxiousness associated with depression (Patient taking differently: Take 0.25 mg by mouth two (2) times a day. Indications: anxiousness associated with depression)   • senna-docusate (PERICOLACE) 8.6-50 mg per tablet Take 2 Tabs by mouth daily.     No current facility-administered medications for this visit.        Past medical history/surgical history, family history, and social history have been reviewed for today's visit      ROS    A ten system review of constitutional, cardiovascular, respiratory, musculoskeletal, endocrine, skin, SHEENT, genitourinary, psychiatric and neurologic systems was obtained and is unremarkable except as mentioned under HPI          EXAMINATION:     General appearance: Patient is well-developed and well-nourished in no apparent distress and well groomed.    Psych/mental health:  Affect: Flat    PHQ  3 most recent PHQ Screens 1/27/2021   PHQ Not Done -   Little  interest or pleasure in doing things Several days   Feeling down, depressed, irritable, or hopeless Several days   Total Score PHQ 2 2   Trouble falling or staying asleep, or sleeping too much -   Feeling tired or having little energy -   Poor appetite, weight loss, or overeating -   Feeling bad about yourself - or that you are a failure or have let yourself or your family down -   Trouble concentrating on things such as school, work, reading, or watching TV -   Moving or speaking so slowly that other people could have noticed; or the opposite being so fidgety that others notice -   Thoughts of being better off dead, or hurting yourself in some way -   PHQ 9 Score -   How difficult have these problems made it for you to do your work, take care of your home and get along with others -       HEENT:   Normocephalic  With evidence of trauma: No  Full range of motion head neck: Yes  Tenderness to palpation of the head neck region: N/A      Cardiovascular:     Extremities warm to touch: N/A  Extremity swelling: No  Discoloration: No  Evidence of PVD: No    Respiratory:   Dyspnea on exertion: No   Abnormal effort on casual observation: No   Use of portable oxygen: No   Evidence of cyanosis: No     Musculoskeletal:   Evidence of significant bone deformities: No   Spinal curvature: No     Integumentary:    Obvious bruising: No   Lacerations or discoloration on casual observation: No       Neurological Examination:   Mental Status:        MMSE  No flowsheet data found. Formal testing was not completed    there was nothing concerning on general observation and discussion.    Alert oriented and appropriate to general conversation  Normal processing on general observation  Followed conversation and responded seemingly appropriate throughout the office visit  No word finding difficulties noted on casual observation  Able to follow directions without difficulty     Cranial Nerves:    EOMs intact gaze is conjugate  No nystagmus is appreciated  Facial motor intact bilaterally  Hearing intact to conversation  Voice with normal projection, no evidence of secretion pooling  Shoulder shrug intact bilaterally  No tongue deviation appreciated     Motor:   She has normal tone and bulk  She is in a wheelchair  She good strength in her uppers as well as her lowers despite claiming she cannot walk and is in a wheelchair      Sensation: Intact to light touch    Coordination/Cerebellar:   Grossly intact    Gait: Patient was not ambulated    Reflexes: Not tested    Fall risk assessment  Fall Risk Assessment, last 12 mths 1/27/2021   Able to walk? Yes   Fall in past 12 months? 1   Do you feel unsteady? 1   Are you worried about falling 1   Number of falls in past 12 months 2   Fall with injury? -           Follow-up and Dispositions    · Return in about 3 months (around 4/27/2021) for In office appointment Dr Moose Perez, MS, ANP-BC, [facility]

## 2021-01-27 NOTE — PATIENT INSTRUCTIONS
As per discussion  We can start you on a medication called Aubagio for your multiple sclerosis  You take it 1 tablet daily    Interruption of your Gerardine Patches is probably what caused you to have a significant flareup in December    We will send the paperwork in an MS one-to-one will be calling you to set up shipment and to talk to you about your insurance coverage. You have had all the correct blood work done prior to the start of this while you are in the hospital or before so we do not need to get any additional blood work at this time     I do strongly recommend that you get the Covid vaccination when it becomes available to you there are no contraindications from a neurologic perspective or from a medical perspective based on our medical history review. Although you may want to discuss further with your primary care or another specialist to make sure there is no other concerns related to your health condition. Below is a website you can go to to get further information about the vaccine and vaccine scheduling through the state website and indirectly through [facility] system    [email or web address]    Next Steps:   If [facility] patients reach out to you for more information on how to get a vaccine, please communicate the following:  o Watch for a [employer] message that will let you know when you are eligible to schedule an appointment for a vaccine. o If you don't have [employer], go to [employer] and click ? atient Portal to sign up.  o You can also watch our website for updates: [email or web address]   Community members should complete the interest form on the [address] [place] [address] website and monitor their local health district website to learn more about additional vaccine distribution opportunities.     Office Policies    o Phone calls/patient messages:  Please allow up to 24 hours for someone in the office to contact you about your call or message. Be mindful your provider may be out of the office or your message may require further review. We encourage you to use [employer] for your messages as this is a faster, more efficient way to communicate with our office    o Medication Refills:  Prescription medications require up to 48 business hours to process. We encourage you to use [employer] for your refills. For controlled medications: Please allow up to 72 business hours to process. Certain medications may require you to  a written prescription at our office. NO narcotic/controlled medications will be prescribed after 4pm Monday through Friday or on weekends    o Form/Paperwork Completion:  We ask that you allow 7-14 business days. You may also download your forms to [employer] to have your doctor print off.

## 2021-01-27 NOTE — PROGRESS NOTES
Chief Complaint   Patient presents with    Multiple Sclerosis     rehab f/u     Patient is here for f/u 1 week after rehab with [facility]. She is having bilateral arm pain. Visit Vitals  /82 (BP 1 Location: Left arm, BP Patient Position: Sitting)   Pulse 83   Temp 97.4 °F (36.3 °C) (Oral)   Resp 16   Ht 5' 11\" (1.803 m)   Wt 199 lb (90.3 kg)   SpO2 100%   BMI 27.75 kg/m²     1. Have you been to the ER, urgent care clinic since your last visit? Hospitalized since your last visit?no    2. Have you seen or consulted any other health care providers outside of the [address] [place] [address] since your last visit? Include any pap smears or colon screening.  no

## 2021-01-28 ENCOUNTER — DOCUMENTATION ONLY (OUTPATIENT)
Dept: NEUROLOGY | Age: 46
End: 2021-01-28

## 2021-01-28 NOTE — PROGRESS NOTES
Faxed Aubagio start form with demographic notes office and insurance confirmation received sent to central scanning

## 2021-02-06 ENCOUNTER — APPOINTMENT (OUTPATIENT)
Dept: CT IMAGING | Age: 46
DRG: 093 | End: 2021-02-06
Attending: EMERGENCY MEDICINE
Payer: MEDICARE

## 2021-02-06 ENCOUNTER — APPOINTMENT (OUTPATIENT)
Dept: MRI IMAGING | Age: 46
DRG: 093 | End: 2021-02-06
Attending: STUDENT IN AN ORGANIZED HEALTH CARE EDUCATION/TRAINING PROGRAM
Payer: MEDICARE

## 2021-02-06 ENCOUNTER — APPOINTMENT (OUTPATIENT)
Dept: GENERAL RADIOLOGY | Age: 46
DRG: 093 | End: 2021-02-06
Attending: EMERGENCY MEDICINE
Payer: MEDICARE

## 2021-02-06 ENCOUNTER — HOSPITAL ENCOUNTER (INPATIENT)
Age: 46
LOS: 1 days | Discharge: HOME | DRG: 093 | End: 2021-02-07
Attending: EMERGENCY MEDICINE | Admitting: STUDENT IN AN ORGANIZED HEALTH CARE EDUCATION/TRAINING PROGRAM
Payer: MEDICARE

## 2021-02-06 DIAGNOSIS — R41.82 ALTERED MENTAL STATUS, UNSPECIFIED ALTERED MENTAL STATUS TYPE: Primary | ICD-10-CM

## 2021-02-06 DIAGNOSIS — F41.9 ANXIETY: ICD-10-CM

## 2021-02-06 DIAGNOSIS — G35 MS (MULTIPLE SCLEROSIS) (HCC): ICD-10-CM

## 2021-02-06 DIAGNOSIS — M79.7 FIBROMYALGIA: ICD-10-CM

## 2021-02-06 LAB
ALBUMIN SERPL-MCNC: 3.1 G/DL (ref 3.5–5)
ALBUMIN/GLOB SERPL: 1 {RATIO} (ref 1.1–2.2)
ALP SERPL-CCNC: 63 U/L (ref 45–117)
ALT SERPL-CCNC: 11 U/L (ref 12–78)
ANION GAP SERPL CALC-SCNC: 4 MMOL/L (ref 5–15)
APPEARANCE UR: CLEAR
AST SERPL-CCNC: 8 U/L (ref 15–37)
ATRIAL RATE: 105 BPM
BASOPHILS # BLD: 0.1 K/UL (ref 0–0.1)
BASOPHILS NFR BLD: 1 % (ref 0–1)
BILIRUB SERPL-MCNC: 0.2 MG/DL (ref 0.2–1)
BILIRUB UR QL: NEGATIVE
BUN SERPL-MCNC: 10 MG/DL (ref 6–20)
BUN/CREAT SERPL: 14 (ref 12–20)
CALCIUM SERPL-MCNC: 8.3 MG/DL (ref 8.5–10.1)
CALCULATED P AXIS, ECG09: 57 DEGREES
CALCULATED R AXIS, ECG10: 11 DEGREES
CALCULATED T AXIS, ECG11: 48 DEGREES
CHLORIDE SERPL-SCNC: 108 MMOL/L (ref 97–108)
CO2 SERPL-SCNC: 29 MMOL/L (ref 21–32)
COLOR UR: NORMAL
COMMENT, HOLDF: NORMAL
CREAT SERPL-MCNC: 0.69 MG/DL (ref 0.55–1.02)
DIAGNOSIS, 93000: NORMAL
DIFFERENTIAL METHOD BLD: ABNORMAL
EOSINOPHIL # BLD: 0.1 K/UL (ref 0–0.4)
EOSINOPHIL NFR BLD: 1 % (ref 0–7)
ERYTHROCYTE [DISTWIDTH] IN BLOOD BY AUTOMATED COUNT: 12.8 % (ref 11.5–14.5)
GLOBULIN SER CALC-MCNC: 3 G/DL (ref 2–4)
GLUCOSE BLD STRIP.AUTO-MCNC: 147 MG/DL (ref 65–100)
GLUCOSE SERPL-MCNC: 127 MG/DL (ref 65–100)
GLUCOSE UR STRIP.AUTO-MCNC: NEGATIVE MG/DL
HCT VFR BLD AUTO: 33.1 % (ref 35–47)
HGB BLD-MCNC: 10.9 G/DL (ref 11.5–16)
HGB UR QL STRIP: NEGATIVE
IMM GRANULOCYTES # BLD AUTO: 0 K/UL (ref 0–0.04)
IMM GRANULOCYTES NFR BLD AUTO: 0 % (ref 0–0.5)
INR PPP: 1 (ref 0.9–1.1)
KETONES UR QL STRIP.AUTO: NEGATIVE MG/DL
LEUKOCYTE ESTERASE UR QL STRIP.AUTO: NEGATIVE
LYMPHOCYTES # BLD: 0.5 K/UL (ref 0.8–3.5)
LYMPHOCYTES NFR BLD: 8 % (ref 12–49)
MCH RBC QN AUTO: 32.2 PG (ref 26–34)
MCHC RBC AUTO-ENTMCNC: 32.9 G/DL (ref 30–36.5)
MCV RBC AUTO: 97.9 FL (ref 80–99)
MONOCYTES # BLD: 0.5 K/UL (ref 0–1)
MONOCYTES NFR BLD: 8 % (ref 5–13)
NEUTS SEG # BLD: 4.9 K/UL (ref 1.8–8)
NEUTS SEG NFR BLD: 82 % (ref 32–75)
NITRITE UR QL STRIP.AUTO: NEGATIVE
NRBC # BLD: 0 K/UL (ref 0–0.01)
NRBC BLD-RTO: 0 PER 100 WBC
P-R INTERVAL, ECG05: 182 MS
PH UR STRIP: 7 [PH] (ref 5–8)
PLATELET # BLD AUTO: 250 K/UL (ref 150–400)
PMV BLD AUTO: 9.6 FL (ref 8.9–12.9)
POTASSIUM SERPL-SCNC: 4.5 MMOL/L (ref 3.5–5.1)
PROT SERPL-MCNC: 6.1 G/DL (ref 6.4–8.2)
PROT UR STRIP-MCNC: NEGATIVE MG/DL
PROTHROMBIN TIME: 10.8 SEC (ref 9–11.1)
Q-T INTERVAL, ECG07: 350 MS
QRS DURATION, ECG06: 90 MS
QTC CALCULATION (BEZET), ECG08: 462 MS
RBC # BLD AUTO: 3.38 M/UL (ref 3.8–5.2)
RBC MORPH BLD: ABNORMAL
SAMPLES BEING HELD,HOLD: NORMAL
SERVICE CMNT-IMP: ABNORMAL
SODIUM SERPL-SCNC: 141 MMOL/L (ref 136–145)
SP GR UR REFRACTOMETRY: 1.01 (ref 1–1.03)
TROPONIN I SERPL-MCNC: <0.05 NG/ML
UROBILINOGEN UR QL STRIP.AUTO: 1 EU/DL (ref 0.2–1)
VENTRICULAR RATE, ECG03: 105 BPM
WBC # BLD AUTO: 6.1 K/UL (ref 3.6–11)

## 2021-02-06 PROCEDURE — 0042T CT CODE NEURO PERF W CBF: CPT

## 2021-02-06 PROCEDURE — 71045 X-RAY EXAM CHEST 1 VIEW: CPT

## 2021-02-06 PROCEDURE — 65270000029 HC RM PRIVATE

## 2021-02-06 PROCEDURE — 84484 ASSAY OF TROPONIN QUANT: CPT

## 2021-02-06 PROCEDURE — 74011000636 HC RX REV CODE- 636: Performed by: EMERGENCY MEDICINE

## 2021-02-06 PROCEDURE — 36415 COLL VENOUS BLD VENIPUNCTURE: CPT

## 2021-02-06 PROCEDURE — 82962 GLUCOSE BLOOD TEST: CPT

## 2021-02-06 PROCEDURE — 4A03X5D MEASUREMENT OF ARTERIAL FLOW, INTRACRANIAL, EXTERNAL APPROACH: ICD-10-PCS | Performed by: RADIOLOGY

## 2021-02-06 PROCEDURE — 70496 CT ANGIOGRAPHY HEAD: CPT

## 2021-02-06 PROCEDURE — 93005 ELECTROCARDIOGRAM TRACING: CPT

## 2021-02-06 PROCEDURE — 70551 MRI BRAIN STEM W/O DYE: CPT

## 2021-02-06 PROCEDURE — 85025 COMPLETE CBC W/AUTO DIFF WBC: CPT

## 2021-02-06 PROCEDURE — 70450 CT HEAD/BRAIN W/O DYE: CPT

## 2021-02-06 PROCEDURE — 85610 PROTHROMBIN TIME: CPT

## 2021-02-06 PROCEDURE — 74011250637 HC RX REV CODE- 250/637: Performed by: STUDENT IN AN ORGANIZED HEALTH CARE EDUCATION/TRAINING PROGRAM

## 2021-02-06 PROCEDURE — 80053 COMPREHEN METABOLIC PANEL: CPT

## 2021-02-06 PROCEDURE — 96372 THER/PROPH/DIAG INJ SC/IM: CPT

## 2021-02-06 PROCEDURE — 65660000000 HC RM CCU STEPDOWN

## 2021-02-06 PROCEDURE — 81003 URINALYSIS AUTO W/O SCOPE: CPT

## 2021-02-06 PROCEDURE — 99285 EMERGENCY DEPT VISIT HI MDM: CPT

## 2021-02-06 RX ORDER — ACETAMINOPHEN 650 MG/1
650 SUPPOSITORY RECTAL
Status: DISCONTINUED | OUTPATIENT
Start: 2021-02-06 | End: 2021-02-07 | Stop reason: HOSPADM

## 2021-02-06 RX ORDER — ENOXAPARIN SODIUM 100 MG/ML
40 INJECTION SUBCUTANEOUS EVERY 24 HOURS
Status: DISCONTINUED | OUTPATIENT
Start: 2021-02-07 | End: 2021-02-07 | Stop reason: HOSPADM

## 2021-02-06 RX ORDER — ACETAMINOPHEN 325 MG/1
650 TABLET ORAL
Status: DISCONTINUED | OUTPATIENT
Start: 2021-02-06 | End: 2021-02-07 | Stop reason: HOSPADM

## 2021-02-06 RX ORDER — AMOXICILLIN 250 MG
2 CAPSULE ORAL DAILY
Status: DISCONTINUED | OUTPATIENT
Start: 2021-02-07 | End: 2021-02-07 | Stop reason: HOSPADM

## 2021-02-06 RX ORDER — ATORVASTATIN CALCIUM 40 MG/1
40 TABLET, FILM COATED ORAL
Status: DISCONTINUED | OUTPATIENT
Start: 2021-02-06 | End: 2021-02-07 | Stop reason: HOSPADM

## 2021-02-06 RX ORDER — GUAIFENESIN 100 MG/5ML
81 LIQUID (ML) ORAL DAILY
Status: DISCONTINUED | OUTPATIENT
Start: 2021-02-06 | End: 2021-02-07 | Stop reason: HOSPADM

## 2021-02-06 RX ORDER — GABAPENTIN 300 MG/1
600 CAPSULE ORAL 3 TIMES DAILY
Status: DISCONTINUED | OUTPATIENT
Start: 2021-02-06 | End: 2021-02-07 | Stop reason: HOSPADM

## 2021-02-06 RX ORDER — DULOXETIN HYDROCHLORIDE 30 MG/1
60 CAPSULE, DELAYED RELEASE ORAL DAILY
Status: DISCONTINUED | OUTPATIENT
Start: 2021-02-07 | End: 2021-02-07 | Stop reason: HOSPADM

## 2021-02-06 RX ORDER — ALPRAZOLAM 0.25 MG/1
0.25 TABLET ORAL
Status: DISCONTINUED | OUTPATIENT
Start: 2021-02-06 | End: 2021-02-07 | Stop reason: HOSPADM

## 2021-02-06 RX ADMIN — ASPIRIN 81 MG: 81 TABLET, CHEWABLE ORAL at 23:33

## 2021-02-06 RX ADMIN — IOPAMIDOL 125 ML: 755 INJECTION, SOLUTION INTRAVENOUS at 17:10

## 2021-02-06 RX ADMIN — ATORVASTATIN CALCIUM 40 MG: 40 TABLET, FILM COATED ORAL at 23:33

## 2021-02-06 RX ADMIN — ALPRAZOLAM 0.25 MG: 0.25 TABLET ORAL at 22:45

## 2021-02-06 NOTE — ED NOTES
Patient arrives w EMS for c/o AMS. EMS reports LKW was around 1600. Patient w a h/o MS. Patient's speech is garbled and EMS was unaware to obtain a clear story due to her roommate or significant other being a poor historian. Dr. Warren Nose immediately to room and Code S level 1 alerted, this writer took patient to CT. Tele Neuro evaluated patient and the NIH was unclear as patient was uncooperative. Neurologist deemed that patient is not a good for TPA at this time based on his evaluation. Patient placed back in scanner for CTA at this time.

## 2021-02-06 NOTE — ED PROVIDER NOTES
EMERGENCY DEPARTMENT HISTORY AND PHYSICAL EXAM      Date: 2/6/2021  Patient Name: Abby Jeffrey    History of Presenting Illness     Chief Complaint   Patient presents with    Altered mental status         HPI: Abby Jeffrey, 50 y.o. female w hx MS s/p multiple admissions w MS flares presenting to ED w cc AMS/slurred speech. Per EMS, pt lives with a man who called EMS because she was not acting like herself. LKN 1600. He was a poor historian per EMS. Pt has slurred speech in ED but states she feels weak allover. Otherwise, unable to get complete hx. There are no other complaints, changes, or physical findings at this time. PCP: None    No current facility-administered medications on file prior to encounter. Current Outpatient Medications on File Prior to Encounter   Medication Sig Dispense Refill    acetaminophen (TYLENOL) 500 mg tablet Take 500 mg by mouth every four (4) hours as needed.  busPIRone (BUSPAR) 5 mg tablet TAKE 2 TABLETS BY MOUTH TWICE DAILY      nystatin (MYCOSTATIN) topical cream APPLY CREAM TOPICALLY TO AFFECTED AREA TWICE DAILY      polyethylene glycol (Miralax) 17 gram packet Take 17 g by mouth two (2) times daily as needed for Constipation.  gabapentin (NEURONTIN) 300 mg capsule Take 2 Caps by mouth three (3) times daily. Max Daily Amount: 1,800 mg. 180 Cap 0    melatonin 3 mg tablet Take 15 mg by mouth nightly as needed for Insomnia.  famotidine (PEPCID) 20 mg tablet Take 20 mg by mouth two (2) times daily as needed for Heartburn.  ALPRAZolam (Xanax) 0.25 mg tablet Take 1 Tab by mouth two (2) times daily as needed for Anxiety. Max Daily Amount: 0.5 mg. Indications: anxiousness associated with depression (Patient taking differently: Take 0.25 mg by mouth two (2) times a day. Indications: anxiousness associated with depression) 6 Tab 0    DULoxetine (CYMBALTA) 60 mg capsule Take 1 Cap by mouth daily.  Indications: neuropathic pain 90 Cap 5    cyclobenzaprine (FLEXERIL) 10 mg tablet Take 1 Tab by mouth three (3) times daily as needed for Muscle Spasm(s). 90 Tab 2    senna-docusate (PERICOLACE) 8.6-50 mg per tablet Take 2 Tabs by mouth daily. 15 Tab 0       Past History     Past Medical History:  Past Medical History:   Diagnosis Date    Body aches 12/11/2014    Chronic pain     Depression     Headache(784.0)     History of mammogram never before    MS (multiple sclerosis) ([facility] [address]     Neurological disorder     Multiple Sclerosis    Pap smear for cervical cancer screening 2008    Psychiatric disorder     anxiety    Psychotic disorder [facility])        Past Surgical History:  Past Surgical History:   Procedure Laterality Date    COLONOSCOPY N/A 2/28/2018    COLONOSCOPY performed by Nav Appiah MD at [facility] ENDOSCOPY    HX CHOLECYSTECTOMY      HX GYN      3 c-sections    HX HEENT      bilateral ear tube surgery    HX HERNIA REPAIR      HX OTHER SURGICAL      R inguinal hernia repair       Family History:  Family History   Problem Relation Age of Onset    Heart Attack Father         tripple bypass    Cancer Father         lung    COPD Father     Diabetes Mother         type 2    Heart Disease Mother         heart disease    Diabetes Paternal Grandmother     No Known Problems Sister     No Known Problems Brother     No Known Problems Child        Social History:  Social History     Tobacco Use    Smoking status: Current Every Day Smoker     Packs/day: 0.50     Years: 17.00     Pack years: 8.50     Types: Cigarettes    Smokeless tobacco: Never Used    Tobacco comment: 1 pack every 3 days. Substance Use Topics    Alcohol use: No    Drug use: No       Allergies: Allergies   Allergen Reactions    Morphine Rash         Review of Systems   Review of Systems   Unable to perform ROS: Mental status change       Physical Exam   Physical Exam  Vitals signs and nursing note reviewed. Constitutional:       Appearance: She is well-developed. HENT:      Head: Normocephalic and atraumatic. Eyes:      Pupils: Pupils are equal, round, and reactive to light. Comments: Gaze asymmetric   Neck:      Musculoskeletal: Normal range of motion and neck supple. Cardiovascular:      Rate and Rhythm: Normal rate and regular rhythm. Pulmonary:      Effort: Pulmonary effort is normal.      Breath sounds: Normal breath sounds. Abdominal:      Palpations: Abdomen is soft. Tenderness: There is no abdominal tenderness. Skin:     General: Skin is warm and dry. Neurological:      Mental Status: She is alert. Comments: Substantial slurred speech, MAEE, no obvious CN deficits other than asymmetry of gaze         Diagnostic Study Results     Labs -     Recent Results (from the past 24 hour(s))   GLUCOSE, POC    Collection Time: 02/06/21  4:55 PM   Result Value Ref Range    Glucose (POC) 147 (H) 65 - 100 mg/dL    Performed by Joel HEARD ([facility])    CBC WITH AUTOMATED DIFF    Collection Time: 02/06/21  5:10 PM   Result Value Ref Range    WBC 6.1 3.6 - 11.0 K/uL    RBC 3.38 (L) 3.80 - 5.20 M/uL    HGB 10.9 (L) 11.5 - 16.0 g/dL    HCT 33.1 (L) 35.0 - 47.0 %    MCV 97.9 80.0 - 99.0 FL    MCH 32.2 26.0 - 34.0 PG    MCHC 32.9 30.0 - 36.5 g/dL    RDW 12.8 11.5 - 14.5 %    PLATELET 958 572 - 021 K/uL    MPV 9.6 8.9 - 12.9 FL    NRBC 0.0 0  WBC    ABSOLUTE NRBC 0.00 0.00 - 0.01 K/uL    NEUTROPHILS 82 (H) 32 - 75 %    LYMPHOCYTES 8 (L) 12 - 49 %    MONOCYTES 8 5 - 13 %    EOSINOPHILS 1 0 - 7 %    BASOPHILS 1 0 - 1 %    IMMATURE GRANULOCYTES 0 0.0 - 0.5 %    ABS. NEUTROPHILS 4.9 1.8 - 8.0 K/UL    ABS. LYMPHOCYTES 0.5 (L) 0.8 - 3.5 K/UL    ABS. MONOCYTES 0.5 0.0 - 1.0 K/UL    ABS. EOSINOPHILS 0.1 0.0 - 0.4 K/UL    ABS. BASOPHILS 0.1 0.0 - 0.1 K/UL    ABS. IMM.  GRANS. 0.0 0.00 - 0.04 K/UL    DF AUTOMATED      RBC COMMENTS NORMOCYTIC, NORMOCHROMIC     METABOLIC PANEL, COMPREHENSIVE    Collection Time: 02/06/21  5:10 PM   Result Value Ref Range    Sodium 141 136 - 145 mmol/L    Potassium 4.5 3.5 - 5.1 mmol/L    Chloride 108 97 - 108 mmol/L    CO2 29 21 - 32 mmol/L    Anion gap 4 (L) 5 - 15 mmol/L    Glucose 127 (H) 65 - 100 mg/dL    BUN 10 6 - 20 MG/DL    Creatinine 0.69 0.55 - 1.02 MG/DL    BUN/Creatinine ratio 14 12 - 20      GFR est AA >60 >60 ml/min/1.73m2    GFR est non-AA >60 >60 ml/min/1.73m2    Calcium 8.3 (L) 8.5 - 10.1 MG/DL    Bilirubin, total 0.2 0.2 - 1.0 MG/DL    ALT (SGPT) 11 (L) 12 - 78 U/L    AST (SGOT) 8 (L) 15 - 37 U/L    Alk. phosphatase 63 45 - 117 U/L    Protein, total 6.1 (L) 6.4 - 8.2 g/dL    Albumin 3.1 (L) 3.5 - 5.0 g/dL    Globulin 3.0 2.0 - 4.0 g/dL    A-G Ratio 1.0 (L) 1.1 - 2.2     PROTHROMBIN TIME + INR    Collection Time: 02/06/21  5:10 PM   Result Value Ref Range    INR 1.0 0.9 - 1.1      Prothrombin time 10.8 9.0 - 11.1 sec   TROPONIN I    Collection Time: 02/06/21  5:10 PM   Result Value Ref Range    Troponin-I, Qt. <0.05 <0.05 ng/mL   SAMPLES BEING HELD    Collection Time: 02/06/21  5:10 PM   Result Value Ref Range    SAMPLES BEING HELD  RED     COMMENT        Add-on orders for these samples will be processed based on acceptable specimen integrity and analyte stability, which may vary by analyte.    URINALYSIS W/ RFLX MICROSCOPIC    Collection Time: 02/06/21  5:56 PM   Result Value Ref Range    Color YELLOW/STRAW      Appearance CLEAR CLEAR      Specific gravity 1.010 1.003 - 1.030      pH (UA) 7.0 5.0 - 8.0      Protein Negative NEG mg/dL    Glucose Negative NEG mg/dL    Ketone Negative NEG mg/dL    Bilirubin Negative NEG      Blood Negative NEG      Urobilinogen 1.0 0.2 - 1.0 EU/dL    Nitrites Negative NEG      Leukocyte Esterase Negative NEG     EKG, 12 LEAD, INITIAL    Collection Time: 02/06/21  6:02 PM   Result Value Ref Range    Ventricular Rate 105 BPM    Atrial Rate 105 BPM    P-R Interval 182 ms    QRS Duration 90 ms    Q-T Interval 350 ms    QTC Calculation (Bezet) 462 ms    Calculated P Axis 57 degrees    Calculated R Axis 11 degrees    Calculated T Axis 48 degrees    Diagnosis       Sinus tachycardia  When compared with ECG of 26-NOV-2020 13:46,  No significant change was found         Radiologic Studies -   XR CHEST PORT   Final Result   No evidence of acute cardiopulmonary process. CTA CODE NEURO HEAD AND NECK W CONT         CT CODE NEURO PERF W CBF         CT CODE NEURO HEAD WO CONTRAST   Final Result   1. No evidence of acute infarct or intracranial hemorrhage. 2. Periventricular white matter disease is not significantly changed. Patient   reports a history of multiple sclerosis. CXR Results  (Last 48 hours)               02/06/21 1819  XR CHEST PORT Final result    Impression:  No evidence of acute cardiopulmonary process. Narrative:  INDICATION:  CVA        COMPARISON: March 2020       FINDINGS: Single AP portable view of the chest obtained at 1806 demonstrates a   stable cardiomediastinal silhouette. The lungs are clear bilaterally. No osseous   abnormalities are seen. Medical Decision Making   I am the first provider for this patient. I reviewed the vital signs, available nursing notes, past medical history, past surgical history, family history and social history. Vital Signs-Reviewed the patient's vital signs. Patient Vitals for the past 24 hrs:   Temp Pulse Resp BP SpO2   02/06/21 1748 97.7 °F (36.5 °C) (!) 101 18 107/66 98 %         Provider Notes (Medical Decision Making):   47-year-old with history of MS presenting to ED with altered mental status. Very difficult to get a clear history. In ED, patient does not have clear localizing symptoms but does have slurred speech, code stroke called on my initial evaluation. She was evaluated via teleneurology, no TPA because no localizing symptoms, irregular exam suggestive more of waxing waning mental status than focal neurological deficit. MRI recommended.   Otherwise, work-up in the emergency department is nonfocal. Unclear etiology for encephalopathy, multiple possible causes including polypharmacy. Patient will require admission for further management. EKG sinus, rate 105, nonspecific ST T changes, normal QT    ED Course:     Initial assessment performed. The patients presenting problems have been discussed, and they are in agreement with the care plan formulated and outlined with them. I have encouraged them to ask questions as they arise throughout their visit. Critical Care Time:     35    Disposition:  admit    PLAN:  1. Current Discharge Medication List        2.    Follow-up Information    None       Return to ED if worse     Diagnosis     Clinical Impression: encephalopathy

## 2021-02-06 NOTE — PROGRESS NOTES
Spiritual Care Assessment/Progress Note  [facility]      NAME: Milagros Marquez      MRN: [id number]  AGE: 50 y.o. SEX: female  Temple Affiliation: Heena Muñiz   Language: English     2/6/2021     Total Time (in minutes): 9     Spiritual Assessment begun in [facility] EMERGENCY DEPT through conversation with:         []Patient        [] Family    [] Friend(s)        Reason for Consult: Crisis(Code Stroke)     Spiritual beliefs: (Please include comment if needed)     [] Identifies with a sudhakar tradition:         [] Supported by a sudhakar community:            [] Claims no spiritual orientation:           [] Seeking spiritual identity:                [] Adheres to an individual form of spirituality:           [x] Not able to assess:                           Identified resources for coping:      [] Prayer                               [] Music                  [] Guided Imagery     [] Family/friends                 [] Pet visits     [] Devotional reading                         [x] Unknown     [] Other:                                               Interventions offered during this visit: (See comments for more details)    Patient Interventions: Other (comment)(IV)           Plan of Care:     [] Support spiritual and/or cultural needs    [] Support AMD and/or advance care planning process      [] Support grieving process   [] Coordinate Rites and/or Rituals    [] Coordination with community clergy   [] No spiritual needs identified at this time   [] Detailed Plan of Care below (See Comments)  [] Make referral to Music Therapy  [] Make referral to Pet Therapy     [] Make referral to Addiction services  [] Make referral to [facility]  [] Make referral to Spiritual Care Partner  [] No future visits requested        [x] Follow up upon further referrals     Comments: Responded to Code Stroke page for pt Leon[place] in [place] [address]. Pt was in CT during time of visit and no family/friend witnessed in room.  As such, unable to assess needs/supports at this time.      Edu 1 MAE [place] 1 Provider  [facility] Paging Service [phone])

## 2021-02-07 ENCOUNTER — APPOINTMENT (OUTPATIENT)
Dept: NON INVASIVE DIAGNOSTICS | Age: 46
DRG: 093 | End: 2021-02-07
Attending: STUDENT IN AN ORGANIZED HEALTH CARE EDUCATION/TRAINING PROGRAM
Payer: MEDICARE

## 2021-02-07 VITALS
OXYGEN SATURATION: 96 % | SYSTOLIC BLOOD PRESSURE: 123 MMHG | DIASTOLIC BLOOD PRESSURE: 80 MMHG | WEIGHT: 223.55 LBS | HEART RATE: 79 BPM | RESPIRATION RATE: 14 BRPM | HEIGHT: 66 IN | BODY MASS INDEX: 35.93 KG/M2 | TEMPERATURE: 97.7 F

## 2021-02-07 PROBLEM — R29.6 RECURRENT FALLS: Status: ACTIVE | Noted: 2021-02-07

## 2021-02-07 PROBLEM — Z91.199 NON COMPLIANCE WITH MEDICAL TREATMENT: Status: ACTIVE | Noted: 2021-02-07

## 2021-02-07 LAB
ALBUMIN SERPL-MCNC: 3.1 G/DL (ref 3.5–5)
ALBUMIN/GLOB SERPL: 1.1 {RATIO} (ref 1.1–2.2)
ALP SERPL-CCNC: 60 U/L (ref 45–117)
ALT SERPL-CCNC: 10 U/L (ref 12–78)
AMPHET UR QL SCN: NEGATIVE
ANION GAP SERPL CALC-SCNC: 5 MMOL/L (ref 5–15)
AST SERPL-CCNC: 7 U/L (ref 15–37)
BARBITURATES UR QL SCN: NEGATIVE
BENZODIAZ UR QL: NEGATIVE
BILIRUB SERPL-MCNC: 0.2 MG/DL (ref 0.2–1)
BUN SERPL-MCNC: 9 MG/DL (ref 6–20)
BUN/CREAT SERPL: 15 (ref 12–20)
CALCIUM SERPL-MCNC: 8.5 MG/DL (ref 8.5–10.1)
CANNABINOIDS UR QL SCN: POSITIVE
CHLORIDE SERPL-SCNC: 113 MMOL/L (ref 97–108)
CHOLEST SERPL-MCNC: 164 MG/DL
CO2 SERPL-SCNC: 25 MMOL/L (ref 21–32)
COCAINE UR QL SCN: NEGATIVE
CREAT SERPL-MCNC: 0.6 MG/DL (ref 0.55–1.02)
DRUG SCRN COMMENT,DRGCM: ABNORMAL
ERYTHROCYTE [DISTWIDTH] IN BLOOD BY AUTOMATED COUNT: 12.9 % (ref 11.5–14.5)
GLOBULIN SER CALC-MCNC: 2.9 G/DL (ref 2–4)
GLUCOSE SERPL-MCNC: 87 MG/DL (ref 65–100)
HCT VFR BLD AUTO: 29.1 % (ref 35–47)
HDLC SERPL-MCNC: 77 MG/DL
HDLC SERPL: 2.1 {RATIO} (ref 0–5)
HGB BLD-MCNC: 9.4 G/DL (ref 11.5–16)
LDLC SERPL CALC-MCNC: 80.2 MG/DL (ref 0–100)
LIPID PROFILE,FLP: NORMAL
MCH RBC QN AUTO: 31.8 PG (ref 26–34)
MCHC RBC AUTO-ENTMCNC: 32.3 G/DL (ref 30–36.5)
MCV RBC AUTO: 98.3 FL (ref 80–99)
METHADONE UR QL: NEGATIVE
NRBC # BLD: 0 K/UL (ref 0–0.01)
NRBC BLD-RTO: 0 PER 100 WBC
OPIATES UR QL: NEGATIVE
PCP UR QL: NEGATIVE
PLATELET # BLD AUTO: 224 K/UL (ref 150–400)
PMV BLD AUTO: 10.1 FL (ref 8.9–12.9)
POTASSIUM SERPL-SCNC: 3.6 MMOL/L (ref 3.5–5.1)
PROT SERPL-MCNC: 6 G/DL (ref 6.4–8.2)
RBC # BLD AUTO: 2.96 M/UL (ref 3.8–5.2)
SODIUM SERPL-SCNC: 143 MMOL/L (ref 136–145)
TRIGL SERPL-MCNC: 34 MG/DL (ref ?–150)
VLDLC SERPL CALC-MCNC: 6.8 MG/DL
WBC # BLD AUTO: 5.1 K/UL (ref 3.6–11)

## 2021-02-07 PROCEDURE — 99223 1ST HOSP IP/OBS HIGH 75: CPT | Performed by: PSYCHIATRY & NEUROLOGY

## 2021-02-07 PROCEDURE — 96372 THER/PROPH/DIAG INJ SC/IM: CPT

## 2021-02-07 PROCEDURE — 85027 COMPLETE CBC AUTOMATED: CPT

## 2021-02-07 PROCEDURE — 80053 COMPREHEN METABOLIC PANEL: CPT

## 2021-02-07 PROCEDURE — 74011250637 HC RX REV CODE- 250/637: Performed by: STUDENT IN AN ORGANIZED HEALTH CARE EDUCATION/TRAINING PROGRAM

## 2021-02-07 PROCEDURE — 80307 DRUG TEST PRSMV CHEM ANLYZR: CPT

## 2021-02-07 PROCEDURE — 36415 COLL VENOUS BLD VENIPUNCTURE: CPT

## 2021-02-07 PROCEDURE — 80061 LIPID PANEL: CPT

## 2021-02-07 PROCEDURE — 74011250636 HC RX REV CODE- 250/636: Performed by: STUDENT IN AN ORGANIZED HEALTH CARE EDUCATION/TRAINING PROGRAM

## 2021-02-07 RX ADMIN — ENOXAPARIN SODIUM 40 MG: 40 INJECTION SUBCUTANEOUS at 09:38

## 2021-02-07 NOTE — ED NOTES
07: 52 Assumed care of pt. Plan of care discussed. Call bell in reach. Will continue to monitor. Pt incontinent, cleaned, diaper placed, pure wick in place, and pulled up in bed. 11:14 Neurology at bedside. 13:20  Pt assisted with getting dressed. Pt given meal tray. 13:41  Pt discharged, assisted with dressing, wheeled to waiting room for ride.

## 2021-02-07 NOTE — H&P
Hospitalist Admission Note    NAME: Tl Self   :  1975   MRN:  [id number]     Date/Time:  2021 9:31 PM    Patient PCP: None  ______________________________________________________________________  Given the patient's current clinical presentation, I have a high level of concern for decompensation if discharged from the emergency department. Complex decision making was performed, which includes reviewing the patient's available past medical records, laboratory results, and x-ray films. My assessment of this patient's clinical condition and my plan of care is as follows. Assessment / Plan: Altered mental status  Dysarthria  Multiple sclerosis  Anxiety     CT Head/ perfusion study: negative for stroke/acute pathology  As per teleneurology, less likely to be stroke, could be polypharmacy  Will get MRI   Will give aspirin and statin for now  ECHO, lipid profile, Hba1c  Dyphagia screening. She mentions she takes gabapentin only, however has xanax on med list, will c/w low dose PRN to avoid withdrawal  Will hold gabapentin and other PO meds for now  Will do drug screening  No focus of infection, so unlikely to be infectious cause of AMS. (CXR clear, UA neg)    Code Status: Full code    DVT Prophylaxis: Lovenox  GI Prophylaxis: not indicated    Baseline: She mentions she is not able to ambulate much      Subjective:   CHIEF COMPLAINT: Altered mental status    HISTORY OF PRESENT ILLNESS:     Yareli Franco is a 50 y.o. F with PMH of MS, Anxiety was brought in by EMS for altered mental status. She is AO x 2, however she doesn't remember how she ended up in the hospital. On arrival to ED her speech was garbled, stroke code  Activated, imaging didn't reveal any acute pathology, was evaluated by tele neurology. She still has slurred speech on my evaluation, she denies any cough, fever, chest pain, doesn't have any focal deficits, change in bladder or bowel habits.     We were asked to admit for work up and evaluation of the above problems.     Past Medical History:   Diagnosis Date   • Body aches 12/11/2014   • Chronic pain    • Depression    • Headache(784.0)    • History of mammogram never before   • MS (multiple sclerosis) (HCC)    • Neurological disorder     Multiple Sclerosis   • Pap smear for cervical cancer screening 2008   • Psychiatric disorder     anxiety   • Psychotic disorder (HCC)         Past Surgical History:   Procedure Laterality Date   • COLONOSCOPY N/A 2/28/2018    COLONOSCOPY performed by Julien Stevens MD at [facility] ENDOSCOPY   • HX CHOLECYSTECTOMY     • HX GYN      3 c-sections   • HX HEENT      bilateral ear tube surgery   • HX HERNIA REPAIR     • HX OTHER SURGICAL      R inguinal hernia repair       Social History     Tobacco Use   • Smoking status: Current Every Day Smoker     Packs/day: 0.50     Years: 17.00     Pack years: 8.50     Types: Cigarettes   • Smokeless tobacco: Never Used   • Tobacco comment: 1 pack every 3 days.   Substance Use Topics   • Alcohol use: No        Family History   Problem Relation Age of Onset   • Heart Attack Father         tripple bypass   • Cancer Father         lung   • COPD Father    • Diabetes Mother         type 2   • Heart Disease Mother         heart disease   • Diabetes Paternal Grandmother    • No Known Problems Sister    • No Known Problems Brother    • No Known Problems Child      Allergies   Allergen Reactions   • Morphine Rash        Prior to Admission medications    Medication Sig Start Date End Date Taking? Authorizing Provider   acetaminophen (TYLENOL) 500 mg tablet Take 500 mg by mouth every four (4) hours as needed. 8/21/20   Provider, Historical   busPIRone (BUSPAR) 5 mg tablet TAKE 2 TABLETS BY MOUTH TWICE DAILY 1/1/21   Provider, Historical   nystatin (MYCOSTATIN) topical cream APPLY CREAM TOPICALLY TO AFFECTED AREA TWICE DAILY 1/1/21   Provider, Historical   polyethylene glycol (Miralax) 17 gram packet Take 17 g  by mouth two (2) times daily as needed for Constipation. Provider, Historical   gabapentin (NEURONTIN) 300 mg capsule Take 2 Caps by mouth three (3) times daily. Max Daily Amount: 1,800 mg. 12/21/20   Deja Loya MD   melatonin 3 mg tablet Take 15 mg by mouth nightly as needed for Insomnia. Provider, Historical   famotidine (PEPCID) 20 mg tablet Take 20 mg by mouth two (2) times daily as needed for Heartburn. Provider, Historical   ALPRAZolam (Xanax) 0.25 mg tablet Take 1 Tab by mouth two (2) times daily as needed for Anxiety. Max Daily Amount: 0.5 mg. Indications: anxiousness associated with depression  Patient taking differently: Take 0.25 mg by mouth two (2) times a day. Indications: anxiousness associated with depression 9/28/20   Raudel So, NP   DULoxetine (CYMBALTA) 60 mg capsule Take 1 Cap by mouth daily. Indications: neuropathic pain 9/28/20   Raudel So, NP   cyclobenzaprine (FLEXERIL) 10 mg tablet Take 1 Tab by mouth three (3) times daily as needed for Muscle Spasm(s). 6/10/20   Dianna Meek MD   senna-docusate (PERICOLACE) 8.6-50 mg per tablet Take 2 Tabs by mouth daily. 1/14/20   Kj Moran MD       REVIEW OF SYSTEMS:     I am not able to complete the review of systems because:    The patient is intubated and sedated   x The patient has altered mental status due to his acute medical problems    The patient has baseline aphasia from prior stroke(s)    The patient has baseline dementia and is not reliable historian    The patient is in acute medical distress and unable to provide information           Total of 12 systems reviewed as follows:       POSITIVE= underlined text  Negative = text not underlined  General:  fever, chills, sweats, generalized weakness, weight loss/gain,      loss of appetite   Eyes:    blurred vision, eye pain, loss of vision, double vision  ENT:    rhinorrhea, pharyngitis   Respiratory:   cough, sputum production, SOB, BOBO, wheezing, pleuritic pain   Cardiology:   chest pain, palpitations, orthopnea, PND, edema, syncope   Gastrointestinal:  abdominal pain , N/V, diarrhea, dysphagia, constipation, bleeding   Genitourinary:  frequency, urgency, dysuria, hematuria, incontinence   Muskuloskeletal :  arthralgia, myalgia, back pain  Hematology:  easy bruising, nose or gum bleeding, lymphadenopathy   Dermatological: rash, ulceration, pruritis, color change / jaundice  Endocrine:   hot flashes or polydipsia   Neurological:  headache, dizziness, confusion, focal weakness, paresthesia,     Speech difficulties, memory loss, gait difficulty  Psychological: Feelings of anxiety, depression, agitation    Objective:   VITALS:    Visit Vitals  /66 (BP Patient Position: At rest)   Pulse (!) 101   Temp 97.7 °F (36.5 °C)   Resp 18   Ht 5' 6\" (1.676 m)   Wt 101.4 kg (223 lb 8.7 oz)   SpO2 98%   BMI 36.08 kg/m²       PHYSICAL EXAM:    General:    Alert, cooperative, no distress, appears stated age. HEENT: Atraumatic, anicteric sclerae, pink conjunctivae     No oral ulcers, mucosa moist, throat clear, dentition fair  Neck:  Supple, symmetrical,  thyroid: non tender  Lungs:   Clear to auscultation bilaterally. No Wheezing or Rhonchi. No rales. Chest wall:  No tenderness  No Accessory muscle use. Heart:   Regular  rhythm,  No  murmur   No edema  Abdomen:   Soft, non-tender. Not distended. Bowel sounds normal  Extremities: No cyanosis. No clubbing,      Skin turgor normal, Capillary refill normal, Radial dial pulse 2+  Skin:     Not pale.   Not Jaundiced  No rashes   Psych:  Looks anxious  Neurologic: Sleepy but easily arousable, moving all extremities, AO x 2  _______________________________________________________________________  Care Plan discussed with:    Comments   Patient y    Family      RN y    Care Manager                    Consultant:      _______________________________________________________________________  Expected  Disposition:   Home with Family y HH/PT/OT/RN    SNF/LTC    [facility]LETTE    ________________________________________________________________________  TOTAL TIME:  35 Minutes    Critical Care Provided     Minutes non procedure based      Comments     Reviewed previous records   >50% of visit spent in counseling and coordination of care  Discussion with patient and/or family and questions answered       ________________________________________________________________________  Signed: Richard Napoles MD    Procedures: see electronic medical records for all procedures/Xrays and details which were not copied into this note but were reviewed prior to creation of Plan. LAB DATA REVIEWED:    Recent Results (from the past 24 hour(s))   GLUCOSE, POC    Collection Time: 02/06/21  4:55 PM   Result Value Ref Range    Glucose (POC) 147 (H) 65 - 100 mg/dL    Performed by Layla HEARD (PCT)    CBC WITH AUTOMATED DIFF    Collection Time: 02/06/21  5:10 PM   Result Value Ref Range    WBC 6.1 3.6 - 11.0 K/uL    RBC 3.38 (L) 3.80 - 5.20 M/uL    HGB 10.9 (L) 11.5 - 16.0 g/dL    HCT 33.1 (L) 35.0 - 47.0 %    MCV 97.9 80.0 - 99.0 FL    MCH 32.2 26.0 - 34.0 PG    MCHC 32.9 30.0 - 36.5 g/dL    RDW 12.8 11.5 - 14.5 %    PLATELET 527 873 - 578 K/uL    MPV 9.6 8.9 - 12.9 FL    NRBC 0.0 0  WBC    ABSOLUTE NRBC 0.00 0.00 - 0.01 K/uL    NEUTROPHILS 82 (H) 32 - 75 %    LYMPHOCYTES 8 (L) 12 - 49 %    MONOCYTES 8 5 - 13 %    EOSINOPHILS 1 0 - 7 %    BASOPHILS 1 0 - 1 %    IMMATURE GRANULOCYTES 0 0.0 - 0.5 %    ABS. NEUTROPHILS 4.9 1.8 - 8.0 K/UL    ABS. LYMPHOCYTES 0.5 (L) 0.8 - 3.5 K/UL    ABS. MONOCYTES 0.5 0.0 - 1.0 K/UL    ABS. EOSINOPHILS 0.1 0.0 - 0.4 K/UL    ABS. BASOPHILS 0.1 0.0 - 0.1 K/UL    ABS. IMM.  GRANS. 0.0 0.00 - 0.04 K/UL    DF AUTOMATED      RBC COMMENTS NORMOCYTIC, NORMOCHROMIC     METABOLIC PANEL, COMPREHENSIVE    Collection Time: 02/06/21  5:10 PM   Result Value Ref Range    Sodium 141 136 - 145 mmol/L    Potassium 4.5 3.5 - 5.1 mmol/L    Chloride 108 97 - 108 mmol/L    CO2 29 21 - 32 mmol/L    Anion gap 4 (L) 5 - 15 mmol/L    Glucose 127 (H) 65 - 100 mg/dL    BUN 10 6 - 20 MG/DL    Creatinine 0.69 0.55 - 1.02 MG/DL    BUN/Creatinine ratio 14 12 - 20      GFR est AA >60 >60 ml/min/1.73m2    GFR est non-AA >60 >60 ml/min/1.73m2    Calcium 8.3 (L) 8.5 - 10.1 MG/DL    Bilirubin, total 0.2 0.2 - 1.0 MG/DL    ALT (SGPT) 11 (L) 12 - 78 U/L    AST (SGOT) 8 (L) 15 - 37 U/L    Alk. phosphatase 63 45 - 117 U/L    Protein, total 6.1 (L) 6.4 - 8.2 g/dL    Albumin 3.1 (L) 3.5 - 5.0 g/dL    Globulin 3.0 2.0 - 4.0 g/dL    A-G Ratio 1.0 (L) 1.1 - 2.2     PROTHROMBIN TIME + INR    Collection Time: 02/06/21  5:10 PM   Result Value Ref Range    INR 1.0 0.9 - 1.1      Prothrombin time 10.8 9.0 - 11.1 sec   TROPONIN I    Collection Time: 02/06/21  5:10 PM   Result Value Ref Range    Troponin-I, Qt. <0.05 <0.05 ng/mL   SAMPLES BEING HELD    Collection Time: 02/06/21  5:10 PM   Result Value Ref Range    SAMPLES BEING HELD  RED     COMMENT        Add-on orders for these samples will be processed based on acceptable specimen integrity and analyte stability, which may vary by analyte.    URINALYSIS W/ RFLX MICROSCOPIC    Collection Time: 02/06/21  5:56 PM   Result Value Ref Range    Color YELLOW/STRAW      Appearance CLEAR CLEAR      Specific gravity 1.010 1.003 - 1.030      pH (UA) 7.0 5.0 - 8.0      Protein Negative NEG mg/dL    Glucose Negative NEG mg/dL    Ketone Negative NEG mg/dL    Bilirubin Negative NEG      Blood Negative NEG      Urobilinogen 1.0 0.2 - 1.0 EU/dL    Nitrites Negative NEG      Leukocyte Esterase Negative NEG     EKG, 12 LEAD, INITIAL    Collection Time: 02/06/21  6:02 PM   Result Value Ref Range    Ventricular Rate 105 BPM    Atrial Rate 105 BPM    P-R Interval 182 ms    QRS Duration 90 ms    Q-T Interval 350 ms    QTC Calculation (Bezet) 462 ms    Calculated P Axis 57 degrees    Calculated R Axis 11 degrees    Calculated T Axis 48 degrees    Diagnosis       Sinus tachycardia  When compared with ECG of 26-NOV-2020 13:46,  No significant change was found  Confirmed by KAYKAY [email or web address] ([id number]) on 2/6/2021 8:59:29 PM

## 2021-02-07 NOTE — ED NOTES
Pt was found on the floor by previous shift nurse. Pt was examined by Dr. Patsy Mac and doesn't require a repeat CT. Pt's vitals are stable and she is moved to room closer to Nurse's station.

## 2021-02-07 NOTE — DISCHARGE INSTRUCTIONS
Patient Education     Altered Mental Status: Care Instructions  Your Care Instructions  Altered mental status is a change in how well your brain is working. As a result, you may be confused, be less alert than usual, or act in odd ways. This may include seeing or hearing things that aren't really there (hallucinations). A mental status change has many possible causes. For example, it may be the result of an infection, an imbalance of chemicals in the body, or a chronic disease such as diabetes or COPD. It can also be caused by things such as a head injury, taking certain medicines, or using alcohol or drugs. The doctor may do tests to look for the cause. These tests may include urine tests, blood tests, and imaging tests such as a CT scan. Sometimes a clear cause isn't found. But tests can help the doctor rule out a serious cause of your symptoms. A change in mental status can be scary. But mental status will often return to normal when the cause is treated. So it is important to get any follow-up testing or treatment the doctor has suggested. The doctor has checked you carefully, but problems can develop later. If you notice any problems or new symptoms, get medical treatment right away. Follow-up care is a key part of your treatment and safety. Be sure to make and go to all appointments, and call your doctor if you are having problems. It's also a good idea to know your test results and keep a list of the medicines you take. How can you care for yourself at home? · Be safe with medicines. Take your medicines exactly as prescribed. Call your doctor if you think you are having a problem with your medicine. · Have another adult stay with you until you are better. This can help keep you safe. Ask that person to watch for signs that your mental status is getting worse. When should you call for help? Call 911 anytime you think you may need emergency care.  For example, call if:  · You passed out (lost consciousness). Call your doctor now or seek immediate medical care if:  · Your mental status is getting worse. · You have new symptoms, such as a fever, chills, or shortness of breath. · You do not feel safe. Watch closely for changes in your health, and be sure to contact your doctor if:  · You do not get better as expected. Where can you learn more? Go to Magnas[employer].be  Enter J452 in the search box to learn more about \"Altered Mental Status: Care Instructions. \"   © 5237-2450 Healthwise, Incorporated. Care instructions adapted under license by [facility] [employer] (which disclaims liability or warranty for this information). This care instruction is for use with your licensed healthcare professional. If you have questions about a medical condition or this instruction, always ask your healthcare professional. [address] any warranty or liability for your use of this information. Content Version: 56.6.890944; Current as of: November 20, 2015           Patient Education        Learning About Anxiety Disorders  What are anxiety disorders? Anxiety disorders are a type of medical problem. They cause severe anxiety. When you feel anxious, you feel that something bad is about to happen. This feeling interferes with your life. These disorders include:  · Generalized anxiety disorder. You feel worried and stressed about many everyday events and activities. This goes on for several months and disrupts your life on most days. · Panic disorder. You have repeated panic attacks. A panic attack is a sudden, intense fear or anxiety. It may make you feel short of breath. Your heart may pound. · Social anxiety disorder. You feel very anxious about what you will say or do in front of people. For example, you may be scared to talk or eat in public. This problem affects your daily life. · Phobias. You are very scared of a specific object, situation, or activity.  For example, you may fear spiders, high places, or small spaces. What are the symptoms? Generalized anxiety disorder  Symptoms may include:  · Feeling worried and stressed about many things almost every day. · Feeling tired or irritable. You may have a hard time concentrating. · Having headaches or muscle aches. · Having a hard time getting to sleep or staying asleep. Panic disorder  You may have repeated panic attacks when there is no reason for feeling afraid. You may change your daily activities because you worry that you will have another attack. Symptoms may include:  · Intense fear, terror, or anxiety. · Trouble breathing or very fast breathing. · Chest pain or tightness. · A heartbeat that races or is not regular. Social anxiety disorder  Symptoms may include:  · Fear about a social situation, such as eating in front of others or speaking in public. You may worry a lot. Or you may be afraid that something bad will happen. · Anxiety that can cause you to blush, sweat, and feel shaky. · A heartbeat that is faster than normal.  · A hard time focusing. Phobias  Symptoms may include:  · More fear than most people of being around an object, being in a situation, or doing an activity. You might also be stressed about the chance of being around the thing you fear. · Worry about losing control, panicking, fainting, or having physical symptoms like a faster heartbeat when you are around the situation or object. How are these disorders treated? Anxiety disorders can be treated with medicines or counseling. A combination of both may be used. Medicines may include:  · Antidepressants. These may help your symptoms by keeping chemicals in your brain in balance. · Benzodiazepines. These may give you short-term relief of your symptoms. Some people use cognitive-behavioral therapy. A therapist helps you learn to change stressful or bad thoughts into helpful thoughts.   Lead a healthy lifestyle  A healthy lifestyle may help you feel better. · Get at least 30 minutes of exercise on most days of the week. Walking is a good choice. · Eat a healthy diet. Include fruits, vegetables, lean proteins, and whole grains in your diet each day. · Try to go to bed at the same time every night. Try for 8 hours of sleep a night. · Find ways to manage stress. Try relaxation exercises. · Avoid alcohol and illegal drugs. Follow-up care is a key part of your treatment and safety. Be sure to make and go to all appointments, and call your doctor if you are having problems. It's also a good idea to know your test results and keep a list of the medicines you take. Where can you learn more? Go to http://www.gray.com/  Enter V335 in the search box to learn more about \"Learning About Anxiety Disorders. \"  Current as of: January 31, 2020               Content Version: 12.6  © 4287-1436 [employer]. Care instructions adapted under license by [employer] (which disclaims liability or warranty for this information). If you have questions about a medical condition or this instruction, always ask your healthcare professional. [place] [address] any warranty or liability for your use of this information. Patient Education        Preventing Falls: Care Instructions  Your Care Instructions     Getting around your home safely can be a challenge if you have injuries or health problems that make it easy for you to fall. Loose rugs and furniture in walkways are among the dangers for many older people who have problems walking or who have poor eyesight. People who have conditions such as arthritis, osteoporosis, or dementia also have to be careful not to fall. You can make your home safer with a few simple measures. Follow-up care is a key part of your treatment and safety. Be sure to make and go to all appointments, and call your doctor if you are having problems.  It's also a good idea to know your test results and keep a list of the medicines you take. How can you care for yourself at home? Taking care of yourself  · You may get dizzy if you do not drink enough water. To prevent dehydration, drink plenty of fluids, enough so that your urine is light yellow or clear like water. Choose water and other caffeine-free clear liquids. If you have kidney, heart, or liver disease and have to limit fluids, talk with your doctor before you increase the amount of fluids you drink. · Exercise regularly to improve your strength, muscle tone, and balance. Walk if you can. Swimming may be a good choice if you cannot walk easily. · Have your vision and hearing checked each year or any time you notice a change. If you have trouble seeing and hearing, you might not be able to avoid objects and could lose your balance. · Know the side effects of the medicines you take. Ask your doctor or pharmacist whether the medicines you take can affect your balance. Sleeping pills or sedatives can affect your balance. · Limit the amount of alcohol you drink. Alcohol can impair your balance and other senses. · Ask your doctor whether calluses or corns on your feet need to be removed. If you wear loose-fitting shoes because of calluses or corns, you can lose your balance and fall. · Talk to your doctor if you have numbness in your feet. Preventing falls at home  · Remove raised doorway thresholds, throw rugs, and clutter. Repair loose carpet or raised areas in the floor. · Move furniture and electrical cords to keep them out of walking paths. · Use nonskid floor wax, and wipe up spills right away, especially on ceramic tile floors. · If you use a walker or cane, put rubber tips on it. If you use crutches, clean the bottoms of them regularly with an abrasive pad, such as steel wool. · Keep your house well lit, especially [employer], and outside walkways. Use night-lights in areas such as hallways and bathrooms. Add extra light switches or use remote switches (such as switches that go on or off when you clap your hands) to make it easier to turn lights on if you have to get up during the night. · Install sturdy handrails on stairways. · Move items in your cabinets so that the things you use a lot are on the lower shelves (about waist level). · Keep a cordless phone and a flashlight with new batteries by your bed. If possible, put a phone in each of the main rooms of your house, or carry a cell phone in case you fall and cannot reach a phone. Or, you can wear a device around your neck or wrist. You push a button that sends a signal for help. · Wear low-heeled shoes that fit well and give your feet good support. Use footwear with nonskid soles. Check the heels and soles of your shoes for wear. Repair or replace worn heels or soles. · Do not wear socks without shoes on wood floors. · Walk on the grass when the sidewalks are slippery. If you live in an area that gets snow and ice in the winter, sprinkle salt on slippery steps and sidewalks. Preventing falls in the bath  · Install grab bars and nonskid mats inside and outside your shower or tub and near the toilet and sinks. · Use shower chairs and bath benches. · Use a hand-held shower head that will allow you to sit while showering. · Get into a tub or shower by putting the weaker leg in first. Get out of a tub or shower with your strong side first.  · Repair loose toilet seats and consider installing a raised toilet seat to make getting on and off the toilet easier. · Keep your bathroom door unlocked while you are in the shower. Where can you learn more? Go to http://www.6Rooms.com/  Enter G117 in the search box to learn more about \"Preventing Falls: Care Instructions. \"  Current as of: April 15, 2020               Content Version: 12.6  © 8149-5031 [employer].    Care instructions adapted under license by [employer] (which disclaims liability or warranty for this information). If you have questions about a medical condition or this instruction, always ask your healthcare professional. [place] [address] any warranty or liability for your use of this information. HOSPITALIST DISCHARGE INSTRUCTIONS    NAME: Romulo Hairston   :  1975   MRN:  [id number]     Date/Time:  2021 12:29 PM    ADMIT DATE: 2021   DISCHARGE DATE: 2021     Attending Physician: Robert Nieves NP    DISCHARGE DIAGNOSIS:    Altered mental status  Dysarthria  Multiple sclerosis  Gait Abnormality   Anxiety   Major Depressive Disorder  Adjustment Disorder with Mixed Anxiety and Depressed Mood   Chronic Pain Syndrome   Fibromyalgia   Medical Noncompliance   Marijuana Abuse     Medications: Per above medication reconciliation. Only take your medications as they are prescribed. Do not use marijuana while taking Gabapentin and Cymbalta. This is what caused your confusion which brought you to the hospital.      Pain Management: per above medications    Recommended diet: Regular Diet    Recommended activity: Activity as tolerated    Wound care: None    Indwelling devices:  None    Supplemental Oxygen: None    Required Lab work: per PCP    Glucose management:  None    Code status: Full    Outside physician follow up: Please follow up with Neurology. Follow-up Information     Follow up With Specialties Details Why Nell Gusman MD Neurology  As scheduled  [address]  [address] [facility] [address] [address]  [address] [phone]            [facility] to avoid frequent ED visits. [facility] can treat; pains, sprains, cuts, wounds, high fevers, upper respiratory infections and much more. There medical team is equipped with all the tools necessary to provide advanced medical care in the comfort of you home, workplace, or location of need.   The medical team consists of doctors, nurse practitioners, and EMTs. [facility] is available 7 days per week 9 am to 9 pm.   Request care by calling [phone] or by going online at [employer]    Information obtained by :  I understand that if any problems occur once I am at home I am to contact my physician. I understand and acknowledge receipt of the instructions indicated above.                                                                                                                                            Physician's or R.N.'s Signature                                                                  Date/Time                                                                                                                                              Patient or Repres
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
## 2021-02-07 NOTE — ED NOTES
Pt found on floor when entering room. Charge nurse immediatly notified. Assesment of PT done prior to moving back to bed. Dr. Corina Torre notified of fall and at bedside to evaluate PT. Pt removed IV during fall. Pt denied hitting head.

## 2021-02-07 NOTE — CONSULTS
Neurology Note    Patient ID:  Fidel Rodriguez  [id number]  30 y.o.  1975      Date of Consultation:  February 7, 2021    Referring Physician: Dr. Carlos Khan    Reason for Consultation:  Worsening neurological function    Subjective: I am not sure what happened last night       History of Present Illness: Fidel Rodriguez is a 50 y.o. female with a history of relapsing remitting multiple sclerosis, anxiety, depression who was brought to the emergency department with confusion and slurred speech. Since admission, there has been significant improvement in her cognitive functioning. It was felt that there may have been additional doses of gabapentin taken. The patient reports that she was taking a higher dose of gabapentin but it had been cut down recently. We have very little other history from last evening. She did have an MRI earlier this morning which revealed no evidence of an acute demyelinating plaque or any acute stroke. The patient currently states she has no new weakness, numbness or tingling. She does have chronic pain in her bilateral upper extremities associated with her multiple sclerosis. Upon a review of the medical records, she is followed in the neurology clinic by Dr. Ines Hudson and Clark Arellano and was last seen in the clinic approximately 10 days ago. She has been on multiple disease modifying therapy in the past and discussions were held about considering Aubagio. She also has anxiety and fibromyalgia.     Past Medical History:   Diagnosis Date    Body aches 12/11/2014    Chronic pain     Depression     Headache(784.0)     History of mammogram never before    [facility]) [facility] [address]     Neurological disorder     Multiple Sclerosis    Pap smear for cervical cancer screening 2008    Psychiatric disorder     anxiety    Psychotic disorder [facility])         Past Surgical History:   Procedure Laterality Date    COLONOSCOPY N/A 2/28/2018    COLONOS[facility] performed [facility]uvia Mittal [facility] at [facility] ENDOSCOPY    HX CHOLECYSTECTOMY      HX GYN      3 c-sections    HX HEENT      bilateral ear tube surgery    HX HERNIA REPAIR      HX OTHER SURGICAL      R inguinal hernia repair        Family History   Problem Relation Age of Onset    Heart Attack Father         tripple bypass    Cancer Father         lung    COPD Father     Diabetes Mother         type 2    Heart Disease Mother         heart disease    Diabetes Paternal Grandmother     No Known Problems Sister     No Known Problems Brother     No Known Problems Child         Social History     Tobacco Use    Smoking status: Current Every Day Smoker     Packs/day: 0.50     Years: 17.00     Pack years: 8.50     Types: Cigarettes    Smokeless tobacco: Never Used    Tobacco comment: 1 pack every 3 days. Substance Use Topics    Alcohol use: No        Allergies   Allergen Reactions    Morphine Rash        Prior to Admission medications    Medication Sig Start Date End Date Taking? Authorizing Provider   acetaminophen (TYLENOL) 500 mg tablet Take 500 mg by mouth every four (4) hours as needed. 8/21/20   Provider, Historical   busPIRone (BUSPAR) 5 mg tablet TAKE 2 TABLETS BY MOUTH TWICE DAILY 1/1/21   Provider, Historical   nystatin (MYCOSTATIN) topical cream APPLY CREAM TOPICALLY TO AFFECTED AREA TWICE DAILY 1/1/21   Provider, Historical   polyethylene glycol (Miralax) 17 gram packet Take 17 g by mouth two (2) times daily as needed for Constipation. Provider, Historical   gabapentin (NEURONTIN) 300 mg capsule Take 2 Caps by mouth three (3) times daily. Max Daily Amount: 1,800 mg. 12/21/20   Jennifer Villavicencio MD   melatonin 3 mg tablet Take 15 mg by mouth nightly as needed for Insomnia. Provider, Historical   famotidine (PEPCID) 20 mg tablet Take 20 mg by mouth two (2) times daily as needed for Heartburn.     Provider, Historical   ALPRAZolam (Xanax) 0.25 mg tablet Take 1 Tab by mouth two (2) times daily as needed for Anxiety. Max Daily Amount: 0.5 mg. Indications: anxiousness associated with depression  Patient taking differently: Take 0.25 mg by mouth two (2) times a day. Indications: anxiousness associated with depression 9/28/20   Sohail Hernandez NP   DULoxetine (CYMBALTA) 60 mg capsule Take 1 Cap by mouth daily. Indications: neuropathic pain 9/28/20   Sohail Hernandez NP   cyclobenzaprine (FLEXERIL) 10 mg tablet Take 1 Tab by mouth three (3) times daily as needed for Muscle Spasm(s). 6/10/20   Kaylee Galaviz MD   senna-docusate (PERICOLACE) 8.6-50 mg per tablet Take 2 Tabs by mouth daily. 1/14/20   Kelvin Cavazos MD       Review of Systems:    General, constitutional: Anxiety, depression, pain  Eyes, vision: negative  Ears, nose, throat: negative  Cardiovascular, heart: negative  Respiratory: negative  Gastrointestinal: negative  Genitourinary: negative  Musculoskeletal: negative  Skin and integumentary: negative  Psychiatric: negative  Endocrine: negative  Neurological: negative, except for HPI  Hematologic/lymphatic: negative  Allergy/immunology: negative    Objective:     Visit Vitals  /80 (BP Patient Position: At rest)   Pulse 79   Temp 97.7 °F (36.5 °C)   Resp 14   Ht 5' 6\" (1.676 m)   Wt 223 lb 8.7 oz (101.4 kg)   SpO2 96%   BMI 36.08 kg/m²       Physical Exam:      General:  appears well nourished in no acute distress. She is quite tearful throughout the examination. Neck: no carotid bruits  Lungs: clear to auscultation  Heart:  no murmurs, regular rate  Lower extremity: peripheral pulses palpable. she does have mild edema  Skin: intact    Neurological exam:    Awake, alert, oriented to person, place and time  Recent and remote memory were normal  Attention and concentration were intact  Language was intact.   There was no aphasia  Speech: no dysarthria, slow speech  Fund of knowledge was preserved    Cranial nerves:   II-XII were tested    Perrrla  Fundoscopic examination was attempted and difficult to visualize fundus  Visual fields were full  Eomi, no evidence of nystagmus  Facial sensation:  normal and symmetric  Facial motor: normal and symmetric  Hearing intact  SCM strength intact  Tongue: midline without fasciculations    Motor: Tone normal    No evidence of fasciculations    Strength testing:  She does have general quadriparesis, slightly worse in her lower extremity. She does feel that her strength is at baseline. Sensory:  Upper extremity: intact to pp  Lower extremity: intact to pp    Reflexes:    Right Left  Biceps  2 2  Triceps 2 2  Brachiorad. 2 2  Patella  1 1  Achilles 1 1    Plantar response:  flexor bilaterally      Cerebellar testing:  no tremor apparent, finger/nose and thor were intact    Gait: This was not assessed due to concerns over safety    Labs:     Lab Results   Component Value Date/Time    Hemoglobin A1c 4.9 12/16/2020 04:41 PM    Sodium 143 02/07/2021 04:36 AM    Potassium 3.6 02/07/2021 04:36 AM    Chloride 113 (H) 02/07/2021 04:36 AM    Glucose 87 02/07/2021 04:36 AM    BUN 9 02/07/2021 04:36 AM    Creatinine 0.60 02/07/2021 04:36 AM    Calcium 8.5 02/07/2021 04:36 AM    WBC 5.1 02/07/2021 02:56 AM    HCT 29.1 (L) 02/07/2021 02:56 AM    HGB 9.4 (L) 02/07/2021 02:56 AM    PLATELET 409 40/21/2573 02:56 AM       Imaging:    Results from Hospital Encounter encounter on 02/06/21   MRI BRAIN WO CONT    Narrative CLINICAL HISTORY: Altered mental status. INDICATION: Altered mental status. COMPARISON: 12/16/2020    TECHNIQUE: MR examination of the brain includes axial and sagittal T1, coronal  T2, axial T2, axial FLAIR, axial gradient echo, axial DWI. CONTRAST: None    FINDINGS:   There are extensive confluent T2/FLAIR hyperintense lesions in the  periventricular and deep white matter of both cerebral hemispheres, with  involvement of the callosal septal interface. Stable right posterior frontal subcortical white matter without enhancement.   Lesion at the left occipital trigone is stable stable lesion at the right  frontal periventricular white matter with resolved incomplete ring enhancement. Stable lesion in the right occipital periventricular white matter. Stable  T2/FLAIR hyperintense lesion in the right surekha. Partially visualized extensive patchy T2/FLAIR hyperintensities in the upper  cervical cord. Stable mild parenchymal volume loss with commensurate dilation of the sulci and  ventricular system. There is no acute infarct, hemorrhage, extra-axial fluid  collection, or mass effect. There is no cerebellar tonsillar herniation. Expected arterial flow-voids are present. The orbital contents are within normal limits. No significant osseous or scalp  lesions are identified. Impression No intracranial mass, hemorrhage or acute infarction. Stable demyelinating plaques demonstrated in the superior right frontal lobe. Stable foci of demyelination in the right frontal periventricular white matter  and left occipital periventricular white matter and right occipital  periventricular white matter. Partially visualized demyelinating disease in the upper cervical cord. Results from Hospital Encounter encounter on 02/06/21   CT CODE NEURO PERF W CBF    Narrative     Clinical history: Code Stroke  INDICATION:   Code Stroke    COMPARISON:  11/7/2013, 2/6/2020  CONTRAST: 100ml Isovue 370  TECHNIQUE:    CT dose reduction was achieved through use of a standardized protocol tailored  for this examination and automatic exposure control for dose modulation. Following the uneventful administration of Isovue-370 contrast material, axial  CT angiography of the head and neck was performed. Coronal and sagittal  reconstructions were obtained. 3D MAXIMAL INTENSITY PROJECTION reconstructed imaging of the cranial vasculature  in both the coronal and sagittal plane was performed. CTA perfusion imaging was also obtained.  Reconstructions were created with color  coding of axial time to peak, axial blood volume, axial blood flow, axial mean  transit time and axial T Max. The study was analyzed by the Celeste W Maia Martinez. Algorithm     FINDINGS:  Confluent periventricular and scattered hypodensities in the cerebral white  matter consistent with known history of demyelinating disease. No pleural  effusion. . No evidence of acute intracranial hemorrhage. . . CTA NECK:   There is conventional three vessel arch anatomy. The origins of the innominate,  left common carotid and left subclavian arteries are normal.    There is  0%stenosis in the right internal carotid artery utilizing NASCET  criteria. There is  0%stenosis in the left internal carotid artery utilizing NASCET  criteria. CTA HEAD:  Left vertebral artery is dominant. The basilar artery and its branches are  normal. The internal carotid, anterior cerebral, and middle cerebral arteries  are patent. There is no flow-limiting intracranial stenosis. There is no  aneurysm. There are no sizable posterior communicating arteries. CT PERFUSION: No evidence of perfusion mismatch. No evidence of reversible  ischemia. Impression There is no major vessel occlusion. No flow limiting stenosis or intracranial  aneurysm. No acute intracranial process. Diffuse cerebral white matter disease is consistent with the patient's known  medical history of multiple sclerosis. I did independently review her brain MRI from February 6, 2021. There is a significant amount of white matter disease burden associated with her diagnosis of MS. There is no acute stroke or acute demyelinating plaque. Assessment and Plan:    The patient is a pleasant 42-year-old female with history of relapsing remitting multiple sclerosis, anxiety, depression, fibromyalgia who presents with acute confusional state. Her mental status has improved. Acute confusional state:   This is most likely related to the multiple amount of medications as well as THC that the patient takes. There is no new abnormalities on her neurological examination compared to prior visit. Electrolytes were unremarkable for an etiology. Her brain MRI does reveal evidence of her demyelinating disease but no acute abnormalities. Her mental status has improved throughout her hospitalization. Relapsing admitting multiple sclerosis:  She will continue to follow closely with her primary neurologist in clinic and work on getting on disease modifying therapy. Anxiety/depression, chronic pain:  She will continue to work with her psychiatrist, primary care doctor, and neurologist in helping to control the symptoms. There is no other additional neurology recommendations at this time. If questions arise, please not hesitate to contact me and I will return to see the patient. The patient should follow-up in clinic with her primary neurologist.         Active Problems:    Anxiety (8/25/2010)      Fibromyalgia (8/17/2012)      MS (multiple sclerosis) ([address] (8/17/2012)      Severe obesity ([address] (10/3/2018)      Exacerbation of multiple sclerosis ([address] (3/9/2020)      Multiple sclerosis exacerbation ([address] (8/6/2020)      Altered mental status (2/6/2021)      Non compliance with medical treatment (2/7/2021)      Recurrent falls (2/7/2021)      I spent  70    minutes providing care to this  acutely ill inpatient with > 50% of the time counseling and assisting in the coordination of care of the patient on the patient's hospital floor/unit.                       Signed By:  Stephanie Jaimes DO FAAN    February 7, 2021

## 2021-02-08 ENCOUNTER — PATIENT OUTREACH (OUTPATIENT)
Dept: CASE MANAGEMENT | Age: 46
End: 2021-02-08

## 2021-02-08 NOTE — PROGRESS NOTES
Patient was admitted to [facility] on 2/6/2021 and discharged on 2/7/2021 for AMS. Outreach made within 2 business days of discharge: Yes    Top Discharge Challenges to be reviewed by the provider     - no PCP on file   - unable to reach patient for [facility] call       Discussed COVID-19 related testing which was not done at this time. Test results were not done. Patient informed of results, if available?n/a  Method of communication with provider : no PCP on file        Advance Care Planning:   Does patient have an Advance Directive: on file     Inpatient Readmission Risk score: n/a  Was this a readmission? no - patient was seen in ER on 1/13  Patient stated reason for the admission: n/a  Patients top risk factors for readmission: lack of knowledge about disease, medical condition, PCP relationship and support system, unable to reach patient for [facility] call, chronoic illness    Home Health/Outpatient orders at discharge: none    Durable Medical Equipment ordered at discharge: none      [facility] follow up appointment(s):   Future Appointments   Date Time Provider Naveen Martins   4/27/2021  3:20 PM MD ROCHELLE Canales BS AMB     02/09/21  Unable to reach patient for [facility] call. VM left when able, my chart message sent. No PCP on file. Ctn to attempt to reach patient in 5 business days.  AR

## 2021-02-10 ENCOUNTER — TELEPHONE (OUTPATIENT)
Dept: NEUROLOGY | Age: 46
End: 2021-02-10

## 2021-02-10 DIAGNOSIS — G35 MULTIPLE SCLEROSIS EXACERBATION (HCC): ICD-10-CM

## 2021-02-10 NOTE — ED NOTES
Post Fall Documentation      Eddye Solders witnessed/unwitnessed fall occurred on 2/6/2021 (Date) at 1843 (Time). The answers to the following questions summarize the fall: In the patient's own words,:  · What were you attempting to do when you fell? Go to the bathroom  · Do you know why you fell? No   · Do you have any pain/discomfort or any other complaints? no  · Which part of your body made contact with the floor or other object? don't know    Nurse:  [facility] Was this an assisted fall? no   Was fall witnessed? No   If witnessed, what part of the body made contact with the floor or other object?  unknown   Patients mental status after the fall/when found: Confused   Any apparent injury:  No apparent injury   Immediate interventions for injury/suspected injury? No interventions needed   Patient assisted back to bed? Other 3, assited by 3 persons back to bed   Name of provider notified and time, any comments? 200, Dr. Ashley Hughes Name of family member notified and time: not called   Charge nurse notified immediately - Rasheed Rothman RN      Immediate VS and physical assessment documented in flow sheets. Neuro assessment every hour x 4 (for potential head injury or unwitnessed fall) documented in flow sheets.       Richard Harvey

## 2021-02-11 RX ORDER — GABAPENTIN 300 MG/1
600 CAPSULE ORAL 3 TIMES DAILY
Qty: 180 CAP | Refills: 0 | OUTPATIENT
Start: 2021-02-11

## 2021-02-15 DIAGNOSIS — G35 MS (MULTIPLE SCLEROSIS) (HCC): Primary | ICD-10-CM

## 2021-02-15 RX ORDER — GABAPENTIN 600 MG/1
600 TABLET ORAL 3 TIMES DAILY
Qty: 90 TAB | Refills: 3 | Status: ON HOLD | OUTPATIENT
Start: 2021-02-15 | End: 2021-04-12 | Stop reason: SDUPTHER

## 2021-02-18 ENCOUNTER — TELEPHONE (OUTPATIENT)
Dept: NEUROLOGY | Age: 46
End: 2021-02-18

## 2021-02-19 ENCOUNTER — PATIENT OUTREACH (OUTPATIENT)
Dept: CASE MANAGEMENT | Age: 46
End: 2021-02-19

## 2021-02-19 NOTE — Clinical Note
Good evening, I was unable to reach patient for [facility] call. I have resolved my episode if you want to attempt to reach patient for ACM services.  Thanks

## 2021-02-19 NOTE — PROGRESS NOTES
02/19/21  Unable to reach patient for [facility] call. Per chart review patient canceled neuro appt. Episode resolved at this time. TOO Gary notified of SHOLA episode being resolved.  AR

## 2021-02-24 ENCOUNTER — TELEPHONE (OUTPATIENT)
Dept: NEUROLOGY | Age: 46
End: 2021-02-24

## 2021-02-24 DIAGNOSIS — G35 MULTIPLE SCLEROSIS EXACERBATION (HCC): Primary | ICD-10-CM

## 2021-02-24 DIAGNOSIS — G35 MS (MULTIPLE SCLEROSIS) (HCC): ICD-10-CM

## 2021-02-24 NOTE — TELEPHONE ENCOUNTER
I rec'd a request from [employer] for Deanna EVERETT. There is an [place] in Media for [address] [place] [address] Road good from 10/1/20 - 12/31/21   [employer] [place],      Is patient currently on [address] [place] [address] Road? If so, please update med list and I will notify [employer] that [address] is not being prescribed. (Dr. Alton Purcell mentions [address] in his notes).

## 2021-02-25 ENCOUNTER — TELEPHONE (OUTPATIENT)
Dept: NEUROLOGY | Age: 46
End: 2021-02-25

## 2021-02-25 DIAGNOSIS — G35 MS (MULTIPLE SCLEROSIS) (HCC): ICD-10-CM

## 2021-02-25 NOTE — TELEPHONE ENCOUNTER
Aubagio PA request send to [facility] [employer]  -     Approved:  Approved today  PA Case: [id number], Status: Approved, Coverage Starts on: 1/1/2021 12:00:00 AM, Coverage Ends on: 12/31/2021 12:00:00 AM. Questions? Contact [phone].       Faxed to [address] [place] [address] [address] [address]. D  -  [employer] ([employer])  Covered: Retail, Mail Order Unknown: Specialty, Long-Term Care               Member ID: [id number] - F     Group ID:  [id number]

## 2021-02-26 ENCOUNTER — PATIENT OUTREACH (OUTPATIENT)
Dept: CASE MANAGEMENT | Age: 46
End: 2021-02-26

## 2021-02-26 NOTE — PROGRESS NOTES
3/5/21 ACM attempted to contact patient and it was noted in chart, patient is now in ER for pain management. This ACM will defer contact until patient is discharged. DMB    3/2/21 1647 pm. ACM contacted patient to inform her of information reference her medications. Patient was informed she should be taking home medications as prescribed. Patient was informed that [facility] had provided information to Av Ramos NP for clarification of MS medications- Aubagio and [address] and is waiting for return message from Ms Ankit Nugent. AT this time patient ended call and requested call back later. [facility]    3/2/21 ACM spoke with patient who stated she \"I am in lots of pain\". Patient was asked to complete medication reconciliation and she stated she is not taking any medications because Dr Kerin Duane told her to stop her medication. Patient is also requesting assistance in obtaining her MS medications Aubugio and Mavenclad from the specialist pharmacy. [facility] at [phone] who directed ACM to the [address] [place] [address] at [phone]. massiel Tirado at pharmacy stated patient is to receive the Aubagio as soon as patients address is verified by pharmacy with patient. It is approved month to month and patient must call each month to continue medication. The Mavenclad medication needs new prescriptions. [employer] only approved for 2 months- patient received 1 dose on 10/14/20 and second dose was shipped 2/9/21. Call placed to Av Ramos NP in neurology, to clarify if patient is to continue on this medication. AC  spoke with [place] and she will give Ms Ankit Nugent and her nurse message to call ACM back. Call was made to Dr Geller's office to clarify what medications patient should be taking. ACM spoke with Shira Carlson nurse for Dr Kareem Hyde who stated  patient is to continue home meds as listed on medication list  [facility]    2/26/21 ACM contacted patient today.  Discussed with patient home care since discharge from hospital on 2/8/21. Patient is to start on 2 new medications for her MS. Patient reports she gets assistance in paying for medications and does not have large copays. Patient reports she has aide or family to assist with ADLS. Patient reports that she gets meals through the [employer] insurance plan. This is short term benefit and patient will need to set up Meals on Wheels for long term needs. Person to contact is Tatiana Garcia at  at [employer] [facility] to set up Meals on Wheels. DMB

## 2021-03-02 ENCOUNTER — TELEPHONE (OUTPATIENT)
Dept: NEUROLOGY | Age: 46
End: 2021-03-02

## 2021-03-05 ENCOUNTER — HOSPITAL ENCOUNTER (EMERGENCY)
Age: 46
Discharge: HOME | End: 2021-03-05
Attending: STUDENT IN AN ORGANIZED HEALTH CARE EDUCATION/TRAINING PROGRAM | Admitting: STUDENT IN AN ORGANIZED HEALTH CARE EDUCATION/TRAINING PROGRAM
Payer: MEDICARE

## 2021-03-05 VITALS
HEIGHT: 66 IN | DIASTOLIC BLOOD PRESSURE: 90 MMHG | RESPIRATION RATE: 18 BRPM | BODY MASS INDEX: 35.93 KG/M2 | SYSTOLIC BLOOD PRESSURE: 164 MMHG | HEART RATE: 75 BPM | OXYGEN SATURATION: 100 % | WEIGHT: 223.55 LBS | TEMPERATURE: 98.2 F

## 2021-03-05 DIAGNOSIS — G89.4 CHRONIC PAIN SYNDROME: Primary | ICD-10-CM

## 2021-03-05 PROCEDURE — 99284 EMERGENCY DEPT VISIT MOD MDM: CPT

## 2021-03-05 PROCEDURE — 74011250637 HC RX REV CODE- 250/637: Performed by: STUDENT IN AN ORGANIZED HEALTH CARE EDUCATION/TRAINING PROGRAM

## 2021-03-05 PROCEDURE — 77030038269 HC DRN EXT URIN PURWCK BARD -A

## 2021-03-05 RX ORDER — GABAPENTIN 300 MG/1
300 CAPSULE ORAL
Status: DISCONTINUED | OUTPATIENT
Start: 2021-03-05 | End: 2021-03-05

## 2021-03-05 RX ORDER — LORAZEPAM 0.5 MG/1
0.5 TABLET ORAL
Status: COMPLETED | OUTPATIENT
Start: 2021-03-05 | End: 2021-03-05

## 2021-03-05 RX ADMIN — GABAPENTIN 400 MG: 100 CAPSULE ORAL at 14:56

## 2021-03-05 RX ADMIN — LORAZEPAM 0.5 MG: 0.5 TABLET ORAL at 16:12

## 2021-03-05 NOTE — ED PROVIDER NOTES
EMERGENCY DEPARTMENT HISTORY AND PHYSICAL EXAM      Date: 3/5/2021  Patient Name: Yudith Lo    History of Presenting Illness     Chief Complaint   Patient presents with    Generalized Body Aches     pain all over r/t MS couple of days of symptoms; arrives by rescue her right foot has been hurting her. History Provided By: patient    HPI: Yudith Lo, 50 y.o. female with past medical history of MS, anxiety, chronic pain and aches, presents to the ED with cc of \"pain all over. \" States that she always has pain from her multiple sclerosis, and usually takes gabapentin for this pain. Patient thinks that she takes 400 mg of gabapentin 4 times a day. She admits that she is not 100% sure on the dosage. She states she has been out of gabapentin for a month, and that she has attempted to get in to see her neurologist for refill without success. States since running out of her gabapentin, her pain has been present everyday without change. Denies pain being different from her usual chronic pain. Denies any weakness or numbness. Denies any vision changes or other concerning neurologic sxs. Denies fevers or chills. Patient also complains of increased anxiety at this time, and wondering if she can have something for her anxiety. States that she normally takes Xanax at home. Nursing note document patient's complaint of right foot pain. When questioned about this during my interview-patient reports that she has already been evaluated for this at another facility, and that the foot pain is not new. States that she is really here because of diffuse body aches due to having run out of gabapentin. PCP: None    No current facility-administered medications on file prior to encounter. Current Outpatient Medications on File Prior to Encounter   Medication Sig Dispense Refill    teriflunomide (AUBAGIO) 14 mg tablet Take 1 Tab by mouth daily.  Indications: relapsing form of multiple sclerosis 90 Tab 3  gabapentin (NEURONTIN) 600 mg tablet Take 1 Tab by mouth three (3) times daily. Max Daily Amount: 1,800 mg. 90 Tab 3    acetaminophen (TYLENOL) 500 mg tablet Take 500 mg by mouth every four (4) hours as needed.  busPIRone (BUSPAR) 5 mg tablet TAKE 2 TABLETS BY MOUTH TWICE DAILY      nystatin (MYCOSTATIN) topical cream APPLY CREAM TOPICALLY TO AFFECTED AREA TWICE DAILY      polyethylene glycol (Miralax) 17 gram packet Take 17 g by mouth two (2) times daily as needed for Constipation.  melatonin 3 mg tablet Take 15 mg by mouth nightly as needed for Insomnia.  famotidine (PEPCID) 20 mg tablet Take 20 mg by mouth two (2) times daily as needed for Heartburn.  ALPRAZolam (Xanax) 0.25 mg tablet Take 1 Tab by mouth two (2) times daily as needed for Anxiety. Max Daily Amount: 0.5 mg. Indications: anxiousness associated with depression (Patient taking differently: Take 0.25 mg by mouth two (2) times a day. Indications: anxiousness associated with depression) 6 Tab 0    DULoxetine (CYMBALTA) 60 mg capsule Take 1 Cap by mouth daily. Indications: neuropathic pain 90 Cap 5    cyclobenzaprine (FLEXERIL) 10 mg tablet Take 1 Tab by mouth three (3) times daily as needed for Muscle Spasm(s). 90 Tab 2    senna-docusate (PERICOLACE) 8.6-50 mg per tablet Take 2 Tabs by mouth daily.  15 Tab 0       Past History     Past Medical History:  Past Medical History:   Diagnosis Date    Body aches 12/11/2014    Chronic pain     Depression     Headache(784.0)     History of mammogram never before    [facility] sclerosis) ([facility] [address]     Neurological disorder     Multiple Sclerosis    Pap smear for cervical cancer screening 2008    Psychiatric disorder     anxiety    Psychotic disorder [facility])        Past Surgical History:  Past Surgical History:   Procedure Laterality Date    COLONOSCOPY N/A 2/28/2018    COLONOSCOPY performed by Christa Kwan MD at [address] HX GYN      3 c-sections    HX HEENT      bilateral ear tube surgery    HX HERNIA REPAIR      HX OTHER SURGICAL      R inguinal hernia repair       Family History:  Family History   Problem Relation Age of Onset    Heart Attack Father         tripple bypass    Cancer Father         lung    COPD Father     Diabetes Mother         type 2    Heart Disease Mother         heart disease    Diabetes Paternal Grandmother     No Known Problems Sister     No Known Problems Brother     No Known Problems Child        Social History:  Social History     Tobacco Use    Smoking status: Current Every Day Smoker     Packs/day: 0.50     Years: 17.00     Pack years: 8.50     Types: Cigarettes    Smokeless tobacco: Never Used    Tobacco comment: 1 pack every 3 days. Substance Use Topics    Alcohol use: No    Drug use: No       Allergies: Allergies   Allergen Reactions    Morphine Rash         Review of Systems   Review of Systems   Constitutional: Negative for chills and fever. HENT: Negative for congestion and rhinorrhea. Eyes: Negative for visual disturbance. Respiratory: Negative for chest tightness and shortness of breath. Cardiovascular: Negative for chest pain and palpitations. Gastrointestinal: Negative for abdominal pain, diarrhea, nausea and vomiting. Genitourinary: Negative for dysuria, flank pain and hematuria. Musculoskeletal: Negative for back pain and neck pain. Diffuse pain all over   Skin: Negative for rash. Allergic/Immunologic: Negative for immunocompromised state. Neurological: Negative for dizziness, speech difficulty, weakness and headaches. Hematological: Negative for adenopathy. Psychiatric/Behavioral: Negative for dysphoric mood and suicidal ideas. The patient is nervous/anxious. Physical Exam   Physical Exam  Vitals signs and nursing note reviewed. Constitutional:       General: She is not in acute distress.      Appearance: She is not ill-appearing or toxic-appearing. HENT:      Head: Normocephalic and atraumatic. Nose: Nose normal.      Mouth/Throat:      Mouth: Mucous membranes are moist.   Eyes:      Extraocular Movements: Extraocular movements intact. Pupils: Pupils are equal, round, and reactive to light. Neck:      Musculoskeletal: Normal range of motion and neck supple. Cardiovascular:      Rate and Rhythm: Normal rate and regular rhythm. Pulses: Normal pulses. Pulmonary:      Effort: Pulmonary effort is normal.      Breath sounds: No stridor. No wheezing or rhonchi. Abdominal:      General: Abdomen is flat. There is no distension. Tenderness: There is no abdominal tenderness. Musculoskeletal: Normal range of motion. Skin:     General: Skin is warm and dry. Neurological:      General: No focal deficit present. Mental Status: She is alert and oriented to person, place, and time. Psychiatric:         Judgment: Judgment normal.         Diagnostic Study Results     Labs -   No results found for this or any previous visit (from the past 24 hour(s)). Radiologic Studies -   No orders to display     CT Results  (Last 48 hours)    None        CXR Results  (Last 48 hours)    None          Medical Decision Making   I am the first provider for this patient. I reviewed the vital signs, available nursing notes, past medical history, past surgical history, family history and social history. Vital Signs-Reviewed the patient's vital signs. Patient Vitals for the past 24 hrs:   Temp Pulse Resp BP SpO2   03/05/21 1244 98.2 °F (36.8 °C) 75 18 (!) 146/93 100 %     Records Reviewed: Nursing Notes and Old Medical Records    Provider Notes (Medical Decision Making):   56-year-old female with past medical history of MS, chronic pain syndrome, anxiety and depression, presenting for evaluation of chronic pain. No concerning neurologic symptoms associated with this. Vitals are stable. Physical exam largely unremarkable.   Neuro exam is nonfocal.  Patient is at her baseline. Patient is given 400 mg of gabapentin here as she initially reported this dose. Also given half a milligram of Ativan for anxiety. However, I did elicit the help of ED pharmacist to look into patient's recent prescription for gabapentin. Per ED pharmacist, patient appears to have a standing prescription with several refills for 600 mg of gabapentin 3 times daily recently prescribed by her neurologist, Dr. Kenton Jimenez. This was discussed with the patient, who seems to not have been aware of this. Advised that she should go  this prescription at her regular pharmacy and take it as prescribed. As patient has no new acute symptoms today, I do not feel further ED work-up with lab studies or imaging is necessary on this visit. She is to follow-up with her neurologist as scheduled. Discharge at this time in stable condition. Return precautions discussed. Vitaliy Judd MD      Disposition:  Discharge      DISCHARGE PLAN:  1. Discharge Medication List as of 3/5/2021  4:03 PM        2. Follow-up Information     Follow up With Specialties Details Why Contact Info    Hortensia Latif MD Neurology In 3 days  [address] [place] [address] Drive  [address] [place] [address]  [address]. [phone]          3. Return to ED if worse     Diagnosis     Clinical Impression:   1. Chronic pain syndrome        Attestations:    Vitaliy Judd MD    Please note that this dictation was completed with [employer], the computer voice recognition software. Quite often unanticipated grammatical, syntax, homophones, and other interpretive errors are inadvertently transcribed by the computer software. Please disregard these errors. Please excuse any errors that have escaped final proofreading. Thank you.

## 2021-03-05 NOTE — DISCHARGE INSTRUCTIONS
You should have gabapentin available with refills at the pharmacy written by Dr. Tamika Mcdowell. Please  those scripts and take as prescribed. Follow up with neurology for further management of your MS and other neurologic sxs.

## 2021-03-05 NOTE — ED NOTES
She says that Gabapentin makes her pain better and No gabapentin makes her pain worse  He doctor is Dr. Jordyn Mayfield.

## 2021-03-05 NOTE — ED NOTES
1450- Pt reports right ankle pain and thinks it is a MS flare up. 1456- Medicated pt per orders. 1510- Pt requesting something for her anxiety    1620- Pt discharged by [facility] . Pt provided with discharge instructions Rx and instructions on follow up care. Pt out of ED via wheelchair  accompanied by ed tech.

## 2021-03-08 ENCOUNTER — TELEPHONE (OUTPATIENT)
Dept: NEUROLOGY | Age: 46
End: 2021-03-08

## 2021-03-08 ENCOUNTER — HOSPITAL ENCOUNTER (EMERGENCY)
Age: 46
Discharge: HOME | End: 2021-03-08
Attending: EMERGENCY MEDICINE
Payer: MEDICARE

## 2021-03-08 VITALS
SYSTOLIC BLOOD PRESSURE: 141 MMHG | TEMPERATURE: 98 F | WEIGHT: 223 LBS | HEART RATE: 89 BPM | OXYGEN SATURATION: 100 % | DIASTOLIC BLOOD PRESSURE: 92 MMHG | RESPIRATION RATE: 18 BRPM | BODY MASS INDEX: 37.15 KG/M2 | HEIGHT: 65 IN

## 2021-03-08 DIAGNOSIS — F41.9 CHRONIC ANXIETY: ICD-10-CM

## 2021-03-08 DIAGNOSIS — G89.4 CHRONIC PAIN SYNDROME: Primary | ICD-10-CM

## 2021-03-08 DIAGNOSIS — G35 HISTORY OF MULTIPLE SCLEROSIS (HCC): ICD-10-CM

## 2021-03-08 PROCEDURE — 99283 EMERGENCY DEPT VISIT LOW MDM: CPT

## 2021-03-08 RX ORDER — ALPRAZOLAM 0.5 MG/1
0.5 TABLET ORAL
Qty: 15 TAB | Refills: 0 | Status: SHIPPED | OUTPATIENT
Start: 2021-03-08 | End: 2021-03-30

## 2021-03-08 RX ORDER — GABAPENTIN 600 MG/1
600 TABLET ORAL 3 TIMES DAILY
Qty: 15 TAB | Refills: 0 | Status: SHIPPED | OUTPATIENT
Start: 2021-03-08 | End: 2021-03-13

## 2021-03-08 RX ORDER — POLYETHYLENE GLYCOL 3350 17 G/17G
17 POWDER, FOR SOLUTION ORAL DAILY
Qty: 289 G | Refills: 0 | Status: SHIPPED | OUTPATIENT
Start: 2021-03-08 | End: 2021-03-30

## 2021-03-08 NOTE — ED NOTES
Pt has hx of MS and is complaining of all-over body pain for almost 2 weeks. She does not complain of any new weakness. She does note that she has not been able to walk for the past month due to the pain.

## 2021-03-08 NOTE — ED PROVIDER NOTES
EMERGENCY DEPARTMENT HISTORY AND PHYSICAL EXAM      Date: 3/8/2021  Patient Name: Nani Shafer    History of Presenting Illness     Chief Complaint   Patient presents with    Other     Chronic pain all over her body from [address]. Pain ahs gotten bad in the last few days.  Anxiety       History Provided By: Patient    HPI: Nani Shafer, 50 y.o. female with a history of multiple sclerosis, fibromyalgia, anxiety, chronic pain and others as below presents EMS to the ED with cc of days or longer of 10 out of 10 constant, achy pain throughout her entire body. She tells me she normally takes gabapentin which works very well however when she went to get a prescription filled at her pharmacy she tells me they cannot fill her prescription until this Wednesday (in 2 days). She was seen in this facility on April 5 and was ultimately discharged. It was after that visit that she went to her [address] and was told she could not fill the prescriptions. There has been no fever lately. She also tells me her anxiety is \"off the charts\" and she normally takes Xanax for that problem which works well for her. She has not had that medicine in several days or longer and has taken that medication chronically. She is followed by neurology, Dr. Charity Feliciano. There are no other complaints, changes, or physical findings at this time. PCP: None    Current Outpatient Medications   Medication Sig Dispense Refill    ALPRAZolam (Xanax) 0.5 mg tablet Take 1 Tab by mouth every eight (8) hours as needed for Anxiety. Max Daily Amount: 1.5 mg. 15 Tab 0    gabapentin (NEURONTIN) 600 mg tablet Take 1 Tab by mouth three (3) times daily for 5 days. Max Daily Amount: 1,800 mg. 15 Tab 0    polyethylene glycol (Miralax) 17 gram/dose powder Take 17 g by mouth daily. 1 tablespoon with 8 oz of water daily 289 g 0    teriflunomide (AUBAGIO) 14 mg tablet Take 1 Tab by mouth daily.  Indications: relapsing form of multiple sclerosis 90 Tab 3  gabapentin (NEURONTIN) 600 mg tablet Take 1 Tab by mouth three (3) times daily. Max Daily Amount: 1,800 mg. 90 Tab 3    acetaminophen (TYLENOL) 500 mg tablet Take 500 mg by mouth every four (4) hours as needed.  busPIRone (BUSPAR) 5 mg tablet TAKE 2 TABLETS BY MOUTH TWICE DAILY      nystatin (MYCOSTATIN) topical cream APPLY CREAM TOPICALLY TO AFFECTED AREA TWICE DAILY      polyethylene glycol (Miralax) 17 gram packet Take 17 g by mouth two (2) times daily as needed for Constipation.  melatonin 3 mg tablet Take 15 mg by mouth nightly as needed for Insomnia.  famotidine (PEPCID) 20 mg tablet Take 20 mg by mouth two (2) times daily as needed for Heartburn.  ALPRAZolam (Xanax) 0.25 mg tablet Take 1 Tab by mouth two (2) times daily as needed for Anxiety. Max Daily Amount: 0.5 mg. Indications: anxiousness associated with depression (Patient taking differently: Take 0.25 mg by mouth two (2) times a day. Indications: anxiousness associated with depression) 6 Tab 0    DULoxetine (CYMBALTA) 60 mg capsule Take 1 Cap by mouth daily. Indications: neuropathic pain 90 Cap 5    cyclobenzaprine (FLEXERIL) 10 mg tablet Take 1 Tab by mouth three (3) times daily as needed for Muscle Spasm(s). 90 Tab 2    senna-docusate (PERICOLACE) 8.6-50 mg per tablet Take 2 Tabs by mouth daily.  15 Tab 0     Past History     Past Medical History:  Past Medical History:   Diagnosis Date    Body aches 12/11/2014    Chronic pain     Depression     Headache(784.0)     History of mammogram never before    [facility] [address]     Neurological disorder     Multiple Sclerosis    Pap smear for cervical cancer screening 2008    Psychiatric disorder     anxiety    Psychotic disorder [facility])        Past Surgical History:  Past Surgical History:   Procedure Laterality Date    COLONOSCOPY N/A 2/28/2018    COLONOSCOPY performed by Phani Mack MD at [address] HX GYN      3 c-sections    HX HEENT      bilateral ear tube surgery    HX HERNIA REPAIR      HX OTHER SURGICAL      R inguinal hernia repair       Family History:  Family History   Problem Relation Age of Onset    Heart Attack Father         tripple bypass    Cancer Father         lung    COPD Father     Diabetes Mother         type 2    Heart Disease Mother         heart disease    Diabetes Paternal Grandmother     No Known Problems Sister     No Known Problems Brother     No Known Problems Child        Social History:  Social History     Tobacco Use    Smoking status: Current Every Day Smoker     Packs/day: 0.50     Years: 17.00     Pack years: 8.50     Types: Cigarettes    Smokeless tobacco: Never Used    Tobacco comment: 1 pack every 3 days. Substance Use Topics    Alcohol use: No    Drug use: No       Allergies: Allergies   Allergen Reactions    Morphine Rash     Review of Systems   Review of Systems   Constitutional: Negative for fatigue and fever. Diffuse pain throughout her body   HENT: Negative for ear pain and sore throat. Eyes: Negative for pain, redness and visual disturbance. Respiratory: Negative for cough and shortness of breath. Cardiovascular: Negative for chest pain and palpitations. Gastrointestinal: Negative for abdominal pain, nausea and vomiting. Genitourinary: Negative for dysuria, frequency and urgency. Musculoskeletal: Negative for back pain, gait problem, neck pain and neck stiffness. Skin: Negative for rash and wound. Neurological: Negative for dizziness, weakness, light-headedness, numbness and headaches. Physical Exam   Physical Exam  Vitals signs and nursing note reviewed. Constitutional:       General: She is not in acute distress. Appearance: She is well-developed. She is not toxic-appearing. HENT:      Head: Normocephalic and atraumatic. Jaw: No trismus.       Right Ear: External ear normal.      Left Ear: External ear normal. Nose: Nose normal.      Mouth/Throat:      Pharynx: Uvula midline. Eyes:      General: No scleral icterus. Conjunctiva/sclera: Conjunctivae normal.      Pupils: Pupils are equal, round, and reactive to light. Neck:      Musculoskeletal: Full passive range of motion without pain and normal range of motion. Cardiovascular:      Rate and Rhythm: Normal rate and regular rhythm. Pulmonary:      Effort: Pulmonary effort is normal. No tachypnea, accessory muscle usage or respiratory distress. Breath sounds: No decreased breath sounds or wheezing. Abdominal:      Palpations: Abdomen is soft. Tenderness: There is no abdominal tenderness. Musculoskeletal: Normal range of motion. Skin:     Findings: No rash. Neurological:      Mental Status: She is alert and oriented to person, place, and time. She is not disoriented. GCS: GCS eye subscore is 4. GCS verbal subscore is 5. GCS motor subscore is 6. Cranial Nerves: No cranial nerve deficit. Psychiatric:         Speech: Speech normal.       Diagnostic Study Results     Labs -   No results found for this or any previous visit (from the past 12 hour(s)). Radiologic Studies -   No orders to display     CT Results  (Last 48 hours)    None        CXR Results  (Last 48 hours)    None        Medical Decision Making   I am the first provider for this patient. I reviewed the vital signs, available nursing notes, past medical history, past surgical history, family history and social history. Vital Signs-Reviewed the patient's vital signs.   Patient Vitals for the past 12 hrs:   Temp Pulse Resp BP SpO2   03/08/21 1730 98 °F (36.7 °C) 89 18 (!) 141/92 100 %       Pulse Oximetry Analysis - 100% on RA    Records Reviewed: Nursing Notes, Old Medical Records, Previous Radiology Studies, Previous Laboratory Studies and  and MILA    Provider Notes (Medical Decision Making):     6:48 PM   review reveals that a 15-day supply of gabapentin was filled on February 11, 2021.  also demonstrates that a 15-day supply of alprazolam was last filled on January 27, 2021. Case discussed with Dr. Ho Leader. Patient is afebrile and in no distress. She tells me that medications work well for her. Given  results believe reasonable to offer a small amount for the next several days until she can fill her regular prescription. Refer to neurology. Return precautions. ED Course:   Initial assessment performed. The patients presenting problems have been discussed, and they are in agreement with the care plan formulated and outlined with them. I have encouraged them to ask questions as they arise throughout their visit. Disposition:  Discharge    PLAN:  1. Discharge Medication List as of 3/8/2021  6:57 PM      START taking these medications    Details   !! ALPRAZolam (Xanax) 0.5 mg tablet Take 1 Tab by mouth every eight (8) hours as needed for Anxiety. Max Daily Amount: 1.5 mg., Normal, Disp-15 Tab, R-0      !! gabapentin (NEURONTIN) 600 mg tablet Take 1 Tab by mouth three (3) times daily for 5 days. Max Daily Amount: 1,800 mg., Normal, Disp-15 Tab, R-0      polyethylene glycol (Miralax) 17 gram/dose powder Take 17 g by mouth daily. 1 tablespoon with 8 oz of water daily, Normal, Disp-289 g, R-0       !! - Potential duplicate medications found. Please discuss with provider. CONTINUE these medications which have NOT CHANGED    Details   teriflunomide (AUBAGIO) 14 mg tablet Take 1 Tab by mouth daily. Indications: relapsing form of multiple sclerosis, Normal, Disp-90 Tab, R-3      !! gabapentin (NEURONTIN) 600 mg tablet Take 1 Tab by mouth three (3) times daily.  Max Daily Amount: 1,800 mg., Normal, Disp-90 Tab, R-3      acetaminophen (TYLENOL) 500 mg tablet Take 500 mg by mouth every four (4) hours as needed., Historical Med      busPIRone (BUSPAR) 5 mg tablet TAKE 2 TABLETS BY MOUTH TWICE DAILY, Historical Med      nystatin (MYCOSTATIN) topical cream APPLY CREAM TOPICALLY TO AFFECTED AREA TWICE DAILY, Historical Med      polyethylene glycol (Miralax) 17 gram packet Take 17 g by mouth two (2) times daily as needed for Constipation. , Historical Med      melatonin 3 mg tablet Take 15 mg by mouth nightly as needed for Insomnia., Historical Med      famotidine (PEPCID) 20 mg tablet Take 20 mg by mouth two (2) times daily as needed for Heartburn., Historical Med      !! ALPRAZolam (Xanax) 0.25 mg tablet Take 1 Tab by mouth two (2) times daily as needed for Anxiety. Max Daily Amount: 0.5 mg. Indications: anxiousness associated with depression, Normal, Disp-6 Tab, R-0      DULoxetine (CYMBALTA) 60 mg capsule Take 1 Cap by mouth daily. Indications: neuropathic pain, Normal, Disp-90 Cap, R-5      cyclobenzaprine (FLEXERIL) 10 mg tablet Take 1 Tab by mouth three (3) times daily as needed for Muscle Spasm(s). , Normal, Disp-90 Tab, R-2      senna-docusate (PERICOLACE) 8.6-50 mg per tablet Take 2 Tabs by mouth daily. , Normal, Disp-15 Tab, R-0       !! - Potential duplicate medications found. Please discuss with provider. 2.   Follow-up Information     Follow up With Specialties Details Why Contact Info    Concepcion Iverson MD Neurology Call  NEUROLOGY: as needed [address] [address]  [address]  [phone]          Return to ED if worse     Diagnosis     Clinical Impression:   1. Chronic pain syndrome    2. Chronic anxiety    3.  History of multiple sclerosis ([facility] [address]

## 2021-03-09 NOTE — ED NOTES
Aries Rivas RN reviewed discharge instructions with the patient. The patient verbalized understanding. All questions and concerns were addressed. The patient declined a wheelchair and is discharged ambulatory in the care of family members with instructions and prescriptions in hand. Pt is alert and oriented x 4. Respirations are clear and unlabored.

## 2021-03-18 ENCOUNTER — PATIENT OUTREACH (OUTPATIENT)
Dept: CASE MANAGEMENT | Age: 46
End: 2021-03-18

## 2021-03-18 RX ORDER — NAPROXEN 500 MG/1
500 TABLET ORAL 2 TIMES DAILY WITH MEALS
COMMUNITY
End: 2021-03-30

## 2021-03-18 NOTE — TELEPHONE ENCOUNTER
Called to get release of information for treatment she is unaware of calling, apologized for inconvience nothing else needed

## 2021-03-18 NOTE — PROGRESS NOTES
3/18/21 Patient returned ACM phone call. [employer] scheduling service was used to schedule appointment with new PCP. Goals      Identification of barriers to adherence to a plan of care such as inability to afford medications, lack of insurance, lack of transportation, etc.        3/18/21 After chart review, patient is currently ordered Augagio 14 mg every day. Has been approved by Tung Gonzalez through 12/31/21. Patient reports she has the medication in the home and is taking as prescribed.  Patient reports she is continuing with Medicaid Aide M-F 9a-3 pm. No hours approved for Sat/Sun but patient reports her friend stays with her.  Patient reports she has food in the home and is eating her meals.  Patient reports she has run out of xanax but does have gabapentin in the home and it taking 4 - 600 mg tabs daily- patient is rescribed 3 tabs daily, Patient was advised to contact Dr Chelle Miles neurologist for clarification of daily dose   Patient has an appointment with Dr Chelle Miles on 3/22/21 at 9 am.   Patient has scheduled new patient appointment with Dr Viviana Urban at [facility] on 4/21/21 at 9 am.   Patient reports she has transportation- her friend who stays with her will provide transportation. [facility]  3/2/21 Patient reports she has called [facility] to apply for Meals on Wheels and meals will be delivered starting 3/8/21.  Patient continues with home care aid through her Medicaid benefits. Pastor Madera contacted Tung Gonzalez on 3/2/21 to establish delivery dates for MS medications -Aubagio and Mavenclad. See progress note for this date for clarification on need for prescribtions  ACM will continue to monitor patient's adherence to plan of care. ACM will contact patient in next 7-10 days. [facility]      2/26/21 Patient with history of MS exerabations. Patient recently admitted to ER/hospital for Altered Mental Status. Patient is now home.  Services are provided by [facility] and [facility] [employer] coverage. Patient has requested assistance in setting up meals on Wheels-    Patient will need to contact [facility] at [phone] Doreen Segundo to apply  Pastor Madera will contact [address] [place] [address] at [phone] for coordination of care for patient   ACM will contact Yael Marrero for coordination of care for patient   Patient will be informed of services available through her insurance coverage and assisted to apply if needed for these services. ACM will monitor patients adherence to plan of care. ACM will contact patient in 7-10 days for follow up. [facility]          Knowledge and adherence of prescribed medication (ie. action, side effects, missed dose, etc.).      3/18/21 Patient is now taking Aubagio, 14 mg daily for MS, gabapentin 600 mg 4 tabs daily-prescription is for 3 tabs daily- patient will contact Dr Chelle Miles for clarification. Patient reports she has run out of xanax 0.25 mg and will discuss refills with Dr Kevin Torres on next office visit on 3/23/21. [facility]  3/2/21 Patient was unable to verbalized her medication list. When asked why she did not take her medications, patient replied Dr Liss Corral told me to stop my medications. Call was placed to Dr Dodie Ignacio office and nurse Michael stated patient should be taking home medications as prescribed. Also patient requested assistance in obtaining her MS medications. [facility] was contacted and stated patient was shipped her 2 doses of Mavenclad and needs new prescription for further shipments. Ajay Alcazar NP, was contacted to clarify if patient is to continue this medication.  Left message for call back to AC   The second MS medication Aubagio is ready to be shipped once [address] verifies patient address- should be shipped by 3/4/21- only authorized for one month at time- Patient will need call [address] monthly for refills   ACM will contact Ajay Alcazar NP office to clarify MS medications   ACM will contact Dr Geller's office to clarify daily home medication regime   ACM will contact patient with updated medication list as soon as possible and update chart  ACM will obtain clarification of home medications and inform patient of current medications. [facility]            3/18/21 ACM attempted to contact patient at numbers provided- left message for patient call back.  JEANNE

## 2021-03-23 ENCOUNTER — OFFICE VISIT (OUTPATIENT)
Dept: NEUROLOGY | Age: 46
End: 2021-03-23
Payer: MEDICARE

## 2021-03-23 VITALS
RESPIRATION RATE: 20 BRPM | SYSTOLIC BLOOD PRESSURE: 140 MMHG | DIASTOLIC BLOOD PRESSURE: 80 MMHG | TEMPERATURE: 96.8 F | HEART RATE: 80 BPM | OXYGEN SATURATION: 91 %

## 2021-03-23 DIAGNOSIS — M25.571 ACUTE RIGHT ANKLE PAIN: ICD-10-CM

## 2021-03-23 DIAGNOSIS — F32.1 CURRENT MODERATE EPISODE OF MAJOR DEPRESSIVE DISORDER WITHOUT PRIOR EPISODE (HCC): ICD-10-CM

## 2021-03-23 DIAGNOSIS — G89.4 CHRONIC PAIN SYNDROME: ICD-10-CM

## 2021-03-23 DIAGNOSIS — Z72.0 TOBACCO ABUSE: ICD-10-CM

## 2021-03-23 DIAGNOSIS — M25.572 ACUTE LEFT ANKLE PAIN: ICD-10-CM

## 2021-03-23 DIAGNOSIS — G35 MS (MULTIPLE SCLEROSIS) (HCC): Primary | ICD-10-CM

## 2021-03-23 DIAGNOSIS — F41.9 ANXIETY: ICD-10-CM

## 2021-03-23 PROCEDURE — G8417 CALC BMI ABV UP PARAM F/U: HCPCS | Performed by: PSYCHIATRY & NEUROLOGY

## 2021-03-23 PROCEDURE — G8432 DEP SCR NOT DOC, RNG: HCPCS | Performed by: PSYCHIATRY & NEUROLOGY

## 2021-03-23 PROCEDURE — 99215 OFFICE O/P EST HI 40 MIN: CPT | Performed by: PSYCHIATRY & NEUROLOGY

## 2021-03-23 PROCEDURE — G8427 DOCREV CUR MEDS BY ELIG CLIN: HCPCS | Performed by: PSYCHIATRY & NEUROLOGY

## 2021-03-23 RX ORDER — DULOXETIN HYDROCHLORIDE 60 MG/1
60 CAPSULE, DELAYED RELEASE ORAL DAILY
Qty: 90 CAP | Refills: 5 | Status: SHIPPED | OUTPATIENT
Start: 2021-03-23

## 2021-03-23 NOTE — PROGRESS NOTES
Follow-up Visit    Name Addie Alvarenga Age 50 y.o. MRN [id number]  1975       Chief Complaint: Pain    Patient comes for a follow up visit. She comes with her boyfriend complaining of pain. She is confined in a wheel chair. Her boyfriend has trouble pushing her up and down the ramp and getting over the curb. She has yet to sign up for covid vaccination. She has been to the ER on two occasions. She has not been getting her alprazolam. I explained to her that she was misusing the drug and that's why she is no longer on it. She is currently on Aubagio for her MS. She has been taking it for two weeks and has had no complications. She does not complain of hair loss. She no longer walks because she feels pain all over and because of the pain in right ankle. Assesment and Plan  1. MS (multiple sclerosis) (HCC)  Continue aubagio  - DULoxetine (CYMBALTA) 60 mg capsule; Take 1 Cap by mouth daily. Indications: neuropathic pain  Dispense: 90 Cap; Refill: 5    2. Anxiety  Conitnue cymbalta      3. Current moderate episode of major depressive disorder without prior episode ([facility] [address]      4. Acute left ankle pain  Continue cymbalata    5. Chronic pain syndrome  - DULoxetine (CYMBALTA) 60 mg capsule; Take 1 Cap by mouth daily. Indications: neuropathic pain  Dispense: 90 Cap; Refill: 5    6. Tobacco abuse  - DULoxetine (CYMBALTA) 60 mg capsule; Take 1 Cap by mouth daily. Indications: neuropathic pain  Dispense: 90 Cap; Refill: 5      Allergies  Morphine     Medications  Current Outpatient Medications   Medication Sig    naproxen (NAPROSYN) 500 mg tablet Take 500 mg by mouth two (2) times daily (with meals).  ALPRAZolam (Xanax) 0.5 mg tablet Take 1 Tab by mouth every eight (8) hours as needed for Anxiety. Max Daily Amount: 1.5 mg.  polyethylene glycol (Miralax) 17 gram/dose powder Take 17 g by mouth daily.  1 tablespoon with 8 oz of water daily    teriflunomide (AUBAGIO) 14 mg tablet Take 1 Tab by mouth daily. Indications: relapsing form of multiple sclerosis    gabapentin (NEURONTIN) 600 mg tablet Take 1 Tab by mouth three (3) times daily. Max Daily Amount: 1,800 mg.  acetaminophen (TYLENOL) 500 mg tablet Take 500 mg by mouth every four (4) hours as needed.  busPIRone (BUSPAR) 5 mg tablet TAKE 2 TABLETS BY MOUTH TWICE DAILY    nystatin (MYCOSTATIN) topical cream APPLY CREAM TOPICALLY TO AFFECTED AREA TWICE DAILY    polyethylene glycol (Miralax) 17 gram packet Take 17 g by mouth two (2) times daily as needed for Constipation.  melatonin 3 mg tablet Take 15 mg by mouth nightly as needed for Insomnia.  famotidine (PEPCID) 20 mg tablet Take 20 mg by mouth two (2) times daily as needed for Heartburn.  ALPRAZolam (Xanax) 0.25 mg tablet Take 1 Tab by mouth two (2) times daily as needed for Anxiety. Max Daily Amount: 0.5 mg. Indications: anxiousness associated with depression (Patient taking differently: Take 0.25 mg by mouth two (2) times a day. Indications: anxiousness associated with depression)    DULoxetine (CYMBALTA) 60 mg capsule Take 1 Cap by mouth daily. Indications: neuropathic pain    cyclobenzaprine (FLEXERIL) 10 mg tablet Take 1 Tab by mouth three (3) times daily as needed for Muscle Spasm(s).  senna-docusate (PERICOLACE) 8.6-50 mg per tablet Take 2 Tabs by mouth daily. No current facility-administered medications for this visit. Medical History  Past Medical History:   Diagnosis Date    Body aches 12/11/2014    Chronic pain     Depression     Headache(784.0)     History of mammogram never before    MS ([facility] sclerosis) ([facility] [address]     Neurological disorder     Multiple Sclerosis    Pap smear for cervical cancer screening 2008    Psychiatric disorder     anxiety    Psychotic disorder ([facility] [address]        Review of Systems   Eyes: Negative for blurred vision and double vision. Respiratory: Negative for cough and shortness of breath.     Cardiovascular: Negative for chest pain, palpitations and orthopnea. Gastrointestinal: Negative for nausea and vomiting. Musculoskeletal: Positive for falls, joint pain and myalgias. Neurological: Negative for dizziness and headaches. Psychiatric/Behavioral: Positive for depression. The patient is nervous/anxious. Exam:  Visit Vitals  BP (!) 140/80 (BP 1 Location: Left arm, BP Patient Position: Sitting, BP Cuff Size: Adult)   Pulse 80   Temp 96.8 °F (36 °C) (Temporal)   Resp 20   SpO2 91%      General: Well developed, well nourished. Head: Normocephalic, atraumatic, anicteric sclera   Neck Normal ROM   Lungs:  Clear to auscultation   Cardiac: Regular rate and rhythm with no murmurs. Abd: Bowel sounds were audible   Ext: No pedal edema   Skin: Supple no rash      NeurologicExam:  Mental Status: Alert and oriented to person place and time   Speech:  Mildly dysarthric no aphasia. Cranial Nerves:  II - XII Intact    Motor:   bilateral upper ext 5/5  Bilateral hip flexion weakness (more one the Left)   Reflexes:   symmetric   Sensory:   diminished sensation on the left . Tremor:   No tremor noted. Cerebellar:  Coordination intact. Neurovascular: No carotid bruits.  No JVD       Lab Review  Lab Results   Component Value Date/Time    WBC 5.1 02/07/2021 02:56 AM    HCT 29.1 (L) 02/07/2021 02:56 AM    HGB 9.4 (L) 02/07/2021 02:56 AM    PLATELET 284 97/73/3986 02:56 AM       Lab Results   Component Value Date/Time    Sodium 143 02/07/2021 04:36 AM    Potassium 3.6 02/07/2021 04:36 AM    Chloride 113 (H) 02/07/2021 04:36 AM    CO2 25 02/07/2021 04:36 AM    Glucose 87 02/07/2021 04:36 AM    BUN 9 02/07/2021 04:36 AM    Creatinine 0.60 02/07/2021 04:36 AM    Calcium 8.5 02/07/2021 04:36 AM       Lab Results   Component Value Date/Time    Hemoglobin A1c 4.9 12/16/2020 04:41 PM        Lab Results   Component Value Date/Time    Cholesterol, total 164 02/07/2021 04:36 AM    HDL Cholesterol 77 02/07/2021 04:36 AM    LDL, calculated 80.2 02/07/2021 04:36 AM    VLDL, calculated 6.8 02/07/2021 04:36 AM    Triglyceride 34 02/07/2021 04:36 AM    CHOL/HDL Ratio 2.1 02/07/2021 04:36 AM     45 minutes spent more than 50% spent in chart review and face to face  examination, discussion of treatment options and approach

## 2021-03-30 ENCOUNTER — HOSPITAL ENCOUNTER (EMERGENCY)
Age: 46
Discharge: HOME | End: 2021-03-30
Attending: EMERGENCY MEDICINE
Payer: MEDICARE

## 2021-03-30 VITALS
TEMPERATURE: 98.6 F | HEART RATE: 60 BPM | DIASTOLIC BLOOD PRESSURE: 81 MMHG | RESPIRATION RATE: 16 BRPM | OXYGEN SATURATION: 100 % | SYSTOLIC BLOOD PRESSURE: 124 MMHG

## 2021-03-30 DIAGNOSIS — G35 MULTIPLE SCLEROSIS EXACERBATION (HCC): Primary | ICD-10-CM

## 2021-03-30 LAB
ALBUMIN SERPL-MCNC: 4.1 G/DL (ref 3.5–5)
ALBUMIN/GLOB SERPL: 1.1 {RATIO} (ref 1.1–2.2)
ALP SERPL-CCNC: 70 U/L (ref 45–117)
ALT SERPL-CCNC: 31 U/L (ref 12–78)
ANION GAP SERPL CALC-SCNC: 6 MMOL/L (ref 5–15)
AST SERPL-CCNC: 18 U/L (ref 15–37)
BASOPHILS # BLD: 0 K/UL (ref 0–0.1)
BASOPHILS NFR BLD: 1 % (ref 0–1)
BILIRUB SERPL-MCNC: 0.6 MG/DL (ref 0.2–1)
BUN SERPL-MCNC: 13 MG/DL (ref 6–20)
BUN/CREAT SERPL: 16 (ref 12–20)
CALCIUM SERPL-MCNC: 9.5 MG/DL (ref 8.5–10.1)
CHLORIDE SERPL-SCNC: 107 MMOL/L (ref 97–108)
CO2 SERPL-SCNC: 26 MMOL/L (ref 21–32)
CREAT SERPL-MCNC: 0.81 MG/DL (ref 0.55–1.02)
DIFFERENTIAL METHOD BLD: ABNORMAL
EOSINOPHIL # BLD: 0.1 K/UL (ref 0–0.4)
EOSINOPHIL NFR BLD: 3 % (ref 0–7)
ERYTHROCYTE [DISTWIDTH] IN BLOOD BY AUTOMATED COUNT: 14 % (ref 11.5–14.5)
GLOBULIN SER CALC-MCNC: 3.6 G/DL (ref 2–4)
GLUCOSE SERPL-MCNC: 75 MG/DL (ref 65–100)
HCT VFR BLD AUTO: 41.6 % (ref 35–47)
HGB BLD-MCNC: 13.6 G/DL (ref 11.5–16)
IMM GRANULOCYTES # BLD AUTO: 0 K/UL (ref 0–0.04)
IMM GRANULOCYTES NFR BLD AUTO: 0 % (ref 0–0.5)
LYMPHOCYTES # BLD: 0.3 K/UL (ref 0.8–3.5)
LYMPHOCYTES NFR BLD: 9 % (ref 12–49)
MCH RBC QN AUTO: 31.6 PG (ref 26–34)
MCHC RBC AUTO-ENTMCNC: 32.7 G/DL (ref 30–36.5)
MCV RBC AUTO: 96.5 FL (ref 80–99)
MONOCYTES # BLD: 0.3 K/UL (ref 0–1)
MONOCYTES NFR BLD: 8 % (ref 5–13)
NEUTS SEG # BLD: 2.5 K/UL (ref 1.8–8)
NEUTS SEG NFR BLD: 79 % (ref 32–75)
NRBC # BLD: 0 K/UL (ref 0–0.01)
NRBC BLD-RTO: 0 PER 100 WBC
PLATELET # BLD AUTO: 295 K/UL (ref 150–400)
PMV BLD AUTO: 9.7 FL (ref 8.9–12.9)
POTASSIUM SERPL-SCNC: 4 MMOL/L (ref 3.5–5.1)
PROT SERPL-MCNC: 7.7 G/DL (ref 6.4–8.2)
RBC # BLD AUTO: 4.31 M/UL (ref 3.8–5.2)
RBC MORPH BLD: ABNORMAL
SODIUM SERPL-SCNC: 139 MMOL/L (ref 136–145)
WBC # BLD AUTO: 3.2 K/UL (ref 3.6–11)

## 2021-03-30 PROCEDURE — 85025 COMPLETE CBC W/AUTO DIFF WBC: CPT

## 2021-03-30 PROCEDURE — 74011250636 HC RX REV CODE- 250/636: Performed by: EMERGENCY MEDICINE

## 2021-03-30 PROCEDURE — 96375 TX/PRO/DX INJ NEW DRUG ADDON: CPT

## 2021-03-30 PROCEDURE — 36415 COLL VENOUS BLD VENIPUNCTURE: CPT

## 2021-03-30 PROCEDURE — 80053 COMPREHEN METABOLIC PANEL: CPT

## 2021-03-30 PROCEDURE — 99285 EMERGENCY DEPT VISIT HI MDM: CPT

## 2021-03-30 PROCEDURE — 96374 THER/PROPH/DIAG INJ IV PUSH: CPT

## 2021-03-30 RX ORDER — KETOROLAC TROMETHAMINE 30 MG/ML
15 INJECTION, SOLUTION INTRAMUSCULAR; INTRAVENOUS
Status: COMPLETED | OUTPATIENT
Start: 2021-03-30 | End: 2021-03-30

## 2021-03-30 RX ORDER — NAPROXEN 500 MG/1
500 TABLET ORAL
Qty: 20 TAB | Refills: 0 | Status: SHIPPED | OUTPATIENT
Start: 2021-03-30 | End: 2021-04-28 | Stop reason: ALTCHOICE

## 2021-03-30 RX ORDER — PREDNISONE 10 MG/1
TABLET ORAL
Qty: 42 TAB | Refills: 0 | Status: SHIPPED | OUTPATIENT
Start: 2021-03-30 | End: 2021-04-12

## 2021-03-30 RX ORDER — NAPROXEN 500 MG/1
500 TABLET ORAL
Qty: 20 TAB | Refills: 0 | Status: SHIPPED | OUTPATIENT
Start: 2021-03-30 | End: 2021-03-30 | Stop reason: SDUPTHER

## 2021-03-30 RX ORDER — PREDNISONE 10 MG/1
TABLET ORAL
Qty: 42 TAB | Refills: 0 | Status: SHIPPED | OUTPATIENT
Start: 2021-03-30 | End: 2021-03-30 | Stop reason: SDUPTHER

## 2021-03-30 RX ADMIN — METHYLPREDNISOLONE SODIUM SUCCINATE 125 MG: 125 INJECTION, POWDER, FOR SOLUTION INTRAMUSCULAR; INTRAVENOUS at 17:28

## 2021-03-30 RX ADMIN — KETOROLAC TROMETHAMINE 15 MG: 30 INJECTION, SOLUTION INTRAMUSCULAR at 17:28

## 2021-03-30 NOTE — ED NOTES
Discharge information reviewed by Lucila Hills MD . Pt verbalized understanding of discharge instructions. Discharge note signed. Pt discharged without difficulty. Pt discharged in stable condition via wheelchair.

## 2021-03-30 NOTE — ED PROVIDER NOTES
EMERGENCY DEPARTMENT HISTORY AND PHYSICAL EXAM      Date: 3/30/2021  Patient Name: Elisabeth Bashir  Patient Age and Sex: 50 y.o. female     History of Presenting Illness     Chief Complaint   Patient presents with    Generalized Body Aches     pt has a hx of MS and 2-3 days ago began to have a flare up. pain over entire body. pt reports they normally give her stroids to help with this       History Provided By: Patient    HPI: Elisabeht Bashir is a 47-year-old female with a history of advanced MS presenting for body aches. Patient states that she normally uses a wheelchair and is not very active at home. States in the past 2 to 3 days has been having pain all over. States it is very consistent with her prior MS flare. She does have a neurologist but has not yet seen him for this. She states that IV steroids and oral steroids usually do work for her. Takes gabapentin for her MS. Denies any chest pain, abdominal pain, diarrhea, cough, URI symptoms, headache. There are no other complaints, changes, or physical findings at this time. PCP: None    No current facility-administered medications on file prior to encounter. Current Outpatient Medications on File Prior to Encounter   Medication Sig Dispense Refill    DULoxetine (CYMBALTA) 60 mg capsule Take 1 Cap by mouth daily. Indications: neuropathic pain 90 Cap 5    teriflunomide (AUBAGIO) 14 mg tablet Take 1 Tab by mouth daily. Indications: relapsing form of multiple sclerosis 90 Tab 3    gabapentin (NEURONTIN) 600 mg tablet Take 1 Tab by mouth three (3) times daily. Max Daily Amount: 1,800 mg. 90 Tab 3    nystatin (MYCOSTATIN) topical cream APPLY CREAM TOPICALLY TO AFFECTED AREA TWICE DAILY      melatonin 3 mg tablet Take 15 mg by mouth nightly as needed for Insomnia.  [DISCONTINUED] naproxen (NAPROSYN) 500 mg tablet Take 500 mg by mouth two (2) times daily (with meals).       [DISCONTINUED] ALPRAZolam (Xanax) 0.5 mg tablet Take 1 Tab by mouth every eight (8) hours as needed for Anxiety. Max Daily Amount: 1.5 mg. 15 Tab 0    [DISCONTINUED] polyethylene glycol (Miralax) 17 gram/dose powder Take 17 g by mouth daily. 1 tablespoon with 8 oz of water daily 289 g 0    acetaminophen (TYLENOL) 500 mg tablet Take 500 mg by mouth every four (4) hours as needed.  [DISCONTINUED] busPIRone (BUSPAR) 5 mg tablet TAKE 2 TABLETS BY MOUTH TWICE DAILY      [DISCONTINUED] polyethylene glycol (Miralax) 17 gram packet Take 17 g by mouth two (2) times daily as needed for Constipation.  [DISCONTINUED] famotidine (PEPCID) 20 mg tablet Take 20 mg by mouth two (2) times daily as needed for Heartburn.  [DISCONTINUED] ALPRAZolam (Xanax) 0.25 mg tablet Take 1 Tab by mouth two (2) times daily as needed for Anxiety. Max Daily Amount: 0.5 mg. Indications: anxiousness associated with depression (Patient taking differently: Take 0.25 mg by mouth two (2) times a day. Indications: anxiousness associated with depression) 6 Tab 0    [DISCONTINUED] cyclobenzaprine (FLEXERIL) 10 mg tablet Take 1 Tab by mouth three (3) times daily as needed for Muscle Spasm(s). 90 Tab 2    [DISCONTINUED] senna-docusate (PERICOLACE) 8.6-50 mg per tablet Take 2 Tabs by mouth daily.  15 Tab 0       Past History     Past Medical History:  Past Medical History:   Diagnosis Date    Body aches 12/11/2014    Chronic pain     Depression     Headache(784.0)     History of mammogram never before    [facility] sclerosis) ([facility] [address]     Neurological disorder     Multiple Sclerosis    Pap smear for cervical cancer screening 2008    Psychiatric disorder     anxiety    Psychotic disorder [facility])        Past Surgical History:  Past Surgical History:   Procedure Laterality Date    COLONOSCOPY N/A 2/28/2018    COLONOS[facility]ques [facility]este [facility] ENDOSCOPY    HX CHOLECYSTECTOMY      HX GYN      3 c-sections    HX HEENT      bilateral ear tube surgery    HX HERNIA REPAIR      HX OTHER SURGICAL      R inguinal hernia repair       Family History:  Family History   Problem Relation Age of Onset    Heart Attack Father         tripple bypass    Cancer Father         lung    COPD Father     Diabetes Mother         type 2    Heart Disease Mother         heart disease    Diabetes Paternal Grandmother     No Known Problems Sister     No Known Problems Brother     No Known Problems Child        Social History:  Social History     Tobacco Use    Smoking status: Current Every Day Smoker     Packs/day: 0.50     Years: 17.00     Pack years: 8.50     Types: Cigarettes    Smokeless tobacco: Never Used    Tobacco comment: 1 pack every 3 days. Substance Use Topics    Alcohol use: No    Drug use: No       Allergies: Allergies   Allergen Reactions    Morphine Rash         Review of Systems   Review of Systems   Constitutional: Negative for chills and fever. Respiratory: Negative for cough and shortness of breath. Cardiovascular: Negative for chest pain. Gastrointestinal: Negative for abdominal pain, constipation, diarrhea, nausea and vomiting. Genitourinary: Negative for dysuria, frequency and hematuria. Musculoskeletal: Positive for myalgias. Neurological: Negative for weakness and numbness. All other systems reviewed and are negative. Physical Exam   Physical Exam  Vitals signs and nursing note reviewed. Constitutional:       Appearance: She is well-developed. Comments: Patient is chronically deconditioned and weak. Speaking out of the right side of her mouth   HENT:      Head: Normocephalic and atraumatic. Nose: Nose normal.      Mouth/Throat:      Mouth: Mucous membranes are moist.   Eyes:      Extraocular Movements: Extraocular movements intact. Conjunctiva/sclera: Conjunctivae normal.   Neck:      Musculoskeletal: Normal range of motion and neck supple. Cardiovascular:      Rate and Rhythm: Normal rate and regular rhythm.    Pulmonary: Effort: Pulmonary effort is normal. No respiratory distress. Breath sounds: Normal breath sounds. Abdominal:      General: There is no distension. Palpations: Abdomen is soft. Tenderness: There is no abdominal tenderness. Musculoskeletal: Normal range of motion. Skin:     General: Skin is warm and dry. Neurological:      General: No focal deficit present. Mental Status: She is alert and oriented to person, place, and time. Mental status is at baseline. Comments: 4-5 strength in all 4 extremities. Psychiatric:      Comments: Very flat affect          Diagnostic Study Results     Labs -     Recent Results (from the past 12 hour(s))   CBC WITH AUTOMATED DIFF    Collection Time: 03/30/21  5:23 PM   Result Value Ref Range    WBC 3.2 (L) 3.6 - 11.0 K/uL    RBC 4.31 3.80 - 5.20 M/uL    HGB 13.6 11.5 - 16.0 g/dL    HCT 41.6 35.0 - 47.0 %    MCV 96.5 80.0 - 99.0 FL    MCH 31.6 26.0 - 34.0 PG    MCHC 32.7 30.0 - 36.5 g/dL    RDW 14.0 11.5 - 14.5 %    PLATELET 810 444 - 099 K/uL    MPV 9.7 8.9 - 12.9 FL    NRBC 0.0 0  WBC    ABSOLUTE NRBC 0.00 0.00 - 0.01 K/uL    NEUTROPHILS PENDING %    LYMPHOCYTES PENDING %    MONOCYTES PENDING %    EOSINOPHILS PENDING %    BASOPHILS PENDING %    IMMATURE GRANULOCYTES PENDING %    ABS. NEUTROPHILS PENDING K/UL    ABS. LYMPHOCYTES PENDING K/UL    ABS. MONOCYTES PENDING K/UL    ABS. EOSINOPHILS PENDING K/UL    ABS. BASOPHILS PENDING K/UL    ABS. IMM. GRANS.  PENDING K/UL    DF PENDING    METABOLIC PANEL, COMPREHENSIVE    Collection Time: 03/30/21  5:23 PM   Result Value Ref Range    Sodium 139 136 - 145 mmol/L    Potassium 4.0 3.5 - 5.1 mmol/L    Chloride 107 97 - 108 mmol/L    CO2 26 21 - 32 mmol/L    Anion gap 6 5 - 15 mmol/L    Glucose 75 65 - 100 mg/dL    BUN 13 6 - 20 MG/DL    Creatinine 0.81 0.55 - 1.02 MG/DL    BUN/Creatinine ratio 16 12 - 20      GFR est AA >60 >60 ml/min/1.73m2    GFR est non-AA >60 >60 ml/min/1.73m2    Calcium 9.5 8.5 - 10.1 MG/DL    Bilirubin, total 0.6 0.2 - 1.0 MG/DL    ALT (SGPT) 31 12 - 78 U/L    AST (SGOT) 18 15 - 37 U/L    Alk. phosphatase 70 45 - 117 U/L    Protein, total 7.7 6.4 - 8.2 g/dL    Albumin 4.1 3.5 - 5.0 g/dL    Globulin 3.6 2.0 - 4.0 g/dL    A-G Ratio 1.1 1.1 - 2.2         Radiologic Studies -   No orders to display     CT Results  (Last 48 hours)    None        CXR Results  (Last 48 hours)    None            Medical Decision Making   I am the first provider for this patient. I reviewed the vital signs, available nursing notes, past medical history, past surgical history, family history and social history. Vital Signs-Reviewed the patient's vital signs. Patient Vitals for the past 12 hrs:   Temp Pulse Resp BP SpO2   03/30/21 1816 -- -- -- 124/81 100 %   03/30/21 1700 98.6 °F (37 °C) 60 16 118/75 100 %   03/30/21 1526 -- 70 16 118/80 100 %       Records Reviewed: Nursing Notes and Old Medical Records    Provider Notes (Medical Decision Making):   Patient presenting with myalgias all over. Most likely an MS flare given that this is consistent with her prior ones. Less likely dehydration, electrolyte issue, COVID-19 infection or other viral illness. ED Course:   Initial assessment performed. The patients presenting problems have been discussed, and they are in agreement with the care plan formulated and outlined with them. I have encouraged them to ask questions as they arise throughout their visit. Critical Care Time:   0    Disposition:  Discharge Note:  The patient has been re-evaluated and is ready for discharge. Reviewed available results with patient. Counseled patient on diagnosis and care plan. Patient has expressed understanding, and all questions have been answered. Patient agrees with plan and agrees to follow up as recommended, or to return to the ED if their symptoms worsen. Discharge instructions have been provided and explained to the patient, along with reasons to return to the ED. PLAN:  Current Discharge Medication List      START taking these medications    Details   naproxen (NAPROSYN) 500 mg tablet Take 1 Tab by mouth every twelve (12) hours as needed for Pain. Qty: 20 Tab, Refills: 0      predniSONE (DELTASONE) 10 mg tablet With Breakfast Take 5tab(50mg)x3days, 4tab(40mg)x3days, 3tab(30mg)x3days, 2tab(20mg)x2days, 1tab(10mg)x2days. #42  Qty: 42 Tab, Refills: 0           2. Follow-up Information     Follow up With Specialties Details Why Contact Info    Your Neurologist  Schedule an appointment as soon as possible for a visit           3. Return to ED if worse     Diagnosis     Clinical Impression:   1. Multiple sclerosis exacerbation (HCC)        Attestations:    Pasha Gonzalez M.D. Please note that this dictation was completed with [employer], the computer voice recognition software. Quite often unanticipated grammatical, syntax, homophones, and other interpretive errors are inadvertently transcribed by the computer software. Please disregard these errors. Please excuse any errors that have escaped final proofreading. Thank you. done

## 2021-03-30 NOTE — DISCHARGE INSTRUCTIONS
It was a pleasure taking care of you in our Emergency Department today. We know that when you come to [facility] you are entrusting us with your health, comfort, and safety. Our physicians and nurses honor that trust, and truly appreciate the opportunity to care for you and your loved ones. We also value your feedback. If you receive a survey about your Emergency Department experience today, please fill it out. We care about our patients' feedback, and we listen to what you have to say. Thank you!

## 2021-03-30 NOTE — ED NOTES
Pt arrived via wheelchair to room #13 from triage. Pt with c/o of generalized pain secondary to MS flare-up. Pt reports that she is wheelchair bound at baseline and lives with her fiance who helps take care of her. Pt reports that she has been experiencing pain that continues to get progressively worse x 2 weeks. Pt resting in position of comfort. Call bell within reach. Pt placed on monitor x 2.

## 2021-03-31 ENCOUNTER — HOSPITAL ENCOUNTER (INPATIENT)
Age: 46
LOS: 7 days | Discharge: NURSING, REHAB OR LONG-TERM CARE | DRG: 060 | End: 2021-04-12
Attending: EMERGENCY MEDICINE | Admitting: INTERNAL MEDICINE
Payer: MEDICARE

## 2021-03-31 ENCOUNTER — APPOINTMENT (OUTPATIENT)
Dept: MRI IMAGING | Age: 46
DRG: 060 | End: 2021-03-31
Attending: INTERNAL MEDICINE
Payer: MEDICARE

## 2021-03-31 ENCOUNTER — APPOINTMENT (OUTPATIENT)
Dept: CT IMAGING | Age: 46
DRG: 060 | End: 2021-03-31
Attending: EMERGENCY MEDICINE
Payer: MEDICARE

## 2021-03-31 DIAGNOSIS — G35 MULTIPLE SCLEROSIS EXACERBATION (HCC): Primary | ICD-10-CM

## 2021-03-31 DIAGNOSIS — R47.81 SLURRED SPEECH: ICD-10-CM

## 2021-03-31 DIAGNOSIS — I95.9 HYPOTENSION, UNSPECIFIED HYPOTENSION TYPE: ICD-10-CM

## 2021-03-31 DIAGNOSIS — R53.1 ACUTE WEAKNESS: ICD-10-CM

## 2021-03-31 DIAGNOSIS — G35 MS (MULTIPLE SCLEROSIS) (HCC): ICD-10-CM

## 2021-03-31 DIAGNOSIS — M79.7 FIBROMYALGIA: ICD-10-CM

## 2021-03-31 DIAGNOSIS — R68.89 UNABLE TO STAND UP: ICD-10-CM

## 2021-03-31 LAB
ALBUMIN SERPL-MCNC: 3.9 G/DL (ref 3.5–5)
ALBUMIN/GLOB SERPL: 1 {RATIO} (ref 1.1–2.2)
ALP SERPL-CCNC: 65 U/L (ref 45–117)
ALT SERPL-CCNC: 29 U/L (ref 12–78)
AMPHET UR QL SCN: NEGATIVE
ANION GAP SERPL CALC-SCNC: 6 MMOL/L (ref 5–15)
APPEARANCE UR: ABNORMAL
AST SERPL-CCNC: 19 U/L (ref 15–37)
ATRIAL RATE: 65 BPM
BACTERIA URNS QL MICRO: ABNORMAL /HPF
BARBITURATES UR QL SCN: NEGATIVE
BASOPHILS # BLD: 0 K/UL (ref 0–0.1)
BASOPHILS NFR BLD: 0 % (ref 0–1)
BENZODIAZ UR QL: POSITIVE
BILIRUB SERPL-MCNC: 0.5 MG/DL (ref 0.2–1)
BILIRUB UR QL CFM: NEGATIVE
BUN SERPL-MCNC: 16 MG/DL (ref 6–20)
BUN/CREAT SERPL: 22 (ref 12–20)
CALCIUM SERPL-MCNC: 9.6 MG/DL (ref 8.5–10.1)
CALCULATED P AXIS, ECG09: 23 DEGREES
CALCULATED R AXIS, ECG10: -6 DEGREES
CALCULATED T AXIS, ECG11: 39 DEGREES
CANNABINOIDS UR QL SCN: POSITIVE
CHLORIDE SERPL-SCNC: 108 MMOL/L (ref 97–108)
CO2 SERPL-SCNC: 23 MMOL/L (ref 21–32)
COCAINE UR QL SCN: NEGATIVE
COLOR UR: ABNORMAL
CREAT SERPL-MCNC: 0.73 MG/DL (ref 0.55–1.02)
DIAGNOSIS, 93000: NORMAL
DIFFERENTIAL METHOD BLD: ABNORMAL
DRUG SCRN COMMENT,DRGCM: ABNORMAL
EOSINOPHIL # BLD: 0 K/UL (ref 0–0.4)
EOSINOPHIL NFR BLD: 0 % (ref 0–7)
EPITH CASTS URNS QL MICRO: ABNORMAL /LPF
ERYTHROCYTE [DISTWIDTH] IN BLOOD BY AUTOMATED COUNT: 13.8 % (ref 11.5–14.5)
GLOBULIN SER CALC-MCNC: 3.8 G/DL (ref 2–4)
GLUCOSE SERPL-MCNC: 94 MG/DL (ref 65–100)
GLUCOSE UR STRIP.AUTO-MCNC: NEGATIVE MG/DL
HCT VFR BLD AUTO: 39.2 % (ref 35–47)
HGB BLD-MCNC: 12.9 G/DL (ref 11.5–16)
HGB UR QL STRIP: NEGATIVE
IMM GRANULOCYTES # BLD AUTO: 0 K/UL (ref 0–0.04)
IMM GRANULOCYTES NFR BLD AUTO: 0 % (ref 0–0.5)
INR PPP: 1.1 (ref 0.9–1.1)
KETONES UR QL STRIP.AUTO: 15 MG/DL
LEUKOCYTE ESTERASE UR QL STRIP.AUTO: NEGATIVE
LYMPHOCYTES # BLD: 0.3 K/UL (ref 0.8–3.5)
LYMPHOCYTES NFR BLD: 4 % (ref 12–49)
MCH RBC QN AUTO: 31.5 PG (ref 26–34)
MCHC RBC AUTO-ENTMCNC: 32.9 G/DL (ref 30–36.5)
MCV RBC AUTO: 95.6 FL (ref 80–99)
METHADONE UR QL: NEGATIVE
MONOCYTES # BLD: 0.3 K/UL (ref 0–1)
MONOCYTES NFR BLD: 4 % (ref 5–13)
NEUTS SEG # BLD: 6.7 K/UL (ref 1.8–8)
NEUTS SEG NFR BLD: 92 % (ref 32–75)
NITRITE UR QL STRIP.AUTO: NEGATIVE
NRBC # BLD: 0 K/UL (ref 0–0.01)
NRBC BLD-RTO: 0 PER 100 WBC
OPIATES UR QL: NEGATIVE
P-R INTERVAL, ECG05: 148 MS
PCP UR QL: NEGATIVE
PH UR STRIP: 5.5 [PH] (ref 5–8)
PLATELET # BLD AUTO: 353 K/UL (ref 150–400)
PMV BLD AUTO: 10.5 FL (ref 8.9–12.9)
POTASSIUM SERPL-SCNC: 4.9 MMOL/L (ref 3.5–5.1)
PROT SERPL-MCNC: 7.7 G/DL (ref 6.4–8.2)
PROT UR STRIP-MCNC: NEGATIVE MG/DL
PROTHROMBIN TIME: 11.2 SEC (ref 9–11.1)
Q-T INTERVAL, ECG07: 438 MS
QRS DURATION, ECG06: 84 MS
QTC CALCULATION (BEZET), ECG08: 455 MS
RBC # BLD AUTO: 4.1 M/UL (ref 3.8–5.2)
RBC #/AREA URNS HPF: ABNORMAL /HPF (ref 0–5)
RBC MORPH BLD: ABNORMAL
SODIUM SERPL-SCNC: 137 MMOL/L (ref 136–145)
SP GR UR REFRACTOMETRY: >1.03 (ref 1–1.03)
UA: UC IF INDICATED,UAUC: ABNORMAL
UROBILINOGEN UR QL STRIP.AUTO: 0.2 EU/DL (ref 0.2–1)
VENTRICULAR RATE, ECG03: 65 BPM
WBC # BLD AUTO: 7.3 K/UL (ref 3.6–11)
WBC URNS QL MICRO: ABNORMAL /HPF (ref 0–4)

## 2021-03-31 PROCEDURE — 74011250636 HC RX REV CODE- 250/636: Performed by: EMERGENCY MEDICINE

## 2021-03-31 PROCEDURE — 85610 PROTHROMBIN TIME: CPT

## 2021-03-31 PROCEDURE — 70553 MRI BRAIN STEM W/O & W/DYE: CPT

## 2021-03-31 PROCEDURE — 96365 THER/PROPH/DIAG IV INF INIT: CPT

## 2021-03-31 PROCEDURE — 81001 URINALYSIS AUTO W/SCOPE: CPT

## 2021-03-31 PROCEDURE — 80307 DRUG TEST PRSMV CHEM ANLYZR: CPT

## 2021-03-31 PROCEDURE — 74011000258 HC RX REV CODE- 258: Performed by: EMERGENCY MEDICINE

## 2021-03-31 PROCEDURE — 80053 COMPREHEN METABOLIC PANEL: CPT

## 2021-03-31 PROCEDURE — 36415 COLL VENOUS BLD VENIPUNCTURE: CPT

## 2021-03-31 PROCEDURE — 70450 CT HEAD/BRAIN W/O DYE: CPT

## 2021-03-31 PROCEDURE — 92610 EVALUATE SWALLOWING FUNCTION: CPT

## 2021-03-31 PROCEDURE — A9575 INJ GADOTERATE MEGLUMI 0.1ML: HCPCS | Performed by: INTERNAL MEDICINE

## 2021-03-31 PROCEDURE — 85025 COMPLETE CBC W/AUTO DIFF WBC: CPT

## 2021-03-31 PROCEDURE — 74011250636 HC RX REV CODE- 250/636: Performed by: INTERNAL MEDICINE

## 2021-03-31 PROCEDURE — 93005 ELECTROCARDIOGRAM TRACING: CPT

## 2021-03-31 PROCEDURE — 65390000012 HC CONDITION CODE 44 OBSERVATION

## 2021-03-31 PROCEDURE — 72156 MRI NECK SPINE W/O & W/DYE: CPT

## 2021-03-31 PROCEDURE — 65660000000 HC RM CCU STEPDOWN

## 2021-03-31 PROCEDURE — 96375 TX/PRO/DX INJ NEW DRUG ADDON: CPT

## 2021-03-31 PROCEDURE — 99285 EMERGENCY DEPT VISIT HI MDM: CPT

## 2021-03-31 PROCEDURE — 74011250637 HC RX REV CODE- 250/637: Performed by: INTERNAL MEDICINE

## 2021-03-31 RX ORDER — LORAZEPAM 2 MG/ML
1 INJECTION INTRAMUSCULAR ONCE
Status: COMPLETED | OUTPATIENT
Start: 2021-03-31 | End: 2021-03-31

## 2021-03-31 RX ORDER — DULOXETIN HYDROCHLORIDE 30 MG/1
60 CAPSULE, DELAYED RELEASE ORAL DAILY
Status: DISCONTINUED | OUTPATIENT
Start: 2021-04-01 | End: 2021-04-01

## 2021-03-31 RX ORDER — SODIUM CHLORIDE 0.9 % (FLUSH) 0.9 %
5-40 SYRINGE (ML) INJECTION AS NEEDED
Status: DISCONTINUED | OUTPATIENT
Start: 2021-03-31 | End: 2021-04-12 | Stop reason: HOSPADM

## 2021-03-31 RX ORDER — ONDANSETRON 2 MG/ML
4 INJECTION INTRAMUSCULAR; INTRAVENOUS
Status: DISCONTINUED | OUTPATIENT
Start: 2021-03-31 | End: 2021-04-12 | Stop reason: HOSPADM

## 2021-03-31 RX ORDER — GADOTERATE MEGLUMINE 376.9 MG/ML
20 INJECTION INTRAVENOUS
Status: COMPLETED | OUTPATIENT
Start: 2021-03-31 | End: 2021-03-31

## 2021-03-31 RX ORDER — LANOLIN ALCOHOL/MO/W.PET/CERES
15 CREAM (GRAM) TOPICAL
Status: DISCONTINUED | OUTPATIENT
Start: 2021-03-31 | End: 2021-04-12 | Stop reason: HOSPADM

## 2021-03-31 RX ORDER — PROMETHAZINE HYDROCHLORIDE 25 MG/1
12.5 TABLET ORAL
Status: DISCONTINUED | OUTPATIENT
Start: 2021-03-31 | End: 2021-04-12 | Stop reason: HOSPADM

## 2021-03-31 RX ORDER — ACETAMINOPHEN 325 MG/1
650 TABLET ORAL
Status: DISCONTINUED | OUTPATIENT
Start: 2021-03-31 | End: 2021-04-12 | Stop reason: HOSPADM

## 2021-03-31 RX ORDER — ENOXAPARIN SODIUM 100 MG/ML
40 INJECTION SUBCUTANEOUS DAILY
Status: DISCONTINUED | OUTPATIENT
Start: 2021-04-01 | End: 2021-04-12 | Stop reason: HOSPADM

## 2021-03-31 RX ORDER — SODIUM CHLORIDE 0.9 % (FLUSH) 0.9 %
5-40 SYRINGE (ML) INJECTION EVERY 8 HOURS
Status: DISCONTINUED | OUTPATIENT
Start: 2021-03-31 | End: 2021-04-12 | Stop reason: HOSPADM

## 2021-03-31 RX ORDER — POLYETHYLENE GLYCOL 3350 17 G/17G
17 POWDER, FOR SOLUTION ORAL DAILY PRN
Status: DISCONTINUED | OUTPATIENT
Start: 2021-03-31 | End: 2021-04-12 | Stop reason: HOSPADM

## 2021-03-31 RX ORDER — GABAPENTIN 300 MG/1
600 CAPSULE ORAL 3 TIMES DAILY
Status: DISCONTINUED | OUTPATIENT
Start: 2021-03-31 | End: 2021-04-01

## 2021-03-31 RX ORDER — ACETAMINOPHEN 650 MG/1
650 SUPPOSITORY RECTAL
Status: DISCONTINUED | OUTPATIENT
Start: 2021-03-31 | End: 2021-04-12 | Stop reason: HOSPADM

## 2021-03-31 RX ADMIN — GADOTERATE MEGLUMINE 20 ML: 376.9 INJECTION INTRAVENOUS at 20:06

## 2021-03-31 RX ADMIN — GABAPENTIN 600 MG: 300 CAPSULE ORAL at 21:22

## 2021-03-31 RX ADMIN — SODIUM CHLORIDE 1000 MG: 900 INJECTION INTRAVENOUS at 13:55

## 2021-03-31 RX ADMIN — Medication 10 ML: at 17:02

## 2021-03-31 RX ADMIN — GABAPENTIN 600 MG: 300 CAPSULE ORAL at 17:01

## 2021-03-31 RX ADMIN — LORAZEPAM 1 MG: 2 INJECTION INTRAMUSCULAR; INTRAVENOUS at 18:35

## 2021-03-31 RX ADMIN — Medication 10 ML: at 21:54

## 2021-03-31 NOTE — ED NOTES
TRANSFER - OUT REPORT:    Verbal report given to Neuro RN(name) on Evans Loaiza  being transferred to Neuro (unit) for routine progression of care       Report consisted of patients Situation, Background, Assessment and   Recommendations(SBAR). Information from the following report(s) SBAR, Kardex, ED Summary, [facility] and Recent Results was reviewed with the receiving nurse. Lines:   Peripheral IV 03/31/21 Left Antecubital (Active)        Opportunity for questions and clarification was provided.

## 2021-03-31 NOTE — H&P
Hospitalist Admission Note    NAME: Daphne Coffman   :  1975   MRN:  [id number]     Date/Time:  3/31/2021 2:30 PM    Patient PCP: None  ______________________________________________________________________  Given the patient's current clinical presentation, I have a high level of concern for decompensation if discharged from the emergency department. Complex decision making was performed, which includes reviewing the patient's available past medical records, laboratory results, and x-ray films. My assessment of this patient's clinical condition and my plan of care is as follows. Assessment / Plan:  Acute exacerbation of relapsing remitting multiple sclerosis, POA  Acute weakness and inability to stand secondary to above, POA  --Patient had multiple ER visits in last 1 month for symptoms of generalized aches and pains with slurred speech  -- She is wheelchair-bound at her baseline now  --Patient presented today with severe worsening of her bilateral lower extremity weakness, and mild worsening of bilateral upper arm weakness.   --ER discussed with patient's neurologist, will admit and start IV Solu-Medrol 1 g daily  --Solu-Medrol started in ER, will order for tomorrow start date  --Neurochecks  --Admit to neuro floor  -Obtain MRI of brain and cervical spine as per neurology recommendation  -Continue Aubagio  -She was just discharged last month and MRI at that time showed stable demyelinating lesions  -I suspect this patient will need some chronic pain management upon discharge    Anxiety/depression, POA  Fibromyalgia  -Continue Cymbalta and gabapentin     Tobacco use, POA  --Patient did not want to discuss tobacco cessation        Code Status: Full  Surrogate Decision Maker: Patient's son     DVT Prophylaxis: Lovenox subcu  GI Prophylaxis: not indicated     Baseline: Is wheelchair-bound now            Subjective:   CHIEF COMPLAINT: Generalized body aches and pains \" my MS is acting up\"    HISTORY OF PRESENT ILLNESS:     Mallory Bhatt is a 50 y.o. F with PMH of MS, Anxiety and fibromyalgia who was discharged last month when she was admitted for altered mental status and was evaluated for stroke and MS exacerbation. MRI showed stable demyelinating lesions. Since her discharge last month she presented emergency department 4 times this month complaining of generalized body aches and pains. She was also noted to have slurred speech on last admission which is again noticed on this admission. She denies any subjective fever chills, chest pain, shortness of breath, cough, sputum production, nausea, vomiting, diarrhea. In the ED neurology was contacted and advised to admit for pulse dose of steroids and repeating MRI of brain and cervical spine. We were asked to admit for work up and evaluation of the above problems.        We were asked to admit for work up and evaluation of the above problems. Past Medical History:   Diagnosis Date    Body aches 12/11/2014    Chronic pain     Depression     Headache(784.0)     History of mammogram never before    MS (multiple sclerosis) ([facility] [address]     Neurological disorder     Multiple Sclerosis    Pap smear for cervical cancer screening 2008    Psychiatric disorder     anxiety    Psychotic disorder [facility])         Past Surgical History:   Procedure Laterality Date    COLONOSCOPY N/A 2/28/2018    COLONOSCOPY performed by Ruben Aguayo MD at [facility] ENDOSCOPY    HX CHOLECYSTECTOMY      HX GYN      3 c-sections    HX HEENT      bilateral ear tube surgery    HX HERNIA REPAIR      HX OTHER SURGICAL      R inguinal hernia repair       Social History     Tobacco Use    Smoking status: Current Every Day Smoker     Packs/day: 0.50     Years: 17.00     Pack years: 8.50     Types: Cigarettes    Smokeless tobacco: Never Used    Tobacco comment: 1 pack every 3 days.    Substance Use Topics    Alcohol use: No        Family History   Problem Relation Age of Onset    Heart Attack Father         tripple bypass    Cancer Father         lung    COPD Father     Diabetes Mother         type 2    Heart Disease Mother         heart disease    Diabetes Paternal Grandmother     No Known Problems Sister     No Known Problems Brother     No Known Problems Child    Reviewed  Allergies   Allergen Reactions    Morphine Rash        Prior to Admission medications    Medication Sig Start Date End Date Taking? Authorizing Provider   naproxen (NAPROSYN) 500 mg tablet Take 1 Tab by mouth every twelve (12) hours as needed for Pain. 3/30/21   Kendall Werner MD   predniSONE (DELTASONE) 10 mg tablet With Breakfast Take 5tab(50mg)x3days, 4tab(40mg)x3days, 3tab(30mg)x3days, 2tab(20mg)x2days, 1tab(10mg)x2days. #42 3/30/21   Kendall Werner MD   DULoxetine (CYMBALTA) 60 mg capsule Take 1 Cap by mouth daily. Indications: neuropathic pain 3/23/21   Jossue Morales MD   teriflunomide (AUBAGIO) 14 mg tablet Take 1 Tab by mouth daily. Indications: relapsing form of multiple sclerosis 2/25/21   Mikayla Mckinley NP   gabapentin (NEURONTIN) 600 mg tablet Take 1 Tab by mouth three (3) times daily. Max Daily Amount: 1,800 mg. 2/15/21   Jossue Morales MD   acetaminophen (TYLENOL) 500 mg tablet Take 500 mg by mouth every four (4) hours as needed. 8/21/20   Provider, Historical   nystatin (MYCOSTATIN) topical cream APPLY CREAM TOPICALLY TO AFFECTED AREA TWICE DAILY 1/1/21   Provider, Historical   melatonin 3 mg tablet Take 15 mg by mouth nightly as needed for Insomnia. Provider, Historical       REVIEW OF SYSTEMS:     I am not able to complete the review of systems because:    The patient is intubated and sedated    The patient has altered mental status due to his acute medical problems    The patient has baseline aphasia from prior stroke(s)    The patient has baseline dementia and is not reliable historian    The patient is in acute medical distress and unable to provide information           Total of 12 systems reviewed as follows:       POSITIVE= underlined text  Negative = text not underlined  General:  fever, chills, sweats, generalized weakness, weight loss/gain,      loss of appetite   Eyes:    blurred vision, eye pain, loss of vision, double vision  ENT:    rhinorrhea, pharyngitis   Respiratory:   cough, sputum production, SOB, BOBO, wheezing, pleuritic pain   Cardiology:   chest pain, palpitations, orthopnea, PND, edema, syncope   Gastrointestinal:  abdominal pain , N/V, diarrhea, dysphagia, constipation, bleeding   Genitourinary:  frequency, urgency, dysuria, hematuria, incontinence   Muskuloskeletal :  arthralgia, myalgia, back pain  Hematology:  easy bruising, nose or gum bleeding, lymphadenopathy   Dermatological: rash, ulceration, pruritis, color change / jaundice  Endocrine:   hot flashes or polydipsia   Neurological:  headache, dizziness, confusion, focal weakness, paresthesia,     Speech difficulties, memory loss, gait difficulty  Psychological: Feelings of anxiety, depression, agitation    Objective:   VITALS:    Visit Vitals  /87   Pulse 80   Temp 97.5 °F (36.4 °C)   Resp 16   Ht 5' 4.5\" (1.638 m)   Wt 101.2 kg (223 lb 1.7 oz)   SpO2 100%   BMI 37.70 kg/m²       PHYSICAL EXAM:    General:    Alert, cooperative, no distress, appears stated age. HEENT: Atraumatic, anicteric sclerae, pink conjunctivae     No oral ulcers, mucosa moist, throat clear, dentition fair  Neck:  Supple, symmetrical,  thyroid: non tender  Lungs:   Clear to auscultation bilaterally. No Wheezing or Rhonchi. No rales. Chest wall:  No tenderness  No Accessory muscle use. Heart:   Regular  rhythm,  No  murmur   No edema  Abdomen:   Soft, non-tender. Not distended. Bowel sounds normal  Extremities: No cyanosis. No clubbing,      Skin turgor normal, Capillary refill normal, Radial dial pulse 2+  Skin:     Not pale. Not Jaundiced  No rashes   Psych:  Good insight. Not depressed.   Not anxious or agitated. Neurologic: EOMs intact. No facial asymmetry. No aphasia, speech is slurred. Symmetrical strength, Sensation grossly intact. Alert and oriented X 4.     _______________________________________________________________________  Care Plan discussed with:    Comments   Patient x    Family      RN x    Care Manager                    Consultant:      _______________________________________________________________________  Expected  Disposition:   Home with Family x   HH/PT/OT/RN    SNF/LTC    COLLETTE    ________________________________________________________________________  TOTAL TIME: 36 Minutes    Critical Care Provided     Minutes non procedure based      Comments    x Reviewed previous records   >50% of visit spent in counseling and coordination of care x Discussion with patient and/or family and questions answered       ________________________________________________________________________  Signed: Rebecca Yi MD    Procedures: see electronic medical records for all procedures/Xrays and details which were not copied into this note but were reviewed prior to creation of Plan. LAB DATA REVIEWED:    Recent Results (from the past 24 hour(s))   CBC WITH AUTOMATED DIFF    Collection Time: 03/30/21  5:23 PM   Result Value Ref Range    WBC 3.2 (L) 3.6 - 11.0 K/uL    RBC 4.31 3.80 - 5.20 M/uL    HGB 13.6 11.5 - 16.0 g/dL    HCT 41.6 35.0 - 47.0 %    MCV 96.5 80.0 - 99.0 FL    MCH 31.6 26.0 - 34.0 PG    MCHC 32.7 30.0 - 36.5 g/dL    RDW 14.0 11.5 - 14.5 %    PLATELET 623 538 - 816 K/uL    MPV 9.7 8.9 - 12.9 FL    NRBC 0.0 0  WBC    ABSOLUTE NRBC 0.00 0.00 - 0.01 K/uL    NEUTROPHILS 79 (H) 32 - 75 %    LYMPHOCYTES 9 (L) 12 - 49 %    MONOCYTES 8 5 - 13 %    EOSINOPHILS 3 0 - 7 %    BASOPHILS 1 0 - 1 %    IMMATURE GRANULOCYTES 0 0.0 - 0.5 %    ABS. NEUTROPHILS 2.5 1.8 - 8.0 K/UL    ABS. LYMPHOCYTES 0.3 (L) 0.8 - 3.5 K/UL    ABS. MONOCYTES 0.3 0.0 - 1.0 K/UL    ABS.  EOSINOPHILS 0.1 0.0 - 0.4 K/UL    ABS. BASOPHILS 0.0 0.0 - 0.1 K/UL    ABS. IMM. GRANS. 0.0 0.00 - 0.04 K/UL    DF SMEAR SCANNED      RBC COMMENTS MACROCYTOSIS  1+       METABOLIC PANEL, COMPREHENSIVE    Collection Time: 03/30/21  5:23 PM   Result Value Ref Range    Sodium 139 136 - 145 mmol/L    Potassium 4.0 3.5 - 5.1 mmol/L    Chloride 107 97 - 108 mmol/L    CO2 26 21 - 32 mmol/L    Anion gap 6 5 - 15 mmol/L    Glucose 75 65 - 100 mg/dL    BUN 13 6 - 20 MG/DL    Creatinine 0.81 0.55 - 1.02 MG/DL    BUN/Creatinine ratio 16 12 - 20      GFR est AA >60 >60 ml/min/1.73m2    GFR est non-AA >60 >60 ml/min/1.73m2    Calcium 9.5 8.5 - 10.1 MG/DL    Bilirubin, total 0.6 0.2 - 1.0 MG/DL    ALT (SGPT) 31 12 - 78 U/L    AST (SGOT) 18 15 - 37 U/L    Alk. phosphatase 70 45 - 117 U/L    Protein, total 7.7 6.4 - 8.2 g/dL    Albumin 4.1 3.5 - 5.0 g/dL    Globulin 3.6 2.0 - 4.0 g/dL    A-G Ratio 1.1 1.1 - 2.2     EKG, 12 LEAD, INITIAL    Collection Time: 03/31/21 12:39 PM   Result Value Ref Range    Ventricular Rate 65 BPM    Atrial Rate 65 BPM    P-R Interval 148 ms    QRS Duration 84 ms    Q-T Interval 438 ms    QTC Calculation (Bezet) 455 ms    Calculated P Axis 23 degrees    Calculated R Axis -6 degrees    Calculated T Axis 39 degrees    Diagnosis       Normal sinus rhythm  Normal ECG  When compared with ECG of 06-FEB-2021 18:02,  Vent.  rate has decreased BY  40 BPM     CBC WITH AUTOMATED DIFF    Collection Time: 03/31/21  1:03 PM   Result Value Ref Range    WBC 7.3 3.6 - 11.0 K/uL    RBC 4.10 3.80 - 5.20 M/uL    HGB 12.9 11.5 - 16.0 g/dL    HCT 39.2 35.0 - 47.0 %    MCV 95.6 80.0 - 99.0 FL    MCH 31.5 26.0 - 34.0 PG    MCHC 32.9 30.0 - 36.5 g/dL    RDW 13.8 11.5 - 14.5 %    PLATELET 226 485 - 945 K/uL    MPV 10.5 8.9 - 12.9 FL    NRBC 0.0 0  WBC    ABSOLUTE NRBC 0.00 0.00 - 0.01 K/uL    NEUTROPHILS 92 (H) 32 - 75 %    LYMPHOCYTES 4 (L) 12 - 49 %    MONOCYTES 4 (L) 5 - 13 %    EOSINOPHILS 0 0 - 7 %    BASOPHILS 0 0 - 1 %    IMMATURE GRANULOCYTES 0 0.0 - 0.5 %    ABS. NEUTROPHILS 6.7 1.8 - 8.0 K/UL    ABS. LYMPHOCYTES 0.3 (L) 0.8 - 3.5 K/UL    ABS. MONOCYTES 0.3 0.0 - 1.0 K/UL    ABS. EOSINOPHILS 0.0 0.0 - 0.4 K/UL    ABS. BASOPHILS 0.0 0.0 - 0.1 K/UL    ABS. IMM. GRANS. 0.0 0.00 - 0.04 K/UL    DF AUTOMATED      RBC COMMENTS NORMOCYTIC, NORMOCHROMIC     METABOLIC PANEL, COMPREHENSIVE    Collection Time: 03/31/21  1:03 PM   Result Value Ref Range    Sodium 137 136 - 145 mmol/L    Potassium 4.9 3.5 - 5.1 mmol/L    Chloride 108 97 - 108 mmol/L    CO2 23 21 - 32 mmol/L    Anion gap 6 5 - 15 mmol/L    Glucose 94 65 - 100 mg/dL    BUN 16 6 - 20 MG/DL    Creatinine 0.73 0.55 - 1.02 MG/DL    BUN/Creatinine ratio 22 (H) 12 - 20      GFR est AA >60 >60 ml/min/1.73m2    GFR est non-AA >60 >60 ml/min/1.73m2    Calcium 9.6 8.5 - 10.1 MG/DL    Bilirubin, total 0.5 0.2 - 1.0 MG/DL    ALT (SGPT) 29 12 - 78 U/L    AST (SGOT) 19 15 - 37 U/L    Alk.  phosphatase 65 45 - 117 U/L    Protein, total 7.7 6.4 - 8.2 g/dL    Albumin 3.9 3.5 - 5.0 g/dL    Globulin 3.8 2.0 - 4.0 g/dL    A-G Ratio 1.0 (L) 1.1 - 2.2     PROTHROMBIN TIME + INR    Collection Time: 03/31/21  1:52 PM   Result Value Ref Range    INR 1.1 0.9 - 1.1      Prothrombin time 11.2 (H) 9.0 - 11.1 sec

## 2021-03-31 NOTE — ROUTINE PROCESS

## 2021-03-31 NOTE — PROGRESS NOTES
End of Shift Note    Bedside shift change report given to  (oncoming nurse) by Arden Morgan (offgoing nurse). Report included the following information SBAR, Kardex, Intake/Output, MAR, Recent Results and Med Rec Status    Shift worked:  0700 - 1930     Shift summary and any significant changes:     pt getting a MRI, ativan was given as a one time dose due to being afraid of small places     Concerns for physician to address:  none     Zone phone for oncoming shift:          Activity:  Activity Level: Up with Assistance  Number times ambulated in hallways past shift: 0  Number of times OOB to chair past shift: 0    Cardiac:   Cardiac Monitoring: Yes           Access:   Current line(s): PIV     Genitourinary:   Urinary status: incontinent and external catheter    Respiratory:   O2 Device: Room air  Chronic home O2 use?: NO  Incentive spirometer at bedside: NO     GI:     Current diet:  DIET DYSPHAGIA PUREED (NDD1)  No Fluids on Tray  Passing flatus: YES  Tolerating current diet: YES       Pain Management:   Patient states pain is manageable on current regimen: YES    Skin:  Samy Score: 14  Interventions: increase time out of bed    Patient Safety:  Fall Score:  Total Score: 4  Interventions: pt to call before getting OOB  High Fall Risk: Yes    Length of Stay:  Expected LOS: - - -  Actual LOS: 0      Asa Eddie

## 2021-03-31 NOTE — ED NOTES
PT. Presents to ED today for complaints of increased weakness, generalized pain, and increased difficulty speaking. Patient with a history of MS and was seen here yesterday for similar symptoms. Pt. Alert but very hard to understand. Pt. Placed in position of comfort with call bell in reach.

## 2021-03-31 NOTE — PROGRESS NOTES
Problem: Dysphagia (Adult)  Goal: *Acute Goals and Plan of Care (Insert Text)  Description: 3/31/2021  Speech path goals  1. Patient will tolerate purees/no liquids with no overt s/s of aspiration. 2. Patient will participate with reeval of swallow to upgrade diet. Outcome: Not Met   SPEECH LANGUAGE PATHOLOGY BEDSIDE SWALLOW EVALUATION  Patient: Charlie Ying (78 y.o. female)  Date: 3/31/2021  Primary Diagnosis: Multiple sclerosis ([facility] [address] [G35]        Precautions: aspiration       ASSESSMENT :  Based on the objective data described below, the patient presents with MS exacerbation. She is on Solumedrol for treatment currently. She has at least a mod dysarthria with hypernasal vq and imprecise artic. She had L facial weakness. Mod oral dysphagia and mild pharyngeal dysphagia is noted. Very slow to masticate the ice chip and slow to propel the pureeds posteriorly. Good oral clearance. She coughed with thins most likely due to premature spillage and swallow delay. Appeared to tolerate nectar thick liquids but limited amount trialed as the patient did not like it. Recommend purees and no liquids. Reeval tomorrow. Patient will benefit from skilled intervention to address the above impairments. Patients rehabilitation potential is considered to be Good     PLAN :  Recommendations and Planned Interventions:  Purees only. She complained that the nectar thick liquids were \"yucky\" so although she seems to be tolerating them will not offer today. Frequency/Duration: Patient will be followed by speech-language pathology 3 times a week to address goals. Discharge Recommendations: None     SUBJECTIVE:   Patient stated the thickened liquids were \"yucky.     OBJECTIVE:     Past Medical History:   Diagnosis Date    Body aches 12/11/2014    Chronic pain     Depression     Headache(784.0)     History of mammogram never before    MS (multiple sclerosis) ([facility] [address]     Neurological disorder     Multiple Sclerosis    Pap smear for cervical cancer screening 2008    Psychiatric disorder     anxiety    Psychotic disorder [facility])      Past Surgical History:   Procedure Laterality Date    COLONOSCOPY N/A 2/28/2018    COLONOSCOPY performed by Clay Nassar MD at [facility] ENDOSCOPY    HX CHOLECYSTECTOMY      HX GYN      3 c-sections    HX HEENT      bilateral ear tube surgery    HX HERNIA REPAIR      HX OTHER SURGICAL      R inguinal hernia repair     Prior Level of Function/Home Situation: wc bound      Diet prior to admission: reg/thins  Current Diet:  NPO   Cognitive and Communication Status:  Neurologic State: Alert  Orientation Level: Oriented to person, Oriented to place, Disoriented to time  Cognition: Follows commands     Perseveration: No perseveration noted     Oral Assessment:  Oral Assessment  Labial: Left droop  Dentition: Natural  Lingual: Decreased rate  Mandible: No impairment  P.O. Trials:     Vocal quality prior to P.O.: Low volume(hypernasal)  Consistency Presented: Thin liquid; Ice chips; Nectar thick liquid;Puree  How Presented: Straw     Bolus Acceptance: No impairment  Bolus Formation/Control: Impaired  Type of Impairment: Delayed  Propulsion: Delayed (# of seconds)  Oral Residue: None  Initiation of Swallow: Delayed (# of seconds)  Laryngeal Elevation: Decreased  Aspiration Signs/Symptoms: Weak cough                Oral Phase Severity: Mild-moderate  Pharyngeal Phase Severity : Mild-moderate    NOMS:   The NOMS functional outcome measure was used to quantify this patient's level of swallowing impairment. Based on the NOMS, the patient was determined to be at level 3 for swallow function       NOMS Swallowing Levels:  Level 1 (CN): NPO  Level 2 (CM): NPO but takes consistency in therapy  Level 3 (CL): Takes less than 50% of nutrition p.o. and continues with nonoral feedings; and/or safe with mod cues; and/or max diet restriction  Level 4 (CK):  Safe swallow but needs mod cues; and/or mod diet restriction; and/or still requires some nonoral feeding/supplements  Level 5 (CJ): Safe swallow with min diet restriction; and/or needs min cues  Level 6 (CI): Independent with p.o.; rare cues; usually self cues; may need to avoid some foods or needs extra time  Level 7 ([address] [place] [address]): Independent for all p.o.  SUBHA. (2003). National Outcomes Measurement System (NOMS): Adult Speech-Language Pathology User's Guide. Pain:  Pain Scale 1: Numeric (0 - 10)  Pain Intensity 1: 10  Pain Location 1: Other (comment)(Whole body)    After treatment:   Patient left in no apparent distress in bed    COMMUNICATION/EDUCATION:   Patient was educated regarding her deficit(s) of dysphagia   The patient's plan of care including recommendations, planned interventions, and recommended diet changes were discussed with: Registered nurse. Patient/family have participated as able in goal setting and plan of care.     Thank you for this referral.  DENEEN Armando  Time Calculation: 10 mins

## 2021-03-31 NOTE — ED PROVIDER NOTES
EMERGENCY DEPARTMENT HISTORY AND PHYSICAL EXAM      Please note that this dictation was completed with the assistance of \"Dragon\", the computer voice recognition software. Quite often unanticipated grammatical, syntax, homophones, and other interpretive errors are inadvertently transcribed by the computer software. Please disregard these errors and any errors that have escaped final proofreading. Thank you. Patient Name: Delio Reynoso  : 1975  MRN: [id number]  History of Presenting Illness     Chief Complaint   Patient presents with    Generalized Body Aches     MS pt here for increase in overall pain. She was seen here yesterday for the same. History Provided By: Patient    HPI: Delio Reynoso, 50 y.o. female with past medical history as documented below presents to the ED with c/o of acute onset of 3 weeks of worsening generalized weakness as well as concern for MS exacerbation. Patient was just seen in the emergency room yesterday for generalized body aches had blood work done and discharged. Patient reports that she is wheelchair-bound. When asked about her speech impediment, patient reports that this has been worsening for the past several weeks. Patient is concerned about a possible MS flare. Patient does see Dr. Greg Fernandes. Also reports some visual disturbances and asked if I had blue eyes which I do not. Pt denies any other alleviating or exacerbating factors. Additionally, pt specifically denies any recent fever, chills, headache, nausea, vomiting, abdominal pain, CP, SOB, lightheadedness, dizziness, numbness, lower extremity swelling, heart palpitations, urinary sxs, diarrhea, constipation, melena, hematochezia, cough, or congestion. History limited due to speech impediment. There are no other complaints, changes or physical findings pertinent to the HPI at this time.     PCP: None    Past History   Past Medical History:  Past Medical History:   Diagnosis Date    Body aches 12/11/2014    Chronic pain     Depression     Headache(784.0)     History of mammogram never before    MS (multiple sclerosis) ([facility] [address]     Neurological disorder     Multiple Sclerosis    Pap smear for cervical cancer screening 2008    Psychiatric disorder     anxiety    Psychotic disorder [facility])        Past Surgical History:  Past Surgical History:   Procedure Laterality Date    COLONOSCOPY N/A 2/28/2018    COLONOSCOPY performed by Kera Roberts MD at hospitals ENDOSCOPY    HX CHOLECYSTECTOMY      HX GYN      3 c-sections    HX HEENT      bilateral ear tube surgery    HX HERNIA REPAIR      HX OTHER SURGICAL      R inguinal hernia repair       Family History:  Family History   Problem Relation Age of Onset    Heart Attack Father         tripple bypass    Cancer Father         lung    COPD Father     Diabetes Mother         type 2    Heart Disease Mother         heart disease    Diabetes Paternal Grandmother     No Known Problems Sister     No Known Problems Brother     No Known Problems Child        Social History:  Social History     Tobacco Use    Smoking status: Current Every Day Smoker     Packs/day: 0.50     Years: 17.00     Pack years: 8.50     Types: Cigarettes    Smokeless tobacco: Never Used    Tobacco comment: 1 pack every 3 days. Substance Use Topics    Alcohol use: No    Drug use: No       Allergies: Allergies   Allergen Reactions    Morphine Rash       Current Medications:  No current facility-administered medications on file prior to encounter. Current Outpatient Medications on File Prior to Encounter   Medication Sig Dispense Refill    naproxen (NAPROSYN) 500 mg tablet Take 1 Tab by mouth every twelve (12) hours as needed for Pain. 20 Tab 0    predniSONE (DELTASONE) 10 mg tablet With Breakfast Take 5tab(50mg)x3days, 4tab(40mg)x3days, 3tab(30mg)x3days, 2tab(20mg)x2days, 1tab(10mg)x2days.  #42 42 Tab 0    DULoxetine (CYMBALTA) 60 mg capsule Take 1 Cap by mouth daily. Indications: neuropathic pain 90 Cap 5    teriflunomide (AUBAGIO) 14 mg tablet Take 1 Tab by mouth daily. Indications: relapsing form of multiple sclerosis 90 Tab 3    gabapentin (NEURONTIN) 600 mg tablet Take 1 Tab by mouth three (3) times daily. Max Daily Amount: 1,800 mg. 90 Tab 3    acetaminophen (TYLENOL) 500 mg tablet Take 500 mg by mouth every four (4) hours as needed.  nystatin (MYCOSTATIN) topical cream APPLY CREAM TOPICALLY TO AFFECTED AREA TWICE DAILY      melatonin 3 mg tablet Take 15 mg by mouth nightly as needed for Insomnia. Review of Systems   Review of Systems   Constitutional: Negative. Negative for chills and fever. HENT: Negative. Negative for congestion and sore throat. Eyes: Negative. Respiratory: Negative. Negative for cough, chest tightness, shortness of breath and wheezing. Cardiovascular: Negative. Negative for chest pain, palpitations and leg swelling. Gastrointestinal: Negative. Negative for abdominal distention, abdominal pain, blood in stool, constipation, diarrhea, nausea and vomiting. Endocrine: Negative. Genitourinary: Negative. Negative for dysuria, flank pain, frequency, hematuria and urgency. Musculoskeletal: Negative. Negative for arthralgias, back pain and myalgias. Skin: Negative. Negative for color change and rash. Neurological: Positive for speech difficulty and weakness. Negative for dizziness, syncope, light-headedness, numbness and headaches. Hematological: Negative. Psychiatric/Behavioral: Negative. Negative for confusion and self-injury. The patient is not nervous/anxious. All other systems reviewed and are negative. Physical Exam   Physical Exam  Vitals signs and nursing note reviewed. Constitutional:       General: She is in acute distress. Appearance: She is well-developed. She is ill-appearing, toxic-appearing and diaphoretic. HENT:      Head: Normocephalic and atraumatic.       Mouth/Throat: Pharynx: No oropharyngeal exudate. Eyes:      Conjunctiva/sclera: Conjunctivae normal.   Neck:      Musculoskeletal: Normal range of motion. Cardiovascular:      Rate and Rhythm: Normal rate and regular rhythm. Heart sounds: Normal heart sounds. Pulmonary:      Effort: Pulmonary effort is normal. No respiratory distress. Breath sounds: Normal breath sounds. No wheezing or rales. Chest:      Chest wall: No tenderness. Abdominal:      General: Bowel sounds are normal. There is no distension. Palpations: Abdomen is soft. There is no mass. Tenderness: There is no abdominal tenderness. There is no guarding or rebound. Musculoskeletal: Normal range of motion. Skin:     General: Skin is warm. Neurological:      Mental Status: She is alert and oriented to person, place, and time. Cranial Nerves: No cranial nerve deficit. Motor: Weakness (4/5 strength BUE/BLE) present. No abnormal muscle tone. Comments: Slurred speech         Diagnostic Study Results     Labs -   I have personally reviewed and interpreted all available laboratory results. Recent Results (from the past 24 hour(s))   EKG, 12 LEAD, INITIAL    Collection Time: 03/31/21 12:39 PM   Result Value Ref Range    Ventricular Rate 65 BPM    Atrial Rate 65 BPM    P-R Interval 148 ms    QRS Duration 84 ms    Q-T Interval 438 ms    QTC Calculation (Bezet) 455 ms    Calculated P Axis 23 degrees    Calculated R Axis -6 degrees    Calculated T Axis 39 degrees    Diagnosis       Normal sinus rhythm  Normal ECG  When compared with ECG of 06-FEB-2021 18:02,  Vent.  rate has decreased BY  40 BPM  Confirmed by Escobar Logklever ([id number]) on 3/31/2021 4:01:35 PM     CBC WITH AUTOMATED DIFF    Collection Time: 03/31/21  1:03 PM   Result Value Ref Range    WBC 7.3 3.6 - 11.0 K/uL    RBC 4.10 3.80 - 5.20 M/uL    HGB 12.9 11.5 - 16.0 g/dL    HCT 39.2 35.0 - 47.0 %    MCV 95.6 80.0 - 99.0 FL    MCH 31.5 26.0 - 34.0 PG    MCHC 32.9 30.0 - 36.5 g/dL    RDW 13.8 11.5 - 14.5 %    PLATELET 746 170 - 333 K/uL    MPV 10.5 8.9 - 12.9 FL    NRBC 0.0 0  WBC    ABSOLUTE NRBC 0.00 0.00 - 0.01 K/uL    NEUTROPHILS 92 (H) 32 - 75 %    LYMPHOCYTES 4 (L) 12 - 49 %    MONOCYTES 4 (L) 5 - 13 %    EOSINOPHILS 0 0 - 7 %    BASOPHILS 0 0 - 1 %    IMMATURE GRANULOCYTES 0 0.0 - 0.5 %    ABS. NEUTROPHILS 6.7 1.8 - 8.0 K/UL    ABS. LYMPHOCYTES 0.3 (L) 0.8 - 3.5 K/UL    ABS. MONOCYTES 0.3 0.0 - 1.0 K/UL    ABS. EOSINOPHILS 0.0 0.0 - 0.4 K/UL    ABS. BASOPHILS 0.0 0.0 - 0.1 K/UL    ABS. IMM. GRANS. 0.0 0.00 - 0.04 K/UL    DF AUTOMATED      RBC COMMENTS NORMOCYTIC, NORMOCHROMIC     METABOLIC PANEL, COMPREHENSIVE    Collection Time: 03/31/21  1:03 PM   Result Value Ref Range    Sodium 137 136 - 145 mmol/L    Potassium 4.9 3.5 - 5.1 mmol/L    Chloride 108 97 - 108 mmol/L    CO2 23 21 - 32 mmol/L    Anion gap 6 5 - 15 mmol/L    Glucose 94 65 - 100 mg/dL    BUN 16 6 - 20 MG/DL    Creatinine 0.73 0.55 - 1.02 MG/DL    BUN/Creatinine ratio 22 (H) 12 - 20      GFR est AA >60 >60 ml/min/1.73m2    GFR est non-AA >60 >60 ml/min/1.73m2    Calcium 9.6 8.5 - 10.1 MG/DL    Bilirubin, total 0.5 0.2 - 1.0 MG/DL    ALT (SGPT) 29 12 - 78 U/L    AST (SGOT) 19 15 - 37 U/L    Alk.  phosphatase 65 45 - 117 U/L    Protein, total 7.7 6.4 - 8.2 g/dL    Albumin 3.9 3.5 - 5.0 g/dL    Globulin 3.8 2.0 - 4.0 g/dL    A-G Ratio 1.0 (L) 1.1 - 2.2     PROTHROMBIN TIME + INR    Collection Time: 03/31/21  1:52 PM   Result Value Ref Range    INR 1.1 0.9 - 1.1      Prothrombin time 11.2 (H) 9.0 - 11.1 sec   URINALYSIS W/ REFLEX CULTURE    Collection Time: 03/31/21  2:59 PM    Specimen: Urine    Urine specimen   Result Value Ref Range    Color YELLOW/STRAW      Appearance CLOUDY (A) CLEAR      Specific gravity >1.030 (H) 1.003 - 1.030    pH (UA) 5.5 5.0 - 8.0      Protein Negative NEG mg/dL    Glucose Negative NEG mg/dL    Ketone 15 (A) NEG mg/dL    Blood Negative NEG      Urobilinogen 0.2 0.2 - 1.0 EU/dL    Nitrites Negative NEG      Leukocyte Esterase Negative NEG      WBC 0-4 0 - 4 /hpf    RBC 0-5 0 - 5 /hpf    Epithelial cells FEW FEW /lpf    Bacteria 4+ (A) NEG /hpf    UA:UC IF INDICATED CULTURE NOT INDICATED BY UA RESULT CNI     BILIRUBIN, CONFIRM    Collection Time: 03/31/21  2:59 PM   Result Value Ref Range    Bilirubin UA, confirm Negative NEG     DRUG SCREEN, URINE    Collection Time: 03/31/21  3:02 PM   Result Value Ref Range    AMPHETAMINES Negative NEG      BARBITURATES Negative NEG      BENZODIAZEPINES Positive (A) NEG      COCAINE Negative NEG      METHADONE Negative NEG      OPIATES Negative NEG      PCP(PHENCYCLIDINE) Negative NEG      THC (TH-CANNABINOL) Positive (A) NEG      Drug screen comment (NOTE)        Radiologic Studies -   I have personally reviewed and interpreted all available imaging studies and agree with radiology interpretation and report. MRI CERV SPINE W WO CONT   Final Result      1. Unchanged appearance of the cervical cord consistent with multiple sclerosis. No evidence of active demyelination. 2. Unchanged mild spondylosis as above. MRI BRAIN W WO CONT   Final Result   Unchanged moderate white matter disease in a pattern consistent with multiple   sclerosis. No evidence of active demyelination. CT HEAD WO CONT   Final Result   No acute intracranial hemorrhage, mass or infarct. CT Results  (Last 48 hours)               03/31/21 1342  CT HEAD WO CONT Final result    Impression:  No acute intracranial hemorrhage, mass or infarct. Narrative:  INDICATION: left facial droop, hx of MS, r/o stroke        Exam: Noncontrast CT of the brain is performed with 5 mm collimation. CT dose reduction was achieved with the use of the standardized protocol   tailored for this examination and automatic exposure control for dose   modulation. Direct comparison is made to prior MRI of the brain dated February 2021.        FINDINGS: There is no acute intracranial hemorrhage, mass, mass effect or   herniation. Ventricular system is normal. Periventricular white matter   hypodensities, are unchanged compared to prior MR dated February 2021. There is   no evidence of acute territorial infarct. The mastoid air cells are well   pneumatized. The visualized paranasal sinuses are normal.               CXR Results  (Last 48 hours)    None          Medical Decision Making   I reviewed the vital signs, available nursing notes, past medical history, past surgical history, family history and social history. Vital Signs-Reviewed the patient's vital signs. Patient Vitals for the past 24 hrs:   Temp Pulse Resp BP SpO2   03/31/21 1531 97.3 °F (36.3 °C) 65 14 111/66 100 %   03/31/21 1400 -- 67 13 110/72 --   03/31/21 1315 -- 62 19 101/67 --   03/31/21 1141 97.5 °F (36.4 °C) 80 16 115/87 100 %       Pulse Oximetry Analysis - 100% on RA    Cardiac Monitor:   Rate: 80 bpm  The cardiac monitor revealed the following rhythm as interpreted by me: Normal Sinus Rhythm     The cardiac monitor was ordered secondary to the patient's history of weakness and to monitor the patient for dysrhythmia    ED EKG interpretation:  Rhythm: normal sinus rhythm; and regular . Rate (approx.): 65; Axis: normal; P wave: normal; QRS interval: normal ; ST/T wave: normal; Other findings: normal. This EKG was interpreted by Porfirio Arias MD    Records Reviewed: Nursing Notes, Old Medical Records, Previous electrocardiograms, Previous Radiology Studies and Previous Laboratory Studies    Provider Notes (Medical Decision Making):   Patient presenting with acute generalized fatigue/weakness and slurred speech, subacute onset, suspect MS exacerbation. DDx: infection, anemia, electrolyte anomoly (hypo or hyperkalemia, hypomagnesemia), hypothyroid, dehydration, depression, CA, ACS. Will obtain EKG, UA, labwork for any urgent/emergent pathology. Will reassess and monitor while in ED.     ED Course:   I am the first provider for this patient's ED visit today. Initial assessment performed. I discussed presenting problems, concerns and my formulated plan for today's visit with the patient and any available family members at bedside. I encouraged them to ask questions as they arise throughout the visit. TOBACCO COUNSELING:  Upon evaluation, pt expressed that they are a current tobacco user. For approximately 5 mins, pt has been counseled on the dangers of smoking and was encouraged to quit as soon as possible in order to decrease further risks to their health. Pt has conveyed their understanding of the risks involved should they continue to use tobacco products. I reviewed our electronic medical record system for any past medical records that were available that may contribute to the patient's current condition, the nursing notes and vital signs from today's visit.       ED Orders Placed :  Orders Placed This Encounter    MECHANICAL PROPHYLAXIS IS CONTRAINDICATED Other, please document Already on Anticoagulation    CT HEAD WO CONT    MRI BRAIN W WO CONT    MRI CERV SPINE W WO CONT    CBC WITH AUTOMATED DIFF    METABOLIC PANEL, COMPREHENSIVE    URINALYSIS W/ REFLEX CULTURE    DRUG SCREEN, URINE    PROTHROMBIN TIME + INR    METABOLIC PANEL, BASIC    CBC W/O DIFF    BILIRUBIN, CONFIRM    DIET DYSPHAGIA PUREED (NDD1)  No Fluids on Tray    CARDIAC/RESPIRATORY MONITORING    CARDIAC MONITOR - ED ONLY    PULSE OXIMETRY CONTINUOUS    VITAL SIGNS    INITIATE NURSING DYSPHAGIA SCREENING    MEASURE HEIGHT    WEIGH PATIENT    NEUROLOGIC STATUS ASSESSMENT    NIH STROKE SCALE ASSESSMENT    OXYGEN CANNULA    POC GLUCOSE    ELEVATE HEAD OF BED    Cardiac Monitor    VITAL SIGNS    ACTIVITY AS TOLERATED W/ASSIST    FULL CODE    IP CONSULT TO NEUROLOGY    IP CONSULT TO SPEECH THERAPY    EKG, 12 LEAD, INITIAL    INSERT PERIPHERAL IV ONE TIME STAT    SALINE LOCK IV ONE TIME STAT    DISCONTD: methylPREDNISolone (PF) (Solu-MEDROL) injection 1,000 mg    methylprednisoLONE (Solu-MEDROL) 1 gm in 100 ml NS MBP    DULoxetine (CYMBALTA) capsule 60 mg    gabapentin (NEURONTIN) capsule 600 mg    melatonin tablet 15 mg    . PHARMACY TO SUBSTITUTE PER PROTOCOL (Reordered from: teriflunomide (AUBAGIO) 14 mg tablet)    sodium chloride (NS) flush 5-40 mL    sodium chloride (NS) flush 5-40 mL    OR Linked Order Group     acetaminophen (TYLENOL) tablet 650 mg     acetaminophen (TYLENOL) suppository 650 mg    polyethylene glycol (MIRALAX) packet 17 g    OR Linked Order Group     promethazine (PHENERGAN) tablet 12.5 mg     ondansetron (ZOFRAN) injection 4 mg    enoxaparin (LOVENOX) injection 40 mg    methylprednisoLONE (Solu-MEDROL) 1 gm in 100 ml NS MBP    LORazepam (ATIVAN) injection 1 mg    gadoterate meglumine (DOTAREM) 0.5 mmol/mL (376.9 mg/mL) contrast solution 20 mL    INITIAL PHYSICIAN ORDER: INPATIENT Telemetry; 3. Patient receiving treatment that can only be provided in an inpatient setting (further clarification in H&P documentation)    IP CONSULT TO Samantha 40       ED Medications Administered:  Medications   DULoxetine (CYMBALTA) capsule 60 mg (has no administration in time range)   gabapentin (NEURONTIN) capsule 600 mg (600 mg Oral Given 3/31/21 2122)   melatonin tablet 15 mg (has no administration in time range)   . PHARMACY TO SUBSTITUTE PER PROTOCOL (Reordered from: teriflunomide (AUBAGIO) 14 mg tablet) (has no administration in time range)   sodium chloride (NS) flush 5-40 mL (10 mL IntraVENous Given 3/31/21 2154)   sodium chloride (NS) flush 5-40 mL (has no administration in time range)   acetaminophen (TYLENOL) tablet 650 mg (has no administration in time range)     Or   acetaminophen (TYLENOL) suppository 650 mg (has no administration in time range)   polyethylene glycol (MIRALAX) packet 17 g (has no administration in time range)   promethazine (PHENERGAN) tablet 12.5 mg (has no administration in time range)     Or   ondansetron (ZOFRAN) injection 4 mg (has no administration in time range)   enoxaparin (LOVENOX) injection 40 mg (has no administration in time range)   methylprednisoLONE (Solu-MEDROL) 1 gm in 100 ml NS MBP (has no administration in time range)   methylprednisoLONE (Solu-MEDROL) 1 gm in 100 ml NS MBP (1,000 mg IntraVENous New Bag 3/31/21 1355)   LORazepam (ATIVAN) injection 1 mg (1 mg IntraVENous Given 3/31/21 1835)   gadoterate meglumine (DOTAREM) 0.5 mmol/mL (376.9 mg/mL) contrast solution 20 mL (20 mL IntraVENous Given 3/31/21 2006)     ED Course as of Mar 31 2208   Wed Mar 31, 2021   1242 Consult Note:  Guillermo Carrasquillo MD with Dr. Shanta Knox,   Specialty: Neurology  Discussed pt's hx, disposition, and available diagnostic and imaging results. Reviewed care plans. Agree with management and plan thus far. Consultant recommends IV Solumedrol 1g, admission, MRI brain and C spine. [HW]      ED Course User Index  [HW] Carlo Corral MD     Progress Note:  Chart reviewed, Patient had multiple ER visits in last 1 month for symptoms of generalized aches and pains with slurred speech. Consult Note:  Guillermo Carrasquillo MD spoke with Dr. Jem Strong,   Specialty: Hospitalist  Discussed pt's hx, disposition, and available diagnostic and imaging results. Reviewed care plans. Agree with management and plan thus far. Consultant will evaluate pt for admission.     CRITICAL CARE NOTE :  IMPENDING DETERIORATION -Cardiovascular, CNS, Metabolic and Renal  ASSOCIATED RISK FACTORS - Hypotension, Shock, Metabolic changes, Dehydration, Vascular Compromise and CNS Decompensation  MANAGEMENT- Bedside Assessment and Supervision of Care  INTERPRETATION -  Xrays, CT Scan, ECG, Blood Pressure and Cardiac Output Measures   INTERVENTIONS - hemodynamic mngmt, Neurologic interventions  and Metabolic interventions  CASE REVIEW - Hospitalist/Intensivist, Medical Sub-Specialist, Nursing and Family  TREATMENT RESPONSE -Improved  PERFORMED BY - Self    NOTES   :  I personally spent 35 minutes of critical care time with this patient. This is time spent at this critically ill patient's bedside actively involved in patient care as well as the coordination of care and discussions with the patient's family. This includes time involved in lab review, consultations with specialist, family decision-making, bedside attention and documentation. During this entire length of time I was immediately available to the patient. This does not include time spent on separately reported billable procedures. Critical Care: The reason for providing this level of medical care for this critically-ill patient was due to a critical illness that impaired one or more vital organ systems, such that there was a high probability of imminent or life-threatening deterioration in the patient's condition. This care involved the highest level of preparedness to intervene urgently. This care involved high complexity decision making to assess, manipulate, and support vital system functions, to treat this degree of vital organ system failure, and to prevent further life threatening deterioration of the patients condition requiring frequent assessments and interventions. Destiny Beck MD    Disposition:   ADMIT  Given the patient's current clinical presentation, I have a high level of concern for decompensation if discharged from the emergency department. Patient is being admitted to the hospital.  The results of their tests and reasons for their admission have been discussed with them and/or available family. They convey agreement and understanding for the need to be admitted and for their admission diagnosis. Consultation has been made with the inpatient physician specialist for hospitalization. Diagnosis   Clinical Impression:  1. Multiple sclerosis exacerbation ([address]    2. Slurred speech    3. Acute weakness    4.  Unable to stand up    5. Hypotension, unspecified hypotension type    6. Fibromyalgia        Attestation:  Ingrid Holland MD, am the attending of record for this patient. I personally performed the services described in this documentation on this date, 3/31/2021 for patientZuri. I have reviewed the chart and verified that the record is accurate and complete.

## 2021-04-01 PROBLEM — R53.1 WEAKNESS: Status: ACTIVE | Noted: 2021-04-01

## 2021-04-01 LAB
25(OH)D3 SERPL-MCNC: 10 NG/ML (ref 30–100)
ANION GAP SERPL CALC-SCNC: 7 MMOL/L (ref 5–15)
BUN SERPL-MCNC: 16 MG/DL (ref 6–20)
BUN/CREAT SERPL: 24 (ref 12–20)
CALCIUM SERPL-MCNC: 8.9 MG/DL (ref 8.5–10.1)
CHLORIDE SERPL-SCNC: 112 MMOL/L (ref 97–108)
CK SERPL-CCNC: 17 U/L (ref 26–192)
CO2 SERPL-SCNC: 22 MMOL/L (ref 21–32)
CREAT SERPL-MCNC: 0.68 MG/DL (ref 0.55–1.02)
ERYTHROCYTE [DISTWIDTH] IN BLOOD BY AUTOMATED COUNT: 13.7 % (ref 11.5–14.5)
ERYTHROCYTE [SEDIMENTATION RATE] IN BLOOD: 28 MM/HR (ref 0–20)
GLUCOSE SERPL-MCNC: 137 MG/DL (ref 65–100)
HCT VFR BLD AUTO: 36.4 % (ref 35–47)
HGB BLD-MCNC: 11.9 G/DL (ref 11.5–16)
MAGNESIUM SERPL-MCNC: 1.9 MG/DL (ref 1.6–2.4)
MCH RBC QN AUTO: 30.9 PG (ref 26–34)
MCHC RBC AUTO-ENTMCNC: 32.7 G/DL (ref 30–36.5)
MCV RBC AUTO: 94.5 FL (ref 80–99)
NRBC # BLD: 0 K/UL (ref 0–0.01)
NRBC BLD-RTO: 0 PER 100 WBC
PLATELET # BLD AUTO: 303 K/UL (ref 150–400)
PMV BLD AUTO: 10.1 FL (ref 8.9–12.9)
POTASSIUM SERPL-SCNC: 4.1 MMOL/L (ref 3.5–5.1)
RBC # BLD AUTO: 3.85 M/UL (ref 3.8–5.2)
SODIUM SERPL-SCNC: 141 MMOL/L (ref 136–145)
WBC # BLD AUTO: 6 K/UL (ref 3.6–11)

## 2021-04-01 PROCEDURE — 74011250637 HC RX REV CODE- 250/637: Performed by: PSYCHIATRY & NEUROLOGY

## 2021-04-01 PROCEDURE — 96372 THER/PROPH/DIAG INJ SC/IM: CPT

## 2021-04-01 PROCEDURE — 85027 COMPLETE CBC AUTOMATED: CPT

## 2021-04-01 PROCEDURE — 82306 VITAMIN D 25 HYDROXY: CPT

## 2021-04-01 PROCEDURE — 96376 TX/PRO/DX INJ SAME DRUG ADON: CPT

## 2021-04-01 PROCEDURE — 77030038269 HC DRN EXT URIN PURWCK BARD -A

## 2021-04-01 PROCEDURE — 80048 BASIC METABOLIC PNL TOTAL CA: CPT

## 2021-04-01 PROCEDURE — 92526 ORAL FUNCTION THERAPY: CPT | Performed by: SPEECH-LANGUAGE PATHOLOGIST

## 2021-04-01 PROCEDURE — 83735 ASSAY OF MAGNESIUM: CPT

## 2021-04-01 PROCEDURE — 85652 RBC SED RATE AUTOMATED: CPT

## 2021-04-01 PROCEDURE — 82746 ASSAY OF FOLIC ACID SERUM: CPT

## 2021-04-01 PROCEDURE — 82607 VITAMIN B-12: CPT

## 2021-04-01 PROCEDURE — 74011250636 HC RX REV CODE- 250/636: Performed by: INTERNAL MEDICINE

## 2021-04-01 PROCEDURE — 99218 HC RM OBSERVATION: CPT

## 2021-04-01 PROCEDURE — 82550 ASSAY OF CK (CPK): CPT

## 2021-04-01 PROCEDURE — 74011000258 HC RX REV CODE- 258: Performed by: INTERNAL MEDICINE

## 2021-04-01 PROCEDURE — 36415 COLL VENOUS BLD VENIPUNCTURE: CPT

## 2021-04-01 PROCEDURE — 65390000012 HC CONDITION CODE 44 OBSERVATION

## 2021-04-01 PROCEDURE — 99223 1ST HOSP IP/OBS HIGH 75: CPT | Performed by: PSYCHIATRY & NEUROLOGY

## 2021-04-01 PROCEDURE — 74011250637 HC RX REV CODE- 250/637: Performed by: INTERNAL MEDICINE

## 2021-04-01 PROCEDURE — 86038 ANTINUCLEAR ANTIBODIES: CPT

## 2021-04-01 RX ORDER — DULOXETIN HYDROCHLORIDE 30 MG/1
30 CAPSULE, DELAYED RELEASE ORAL DAILY
Status: DISCONTINUED | OUTPATIENT
Start: 2021-04-02 | End: 2021-04-12 | Stop reason: HOSPADM

## 2021-04-01 RX ORDER — GABAPENTIN 300 MG/1
600 CAPSULE ORAL 4 TIMES DAILY
Status: DISCONTINUED | OUTPATIENT
Start: 2021-04-01 | End: 2021-04-12 | Stop reason: HOSPADM

## 2021-04-01 RX ADMIN — GABAPENTIN 600 MG: 300 CAPSULE ORAL at 10:17

## 2021-04-01 RX ADMIN — Medication 10 ML: at 21:17

## 2021-04-01 RX ADMIN — GABAPENTIN 600 MG: 300 CAPSULE ORAL at 14:00

## 2021-04-01 RX ADMIN — ENOXAPARIN SODIUM 40 MG: 40 INJECTION SUBCUTANEOUS at 10:17

## 2021-04-01 RX ADMIN — Medication 15 MG: at 21:19

## 2021-04-01 RX ADMIN — SODIUM CHLORIDE 1000 MG: 900 INJECTION INTRAVENOUS at 11:43

## 2021-04-01 RX ADMIN — Medication 10 ML: at 05:38

## 2021-04-01 RX ADMIN — ACETAMINOPHEN 650 MG: 325 TABLET ORAL at 21:19

## 2021-04-01 RX ADMIN — GABAPENTIN 600 MG: 300 CAPSULE ORAL at 18:42

## 2021-04-01 RX ADMIN — DULOXETINE HYDROCHLORIDE 60 MG: 30 CAPSULE, DELAYED RELEASE ORAL at 10:17

## 2021-04-01 RX ADMIN — GABAPENTIN 600 MG: 300 CAPSULE ORAL at 21:17

## 2021-04-01 RX ADMIN — POLYETHYLENE GLYCOL 3350 17 G: 17 POWDER, FOR SOLUTION ORAL at 14:24

## 2021-04-01 RX ADMIN — Medication 10 ML: at 14:00

## 2021-04-01 NOTE — PROGRESS NOTES
End of Shift Note    Bedside shift change report given to [address] [place] [address] (oncoming nurse) by Heri Still (offgoing nurse). Report included the following information SBAR, Kardex, Intake/Output, MAR and Accordion    Shift worked:  [id number]     Shift summary and any significant changes:     Patient now has dysphagia [facility] diet, tolerating diet      Concerns for physician to address:       Zone phone for oncoming shift:   [phone]       Activity:  Activity Level: Up with Assistance  Number times ambulated in hallways past shift: 0  Number of times OOB to chair past shift: 0    Cardiac:   Cardiac Monitoring: Yes      Cardiac Rhythm: Normal sinus rhythm    Access:   Current line(s): PIV     Genitourinary:   Urinary status: voiding and external catheter    Respiratory:   O2 Device: Room air  Chronic home O2 use?: NO  Incentive spirometer at bedside: NO     GI:  Last Bowel Movement Date: (unknown)  Current diet:  DIET DYSPHAGIA [facility] ALTERED (NDD2)  Passing flatus: YES  Tolerating current diet: YES       Pain Management:   Patient states pain is manageable on current regimen: YES    Skin:  Samy Score: 14  Interventions: float heels, increase time out of bed and internal/external urinary devices    Patient Safety:  Fall Score:  Total Score: 4  Interventions: bed/chair alarm, assistive device (walker, cane, etc), gripper socks and pt to call before getting OOB  High Fall Risk: Yes    Length of Stay:  Expected LOS: 3d 0h  Actual LOS: [facility]

## 2021-04-01 NOTE — PROGRESS NOTES
Hospitalist Progress Note    NAME: Madeline Ho   :  1975   MRN:  [id number]       Assessment / Plan:    Acute exacerbation of relapsing remitting multiple sclerosis, POA  Acute weakness and inability to stand secondary to above, POA  --Patient had multiple ER visits in last 1 month for symptoms of generalized aches and pains with slurred speech  --Liz More is wheelchair-bound at her baseline now  --Patient presented today with severe worsening of her bilateral lower extremity weakness, and mild worsening of bilateral upper arm weakness. --c/w IV Solu-Medrol 1 g daily     MRI of brain and cervical spine showed stability of lesions   -Continue Aubagio  -She was just discharged last month and MRI at that time showed stable demyelinating lesions     Anxiety/depression, POA  Fibromyalgia  -Continue Cymbalta and gabapentin     Tobacco use, POA  --Patient did not want to discuss tobacco cessation per admitting doctor         Code Status: Full  Surrogate Decision Maker: Patient's son     DVT Prophylaxis: Lovenox subcu  GI Prophylaxis: not indicated     Baseline: Is wheelchair-bound now  30.0 - 39.9 Obese / Body mass index is 37.7 kg/m². Subjective:     Chief Complaint / Reason for Physician Visit  FU MS . Feels weak  Discussed with RN events overnight. Review of Systems:  Symptom Y/N Comments  Symptom Y/N Comments   Fever/Chills n   Chest Pain n    Poor Appetite    Edema     Cough    Abdominal Pain n    Sputum    Joint Pain     SOB/BOBO n   Pruritis/Rash     Nausea/vomit n   Tolerating PT/OT     Diarrhea    Tolerating Diet y    Constipation    Other       Could NOT obtain due to:      Objective:     VITALS:   Last 24hrs VS reviewed since prior progress note.  Most recent are:  Patient Vitals for the past 24 hrs:   Temp Pulse Resp BP SpO2   21 0808 98.6 °F (37 °C) 77 16 118/68 98 %   21 0400 -- 75 -- -- --   21 0309 97.6 °F (36.4 °C) 75 16 (!) 107/57 97 %   21 0000 -- 73 -- -- --   03/31/21 2358 97.4 °F (36.3 °C) 73 14 (!) 100/55 94 %   03/31/21 2000 -- 75 -- -- --   03/31/21 1531 97.3 °F (36.3 °C) 65 14 111/66 100 %   03/31/21 1400 -- 67 13 110/72 --   03/31/21 1315 -- 62 19 101/67 --   03/31/21 1141 97.5 °F (36.4 °C) 80 16 115/87 100 %       Intake/Output Summary (Last 24 hours) at 4/1/2021 0836  Last data filed at 4/1/2021 0309  Gross per 24 hour   Intake --   Output 270 ml   Net -270 ml        I had a face to face encounter and independently examined this patient on 4/1/2021, as outlined below:  PHYSICAL EXAM:  General: WD, WN. Alert, cooperative, no acute distress    EENT:  EOMI. Anicteric sclerae. MMM  Resp:  CTA bilaterally, no wheezing or rales. No accessory muscle use  CV:  Regular  rhythm,  No edema  GI:  Soft, Non distended, Non tender. +Bowel sounds  Neurologic:  Alert and oriented X 3, normal speech,   Psych:   Poor insight. Not anxious nor agitated  Skin:  No rashes. No jaundice    Reviewed most current lab test results and cultures  YES  Reviewed most current radiology test results   YES  Review and summation of old records today    NO  Reviewed patient's current orders and MAR    YES  PMH/SH reviewed - no change compared to H&P  ________________________________________________________________________  Care Plan discussed with:    Comments   Patient x    Family      RN x    Care Manager     Consultant                       x Multidiciplinary team rounds were held today with , nursing, pharmacist and clinical coordinator. Patient's plan of care was discussed; medications were reviewed and discharge planning was addressed.      ________________________________________________________________________  Total NON critical care TIME: 25 Minutes    Total CRITICAL CARE TIME Spent:   Minutes non procedure based      Comments   >50% of visit spent in counseling and coordination of care     ________________________________________________________________________  [facility] Manish Juan MD     Procedures: see electronic medical records for all procedures/Xrays and details which were not copied into this note but were reviewed prior to creation of Plan. LABS:  I reviewed today's most current labs and imaging studies.   Pertinent labs include:  Recent Labs     04/01/21 0245 03/31/21  1303 03/30/21  1723   WBC 6.0 7.3 3.2*   HGB 11.9 12.9 13.6   HCT 36.4 39.2 41.6    353 295     Recent Labs     04/01/21 0245 03/31/21  1352 03/31/21  1303 03/30/21  1723     --  137 139   K 4.1  --  4.9 4.0   *  --  108 107   CO2 22  --  23 26   *  --  94 75   BUN 16  --  16 13   CREA 0.68  --  0.73 0.81   CA 8.9  --  9.6 9.5   ALB  --   --  3.9 4.1   TBILI  --   --  0.5 0.6   ALT  --   --  29 31   INR  --  1.1  --   --        Signed: Jaxon Carroll MD

## 2021-04-01 NOTE — CONSULTS
Consult  REFERRED BY:  None    CHIEF COMPLAINT: Bilateral leg weakness      Subjective: Jd Spears is a 50 y.o. right-handed  female seen at the request of the hospitalist for evaluation of about a 4-week history of progressive diffuse aching pain all over her body, which she has been in the emergency room 4 times this month, and she was finally admitted last night to see if it was associated with a MS exacerbation, because no other clear cause could be found. Fortunately her MRI of the brain and cervical spine shows no active lesions in her MS. This appears to be some type of musculoskeletal problem. She has had some sensation of generalized weakness, is already wheelchair ridden and can no longer walk from her advanced progressive MS. She is on Aubagio treatment for about the last month for this disease. She was last in the hospital in September 2020 and she had a new tiny lesion seen on her MRI of the brain before she was started on therapy. Has had no fever, no meningismus, no new drug exposure, no toxin exposure, and no swollen joints, no recent infections. Her blood work so far has been relatively stable. She is on Cymbalta 60 mg a day gabapentin 600 mg 4 times a day, and we will continue the gabapentin and increase the Cymbalta to 90 mg a day in the interim since it is approved FDA for musculoskeletal pain. She is on her second dose of 1 g of Solu-Medrol, will continue that empirically. Though I do not see any active lesions of the MS just in case. She may be a candidate for some rehab. She denies any type of infection that could precipitate a pseudo-exacerbation. She is not had any fever, meningismus, head injury, back injury, and no major increase in any spinal pain, and no change in her bowel and bladder function. Patient previously had abused Ativan she is off of that.      Past Medical History:   Diagnosis Date    Body aches 12/11/2014    Chronic pain     Depression  Headache(784.0)     History of mammogram never before    MS ([facility] sclerosis) ([facility] [address]     Neurological disorder     Multiple Sclerosis    Pap smear for cervical cancer screening 2008    Psychiatric disorder     anxiety    Psychotic disorder [facility])       Past Surgical History:   Procedure Laterality Date    COLONOSCOPY N/A 2/28/2018    [facility] ENDOSCOPY    HX CHOLECYSTECTOMY      HX GYN      3 c-sections    HX HEENT      bilateral ear tube surgery    HX HERNIA REPAIR      HX OTHER SURGICAL      R inguinal hernia repair     Family History   Problem Relation Age of Onset    Heart Attack Father         tripple bypass    Cancer Father         lung    COPD Father     Diabetes Mother         type 2    Heart Disease Mother         heart disease    Diabetes Paternal Grandmother     No Known Problems Sister     No Known Problems Brother     No Known Problems Child       Social History     Tobacco Use    Smoking status: Current Every Day Smoker     Packs/day: 0.50     Years: 17.00     Pack years: 8.50     Types: Cigarettes    Smokeless tobacco: Never Used    Tobacco comment: 1 pack every 3 days. Substance Use Topics    Alcohol use: No         Current Facility-Administered Medications:     gabapentin (NEURONTIN) capsule 600 mg, 600 mg, Oral, QID, Luisa East MD    [START ON 4/2/2021] DULoxetine (CYMBALTA) capsule 30 mg, 30 mg, Oral, DAILY, Luisa East MD    melatonin tablet 15 mg, 15 mg, Oral, QHS PRN, Krysta Juarez MD    . PHARMACY TO SUBSTITUTE PER PROTOCOL (Reordered from: teriflunomide (AUBAGIO) 14 mg tablet), , , Per Protocol, Ricarda LICONA MD    sodium chloride (NS) flush 5-40 mL, 5-40 mL, IntraVENous, Q8H, Yadiel Solomon MD, 10 mL at 04/01/21 0538    sodium chloride (NS) flush 5-40 mL, 5-40 mL, IntraVENous, PRN, Krysta Juarez MD    acetaminophen (TYLENOL) tablet 650 mg, 650 mg, Oral, Q6H PRN **OR** acetaminophen (TYLENOL) suppository 650 mg, 650 mg, Rectal, Q6H PRN, Dona Dominguez MD    polyethylene glycol (MIRALAX) packet 17 g, 17 g, Oral, DAILY PRN, Dona Dominguez MD    promethazine (PHENERGAN) tablet 12.5 mg, 12.5 mg, Oral, Q6H PRN **OR** ondansetron (ZOFRAN) injection 4 mg, 4 mg, IntraVENous, Q6H PRN, Dona Dominguez MD    enoxaparin (LOVENOX) injection 40 mg, 40 mg, SubCUTAneous, DAILY, Dona Dominguez MD, 40 mg at 04/01/21 1017    methylprednisoLONE (Solu-MEDROL) 1 gm in 100 ml NS MBP, 1,000 mg, IntraVENous, DAILY, Dona Dominguez MD, Last Rate: 100 mL/hr at 04/01/21 1143, 1,000 mg at 04/01/21 1143        Allergies   Allergen Reactions    Morphine Rash      MRI Results (most recent):  Results from [place] encounter on 08/06/20   MRI CERV SPINE W WO CONT    Addendum Addendum: Pre and postcontrast imaging was performed with 20 mL Mone Rogel MD 8/7/2020  8:44 AM          Narrative PRELIMINARY REPORT    MRI Cervical Spine shows multilevel spinal cord plaques without significant  change since 3/8/2020. There is no spinal stenosis. Preliminary report was provided by Dr. Adalberto Pfeiffer, the on-call radiologist, at 2036  hours. Final report to follow. END PRELIMINARY REPORT      EXAM: MRI CERV SPINE W WO CONT    INDICATION: MS exacebation, weakness. COMPARISON: 3/8/2020    TECHNIQUE: MR imaging of the cervical spine was performed using the following  sequences: sagittal T1, T2, STIR;  axial T2, T1.     CONTRAST:  None. FINDINGS:    There is normal alignment of the cervical spine. Vertebral body heights are  maintained. Marrow signal is normal.    Multiple areas of increased T2/STIR signal are seen throughout the cervical cord  without significant change. There is no abnormal enhancement to suggest active  demyelination. The paraspinal soft tissues are within normal limits. C2-C3: Mild central disc osteophyte complex. No significant spinal stenosis or  neural foraminal narrowing.     C3-C4: No herniation or stenosis. C4-C5: No herniation or stenosis. C5-C6: Minimal broad-based disc osteophyte complex without significant spinal  stenosis or neural foraminal narrowing. C6-C7: Mild broad-based disc osteophyte complex without significant spinal  stenosis. There is unchanged mild left neural foraminal narrowing. C7-T1: No significant spinal stenosis. There is unchanged mild left neural  foraminal narrowing. Impression IMPRESSION:    1. Unchanged multilevel areas of abnormal signal in the cord consistent with  demyelinating disease. No evidence of active demyelination. 2. Unchanged mild spondylosis as above. Results from Hospital Encounter encounter on 08/06/20   MRI CERV SPINE W WO CONT    Addendum Addendum: Pre and postcontrast imaging was performed with 20 mL Dotare[employer]      Halina Stock MD 8/7/2020  8:44 AM          Narrative PRELIMINARY REPORT    MRI Cervical Spine shows multilevel spinal cord plaques without significant  change since 3/8/2020. There is no spinal stenosis. Preliminary report was provided by Dr. Sid Nunn, the on-call radiologist, at 2036  hours. Final report to follow. END PRELIMINARY REPORTEXAM: MRI CERV SPINE W WO CONT    INDICATION: MS exacebation, weakness. COMPARISON: 3/8/2020    TECHNIQUE: MR imaging of the cervical spine was performed using the following  sequences: sagittal T1, T2, STIR;  axial T2, T1.     CONTRAST:  None. FINDINGS:    There is normal alignment of the cervical spine. Vertebral body heights are  maintained. Marrow signal is normal.    Multiple areas of increased T2/STIR signal are seen throughout the cervical cord  without significant change. There is no abnormal enhancement to suggest active  demyelination. The paraspinal soft tissues are within normal limits. C2-C3: Mild central disc osteophyte complex. No significant spinal stenosis or  neural foraminal narrowing. C3-C4: No herniation or stenosis.     C4-C5: No herniation or stenosis. C5-C6: Minimal broad-based disc osteophyte complex without significant spinal  stenosis or neural foraminal narrowing. C6-C7: Mild broad-based disc osteophyte complex without significant spinal  stenosis. There is unchanged mild left neural foraminal narrowing. C7-T1: No significant spinal stenosis. There is unchanged mild left neural  foraminal narrowing. Impression IMPRESSION:    1. Unchanged multilevel areas of abnormal signal in the cord consistent with  demyelinating disease. No evidence of active demyelination. 2. Unchanged mild spondylosis as above. Review of Systems:  A comprehensive review of systems was negative except for: Constitutional: positive for fatigue and malaise  Musculoskeletal: positive for myalgias, arthralgias, stiff joints and muscle weakness  Neurological: positive for speech problems, paresthesia, coordination problems, gait problems and weakness   Vitals:    04/01/21 0309 04/01/21 0400 04/01/21 0808 04/01/21 1112   BP: (!) 107/57  118/68 118/80   Pulse: 75 75 77 80   Resp: 16  16 16   Temp: 97.6 °F (36.4 °C)  98.6 °F (37 °C) 98.2 °F (36.8 °C)   SpO2: 97%  98% 96%   Weight:       Height:         Objective:     I      NEUROLOGICAL EXAM:    Appearance: The patient is poorly developed and nourished, provides a coherent history and is in no acute distress. Mental Status: Oriented to time, place and person, and the president, cognitive function is slow and abnormal and speech is fluent and no aphasia or dysarthria. Mood and affect appropriate. Cranial Nerves:   Intact visual fields. Fundi are benign, discs are poorly seen. SHEFALI, EOM's full, no nystagmus, no ptosis. Facial sensation is normal. Corneal reflexes are not tested. Facial movement is asymmetric. Hearing is normal bilaterally. Palate is midline with normal sternocleidomastoid and trapezius muscles are normal. Tongue is midline.   Neck without meningismus or bruits  Temporal arteries are not tender or enlarged  TMJ areas are not tender on palpation   Motor:   Patient has spastic quadriplegia, strength about 4/5 in the right arm and 3/5 in the left arm, and about 2/5 in the right leg and 3/5 in the left leg. She has right-sided weakness greater than left. Reflexes:   Deep tendon reflexes 2+/4 on the left and and 3+/4 on the right  No babinski or clonus present   Sensory:   Normal to touch, pinprick and vibration and temperature decreased in both feet. DSS is intact   Gait:  Not testable   Tremor:   No tremor noted. Cerebellar:  Not testable cerebellar signs present on Romberg and tandem testing and finger-nose-finger exam.   Neurovascular:  Normal heart sounds and regular rhythm, peripheral pulses intact, and no carotid bruits. Assessment:   [unfilled]  Active Problems:    Multiple sclerosis ([address] (3/31/2021)        Plan:     Patient with possible new exacerbation of her MS, so we will give another course of IV steroids, and see if we can get her better, and she probably needs to go to rehab again if she agrees, so we will consult . This may just be due to musculoskeletal pain from her disability. We will check metabolic parameters, and I reviewed the MRI scan personally on the PACS system and I agree with report as dictated I do not see any active new lesions. We will follow her closely, continue excellent medical care as you are, and we will give 3 days of IV Solu-Medrol as the beginning therapy. Patient's leg weakness is about the same may   Patient advised not to smoke, and she had some reservations about going to a rehab facility, but we tried to reassure her that this is not a nursing home and she is going to just for therapy that she did before should be no problem. I will follow as needed for now.     Signed By: Lakshmi House MD     April 1, 2021       CC: None  FAX: None    10 AM

## 2021-04-01 NOTE — PROGRESS NOTES
Problem: Dysphagia (Adult)  Goal: *Acute Goals and Plan of Care (Insert Text)  Description: 3/31/2021  Speech path goals  1. Patient will tolerate purees/no liquids with no overt s/s of aspiration. MET 4/1. Upgrade to dysphagia 2/thin liquids  2. Patient will participate with reeval of swallow to upgrade diet. Outcome: Progressing Towards Goal   SPEECH LANGUAGE PATHOLOGY DYSPHAGIA TREATMENT  Patient: Rod Mullins (67 y.o. female)  Date: 4/1/2021  Diagnosis: Multiple sclerosis ([facility] [address] Keanuice Rik <principal problem not specified>       Precautions: Aspiration      ASSESSMENT:  Patient demonstrated improvement with swallow function and airway protection today. She was able to tolerate thin liquids via cup sips and soft solids with slow oral phase, mild delay, and no s/s of aspiration/penetration. MBS was completed in March 2020 which recommended dental soft diet with thin liquids and no straws. Patient states that she normally eats regular texture foods/thin liquids at home. Recommendations are below. PLAN:  Recommendations and Planned Interventions:  Dysphagia 2/mechanically altered diet  Thin liquids  No straw  Sit upright 90 degrees  Small bites/sips  Meds 1 at a time, or in puree  Patient continues to benefit from skilled intervention to address the above impairments. Continue treatment per established plan of care. Discharge Recommendations: To Be Determined     SUBJECTIVE:   Patient stated I want some coffee. OBJECTIVE:   Cognitive and Communication Status:  Neurologic State: Alert  Orientation Level: Oriented to person, Oriented to place, Disoriented to time  Cognition: Follows commands     Perseveration: No perseveration noted     Dysphagia Treatment:  Oral Assessment:     P.O. Trials:  Patient Position: upright in bed  Vocal quality prior to P.O.: Low volume; Other (comment)(hypernasal)  Consistency Presented: Thin liquid;Puree;Mechanical soft; Ice chips  How Presented: Self-fed/presented;Cup/sip Bolus Acceptance: No impairment  Bolus Formation/Control: Impaired  Type of Impairment: Delayed;Mastication  Propulsion: Delayed (# of seconds)  Oral Residue: None  Initiation of Swallow: Delayed (# of seconds)  Laryngeal Elevation: Functional  Aspiration Signs/Symptoms: None  Pharyngeal Phase Characteristics: Other (comment)(suspected delay)  Effective Modifications: Small sips and bites                Exercises:  Laryngeal Exercises:                                                                                                                                   Pain:  Pain Scale 1: Numeric (0 - 10)  Pain Intensity 1: 10  Pain Location 1: Other (comment)(all over )  Notified RN    After treatment:   Patient left in no apparent distress in bed, Call bell within reach, Nursing notified, Bed / chair alarm activated, and Safety precautions posted at beside. COMMUNICATION/EDUCATION:   Patient was educated regarding her deficit(s) of dysphagia as this relates to her diagnosis of MS exacerbation. She demonstrated Good understanding as evidenced by verbalization. The patient's plan of care including recommendations, planned interventions, and recommended diet changes were discussed with: Registered nurse.      DENEEN Hammond  Time Calculation: 23 mins

## 2021-04-02 LAB
ANA SER QL: NEGATIVE
ANION GAP SERPL CALC-SCNC: 5 MMOL/L (ref 5–15)
BUN SERPL-MCNC: 24 MG/DL (ref 6–20)
BUN/CREAT SERPL: 32 (ref 12–20)
CALCIUM SERPL-MCNC: 8.9 MG/DL (ref 8.5–10.1)
CHLORIDE SERPL-SCNC: 111 MMOL/L (ref 97–108)
CK SERPL-CCNC: 16 U/L (ref 26–192)
CO2 SERPL-SCNC: 21 MMOL/L (ref 21–32)
CREAT SERPL-MCNC: 0.74 MG/DL (ref 0.55–1.02)
ERYTHROCYTE [DISTWIDTH] IN BLOOD BY AUTOMATED COUNT: 14 % (ref 11.5–14.5)
ERYTHROCYTE [SEDIMENTATION RATE] IN BLOOD: 28 MM/HR (ref 0–20)
GLUCOSE SERPL-MCNC: 131 MG/DL (ref 65–100)
HCT VFR BLD AUTO: 32.9 % (ref 35–47)
HGB BLD-MCNC: 11.1 G/DL (ref 11.5–16)
MCH RBC QN AUTO: 32 PG (ref 26–34)
MCHC RBC AUTO-ENTMCNC: 33.7 G/DL (ref 30–36.5)
MCV RBC AUTO: 94.8 FL (ref 80–99)
NRBC # BLD: 0 K/UL (ref 0–0.01)
NRBC BLD-RTO: 0 PER 100 WBC
PLATELET # BLD AUTO: 259 K/UL (ref 150–400)
PMV BLD AUTO: 10.4 FL (ref 8.9–12.9)
POTASSIUM SERPL-SCNC: 4.7 MMOL/L (ref 3.5–5.1)
RBC # BLD AUTO: 3.47 M/UL (ref 3.8–5.2)
RHEUMATOID FACT SERPL-ACNC: <10 IU/ML
SODIUM SERPL-SCNC: 137 MMOL/L (ref 136–145)
VIT B12 SERPL-MCNC: 255 PG/ML (ref 193–986)
WBC # BLD AUTO: 7.4 K/UL (ref 3.6–11)

## 2021-04-02 PROCEDURE — 77030038269 HC DRN EXT URIN PURWCK BARD -A

## 2021-04-02 PROCEDURE — 85027 COMPLETE CBC AUTOMATED: CPT

## 2021-04-02 PROCEDURE — 36415 COLL VENOUS BLD VENIPUNCTURE: CPT

## 2021-04-02 PROCEDURE — 96372 THER/PROPH/DIAG INJ SC/IM: CPT

## 2021-04-02 PROCEDURE — 97161 PT EVAL LOW COMPLEX 20 MIN: CPT

## 2021-04-02 PROCEDURE — 2709999900 HC NON-CHARGEABLE SUPPLY

## 2021-04-02 PROCEDURE — 74011250636 HC RX REV CODE- 250/636: Performed by: INTERNAL MEDICINE

## 2021-04-02 PROCEDURE — 92526 ORAL FUNCTION THERAPY: CPT

## 2021-04-02 PROCEDURE — 82607 VITAMIN B-12: CPT

## 2021-04-02 PROCEDURE — 74011000258 HC RX REV CODE- 258: Performed by: INTERNAL MEDICINE

## 2021-04-02 PROCEDURE — 86431 RHEUMATOID FACTOR QUANT: CPT

## 2021-04-02 PROCEDURE — 85652 RBC SED RATE AUTOMATED: CPT

## 2021-04-02 PROCEDURE — 99232 SBSQ HOSP IP/OBS MODERATE 35: CPT | Performed by: PSYCHIATRY & NEUROLOGY

## 2021-04-02 PROCEDURE — 74011250637 HC RX REV CODE- 250/637: Performed by: INTERNAL MEDICINE

## 2021-04-02 PROCEDURE — 82550 ASSAY OF CK (CPK): CPT

## 2021-04-02 PROCEDURE — 97530 THERAPEUTIC ACTIVITIES: CPT

## 2021-04-02 PROCEDURE — 74011250637 HC RX REV CODE- 250/637: Performed by: PSYCHIATRY & NEUROLOGY

## 2021-04-02 PROCEDURE — 97530 THERAPEUTIC ACTIVITIES: CPT | Performed by: OCCUPATIONAL THERAPIST

## 2021-04-02 PROCEDURE — 96376 TX/PRO/DX INJ SAME DRUG ADON: CPT

## 2021-04-02 PROCEDURE — 99218 HC RM OBSERVATION: CPT

## 2021-04-02 PROCEDURE — 97166 OT EVAL MOD COMPLEX 45 MIN: CPT | Performed by: OCCUPATIONAL THERAPIST

## 2021-04-02 PROCEDURE — 80048 BASIC METABOLIC PNL TOTAL CA: CPT

## 2021-04-02 RX ORDER — OXYCODONE HYDROCHLORIDE 5 MG/1
5 TABLET ORAL
Status: DISCONTINUED | OUTPATIENT
Start: 2021-04-02 | End: 2021-04-12 | Stop reason: HOSPADM

## 2021-04-02 RX ORDER — POLYETHYLENE GLYCOL 3350 17 G/17G
17 POWDER, FOR SOLUTION ORAL DAILY
Status: DISCONTINUED | OUTPATIENT
Start: 2021-04-02 | End: 2021-04-12 | Stop reason: HOSPADM

## 2021-04-02 RX ADMIN — ACETAMINOPHEN 650 MG: 325 TABLET ORAL at 21:05

## 2021-04-02 RX ADMIN — ACETAMINOPHEN 650 MG: 325 TABLET ORAL at 12:13

## 2021-04-02 RX ADMIN — GABAPENTIN 600 MG: 300 CAPSULE ORAL at 12:13

## 2021-04-02 RX ADMIN — GABAPENTIN 600 MG: 300 CAPSULE ORAL at 21:05

## 2021-04-02 RX ADMIN — Medication 10 ML: at 14:00

## 2021-04-02 RX ADMIN — POLYETHYLENE GLYCOL 3350 17 G: 17 POWDER, FOR SOLUTION ORAL at 12:07

## 2021-04-02 RX ADMIN — LACTULOSE 15 ML: 20 SOLUTION ORAL at 13:00

## 2021-04-02 RX ADMIN — GABAPENTIN 600 MG: 300 CAPSULE ORAL at 17:12

## 2021-04-02 RX ADMIN — ENOXAPARIN SODIUM 40 MG: 40 INJECTION SUBCUTANEOUS at 09:49

## 2021-04-02 RX ADMIN — GABAPENTIN 600 MG: 300 CAPSULE ORAL at 09:49

## 2021-04-02 RX ADMIN — Medication 15 MG: at 21:15

## 2021-04-02 RX ADMIN — Medication 10 ML: at 06:10

## 2021-04-02 RX ADMIN — LACTULOSE 15 ML: 20 SOLUTION ORAL at 17:12

## 2021-04-02 RX ADMIN — DULOXETINE HYDROCHLORIDE 30 MG: 30 CAPSULE, DELAYED RELEASE ORAL at 09:49

## 2021-04-02 RX ADMIN — SODIUM CHLORIDE 1000 MG: 900 INJECTION INTRAVENOUS at 09:54

## 2021-04-02 RX ADMIN — Medication 10 ML: at 21:05

## 2021-04-02 NOTE — PROGRESS NOTES
Hospitalist Progress Note    NAME: Nicho Vu   :  1975   MRN:  [id number]       Assessment / Plan:    Acute exacerbation of relapsing remitting multiple sclerosis, POA  Acute weakness and inability to stand secondary to above, POA  --Patient had multiple ER visits in last 1 month for symptoms of generalized aches and pains with slurred speech  --Johnny Brambila is wheelchair-bound at her baseline now  --Patient presented today with severe worsening of her bilateral lower extremity weakness, and mild worsening of bilateral upper arm weakness. --c/w IV Solu-Medrol 1 g daily, last dose tomorrow      MRI of brain and cervical spine showed stability of lesions   -Continue Aubagio  -She was just discharged last month and MRI at that time showed stable demyelinating lesions     Anxiety/depression, POA  Fibromyalgia  -Continue Cymbalta and gabapentin     Tobacco use, POA  --Patient did not want to discuss tobacco cessation per admitting doctor         Code Status: Full  Surrogate Decision Maker: Patient's son     DVT Prophylaxis: Lovenox subcu  GI Prophylaxis: not indicated   dispo: IP rehab   Baseline: Is wheelchair-bound now  30.0 - 39.9 Obese / Body mass index is 37.7 kg/m². Subjective:     Chief Complaint / Reason for Physician Visit  FU MS .c/o generalized body aches , requesting oxy or tramadol . Discussed with RN events overnight. Review of Systems:  Symptom Y/N Comments  Symptom Y/N Comments   Fever/Chills n   Chest Pain n    Poor Appetite    Edema     Cough    Abdominal Pain n    Sputum    Joint Pain     SOB/BOBO n   Pruritis/Rash     Nausea/vomit n   Tolerating PT/OT     Diarrhea    Tolerating Diet y    Constipation    Other       Could NOT obtain due to:      Objective:     VITALS:   Last 24hrs VS reviewed since prior progress note.  Most recent are:  Patient Vitals for the past 24 hrs:   Temp Pulse Resp BP SpO2   21 0306 97.5 °F (36.4 °C) 60 16 (!) 96/55 95 %   21 2326 97.8 °F (36.6 °C) (!) 59 18 (!) 101/59 94 %   04/01/21 1924 97.4 °F (36.3 °C) 75 16 (!) 110/56 93 %   04/01/21 1538 98.6 °F (37 °C) 84 18 112/74 94 %   04/01/21 1112 98.2 °F (36.8 °C) 80 16 118/80 96 %     No intake or output data in the 24 hours ending 04/02/21 0811     I had a face to face encounter and independently examined this patient on 4/2/2021, as outlined below:  PHYSICAL EXAM:  General: WD, WN. Alert, cooperative, no acute distress    EENT:  EOMI. Anicteric sclerae. MMM  Resp:  CTA bilaterally, no wheezing or rales. No accessory muscle use  CV:  Regular  rhythm,  No edema  GI:  Soft, Non distended, Non tender. +Bowel sounds  Neurologic:  Alert and oriented X 3, normal speech,   Psych:   Poor insight. Not anxious nor agitated  Skin:  No rashes. No jaundice    Reviewed most current lab test results and cultures  YES  Reviewed most current radiology test results   YES  Review and summation of old records today    NO  Reviewed patient's current orders and MAR    YES  PMH/SH reviewed - no change compared to H&P  ________________________________________________________________________  Care Plan discussed with:    Comments   Patient x    Family      RN x    Care Manager     Consultant                       x Multidiciplinary team rounds were held today with , nursing, pharmacist and clinical coordinator. Patient's plan of care was discussed; medications were reviewed and discharge planning was addressed.      ________________________________________________________________________  Total NON critical care TIME: 25 Minutes    Total CRITICAL CARE TIME Spent:   Minutes non procedure based      Comments   >50% of visit spent in counseling and coordination of care     ________________________________________________________________________  Julius Dueñas MD     Procedures: see electronic medical records for all procedures/Xrays and details which were not copied into this note but were reviewed prior to creation of Plan. LABS:  I reviewed today's most current labs and imaging studies.   Pertinent labs include:  Recent Labs     04/02/21  0040 04/01/21  0245 03/31/21  1303   WBC 7.4 6.0 7.3   HGB 11.1* 11.9 12.9   HCT 32.9* 36.4 39.2    303 353     Recent Labs     04/02/21  0040 04/01/21  1128 04/01/21  0245 03/31/21  1352 03/31/21  1303 03/30/21  1723     --  141  --  137 139   K 4.7  --  4.1  --  4.9 4.0   *  --  112*  --  108 107   CO2 21  --  22  --  23 26   *  --  137*  --  94 75   BUN 24*  --  16  --  16 13   CREA 0.74  --  0.68  --  0.73 0.81   CA 8.9  --  8.9  --  9.6 9.5   MG  --  1.9  --   --   --   --    ALB  --   --   --   --  3.9 4.1   TBILI  --   --   --   --  0.5 0.6   ALT  --   --   --   --  29 31   INR  --   --   --  1.1  --   --        Signed: Nevaeh Guy MD

## 2021-04-02 NOTE — PROGRESS NOTES
Problem: Mobility Impaired (Adult and Pediatric)  Goal: *Acute Goals and Plan of Care (Insert Text)  Description: FUNCTIONAL STATUS PRIOR TO ADMISSION: The patient required increased assistance from her fiance for stand pivot transfers to and from her wheelchair over the past month. Prior to that she was able to walk short distances with RW. HOME SUPPORT PRIOR TO ADMISSION: The patient lived with fiance and required assistance for transfers and ADLs. Physical Therapy Goals  Initiated 4/2/2021  1. Patient will move from supine to sit and sit to supine  in bed with minimal assistance/contact guard assist within 7 day(s). 2.  Patient will transfer from bed to chair and chair to bed with minimal assistance/contact guard assist using the least restrictive device within 7 day(s). 3.  Patient will perform sit to stand with minimal assistance/contact guard assist within 7 day(s). 4.  Patient will ambulate with minimal assistance/contact guard assist for 5 feet with the least restrictive device within 7 day(s). Outcome: Progressing Towards Goal   PHYSICAL THERAPY EVALUATION  Patient: Evans Loaiza (82 y.o. female)  Date: 4/2/2021  [facility] [G35]  Weakness [R53.1]        Precautions:   Fall      ASSESSMENT  Based on the objective data described below, the patient presents with generalized weakness, decreased functional mobility, impaired balance and gait, decreased tolerance to activity s/p admission on 3/31 for pain and weakness due to MS exacerbation. Pt received supine in bed and agreeable to therapy. Pt tolerated session fairly well. Pt completed bed mobility with mod A x 1 and additional time and effort. Pt performed sit<>stands with RW and with BHHA with min A x 1-2 and attempted side stepping along the bed with difficulty advancing each LE laterally. Pt required assist for weight shifting as well as assist at L foot to advance laterally.  Pt assisted back to supine position with all needs met. Pt will benefit from inpatient rehab upon discharge to continue therapy efforts, reduce risk for falls and readmission. .    Current Level of Function Impacting Discharge (mobility/balance): mod A bed mobility, min A x 1-2 sit<>stand with RW, side stepping with min A x 2 for balance, mod A to advance LE    Functional Outcome Measure: The patient scored Total Score: 11/28 on the Tinetti outcome measure which is indicative of high fall risk. Other factors to consider for discharge: previously stand pivot transfer only with assist from fiance, steps to enter home     Patient will benefit from skilled therapy intervention to address the above noted impairments. PLAN :  Recommendations and Planned Interventions: bed mobility training, transfer training, gait training, therapeutic exercises, neuromuscular re-education, patient and family training/education, and therapeutic activities      Frequency/Duration: Patient will be followed by physical therapy:  4 times a week to address goals. Recommendation for discharge: (in order for the patient to meet his/her long term goals)  Therapy 3 hours per day 5-7 days per week    This discharge recommendation:  Has been made in collaboration with the attending provider and/or case management    IF patient discharges home will need the following DME: patient owns DME required for discharge         SUBJECTIVE:   Patient stated I'm still having pain all over.     OBJECTIVE DATA SUMMARY:   HISTORY:    Past Medical History:   Diagnosis Date    Body aches 12/11/2014    Chronic pain     Depression     Headache(784.0)     History of mammogram never before    MS (multiple sclerosis) ([facility]     Neurological disorder     Multiple Sclerosis    Pap smear for cervical cancer screening 2008    Psychiatric disorder     anxiety    Psychotic disorder [facility])      Past Surgical History:   Procedure Laterality Date    COLONOSCOPY N/A 2/28/2018 COLONOSCOPY performed by Kenneth Hu MD at hospitals ENDOSCOPY    HX CHOLECYSTECTOMY      HX GYN      3 c-sections    HX HEENT      bilateral ear tube surgery    HX HERNIA REPAIR      HX OTHER SURGICAL      R inguinal hernia repair       Personal factors and/or comorbidities impacting plan of care:     Home Situation  Home Environment: Private residence  # Steps to Enter: 2  Rails to Enter: No  One/Two Story Residence: One story  Living Alone: No  Support Systems: Spouse/Significant Other/Partner  Current DME Used/Available at Home: Wheelchair, Walker, rolling, Walker, rollator, Commode, bedside, Grab bars, Tub transfer bench  Tub or Shower Type: Tub/Shower combination    EXAMINATION/PRESENTATION/DECISION MAKING:   Critical Behavior:  Neurologic State: Alert  Orientation Level: Oriented X4  Cognition: Follows commands     Hearing: Auditory  Auditory Impairment: None  Skin:    Edema:   Range Of Motion:  AROM: Generally decreased, functional                       Strength:    Strength: Generally decreased, functional                    Tone & Sensation:                                  Coordination:     Vision:      Functional Mobility:  Bed Mobility:     Supine to Sit: Moderate assistance  Sit to Supine: Minimum assistance  Scooting: Moderate assistance(scoot hips forward to EOB)  Transfers:  Sit to Stand: Minimum assistance;Assist x1;Assist x2  Stand to Sit: Minimum assistance;Assist x1                       Balance:   Sitting: Impaired  Sitting - Static: Good (unsupported)  Sitting - Dynamic: Fair (occasional)  Standing: Impaired; With support  Standing - Static: Good  Standing - Dynamic : Fair;Constant support  Ambulation/Gait Training:  Distance (ft): 1 Feet (ft)  Assistive Device: Gait belt;Walker, rolling  Ambulation - Level of Assistance: Minimal assistance;Assist x2(assist to laterally step R foot)        Gait Abnormalities: Decreased step clearance; Step to gait; Shuffling gait        Base of Support: Widened     Speed/Heena: Pace decreased (<100 feet/min); Shuffled  Step Length: Right shortened;Left shortened                     Stairs: Therapeutic Exercises:   LAQ, seated marching, ankle pumps    Functional Measure:  Tinetti test:    Sitting Balance: 1  Arises: 1  Attempts to Rise: 1  Immediate Standing Balance: 1  Standing Balance: 1  Nudged: 1  Eyes Closed: 0  Turn 360 Degrees - Continuous/Discontinuous: 0  Turn 360 Degrees - Steady/Unsteady: 0  Sitting Down: 2  Balance Score: 8 Balance total score  Indication of Gait: 0  R Step Length/Height: 0  L Step Length/Height: 0  R Foot Clearance: 1  L Foot Clearance: 1  Step Symmetry: 1  Step Continuity: 0  Path: 0  Trunk: 0  Walking Time: 0  Gait Score: 3 Gait total score  Total Score: 11/28 Overall total score         Tinetti Tool Score Risk of Falls  <19 = High Fall Risk  19-24 = Moderate Fall Risk  25-28 = Low Fall Risk  Tinetti ME. Performance-Oriented Assessment of Mobility Problems in Elderly Patients. [facility]; [id number]. (Scoring Description: PT Bulletin Feb. 10, 1993)    Older adults: Vicente Peters et al, 2009; n = 1000 [facility] elderly evaluated with ABC, KETTY, ADL, and IADL)  · Mean KETTY score for males aged 69-68 years = 26.21(3.40)  · Mean KETTY score for females age 69-68 years = 25.16(4.30)  · Mean KETTY score for males over 80 years = 23.29(6.02)  · Mean KETTY score for females over 80 years = 17.20(8.32)        Based on the above components, the patient evaluation is determined to be of the following complexity level: LOW     Pain Rating:  Pt c/o pain all over, but did not quantify    Activity Tolerance:   Fair      After treatment patient left in no apparent distress:   Supine in bed, Call bell within reach, Bed / chair alarm activated, and Side rails x 3    COMMUNICATION/EDUCATION:   The patients plan of care was discussed with: Occupational therapist and Registered nurse.      Fall prevention education was provided and the patient/caregiver indicated understanding., Patient/family have participated as able in goal setting and plan of care. , and Patient/family agree to work toward stated goals and plan of care.     Thank you for this referral.  Hola Luke, PT, DPT   Time Calculation: 23 mins

## 2021-04-02 NOTE — PROGRESS NOTES
Bedside and Verbal shift change report given to [address] [place] [address] (oncoming nurse) by Ivet Wagner (offgoing nurse). Report included the following information SBAR, Kardex, Procedure Summary, Intake/Output, MAR and Accordion.

## 2021-04-02 NOTE — DISCHARGE INSTRUCTIONS
Smoking Cessation Program:   [facility] is now offering a proven, interactive, text-based smoking cessation program for FREE! To register, please text Deandre Borges [phone] or visit [email or web address]  For more information, please call: [phone]

## 2021-04-02 NOTE — PROGRESS NOTES
Problem: Self Care Deficits Care Plan (Adult)  Goal: *Acute Goals and Plan of Care (Insert Text)  Description: FUNCTIONAL STATUS PRIOR TO ADMISSION: progression of  weakness over the past month, due to this pt has been unable to ambulate except to furntiure walk with assist into bathroom to toilet (wheelchair does not fit in bathroom), pts boyfriend has been assisting with all transfers/mobility, pt was needing assist with pulling her shirt over her head, able to pull depends and pants up over hips while her boyfriend assists to keep pt standing, stand pivot transfer to wheelchair with one assist without assist devices, prior to a month ago pt was ambulatory short distances and able to assist more in her care    HOME SUPPORT PRIOR TO ADMISSION: The patient lived with boyfriend whom has been assisting. Occupational Therapy Goals:  Initiated 4/2/2021  1. Patient will perform grooming seated with modified independence within 7 days. 2. Patient will perform toileting with moderate assistance  within 7 days. 3. Patient will perform upper body dressing with supervision/set-up within 7 days  4. Patient will perform lower body dressing with moderate assist within 7 days. 5. Patient will transfer from bedside commode with moderate assistance  using the least restrictive device and appropriate durable medical equipment within 7 days. Outcome: Not Met   OCCUPATIONAL THERAPY EVALUATION  Patient: Evans Loaiza (20 y.o. female)  Date: 4/2/2021  Primary Diagnosis: [facility] sclerosis ([facility] [G35]  Weakness [R53.1]        Precautions:   Fall    ASSESSMENT  Based on the objective data described below, the patient presents with recent regression in strength, mobility and ADLs. Pt reports due to this she is now wheelchair bound and only able to walk a few feet with assist holding onto the counter into her bathroom (wheelchair does not fit in bathroom).   Pt presents with general weakness throughout and RUE is stronger than LUE. Pt was able to feed herself today but has difficulty bring hands over head due to weakness. During seated EOB reaching tasks pt presented with trunk sway and fair balance. Mod assist needed to scoot to EOB due to decreased ability to weight shift and push to scoot due to functional weakness. Sit to stand performed x2 and pt needed min assist x2 but was unable to weight shift or advance LE to side step or transfer. Manual assist needed to weight shift and to side step due to weakness. Pt would benefit from rehab at discharge. Current Level of Function Impacting Discharge (ADLs/self-care):   Bed Mobility:  Supine to Sit: Moderate assistance  Sit to Supine: Minimum assistance  Scooting: Moderate assistance(scoot hips forward to EOB)    Transfers:  Sit to Stand: Minimum assistance;Assist x1;Assist x2  Stand to Sit: Minimum assistance;Assist x1  Bed to Chair: (unable with RW, difficulty with weight shift)  Bathroom Mobility: (unable)  Toilet Transfer : (unable)    Feeding: Supervision    Oral Facial Hygiene/Grooming: Contact guard assistance(seated, EOB)    Bathing: Moderate assistance    Upper Body Dressing: Minimum assistance    Lower Body Dressing: Total assistance;Maximum assistance    Toileting: Total assistance;Maximum assistance    Functional Outcome Measure: The patient scored 20/100 on the barthel outcome measure which is indicative of a significant decline in mobility and ADLs. Other factors to consider for discharge: fall risk, progressive recent weakness resulting in inability to care for herself     Patient will benefit from skilled therapy intervention to address the above noted impairments.        PLAN :  Recommendations and Planned Interventions: self care training, functional mobility training, therapeutic exercise, balance training, therapeutic activities, neuromuscular re-education, patient education, home safety training, and family training/education    Frequency/Duration: Patient will be followed by occupational therapy 4 times a week to address goals. Recommendation for discharge: (in order for the patient to meet his/her long term goals)  Therapy 3 hours per day 5-7 days per week    This discharge recommendation:  A follow-up discussion with the attending provider and/or case management is planned    IF patient discharges home will need the following DME: [facility] if pt does not all ready own       SUBJECTIVE:   Patient stated I have gotten weaker.     OBJECTIVE DATA SUMMARY:   HISTORY:   Past Medical History:   Diagnosis Date    Body aches 12/11/2014    Chronic pain     Depression     Headache(784.0)     History of mammogram never before    MS (multiple sclerosis) ([facility]     Neurological disorder     Multiple Sclerosis    Pap smear for cervical cancer screening 2008    Psychiatric disorder     anxiety    Psychotic disorder [facility])      Past Surgical History:   Procedure Laterality Date    COLONOSCOPY N/A 2/28/2018    COLONOSCOPY performed by Mortimer Latch, MD at [facility] ENDOSCOPY    HX CHOLECYSTECTOMY      HX GYN      3 c-sections    HX HEENT      bilateral ear tube surgery    HX HERNIA REPAIR      HX OTHER SURGICAL      R inguinal hernia repair       Expanded or extensive additional review of patient history:     Home Situation  Home Environment: Private residence  # Steps to Enter: 2  Rails to Enter: No  One/Two Story Residence: One story  Living Alone: No  Support Systems: Spouse/Significant Other/Partner  Current DME Used/Available at Home: Wheelchair, Walker, rolling, Olene [place], rollator, Commode, bedside, Grab bars, Tub transfer bench  Tub or Shower Type: Tub/Shower combination    Hand dominance: Right    EXAMINATION OF PERFORMANCE DEFICITS:  Cognitive/Behavioral Status:  Neurologic State: Alert  Orientation Level: Oriented X4  Cognition: Follows commands  Perception: Appears intact  Perseveration: No perseveration noted  Safety/Judgement: Awareness of environment; Fall prevention;Home safety; Insight into deficits        Hearing: Auditory  Auditory Impairment: None    Vision/Perceptual:                                Corrective Lenses: Reading glasses    Range of Motion:    AROM: Generally decreased, functional  PROM: Within functional limits                      Strength:    Strength: Generally decreased, functional                Coordination:  Coordination: Generally decreased, functional  Fine Motor Skills-Upper: Left Intact; Right Intact    Gross Motor Skills-Upper: Left Intact; Right Intact(generally weak)    Tone & Sensation:    Tone: Normal                         Balance:  Sitting: Impaired  Sitting - Static: Good (unsupported)  Sitting - Dynamic: Fair (occasional)  Standing: Impaired; With support  Standing - Static: Good  Standing - Dynamic : Fair;Constant support    Functional Mobility and Transfers for ADLs:  Bed Mobility:  Supine to Sit: Moderate assistance  Sit to Supine: Minimum assistance  Scooting: Moderate assistance(scoot hips forward to EOB)    Transfers:  Sit to Stand: Minimum assistance;Assist x1;Assist x2  Stand to Sit: Minimum assistance;Assist x1  Bed to Chair: (unable with RW, difficulty with weight shift)  Bathroom Mobility: (unable)  Toilet Transfer : (unable)    ADL Assessment:  Feeding: Supervision    Oral Facial Hygiene/Grooming: Contact guard assistance(seated, EOB)    Bathing: Moderate assistance    Upper Body Dressing: Minimum assistance    Lower Body Dressing: Total assistance;Maximum assistance    Toileting: Total assistance;Maximum assistance                ADL Intervention and task modifications:     See assessment  Cognitive Retraining  Safety/Judgement: Awareness of environment; Fall prevention;Home safety; Insight into deficits       Functional Measure:  Barthel Index:    Bathin  Bladder: 5  Bowels: 5  Groomin  Dressin  Feedin  Mobility: 0  Stairs: 0  Toilet Use: 0  Transfer (Bed to Chair and Back): 5  Total: 20/100        The Barthel ADL Index: Guidelines  1. The index should be used as a record of what a patient does, not as a record of what a patient could do. 2. The main aim is to establish degree of independence from any help, physical or verbal, however minor and for whatever reason. 3. The need for supervision renders the patient not independent. 4. A patient's performance should be established using the best available evidence. Asking the patient, friends/relatives and nurses are the usual sources, but direct observation and common sense are also important. However direct testing is not needed. 5. Usually the patient's performance over the preceding 24-48 hours is important, but occasionally longer periods will be relevant. 6. Middle categories imply that the patient supplies over 50 per cent of the effort. 7. Use of aids to be independent is allowed. Theora Litter., Barthel, D.W. (4080). Functional evaluation: the Barthel Index. [address] [facility] (14)2. LOUIS Griffin, Leslie Barker, China Levine, [place], [address] [place] [address] (1999). Measuring the change indisability after inpatient rehabilitation; comparison of the responsiveness of the Barthel Index and Functional Randolph Measure. Journal of Neurology, Neurosurgery, and Psychiatry, 66(4), 218-320. Ashutosh Villanueva, N.J.A, BETTY Moss, & Deann Boast, MEVARISTO. (2004.) Assessment of post-stroke quality of life in cost-effectiveness studies: The usefulness of the Barthel Index and the EuroQoL-5D.  Quality of Life Research, 15, 937-93         Occupational Therapy Evaluation Charge Determination   History Examination Decision-Making   MEDIUM Complexity : Expanded review of history including physical, cognitive and psychosocial  history  MEDIUM Complexity : 3-5 performance deficits relating to physical, cognitive , or psychosocial skils that result in activity limitations and / or participation restrictions MEDIUM Complexity : Patient may present with comorbidities that affect occupational performnce. Miniml to moderate modification of tasks or assistance (eg, physical or verbal ) with assesment(s) is necessary to enable patient to complete evaluation       Based on the above components, the patient evaluation is determined to be of the following complexity level: MEDIUM  Pain Ratin/10    Activity Tolerance:   Fair and requires rest breaks    After treatment patient left in no apparent distress:    Supine in bed, Heels elevated for pressure relief, Call bell within reach, Bed / chair alarm activated, and Side rails x 3    COMMUNICATION/EDUCATION:   The patients plan of care was discussed with: Physical therapist, Registered nurse, and patient . Patient/family have participated as able in goal setting and plan of care. and Patient/family agree to work toward stated goals and plan of care. This patients plan of care is appropriate for delegation to [facility].     Thank you for this referral.  YENIFER Monahan/L  Time Calculation: 23 mins

## 2021-04-02 NOTE — CARDIO/PULMONARY
Cardiac Rehab Note: chart review/referral     Smoking history assessed. Patient is a current smoker. Smoking Cessation Program link has been added to the AVS.     Smoking Cessation Program:   Dain Moreno is now offering a proven, interactive, text-based smoking cessation program for FREE! To register, please text Deandre Borges [phone] or visit [email or web address]  For more information, please call: [phone]

## 2021-04-02 NOTE — PROGRESS NOTES
Patient stated that she is hurting all over and is requesting Gabapentin. Patient reminded that Gabapentin is a scheduled medication not PRN. Tylenol was given at 1213- she reported that it helped but it didn't go away completely. She is also feeling anxious and would like medication for that as well. Dr. Quinten De Jesus made aware of situation.

## 2021-04-02 NOTE — PROGRESS NOTES
End of Shift Note    Bedside shift change report given to Violeta Adames (oncoming nurse) by Hope Mckinney RN (offgoing nurse). Report included the following information SBAR, Kardex, Intake/Output, MAR and Accordion    Shift worked:  7p-7a     Shift summary and any significant changes:     none      Concerns for physician to address:    none   Zone phone for oncoming shift:   [phone]       Activity:  Activity Level: Up with Assistance  Number times ambulated in hallways past shift: 0  Number of times OOB to chair past shift: 0    Cardiac:   Cardiac Monitoring: Yes      Cardiac Rhythm: Normal sinus rhythm    Access:   Current line(s): PIV     Genitourinary:   Urinary status: voiding and external catheter    Respiratory:   O2 Device: Room air  Chronic home O2 use?: NO  Incentive spirometer at bedside: NO     GI:  Last Bowel Movement Date: (not sure)  Current diet:  DIET DYSPHAGIA MECH ALTERED (NDD2)  Passing flatus: YES  Tolerating current diet: YES       Pain Management:   Patient states pain is manageable on current regimen: YES    Skin:  Samy Score: 14  Interventions: float heels, increase time out of bed and internal/external urinary devices    Patient Safety:  Fall Score:  Total Score: 4  Interventions: bed/chair alarm, assistive device (walker, cane, etc), gripper socks and pt to call before getting OOB  High Fall Risk: Yes    Length of Stay:  Expected LOS: 3d 0h  Actual LOS: 1      Hope Mckinney RN

## 2021-04-03 LAB
ANION GAP SERPL CALC-SCNC: 5 MMOL/L (ref 5–15)
BUN SERPL-MCNC: 20 MG/DL (ref 6–20)
BUN/CREAT SERPL: 28 (ref 12–20)
CALCIUM SERPL-MCNC: 9.2 MG/DL (ref 8.5–10.1)
CHLORIDE SERPL-SCNC: 110 MMOL/L (ref 97–108)
CO2 SERPL-SCNC: 25 MMOL/L (ref 21–32)
CREAT SERPL-MCNC: 0.72 MG/DL (ref 0.55–1.02)
ERYTHROCYTE [DISTWIDTH] IN BLOOD BY AUTOMATED COUNT: 13.7 % (ref 11.5–14.5)
FOLATE SERPL-MCNC: 5.6 NG/ML (ref 5–21)
GLUCOSE SERPL-MCNC: 123 MG/DL (ref 65–100)
HCT VFR BLD AUTO: 34.6 % (ref 35–47)
HGB BLD-MCNC: 11.4 G/DL (ref 11.5–16)
MCH RBC QN AUTO: 31.5 PG (ref 26–34)
MCHC RBC AUTO-ENTMCNC: 32.9 G/DL (ref 30–36.5)
MCV RBC AUTO: 95.6 FL (ref 80–99)
NRBC # BLD: 0 K/UL (ref 0–0.01)
NRBC BLD-RTO: 0 PER 100 WBC
PLATELET # BLD AUTO: 241 K/UL (ref 150–400)
PMV BLD AUTO: 11.2 FL (ref 8.9–12.9)
POTASSIUM SERPL-SCNC: 4.2 MMOL/L (ref 3.5–5.1)
RBC # BLD AUTO: 3.62 M/UL (ref 3.8–5.2)
SODIUM SERPL-SCNC: 140 MMOL/L (ref 136–145)
VIT B12 SERPL-MCNC: 275 PG/ML (ref 193–986)
WBC # BLD AUTO: 4.9 K/UL (ref 3.6–11)

## 2021-04-03 PROCEDURE — 99218 HC RM OBSERVATION: CPT

## 2021-04-03 PROCEDURE — 74011000258 HC RX REV CODE- 258: Performed by: INTERNAL MEDICINE

## 2021-04-03 PROCEDURE — 36415 COLL VENOUS BLD VENIPUNCTURE: CPT

## 2021-04-03 PROCEDURE — 74011250637 HC RX REV CODE- 250/637: Performed by: PSYCHIATRY & NEUROLOGY

## 2021-04-03 PROCEDURE — 85027 COMPLETE CBC AUTOMATED: CPT

## 2021-04-03 PROCEDURE — 96376 TX/PRO/DX INJ SAME DRUG ADON: CPT

## 2021-04-03 PROCEDURE — 80048 BASIC METABOLIC PNL TOTAL CA: CPT

## 2021-04-03 PROCEDURE — 74011250637 HC RX REV CODE- 250/637: Performed by: INTERNAL MEDICINE

## 2021-04-03 PROCEDURE — 96372 THER/PROPH/DIAG INJ SC/IM: CPT

## 2021-04-03 PROCEDURE — 74011250636 HC RX REV CODE- 250/636: Performed by: INTERNAL MEDICINE

## 2021-04-03 RX ADMIN — ACETAMINOPHEN 650 MG: 325 TABLET ORAL at 09:37

## 2021-04-03 RX ADMIN — GABAPENTIN 600 MG: 300 CAPSULE ORAL at 21:29

## 2021-04-03 RX ADMIN — GABAPENTIN 600 MG: 300 CAPSULE ORAL at 09:37

## 2021-04-03 RX ADMIN — DULOXETINE HYDROCHLORIDE 30 MG: 30 CAPSULE, DELAYED RELEASE ORAL at 09:37

## 2021-04-03 RX ADMIN — GABAPENTIN 600 MG: 300 CAPSULE ORAL at 12:36

## 2021-04-03 RX ADMIN — SODIUM CHLORIDE 1000 MG: 900 INJECTION INTRAVENOUS at 09:47

## 2021-04-03 RX ADMIN — Medication 15 MG: at 21:59

## 2021-04-03 RX ADMIN — OXYCODONE 5 MG: 5 TABLET ORAL at 16:05

## 2021-04-03 RX ADMIN — ENOXAPARIN SODIUM 40 MG: 40 INJECTION SUBCUTANEOUS at 09:38

## 2021-04-03 RX ADMIN — ACETAMINOPHEN 650 MG: 325 TABLET ORAL at 04:58

## 2021-04-03 RX ADMIN — Medication 10 ML: at 21:29

## 2021-04-03 RX ADMIN — Medication 10 ML: at 13:40

## 2021-04-03 RX ADMIN — GABAPENTIN 600 MG: 300 CAPSULE ORAL at 17:20

## 2021-04-03 RX ADMIN — POLYETHYLENE GLYCOL 3350 17 G: 17 POWDER, FOR SOLUTION ORAL at 09:38

## 2021-04-03 RX ADMIN — OXYCODONE 5 MG: 5 TABLET ORAL at 20:20

## 2021-04-03 RX ADMIN — Medication 10 ML: at 05:59

## 2021-04-03 NOTE — PROGRESS NOTES
Hospitalist Progress Note    NAME: Jd Spears   :  1975   MRN:  [id number]       Assessment / Plan:    Acute exacerbation of relapsing remitting multiple sclerosis, POA  Acute weakness and inability to stand secondary to above, POA  --Patient had multiple ER visits in last 1 month for symptoms of generalized aches and pains with slurred speech  --Reagan Wilson is wheelchair-bound at her baseline now  --Patient presented today with severe worsening of her bilateral lower extremity weakness, and mild worsening of bilateral upper arm weakness. --c/w IV Solu-Medrol 1 g daily, last dose tomorrow      MRI of brain and cervical spine showed stability of lesions   -Continue Aubagio  -She was just discharged last month and MRI at that time showed stable demyelinating lesions  Finished her steroid course   Awaiting placement      Anxiety/depression, POA  Fibromyalgia  -Continue Cymbalta and gabapentin     Tobacco use, POA  --Patient did not want to discuss tobacco cessation per admitting doctor         Code Status: Full  Surrogate Decision Maker: Patient's son     DVT Prophylaxis: Lovenox subcu  GI Prophylaxis: not indicated   dispo: IP rehab   Baseline: Is wheelchair-bound now  30.0 - 39.9 Obese / Body mass index is 37.7 kg/m². Subjective:     Chief Complaint / Reason for Physician Visit  FU MS .no acute changes  . Discussed with RN events overnight. Review of Systems:  Symptom Y/N Comments  Symptom Y/N Comments   Fever/Chills n   Chest Pain n    Poor Appetite    Edema     Cough    Abdominal Pain n    Sputum    Joint Pain     SOB/BOBO n   Pruritis/Rash     Nausea/vomit n   Tolerating PT/OT     Diarrhea    Tolerating Diet y    Constipation    Other       Could NOT obtain due to:      Objective:     VITALS:   Last 24hrs VS reviewed since prior progress note.  Most recent are:  Patient Vitals for the past 24 hrs:   Temp Pulse Resp BP SpO2   21 0824 98 °F (36.7 °C) 63 16 (!) 126/58 98 %   21 0344 97.4 °F (36.3 °C) (!) 54 18 114/75 99 %   04/02/21 2313 97.9 °F (36.6 °C) (!) 51 16 116/75 95 %   04/02/21 2100 98 °F (36.7 °C) 60 16 112/74 96 %   04/02/21 1524 97.5 °F (36.4 °C) 60 18 108/62 98 %   04/02/21 1221 -- 65 -- -- --   04/02/21 1205 97.5 °F (36.4 °C) (!) 58 17 113/65 98 %       Intake/Output Summary (Last 24 hours) at 4/3/2021 0912  Last data filed at 4/3/2021 0646  Gross per 24 hour   Intake --   Output 850 ml   Net -850 ml        I had a face to face encounter and independently examined this patient on 4/3/2021, as outlined below:  PHYSICAL EXAM:  General: WD, WN. Alert, cooperative, no acute distress    EENT:  EOMI. Anicteric sclerae. MMM  Resp:  CTA bilaterally, no wheezing or rales. No accessory muscle use  CV:  Regular  rhythm,  No edema  GI:  Soft, Non distended, Non tender. +Bowel sounds  Neurologic:  Alert and oriented X 3, normal speech,   Psych:   Poor insight. Not anxious nor agitated  Skin:  No rashes. No jaundice    Reviewed most current lab test results and cultures  YES  Reviewed most current radiology test results   YES  Review and summation of old records today    NO  Reviewed patient's current orders and MAR    YES  PMH/ reviewed - no change compared to H&P  ________________________________________________________________________  Care Plan discussed with:    Comments   Patient x    Family      RN x    Care Manager     Consultant                       x Multidiciplinary team rounds were held today with , nursing, pharmacist and clinical coordinator. Patient's plan of care was discussed; medications were reviewed and discharge planning was addressed.      ________________________________________________________________________  Total NON critical care TIME: 25 Minutes    Total CRITICAL CARE TIME Spent:   Minutes non procedure based      Comments   >50% of visit spent in counseling and coordination of care ________________________________________________________________________  Karoline Smith MD     Procedures: see electronic medical records for all procedures/Xrays and details which were not copied into this note but were reviewed prior to creation of Plan. LABS:  I reviewed today's most current labs and imaging studies.   Pertinent labs include:  Recent Labs     04/03/21  0357 04/02/21  0040 04/01/21  0245   WBC 4.9 7.4 6.0   HGB 11.4* 11.1* 11.9   HCT 34.6* 32.9* 36.4    259 303     Recent Labs     04/03/21  0357 04/02/21  0040 04/01/21  1128 04/01/21  0245 03/31/21  1352 03/31/21  1303    137  --  141  --  137   K 4.2 4.7  --  4.1  --  4.9   * 111*  --  112*  --  108   CO2 25 21  --  22  --  23   * 131*  --  137*  --  94   BUN 20 24*  --  16  --  16   CREA 0.72 0.74  --  0.68  --  0.73   CA 9.2 8.9  --  8.9  --  9.6   MG  --   --  1.9  --   --   --    ALB  --   --   --   --   --  3.9   TBILI  --   --   --   --   --  0.5   ALT  --   --   --   --   --  29   INR  --   --   --   --  1.1  --        Signed: Karoline Smith MD

## 2021-04-03 NOTE — PROGRESS NOTES
Consult  REFERRED BY:  None    CHIEF COMPLAINT: Generalized weakness and right-sided weakness      Subjective: Kera Hammer is a 50 y.o. right-handed  female we are asked to evaluate by the hospitalist for evaluation of new problem of generalized weakness and right-sided weakness that has been slowly getting worse for the last month and the patient was admitted for hospitalization and for IV steroids. Her MRI scan of the cervical and thoracic spine look stable with no new lesions, but clear multiple MS type old lesions. Her MRI of the brain showed no enhancing lesions, multiple old MS lesions from what progressive since her last scan, and showing several areas of diffusion positive images probably suggesting subacute disease flareups. Patient has undergone benzodiazepine withdrawal and Neurontin reduction and has undergone withdrawal from all of that in addition. She denies any new fever, meningismus, or any other new neurologic problem. She has had no unusual headache or trauma. She is followed by Dr. Demi Cordon her neurologist.  She is on Tecfidera and takes her medications regularly. She says she is not had a major exacerbation in some time. She is a little stronger after her second dose of IV Solu-Medrol, she is ambulating better, and with assistive devices she could probably go home and do home health and that is what she would like to do we discussed this both with the patient and her  in detail  Patient has finished IV steroids, feels very good today, wants to go home, she will be discharged today to follow-up with Dr. Demi Cordon in the office to discuss whether or not she should change her disease modifying drugs  Discussed with the patient and her  in detail. Patient will consider inpatient rehab now, and she is finishing off 3 days of IV Solu-Medrol, and I talked to case management, and they may try some more long-term care facility for her.   Neurologically she is stable and can follow-up with Dr. Isabel Nino at discharge. We will follow as needed. Past Medical History:   Diagnosis Date    Body aches 12/11/2014    Chronic pain     Depression     Headache(784.0)     History of mammogram never before    MS [facility]) [facility] [address]     Neurological disorder     Multiple Sclerosis    Pap smear for cervical cancer screening 2008    Psychiatric disorder     anxiety    Psychotic disorder [facility])       Past Surgical History:   Procedure Laterality Date    COLONOSCOPY N/A 2/28/2018    COLONOSCOPY performed by [facility]ga Irene [facility] at [facility] ENDOSCOPY    HX CHOLECYSTECTOMY      HX GYN      3 c-sections    HX HEENT      bilateral ear tube surgery    HX HERNIA REPAIR      HX OTHER SURGICAL      R inguinal hernia repair     Family History   Problem Relation Age of Onset    Heart Attack Father         tripple bypass    Cancer Father         lung    COPD Father     Diabetes Mother         type 2    Heart Disease Mother         heart disease    Diabetes Paternal Grandmother     No Known Problems Sister     No Known Problems Brother     No Known Problems Child       Social History     Tobacco Use    Smoking status: Current Every Day Smoker     Packs/day: 0.50     Years: 17.00     Pack years: 8.50     Types: Cigarettes    Smokeless tobacco: Never Used    Tobacco comment: 1 pack every 3 days.    Substance Use Topics    Alcohol use: No         Current Facility-Administered Medications:     polyethylene glycol (MIRALAX) packet 17 g, 17 g, Oral, DAILY, Chandra Hess MD, 17 g at 04/02/21 1207    oxyCODONE IR (ROXICODONE) tablet 5 mg, 5 mg, Oral, Q4H PRN, Chandra Hess MD    gabapentin (NEURONTIN) capsule 600 mg, 600 mg, Oral, QID, Rah Magaña MD, 600 mg at 04/02/21 1712    DULoxetine (CYMBALTA) capsule 30 mg, 30 mg, Oral, DAILY, Rah Magaña MD, 30 mg at 04/02/21 0949    melatonin tablet 15 mg, 15 mg, Oral, QHS PRN, Nigel Chin MD, 15 mg at 04/01/21 9393   [facility] TO SUBSTITUTE PER PROTOCOL (Reordered from: teriflunomide (AUBAGIO) 14 mg tablet), , , Per Protocol, Luis Daniel Artis MD    sodium chloride (NS) flush 5-40 mL, 5-40 mL, IntraVENous, Q8H, Yadiel Cortes MD, 10 mL at 04/02/21 1400    sodium chloride (NS) flush 5-40 mL, 5-40 mL, IntraVENous, PRN, Luis Daniel Artis MD    acetaminophen (TYLENOL) tablet 650 mg, 650 mg, Oral, Q6H PRN, 650 mg at 04/02/21 1213 **OR** acetaminophen (TYLENOL) suppository 650 mg, 650 mg, Rectal, Q6H PRN, Luis Daniel Artis MD    polyethylene glycol (MIRALAX) packet 17 g, 17 g, Oral, DAILY PRN, Luis Daniel Artis MD, 17 g at 04/01/21 1424    promethazine (PHENERGAN) tablet 12.5 mg, 12.5 mg, Oral, Q6H PRN **OR** ondansetron (ZOFRAN) injection 4 mg, 4 mg, IntraVENous, Q6H PRN, Yadiel Cortes MD    enoxaparin (LOVENOX) injection 40 mg, 40 mg, SubCUTAneous, DAILY, Luis Daniel Artis MD, 40 mg at 04/02/21 0753    methylprednisoLONE (Solu-MEDROL) 1 gm in 100 ml NS MBP, 1,000 mg, IntraVENous, DAILY, Luis Daniel Artis MD, Last Rate: 100 mL/hr at 04/02/21 0954, 1,000 mg at 04/02/21 3985        Allergies   Allergen Reactions    Morphine Rash      MRI Results (most recent):  Results from [place] encounter on 03/31/21   MRI CERV SPINE W WO CONT    Narrative EXAM: MRI CERV SPINE W WO CONT    INDICATION: MS exacerbation. COMPARISON: 11/27/2020    TECHNIQUE: MR imaging of the cervical spine was performed using the following  sequences: sagittal T1, T2, STIR;  axial T2, T1 prior to and following contrast  administration. CONTRAST: 20 mL of Dotarem. FINDINGS:    There is normal alignment of the cervical spine. Vertebral body heights are  maintained. Marrow signal is normal.    The craniocervical junction is intact. Areas of increased signal are seen  scattered throughout the cervical cord. These are unchanged. There is no  pathologic intrathecal enhancement. The paraspinal soft tissues are within normal limits.     C2-C3: No herniation or stenosis. C3-C4: No herniation or stenosis. C4-C5: No herniation or stenosis. C5-C6: No herniation or stenosis. C6-C7: Mild left paracentral disc osteophyte complex causing mild narrowing left  lateral recess. Mild left neural foraminal narrowing. Unchanged. C7-T1: Mild left subarticular disc osteophyte complex causing narrowing of left  lateral recess. There is mild to moderate left neural foraminal narrowing. Unchanged. Impression 1. Unchanged appearance of the cervical cord consistent with multiple sclerosis. No evidence of active demyelination. 2. Unchanged mild spondylosis as above. Results from [place] encounter on 03/31/21   MRI CERV SPINE W WO CONT    Narrative EXAM: MRI CERV SPINE W WO CONT    INDICATION: MS exacerbation. COMPARISON: 11/27/2020    TECHNIQUE: MR imaging of the cervical spine was performed using the following  sequences: sagittal T1, T2, STIR;  axial T2, T1 prior to and following contrast  administration. CONTRAST: 20 mL of Dotarem. FINDINGS:    There is normal alignment of the cervical spine. Vertebral body heights are  maintained. Marrow signal is normal.    The craniocervical junction is intact. Areas of increased signal are seen  scattered throughout the cervical cord. These are unchanged. There is no  pathologic intrathecal enhancement. The paraspinal soft tissues are within normal limits. C2-C3: No herniation or stenosis. C3-C4: No herniation or stenosis. C4-C5: No herniation or stenosis. C5-C6: No herniation or stenosis. C6-C7: Mild left paracentral disc osteophyte complex causing mild narrowing left  lateral recess. Mild left neural foraminal narrowing. Unchanged. C7-T1: Mild left subarticular disc osteophyte complex causing narrowing of left  lateral recess. There is mild to moderate left neural foraminal narrowing. Unchanged. Impression 1.  Unchanged appearance of the cervical cord consistent with multiple sclerosis. No evidence of active demyelination. 2. Unchanged mild spondylosis as above. Review of Systems:  A comprehensive review of systems was negative except for: Constitutional: positive for fatigue and malaise  Musculoskeletal: positive for myalgias, arthralgias and muscle weakness  Neurological: positive for paresthesia, coordination problems, gait problems and weakness  Behvioral/Psych: positive for anxiety and depression   Vitals:    04/02/21 0856 04/02/21 1205 04/02/21 1221 04/02/21 1524   BP:  113/65  108/62   Pulse: 76 (!) 58 65 60   Resp:  17  18   Temp:  97.5 °F (36.4 °C)  97.5 °F (36.4 °C)   SpO2:  98%  98%   Weight:       Height:         Objective:     I      NEUROLOGICAL EXAM:    Appearance: The patient is well developed, well nourished, provides a coherent history and is in no acute distress. Mental Status: Oriented to time, place and person, and the president, cognitive function is normal and speech is fluent and no aphasia or dysarthria. Mood and affect appropriate but mildly depressed. Cranial Nerves:   Intact visual fields. Fundi are benign, disc are flat, with mild optic pallor. SHEFALI, EOM's full, no nystagmus, no ptosis. Facial sensation is normal. Corneal reflexes are not tested. Facial movement is weak on the right. Hearing is normal bilaterally. Palate is midline with normal sternocleidomastoid and trapezius muscles are normal. Tongue is midline. Neck without meningismus or bruits  Temporal arteries are not tender or enlarged  TMJ areas are not tender on palpation   Motor:   Patient has moderate spastic right hemiparesis, and mild spastic left hemiparesis from her MS.   Muscle tone is increased, right greater than left, muscle bulk is normal.  Rapid alternating movement is slow bilaterally right side greater than left   Reflexes:   Deep tendon reflexes 2+/4 on the left and 3+/4 on the right   No babinski or clonus present   Sensory:   Normal to touch, pinprick and vibration and temperature. DSS is intact   Gait:  Abnormal gait with patient needing a rolling walker to ambulate due to paraparetic gait bilaterally, right side greater than left. Tremor:   No tremor noted. Cerebellar: Moderately abnormal cerebellar signs present on Romberg and tandem testing and finger-nose-finger exam is unremarkable. Neurovascular:  Normal heart sounds and regular rhythm, peripheral pulses intact, and no carotid bruits. Assessment:       ICD-10-CM ICD-9-CM    1. Multiple sclerosis exacerbation ([address]  G35 340    2. Slurred speech  R47.81 784.59      Active Problems:    Multiple sclerosis ([address] (3/31/2021)      Weakness (4/1/2021)        Plan:     Patient with probable exacerbations of subacute nature of her MS, with multiple diffuse and positive imaging noted on MRI scan  Have patient continue her Tecfidera 240 mg twice a day. Patient desires home health, and we will set that up with the , she does not want inpatient rehab  She is stronger after her third dose of IV Solu-Medrol, she is ambulating better, and with assistive devices she can go home and do home health and that is what she would like to do we discussed this both with the patient and her  in detail  Patient has finished IV steroids, feels very good today,  Discussed with the patient and her  in detail. Patient will consider inpatient rehab now, and she is finishing off 3 days of IV Solu-Medrol, and I talked to case management, and they may try some more long-term care facility for her. Neurologically she is stable and can follow-up with Dr. Albania Del Toro at discharge. We will follow as needed.   Call if we can help      Signed By: Antwon Rico MD     April 2, 2021       CC: None  FAX: None

## 2021-04-03 NOTE — PROGRESS NOTES
End of Shift Note    Bedside shift change report given to José Luis Mace RN (oncoming nurse) by Ted Lanza RN (offgoing nurse). Report included the following information    Shift worked:  7p-7a   Shift summary and any significant changes:            Concerns for physician to address:     Zone phone for oncoming shift:   [phone]     Patient Information  Lisette Ross  50 y.o.  3/31/2021 12:09 PM by Reji Hargrove MD. Lisette Ross was admitted from home    Problem List  Patient Active Problem List    Diagnosis Date Noted    Weakness 04/01/2021    Multiple sclerosis ([address] 03/31/2021    Non compliance with medical treatment 02/07/2021    Recurrent falls 02/07/2021    Altered mental status 02/06/2021    Leukopenia 11/25/2020    UTI (urinary tract infection) 11/25/2020    Multiple sclerosis exacerbation ([address] 08/06/2020    Facial droop 03/23/2020    Exacerbation of multiple sclerosis ([address] 03/09/2020    Left-sided weakness 03/08/2020    Severe obesity ([address] 10/03/2018    Constipation 07/17/2015    Body aches 12/11/2014    Back pain 09/07/2012    Fibromyalgia 08/17/2012    MS (multiple sclerosis) ([address] 08/17/2012    Decreased hearing 01/31/2011    Acute pharyngitis 01/26/2011    Anxiety 08/25/2010    Blood pressure elevated 08/25/2010    Tobacco abuse 08/25/2010     Past Medical History:   Diagnosis Date    Body aches 12/11/2014    Chronic pain     Depression     Headache(784.0)     History of mammogram never before    MS (multiple sclerosis) ([address]     Neurological disorder     Multiple Sclerosis    Pap smear for cervical cancer screening 2008    Psychiatric disorder     anxiety    Psychotic disorder ([address]        Core Measures:  CVA: No  CHF: No  PNA: No    Activity:  Activity Level:  Up with Assistance  Number times ambulated in hallways past shift: 0  Number of times OOB to chair past shift:0    Cardiac:   Cardiac Monitoring: Yes    Cardiac Rhythm: Sinus bradycardia    Access: Current line(s): 20 LAC, 22 RH  Central Line? No  PICC LINE? No    Genitourinary:   Urinary status: Continent  Urinary Catheter? No    Respiratory:   O2 Device: Room air  Chronic home O2 use?: No  Incentive spirometer at bedside: No       GI:  Last Bowel Movement Date: (Patient unable to recall. She stated \"its been a while\")  Current diet:  DIET DYSPHAGIA ADVANCED SOFT (NDD3)  Passing flatus: No  Tolerating current diet: No       Pain Management:   Patient states pain is manageable on current regimen: Yes    Skin:  Samy Score: 14  Interventions: clean dry skin, turn and reposition    Patient Safety:  Fall Score:  Total Score: 4  Interventions: bed low, wheels locked, alarm on, call bell in reach, 3x siderails  High Fall Risk: Yes    DVT prophylaxis:  DVT prophylaxis Med- Lovenox   DVT prophylaxis SCD or CONG- No    Wounds: (If Applicable)  Wounds- reddened, but blanchable sacrum heels, excoriation under folds    Active Consults:  IP CONSULT TO NEUROLOGY    Length of Stay:  Expected LOS: 3d 0h  Actual LOS: 1  Discharge Plan:

## 2021-04-03 NOTE — PROGRESS NOTES
End of Shift Note    Bedside shift change report given to [facility] (oncoming nurse) by Lawrence Simpson (offgoing nurse). Report included the following information SBAR, Kardex, Procedure Summary, MAR, Accordion and Recent Results    Shift worked:  7A-7P     Shift summary and any significant changes:    None      Concerns for physician to address:  Patient curious about pain management clinics. Zone phone for oncoming shift:   3200       Activity:  Activity Level: Up with Assistance  Number times ambulated in hallways past shift: 0  Number of times OOB to chair past shift: 0    Cardiac:   Cardiac Monitoring: Yes      Cardiac Rhythm: Sinus bradycardia    Access:   Current line(s): PIV     Genitourinary:   Urinary status: voiding and external catheter    Respiratory:   O2 Device: Room air  Chronic home O2 use?: N/A  Incentive spirometer at bedside: NO     GI:  Last Bowel Movement Date: (Patient unable to recall. She stated \"its been a while\")  Current diet:  DIET DYSPHAGIA ADVANCED SOFT (NDD3)  Passing flatus: YES  Tolerating current diet: YES       Pain Management:   Patient states pain is manageable on current regimen: NO    Skin:  Saym Score: 14  Interventions: turn team, increase time out of bed and PT/OT consult    Patient Safety:  Fall Score:  Total Score: 4  Interventions: bed/chair alarm, gripper socks and pt to call before getting OOB  High Fall Risk: Yes    Length of Stay:  Expected LOS: 3d 0h  Actual LOS: [facility].

## 2021-04-03 NOTE — PROGRESS NOTES
CM attempted to complete assessment, currently receiving care from nursing. Pt asked for CM to call back later. Per chart review, pt admitted under observation status on 3/31/21 for Multiple Sclerosis, Weakness. PT/OT recommendations for acute inpatient rehab. Pt has [employer] Medicare & [facility] Medicaid - would require insurance authorization. Pt has been to [facility] in November-December 2020 and [facility] December 2020. CM will follow-up again to complete assessment.      Aleida Wolf. [place] [address] Manager  [phone]

## 2021-04-04 PROCEDURE — 74011250637 HC RX REV CODE- 250/637: Performed by: INTERNAL MEDICINE

## 2021-04-04 PROCEDURE — 96372 THER/PROPH/DIAG INJ SC/IM: CPT

## 2021-04-04 PROCEDURE — 99218 HC RM OBSERVATION: CPT

## 2021-04-04 PROCEDURE — 74011250636 HC RX REV CODE- 250/636: Performed by: INTERNAL MEDICINE

## 2021-04-04 PROCEDURE — 94760 N-INVAS EAR/PLS OXIMETRY 1: CPT

## 2021-04-04 PROCEDURE — 74011250637 HC RX REV CODE- 250/637: Performed by: PSYCHIATRY & NEUROLOGY

## 2021-04-04 RX ADMIN — Medication 10 ML: at 06:11

## 2021-04-04 RX ADMIN — Medication 10 ML: at 21:11

## 2021-04-04 RX ADMIN — GABAPENTIN 600 MG: 300 CAPSULE ORAL at 17:04

## 2021-04-04 RX ADMIN — GABAPENTIN 600 MG: 300 CAPSULE ORAL at 08:55

## 2021-04-04 RX ADMIN — GABAPENTIN 600 MG: 300 CAPSULE ORAL at 12:09

## 2021-04-04 RX ADMIN — DULOXETINE HYDROCHLORIDE 30 MG: 30 CAPSULE, DELAYED RELEASE ORAL at 08:55

## 2021-04-04 RX ADMIN — Medication 15 MG: at 20:41

## 2021-04-04 RX ADMIN — OXYCODONE 5 MG: 5 TABLET ORAL at 13:44

## 2021-04-04 RX ADMIN — ONDANSETRON 4 MG: 2 INJECTION INTRAMUSCULAR; INTRAVENOUS at 15:56

## 2021-04-04 RX ADMIN — GABAPENTIN 600 MG: 300 CAPSULE ORAL at 21:11

## 2021-04-04 RX ADMIN — ENOXAPARIN SODIUM 40 MG: 40 INJECTION SUBCUTANEOUS at 08:55

## 2021-04-04 RX ADMIN — OXYCODONE 5 MG: 5 TABLET ORAL at 08:55

## 2021-04-04 RX ADMIN — ONDANSETRON 4 MG: 2 INJECTION INTRAMUSCULAR; INTRAVENOUS at 20:37

## 2021-04-04 RX ADMIN — ACETAMINOPHEN 650 MG: 325 TABLET ORAL at 11:26

## 2021-04-04 RX ADMIN — OXYCODONE 5 MG: 5 TABLET ORAL at 20:34

## 2021-04-04 RX ADMIN — POLYETHYLENE GLYCOL 3350 17 G: 17 POWDER, FOR SOLUTION ORAL at 08:55

## 2021-04-04 RX ADMIN — Medication 10 ML: at 13:51

## 2021-04-04 NOTE — PROGRESS NOTES
Problem: Falls - Risk of  Goal: *Absence of Falls  Description: Document Lilia Shaikh Fall Risk and appropriate interventions in the flowsheet.   Outcome: Progressing Towards Goal  Note: Fall Risk Interventions:  Mobility Interventions: Bed/chair exit alarm, Communicate number of staff needed for ambulation/transfer, OT consult for ADLs, Patient to call before getting OOB, PT Consult for mobility concerns         Medication Interventions: Bed/chair exit alarm, Evaluate medications/consider consulting pharmacy, Patient to call before getting OOB, Teach patient to arise slowly    Elimination Interventions: Bed/chair exit alarm, Call light in reach, Patient to call for help with toileting needs, Toileting schedule/hourly rounds    History of Falls Interventions: Bed/chair exit alarm, Consult care management for discharge planning, Door open when patient unattended, Evaluate medications/consider consulting pharmacy, Vital signs minimum Q4HRs X 24 hrs (comment for end date)         Problem: Patient Education: Go to Patient Education Activity  Goal: Patient/Family Education  Outcome: Progressing Towards Goal     Problem: Patient Education: Go to Patient Education Activity  Goal: Patient/Family Education  Outcome: Progressing Towards Goal

## 2021-04-04 NOTE — PROGRESS NOTES
Transition of Care Plan:    RUR: N/A - Observation  Disposition: IPR ([facility]) vs. HH  Follow up appointments: PCP (needs new pt PCP appnt)  DME needed: None - has w/c, rollator, RW, tub bench  Transportation at Discharge: Fiance vs. [facility] Medicaid BLS  [place] or means to access home: Yes - fiance     IM Medicare letter: N/A - Observation  Caregiver Contact: Idalia Mucsarwat ([phone])  Discharge Caregiver contacted prior to discharge? Pt/CM to coordinate dc plan with stan        Reason for Admission:   Multiple Sclerosis, Weakness                  RUR Score: 22                 PCP: First and Last name:   None - Pt previously had been assigned to Dr. Angelique Clemente, but hadn't seen in 2-3 years. No PCP setup prior to discharge to [facility] last year. Pt reports she uses her Neurologist (Dr. Leonidas Guajardo) for all medical needs, but verbalized understanding for need for new PCP appointment. Request sent to [facility] specialist for assistance. Name of Practice:    Are you a current patient: Yes/No:    Approximate date of last visit:    Can you participate in a virtual visit if needed:     Do you (patient/family) have any concerns for transition/discharge? Pt wants to discuss recommendation for IPR with fihernando when he comes back up from [place] this afternoon. If pt/fiance decide against IPR, would like [facility] services to return home. Requested for CM to follow-up tomorrow AM with her decision. Plan for utilizing home health: PT/OT recommendations for IPR - awaiting pt's decision. Current Advanced Directive/Advance Care Plan:  Full Code - ACP scanned on file 11/30/20 naming mother as primary mPOA ([facility], [phone]) and fiance as secondary mPOA Idalia Ashby, [phone]). Declined to make any changes. Healthcare Decision Maker:  primary mPOA ([address] [facility], [phone]) and fiance as secondary mPOA Idalia Ashby, [phone]).   Click here to complete HealthCare Decision Makers including selection of the Healthcare Decision Maker Relationship (ie \"Primary\")            Primary Decision Maker: Suzi Nunez -  - [phone]    Secondary Decision Maker: Kim Yoder - [phone]    Transition of Care Plan:      - IPR vs. HH (awaiting pt's decision)  - COVID-19 test if pt is agreeable to placement  - BLS transport  - Follow-up Appointments  - 2nd IM Letter    49 YO White Female admitted under observation status on 3/31/21 for Multiple Sclerosis, Weakness. . Has 30 YO son that lives in [place]. Has 15 and 15 YO son's that live with  spouse in [place] [address]. Lives in a [place] house (2 [address]) with stan Roberto Quarles). Pt denied any concerns with her current living environment or fiance. Reports she is able to get around using a rollator or w/c typically. Receives assistance from [employer] for transportation. Had hospitalizations at [facility] last year and discharged to [facility] in [address] [place] [address] and [facility] in Dec 2020. Pt reported after discharging home she received some [facility] services but has since been discharged. Has [employer] Medicare and [facility] Medicaid. Preferred Rx is [facility] ([address]). Observation letters signed, Code 44 completed. CM met with pt to discuss PT/OT recommendations for IPR. Pt reported she would not like to go to [facility] (clarifed with pt this is sub-acute), but would go to [facility] again. However, pt requested to speak with her fiance this afternoon to determine if she will go with IPR vs. [facility] services. CM advised pt decision would be needed quickly, as insurance authorization would need to be submitted for placement. Pt requested for CM to follow-up tomorrow morning. CM will continue to remain available to assist with dc planning    Care Management Interventions  PCP Verified by CM:  Yes  Palliative Care Criteria Met (RRAT>21 & CHF Dx)?: No  Mode of Transport at Discharge: BLS  Transition of Care Consult (CM Consult): Discharge Planning  Discharge Durable Medical Equipment: No(W/C, rollator, RW, tub bench at home)  Health Maintenance Reviewed: Yes  Physical Therapy Consult: Yes  Occupational Therapy Consult: Yes  Speech Therapy Consult: Yes  Current Support Network:  Other(1-story house ([address]) with her stan Driscoll); mother lives in [place])  Confirm Follow Up Transport: Family(Fiance)  The Plan for Transition of Care is Related to the Following Treatment Goals : IPR vs.   The Patient and/or Patient Representative was Provided with a Choice of Provider and Agrees with the Discharge Plan?: Yes  Name of the Patient Representative Who was Provided with a Choice of Provider and Agrees with the Discharge Plan: Jovan Pitt (pt)  Discharge Location  Discharge Placement: Unable to determine at this time([facility] vs. [facility])    Angel Joyce 29 Manager  [phone]

## 2021-04-04 NOTE — PROGRESS NOTES
End of Shift Note    Bedside shift change report given to Gail Bowen (oncoming nurse) by Clement Gallegos (offgoing nurse). Report included the following information SBAR and Kardex    Shift worked:  day     Shift summary and any significant changes:    Uneventful shift     Concerns for physician to address: none   Zone phone for oncoming shift:       Activity:  Activity Level: Bed Rest  Number times ambulated in hallways past shift: 0  Number of times OOB to chair past shift: 0    Cardiac:   Cardiac Monitoring: Yes      Cardiac Rhythm: Normal sinus rhythm    Access:   Current line(s): PIV     Genitourinary:   Urinary status: voiding, incontinent and external catheter    Respiratory:   O2 Device: Room air  Chronic home O2 use?: NO  Incentive spirometer at bedside: NO     GI:  Last Bowel Movement Date: (Patient unable to recall. She stated \"its been a while\")  Current diet:  DIET DYSPHAGIA ADVANCED SOFT (NDD3)  Passing flatus: YES  Tolerating current diet: YES       Pain Management:   Patient states pain is manageable on current regimen: YES    Skin:  Samy Score: 15  Interventions: speciality bed, float heels, PT/OT consult and internal/external urinary devices    Patient Safety:  Fall Score:  Total Score: 4  Interventions: bed/chair alarm, assistive device (walker, cane, etc), gripper socks and pt to call before getting OOB  High Fall Risk: Yes    Length of Stay:  Expected LOS: 3d 0h  Actual LOS: 1      Clement Form

## 2021-04-04 NOTE — PROGRESS NOTES
End of Shift Note    Bedside shift change report given to Merari Moore RN (oncoming nurse) by Jh Mcdermott RN (offgoing nurse). Report included the following information     Shift worked:  7p-7a     Shift summary and any significant changes:    oxycodone and gabapentin for pain management     Concerns for physician to address:  Patient curious about pain management clinics. Zone phone for oncoming shift:   [phone]       Activity:  Activity Level: Bed Rest  Number times ambulated in hallways past shift: 0  Number of times OOB to chair past shift: 0    Cardiac:   Cardiac Monitoring: Yes      Cardiac Rhythm: Sinus bradycardia    Access:   Current line(s): PIV 22 RH    Genitourinary:   Urinary status: voiding and external catheter    Respiratory:   O2 Device: Room air  Incentive spirometer at bedside: NO     GI:  Last Bowel Movement Date: (Patient unable to recall. She stated \"its been a while\")  Current diet:  DIET DYSPHAGIA ADVANCED SOFT (NDD3)  Passing flatus: YES  Tolerating current diet: YES       Pain Management:   Patient states pain is manageable on current regimen: NO    Skin:  Samy Score: 14  Interventions: turn team, increase time out of bed and PT/OT consult    Patient Safety:  Fall Score:  Total Score: 4  Interventions: bed/chair alarm, gripper socks and pt to call before getting OOB  High Fall Risk: Yes    Length of Stay:  Expected LOS: 3d 0h  Actual LOS: 1

## 2021-04-04 NOTE — PROGRESS NOTES
Hospitalist Progress Note    NAME: Charlie Ying   :  1975   MRN:  [id number]       Assessment / Plan:    Acute exacerbation of relapsing remitting multiple sclerosis, POA  Acute weakness and inability to stand secondary to above, POA  --Patient had multiple ER visits in last 1 month for symptoms of generalized aches and pains with slurred speech  --[place] is wheelchair-bound at her baseline now  --Patient presented today with severe worsening of her bilateral lower extremity weakness, and mild worsening of bilateral upper arm weakness.  -s/p IV Solu-Medrol 1 g dailyx3      MRI of brain and cervical spine showed stability of lesions   -Continue Aubagio  -She was just discharged last month and MRI at that time showed stable demyelinating lesions  Finished her steroid course   Awaiting placement      Anxiety/depression, POA  Fibromyalgia  -Continue Cymbalta and gabapentin     Tobacco use, POA  --Patient did not want to discuss tobacco cessation per admitting doctor         Code Status: Full  Surrogate Decision Maker: Patient's son     DVT Prophylaxis: Lovenox subcu  GI Prophylaxis: not indicated   dispo: IP rehab   Baseline: Is wheelchair-bound now  30.0 - 39.9 Obese / Body mass index is 37.7 kg/m². Subjective:     Chief Complaint / Reason for Physician Visit  FU MS .no acute changes  . Feeling better Discussed with RN events overnight. Review of Systems:  Symptom Y/N Comments  Symptom Y/N Comments   Fever/Chills n   Chest Pain n    Poor Appetite    Edema     Cough    Abdominal Pain n    Sputum    Joint Pain     SOB/BOBO n   Pruritis/Rash     Nausea/vomit n   Tolerating PT/OT     Diarrhea    Tolerating Diet y    Constipation    Other       Could NOT obtain due to:      Objective:     VITALS:   Last 24hrs VS reviewed since prior progress note.  Most recent are:  Patient Vitals for the past 24 hrs:   Temp Pulse Resp BP SpO2   21 0416 98.1 °F (36.7 °C) (!) 51 16 (!) 88/63 96 %   21 2358 98.5 °F (36.9 °C) 72 16 106/62 --   04/03/21 1944 98.3 °F (36.8 °C) 69 17 118/70 --   04/03/21 1719 -- -- -- -- 96 %   04/03/21 1606 98.5 °F (36.9 °C) 67 16 112/60 97 %   04/03/21 1150 98.2 °F (36.8 °C) 64 16 114/64 97 %   04/03/21 0824 98 °F (36.7 °C) 63 16 (!) 126/58 98 %       Intake/Output Summary (Last 24 hours) at 4/4/2021 0742  Last data filed at 4/4/2021 0640  Gross per 24 hour   Intake --   Output 900 ml   Net -900 ml        I had a face to face encounter and independently examined this patient on 4/4/2021, as outlined below:  PHYSICAL EXAM:  General: WD, WN. Alert, cooperative, no acute distress    EENT:  EOMI. Anicteric sclerae. MMM  Resp:  CTA bilaterally, no wheezing or rales. No accessory muscle use  CV:  Regular  rhythm,  No edema  GI:  Soft, Non distended, Non tender. +Bowel sounds  Neurologic:  Alert and oriented X 3, normal speech,   Psych:   Poor insight. Not anxious nor agitated  Skin:  No rashes. No jaundice    Reviewed most current lab test results and cultures  YES  Reviewed most current radiology test results   YES  Review and summation of old records today    NO  Reviewed patient's current orders and MAR    YES  PMH/ reviewed - no change compared to H&P  ________________________________________________________________________  Care Plan discussed with:    Comments   Patient x    Family      RN x    Care Manager     Consultant                       x Multidiciplinary team rounds were held today with , nursing, pharmacist and clinical coordinator. Patient's plan of care was discussed; medications were reviewed and discharge planning was addressed.      ________________________________________________________________________  Total NON critical care TIME: 25 Minutes    Total CRITICAL CARE TIME Spent:   Minutes non procedure based      Comments   >50% of visit spent in counseling and coordination of care ________________________________________________________________________  Veronica Fields MD     Procedures: see electronic medical records for all procedures/Xrays and details which were not copied into this note but were reviewed prior to creation of Plan. LABS:  I reviewed today's most current labs and imaging studies.   Pertinent labs include:  Recent Labs     04/03/21 0357 04/02/21 0040   WBC 4.9 7.4   HGB 11.4* 11.1*   HCT 34.6* 32.9*    259     Recent Labs     04/03/21 0357 04/02/21 0040 04/01/21  1128    137  --    K 4.2 4.7  --    * 111*  --    CO2 25 21  --    * 131*  --    BUN 20 24*  --    CREA 0.72 0.74  --    CA 9.2 8.9  --    MG  --   --  1.9       Signed: Veronica Fields MD

## 2021-04-04 NOTE — PROGRESS NOTES
0700: Bedside shift change report given to Pastor Martin (oncoming nurse) by Isabel Gonzalez (offgoing nurse). Report included the following information SBAR and Kardex.

## 2021-04-05 ENCOUNTER — PATIENT OUTREACH (OUTPATIENT)
Dept: CASE MANAGEMENT | Age: 46
End: 2021-04-05

## 2021-04-05 LAB
APPEARANCE UR: ABNORMAL
BACTERIA URNS QL MICRO: ABNORMAL /HPF
BILIRUB UR QL: NEGATIVE
COLOR UR: ABNORMAL
EPITH CASTS URNS QL MICRO: ABNORMAL /LPF
GLUCOSE UR STRIP.AUTO-MCNC: NEGATIVE MG/DL
HGB UR QL STRIP: NEGATIVE
HYALINE CASTS URNS QL MICRO: ABNORMAL /LPF (ref 0–5)
KETONES UR QL STRIP.AUTO: NEGATIVE MG/DL
LEUKOCYTE ESTERASE UR QL STRIP.AUTO: NEGATIVE
NITRITE UR QL STRIP.AUTO: NEGATIVE
PH UR STRIP: 7 [PH] (ref 5–8)
PROT UR STRIP-MCNC: NEGATIVE MG/DL
RBC #/AREA URNS HPF: ABNORMAL /HPF (ref 0–5)
SARS-COV-2, COV2: NORMAL
SP GR UR REFRACTOMETRY: 1.03 (ref 1–1.03)
UA: UC IF INDICATED,UAUC: ABNORMAL
UROBILINOGEN UR QL STRIP.AUTO: 1 EU/DL (ref 0.2–1)
WBC URNS QL MICRO: ABNORMAL /HPF (ref 0–4)

## 2021-04-05 PROCEDURE — 96372 THER/PROPH/DIAG INJ SC/IM: CPT

## 2021-04-05 PROCEDURE — 74011250637 HC RX REV CODE- 250/637: Performed by: INTERNAL MEDICINE

## 2021-04-05 PROCEDURE — 97535 SELF CARE MNGMENT TRAINING: CPT | Performed by: OCCUPATIONAL THERAPIST

## 2021-04-05 PROCEDURE — 94760 N-INVAS EAR/PLS OXIMETRY 1: CPT

## 2021-04-05 PROCEDURE — 99218 HC RM OBSERVATION: CPT

## 2021-04-05 PROCEDURE — 74011250636 HC RX REV CODE- 250/636: Performed by: INTERNAL MEDICINE

## 2021-04-05 PROCEDURE — 74011250637 HC RX REV CODE- 250/637: Performed by: PSYCHIATRY & NEUROLOGY

## 2021-04-05 PROCEDURE — 65660000000 HC RM CCU STEPDOWN

## 2021-04-05 PROCEDURE — 97116 GAIT TRAINING THERAPY: CPT

## 2021-04-05 PROCEDURE — 81001 URINALYSIS AUTO W/SCOPE: CPT

## 2021-04-05 PROCEDURE — U0005 INFEC AGEN DETEC AMPLI PROBE: HCPCS

## 2021-04-05 RX ADMIN — ENOXAPARIN SODIUM 40 MG: 40 INJECTION SUBCUTANEOUS at 08:49

## 2021-04-05 RX ADMIN — GABAPENTIN 600 MG: 300 CAPSULE ORAL at 21:07

## 2021-04-05 RX ADMIN — GABAPENTIN 600 MG: 300 CAPSULE ORAL at 08:49

## 2021-04-05 RX ADMIN — Medication 10 ML: at 18:08

## 2021-04-05 RX ADMIN — OXYCODONE 5 MG: 5 TABLET ORAL at 08:49

## 2021-04-05 RX ADMIN — DULOXETINE HYDROCHLORIDE 30 MG: 30 CAPSULE, DELAYED RELEASE ORAL at 08:49

## 2021-04-05 RX ADMIN — Medication 10 ML: at 21:07

## 2021-04-05 RX ADMIN — ONDANSETRON 4 MG: 2 INJECTION INTRAMUSCULAR; INTRAVENOUS at 23:10

## 2021-04-05 RX ADMIN — OXYCODONE 5 MG: 5 TABLET ORAL at 18:07

## 2021-04-05 RX ADMIN — GABAPENTIN 600 MG: 300 CAPSULE ORAL at 18:07

## 2021-04-05 RX ADMIN — OXYCODONE 5 MG: 5 TABLET ORAL at 23:10

## 2021-04-05 RX ADMIN — ONDANSETRON 4 MG: 2 INJECTION INTRAMUSCULAR; INTRAVENOUS at 18:07

## 2021-04-05 RX ADMIN — Medication 15 MG: at 20:14

## 2021-04-05 RX ADMIN — Medication 10 ML: at 05:24

## 2021-04-05 RX ADMIN — ONDANSETRON 4 MG: 2 INJECTION INTRAMUSCULAR; INTRAVENOUS at 08:49

## 2021-04-05 RX ADMIN — POLYETHYLENE GLYCOL 3350 17 G: 17 POWDER, FOR SOLUTION ORAL at 08:50

## 2021-04-05 RX ADMIN — GABAPENTIN 600 MG: 300 CAPSULE ORAL at 13:52

## 2021-04-05 NOTE — PROGRESS NOTES
Transition of Care Plan:     RUR: 22% - \"moderate risk\"  Disposition: in-pt rehab (pending insurance auth) vs. Home with [facility], resumed private duty care, & f/u apts  Follow up appointments: PCP (needs new pt PCP apt) & speciality apts  DME needed: None - has w/c, rollator, RW, tub bench  [facility]  [place] or means to access home: Yes - fiance      Medicare letter: N/A - Pt is current in observation status. Observation notices (State/[place]) provided at bedside 4/4/21, copies on chart  Caregiver Contact: Pt's stan (Idalia Muck: [phone])  Discharge Caregiver contacted prior to discharge? Pt/CM to coordinate dc plan with stan     COVID-19 test per placement protocol: Pt had COVID-19 test completed per placement protocol 4/5/21; results pending    Update - 2:01 PM: CM noted update that pt was upgraded from observation to in-pt status. Initial note: CM reviewed pt's chart and discussed updates in IDR prior to moving forward with d/c planning. Attending MD reported that the pt is medically stable for d/c today pending placement. In IDR, pt requested for a referral to be sent to in-pt rehab facility [facility]) for review. CM urged pt to consider a back-up option for d/c (SNF vs HH) in the event her insurance declines to cover in-pt rehab intervention; pt verbalized understanding. FOC offered, copy placed on chart. CM will send referral via [employer] to [facility] for review. CM requested for MD to place an order for a COVID-19 test per placement protocol; MD to complete request.    CM to enter a delay in d/c to reflect the current barriers preventing pt from transitioning out of [facility]. CM will continue to follow & report updates once available.     Chao Bundy, MSW  Care Manager, [facility]

## 2021-04-05 NOTE — PROGRESS NOTES
End of Shift Note    Bedside shift change report given to Kirby German (oncoming nurse) by TAMERA Bush (offgoing nurse).   Report included the following information SBAR    Shift worked:  [id number]   Shift summary and any significant changes:     PRN Oxy given x1; PRN Melatonin given x1; PRN Zofran given x1       Concerns for physician to address:  None   Zone phone for oncoming shift:   [phone]     Patient Information  Rod Mullins  50 y.o.  3/31/2021 12:09 PM by Dianne Bowles MD. Rod Mullins was admitted from Home    Problem List  Patient Active Problem List    Diagnosis Date Noted    Weakness 04/01/2021    Multiple sclerosis ([address] 03/31/2021    Non compliance with medical treatment 02/07/2021    Recurrent falls 02/07/2021    Altered mental status 02/06/2021    Leukopenia 11/25/2020    UTI (urinary tract infection) 11/25/2020    Multiple sclerosis exacerbation ([address] 08/06/2020    Facial droop 03/23/2020    Exacerbation of multiple sclerosis ([address] 03/09/2020    Left-sided weakness 03/08/2020    Severe obesity ([address] 10/03/2018    Constipation 07/17/2015    Body aches 12/11/2014    Back pain 09/07/2012    Fibromyalgia 08/17/2012    MS (multiple sclerosis) ([address] 08/17/2012    Decreased hearing 01/31/2011    Acute pharyngitis 01/26/2011    Anxiety 08/25/2010    Blood pressure elevated 08/25/2010    Tobacco abuse 08/25/2010     Past Medical History:   Diagnosis Date    Body aches 12/11/2014    Chronic pain     Depression     Headache(784.0)     History of mammogram never before    MS (multiple sclerosis) ([address]     Neurological disorder     Multiple Sclerosis    Pap smear for cervical cancer screening 2008    Psychiatric disorder     anxiety    Psychotic disorder ([address]        Core Measures:  CVA: No No  CHF:No No  PNA:No No    Activity:  Activity Level: Bed Rest  Number times ambulated in hallways past shift: 0  Number of times OOB to chair past shift: 0    Cardiac:   Cardiac Monitoring: Yes      Cardiac Rhythm: Sinus bradycardia    Access:   Current line(s): PIV     Genitourinary:   Urinary status: external catheter    Respiratory:   O2 Device: Room air  Chronic home O2 use?: N/A  Incentive spirometer at bedside: N/A       GI:  Last Bowel Movement Date: (pt states unknown)  Current diet:  DIET DYSPHAGIA ADVANCED SOFT (NDD3)  Passing flatus: YES  Tolerating current diet: YES       Pain Management:   Patient states pain is manageable on current regimen: YES    Skin:  Samy Score: 16  Interventions: increase time out of bed, limit briefs and internal/external urinary devices    Patient Safety:  Fall Score:  Total Score: 4  Interventions: bed/chair alarm, gripper socks and pt to call before getting OOB  High Fall Risk: Yes    DVT prophylaxis:  DVT prophylaxis Med- Yes  DVT prophylaxis SCD or CONG- No     Wounds: (If Applicable)  Wounds- No  Location     Active Consults:  IP CONSULT TO NEUROLOGY    Length of Stay:  Expected LOS: 3d 0h  Actual LOS: 1  Discharge Plan: No; TAMERA Alexander

## 2021-04-05 NOTE — PROGRESS NOTES
Problem: Self Care Deficits Care Plan (Adult)  Goal: *Acute Goals and Plan of Care (Insert Text)  Description: FUNCTIONAL STATUS PRIOR TO ADMISSION: progression of  weakness over the past month, due to this pt has been unable to ambulate except to furntiure walk with assist into bathroom to toilet (wheelchair does not fit in bathroom), pts boyfriend has been assisting with all transfers/mobility, pt was needing assist with pulling her shirt over her head, able to pull depends and pants up over hips while her boyfriend assists to keep pt standing, stand pivot transfer to wheelchair with one assist without assist devices, prior to a month ago pt was ambulatory short distances and able to assist more in her care    HOME SUPPORT PRIOR TO ADMISSION: The patient lived with boyfriend whom has been assisting. Occupational Therapy Goals:  Initiated 4/2/2021  1. Patient will perform grooming seated with modified independence within 7 days. 2. Patient will perform toileting with moderate assistance  within 7 days. 3. Patient will perform upper body dressing with supervision/set-up within 7 days  4. Patient will perform lower body dressing with moderate assist within 7 days. 5. Patient will transfer from bedside commode with moderate assistance  using the least restrictive device and appropriate durable medical equipment within 7 days. Outcome: Progressing Towards Goal   OCCUPATIONAL THERAPY TREATMENT  Patient: Zuri Jimenez (89 y.o. female)  Date: 4/5/2021  Diagnosis: Multiple sclerosis ([facility] [G35]  Weakness [R53.1]  MS (multiple sclerosis) ([facility] [address] [facility] Willene Riedel <principal problem not specified>       Precautions: Fall  Chart, occupational therapy assessment, plan of care, and goals were reviewed. ASSESSMENT  Patient continues with skilled OT services and is progressing towards goals. Pt presented with improved activity tolerance, standing balance and mobility.   Pt had improved ability to achieve sit to stand and CGA overall needed. Pt was able to mobilize short distance with RW and CGA. Over time during standing ADL pt needed min assist for balance. Continue to recommend rehab at discharge. Current Level of Function Impacting Discharge (ADLs): CGA x2 for sit to stand and short distance mobility with RW, CGA to min assist for balance standing to wash fab areas, min assist changing depends              PLAN :  Patient continues to benefit from skilled intervention to address the above impairments. Continue treatment per established plan of care to address goals. Recommend with staff: transfer to [facility] for toileting    Recommend next OT session: standing tolerance, sit to stand, ADLS    Recommendation for discharge: (in order for the patient to meet his/her long term goals)  Therapy 3 hours per day 5-7 days per week    This discharge recommendation:  Has been made in collaboration with the attending provider and/or case management    IF patient discharges home will need the following DME: has all needed equipment       SUBJECTIVE:   Patient stated I feel a little stronger today.     OBJECTIVE DATA SUMMARY:   Cognitive/Behavioral Status:  Neurologic State: Alert  Orientation Level: Oriented X4  Cognition: Follows commands  Perception: Appears intact  Perseveration: No perseveration noted  Safety/Judgement: Fall prevention;Home safety; Insight into deficits    Functional Mobility and Transfers for ADLs:  Bed Mobility:  Rolling: Stand-by assistance  Supine to Sit: Stand-by assistance  Scooting: Stand-by assistance    Transfers:  Sit to Stand: Contact guard assistance;Assist x2     Bed to Chair: Contact guard assistance;Assist x2    Balance:  Sitting: Impaired  Sitting - Static: Good (unsupported)  Sitting - Dynamic: Fair (occasional)  Standing: Impaired; With support(RW)  Standing - Static: Good;Constant support  Standing - Dynamic : Fair;Constant support    ADL Intervention:     Toileting  Bladder Hygiene: Contact guard assistance(standing)  Clothing Management: Minimum assistance    Cognitive Retraining  Safety/Judgement: Fall prevention;Home safety; Insight into deficits    Pain:  0/10    Activity Tolerance:   Fair and requires rest breaks    After treatment patient left in no apparent distress:   Sitting in chair, Call bell within reach, and Bed / chair alarm activated    COMMUNICATION/COLLABORATION:   The patients plan of care was discussed with: Physical therapist, Registered nurse, and patient .      YENIFER Moreno/L  Time Calculation: 16 mins

## 2021-04-05 NOTE — PROGRESS NOTES
Hospitalist Progress Note    NAME: Tosin Xiong   :  1975   MRN:  [id number]       Assessment / Plan:    Acute exacerbation of relapsing remitting multiple sclerosis, POA  Acute weakness and inability to stand secondary to above, POA  Patient had multiple ER visits in last 1 month for symptoms of generalized aches and pains with slurred speech  She is wheelchair-bound at her baseline now  Patient presented  with severe worsening of her bilateral lower extremity weakness, and mild worsening of bilateral upper arm weakness. s/p IV Solu-Medrol 1 g dailyx3   MRI of brain and cervical spine showed stability of lesions   Continue Aubagio  She was just discharged last month and MRI at that time showed stable demyelinating lesions  Finished her steroid course   Awaiting placement , CM working on it   Anxiety/depression, POA  Fibromyalgia  Continue Cymbalta and gabapentin  Tobacco use, POA  Patient did not want to discuss tobacco cessation per admitting doctor   Code Status: Full  Surrogate Decision Maker: Patient's son  DVT Prophylaxis: Lovenox subcu  GI Prophylaxis: not indicated   dispo: IP rehab   Baseline: Is wheelchair-bound now  30.0 - 39.9 Obese / Body mass index is 37.7 kg/m². Subjective:     Chief Complaint / Reason for Physician Visit  I am still having body pain every where , the narcotics helped me a lot    Discussed with RN events overnight. Review of Systems:  Symptom Y/N Comments  Symptom Y/N Comments   Fever/Chills n   Chest Pain n    Poor Appetite    Edema     Cough    Abdominal Pain n    Sputum    Joint Pain     SOB/BOBO n   Pruritis/Rash     Nausea/vomit n   Tolerating PT/OT     Diarrhea    Tolerating Diet y    Constipation    Other       Could NOT obtain due to:      Objective:     VITALS:   Last 24hrs VS reviewed since prior progress note.  Most recent are:  Patient Vitals for the past 24 hrs:   Temp Pulse Resp BP SpO2   21 0329 97.3 °F (36.3 °C) (!) 53 20 93/61 98 % 04/04/21 2329 97.8 °F (36.6 °C) (!) 52 20 97/64 98 %   04/04/21 2032 -- (!) 56 -- -- --   04/04/21 1949 98.4 °F (36.9 °C) 62 18 124/69 97 %   04/04/21 1550 98.1 °F (36.7 °C) 78 18 116/87 96 %   04/04/21 1308 97.8 °F (36.6 °C) 61 16 (!) 108/59 98 %   04/04/21 0751 98.4 °F (36.9 °C) (!) 52 17 106/69 96 %       Intake/Output Summary (Last 24 hours) at 4/5/2021 0723  Last data filed at 4/5/2021 3079  Gross per 24 hour   Intake --   Output 750 ml   Net -750 ml        I had a face to face encounter and independently examined this patient on 4/5/2021, as outlined below:  PHYSICAL EXAM:  General: WD, WN. Alert, cooperative, no acute distress    EENT:  EOMI. Anicteric sclerae. MMM  Resp:  CTA bilaterally, no wheezing or rales. No accessory muscle use  CV:  Regular  rhythm,  No edema  GI:  Soft, Non distended, Non tender. +Bowel sounds  Neurologic:  Alert and oriented X 3, normal speech,   Psych:   Poor insight. Not anxious nor agitated  Skin:  No rashes. No jaundice    Reviewed most current lab test results and cultures  YES  Reviewed most current radiology test results   YES  Review and summation of old records today    NO  Reviewed patient's current orders and MAR    YES  PMH/ reviewed - no change compared to H&P  ________________________________________________________________________  Care Plan discussed with:    Comments   Patient x    Family      RN x    Care Manager     Consultant                       x Multidiciplinary team rounds were held today with , nursing, pharmacist and clinical coordinator. Patient's plan of care was discussed; medications were reviewed and discharge planning was addressed.      ________________________________________________________________________  Total NON critical care TIME: 25 Minutes    Total CRITICAL CARE TIME Spent:   Minutes non procedure based      Comments   >50% of visit spent in counseling and coordination of care ________________________________________________________________________  Kaur Segovia MD     Procedures: see electronic medical records for all procedures/Xrays and details which were not copied into this note but were reviewed prior to creation of Plan. LABS:  I reviewed today's most current labs and imaging studies. Pertinent labs include:  Recent Labs     04/03/21 0357   WBC 4.9   HGB 11.4*   HCT 34.6*        Recent Labs     04/03/21 0357      K 4.2   *   CO2 25   *   BUN 20   CREA 0.72   CA 9.2       Signed:  Kaur Segovia MD

## 2021-04-05 NOTE — PROGRESS NOTES
Bedside and Verbal shift change report given to ALEJANDRO Davis (oncoming nurse) by [address] [place] (offgoing nurse). Report given with SBAR, Kardex, Intake/Output, MAR and Recent Results.

## 2021-04-05 NOTE — PROGRESS NOTES
Problem: Falls - Risk of  Goal: *Absence of Falls  Description: Document Jimy Hidalgo Fall Risk and appropriate interventions in the flowsheet. Outcome: Progressing Towards Goal  Note: Fall Risk Interventions:  Mobility Interventions: Bed/chair exit alarm         Medication Interventions: Bed/chair exit alarm    Elimination Interventions: Bed/chair exit alarm    History of Falls Interventions: Bed/chair exit alarm         Problem: Pressure Injury - Risk of  Goal: *Prevention of pressure injury  Description: Document Samy Scale and appropriate interventions in the flowsheet.   Outcome: Progressing Towards Goal  Note: Pressure Injury Interventions:  Sensory Interventions: Assess changes in LOC, Assess need for specialty bed, Avoid rigorous massage over bony prominences, Check visual cues for pain, Discuss PT/OT consult with provider, Float heels, Keep linens dry and wrinkle-free, Maintain/enhance activity level, Minimize linen layers    Moisture Interventions: Absorbent underpads    Activity Interventions: PT/OT evaluation    Mobility Interventions: HOB 30 degrees or less    Nutrition Interventions: Document food/fluid/supplement intake    Friction and Shear Interventions: HOB 30 degrees or less                Problem: TIA/CVA Stroke: 0-24 hours  Goal: Off Pathway (Use only if patient is Off Pathway)  Outcome: Progressing Towards Goal  Goal: Activity/Safety  Outcome: Progressing Towards Goal  Goal: Consults, if ordered  Outcome: Progressing Towards Goal  Goal: Diagnostic Test/Procedures  Outcome: Progressing Towards Goal  Goal: Nutrition/Diet  Outcome: Progressing Towards Goal  Goal: Discharge Planning  Outcome: Progressing Towards Goal  Goal: Medications  Outcome: Progressing Towards Goal  Goal: Respiratory  Outcome: Progressing Towards Goal  Goal: Treatments/Interventions/Procedures  Outcome: Progressing Towards Goal  Goal: Minimize risk of bleeding post-thrombolytic infusion  Outcome: Progressing Towards Goal  Goal: Monitor for complications post-thrombolytic infusion  Outcome: Progressing Towards Goal  Goal: Psychosocial  Outcome: Progressing Towards Goal  Goal: *Hemodynamically stable  Outcome: Progressing Towards Goal  Goal: *Neurologically stable  Description: Absence of additional neurological deficits    Outcome: Progressing Towards Goal  Goal: *Verbalizes anxiety and depression are reduced or absent  Outcome: Progressing Towards Goal  Goal: *Absence of Signs of Aspiration on Current Diet  Outcome: Progressing Towards Goal  Goal: *Absence of deep venous thrombosis signs and symptoms(Stroke Metric)  Outcome: Progressing Towards Goal  Goal: *Ability to perform ADLs and demonstrates progressive mobility and function  Outcome: Progressing Towards Goal  Goal: *Stroke education started(Stroke Metric)  Outcome: Progressing Towards Goal  Goal: *Dysphagia screen performed(Stroke Metric)  Outcome: Progressing Towards Goal  Goal: *Rehab consulted(Stroke Metric)  Outcome: Progressing Towards Goal     Problem: TIA/CVA Stroke: Day 2 Until Discharge  Goal: Off Pathway (Use only if patient is Off Pathway)  Outcome: Progressing Towards Goal  Goal: Activity/Safety  Outcome: Progressing Towards Goal  Goal: Diagnostic Test/Procedures  Outcome: Progressing Towards Goal  Goal: Nutrition/Diet  Outcome: Progressing Towards Goal  Goal: Discharge Planning  Outcome: Progressing Towards Goal  Goal: Medications  Outcome: Progressing Towards Goal  Goal: Respiratory  Outcome: Progressing Towards Goal  Goal: Treatments/Interventions/Procedures  Outcome: Progressing Towards Goal  Goal: Psychosocial  Outcome: Progressing Towards Goal  Goal: *Verbalizes anxiety and depression are reduced or absent  Outcome: Progressing Towards Goal  Goal: *Absence of aspiration  Outcome: Progressing Towards Goal  Goal: *Absence of deep venous thrombosis signs and symptoms(Stroke Metric)  Outcome: Progressing Towards Goal  Goal: *Optimal pain control at patient's stated goal  Outcome: Progressing Towards Goal  Goal: *Tolerating diet  Outcome: Progressing Towards Goal  Goal: *Ability to perform ADLs and demonstrates progressive mobility and function  Outcome: Progressing Towards Goal  Goal: *Stroke education continued(Stroke Metric)  Outcome: Progressing Towards Goal

## 2021-04-05 NOTE — PROGRESS NOTES
Problem: Mobility Impaired (Adult and Pediatric)  Goal: *Acute Goals and Plan of Care (Insert Text)  Description: FUNCTIONAL STATUS PRIOR TO ADMISSION: The patient required increased assistance from her fiance for stand pivot transfers to and from her wheelchair over the past month. Prior to that she was able to walk short distances with RW. HOME SUPPORT PRIOR TO ADMISSION: The patient lived with fiance and required assistance for transfers and ADLs. Physical Therapy Goals  Initiated 4/2/2021  1. Patient will move from supine to sit and sit to supine  in bed with minimal assistance/contact guard assist within 7 day(s). 2.  Patient will transfer from bed to chair and chair to bed with minimal assistance/contact guard assist using the least restrictive device within 7 day(s). 3.  Patient will perform sit to stand with minimal assistance/contact guard assist within 7 day(s). 4.  Patient will ambulate with minimal assistance/contact guard assist for 5 feet with the least restrictive device within 7 day(s). Outcome: Progressing Towards Goal   PHYSICAL THERAPY TREATMENT  Patient: Kera Hammer (65 y.o. female)  Date: 4/5/2021  Diagnosis: Multiple sclerosis ([facility] [G35]  Weakness [R53.1] <principal problem not specified>       Precautions: Fall  Chart, physical therapy assessment, plan of care and goals were reviewed. ASSESSMENT  Patient continues with skilled PT services and is progressing towards goals, demonstrating significant improvement in activity tolerance, strength, balance, and overall functional mobility. Pt able to progress ambulation trial to a distance of 12ft w/ RW and CGAx2. Pt with slow, shuffled gait pattern and overall fair gait stability. Pt maintains LLE in position of ER, stating that this is baseline. Pt fatigued quickly during ambulation with decrease in gait stability noted.  Despite improvements in functional mobility demonstrated this date, pt remains at increased risk for falls and well below her baseline level. Continue to recommend additional rehab at discharge. Current Level of Function Impacting Discharge (mobility/balance): CGAx2 with use of RW    Other factors to consider for discharge: hx of MS,falls risk, caregiver burden         PLAN :  Patient continues to benefit from skilled intervention to address the above impairments. Continue treatment per established plan of care. to address goals. Recommendation for discharge: (in order for the patient to meet his/her long term goals)  Therapy 3 hours per day 5-7 days per week    This discharge recommendation:  Has been made in collaboration with the attending provider and/or case management    IF patient discharges home will need the following DME: to be determined (TBD)       SUBJECTIVE:   Patient stated i have three sons, one lives in [place].     OBJECTIVE DATA SUMMARY:   Critical Behavior:  Neurologic State: Alert  Orientation Level: Oriented X4  Cognition: Follows commands  Safety/Judgement: Awareness of environment, Fall prevention, Home safety, Insight into deficits  Functional Mobility Training:  Bed Mobility:  Rolling: Stand-by assistance  Supine to Sit: Stand-by assistance     Scooting: Stand-by assistance        Transfers:  Sit to Stand: Contact guard assistance;Assist x2  Stand to Sit: Contact guard assistance;Assist x2        Bed to Chair: Contact guard assistance;Assist x2                    Balance:  Sitting: Impaired  Sitting - Static: Good (unsupported)  Sitting - Dynamic: Fair (occasional)  Standing: Impaired; With support(RW)  Standing - Static: Good;Constant support  Standing - Dynamic : Fair;Constant support  Ambulation/Gait Training:  Distance (ft): 12 Feet (ft)  Assistive Device: Walker, rolling;Gait belt  Ambulation - Level of Assistance: Contact guard assistance;Assist x2        Gait Abnormalities: Decreased step clearance; Step to gait; Shuffling gait        Base of Support: Widened Speed/Heena: Pace decreased (<100 feet/min); Shuffled  Step Length: Left shortened;Right shortened                    Pain Rating:  Denied complaints of pain, reports receiving pain medication prior to therapy session    Activity Tolerance:   Good    After treatment patient left in no apparent distress:   Sitting in chair, Call bell within reach, and Bed / chair alarm activated    COMMUNICATION/COLLABORATION:   The patients plan of care was discussed with: Occupational therapist and Registered nurse.      Nicole Werner PT, DPT   Time Calculation: 14 mins

## 2021-04-05 NOTE — PROGRESS NOTES
4/5/21 Per chart review, patient is currently admitted to hospital and is waiting for d/c to rehab center or to home. ACM will follow up in week to determine CCM needs at that time.  DMB

## 2021-04-06 LAB
ALBUMIN SERPL-MCNC: 2.8 G/DL (ref 3.5–5)
ALBUMIN/GLOB SERPL: 1 {RATIO} (ref 1.1–2.2)
ALP SERPL-CCNC: 45 U/L (ref 45–117)
ALT SERPL-CCNC: 22 U/L (ref 12–78)
ANION GAP SERPL CALC-SCNC: 3 MMOL/L (ref 5–15)
AST SERPL-CCNC: 9 U/L (ref 15–37)
BASOPHILS # BLD: 0 K/UL (ref 0–0.1)
BASOPHILS NFR BLD: 0 % (ref 0–1)
BILIRUB SERPL-MCNC: 0.1 MG/DL (ref 0.2–1)
BUN SERPL-MCNC: 23 MG/DL (ref 6–20)
BUN/CREAT SERPL: 23 (ref 12–20)
CALCIUM SERPL-MCNC: 8.4 MG/DL (ref 8.5–10.1)
CHLORIDE SERPL-SCNC: 109 MMOL/L (ref 97–108)
CO2 SERPL-SCNC: 26 MMOL/L (ref 21–32)
CREAT SERPL-MCNC: 1 MG/DL (ref 0.55–1.02)
DIFFERENTIAL METHOD BLD: ABNORMAL
EOSINOPHIL # BLD: 0.2 K/UL (ref 0–0.4)
EOSINOPHIL NFR BLD: 4 % (ref 0–7)
ERYTHROCYTE [DISTWIDTH] IN BLOOD BY AUTOMATED COUNT: 13.5 % (ref 11.5–14.5)
GLOBULIN SER CALC-MCNC: 2.9 G/DL (ref 2–4)
GLUCOSE SERPL-MCNC: 82 MG/DL (ref 65–100)
HCT VFR BLD AUTO: 40.5 % (ref 35–47)
HGB BLD-MCNC: 12.6 G/DL (ref 11.5–16)
IMM GRANULOCYTES # BLD AUTO: 0.1 K/UL (ref 0–0.04)
IMM GRANULOCYTES NFR BLD AUTO: 1 % (ref 0–0.5)
LYMPHOCYTES # BLD: 0.4 K/UL (ref 0.8–3.5)
LYMPHOCYTES NFR BLD: 8 % (ref 12–49)
MCH RBC QN AUTO: 31.5 PG (ref 26–34)
MCHC RBC AUTO-ENTMCNC: 31.1 G/DL (ref 30–36.5)
MCV RBC AUTO: 101.3 FL (ref 80–99)
MONOCYTES # BLD: 0.5 K/UL (ref 0–1)
MONOCYTES NFR BLD: 9 % (ref 5–13)
NEUTS SEG # BLD: 3.8 K/UL (ref 1.8–8)
NEUTS SEG NFR BLD: 78 % (ref 32–75)
NRBC # BLD: 0 K/UL (ref 0–0.01)
NRBC BLD-RTO: 0 PER 100 WBC
PLATELET # BLD AUTO: 172 K/UL (ref 150–400)
PMV BLD AUTO: 11.1 FL (ref 8.9–12.9)
POTASSIUM SERPL-SCNC: 3.7 MMOL/L (ref 3.5–5.1)
PROT SERPL-MCNC: 5.7 G/DL (ref 6.4–8.2)
RBC # BLD AUTO: 4 M/UL (ref 3.8–5.2)
RBC MORPH BLD: ABNORMAL
SARS-COV-2, XPLCVT: NOT DETECTED
SODIUM SERPL-SCNC: 138 MMOL/L (ref 136–145)
SOURCE, COVRS: NORMAL
WBC # BLD AUTO: 5 K/UL (ref 3.6–11)

## 2021-04-06 PROCEDURE — 97116 GAIT TRAINING THERAPY: CPT

## 2021-04-06 PROCEDURE — 92526 ORAL FUNCTION THERAPY: CPT

## 2021-04-06 PROCEDURE — 74011250637 HC RX REV CODE- 250/637: Performed by: PSYCHIATRY & NEUROLOGY

## 2021-04-06 PROCEDURE — 36415 COLL VENOUS BLD VENIPUNCTURE: CPT

## 2021-04-06 PROCEDURE — 97530 THERAPEUTIC ACTIVITIES: CPT

## 2021-04-06 PROCEDURE — 74011250637 HC RX REV CODE- 250/637: Performed by: INTERNAL MEDICINE

## 2021-04-06 PROCEDURE — 74011250636 HC RX REV CODE- 250/636: Performed by: INTERNAL MEDICINE

## 2021-04-06 PROCEDURE — 97535 SELF CARE MNGMENT TRAINING: CPT | Performed by: OCCUPATIONAL THERAPIST

## 2021-04-06 PROCEDURE — 74011250637 HC RX REV CODE- 250/637: Performed by: NURSE PRACTITIONER

## 2021-04-06 PROCEDURE — 92522 EVALUATE SPEECH PRODUCTION: CPT

## 2021-04-06 PROCEDURE — 85025 COMPLETE CBC W/AUTO DIFF WBC: CPT

## 2021-04-06 PROCEDURE — 65660000000 HC RM CCU STEPDOWN

## 2021-04-06 PROCEDURE — 80053 COMPREHEN METABOLIC PANEL: CPT

## 2021-04-06 RX ORDER — ZOLPIDEM TARTRATE 5 MG/1
5 TABLET ORAL ONCE
Status: COMPLETED | OUTPATIENT
Start: 2021-04-06 | End: 2021-04-06

## 2021-04-06 RX ORDER — ALPRAZOLAM 0.5 MG/1
1 TABLET ORAL ONCE
Status: COMPLETED | OUTPATIENT
Start: 2021-04-06 | End: 2021-04-06

## 2021-04-06 RX ADMIN — ONDANSETRON 4 MG: 2 INJECTION INTRAMUSCULAR; INTRAVENOUS at 09:35

## 2021-04-06 RX ADMIN — Medication 10 ML: at 21:23

## 2021-04-06 RX ADMIN — ALPRAZOLAM 1 MG: 0.5 TABLET ORAL at 02:48

## 2021-04-06 RX ADMIN — OXYCODONE 5 MG: 5 TABLET ORAL at 09:26

## 2021-04-06 RX ADMIN — DULOXETINE HYDROCHLORIDE 30 MG: 30 CAPSULE, DELAYED RELEASE ORAL at 09:07

## 2021-04-06 RX ADMIN — GABAPENTIN 600 MG: 300 CAPSULE ORAL at 09:07

## 2021-04-06 RX ADMIN — ZOLPIDEM TARTRATE 5 MG: 5 TABLET ORAL at 21:19

## 2021-04-06 RX ADMIN — OXYCODONE 5 MG: 5 TABLET ORAL at 14:13

## 2021-04-06 RX ADMIN — Medication 10 ML: at 09:35

## 2021-04-06 RX ADMIN — ONDANSETRON 4 MG: 2 INJECTION INTRAMUSCULAR; INTRAVENOUS at 14:13

## 2021-04-06 RX ADMIN — GABAPENTIN 600 MG: 300 CAPSULE ORAL at 21:19

## 2021-04-06 RX ADMIN — GABAPENTIN 600 MG: 300 CAPSULE ORAL at 18:20

## 2021-04-06 RX ADMIN — ONDANSETRON 4 MG: 2 INJECTION INTRAMUSCULAR; INTRAVENOUS at 19:57

## 2021-04-06 RX ADMIN — OXYCODONE 5 MG: 5 TABLET ORAL at 18:20

## 2021-04-06 RX ADMIN — Medication 10 ML: at 14:19

## 2021-04-06 RX ADMIN — Medication 10 ML: at 02:22

## 2021-04-06 RX ADMIN — GABAPENTIN 600 MG: 300 CAPSULE ORAL at 14:19

## 2021-04-06 RX ADMIN — ENOXAPARIN SODIUM 40 MG: 40 INJECTION SUBCUTANEOUS at 09:06

## 2021-04-06 RX ADMIN — POLYETHYLENE GLYCOL 3350 17 G: 17 POWDER, FOR SOLUTION ORAL at 09:06

## 2021-04-06 NOTE — PROGRESS NOTES
Patient rang out and stated that she thinks she's getting a yeast infection, that it \"burns when I pee\". Informed Purveyor NP. No new orders at this time.

## 2021-04-06 NOTE — PROGRESS NOTES
Problem: Mobility Impaired (Adult and Pediatric)  Goal: *Acute Goals and Plan of Care (Insert Text)  Description: FUNCTIONAL STATUS PRIOR TO ADMISSION: The patient required increased assistance from her fiance for stand pivot transfers to and from her wheelchair over the past month. Prior to that she was able to walk short distances with RW. HOME SUPPORT PRIOR TO ADMISSION: The patient lived with fiance and required assistance for transfers and ADLs. Physical Therapy Goals  Initiated 4/2/2021  1. Patient will move from supine to sit and sit to supine  in bed with minimal assistance/contact guard assist within 7 day(s). 2.  Patient will transfer from bed to chair and chair to bed with minimal assistance/contact guard assist using the least restrictive device within 7 day(s). 3.  Patient will perform sit to stand with minimal assistance/contact guard assist within 7 day(s). 4.  Patient will ambulate with minimal assistance/contact guard assist for 5 feet with the least restrictive device within 7 day(s). Outcome: Progressing Towards Goal   PHYSICAL THERAPY TREATMENT  Patient: Zuri Jimenez (71 y.o. female)  Date: 4/6/2021  Diagnosis: Multiple sclerosis ([facility] [G35]  Weakness [R53.1]  MS (multiple sclerosis) ([facility] Willene Riedel <principal problem not specified>       Precautions: Fall  Chart, physical therapy assessment, plan of care and goals were reviewed. ASSESSMENT  Patient continues with skilled PT services and is progressing towards goals. Pt improving slowly but still limited below her baseline for gait, functional mobility and activity tolerance. She requires SBA for her bed mobility and some extra time. She completes transfers with CGA now able to work on repeated sit <> stand x5 with last 3 reps with hands on knees only, requires extra time for hip and knee extension recruitment. Pt amb with CGA with RW to and from bathroom, step-to gait with RLE in ER.  She shows fair dynamic standing balance with intermittent increased posterior left sway while completing ADL activity at sink with intermittent UE support. Pt returned to chair, alarm on, call bell in reach. Pt continues to be good candidate for intensive inpt rehab given her MS, weakness, decreased balance, increased fall risk and level of function below her baseline. Current Level of Function Impacting Discharge (mobility/balance): CGA with use of RW, decreased dynamic balance, very short distances only    Other factors to consider for discharge: MS, fall risk with decreased strength/balance         PLAN :  Patient continues to benefit from skilled intervention to address the above impairments. Continue treatment per established plan of care. to address goals. Recommendation for discharge: (in order for the patient to meet his/her long term goals)  Therapy 3 hours per day 5-7 days per week    This discharge recommendation:  Has been made in collaboration with the attending provider and/or case management    IF patient discharges home will need the following DME: rolling walker       SUBJECTIVE:   Patient stated I want to brush my teeth.     OBJECTIVE DATA SUMMARY:   Critical Behavior:  Neurologic State: Alert  Orientation Level: Oriented X4  Cognition: Appropriate decision making  Safety/Judgement: Fall prevention, Home safety, Insight into deficits  Functional Mobility Training:  Bed Mobility:  Rolling: Stand-by assistance  Supine to Sit: Stand-by assistance  Sit to Supine: Stand-by assistance  Scooting: Stand-by assistance        Transfers:  Sit to Stand: Contact guard assistance; Additional time; Other (comment)(able to come to stand with hands on knees)  Stand to Sit: Contact guard assistance; Additional time        Bed to Chair: Contact guard assistance; Other (comment)(RW)                    Balance:  Sitting: Impaired  Sitting - Static: Good (unsupported)  Sitting - Dynamic: Good (unsupported)  Standing: Impaired  Standing - Static: Good;Constant support; Other (comment)(with RW)  Standing - Dynamic : Fair(intermittent UE support/BR - posterior L sway at times)  Ambulation/Gait Training:  Distance (ft): 25 Feet (ft)  Assistive Device: Gait belt;Walker, rolling  Ambulation - Level of Assistance: Contact guard assistance        Gait Abnormalities: Decreased step clearance; Step to gait(RLE in ER - baseline)        Base of Support: Widened     Speed/Heena: Slow;Pace decreased (<100 feet/min)  Step Length: Right shortened;Left shortened             Pain Rating:  No c/o    Activity Tolerance:   Fair    After treatment patient left in no apparent distress:   Sitting in chair, Call bell within reach, and Bed / chair alarm activated    COMMUNICATION/COLLABORATION:   The patients plan of care was discussed with: Occupational therapist and Registered nurse.      Lashawn Schofield, PT   Time Calculation: 26 mins

## 2021-04-06 NOTE — PROGRESS NOTES
End of Shift Note     Bedside shift change report given to  Roxanne Mendiola R.N (oncoming nurse) by Jackelyn Crystal R.N (offgoing nurse).   Report included the following information SBAR     Shift worked:  7am-7pm   Shift summary and any significant changes:      none         Concerns for physician to address:  none    Zone phone for oncoming shift:   [phone]      Patient Information  Tony Cabral  50 y.o.  3/31/2021 12:09 PM by Jessica Hernandez MD. Tony Cabral was admitted from Home     Problem List       Patient Active Problem List     Diagnosis Date Noted    Weakness 04/01/2021    Multiple sclerosis ([address] 03/31/2021    Non compliance with medical treatment 02/07/2021    Recurrent falls 02/07/2021    Altered mental status 02/06/2021    Leukopenia 11/25/2020    UTI (urinary tract infection) 11/25/2020    Multiple sclerosis exacerbation ([address] 08/06/2020    Facial droop 03/23/2020    Exacerbation of multiple sclerosis ([address] 03/09/2020    Left-sided weakness 03/08/2020    Severe obesity ([address] 10/03/2018    Constipation 07/17/2015    Body aches 12/11/2014    Back pain 09/07/2012    Fibromyalgia 08/17/2012    MS (multiple sclerosis) ([address] 08/17/2012    Decreased hearing 01/31/2011    Acute pharyngitis 01/26/2011    Anxiety 08/25/2010    Blood pressure elevated 08/25/2010    Tobacco abuse 08/25/2010           Past Medical History:   Diagnosis Date    Body aches 12/11/2014    Chronic pain      Depression      Headache(784.0)      History of mammogram never before    MS (multiple sclerosis) ([address]      Neurological disorder       Multiple Sclerosis    Pap smear for cervical cancer screening 2008    Psychiatric disorder       anxiety    Psychotic disorder ([address]           Core Measures:  CVA: No No  CHF:No No  PNA:No No     Activity:  Activity Level: Bed Rest  Number times ambulated in hallways past shift: 0  Number of times OOB to chair past shift: 0     Cardiac:   Cardiac Monitoring: Yes      Cardiac Rhythm: Normal sinus rhythm     Access:   Current line(s): PIV      Genitourinary:   Urinary status: external catheter     Respiratory:   O2 Device: Room air  Chronic home O2 use?: N/A  Incentive spirometer at bedside: N/A     GI:  Last Bowel Movement Date: (patient states unknown)  Current diet:  DIET DYSPHAGIA ADVANCED SOFT (NDD3)  Passing flatus: YES  Tolerating current diet: YES     Pain Management:   Patient states pain is manageable on current regimen: YES     Skin:  Samy Score: 17  Interventions: increase time out of bed and limit briefs    Patient Safety:  Fall Score: Total Score: 4  Interventions: bed/chair alarm and gripper socks  High Fall Risk: Yes     DVT prophylaxis:  DVT prophylaxis Med- Yes  DVT prophylaxis SCD or CONG- No      Wounds: (If Applicable)  Wounds- No  Location      Active Consults:  IP CONSULT TO NEUROLOGY     Length of Stay:  Expected LOS: 3d 0h  Actual LOS: 2  Discharge Plan: Yes; SNF      July MARTINI

## 2021-04-06 NOTE — PROGRESS NOTES
Received message from patient's nurse Emmett Kendall stating :    Patient thinks she might be getting a yeast infection. She stated that it burns when she urinates. Can you start her on something please? Discussion / orders:    Ordered UA with reflex culture           Please note that this note was dictated using Dragon computer voice recognition software. Quite often unanticipated grammatical, syntax, homophones, and other interpretive errors are inadvertently transcribed by the computer software. Please disregard these errors. Please excuse any errors that have escaped final proofreading.

## 2021-04-06 NOTE — PROGRESS NOTES
Problem: Motor Speech Impaired (Adult)  Goal: *Acute Goals and Plan of Care (Insert Text)  Description: 4/6/2021  Speech path  1. Patient will produce sentences with 90% intelligibility  2. Patient will produce conversational speech with 90% intelligibility. 3. Patient will state 2 motor speech strategies with 100% acc. 4/6/2021 1010 by Helio Hernandez SLP  Outcome: Not Met     Problem: Dysphagia (Adult)  Goal: *Acute Goals and Plan of Care (Insert Text)  Description: 3/31/2021  Speech path goals  1. Patient will tolerate purees/no liquids with no overt s/s of aspiration. MET 4/1. Upgrade to dysphagia 2/thin liquids. Upgrade to dys 3.  2. Patient will participate with reeval of swallow to upgrade diet. Goal met 4/2. Outcome: Resolved/Met   SPEECH LANGUAGE PATHOLOGY EVALUATION  Patient: Mary Glover (15 y.o. female)  Date: 4/6/2021  Primary Diagnosis: Multiple sclerosis ([address] [G35]  Weakness [R53.1]  MS (multiple sclerosis) ([facility] [G35]        Precautions:   Fall    ASSESSMENT :  Based on the objective data described below, the patient presents with mild to mod dysarthria and functional swallow of all textures. She ate regular textures and drank water well. Her dysarthria is characterized by imprecise artic and blended word boundaries. Mildly reduced speech intelligibility. Speech is improved with slower rate. Very pleasant and motivated patient. Patient will benefit from skilled intervention to address the above impairments. Patients rehabilitation potential is considered to be Good     PLAN :  Recommendations and Planned Interventions:  Advance to regular diet. Motor speech therapy to improve speech intelligibility. Frequency/Duration: Patient will be followed by speech-language pathology 3 times a week to address goals. Discharge Recommendations: None     SUBJECTIVE:   Patient stated her first MS exacerbation occurred due to her divorce.    OBJECTIVE:     Past Medical History: Diagnosis Date    Body aches 12/11/2014    Chronic pain     Depression     Headache(784.0)     History of mammogram never before    MS (multiple sclerosis) ([facility]     Neurological disorder     Multiple Sclerosis    Pap smear for cervical cancer screening 2008    Psychiatric disorder     anxiety    Psychotic disorder [facility])      Past Surgical History:   Procedure Laterality Date    COLONOSCOPY N/A 2/28/2018    COLONOSCOPY performed by Mat Lyles MD at [facility] ENDOSCOPY    HX CHOLECYSTECTOMY      HX GYN      3 c-sections    HX HEENT      bilateral ear tube surgery    HX HERNIA REPAIR      HX OTHER SURGICAL      R inguinal hernia repair     Prior Level of Function/Home Situation:   Home Situation  Home Environment: Private residence  # Steps to Enter: 2  Rails to Enter: No  One/Two Story Residence: One story  Living Alone: No  Support Systems: Spouse/Significant Other/Partner  Current DME Used/Available at Home: Wheelchair, Walker, rolling, Walker, rollator, Commode, bedside, Grab bars, Tub transfer bench  Tub or Shower Type: Tub/Shower combination  Mental Status:  Neurologic State: Alert  Orientation Level: Oriented X4  Cognition: Appropriate decision making  Perception: Appears intact  Perseveration: No perseveration noted  Safety/Judgement: Fall prevention, Home safety, Insight into deficits  Motor Speech:  Oral-Motor Structure/Motor Speech  Intelligibility: Impaired  Conversation Intelligibility (%): 80 %  Overall Impairment Severity: Mild-moderate               NOMS: motor speech 5   SWALLOW  Patient ate peanut butter and crackers with no deficits. She drank water via straw well. No overt s/s of aspiration. Will advance to reg/thins   Pain:  Pain Scale 1: Numeric (0 - 10)  Pain Intensity 1: 9  Pain Location 1: Generalized    After treatment:   Patient left in no apparent distress in bed    COMMUNICATION/EDUCATION:   Patient was educated regarding her deficit(s) of dysarthria.    The patient's plan of care including recommendations, planned interventions, and recommended diet changes were discussed with: Registered nurse. Patient/family have participated as able in goal setting and plan of care.     Thank you for this referral.  Nawaf Raymond, DENEEN  Time Calculation: 20 mins

## 2021-04-06 NOTE — PROGRESS NOTES
Problem: Falls - Risk of  Goal: *Absence of Falls  Description: Document Dhruv [place] Fall Risk and appropriate interventions in the flowsheet.   Note: Fall Risk Interventions:  Mobility Interventions: Bed/chair exit alarm, Patient to call before getting OOB         Medication Interventions: Bed/chair exit alarm, Patient to call before getting OOB, Teach patient to arise slowly    Elimination Interventions: Bed/chair exit alarm, Call light in reach, Toileting schedule/hourly rounds    History of Falls Interventions: Bed/chair exit alarm

## 2021-04-06 NOTE — PROGRESS NOTES
Problem: Falls - Risk of  Goal: *Absence of Falls  Description: Document Hany Kevin Fall Risk and appropriate interventions in the flowsheet. Outcome: Progressing Towards Goal  Note: Fall Risk Interventions:  Mobility Interventions: Bed/chair exit alarm         Medication Interventions: Bed/chair exit alarm    Elimination Interventions: Bed/chair exit alarm    History of Falls Interventions: Bed/chair exit alarm         Problem: Pressure Injury - Risk of  Goal: *Prevention of pressure injury  Description: Document Samy Scale and appropriate interventions in the flowsheet.   Outcome: Progressing Towards Goal  Note: Pressure Injury Interventions:  Sensory Interventions: Assess changes in LOC, Keep linens dry and wrinkle-free    Moisture Interventions: Absorbent underpads, Limit adult briefs    Activity Interventions: Increase time out of bed, PT/OT evaluation    Mobility Interventions: HOB 30 degrees or less    Nutrition Interventions: Discuss nutritional consult with provider    Friction and Shear Interventions: HOB 30 degrees or less, Minimize layers                Problem: TIA/CVA Stroke: 0-24 hours  Goal: Off Pathway (Use only if patient is Off Pathway)  Outcome: Progressing Towards Goal  Goal: Activity/Safety  Outcome: Progressing Towards Goal  Goal: Consults, if ordered  Outcome: Progressing Towards Goal  Goal: Diagnostic Test/Procedures  Outcome: Progressing Towards Goal  Goal: Nutrition/Diet  Outcome: Progressing Towards Goal  Goal: Discharge Planning  Outcome: Progressing Towards Goal  Goal: Medications  Outcome: Progressing Towards Goal  Goal: Respiratory  Outcome: Progressing Towards Goal  Goal: Treatments/Interventions/Procedures  Outcome: Progressing Towards Goal  Goal: Minimize risk of bleeding post-thrombolytic infusion  Outcome: Progressing Towards Goal  Goal: Monitor for complications post-thrombolytic infusion  Outcome: Progressing Towards Goal  Goal: Psychosocial  Outcome: Progressing Towards Goal  Goal: *Hemodynamically stable  Outcome: Progressing Towards Goal  Goal: *Neurologically stable  Description: Absence of additional neurological deficits    Outcome: Progressing Towards Goal  Goal: *Verbalizes anxiety and depression are reduced or absent  Outcome: Progressing Towards Goal  Goal: *Absence of Signs of Aspiration on Current Diet  Outcome: Progressing Towards Goal  Goal: *Absence of deep venous thrombosis signs and symptoms(Stroke Metric)  Outcome: Progressing Towards Goal  Goal: *Ability to perform ADLs and demonstrates progressive mobility and function  Outcome: Progressing Towards Goal  Goal: *Stroke education started(Stroke Metric)  Outcome: Progressing Towards Goal  Goal: *Dysphagia screen performed(Stroke Metric)  Outcome: Progressing Towards Goal  Goal: *Rehab consulted(Stroke Metric)  Outcome: Progressing Towards Goal     Problem: TIA/CVA Stroke: Day 2 Until Discharge  Goal: Off Pathway (Use only if patient is Off Pathway)  Outcome: Progressing Towards Goal  Goal: Activity/Safety  Outcome: Progressing Towards Goal  Goal: Diagnostic Test/Procedures  Outcome: Progressing Towards Goal  Goal: Nutrition/Diet  Outcome: Progressing Towards Goal  Goal: Discharge Planning  Outcome: Progressing Towards Goal  Goal: Medications  Outcome: Progressing Towards Goal  Goal: Respiratory  Outcome: Progressing Towards Goal  Goal: Treatments/Interventions/Procedures  Outcome: Progressing Towards Goal  Goal: Psychosocial  Outcome: Progressing Towards Goal  Goal: *Verbalizes anxiety and depression are reduced or absent  Outcome: Progressing Towards Goal  Goal: *Absence of aspiration  Outcome: Progressing Towards Goal  Goal: *Absence of deep venous thrombosis signs and symptoms(Stroke Metric)  Outcome: Progressing Towards Goal  Goal: *Optimal pain control at patient's stated goal  Outcome: Progressing Towards Goal  Goal: *Tolerating diet  Outcome: Progressing Towards Goal  Goal: *Ability to perform ADLs and demonstrates progressive mobility and function  Outcome: Progressing Towards Goal  Goal: *Stroke education continued(Stroke Metric)  Outcome: Progressing Towards Goal

## 2021-04-06 NOTE — PROGRESS NOTES
End of Shift Note    Bedside shift change report given to Ansley David (oncoming nurse) by TAMERA Falcon (offgoing nurse).   Report included the following information SBAR    Shift worked:  4947-0153   Shift summary and any significant changes:     Urinalysis completed because patient was complaining of burning when she pees, +3 bacteria and few epithelial cells found; PRN Oxy given x1; PRN Melatonin given x1; PRN Zofran given x1; Xanex one time order given        Concerns for physician to address:  Urinalysis results    Zone phone for oncoming shift:   [phone]     Patient Information  Nannie Lombard  50 y.o.  3/31/2021 12:09 PM by Saloni Reynoso MD. Nannie Lombard was admitted from Home    Problem List  Patient Active Problem List    Diagnosis Date Noted    Weakness 04/01/2021    Multiple sclerosis ([address] 03/31/2021    Non compliance with medical treatment 02/07/2021    Recurrent falls 02/07/2021    Altered mental status 02/06/2021    Leukopenia 11/25/2020    UTI (urinary tract infection) 11/25/2020    Multiple sclerosis exacerbation ([address] 08/06/2020    Facial droop 03/23/2020    Exacerbation of multiple sclerosis ([address] 03/09/2020    Left-sided weakness 03/08/2020    Severe obesity ([address] 10/03/2018    Constipation 07/17/2015    Body aches 12/11/2014    Back pain 09/07/2012    Fibromyalgia 08/17/2012    MS (multiple sclerosis) ([address] 08/17/2012    Decreased hearing 01/31/2011    Acute pharyngitis 01/26/2011    Anxiety 08/25/2010    Blood pressure elevated 08/25/2010    Tobacco abuse 08/25/2010     Past Medical History:   Diagnosis Date    Body aches 12/11/2014    Chronic pain     Depression     Headache(784.0)     History of mammogram never before    MS (multiple sclerosis) ([address]     Neurological disorder     Multiple Sclerosis    Pap smear for cervical cancer screening 2008    Psychiatric disorder     anxiety    Psychotic disorder ([address]        Core Measures:  CVA: No No  CHF:No No  PNA:No No    Activity:  Activity Level: Bed Rest  Number times ambulated in hallways past shift: 0  Number of times OOB to chair past shift: 0    Cardiac:   Cardiac Monitoring: Yes      Cardiac Rhythm: Normal sinus rhythm    Access:   Current line(s): PIV     Genitourinary:   Urinary status: external catheter    Respiratory:   O2 Device: Room air  Chronic home O2 use?: N/A  Incentive spirometer at bedside: N/A       GI:  Last Bowel Movement Date: (patient states unknown)  Current diet:  DIET DYSPHAGIA ADVANCED SOFT (NDD3)  Passing flatus: YES  Tolerating current diet: YES       Pain Management:   Patient states pain is manageable on current regimen: YES    Skin:  Samy Score: 17  Interventions: increase time out of bed and limit briefs    Patient Safety:  Fall Score:  Total Score: 4  Interventions: bed/chair alarm and gripper socks  High Fall Risk: Yes    DVT prophylaxis:  DVT prophylaxis Med- Yes  DVT prophylaxis SCD or CONG- No     Wounds: (If Applicable)  Wounds- No  Location     Active Consults:  IP CONSULT TO NEUROLOGY    Length of Stay:  Expected LOS: 3d 0h  Actual LOS: 2  Discharge Plan: Yes; SNF       Mliss Scheuermann, GN 4

## 2021-04-06 NOTE — PROGRESS NOTES
Problem: Self Care Deficits Care Plan (Adult)  Goal: *Acute Goals and Plan of Care (Insert Text)  Description: FUNCTIONAL STATUS PRIOR TO ADMISSION: progression of  weakness over the past month, due to this pt has been unable to ambulate except to furntiure walk with assist into bathroom to toilet (wheelchair does not fit in bathroom), pts boyfriend has been assisting with all transfers/mobility, pt was needing assist with pulling her shirt over her head, able to pull depends and pants up over hips while her boyfriend assists to keep pt standing, stand pivot transfer to wheelchair with one assist without assist devices, prior to a month ago pt was ambulatory short distances and able to assist more in her care    HOME SUPPORT PRIOR TO ADMISSION: The patient lived with boyfriend whom has been assisting. Occupational Therapy Goals:  Initiated 4/2/2021  1. Patient will perform grooming seated with modified independence within 7 days. 2. Patient will perform toileting with moderate assistance  within 7 days. 3. Patient will perform upper body dressing with supervision/set-up within 7 days  4. Patient will perform lower body dressing with moderate assist within 7 days. 5. Patient will transfer from bedside commode with moderate assistance  using the least restrictive device and appropriate durable medical equipment within 7 days. Outcome: Progressing Towards Goal   OCCUPATIONAL THERAPY TREATMENT  Patient: Bola Martinez (36 y.o. female)  Date: 4/6/2021  Diagnosis: Multiple sclerosis ([facility] [G35]  Weakness [R53.1]  MS (multiple sclerosis) ([facility] Michelle Dobbins <principal problem not specified>       Precautions: Fall  Chart, occupational therapy assessment, plan of care, and goals were reviewed. ASSESSMENT  Patient continues with skilled OT services and is progressing towards goals. Pt continues to be motivated to participate. Pts speech is improving and pt reported feeling better today.   Improved tolerance to standing and mobility overall and pt was able to mobilize to bathroom ~15 feet with RW but needed min assist for dynamic standing balance at sink with hands off walker. Pt presented with left lateral and posterior LOB and was unable to correct without assist.  Upon returning to bathroom pt needed increased time and effort to advance LE to return to chair but remained CGA. Continue to recommend intensive rehab at discharge. Current Level of Function Impacting Discharge (ADLs): SBA bed mobility    Sit to stand CGA, CGA to min assist dynamic standing balance at sink    Grooming  Brushing Teeth: Contact guard assistance;Minimum assistance(for balance standing at sink)      Lower Body Dressing Assistance  Pants With Elastic Waist: Contact guard assistance;Minimum assistance(balance to pull over hips in standing)    Other factors to consider for discharge: fall risk, making gains with therapy but would benefit from IPR         PLAN :  Patient continues to benefit from skilled intervention to address the above impairments. Continue treatment per established plan of care to address goals. Recommend with staff: OOB to chair and to [facility] for toileting    Recommend next OT session: standing balance, ADLS    Recommendation for discharge: (in order for the patient to meet his/her long term goals)  Therapy 3 hours per day 5-7 days per week    This discharge recommendation:  Has been made in collaboration with the attending provider and/or case management    IF patient discharges home will need the following DME: has all needed DME       SUBJECTIVE:   Patient stated I am ready to get up.     OBJECTIVE DATA SUMMARY:   Cognitive/Behavioral Status:  Neurologic State: Alert  Orientation Level: Oriented X4  Cognition: Appropriate decision making  Perception: Appears intact  Perseveration: No perseveration noted  Safety/Judgement: Awareness of environment; Fall prevention    Functional Mobility and Transfers for ADLs:  Bed Mobility:  Rolling: Stand-by assistance  Supine to Sit: Stand-by assistance  Sit to Supine: Stand-by assistance  Scooting: Stand-by assistance    Transfers:  Sit to Stand: Contact guard assistance; Additional time; Other (comment)(able to come to stand with hands on knees)     Bed to Chair: Contact guard assistance; Other (comment)(RW)    Balance:  Sitting: Impaired  Sitting - Static: Good (unsupported)  Sitting - Dynamic: Good (unsupported)  Standing: Impaired  Standing - Static: Good;Constant support; Other (comment)(with RW)  Standing - Dynamic : Fair(intermittent UE support/BR - posterior L sway at times)    ADL Intervention:       Grooming  Brushing Teeth: Contact guard assistance;Minimum assistance(for balance standing at sink)      Lower Body Dressing Assistance  Pants With Elastic Waist: Contact guard assistance;Minimum assistance(balance to pull over hips in standing)         Cognitive Retraining  Safety/Judgement: Awareness of environment; Fall prevention      Activity Tolerance:   Fair and requires rest breaks    After treatment patient left in no apparent distress:   Sitting in chair, Call bell within reach, and Bed / chair alarm activated    COMMUNICATION/COLLABORATION:   The patients plan of care was discussed with: Physical therapist, Registered nurse, and patient .      Clark Forrest OTR/L  Time Calculation: 24 mins

## 2021-04-06 NOTE — PROGRESS NOTES
Hospitalist Progress Note    NAME: Mary Glover   :  1975   MRN:  [id number]       Assessment / Plan:    Acute exacerbation of relapsing remitting multiple sclerosis, POA  Acute weakness and inability to stand secondary to above, POA  Patient had multiple ER visits in last 1 month for symptoms of generalized aches and pains with slurred speech  She is wheelchair-bound at her baseline now  Patient presented  with severe worsening of her bilateral lower extremity weakness, and mild worsening of bilateral upper arm weakness. MRI of brain and cervical spine showed stability of lesions   UA neg  s/p IV Solu-Medrol 1 g dailyx3   Continue Aubagio  She was just discharged last month and MRI at that time showed stable demyelinating lesions  Awaiting placement to [facility], pending insurance auth and covid19 test , CM working on it     Anxiety/depression, POA  Fibromyalgia  Continue Cymbalta and gabapentin    Tobacco use, POA  Counseled. Code Status: Full  Surrogate Decision Maker: Patient's son  DVT Prophylaxis: Lovenox subcu  GI Prophylaxis: not indicated   dispo: IP rehab   Baseline: Is wheelchair-bound now  30.0 - 39.9 Obese / Body mass index is 37.7 kg/m². Subjective:     Chief Complaint / Reason for Physician Visit  Pt seen in bed, NAD. Discussed with RN events overnight. Review of Systems:  Symptom Y/N Comments  Symptom Y/N Comments   Fever/Chills n   Chest Pain n    Poor Appetite    Edema     Cough    Abdominal Pain n    Sputum    Joint Pain     SOB/BOBO n   Pruritis/Rash     Nausea/vomit n   Tolerating PT/OT     Diarrhea    Tolerating Diet y    Constipation    Other       Could NOT obtain due to:      Objective:     VITALS:   Last 24hrs VS reviewed since prior progress note.  Most recent are:  Patient Vitals for the past 24 hrs:   Temp Pulse Resp BP SpO2   21 0808 97.6 °F (36.4 °C) (!) 55 16 101/61 98 %   21 0258 97.9 °F (36.6 °C) (!) 57 16 109/76 97 %   21 2311 97.8 °F (36.6 °C) 72 20 117/83 97 %   04/05/21 1943 98.7 °F (37.1 °C) 86 16 109/74 91 %   04/05/21 1617 98.9 °F (37.2 °C) 69 18 117/81 100 %   04/05/21 1201 98.3 °F (36.8 °C) 74 20 113/75 97 %       Intake/Output Summary (Last 24 hours) at 4/6/2021 1039  Last data filed at 4/6/2021 0641  Gross per 24 hour   Intake --   Output 400 ml   Net -400 ml        I had a face to face encounter and independently examined this patient on 4/6/2021, as outlined below:  PHYSICAL EXAM:  General: WD, WN. Alert, cooperative, no acute distress    EENT:  EOMI. Anicteric sclerae. MMM  Resp:  CTA bilaterally, no wheezing or rales. No accessory muscle use  CV:  Regular  rhythm,  No edema  GI:  Soft, Non distended, Non tender. +Bowel sounds  Neurologic:  Alert and oriented X 3, normal speech,   Psych:   Poor insight. Not anxious nor agitated  Skin:  No rashes. No jaundice    Reviewed most current lab test results and cultures  YES  Reviewed most current radiology test results   YES  Review and summation of old records today    NO  Reviewed patient's current orders and MAR    YES  PMH/ reviewed - no change compared to H&P  ________________________________________________________________________  Care Plan discussed with:    Comments   Patient x    Family      RN x    Care Manager x    Consultant                       x Multidiciplinary team rounds were held today with , nursing, pharmacist and clinical coordinator. Patient's plan of care was discussed; medications were reviewed and discharge planning was addressed.      ________________________________________________________________________  Total NON critical care TIME: 25 Minutes    Total CRITICAL CARE TIME Spent:   Minutes non procedure based      Comments   >50% of visit spent in counseling and coordination of care     ________________________________________________________________________  Jessica Lizarraga MD     Procedures: see electronic medical records for all procedures/Xrays and details which were not copied into this note but were reviewed prior to creation of Plan. LABS:  I reviewed today's most current labs and imaging studies.   Pertinent labs include:  Recent Labs     04/06/21 0220   WBC 5.0   HGB 12.6   HCT 40.5        Recent Labs     04/06/21 0220      K 3.7   *   CO2 26   GLU 82   BUN 23*   CREA 1.00   CA 8.4*   ALB 2.8*   TBILI 0.1*   ALT 22       Signed: Bebeto Clayton MD

## 2021-04-07 PROCEDURE — 74011250637 HC RX REV CODE- 250/637: Performed by: INTERNAL MEDICINE

## 2021-04-07 PROCEDURE — 97110 THERAPEUTIC EXERCISES: CPT | Performed by: OCCUPATIONAL THERAPIST

## 2021-04-07 PROCEDURE — 97535 SELF CARE MNGMENT TRAINING: CPT | Performed by: OCCUPATIONAL THERAPIST

## 2021-04-07 PROCEDURE — 74011250637 HC RX REV CODE- 250/637: Performed by: NURSE PRACTITIONER

## 2021-04-07 PROCEDURE — 77030038269 HC DRN EXT URIN PURWCK BARD -A

## 2021-04-07 PROCEDURE — 92507 TX SP LANG VOICE COMM INDIV: CPT

## 2021-04-07 PROCEDURE — 65660000000 HC RM CCU STEPDOWN

## 2021-04-07 PROCEDURE — 92526 ORAL FUNCTION THERAPY: CPT

## 2021-04-07 PROCEDURE — 97116 GAIT TRAINING THERAPY: CPT

## 2021-04-07 PROCEDURE — 74011250636 HC RX REV CODE- 250/636: Performed by: INTERNAL MEDICINE

## 2021-04-07 PROCEDURE — 97112 NEUROMUSCULAR REEDUCATION: CPT

## 2021-04-07 PROCEDURE — 74011250637 HC RX REV CODE- 250/637: Performed by: PSYCHIATRY & NEUROLOGY

## 2021-04-07 RX ORDER — ALPRAZOLAM 0.5 MG/1
0.25 TABLET ORAL
Status: DISCONTINUED | OUTPATIENT
Start: 2021-04-07 | End: 2021-04-11

## 2021-04-07 RX ORDER — ZOLPIDEM TARTRATE 5 MG/1
5 TABLET ORAL
Status: DISCONTINUED | OUTPATIENT
Start: 2021-04-07 | End: 2021-04-12 | Stop reason: HOSPADM

## 2021-04-07 RX ADMIN — Medication 10 ML: at 05:50

## 2021-04-07 RX ADMIN — GABAPENTIN 600 MG: 300 CAPSULE ORAL at 12:12

## 2021-04-07 RX ADMIN — Medication 10 ML: at 14:00

## 2021-04-07 RX ADMIN — DULOXETINE HYDROCHLORIDE 30 MG: 30 CAPSULE, DELAYED RELEASE ORAL at 08:52

## 2021-04-07 RX ADMIN — ALPRAZOLAM 0.25 MG: 0.5 TABLET ORAL at 15:16

## 2021-04-07 RX ADMIN — OXYCODONE 5 MG: 5 TABLET ORAL at 18:56

## 2021-04-07 RX ADMIN — POLYETHYLENE GLYCOL 3350 17 G: 17 POWDER, FOR SOLUTION ORAL at 08:52

## 2021-04-07 RX ADMIN — Medication 10 ML: at 21:23

## 2021-04-07 RX ADMIN — ENOXAPARIN SODIUM 40 MG: 40 INJECTION SUBCUTANEOUS at 08:52

## 2021-04-07 RX ADMIN — GABAPENTIN 600 MG: 300 CAPSULE ORAL at 21:21

## 2021-04-07 RX ADMIN — OXYCODONE 5 MG: 5 TABLET ORAL at 08:52

## 2021-04-07 RX ADMIN — ONDANSETRON 4 MG: 2 INJECTION INTRAMUSCULAR; INTRAVENOUS at 09:00

## 2021-04-07 RX ADMIN — GABAPENTIN 600 MG: 300 CAPSULE ORAL at 08:52

## 2021-04-07 RX ADMIN — ZOLPIDEM TARTRATE 5 MG: 5 TABLET ORAL at 21:21

## 2021-04-07 RX ADMIN — GABAPENTIN 600 MG: 300 CAPSULE ORAL at 17:46

## 2021-04-07 NOTE — PROGRESS NOTES
Transition of Care Plan:     RUR: 26% - \"moderate risk\"  Disposition: TBD Home with HH or SNF, resume private duty care, & f/u apts; if IPR will need peer to peer review  Follow up appointments: PCP (needs new pt PCP apt) & speciality apts  DME needed: None - has w/c, rollator, RW, tub bench  Transportation at Discharge: [facility] vs. [facility] Medicaid BLS  [place] or means to access home: Yes - [facility]      Medicare letter: at d/c  Caregiver Contact: Pt's stan Ash Vaibhav: [phone])  Discharge Caregiver contacted prior to discharge? Pt/CM to coordinate dc plan with stan     COVID-19 test per placement protocol: Pt had COVID-19 test completed per placement protocol 4/5/21; results pending    4:21 p.m. CM informed peer to peer was denied. CM will follow up with pt tomorrow regarding SNF placement. 12:28 p.m. Dr. Amanda Mcdowell is doing a peer to peer review. CM received notification that pt's insurance denied IPR. A peer to peer can be initiated if doctor desires. CM will pass along in IDR and discuss d/c plan further.       Cinthia More,   Care Management, [facility]

## 2021-04-07 NOTE — PROGRESS NOTES
Problem: Motor Speech Impaired (Adult)  Goal: *Acute Goals and Plan of Care (Insert Text)  Description: 4/6/2021  Speech path  1. Patient will produce sentences with 90% intelligibility  2. Patient will produce conversational speech with 90% intelligibility. 3. Patient will state 2 motor speech strategies with 100% acc. Outcome: Progressing Towards Goal   SPEECH LANGUAGE PATHOLOGY TREATMENT  Patient: Mau Robles (89 y.o. female)  Date: 4/7/2021  Diagnosis: Multiple sclerosis ([facility] [G35]  Weakness [R53.1]  MS (multiple sclerosis) ([facility] [address] [facility] <principal problem not specified>         ASSESSMENT:  Patient continues with dysarthria and is not using strategies well. Some difficulty with blends with /l/. Practiced blends and much better with effort. 80% at sentence level. She will need to continue speech therapy at [facility]. Swallowing has resolved. PLAN:  Patient continues to benefit from skilled intervention to address the above impairments. Continue treatment per established plan of care. Discharge Recommendations:  speech therapy      SUBJECTIVE:   Patient stated she is having a relaxing day. OBJECTIVE:   Mental Status:  Neurologic State: Alert  Orientation Level: Oriented X4  Cognition: Follows commands  Perception: Appears intact  Perseveration: No perseveration noted  Safety/Judgement: Fall prevention, Home safety  Treatment & Interventions:   Motor Speech:  Conversation Intelligibility (%): 80 %  Intelligibility: Impaired           Conversation Intelligibility (%): 80 %                   Voice:        Response & Tolerance to Activities:     good  Pain:  Pain Scale 1: Numeric (0 - 10)  Pain Intensity 1: 0  Pain Location 1: Generalized    After treatment:   Patient left in no apparent distress in bed    COMMUNICATION/EDUCATION:   Patient was educated regarding her deficit(s) of dysarthria    The patient's plan of care including recommendations, planned interventions, and recommended diet changes were discussed with: Registered nurse.      Radha Locke, SLP  Time Calculation: 10 mins

## 2021-04-07 NOTE — PROGRESS NOTES
Problem: Self Care Deficits Care Plan (Adult)  Goal: *Acute Goals and Plan of Care (Insert Text)  Description: FUNCTIONAL STATUS PRIOR TO ADMISSION: progression of  weakness over the past month, due to this pt has been unable to ambulate except to furntiure walk with assist into bathroom to toilet (wheelchair does not fit in bathroom), pts boyfriend has been assisting with all transfers/mobility, pt was needing assist with pulling her shirt over her head, able to pull depends and pants up over hips while her boyfriend assists to keep pt standing, stand pivot transfer to wheelchair with one assist without assist devices, prior to a month ago pt was ambulatory short distances and able to assist more in her care    HOME SUPPORT PRIOR TO ADMISSION: The patient lived with boyfriend whom has been assisting. Occupational Therapy Goals:  Initiated 4/2/2021  1. Patient will perform grooming seated with modified independence within 7 days. 2. Patient will perform toileting with moderate assistance  within 7 days. 3. Patient will perform upper body dressing with supervision/set-up within 7 days  4. Patient will perform lower body dressing with moderate assist within 7 days. 5. Patient will transfer from bedside commode with moderate assistance  using the least restrictive device and appropriate durable medical equipment within 7 days. Outcome: Progressing Towards Goal   OCCUPATIONAL THERAPY TREATMENT  Patient: Charlie Ying (14 y.o. female)  Date: 4/7/2021  Diagnosis: Multiple sclerosis ([facility] [address] [G35]  Weakness [R53.1]  MS (multiple sclerosis) ([facility] [address] Garcias Prophet <principal problem not specified>       Precautions: Fall  Chart, occupational therapy assessment, plan of care, and goals were reviewed. ASSESSMENT  Patient continues with skilled OT services and is progressing towards goals. Pt speech was more slurred this session but pt presented with improved dynamic standing balance and tolerance this session.   CGA overall for seated EOB crossed leg technique to don socks. CGA to  min assist for balance with pulling up of pants in standing. Pt was mobilize in room with CGA and was able to stand at sink to brush teeth and comb hair without a break. Pt needed to stand with left leg in front and at a angle at the sink for stability due to RLE weakness. Additional time and effort needed for sit to stand and pt has limited foot clearance during mobility with RW due to LE weakness. She continues to make steady gain     Current Level of Function Impacting Discharge (ADLs):     Grooming  Brushing Teeth: Contact guard assistance(standing at sink)  Brushing/Combing Hair: Contact guard assistance(standing at sink)    Lower Body Dressing Assistance  Socks: Contact guard assistance(seated EOB)  Leg Crossed Method Used: Yes    Toileting  Clothing Management: Minimum assistance    Other factors to consider for discharge: making gains with aggressive therapy         PLAN :  Patient continues to benefit from skilled intervention to address the above impairments. Continue treatment per established plan of care to address goals. Recommend with staff: to bathroom for toileting, OOB for meals    Recommend next OT session: sit stands, standing balance, ADLS    Recommendation for discharge: (in order for the patient to meet his/her long term goals)  Therapy 3 hours per day 5-7 days per week    This discharge recommendation:  Has been made in collaboration with the attending provider and/or case management    IF patient discharges home will need the following DME: has all needed DME       SUBJECTIVE:   Patient stated I am ready to move.     OBJECTIVE DATA SUMMARY:   Cognitive/Behavioral Status:  Neurologic State: Alert  Orientation Level: Oriented X4  Cognition: Follows commands  Perception: Appears intact  Perseveration: No perseveration noted  Safety/Judgement: Fall prevention;Home safety    Functional Mobility and Transfers for ADLs:  Bed Mobility:  Rolling: Stand-by assistance  Supine to Sit: Stand-by assistance  Sit to Supine: Stand-by assistance  Scooting: Stand-by assistance    Transfers:  Sit to Stand: Contact guard assistance; Other (comment)(working on LE recruitment and shifting wgt forward)     Bed to Chair: Contact guard assistance; Other (comment)(with RW; cues for completing turn/approach to chair)    Balance:  Sitting: Impaired  Sitting - Static: Good (unsupported)  Sitting - Dynamic: Good (unsupported)  Standing: Impaired  Standing - Static: Good;Constant support; Other (comment); Fair(fair when using intermittent support in ADLs)  Standing - Dynamic : Fair;Constant support; Other (comment)(BUE support needed)    ADL Intervention:       Grooming  Brushing Teeth: Contact guard assistance(standing at sink)  Brushing/Combing Hair: Contact guard assistance(standing at sink)    Lower Body Dressing Assistance  Socks: Contact guard assistance(seated EOB)  Leg Crossed Method Used: Yes    Toileting  Clothing Management: Minimum assistance    Cognitive Retraining  Safety/Judgement: Fall prevention;Home safety    Therapeutic Exercises:   Chair tricep push ups 5 reps, needed to support calfs on chair to achieve motion     Activity Tolerance:   Fair    After treatment patient left in no apparent distress:   Supine in bed, Call bell within reach, Bed / chair alarm activated, and Side rails x 3    COMMUNICATION/COLLABORATION:   The patients plan of care was discussed with: Physical therapist, Registered nurse, and patient .      Maxine Jacob OTR/L  Time Calculation: 25 mins

## 2021-04-07 NOTE — PROGRESS NOTES
End of Shift Note     Bedside shift change report given to  [facility] LAUREEN (oncoming nurse) by Abdirahman Gray R.N (offgoing nurse). Report included the following information SBAR     Shift worked:  7pm-7am   Shift summary and any significant changes:      none         Concerns for physician to address:  none    Zone phone for oncoming shift:   [phone]      Patient Information  Brigette Chisholm  50 y.o.  3/31/2021 12:09 PM by Louise Garcia MD. Brigette Chisholm was admitted from Home     Problem List       Patient Active Problem List     Diagnosis Date Noted    Weakness 04/01/2021    Multiple sclerosis ([address] 03/31/2021    Non compliance with medical treatment 02/07/2021    Recurrent falls 02/07/2021    Altered mental status 02/06/2021    Leukopenia 11/25/2020    UTI (urinary tract infection) 11/25/2020    Multiple sclerosis exacerbation ([address] 08/06/2020    Facial droop 03/23/2020    Exacerbation of multiple sclerosis ([address] 03/09/2020    Left-sided weakness 03/08/2020    Severe obesity ([address] 10/03/2018    Constipation 07/17/2015    Body aches 12/11/2014    Back pain 09/07/2012    Fibromyalgia 08/17/2012    MS (multiple sclerosis) ([address] 08/17/2012    Decreased hearing 01/31/2011    Acute pharyngitis 01/26/2011    Anxiety 08/25/2010    Blood pressure elevated 08/25/2010    Tobacco abuse 08/25/2010           Past Medical History:   Diagnosis Date    Body aches 12/11/2014    Chronic pain      Depression      Headache(784.0)      History of mammogram never before    MS (multiple sclerosis) ([address]      Neurological disorder       Multiple Sclerosis    Pap smear for cervical cancer screening 2008    Psychiatric disorder       anxiety    Psychotic disorder ([address]           Core Measures:  CVA: No No  CHF:No No  PNA:No No     Activity:  Activity Level: Bed Rest  Number times ambulated in hallways past shift: 0  Number of times OOB to chair past shift: 0     Cardiac:   Cardiac Monitoring:  Yes Cardiac Rhythm: Normal sinus rhythm     Access:   Current line(s): PIV      Genitourinary:   Urinary status: external catheter     Respiratory:   O2 Device: Room air  Chronic home O2 use?: N/A  Incentive spirometer at bedside: N/A     GI:  Last Bowel Movement Date: (patient states unknown)  Current diet:  DIET DYSPHAGIA ADVANCED SOFT (NDD3)  Passing flatus: YES  Tolerating current diet: YES     Pain Management:   Patient states pain is manageable on current regimen: YES     Skin:  Samy Score: 17  Interventions: increase time out of bed and limit briefs    Patient Safety:  Fall Score: Total Score: 4  Interventions: bed/chair alarm and gripper socks  High Fall Risk: Yes     DVT prophylaxis:  DVT prophylaxis Med- Yes  DVT prophylaxis SCD or CONG- No      Wounds: (If Applicable)  Wounds- No  Location      Active Consults:  IP CONSULT TO NEUROLOGY     Length of Stay:  Expected LOS: 3d 0h  Actual LOS: 2  Discharge Plan: Yes; SNF      July MARTINI

## 2021-04-07 NOTE — PROGRESS NOTES
Problem: Mobility Impaired (Adult and Pediatric)  Goal: *Acute Goals and Plan of Care (Insert Text)  Description: FUNCTIONAL STATUS PRIOR TO ADMISSION: The patient required increased assistance from her fiance for stand pivot transfers to and from her wheelchair over the past month. Prior to that she was able to walk short distances with RW. HOME SUPPORT PRIOR TO ADMISSION: The patient lived with fiance and required assistance for transfers and ADLs. Physical Therapy Goals  Initiated 4/2/2021  1. Patient will move from supine to sit and sit to supine  in bed with minimal assistance/contact guard assist within 7 day(s). 2.  Patient will transfer from bed to chair and chair to bed with minimal assistance/contact guard assist using the least restrictive device within 7 day(s). 3.  Patient will perform sit to stand with minimal assistance/contact guard assist within 7 day(s). 4.  Patient will ambulate with minimal assistance/contact guard assist for 5 feet with the least restrictive device within 7 day(s). Outcome: Progressing Towards Goal   PHYSICAL THERAPY TREATMENT  Patient: Nicho Vu (49 y.o. female)  Date: 4/7/2021  Diagnosis: Multiple sclerosis ([facility] [address] [G35]  Weakness [R53.1]  MS (multiple sclerosis) ([facility] [address] Rolly Green <principal problem not specified>       Precautions: Fall  Chart, physical therapy assessment, plan of care and goals were reviewed. ASSESSMENT  Patient continues with skilled PT services and is progressing towards goals. Pt continues to show deficits in gait, balance, functional mobility and motor fatigue with activity. She is able to complete bed mobility with SBA. She is training with sit<> stand with CGA working on hip/knee ext recruitment and shifting weight forward over feet. With transfers and approaches to chair, pt needs cues for completing turn and attaining proximity to chair prior to sitting.  Pt is amb with RW with CGA with very shortened step length and limited step clearance which worsens with fatigue at end of distance. Her standing balance is improved during standing activity at sink today with pt more consistently being able to keep weight forward to prevent posterior sway. Pt instructed in LE/core exercises in bed: bent knee to chest, bridging, and SLR with opposite leg bent working on control and stabilizing core. Pt would still be an excellent candidate for inpatient rehab given her hx of MS, remaining weakness and decreased activity tolerance with noted gait and balance issues with increased risk for fall. Pt has responded well to therapy in the acute setting coming in at w/c level and now progressing to short distance amb but with the continued deficits. She is at a pivotal stage where d/c to lower level of therapy may not provide the intensity needed to continue to progress strength and mobility enough to prevent backslide to very limited mobility again. Current Level of Function Impacting Discharge (mobility/balance): see above details; CGA with RW for OOB but short distance only, balance and gait deficits as noted. Other factors to consider for discharge: fall risk, MS, functioning below baseline         PLAN :  Patient continues to benefit from skilled intervention to address the above impairments. Continue treatment per established plan of care. to address goals. Recommendation for discharge: (in order for the patient to meet his/her long term goals)  Therapy 3 hours per day 5-7 days per week: Pt would still be an excellent candidate for inpatient rehab given her hx of MS, remaining weakness and decreased activity tolerance with noted gait and balance issues with increased risk for fall. Pt has responded well to therapy in the acute setting coming in at w/c level and now progressing to short distance amb but with the continued deficits.  She is at a pivotal stage where d/c to lower level of therapy may not provide the intensity needed to continue to progress strength and mobility enough to prevent backslide to very limited mobility again. This discharge recommendation:  Has been made in collaboration with the attending provider and/or case management    IF patient discharges home will need the following DME: patient owns DME required for discharge       SUBJECTIVE:   Patient stated I want to get to rehab.     OBJECTIVE DATA SUMMARY:   Critical Behavior:  Neurologic State: Alert  Orientation Level: Oriented X4  Cognition: Follows commands  Safety/Judgement: Awareness of environment, Fall prevention  Functional Mobility Training:  Bed Mobility:  Rolling: Stand-by assistance  Supine to Sit: Stand-by assistance  Sit to Supine: Stand-by assistance  Scooting: Stand-by assistance        Transfers:  Sit to Stand: Contact guard assistance; Other (comment)(working on LE recruitment and shifting wgt forward)  Stand to Sit: Contact guard assistance        Bed to Chair: Contact guard assistance; Other (comment)(with RW; cues for completing turn/approach to chair)                    Balance:  Sitting: Impaired  Sitting - Static: Good (unsupported)  Sitting - Dynamic: Good (unsupported)  Standing: Impaired  Standing - Static: Good;Constant support; Other (comment); Fair(fair when using intermittent support in ADLs)  Standing - Dynamic : Fair;Constant support; Other (comment)(BUE support needed)  Ambulation/Gait Training:  Distance (ft): 30 Feet (ft)  Assistive Device: Gait belt;Walker, rolling  Ambulation - Level of Assistance: Contact guard assistance        Gait Abnormalities: Decreased step clearance;Trunk sway increased; Shuffling gait; Other(RLE toe out, chronic)        Base of Support: Widened     Speed/Heena: Slow;Pace decreased (<100 feet/min)  Step Length: Right shortened;Left shortened  Swing Pattern: (decreased clearance with fatigue at end of distance)          Pain Rating:  No c/o    Activity Tolerance:   Fair    After treatment patient left in no apparent distress:   Supine in bed, Call bell within reach, Bed / chair alarm activated, and Side rails x 3    COMMUNICATION/COLLABORATION:   The patients plan of care was discussed with: Occupational therapist, Registered nurse, Physician, and Case management.      Clint Schofield, PT   Time Calculation: 26 mins

## 2021-04-07 NOTE — PROGRESS NOTES
Hospitalist Progress Note    NAME: Madeline Ho   :  1975   MRN:  [id number]       Assessment / Plan:    Acute exacerbation of relapsing remitting multiple sclerosis, POA  Acute weakness and inability to stand secondary to above, POA  Patient had multiple ER visits in last 1 month for symptoms of generalized aches and pains with slurred speech  She is wheelchair-bound at her baseline now  Patient presented  with severe worsening of her bilateral lower extremity weakness, and mild worsening of bilateral upper arm weakness. MRI of brain and cervical spine showed stability of lesions   UA neg  s/p IV Solu-Medrol 1 g dailyx3   Continue Aubagio  She was just discharged last month and MRI at that time showed stable demyelinating lesions  Awaiting placement to [facility], pending [facility] working on it. Covid19 screening test was neg. Anxiety/depression, POA  Fibromyalgia  Continue Cymbalta and gabapentin    Tobacco use, POA  Counseled. Code Status: Full  Surrogate Decision Maker: Patient's son  DVT Prophylaxis: Lovenox subcu  GI Prophylaxis: not indicated   dispo: IP rehab   Baseline: Is wheelchair-bound now  30.0 - 39.9 Obese / Body mass index is 37.7 kg/m². Subjective:     Chief Complaint / Reason for Physician Visit  Pt seen in bed, NAD. Discussed with RN events overnight. Review of Systems:  Symptom Y/N Comments  Symptom Y/N Comments   Fever/Chills n   Chest Pain n    Poor Appetite    Edema     Cough    Abdominal Pain n    Sputum    Joint Pain     SOB/BOBO n   Pruritis/Rash     Nausea/vomit n   Tolerating PT/OT     Diarrhea    Tolerating Diet y    Constipation    Other       Could NOT obtain due to:      Objective:     VITALS:   Last 24hrs VS reviewed since prior progress note.  Most recent are:  Patient Vitals for the past 24 hrs:   Temp Pulse Resp BP SpO2   21 0753 97.9 °F (36.6 °C) 67 18 112/66 100 %   21 0318 97.9 °F (36.6 °C) 65 16 106/64 98 %   21 2324 97.9 °F (36.6 °C) (!) 105 18 113/78 98 %   04/06/21 1945 97.4 °F (36.3 °C) 88 18 119/78 99 %   04/06/21 1543 97.6 °F (36.4 °C) 90 18 (!) 106/90 94 %   04/06/21 1101 97.5 °F (36.4 °C) 78 18 106/78 --       Intake/Output Summary (Last 24 hours) at 4/7/2021 1036  Last data filed at 4/6/2021 1820  Gross per 24 hour   Intake 480 ml   Output --   Net 480 ml        I had a face to face encounter and independently examined this patient on 4/7/2021, as outlined below:  PHYSICAL EXAM:  General: WD, WN. Alert, cooperative, no acute distress    EENT:  EOMI. Anicteric sclerae. MMM  Resp:  CTA bilaterally, no wheezing or rales. No accessory muscle use  CV:  Regular  rhythm,  No edema  GI:  Soft, Non distended, Non tender. +Bowel sounds  Neurologic:  Alert and oriented X 3, normal speech,   Psych:   Poor insight. Not anxious nor agitated  Skin:  No rashes. No jaundice    Reviewed most current lab test results and cultures  YES  Reviewed most current radiology test results   YES  Review and summation of old records today    NO  Reviewed patient's current orders and MAR    YES  PMH/SH reviewed - no change compared to H&P  ________________________________________________________________________  Care Plan discussed with:    Comments   Patient x    Family      RN x    Care Manager x    Consultant                       x Multidiciplinary team rounds were held today with , nursing, pharmacist and clinical coordinator. Patient's plan of care was discussed; medications were reviewed and discharge planning was addressed.      ________________________________________________________________________  Total NON critical care TIME: 25 Minutes    Total CRITICAL CARE TIME Spent:   Minutes non procedure based      Comments   >50% of visit spent in counseling and coordination of care     ________________________________________________________________________  Cb White MD     Procedures: see electronic medical records for all procedures/Xrays and details which were not copied into this note but were reviewed prior to creation of Plan. LABS:  I reviewed today's most current labs and imaging studies.   Pertinent labs include:  Recent Labs     04/06/21 0220   WBC 5.0   HGB 12.6   HCT 40.5        Recent Labs     04/06/21 0220      K 3.7   *   CO2 26   GLU 82   BUN 23*   CREA 1.00   CA 8.4*   ALB 2.8*   TBILI 0.1*   ALT 22       Signed: Patricia Paul MD

## 2021-04-07 NOTE — PROGRESS NOTES
0700 Bedside shift change report given to Mery Escobar (oncoming nurse) by Maury Sanford RN (offgoing nurse). Report included the following information SBAR, Kardex, Intake/Output and MAR.     1216 Perfect served Dr. Carolyn Marques about the patient wanting something for anxiety. Dr. Carolyn Marques read message no reply no orders. 46 Perfect served Dr. Carolyn Marques again about the pt wanting something for anxiety. Dr. Carolyn Marques read message and placed order for PRN Xanax. 1900 End of Shift Note    Bedside shift change report given to Chris Blanton RN (oncoming nurse) by Kylee Nelson RN (offgoing nurse). Report included the following information SBAR, Kardex, Intake/Output and MAR    Shift worked:  3491-1455     Shift summary and any significant changes:     None     Concerns for physician to address:  None     Zone phone for oncoming shift:   [phone]       Activity:  Activity Level: Up with Assistance  Number times ambulated in hallways past shift: 0  Number of times OOB to chair past shift: 0    Cardiac:   Cardiac Monitoring: Yes      Cardiac Rhythm: Normal sinus rhythm    Access:   Current line(s): PIV     Genitourinary:   Urinary status: voiding    Respiratory:   O2 Device: Room air  Chronic home O2 use?: N/A  Incentive spirometer at bedside: N/A     GI:  Last Bowel Movement Date: 04/07/21  Current diet:  DIET REGULAR 2 GM NA (House Low NA)  Passing flatus: YES  Tolerating current diet: YES  % Diet Eaten: 100 %    Pain Management:   Patient states pain is manageable on current regimen: YES    Skin:  Samy Score: 17  Interventions: increase time out of bed    Patient Safety:  Fall Score:  Total Score: 4  Interventions: bed/chair alarm  High Fall Risk: Yes    Length of Stay:  Expected LOS: 3d 0h  Actual LOS: 3      Kylee Nelson, ALEJANDRO

## 2021-04-07 NOTE — PROGRESS NOTES
Received message from patient's nurse Sandy Steven stating :    pt would like order for [facility] for hs. Discussion / orders:    Ordered one time dose of ambien 5 mg po         Please note that this note was dictated using Dragon computer voice recognition software. Quite often unanticipated grammatical, syntax, homophones, and other interpretive errors are inadvertently transcribed by the computer software. Please disregard these errors. Please excuse any errors that have escaped final proofreading.

## 2021-04-08 PROCEDURE — 74011250637 HC RX REV CODE- 250/637: Performed by: INTERNAL MEDICINE

## 2021-04-08 PROCEDURE — 92507 TX SP LANG VOICE COMM INDIV: CPT | Performed by: SPEECH-LANGUAGE PATHOLOGIST

## 2021-04-08 PROCEDURE — 74011250637 HC RX REV CODE- 250/637: Performed by: PSYCHIATRY & NEUROLOGY

## 2021-04-08 PROCEDURE — 74011250637 HC RX REV CODE- 250/637: Performed by: NURSE PRACTITIONER

## 2021-04-08 PROCEDURE — 97535 SELF CARE MNGMENT TRAINING: CPT | Performed by: OCCUPATIONAL THERAPIST

## 2021-04-08 PROCEDURE — 65660000000 HC RM CCU STEPDOWN

## 2021-04-08 PROCEDURE — 97116 GAIT TRAINING THERAPY: CPT

## 2021-04-08 PROCEDURE — 77030038269 HC DRN EXT URIN PURWCK BARD -A

## 2021-04-08 PROCEDURE — 74011250636 HC RX REV CODE- 250/636: Performed by: INTERNAL MEDICINE

## 2021-04-08 RX ADMIN — DULOXETINE HYDROCHLORIDE 30 MG: 30 CAPSULE, DELAYED RELEASE ORAL at 09:02

## 2021-04-08 RX ADMIN — OXYCODONE 5 MG: 5 TABLET ORAL at 22:14

## 2021-04-08 RX ADMIN — GABAPENTIN 600 MG: 300 CAPSULE ORAL at 13:19

## 2021-04-08 RX ADMIN — OXYCODONE 5 MG: 5 TABLET ORAL at 09:02

## 2021-04-08 RX ADMIN — OXYCODONE 5 MG: 5 TABLET ORAL at 16:22

## 2021-04-08 RX ADMIN — Medication 10 ML: at 22:15

## 2021-04-08 RX ADMIN — ZOLPIDEM TARTRATE 5 MG: 5 TABLET ORAL at 22:14

## 2021-04-08 RX ADMIN — GABAPENTIN 600 MG: 300 CAPSULE ORAL at 18:05

## 2021-04-08 RX ADMIN — Medication 10 ML: at 05:09

## 2021-04-08 RX ADMIN — GABAPENTIN 600 MG: 300 CAPSULE ORAL at 22:14

## 2021-04-08 RX ADMIN — POLYETHYLENE GLYCOL 3350 17 G: 17 POWDER, FOR SOLUTION ORAL at 09:02

## 2021-04-08 RX ADMIN — ALPRAZOLAM 0.25 MG: 0.5 TABLET ORAL at 09:05

## 2021-04-08 RX ADMIN — GABAPENTIN 600 MG: 300 CAPSULE ORAL at 09:07

## 2021-04-08 RX ADMIN — Medication 10 ML: at 13:20

## 2021-04-08 RX ADMIN — ENOXAPARIN SODIUM 40 MG: 40 INJECTION SUBCUTANEOUS at 09:02

## 2021-04-08 RX ADMIN — ALPRAZOLAM 0.25 MG: 0.5 TABLET ORAL at 18:05

## 2021-04-08 NOTE — PROGRESS NOTES
Problem: Mobility Impaired (Adult and Pediatric)  Goal: *Acute Goals and Plan of Care (Insert Text)  Description: FUNCTIONAL STATUS PRIOR TO ADMISSION: The patient required increased assistance from her fiance for stand pivot transfers to and from her wheelchair over the past month. Prior to that she was able to walk short distances with RW. HOME SUPPORT PRIOR TO ADMISSION: The patient lived with fiance and required assistance for transfers and ADLs. Physical Therapy Goals  Initiated 4/2/2021  1. Patient will move from supine to sit and sit to supine  in bed with minimal assistance/contact guard assist within 7 day(s). 2.  Patient will transfer from bed to chair and chair to bed with minimal assistance/contact guard assist using the least restrictive device within 7 day(s). 3.  Patient will perform sit to stand with minimal assistance/contact guard assist within 7 day(s). 4.  Patient will ambulate with minimal assistance/contact guard assist for 5 feet with the least restrictive device within 7 day(s). Outcome: Progressing Towards Goal   PHYSICAL THERAPY TREATMENT  Patient: Hina Mckoy (1 y.o. female)  Date: 4/8/2021  Diagnosis: Multiple sclerosis ([facility] [G35]  Weakness [R53.1]  MS (multiple sclerosis) ([facility] Madison Meraz <principal problem not specified>       Precautions: Fall  Chart, physical therapy assessment, plan of care and goals were reviewed. ASSESSMENT  Patient continues with skilled PT services and is progressing towards goals, demonstrating continued improvements in activity tolerance, strength, balance, and overall functional mobility. Pt able to progress ambulation trials to a distance of 20ft and 30ft, respectively, requiring seated rest break b/t secondary to decreased endurance/activity tolerance. Gait slow and shuffled with pt maintaining RLE in position of ER.  Pt with depressed mood this date, only agreeable to limited participation with therapy, stating she was upset over news regarding discharge plan (denied IPR). Despite improvements demonstrated this date, pt continues to function well below her baseline mod I level and most appropriate for additional rehab at discharge. Current Level of Function Impacting Discharge (mobility/balance): CGAx1 with use of RW    Other factors to consider for discharge: hx of MS, falls risk, limited caregiver support         PLAN :  Patient continues to benefit from skilled intervention to address the above impairments. Continue treatment per established plan of care. to address goals. Recommendation for discharge: (in order for the patient to meet his/her long term goals)  Therapy 3 hours per day 5-7 days per week    This discharge recommendation:  Has been made in collaboration with the attending provider and/or case management    IF patient discharges home will need the following DME: to be determined (TBD)       SUBJECTIVE:   Patient stated I'm upset that [facility] denied my rehab stay.     OBJECTIVE DATA SUMMARY:   Critical Behavior:  Neurologic State: Alert  Orientation Level: Appropriate for age, Oriented X4  Cognition: Appropriate decision making  Safety/Judgement: Fall prevention, Home safety  Functional Mobility Training:  Bed Mobility:  Rolling: Supervision  Supine to Sit: Supervision     Scooting: Supervision        Transfers:  Sit to Stand: Contact guard assistance  Stand to Sit: Contact guard assistance        Bed to Chair: Contact guard assistance;Assist x1                    Balance:  Sitting: Intact  Standing: Impaired; With support(RW)  Standing - Static: Good;Constant support  Standing - Dynamic : Fair;Constant support  Ambulation/Gait Training:  Distance (ft): 50 Feet (ft)(20ft, 30ft w seated rest break b/t)  Assistive Device: Walker, rolling;Gait belt  Ambulation - Level of Assistance: Contact guard assistance        Gait Abnormalities: Decreased step clearance; Step to gait; Shuffling gait        Base of Support: Widened Speed/Heena: Pace decreased (<100 feet/min); Shuffled  Step Length: Left shortened;Right shortened                   Pain Rating:  Denied complaints of pain    Activity Tolerance:   Good    After treatment patient left in no apparent distress:   Sitting in chair, Call bell within reach, and Bed / chair alarm activated    COMMUNICATION/COLLABORATION:   The patients plan of care was discussed with: Occupational therapist, Registered nurse, and Case management.      Bandar Dawson PT, DPT   Time Calculation: 14 mins

## 2021-04-08 NOTE — PROGRESS NOTES
Hospitalist Progress Note    NAME: Tosin Xiong   :  1975   MRN:  [id number]       Assessment / Plan:    Acute exacerbation of relapsing remitting multiple sclerosis, POA  Acute weakness and inability to stand secondary to above, POA  Patient had multiple ER visits in last 1 month for symptoms of generalized aches and pains with slurred speech  She is wheelchair-bound at her baseline now  Patient presented  with severe worsening of her bilateral lower extremity weakness, and mild worsening of bilateral upper arm weakness. MRI of brain and cervical spine showed stability of lesions   UA neg  s/p IV Solu-Medrol 1 g dailyx3   Continue Aubagio  She was just discharged last month and MRI at that time showed stable demyelinating lesions  P2P done on . CM working with pt in regards to SNF at discharge. Cont' PT/OT while inpt    Anxiety/depression, POA  Fibromyalgia  Continue Cymbalta and gabapentin  Add xanax prn for anxiety    Tobacco use, POA  Counseled. Code Status: Full  Surrogate Decision Maker: Patient's son  DVT Prophylaxis: Lovenox subcu  GI Prophylaxis: not indicated   dispo: IP rehab   Baseline: Is wheelchair-bound now  30.0 - 39.9 Obese / Body mass index is 37.7 kg/m². Subjective:     Chief Complaint / Reason for Physician Visit  Pt seen in bed, teary as she is disappointed about not going to inpt rehab. Discussed with RN events overnight. Review of Systems:  Symptom Y/N Comments  Symptom Y/N Comments   Fever/Chills n   Chest Pain n    Poor Appetite    Edema     Cough    Abdominal Pain n    Sputum    Joint Pain     SOB/BOBO n   Pruritis/Rash     Nausea/vomit n   Tolerating PT/OT     Diarrhea    Tolerating Diet y    Constipation    Other       Could NOT obtain due to:      Objective:     VITALS:   Last 24hrs VS reviewed since prior progress note.  Most recent are:  Patient Vitals for the past 24 hrs:   Temp Pulse Resp BP SpO2   21 0809 97.8 °F (36.6 °C) 66 16 117/69 95 %   04/08/21 0337 97.6 °F (36.4 °C) 66 18 110/65 100 %   04/07/21 2323 98.4 °F (36.9 °C) 76 18 119/70 96 %   04/07/21 1956 98.3 °F (36.8 °C) 78 18 114/76 100 %   04/07/21 1600 97.9 °F (36.6 °C) 71 18 120/68 99 %   04/07/21 1145 98.4 °F (36.9 °C) 76 18 108/62 98 %       Intake/Output Summary (Last 24 hours) at 4/8/2021 1035  Last data filed at 4/8/2021 3738  Gross per 24 hour   Intake --   Output 950 ml   Net -950 ml        I had a face to face encounter and independently examined this patient on 4/8/2021, as outlined below:  PHYSICAL EXAM:  General: Female in bed, not in acute distress    EENT:  EOMI. Anicteric sclerae. MMM  Resp:  CTA bilaterally, no wheezing or rales. No accessory muscle use  CV:  Regular  rhythm,  No edema  GI:  Soft, Non distended, Non tender. +Bowel sounds  Neurologic:  Alert and oriented X 3, normal speech,   Psych:   Anxious, teary  Skin:  No rashes. No jaundice    Reviewed most current lab test results and cultures  YES  Reviewed most current radiology test results   YES  Review and summation of old records today    NO  Reviewed patient's current orders and MAR    YES  PMH/ reviewed - no change compared to H&P  ________________________________________________________________________  Care Plan discussed with:    Comments   Patient x    Family      RN x    Care Manager x    Consultant                       x Multidiciplinary team rounds were held today with , nursing, pharmacist and clinical coordinator. Patient's plan of care was discussed; medications were reviewed and discharge planning was addressed.      ________________________________________________________________________  Total NON critical care TIME: 25 Minutes    Total CRITICAL CARE TIME Spent:   Minutes non procedure based      Comments   >50% of visit spent in counseling and coordination of care     ________________________________________________________________________  Elbert Brood, MD     Procedures: see electronic medical records for all procedures/Xrays and details which were not copied into this note but were reviewed prior to creation of Plan. LABS:  I reviewed today's most current labs and imaging studies.   Pertinent labs include:  Recent Labs     04/06/21 0220   WBC 5.0   HGB 12.6   HCT 40.5        Recent Labs     04/06/21 0220      K 3.7   *   CO2 26   GLU 82   BUN 23*   CREA 1.00   CA 8.4*   ALB 2.8*   TBILI 0.1*   ALT 22       Signed: Gely Lucas MD

## 2021-04-08 NOTE — PROGRESS NOTES
1900 End of Shift Note    Bedside shift change report given to Hermes Barnard RN (oncoming nurse) by Bette Shankar RN (offgoing nurse). Report included the following information SBAR, Kardex, Intake/Output and MAR    Shift worked: night     Shift summary and any significant changes:     None     Concerns for physician to address:  None     Zone phone for oncoming shift:   [phone]       Activity:  Activity Level: Up with Assistance  Number times ambulated in hallways past shift: 0  Number of times OOB to chair past shift: 0    Cardiac:   Cardiac Monitoring: Yes      Cardiac Rhythm: Normal sinus rhythm    Access:   Current line(s): PIV     Genitourinary:   Urinary status: voiding    Respiratory:   O2 Device: Room air  Chronic home O2 use?: N/A  Incentive spirometer at bedside: N/A     GI:  Last Bowel Movement Date: 04/07/21  Current diet:  DIET REGULAR 2 GM NA (House Low NA)  Passing flatus: YES  Tolerating current diet: YES  % Diet Eaten: 80 %    Pain Management:   Patient states pain is manageable on current regimen: YES    Skin:  Samy Score: 17  Interventions: increase time out of bed    Patient Safety:  Fall Score:  Total Score: 4  Interventions: bed/chair alarm  High Fall Risk: Yes    Length of Stay:  Expected LOS: 3d 0h  Actual LOS: 4      Bette Shankar RN

## 2021-04-08 NOTE — PROGRESS NOTES
Received message from patient's nurse Marbella Art stating :    Patient is requesting a 5mg dose of Ambien tonight to help her sleep. She states not being able to sleep well without it. Discussion / orders:    Ordered Ambien 5 mg daily as needed at bedtime for sleep           Please note that this note was dictated using Dragon computer voice recognition software. Quite often unanticipated grammatical, syntax, homophones, and other interpretive errors are inadvertently transcribed by the computer software. Please disregard these errors. Please excuse any errors that have escaped final proofreading.

## 2021-04-08 NOTE — PROGRESS NOTES
Comprehensive Nutrition Assessment    Type and Reason for Visit: Initial, RD nutrition re-screen/LOS    Nutrition Recommendations/Plan:  Continue current diet     Nutrition Assessment:     Patient medically noted for MS. PMH Depression. Chart reviewed for length of stay. Patient sitting up in chair at time of visit. She reports a good appetite and eating well. Provided her with menu to help with meal selections. She reports she is trying to watch her diet and eating a lot of vegetables. No nutrition questions/concerns at this time. Estimated Daily Nutrient Needs:  Energy (kcal): 1716 kcal (BMR 1638 x 1. 2AF -250kcal); Weight Used for Energy Requirements: Current  Protein (g): 81g (0.8 g/kg bw); Weight Used for Protein Requirements: Current  Fluid (ml/day): 1700 mL; Method Used for Fluid Requirements: 1 ml/kcal    Nutrition Related Findings:       BM 4/7  Cymbalta, Miralax    Wounds:    None       Current Nutrition Therapies:  DIET REGULAR 2 GM NA (House Low NA)    Anthropometric Measures:  · Height:  5' 4.5\" (163.8 cm)  · Current Body Wt:  101.2 kg (223 lb 1.7 oz)   · BMI Category:  Obese class 2 (BMI 35.0-39. 9)       Nutrition Diagnosis:   · No nutrition diagnosis at this time     Nutrition Interventions:   Food and/or Nutrient Delivery: Continue current diet  Nutrition Education and Counseling: No recommendations at this time  Coordination of Nutrition Care: No recommendation at this time    Goals:  PO intake >70% of meals next 5-7 days       Nutrition Monitoring and Evaluation:   Behavioral-Environmental Outcomes: None identified  Food/Nutrient Intake Outcomes: Food and nutrient intake  Physical Signs/Symptoms Outcomes: Biochemical data, Weight    Discharge Planning:    Continue current diet     Electronically signed by Clarissa Goldstein RD on 4/8/2021 at 2:04 PM    Contact: ext [phone]

## 2021-04-08 NOTE — PROGRESS NOTES
Problem: Self Care Deficits Care Plan (Adult)  Goal: *Acute Goals and Plan of Care (Insert Text)  Description: FUNCTIONAL STATUS PRIOR TO ADMISSION: progression of  weakness over the past month, due to this pt has been unable to ambulate except to furntiure walk with assist into bathroom to toilet (wheelchair does not fit in bathroom), pts boyfriend has been assisting with all transfers/mobility, pt was needing assist with pulling her shirt over her head, able to pull depends and pants up over hips while her boyfriend assists to keep pt standing, stand pivot transfer to wheelchair with one assist without assist devices, prior to a month ago pt was ambulatory short distances and able to assist more in her care    HOME SUPPORT PRIOR TO ADMISSION: The patient lived with boyfriend whom has been assisting. Occupational Therapy Goals:  Initiated 4/2/2021  1. Patient will perform grooming seated with modified independence within 7 days. 2. Patient will perform toileting with moderate assistance  within 7 days. 3. Patient will perform upper body dressing with supervision/set-up within 7 days  4. Patient will perform lower body dressing with moderate assist within 7 days. 5. Patient will transfer from bedside commode with moderate assistance  using the least restrictive device and appropriate durable medical equipment within 7 days. Outcome: Progressing Towards Goal   OCCUPATIONAL THERAPY TREATMENT  Patient: Mau Robles (12 y.o. female)  Date: 4/8/2021  Diagnosis: Multiple sclerosis ([facility] [G35]  Weakness [R53.1]  MS (multiple sclerosis) ([facility] <principal problem not specified>       Precautions: Fall  Chart, occupational therapy assessment, plan of care, and goals were reviewed. ASSESSMENT  Patient continues with skilled OT services and is progressing towards goals. Pt was frustrated due to [facility] being denied for IPR.   Pt continues to make progress and is benefiting from daily OT services. Pt would continue to benefit from rehab at discharge and recommend SNF due to pts denial for IPR. CGA to min assist for managing pants in standing and grooming at sink. Pt continues to have LE weakness and decreased foot clearance. Current Level of Function Impacting Discharge (ADLs): CGA mobility with RW  Grooming  Washing Hands: Contact guard assistance(standing at sink)    Toileting  Bladder Hygiene: Contact guard assistance  Clothing Management: Minimum assistance    Other factors to consider for discharge: making steady gains with therapy         PLAN :  Patient continues to benefit from skilled intervention to address the above impairments. Continue treatment per established plan of care to address goals. Recommend with staff: Marcus Aldana to chair to bathroom for toileting    Recommend next OT session: ADLS mobility    Recommendation for discharge: (in order for the patient to meet his/her long term goals)  Therapy up to 5 days/week in SNF setting    This discharge recommendation:  Has been made in collaboration with the attending provider and/or case management    IF patient discharges home will need the following DME: has all needed DME for discharge       SUBJECTIVE:   Patient stated I am so frustrated.     OBJECTIVE DATA SUMMARY:   Cognitive/Behavioral Status:  Neurologic State: Alert  Orientation Level: Appropriate for age;Oriented X4  Cognition: Appropriate decision making  Perception: Appears intact  Perseveration: No perseveration noted  Safety/Judgement: Fall prevention    Functional Mobility and Transfers for ADLs:  Bed Mobility:  Rolling: Supervision  Supine to Sit: Supervision  Scooting: Supervision    Transfers:  Sit to Stand: Contact guard assistance     Bed to Chair: Contact guard assistance;Assist x1    Balance:  Sitting: Intact  Standing: Impaired; With support(RW)  Standing - Static: Good;Constant support  Standing - Dynamic : Fair;Constant support    ADL Intervention: Grooming  Washing Hands: Contact guard assistance(standing at sink)    Toileting  Bladder Hygiene: Contact guard assistance  Clothing Management: Minimum assistance    Cognitive Retraining  Safety/Judgement: Fall prevention        Activity Tolerance:   Fair and requires rest breaks    After treatment patient left in no apparent distress:   Sitting in chair, Call bell within reach, and Bed / chair alarm activated    COMMUNICATION/COLLABORATION:   The patients plan of care was discussed with: Physical therapist, Registered nurse, and patient .      Rufina Houston OTR/L  Time Calculation: 12 mins

## 2021-04-08 NOTE — PROGRESS NOTES
Transition of Care Plan:     RUR: 27% - \"moderate risk\"  Disposition: TBD Home [address] or SNF, resume private duty care, & f/u apts; peer to peer denied for IPR; [address]  Follow up appointments: PCP (needs new pt PCP apt) & speciality apts  DME needed: None - has w/c, rollator, RW, tub bench  [facility]  [place] or means to access home: Yes - fiance     IM Medicare letter: at d/c  Caregiver Contact: Pt's stan Gautam Age [phone])  Discharge Caregiver contacted prior to discharge? Pt/CM to coordinate dc plan with stan     COVID-19 test per placement protocol: Pt had COVID-19 test completed per placement protocol 4/5/21; results pending    1:38 p.m. CM received a call from Heidi Valentine of [address] [place] [address] who said [facility] declined because of pt's meds but that [facility] was interested and for us to start the [facility]. CM faxed clinical information to [facility] at [phone]. CM met with pt at bedside as peer to peer was denied. CM reviewed different options. Pt provided [address] form and picked out the top three choices of SNF, which are [facility] or [facility].  Pt had a bad experience previously at [facility] and does not desire to go back. CM will send referrals.       Joyce Houser,   Care Management, [facility]  [phone]

## 2021-04-08 NOTE — PROGRESS NOTES
Problem: Motor Speech Impaired (Adult)  Goal: *Acute Goals and Plan of Care (Insert Text)  Description: 4/6/2021  Speech path  1. Patient will produce sentences with 90% intelligibility  2. Patient will produce conversational speech with 90% intelligibility. 3. Patient will state 2 motor speech strategies with 100% acc. Outcome: Progressing Towards Goal       SPEECH LANGUAGE PATHOLOGY TREATMENT  Patient: Elsa Byrd (51 y.o. female)  Date: 4/8/2021  Diagnosis: [facility] [G35]  Weakness [R53.1]  MS [facility]multiple sclerosis) [facility] Kapadia <principal problem not specified>         ASSESSMENT:  Patient amenable and pleasant in tx. Able to name 2/3 strategies. Continues to need cues in conversation or when getting excited, difficulty with multisyllable words and consonant blends Able to be close to 100% intelligible in more structured tasks. Continue plan. PLAN:  Patient continues to benefit from skilled intervention to address the above impairments. Continue treatment per established plan of care. Discharge Recommendations:  Inpatient Rehab     SUBJECTIVE:   Patient stated It wasn't a good day yesterday. I got denied by [facility]. OBJECTIVE:   Mental Status:  Neurologic State: Alert  Orientation Level: Oriented X4  Cognition: Follows commands, Appropriate safety awareness, Appropriate decision making  Perception: Appears intact  Perseveration: No perseveration noted  Safety/Judgement: Fall prevention, Home safety  Treatment & Interventions:   Motor Speech:  Conversation Intelligibility (%): 75 %     Word Intelligibility (%): 95 %     Sentence Intelligibility (%): 95 %(in structured tasks)  Conversation Intelligibility (%): 75 %     Speech Characteristics: Other (comment)(needs cues for elaborated utterances.)        Interfering Components: Limited error awareness(limited error awareness with conversation.)  Language Comprehension and Expression:     Verbal Expression  Verbal Expression  Primary Mode of Expression: Verbal  Initiation: No impairment  Sentence Formulation: (needs encouragement for elaborated utterances, but no anomia noted.)  Sentence forumlation cueing type: Verbal  Sentence formulation cueing amount: Minimal  Speech Characteristics: Other (comment)(needs cues for elaborated utterances.)  Interfering Components: Limited error awareness(limited error awareness with conversation.)  Overall Impairment: Mild  Cueing type: Verbal  N   Response & Tolerance to Activities:     No pain, tolerated well. Pain:  Pain Scale 1: Numeric (0 - 10)  Pain Intensity 1: 0       After treatment:   Patient left in no apparent distress in bed and Call bell within reach    COMMUNICATION/EDUCATION:   Patient was educated regarding her deficit(s) of dysarthria as this relates to her diagnosis of CVA. She demonstrated Good understanding as evidenced by her responses.     The patient's plan of care including recommendations, planned interventions, and recommended diet changes were discussed with: Speech therapist.     Donovan Kidd SLP  Time Calculation: 22 mins

## 2021-04-08 NOTE — PROGRESS NOTES
Problem: Falls - Risk of  Goal: *Absence of Falls  Description: Document Yamilex Ver[place] Fall Risk and appropriate interventions in the flowsheet. Outcome: Progressing Towards Goal  Note: Fall Risk Interventions:  Mobility Interventions: Bed/chair exit alarm, Communicate number of staff needed for ambulation/transfer    Mentation Interventions: Bed/chair exit alarm    Medication Interventions: Bed/chair exit alarm    Elimination Interventions: Bed/chair exit alarm, Call light in reach    History of Falls Interventions: Bed/chair exit alarm         Problem: Patient Education: Go to Patient Education Activity  Goal: Patient/Family Education  Outcome: Progressing Towards Goal     Problem: Patient Education: Go to Patient Education Activity  Goal: Patient/Family Education  Outcome: Progressing Towards Goal     Problem: Pressure Injury - Risk of  Goal: *Prevention of pressure injury  Description: Document Samy Scale and appropriate interventions in the flowsheet.   Outcome: Progressing Towards Goal  Note: Pressure Injury Interventions:  Sensory Interventions: Assess changes in LOC    Moisture Interventions: Absorbent underpads    Activity Interventions: Increase time out of bed    Mobility Interventions: HOB 30 degrees or less, PT/OT evaluation    Nutrition Interventions: Document food/fluid/supplement intake    Friction and Shear Interventions: Lift sheet, Minimize layers                Problem: Patient Education: Go to Patient Education Activity  Goal: Patient/Family Education  Outcome: Progressing Towards Goal     Problem: Patient Education: Go to Patient Education Activity  Goal: Patient/Family Education  Outcome: Progressing Towards Goal     Problem: TIA/CVA Stroke: 0-24 hours  Goal: Off Pathway (Use only if patient is Off Pathway)  Outcome: Progressing Towards Goal  Goal: Activity/Safety  Outcome: Progressing Towards Goal  Goal: Consults, if ordered  Outcome: Progressing Towards Goal  Goal: Diagnostic Test/Procedures  Outcome: Progressing Towards Goal  Goal: Nutrition/Diet  Outcome: Progressing Towards Goal  Goal: Discharge Planning  Outcome: Progressing Towards Goal  Goal: Medications  Outcome: Progressing Towards Goal  Goal: Respiratory  Outcome: Progressing Towards Goal  Goal: Treatments/Interventions/Procedures  Outcome: Progressing Towards Goal  Goal: Minimize risk of bleeding post-thrombolytic infusion  Outcome: Progressing Towards Goal  Goal: Monitor for complications post-thrombolytic infusion  Outcome: Progressing Towards Goal  Goal: Psychosocial  Outcome: Progressing Towards Goal  Goal: *Hemodynamically stable  Outcome: Progressing Towards Goal  Goal: *Neurologically stable  Description: Absence of additional neurological deficits    Outcome: Progressing Towards Goal  Goal: *Verbalizes anxiety and depression are reduced or absent  Outcome: Progressing Towards Goal  Goal: *Absence of Signs of Aspiration on Current Diet  Outcome: Progressing Towards Goal  Goal: *Absence of deep venous thrombosis signs and symptoms(Stroke Metric)  Outcome: Progressing Towards Goal  Goal: *Ability to perform ADLs and demonstrates progressive mobility and function  Outcome: Progressing Towards Goal  Goal: *Stroke education started(Stroke Metric)  Outcome: Progressing Towards Goal  Goal: *Dysphagia screen performed(Stroke Metric)  Outcome: Progressing Towards Goal  Goal: *Rehab consulted(Stroke Metric)  Outcome: Progressing Towards Goal     Problem: TIA/CVA Stroke: Day 2 Until Discharge  Goal: Off Pathway (Use only if patient is Off Pathway)  Outcome: Progressing Towards Goal  Goal: Activity/Safety  Outcome: Progressing Towards Goal  Goal: Diagnostic Test/Procedures  Outcome: Progressing Towards Goal  Goal: Nutrition/Diet  Outcome: Progressing Towards Goal  Goal: Discharge Planning  Outcome: Progressing Towards Goal  Goal: Medications  Outcome: Progressing Towards Goal  Goal: Respiratory  Outcome: Progressing Towards Goal  Goal: Treatments/Interventions/Procedures  Outcome: Progressing Towards Goal  Goal: Psychosocial  Outcome: Progressing Towards Goal  Goal: *Verbalizes anxiety and depression are reduced or absent  Outcome: Progressing Towards Goal  Goal: *Absence of aspiration  Outcome: Progressing Towards Goal  Goal: *Absence of deep venous thrombosis signs and symptoms(Stroke Metric)  Outcome: Progressing Towards Goal  Goal: *Optimal pain control at patient's stated goal  Outcome: Progressing Towards Goal  Goal: *Tolerating diet  Outcome: Progressing Towards Goal  Goal: *Ability to perform ADLs and demonstrates progressive mobility and function  Outcome: Progressing Towards Goal  Goal: *Stroke education continued(Stroke Metric)  Outcome: Progressing Towards Goal     Problem: Ischemic Stroke: Discharge Outcomes  Goal: *Verbalizes anxiety and depression are reduced or absent  Outcome: Progressing Towards Goal  Goal: *Verbalize understanding of risk factor modification(Stroke Metric)  Outcome: Progressing Towards Goal  Goal: *Hemodynamically stable  Outcome: Progressing Towards Goal  Goal: *Absence of aspiration pneumonia  Outcome: Progressing Towards Goal  Goal: *Aware of needed dietary changes  Outcome: Progressing Towards Goal  Goal: *Verbalize understanding of prescribed medications including anti-coagulants, anti-lipid, and/or anti-platelets(Stroke Metric)  Outcome: Progressing Towards Goal  Goal: *Tolerating diet  Outcome: Progressing Towards Goal  Goal: *Aware of follow-up diagnostics related to anticoagulants  Outcome: Progressing Towards Goal  Goal: *Ability to perform ADLs and demonstrates progressive mobility and function  Outcome: Progressing Towards Goal  Goal: *Absence of DVT(Stroke Metric)  Outcome: Progressing Towards Goal  Goal: *Absence of aspiration  Outcome: Progressing Towards Goal  Goal: *Optimal pain control at patient's stated goal  Outcome: Progressing Towards Goal  Goal: *Home safety concerns addressed  Outcome: Progressing Towards Goal  Goal: *Describes available resources and support systems  Outcome: Progressing Towards Goal  Goal: *Verbalizes understanding of activation of EMS(911) for stroke symptoms(Stroke Metric)  Outcome: Progressing Towards Goal  Goal: *Understands and describes signs and symptoms to report to providers(Stroke Metric)  Outcome: Progressing Towards Goal  Goal: *Neurolgocially stable (absence of additional neurological deficits)  Outcome: Progressing Towards Goal  Goal: *Verbalizes importance of follow-up with primary care physician(Stroke Metric)  Outcome: Progressing Towards Goal  Goal: *Smoking cessation discussed,if applicable(Stroke Metric)  Outcome: Progressing Towards Goal  Goal: *Depression screening completed(Stroke Metric)  Outcome: Progressing Towards Goal     Problem: Patient Education: Go to Patient Education Activity  Goal: Patient/Family Education  Outcome: Progressing Towards Goal     Problem: Patient Education: Go to Patient Education Activity  Goal: Patient/Family Education  Outcome: Progressing Towards Goal

## 2021-04-09 LAB
COVID-19 RAPID TEST, COVR: NOT DETECTED
SARS-COV-2, COV2: NORMAL
SOURCE, COVRS: NORMAL

## 2021-04-09 PROCEDURE — 97116 GAIT TRAINING THERAPY: CPT

## 2021-04-09 PROCEDURE — 74011250637 HC RX REV CODE- 250/637: Performed by: NURSE PRACTITIONER

## 2021-04-09 PROCEDURE — 77030038269 HC DRN EXT URIN PURWCK BARD -A

## 2021-04-09 PROCEDURE — 65660000000 HC RM CCU STEPDOWN

## 2021-04-09 PROCEDURE — 74011250637 HC RX REV CODE- 250/637: Performed by: INTERNAL MEDICINE

## 2021-04-09 PROCEDURE — 74011250636 HC RX REV CODE- 250/636: Performed by: INTERNAL MEDICINE

## 2021-04-09 PROCEDURE — 97535 SELF CARE MNGMENT TRAINING: CPT | Performed by: OCCUPATIONAL THERAPIST

## 2021-04-09 PROCEDURE — 74011250637 HC RX REV CODE- 250/637: Performed by: PSYCHIATRY & NEUROLOGY

## 2021-04-09 PROCEDURE — 87635 SARS-COV-2 COVID-19 AMP PRB: CPT

## 2021-04-09 RX ADMIN — ENOXAPARIN SODIUM 40 MG: 40 INJECTION SUBCUTANEOUS at 09:29

## 2021-04-09 RX ADMIN — Medication 10 ML: at 22:13

## 2021-04-09 RX ADMIN — OXYCODONE 5 MG: 5 TABLET ORAL at 03:44

## 2021-04-09 RX ADMIN — OXYCODONE 5 MG: 5 TABLET ORAL at 09:30

## 2021-04-09 RX ADMIN — Medication 10 ML: at 06:41

## 2021-04-09 RX ADMIN — DULOXETINE HYDROCHLORIDE 30 MG: 30 CAPSULE, DELAYED RELEASE ORAL at 09:30

## 2021-04-09 RX ADMIN — Medication 10 ML: at 14:13

## 2021-04-09 RX ADMIN — ALPRAZOLAM 0.25 MG: 0.5 TABLET ORAL at 21:38

## 2021-04-09 RX ADMIN — ALPRAZOLAM 0.25 MG: 0.5 TABLET ORAL at 12:07

## 2021-04-09 RX ADMIN — GABAPENTIN 600 MG: 300 CAPSULE ORAL at 14:13

## 2021-04-09 RX ADMIN — GABAPENTIN 600 MG: 300 CAPSULE ORAL at 09:29

## 2021-04-09 RX ADMIN — OXYCODONE 5 MG: 5 TABLET ORAL at 21:38

## 2021-04-09 RX ADMIN — GABAPENTIN 600 MG: 300 CAPSULE ORAL at 21:38

## 2021-04-09 RX ADMIN — GABAPENTIN 600 MG: 300 CAPSULE ORAL at 17:30

## 2021-04-09 RX ADMIN — ZOLPIDEM TARTRATE 5 MG: 5 TABLET ORAL at 21:38

## 2021-04-09 RX ADMIN — OXYCODONE 5 MG: 5 TABLET ORAL at 17:31

## 2021-04-09 NOTE — PROGRESS NOTES
Problem: Self Care Deficits Care Plan (Adult)  Goal: *Acute Goals and Plan of Care (Insert Text)  FUNCTIONAL STATUS PRIOR TO ADMISSION: progression of  weakness over the past month, due to this pt has been unable to ambulate except to furntiure walk with assist into bathroom to toilet (wheelchair does not fit in bathroom), pts boyfriend has been assisting with all transfers/mobility, pt was needing assist with pulling her shirt over her head, able to pull depends and pants up over hips while her boyfriend assists to keep pt standing, stand pivot transfer to wheelchair with one assist without assist devices, prior to a month ago pt was ambulatory short distances and able to assist more in her care    HOME SUPPORT PRIOR TO ADMISSION: The patient lived with boyfriend whom has been assisting. Occupational Therapy Goals:  Re-eval 4/9/2021    Initiated 4/2/2021  1. Patient will perform grooming seated with modified independence within 7 days. Met. Upgrade to standing at sink with modified independence  2. Patient will perform toileting with moderate assistance  within 7 days. Met upgrade to modified independence   3. Patient will perform upper body dressing with supervision/set-up within 7 days. Met 4/9/2021 (discontinue goal)    4. Patient will perform lower body dressing with moderate assist within 7 days. Met upgrade to modified independence  5. Patient will transfer from bedside commode with moderate assistance using the least restrictive device and appropriate durable medical equipment within 7 days.   Met 4/9/2021 upgrade to modified independent to standard toilet    Outcome: Progressing Towards Goal    OCCUPATIONAL THERAPY RE-EVALUATION  Patient: Nicho Vu (10 y.o. female)  Date: 4/9/2021  Diagnosis: Multiple sclerosis ([facility] [address] [G35]  Weakness [R53.1]  MS (multiple sclerosis) ([facility] [address] [place]olly [place] <principal problem not specified>      Precautions: Fall  Chart, occupational therapy assessment, plan of care, and goals were reviewed. ASSESSMENT  Based on the objective data described below, pt continue to make steady gains and has met all goals from initial eval.  On eval pt was unable to stand pivot and was unable to remain standing. Pt is now able to mobilize with RW short distances in the room with RW and limited foot clearance. She has progressed to being able to stand to perform ADLs with CGA to min assist for balance. She needs min assist to fully pull up pants standing bending forward method to obtain pants from around ankles. She is frustrated that she has been declined IPR services and that SNF is being attempted to be set up. She is however continuing to be motivated to participate and is making excellent progress with daily therapy. Current Level of Function Impacting Discharge (ADLs): CGA/min assist mobility with Rw    Feeding: Modified independent    Oral Facial Hygiene/Grooming: Contact guard assistance    Bathing: Minimum assistance    Upper Body Dressing: Setup    Lower Body Dressing: Minimum assistance    Toileting: Minimum assistance    Other factors to consider for discharge: fall risk, needs assist          PLAN :  Recommendations and Planned Interventions: self care training, functional mobility training, therapeutic exercise, balance training, therapeutic activities, patient education, home safety training and family training/education    Frequency/Duration: Patient will be followed by occupational therapy 3 times a week to address goals.     Recommend with staff: assist with mobility    Recommend next OT session: standing balance, ADLS    Recommendation for discharge: (in order for the patient to meet his/her long term goals)  Therapy up to 5 days/week in SNF setting or if pt returns to home she will need assist and home health    This discharge recommendation:  Has been made in collaboration with the attending provider and/or case management    Equipment recommendations for successful discharge (if) home: none       SUBJECTIVE:   Patient stated I am so frustrated.     OBJECTIVE DATA SUMMARY:   Hospital course since last seen and reason for reevaluation: continuing to make gains with mobility and ADLS    Cognitive/Behavioral Status:  Neurologic State: Alert  Orientation Level: Oriented X4  Cognition: Follows commands  Perception: Appears intact  Perseveration: No perseveration noted  Safety/Judgement: Fall prevention; Awareness of environment  Hearing: Auditory  Auditory Impairment: None    Vision/Perceptual:                                     Range of Motion:    AROM: Generally decreased, functional  PROM: Within functional limits                      Strength:    Strength: Generally decreased, functional                Coordination:  Coordination: Generally decreased, functional  Fine Motor Skills-Upper: Left Intact; Right Intact    Gross Motor Skills-Upper: Left Intact; Right Intact(slow, tremors at times)    Tone & Sensation:    Tone: Normal                           Functional Mobility and Transfers for ADLs:    Transfers:  Sit to Stand: Contact guard assistance  Functional Transfers  Bathroom Mobility: Contact guard assistance(with RW)  Toilet Transfer : Contact guard assistance  Bed to Chair: Contact guard assistance(with RW)    Balance:  Sitting: Intact  Sitting - Static: Good (unsupported)  Sitting - Dynamic: Good (unsupported)  Standing: Impaired  Standing - Static: Good  Standing - Dynamic : Fair    ADL Assessment:  Feeding: Modified independent    Oral Facial Hygiene/Grooming: Contact guard assistance    Bathing: Minimum assistance    Upper Body Dressing: Setup    Lower Body Dressing: Minimum assistance    Toileting: Minimum assistance                ADL Intervention and task modifications:       Grooming  Washing Hands: Contact guard assistance(standing at sink')  Brushing/Combing Hair: Contact guard assistance(standing at sink)                        Toileting  Bladder Hygiene: Contact guard assistance  Clothing Management: Minimum assistance    Cognitive Retraining  Safety/Judgement: Fall prevention; Awareness of environment      Functional Measure:  Barthel Index:    Bathin  Bladder: 5  Bowels: 5  Groomin  Dressin  Feeding: 10  Mobility: 5  Stairs: 0  Toilet Use: 5  Transfer (Bed to Chair and Back): 10  Total: 45/100        The Barthel ADL Index: Guidelines  1. The index should be used as a record of what a patient does, not as a record of what a patient could do. 2. The main aim is to establish degree of independence from any help, physical or verbal, however minor and for whatever reason. 3. The need for supervision renders the patient not independent. 4. A patient's performance should be established using the best available evidence. Asking the patient, friends/relatives and nurses are the usual sources, but direct observation and common sense are also important. However direct testing is not needed. 5. Usually the patient's performance over the preceding 24-48 hours is important, but occasionally longer periods will be relevant. 6. Middle categories imply that the patient supplies over 50 per cent of the effort. 7. Use of aids to be independent is allowed. Kip Mccormick., Barthel, DJanetW. ([id number]). Functional evaluation: the Barthel Index. [address] [facility] ([id number])2. LOUIS Monroe, Lester Limon., [place], [address] [place] [address] (). Measuring the change indisability after inpatient rehabilitation; comparison of the responsiveness of the Barthel Index and Functional [place] Measure. Journal of Neurology, Neurosurgery, and Psychiatry, 66(4), 015-920. LIANET [email or web address].LIVAN, BETTY Moss, & Yanira Camilo MEVARISTO. (2004.) Assessment of post-stroke quality of life in cost-effectiveness studies: The usefulness of the Barthel Index and the EuroQoL-5D.  Quality of Life Research, 13, 235-90             Activity Tolerance:   Fair    After treatment patient left in no apparent distress:   Sitting in chair, Call bell within reach and Bed / chair alarm activated    COMMUNICATION/COLLABORATION:   The patients plan of care was discussed with: Physical therapist, Registered nurse and patient.      Christi Barton OTR/L  Time Calculation: 12 mins

## 2021-04-09 NOTE — PROGRESS NOTES
Transition of Care Plan:     RUR: 22%  Disposition: [facility] (waiting on [address])  Follow up appointments: PCP (needs new pt PCP apt) & speciality apts  DME needed: None - has w/c, rollator, RW, tub bench  Transportation at Discharge: [facility] Medicaid BLS  Philip or means to access home: Yes - stan     IM Medicare letter: at d/c  Caregiver Contact: Pt's stan Holder [phone])  Discharge Caregiver contacted prior to discharge? Pt/CM to coordinate dc plan with stan     COVID-19 test per placement protocol: Pt had COVID-19 test completed per placement protocol 4/5/21; results pending    CM spoke with Kateryna of [facility] who said they are waiting on [address] but could accept pt possibly tomorrow if approved. They are waving pt's carve out with her MS medications. If [facility], call Too Christianson in admissions at [phone]. Pt will need an updated covid test.  CM will send Dr. Yani Lofton a message via [address] [place] [address].       Pat Mendoza,   Care Management, [facility]

## 2021-04-09 NOTE — PROGRESS NOTES
1900 End of Shift Note    Bedside shift change report given to Mikey Arvizu RN (oncoming nurse) by Reza Bingham RN (offgoing nurse). Report included the following information SBAR, Kardex, Intake/Output and MAR    Shift worked: night     Shift summary and any significant changes:     None     Concerns for physician to address:  None     Zone phone for oncoming shift:   [phone]       Activity:  Activity Level: Up with Assistance  Number times ambulated in hallways past shift: 0  Number of times OOB to chair past shift: 0    Cardiac:   Cardiac Monitoring: Yes      Cardiac Rhythm: Normal sinus rhythm    Access:   Current line(s): PIV     Genitourinary:   Urinary status: voiding    Respiratory:   O2 Device: Room air  Chronic home O2 use?: N/A  Incentive spirometer at bedside: N/A     GI:  Last Bowel Movement Date: 04/08/21  Current diet:  DIET REGULAR 2 GM NA (House Low NA)  Passing flatus: YES  Tolerating current diet: YES  % Diet Eaten: 80 %    Pain Management:   Patient states pain is manageable on current regimen: YES    Skin:  Samy Score: 18  Interventions: increase time out of bed    Patient Safety:  Fall Score:  Total Score: 4  Interventions: bed/chair alarm  High Fall Risk: Yes    Length of Stay:  Expected LOS: 3d 0h  Actual LOS: 5      Reza Bingham RN

## 2021-04-09 NOTE — PROGRESS NOTES
Problem: Mobility Impaired (Adult and Pediatric)  Goal: *Acute Goals and Plan of Care (Insert Text)  Description: FUNCTIONAL STATUS PRIOR TO ADMISSION: The patient required increased assistance from her fiance for stand pivot transfers to and from her wheelchair over the past month. Prior to that she was able to walk short distances with RW. HOME SUPPORT PRIOR TO ADMISSION: The patient lived with fiance and required assistance for transfers and ADLs. Physical Therapy Goals  Initiated 4/2/2021  1. Patient will move from supine to sit and sit to supine  in bed with minimal assistance/contact guard assist within 7 day(s). 2.  Patient will transfer from bed to chair and chair to bed with minimal assistance/contact guard assist using the least restrictive device within 7 day(s). 3.  Patient will perform sit to stand with minimal assistance/contact guard assist within 7 day(s). 4.  Patient will ambulate with minimal assistance/contact guard assist for 5 feet with the least restrictive device within 7 day(s). Physical Therapy Goals  Updated 4/9/2021 to reflect progress  1. Patient will move from supine to sit and sit to supine in bed with modified independence within 7 day(s). 2.  Patient will transfer from bed to chair and chair to bed with supervision using the least restrictive device within 7 day(s). 3.  Patient will perform sit to stand with supervision within 7 day(s). 4.  Patient will ambulate with supervision for 75 feet with the least restrictive device within 7 day(s). Outcome: Progressing Towards Goal   PHYSICAL THERAPY TREATMENT: WEEKLY REASSESSMENT  Patient: Jagruti Castro (86 y.o. female)  Date: 4/9/2021  Primary Diagnosis: Multiple sclerosis ([facility] [address] [G35]  Weakness [R53.1]  MS (multiple sclerosis) ([facility] [address] [G35]        Precautions:  Fall      ASSESSMENT  Patient continues with skilled PT services and is progressing towards goals.  Pt has progressed well in all areas of mobility still needing assistance due to decreased strength, control, balance and activity tolerance. Patient's progression toward goals since last assessment: Pt has progressed from only being able to transfer and not able to progress to amb at eval to now amb with RW with CGA for distances up to 50' which varies depending upon the day. She is CGA for transfers and has been working on hip and knee control for sit to stand without using UEs to assist. She exhibits at shortened step, shuffling step-to gait with slowed pace. Pt has increased 4 points on the tinetti scale. He initial goals were met or exceeded and new goals were set to reflect progress. Current Level of Function Impacting Discharge (mobility/balance): Overall CGA with use of RW, amb short distances only    Functional Outcome Measure: The patient scored 16/28 on the tinetti outcome measure which is indicative of high fall risk. Other factors to consider for discharge: fall risk         PLAN :  Goals have been updated based on progression since last assessment. Patient continues to benefit from skilled intervention to address the above impairments. Recommendations and Planned Interventions: bed mobility training, transfer training, gait training, therapeutic exercises, neuromuscular re-education, patient and family training/education, and therapeutic activities      Frequency/Duration: Patient will be followed by physical therapy:  4 times a week to address goals.     Recommendation for discharge: (in order for the patient to meet his/her long term goals)  Therapy 3 hours per day 5-7 days per week however peer to peer appeal for this has been denied; pt would benefit from SNF rehab stay prior to return home to reach maximal level of function    This discharge recommendation:  Has been made in collaboration with the attending provider and/or case management    IF patient discharges home will need the following DME: patient owns DME required for discharge         SUBJECTIVE:   Patient stated I want to go home.     OBJECTIVE DATA SUMMARY:   HISTORY:    Past Medical History:   Diagnosis Date    Body aches 12/11/2014    Chronic pain     Depression     Headache(784.0)     History of mammogram never before    MS (multiple sclerosis) ([facility] [address]     Neurological disorder     Multiple Sclerosis    Pap smear for cervical cancer screening 2008    Psychiatric disorder     anxiety    Psychotic disorder [facility])      Past Surgical History:   Procedure Laterality Date    COLONOSCOPY N/A 2/28/2018    COLONOSCOPY performed by Bethany Orellana MD at [facility] ENDOSCOPY    HX CHOLECYSTECTOMY      HX GYN      3 c-sections    HX HEENT      bilateral ear tube surgery    HX HERNIA REPAIR      HX OTHER SURGICAL      R inguinal hernia repair       Personal factors and/or comorbidities impacting plan of care: MS    Home Situation  Home Environment: Private residence  # Steps to Enter: 2  Rails to Enter: No  One/Two Story Residence: One story  Living Alone: No  Support Systems: Spouse/Significant Other/Partner  Patient Expects to be Discharged to[de-identified] Private residence  Current DME Used/Available at Home: Wheelchair, Fleurette Amble, [place], Fleurette Amble, rollator, Commode, bedside, Grab bars, Tub transfer bench  Tub or Shower Type: Tub/Shower combination    EXAMINATION/PRESENTATION/DECISION MAKING:   Critical Behavior:  Neurologic State: Alert  Orientation Level: Oriented X4  Cognition: Follows commands  Safety/Judgement: Fall prevention, Awareness of environment  Hearing:   Auditory  Auditory Impairment: None    Range Of Motion:  AROM: Generally decreased, functional           PROM: Within functional limits           Strength:    Strength: Generally decreased, functional                    Tone & Sensation:   Tone: Normal                              Coordination:  Coordination: Generally decreased, functional       Functional Mobility:  Bed Mobility:              Transfers:  Sit to Stand: Contact guard assistance  Stand to Sit: Contact guard assistance        Bed to Chair: Contact guard assistance(with RW)              Balance:   Sitting: Intact  Sitting - Static: Good (unsupported)  Sitting - Dynamic: Good (unsupported)  Standing: Impaired  Standing - Static: Good  Standing - Dynamic : Fair  Ambulation/Gait Training:  Distance (ft): 20 Feet (ft)  Assistive Device: Gait belt;Walker, rolling  Ambulation - Level of Assistance: Contact guard assistance        Gait Abnormalities: Decreased step clearance; Step to gait; Shuffling gait        Base of Support: Widened; Other (comment)(RLE toe out, chronic)     Speed/Heena: Pace decreased (<100 feet/min); Slow  Step Length: Right shortened;Left shortened            Functional Measure:  Tinetti test:    Sitting Balance: 1  Arises: 2  Attempts to Rise: 2  Immediate Standing Balance: 1  Standing Balance: 1  Nudged: 1  Eyes Closed: 0  Turn 360 Degrees - Continuous/Discontinuous: 0  Turn 360 Degrees - Steady/Unsteady: 0  Sitting Down: 2  Balance Score: 10 Balance total score  Indication of Gait: 1  R Step Length/Height: 0  L Step Length/Height: 1  R Foot Clearance: 1  L Foot Clearance: 1  Step Symmetry: 1  Step Continuity: 0  Path: 1  Trunk: 0  Walking Time: 0  Gait Score: 6 Gait total score  Total Score: 16/28 Overall total score         Tinetti Tool Score Risk of Falls  <19 = High Fall Risk  19-24 = Moderate Fall Risk  25-28 = Low Fall Risk  Tinetti ME. Performance-Oriented Assessment of Mobility Problems in Elderly Patients. [place] [address]; [id number].  (Scoring Description: PT Bulletin Feb. 10, 1993)    Older adults: Gerardo Aaron et al, 2009; n = 1000 [facility] elderly evaluated with ABC, KETTY, ADL, and IADL)  · Mean KETTY score for males aged 69-68 years = 26.21(3.40)  · Mean KETTY score for females age 69-68 years = 25.16(4.30)  · Mean KETTY score for males over 80 years = 23.29(6.02)  · Mean KETTY score for females over 80 years = 17.20(8.32)            Activity Tolerance:   Fair    After treatment patient left in no apparent distress:   Supine in bed, Call bell within reach, Bed / chair alarm activated, and Side rails x 3    COMMUNICATION/EDUCATION:   The patients plan of care was discussed with: Occupational therapist and Registered nurse. Fall prevention education was provided and the patient/caregiver indicated understanding., Patient/family have participated as able in goal setting and plan of care. , and Patient/family agree to work toward stated goals and plan of care.     Thank you for this referral.  Ceci Ramirez, PT   Time Calculation: 8 mins

## 2021-04-09 NOTE — PROGRESS NOTES
Hospitalist Progress Note    NAME: Jessica Sen   :  1975   MRN:  [id number]       Assessment / Plan:    Acute exacerbation of relapsing remitting multiple sclerosis, POA  Acute weakness and inability to stand secondary to above, POA  Patient had multiple ER visits in last 1 month for symptoms of generalized aches and pains with slurred speech  She is wheelchair-bound at her baseline now  Patient presented  with severe worsening of her bilateral lower extremity weakness, and mild worsening of bilateral upper arm weakness. MRI of brain and cervical spine showed stability of lesions   UA neg  s/p IV Solu-Medrol 1 g dailyx3   Continue Aubagio  She was just discharged last month and MRI at that time showed stable demyelinating lesions  P2P done on . CM working with pt in regards to SNF at discharge. Cont' PT/OT while inpt    Anxiety/depression, POA  Fibromyalgia  Continue Cymbalta and gabapentin  Add xanax prn for anxiety    Tobacco use, POA  Counseled. Code Status: Full  Surrogate Decision Maker: Patient's son  DVT Prophylaxis: Lovenox subcu  GI Prophylaxis: not indicated   dispo: IP rehab   Baseline: Is wheelchair-bound now  30.0 - 39.9 Obese / Body mass index is 37.69 kg/m². Subjective:     Chief Complaint / Reason for Physician Visit  No new complaint. Awaiting for SNF acceptance. Discussed with RN events overnight. Review of Systems:  Symptom Y/N Comments  Symptom Y/N Comments   Fever/Chills n   Chest Pain n    Poor Appetite    Edema     Cough    Abdominal Pain n    Sputum    Joint Pain     SOB/BOBO n   Pruritis/Rash     Nausea/vomit n   Tolerating PT/OT     Diarrhea    Tolerating Diet y    Constipation    Other       Could NOT obtain due to:      Objective:     VITALS:   Last 24hrs VS reviewed since prior progress note.  Most recent are:  Patient Vitals for the past 24 hrs:   Temp Pulse Resp BP SpO2   21 1125 98.9 °F (37.2 °C) 77 16 122/76 94 %   21 0813 98.3 °F (36.8 °C) 82 18 110/82 98 %   04/09/21 0331 97.8 °F (36.6 °C) 73 16 107/67 96 %   04/09/21 0016 98 °F (36.7 °C) 73 16 (!) 102/58 96 %   04/08/21 2000 98.4 °F (36.9 °C) 87 16 122/81 99 %   04/08/21 1613 98.5 °F (36.9 °C) 73 16 119/67 99 %       Intake/Output Summary (Last 24 hours) at 4/9/2021 1232  Last data filed at 4/8/2021 1254  Gross per 24 hour   Intake 120 ml   Output --   Net 120 ml        I had a face to face encounter and independently examined this patient on 4/9/2021, as outlined below:  PHYSICAL EXAM:  General: Female in bed, not in acute distress    EENT:  EOMI. Anicteric sclerae. MMM  Resp:  CTA bilaterally, no wheezing or rales. No accessory muscle use  CV:  Regular  rhythm,  No edema  GI:  Soft, Non distended, Non tender. +Bowel sounds  Neurologic:  Alert and oriented X 3, normal speech,   Psych:   Anxious, teary  Skin:  No rashes. No jaundice    Reviewed most current lab test results and cultures  YES  Reviewed most current radiology test results   YES  Review and summation of old records today    NO  Reviewed patient's current orders and MAR    YES  PMH/ reviewed - no change compared to H&P  ________________________________________________________________________  Care Plan discussed with:    Comments   Patient x    Family      RN x    Care Manager x    Consultant                       x Multidiciplinary team rounds were held today with , nursing, pharmacist and clinical coordinator. Patient's plan of care was discussed; medications were reviewed and discharge planning was addressed.      ________________________________________________________________________  Total NON critical care TIME: 25 Minutes    Total CRITICAL CARE TIME Spent:   Minutes non procedure based      Comments   >50% of visit spent in counseling and coordination of care     ________________________________________________________________________  Alanis Torres MD     Procedures: see electronic medical records for all procedures/Xrays and details which were not copied into this note but were reviewed prior to creation of Plan. LABS:  I reviewed today's most current labs and imaging studies. Pertinent labs include:  No results for input(s): WBC, HGB, HCT, PLT, HGBEXT, HCTEXT, PLTEXT, HGBEXT, HCTEXT, PLTEXT in the last 72 hours. No results for input(s): NA, K, CL, CO2, GLU, BUN, CREA, CA, MG, PHOS, ALB, TBIL, TBILI, ALT, INR, INREXT, INREXT in the last 72 hours.     No lab exists for component: SGOT    Signed: Karla Reece MD

## 2021-04-09 NOTE — PROGRESS NOTES
Problem: Falls - Risk of  Goal: *Absence of Falls  Description: Document Erika Alcala Fall Risk and appropriate interventions in the flowsheet.   Outcome: Progressing Towards Goal  Note: Fall Risk Interventions:  Mobility Interventions: Bed/chair exit alarm, Utilize walker, cane, or other assistive device    Mentation Interventions: Bed/chair exit alarm    Medication Interventions: Bed/chair exit alarm    Elimination Interventions: Bed/chair exit alarm, Call light in reach    History of Falls Interventions: Bed/chair exit alarm         Problem: Patient Education: Go to Patient Education Activity  Goal: Patient/Family Education  Outcome: Progressing Towards Goal     Problem: Patient Education: Go to Patient Education Activity  Goal: Patient/Family Education  Outcome: Progressing Towards Goal     Problem: Patient Education: Go to Patient Education Activity  Goal: Patient/Family Education  Outcome: Progressing Towards Goal     Problem: Patient Education: Go to Patient Education Activity  Goal: Patient/Family Education  Outcome: Progressing Towards Goal     Problem: TIA/CVA Stroke: 0-24 hours  Goal: Off Pathway (Use only if patient is Off Pathway)  Outcome: Progressing Towards Goal  Goal: Activity/Safety  Outcome: Progressing Towards Goal  Goal: Consults, if ordered  Outcome: Progressing Towards Goal  Goal: Diagnostic Test/Procedures  Outcome: Progressing Towards Goal  Goal: Nutrition/Diet  Outcome: Progressing Towards Goal  Goal: Discharge Planning  Outcome: Progressing Towards Goal  Goal: Medications  Outcome: Progressing Towards Goal  Goal: Respiratory  Outcome: Progressing Towards Goal  Goal: Treatments/Interventions/Procedures  Outcome: Progressing Towards Goal  Goal: Minimize risk of bleeding post-thrombolytic infusion  Outcome: Progressing Towards Goal  Goal: Monitor for complications post-thrombolytic infusion  Outcome: Progressing Towards Goal  Goal: Psychosocial  Outcome: Progressing Towards Goal  Goal: *Hemodynamically stable  Outcome: Progressing Towards Goal  Goal: *Neurologically stable  Description: Absence of additional neurological deficits    Outcome: Progressing Towards Goal  Goal: *Verbalizes anxiety and depression are reduced or absent  Outcome: Progressing Towards Goal  Goal: *Absence of Signs of Aspiration on Current Diet  Outcome: Progressing Towards Goal  Goal: *Absence of deep venous thrombosis signs and symptoms(Stroke Metric)  Outcome: Progressing Towards Goal  Goal: *Ability to perform ADLs and demonstrates progressive mobility and function  Outcome: Progressing Towards Goal  Goal: *Stroke education started(Stroke Metric)  Outcome: Progressing Towards Goal  Goal: *Dysphagia screen performed(Stroke Metric)  Outcome: Progressing Towards Goal  Goal: *Rehab consulted(Stroke Metric)  Outcome: Progressing Towards Goal     Problem: TIA/CVA Stroke: Day 2 Until Discharge  Goal: Off Pathway (Use only if patient is Off Pathway)  Outcome: Progressing Towards Goal  Goal: Activity/Safety  Outcome: Progressing Towards Goal  Goal: Diagnostic Test/Procedures  Outcome: Progressing Towards Goal  Goal: Nutrition/Diet  Outcome: Progressing Towards Goal  Goal: Discharge Planning  Outcome: Progressing Towards Goal  Goal: Medications  Outcome: Progressing Towards Goal  Goal: Respiratory  Outcome: Progressing Towards Goal  Goal: Treatments/Interventions/Procedures  Outcome: Progressing Towards Goal  Goal: Psychosocial  Outcome: Progressing Towards Goal  Goal: *Verbalizes anxiety and depression are reduced or absent  Outcome: Progressing Towards Goal  Goal: *Absence of aspiration  Outcome: Progressing Towards Goal  Goal: *Absence of deep venous thrombosis signs and symptoms(Stroke Metric)  Outcome: Progressing Towards Goal  Goal: *Optimal pain control at patient's stated goal  Outcome: Progressing Towards Goal  Goal: *Tolerating diet  Outcome: Progressing Towards Goal  Goal: *Ability to perform ADLs and demonstrates progressive mobility and function  Outcome: Progressing Towards Goal  Goal: *Stroke education continued(Stroke Metric)  Outcome: Progressing Towards Goal     Problem: Ischemic Stroke: Discharge Outcomes  Goal: *Verbalizes anxiety and depression are reduced or absent  Outcome: Progressing Towards Goal  Goal: *Verbalize understanding of risk factor modification(Stroke Metric)  Outcome: Progressing Towards Goal  Goal: *Hemodynamically stable  Outcome: Progressing Towards Goal  Goal: *Absence of aspiration pneumonia  Outcome: Progressing Towards Goal  Goal: *Aware of needed dietary changes  Outcome: Progressing Towards Goal  Goal: *Verbalize understanding of prescribed medications including anti-coagulants, anti-lipid, and/or anti-platelets(Stroke Metric)  Outcome: Progressing Towards Goal  Goal: *Tolerating diet  Outcome: Progressing Towards Goal  Goal: *Aware of follow-up diagnostics related to anticoagulants  Outcome: Progressing Towards Goal  Goal: *Ability to perform ADLs and demonstrates progressive mobility and function  Outcome: Progressing Towards Goal  Goal: *Absence of DVT(Stroke Metric)  Outcome: Progressing Towards Goal  Goal: *Absence of aspiration  Outcome: Progressing Towards Goal  Goal: *Optimal pain control at patient's stated goal  Outcome: Progressing Towards Goal  Goal: *Home safety concerns addressed  Outcome: Progressing Towards Goal  Goal: *Describes available resources and support systems  Outcome: Progressing Towards Goal  Goal: *Verbalizes understanding of activation of EMS(911) for stroke symptoms(Stroke Metric)  Outcome: Progressing Towards Goal  Goal: *Understands and describes signs and symptoms to report to providers(Stroke Metric)  Outcome: Progressing Towards Goal  Goal: *Neurolgocially stable (absence of additional neurological deficits)  Outcome: Progressing Towards Goal  Goal: *Verbalizes importance of follow-up with primary care physician(Stroke Metric)  Outcome: Progressing Towards Goal  Goal: *Smoking cessation discussed,if applicable(Stroke Metric)  Outcome: Progressing Towards Goal  Goal: *Depression screening completed(Stroke Metric)  Outcome: Progressing Towards Goal     Problem: Patient Education: Go to Patient Education Activity  Goal: Patient/Family Education  Outcome: Progressing Towards Goal     Problem: Patient Education: Go to Patient Education Activity  Goal: Patient/Family Education  Outcome: Progressing Towards Goal

## 2021-04-10 PROCEDURE — 65660000000 HC RM CCU STEPDOWN

## 2021-04-10 PROCEDURE — 74011250637 HC RX REV CODE- 250/637: Performed by: INTERNAL MEDICINE

## 2021-04-10 PROCEDURE — 74011250637 HC RX REV CODE- 250/637: Performed by: PSYCHIATRY & NEUROLOGY

## 2021-04-10 PROCEDURE — 74011250637 HC RX REV CODE- 250/637: Performed by: NURSE PRACTITIONER

## 2021-04-10 PROCEDURE — 74011250636 HC RX REV CODE- 250/636: Performed by: INTERNAL MEDICINE

## 2021-04-10 PROCEDURE — 77030038269 HC DRN EXT URIN PURWCK BARD -A

## 2021-04-10 RX ADMIN — Medication 10 ML: at 22:07

## 2021-04-10 RX ADMIN — GABAPENTIN 600 MG: 300 CAPSULE ORAL at 18:21

## 2021-04-10 RX ADMIN — OXYCODONE 5 MG: 5 TABLET ORAL at 18:21

## 2021-04-10 RX ADMIN — ZOLPIDEM TARTRATE 5 MG: 5 TABLET ORAL at 22:07

## 2021-04-10 RX ADMIN — ALPRAZOLAM 0.25 MG: 0.5 TABLET ORAL at 22:07

## 2021-04-10 RX ADMIN — DULOXETINE HYDROCHLORIDE 30 MG: 30 CAPSULE, DELAYED RELEASE ORAL at 09:51

## 2021-04-10 RX ADMIN — ALPRAZOLAM 0.25 MG: 0.5 TABLET ORAL at 09:50

## 2021-04-10 RX ADMIN — ALPRAZOLAM 0.25 MG: 0.5 TABLET ORAL at 15:08

## 2021-04-10 RX ADMIN — OXYCODONE 5 MG: 5 TABLET ORAL at 22:07

## 2021-04-10 RX ADMIN — GABAPENTIN 600 MG: 300 CAPSULE ORAL at 09:51

## 2021-04-10 RX ADMIN — POLYETHYLENE GLYCOL 3350 17 G: 17 POWDER, FOR SOLUTION ORAL at 09:51

## 2021-04-10 RX ADMIN — Medication 10 ML: at 06:45

## 2021-04-10 RX ADMIN — OXYCODONE 5 MG: 5 TABLET ORAL at 06:13

## 2021-04-10 RX ADMIN — Medication 10 ML: at 15:08

## 2021-04-10 RX ADMIN — OXYCODONE 5 MG: 5 TABLET ORAL at 12:01

## 2021-04-10 RX ADMIN — ENOXAPARIN SODIUM 40 MG: 40 INJECTION SUBCUTANEOUS at 09:51

## 2021-04-10 RX ADMIN — GABAPENTIN 600 MG: 300 CAPSULE ORAL at 12:01

## 2021-04-10 RX ADMIN — GABAPENTIN 600 MG: 300 CAPSULE ORAL at 22:07

## 2021-04-10 NOTE — PROGRESS NOTES
Spiritual Care Assessment/Progress Note  [facility]      NAME: Kera Hammer      MRN: [id number]  AGE: 50 y.o.  SEX: female  Alevism Affiliation: Shanna Chaney   Language: English     4/10/2021     Total Time (in minutes): 5     Spiritual Assessment begun in [facility] [employer] through conversation with:         [x]Patient        [] Family    [] Friend(s)        Reason for Consult: Initial/Spiritual assessment, patient floor     Spiritual beliefs: (Please include comment if needed)     [] Identifies with a sudhakar tradition:         [] Supported by a sudhakar community:            [] Claims no spiritual orientation:           [] Seeking spiritual identity:                [] Adheres to an individual form of spirituality:           [x] Not able to assess:                           Identified resources for coping:      [] Prayer                               [] Music                  [] Guided Imagery     [] Family/friends                 [] Pet visits     [] Devotional reading                         [x] Unknown     [] Other:                                               Interventions offered during this visit: (See comments for more details)    Patient Interventions: Affirmation of emotions/emotional suffering, Initial visit, Initial/Spiritual assessment, patient floor           Plan of Care:     [] Support spiritual and/or cultural needs    [] Support AMD and/or advance care planning process      [] Support grieving process   [] Coordinate Rites and/or Rituals    [] Coordination with community clergy   [] No spiritual needs identified at this time   [] Detailed Plan of Care below (See Comments)  [] Make referral to Music Therapy  [] Make referral to Pet Therapy     [] Make referral to Addiction services  [] Make referral to [facility]  [] Make referral to Spiritual Care Partner  [] No future visits requested        [x] Follow up upon further referrals     Comments:     Initial Spiritual Care Assessment visit for Ms. Berry Kulkarni in [address]. Patient was alert, no family was present.  explored her concerns, she denied any spiritual/emotional needs. Advised of  availability.       [id number] [facility] Renaldo Hinojosa, M.S., Th.M.  Spiritual Care Provider   Paging Service [phone])

## 2021-04-10 NOTE — PROGRESS NOTES
1900 End of Shift Note    Bedside shift change report given to Coleman Myles RN (oncoming nurse) by John Tse RN (offgoing nurse). Report included the following information SBAR, Kardex, Intake/Output and MAR    Shift worked: night     Shift summary and any significant changes:     None     Concerns for physician to address:  None     Zone phone for oncoming shift:   [phone]       Activity:  Activity Level: Up with Assistance  Number times ambulated in hallways past shift: 0  Number of times OOB to chair past shift: 0    Cardiac:   Cardiac Monitoring: Yes      Cardiac Rhythm: Normal sinus rhythm    Access:   Current line(s): PIV     Genitourinary:   Urinary status: voiding    Respiratory:   O2 Device: None (Room air)  Chronic home O2 use?: N/A  Incentive spirometer at bedside: N/A     GI:  Last Bowel Movement Date: 04/08/21  Current diet:  DIET REGULAR 2 GM NA (House Low NA)  Passing flatus: YES  Tolerating current diet: YES  % Diet Eaten: 80 %    Pain Management:   Patient states pain is manageable on current regimen: YES    Skin:  Samy Score: 21  Interventions: increase time out of bed    Patient Safety:  Fall Score:  Total Score: 2  Interventions: bed/chair alarm  High Fall Risk: Yes    Length of Stay:  Expected LOS: 3d 0h  Actual LOS: 6      John Tse RN

## 2021-04-10 NOTE — PROGRESS NOTES
Problem: Falls - Risk of  Goal: *Absence of Falls  Description: Document Desiree Moore Fall Risk and appropriate interventions in the flowsheet. Outcome: Progressing Towards Goal  Note: Fall Risk Interventions:  Mobility Interventions: Bed/chair exit alarm, PT Consult for mobility concerns    Mentation Interventions: Bed/chair exit alarm    Medication Interventions: Bed/chair exit alarm, Assess postural VS orthostatic hypotension    Elimination Interventions: Bed/chair exit alarm, Call light in reach    History of Falls Interventions: Bed/chair exit alarm         Problem: Patient Education: Go to Patient Education Activity  Goal: Patient/Family Education  Outcome: Progressing Towards Goal     Problem: Patient Education: Go to Patient Education Activity  Goal: Patient/Family Education  Outcome: Progressing Towards Goal     Problem: Pressure Injury - Risk of  Goal: *Prevention of pressure injury  Description: Document Samy Scale and appropriate interventions in the flowsheet.   Outcome: Progressing Towards Goal  Note: Pressure Injury Interventions:  Sensory Interventions: Assess changes in LOC    Moisture Interventions: Minimize layers    Activity Interventions: PT/OT evaluation    Mobility Interventions: PT/OT evaluation, HOB 30 degrees or less    Nutrition Interventions: Document food/fluid/supplement intake    Friction and Shear Interventions: Apply protective barrier, creams and emollients, Minimize layers, Lift team/patient mobility team                Problem: Patient Education: Go to Patient Education Activity  Goal: Patient/Family Education  Outcome: Progressing Towards Goal     Problem: Patient Education: Go to Patient Education Activity  Goal: Patient/Family Education  Outcome: Progressing Towards Goal     Problem: TIA/CVA Stroke: 0-24 hours  Goal: Off Pathway (Use only if patient is Off Pathway)  Outcome: Progressing Towards Goal  Goal: Activity/Safety  Outcome: Progressing Towards Goal  Goal: Consults, if ordered  Outcome: Progressing Towards Goal  Goal: Diagnostic Test/Procedures  Outcome: Progressing Towards Goal  Goal: Nutrition/Diet  Outcome: Progressing Towards Goal  Goal: Discharge Planning  Outcome: Progressing Towards Goal  Goal: Medications  Outcome: Progressing Towards Goal  Goal: Respiratory  Outcome: Progressing Towards Goal  Goal: Treatments/Interventions/Procedures  Outcome: Progressing Towards Goal  Goal: Minimize risk of bleeding post-thrombolytic infusion  Outcome: Progressing Towards Goal  Goal: Monitor for complications post-thrombolytic infusion  Outcome: Progressing Towards Goal  Goal: Psychosocial  Outcome: Progressing Towards Goal  Goal: *Hemodynamically stable  Outcome: Progressing Towards Goal  Goal: *Neurologically stable  Description: Absence of additional neurological deficits    Outcome: Progressing Towards Goal  Goal: *Verbalizes anxiety and depression are reduced or absent  Outcome: Progressing Towards Goal  Goal: *Absence of Signs of Aspiration on Current Diet  Outcome: Progressing Towards Goal  Goal: *Absence of deep venous thrombosis signs and symptoms(Stroke Metric)  Outcome: Progressing Towards Goal  Goal: *Ability to perform ADLs and demonstrates progressive mobility and function  Outcome: Progressing Towards Goal  Goal: *Stroke education started(Stroke Metric)  Outcome: Progressing Towards Goal  Goal: *Dysphagia screen performed(Stroke Metric)  Outcome: Progressing Towards Goal  Goal: *Rehab consulted(Stroke Metric)  Outcome: Progressing Towards Goal     Problem: TIA/CVA Stroke: Day 2 Until Discharge  Goal: Off Pathway (Use only if patient is Off Pathway)  Outcome: Progressing Towards Goal  Goal: Activity/Safety  Outcome: Progressing Towards Goal  Goal: Diagnostic Test/Procedures  Outcome: Progressing Towards Goal  Goal: Nutrition/Diet  Outcome: Progressing Towards Goal  Goal: Discharge Planning  Outcome: Progressing Towards Goal  Goal: Medications  Outcome: Progressing Towards Goal  Goal: Respiratory  Outcome: Progressing Towards Goal  Goal: Treatments/Interventions/Procedures  Outcome: Progressing Towards Goal  Goal: Psychosocial  Outcome: Progressing Towards Goal  Goal: *Verbalizes anxiety and depression are reduced or absent  Outcome: Progressing Towards Goal  Goal: *Absence of aspiration  Outcome: Progressing Towards Goal  Goal: *Absence of deep venous thrombosis signs and symptoms(Stroke Metric)  Outcome: Progressing Towards Goal  Goal: *Optimal pain control at patient's stated goal  Outcome: Progressing Towards Goal  Goal: *Tolerating diet  Outcome: Progressing Towards Goal  Goal: *Ability to perform ADLs and demonstrates progressive mobility and function  Outcome: Progressing Towards Goal  Goal: *Stroke education continued(Stroke Metric)  Outcome: Progressing Towards Goal     Problem: Patient Education: Go to Patient Education Activity  Goal: Patient/Family Education  Outcome: Progressing Towards Goal     Problem: Patient Education: Go to Patient Education Activity  Goal: Patient/Family Education  Outcome: Progressing Towards Goal

## 2021-04-10 NOTE — PROGRESS NOTES
End of Shift Note     Bedside shift change report given to Michele Marquez RN (oncoming nurse) by Gwendolyn Garcia RN (offgoing nurse). Report included the following information SBAR, Kardex, Intake/Output and MAR     Shift worked: Day       Shift summary and any significant changes:      None      Concerns for physician to address:  None      Zone phone for oncoming shift:   [phone]         Activity:  Activity Level: Up with Assistance  Number times ambulated in hallways past shift: 0  Number of times OOB to chair past shift: 0     Cardiac:   Cardiac Monitoring: Yes      Cardiac Rhythm: Normal sinus rhythm     Access:   Current line(s): PIV      Genitourinary:   Urinary status: voiding     Respiratory:   O2 Device: None (Room air)  Chronic home O2 use?: N/A  Incentive spirometer at bedside: N/A  GI:  Last Bowel Movement Date: 04/08/21  Current diet:  DIET REGULAR 2 GM NA (House Low NA)  Passing flatus: YES  Tolerating current diet: YES  % Diet Eaten: 80 %     Pain Management:   Patient states pain is manageable on current regimen: YES     Skin:  Samy Score: 21  Interventions: increase time out of bed    Patient Safety:  Fall Score: Total Score: 2  Interventions: bed/chair alarm  High Fall Risk:  Yes     Length of Stay:  Expected LOS: 3d 0h  Actual LOS: 6

## 2021-04-10 NOTE — PROGRESS NOTES
Hospitalist Progress Note    NAME: Omid Sharma   :  1975   MRN:  [id number]       Assessment / Plan:    Acute exacerbation of relapsing remitting multiple sclerosis, POA  Acute weakness and inability to stand secondary to above, POA  Patient had multiple ER visits in last 1 month for symptoms of generalized aches and pains with slurred speech  She is wheelchair-bound at her baseline now  Patient presented  with severe worsening of her bilateral lower extremity weakness, and mild worsening of bilateral upper arm weakness. MRI of brain and cervical spine showed stability of lesions   UA neg  s/p IV Solu-Medrol 1 g dailyx3   Continue Aubagio  She was just discharged last month and MRI at that time showed stable demyelinating lesions  P2P done on ., . CM working with pt in regards to SNF at discharge. Cont' PT/OT while inpt    Anxiety/depression, POA  Fibromyalgia  Continue Cymbalta and gabapentin  Add xanax prn for anxiety    Tobacco use, POA  Counseled. Code Status: Full  Surrogate Decision Maker: Patient's son  DVT Prophylaxis: Lovenox subcu  GI Prophylaxis: not indicated   dispo: IP rehab   Baseline: Is wheelchair-bound now  30.0 - 39.9 Obese / Body mass index is 37.69 kg/m². Subjective:     Chief Complaint / Reason for Physician Visit  No new complaint. Discussed with RN events overnight. Review of Systems:  Symptom Y/N Comments  Symptom Y/N Comments   Fever/Chills n   Chest Pain n    Poor Appetite    Edema     Cough    Abdominal Pain n    Sputum    Joint Pain     SOB/BOBO n   Pruritis/Rash     Nausea/vomit n   Tolerating PT/OT     Diarrhea    Tolerating Diet y    Constipation    Other       Could NOT obtain due to:      Objective:     VITALS:   Last 24hrs VS reviewed since prior progress note.  Most recent are:  Patient Vitals for the past 24 hrs:   Temp Pulse Resp BP SpO2   04/10/21 0750 97.8 °F (36.6 °C) 72 16 123/70 100 %   04/10/21 0316 97.9 °F (36.6 °C) 66 16 103/66 100 %   04/10/21 0005 98 °F (36.7 °C) 76 16 107/69 99 %   04/09/21 2000 98 °F (36.7 °C) 87 16 120/83 98 %   04/09/21 1531 98.5 °F (36.9 °C) 69 16 114/77 99 %   04/09/21 1125 98.9 °F (37.2 °C) 77 16 122/76 94 %       Intake/Output Summary (Last 24 hours) at 4/10/2021 1120  Last data filed at 4/10/2021 0600  Gross per 24 hour   Intake 240 ml   Output 800 ml   Net -560 ml        I had a face to face encounter and independently examined this patient on 4/10/2021, as outlined below:  PHYSICAL EXAM:  General: Female in bed, not in acute distress    EENT:  EOMI. Anicteric sclerae. MMM  Resp:  CTA bilaterally, no wheezing or rales. No accessory muscle use  CV:  Regular  rhythm,  No edema  GI:  Soft, Non distended, Non tender. +Bowel sounds  Neurologic:  Alert and oriented X 3, normal speech,   Psych:   Anxious, teary  Skin:  No rashes. No jaundice    Reviewed most current lab test results and cultures  YES  Reviewed most current radiology test results   YES  Review and summation of old records today    NO  Reviewed patient's current orders and MAR    YES  PMH/ reviewed - no change compared to H&P  ________________________________________________________________________  Care Plan discussed with:    Comments   Patient x    Family      RN x    Care Manager x    Consultant                       x Multidiciplinary team rounds were held today with , nursing, pharmacist and clinical coordinator. Patient's plan of care was discussed; medications were reviewed and discharge planning was addressed.      ________________________________________________________________________  Total NON critical care TIME: 25 Minutes    Total CRITICAL CARE TIME Spent:   Minutes non procedure based      Comments   >50% of visit spent in counseling and coordination of care     ________________________________________________________________________  Gordon Ortiz MD     Procedures: see electronic medical records for all procedures/Xrays and details which were not copied into this note but were reviewed prior to creation of Plan. LABS:  I reviewed today's most current labs and imaging studies. Pertinent labs include:  No results for input(s): WBC, HGB, HCT, PLT, HGBEXT, HCTEXT, PLTEXT, HGBEXT, HCTEXT, PLTEXT in the last 72 hours. No results for input(s): NA, K, CL, CO2, GLU, BUN, CREA, CA, MG, PHOS, ALB, TBIL, TBILI, ALT, INR, INREXT, INREXT in the last 72 hours.     No lab exists for component: SGOT    Signed: Kateryna Garcia MD

## 2021-04-11 PROCEDURE — 74011250636 HC RX REV CODE- 250/636: Performed by: INTERNAL MEDICINE

## 2021-04-11 PROCEDURE — 77030038269 HC DRN EXT URIN PURWCK BARD -A

## 2021-04-11 PROCEDURE — 74011250637 HC RX REV CODE- 250/637: Performed by: NURSE PRACTITIONER

## 2021-04-11 PROCEDURE — 74011250637 HC RX REV CODE- 250/637: Performed by: INTERNAL MEDICINE

## 2021-04-11 PROCEDURE — 65660000000 HC RM CCU STEPDOWN

## 2021-04-11 PROCEDURE — 74011250637 HC RX REV CODE- 250/637: Performed by: PSYCHIATRY & NEUROLOGY

## 2021-04-11 RX ORDER — ALPRAZOLAM 0.5 MG/1
0.5 TABLET ORAL
Status: DISCONTINUED | OUTPATIENT
Start: 2021-04-11 | End: 2021-04-12 | Stop reason: HOSPADM

## 2021-04-11 RX ADMIN — GABAPENTIN 600 MG: 300 CAPSULE ORAL at 14:20

## 2021-04-11 RX ADMIN — ENOXAPARIN SODIUM 40 MG: 40 INJECTION SUBCUTANEOUS at 09:31

## 2021-04-11 RX ADMIN — ALPRAZOLAM 0.25 MG: 0.5 TABLET ORAL at 09:31

## 2021-04-11 RX ADMIN — OXYCODONE 5 MG: 5 TABLET ORAL at 03:43

## 2021-04-11 RX ADMIN — OXYCODONE 5 MG: 5 TABLET ORAL at 14:20

## 2021-04-11 RX ADMIN — POLYETHYLENE GLYCOL 3350 17 G: 17 POWDER, FOR SOLUTION ORAL at 09:31

## 2021-04-11 RX ADMIN — Medication 10 ML: at 14:20

## 2021-04-11 RX ADMIN — GABAPENTIN 600 MG: 300 CAPSULE ORAL at 22:03

## 2021-04-11 RX ADMIN — ALPRAZOLAM 0.5 MG: 0.5 TABLET ORAL at 22:08

## 2021-04-11 RX ADMIN — ZOLPIDEM TARTRATE 5 MG: 5 TABLET ORAL at 22:03

## 2021-04-11 RX ADMIN — ALPRAZOLAM 0.5 MG: 0.5 TABLET ORAL at 17:25

## 2021-04-11 RX ADMIN — GABAPENTIN 600 MG: 300 CAPSULE ORAL at 17:25

## 2021-04-11 RX ADMIN — Medication 10 ML: at 22:18

## 2021-04-11 RX ADMIN — DULOXETINE HYDROCHLORIDE 30 MG: 30 CAPSULE, DELAYED RELEASE ORAL at 09:31

## 2021-04-11 RX ADMIN — GABAPENTIN 600 MG: 300 CAPSULE ORAL at 09:31

## 2021-04-11 NOTE — PROGRESS NOTES
Hospitalist Progress Note    NAME: Mita Mejia   :  1975   MRN:  [id number]       Assessment / Plan:    Acute exacerbation of relapsing remitting multiple sclerosis, POA  Acute weakness and inability to stand secondary to above, POA  Patient had multiple ER visits in last 1 month for symptoms of generalized aches and pains with slurred speech  She is wheelchair-bound at her baseline now  Patient presented  with severe worsening of her bilateral lower extremity weakness, and mild worsening of bilateral upper arm weakness. MRI of brain and cervical spine showed stability of lesions   UA neg  s/p IV Solu-Medrol 1 g dailyx3   Continue Aubagio  She was just discharged last month and MRI at that time showed stable demyelinating lesions  P2P done on . CM working with pt in regards to SNF at discharge. Hopefully can be discharge tomorrow  Cont' PT/OT while inpt    Anxiety/depression, POA  Fibromyalgia  Continue Cymbalta and gabapentin  Add xanax prn for anxiety    Tobacco use, POA  Counseled. Code Status: Full  Surrogate Decision Maker: Patient's son  DVT Prophylaxis: Lovenox subcu  GI Prophylaxis: not indicated   dispo: IP rehab   Baseline: Is wheelchair-bound now  30.0 - 39.9 Obese / Body mass index is 37.69 kg/m². Subjective:     Chief Complaint / Reason for Physician Visit  No new complaint. Pt is in good spirits today. Discussed with RN events overnight. Review of Systems:  Symptom Y/N Comments  Symptom Y/N Comments   Fever/Chills n   Chest Pain n    Poor Appetite    Edema     Cough    Abdominal Pain n    Sputum    Joint Pain     SOB/BOBO n   Pruritis/Rash     Nausea/vomit n   Tolerating PT/OT     Diarrhea    Tolerating Diet y    Constipation    Other       Could NOT obtain due to:      Objective:     VITALS:   Last 24hrs VS reviewed since prior progress note.  Most recent are:  Patient Vitals for the past 24 hrs:   Temp Pulse Resp BP SpO2   21 1126 97.9 °F (36.6 °C) 68 18 114/76 98 %   04/11/21 0729 97.6 °F (36.4 °C) 64 16 108/69 98 %   04/11/21 0340 97.5 °F (36.4 °C) 68 18 104/64 98 %   04/10/21 2328 98 °F (36.7 °C) 83 18 (!) 143/66 97 %   04/10/21 2001 98.4 °F (36.9 °C) 75 18 118/79 98 %   04/10/21 1601 97.9 °F (36.6 °C) 80 16 128/75 100 %   04/10/21 1145 98 °F (36.7 °C) 84 18 118/68 96 %       Intake/Output Summary (Last 24 hours) at 4/11/2021 1129  Last data filed at 4/10/2021 1800  Gross per 24 hour   Intake --   Output 300 ml   Net -300 ml        I had a face to face encounter and independently examined this patient on 4/11/2021, as outlined below:  PHYSICAL EXAM:  General: Female in bed, not in acute distress    EENT:  EOMI. Anicteric sclerae. MMM  Resp:  CTA bilaterally, no wheezing or rales. No accessory muscle use  CV:  Regular  rhythm,  No edema  GI:  Soft, Non distended, Non tender. +Bowel sounds  Neurologic:  Alert and oriented X 3, normal speech,   Psych:   Anxious, teary  Skin:  No rashes. No jaundice    Reviewed most current lab test results and cultures  YES  Reviewed most current radiology test results   YES  Review and summation of old records today    NO  Reviewed patient's current orders and MAR    YES  PMH/ reviewed - no change compared to H&P  ________________________________________________________________________  Care Plan discussed with:    Comments   Patient x    Family      RN x    Care Manager x    Consultant                       x Multidiciplinary team rounds were held today with , nursing, pharmacist and clinical coordinator. Patient's plan of care was discussed; medications were reviewed and discharge planning was addressed.      ________________________________________________________________________  Total NON critical care TIME: 25 Minutes    Total CRITICAL CARE TIME Spent:   Minutes non procedure based      Comments   >50% of visit spent in counseling and coordination of care ________________________________________________________________________  Bebeto Clayton MD     Procedures: see electronic medical records for all procedures/Xrays and details which were not copied into this note but were reviewed prior to creation of Plan. LABS:  I reviewed today's most current labs and imaging studies. Pertinent labs include:  No results for input(s): WBC, HGB, HCT, PLT, HGBEXT, HCTEXT, PLTEXT, HGBEXT, HCTEXT, PLTEXT in the last 72 hours. No results for input(s): NA, K, CL, CO2, GLU, BUN, CREA, CA, MG, PHOS, ALB, TBIL, TBILI, ALT, INR, INREXT, INREXT in the last 72 hours.     No lab exists for component: SGOT    Signed: Bebeto Clayton MD

## 2021-04-11 NOTE — PROGRESS NOTES
End of Shift Note     Bedside shift change report given to [place] (oncoming nurse) by Wanda Castro RN (offgoing nurse). Report included the following information SBAR, Kardex, Intake/Output and MAR     Shift worked: night      Shift summary and any significant changes:      None      Concerns for physician to address:  None      Zone phone for oncoming shift:   [phone]         Activity:  Activity Level: Up with Assistance  Number times ambulated in hallways past shift: 0  Number of times OOB to chair past shift: 0     Cardiac:   Cardiac Monitoring: Yes      Cardiac Rhythm: Normal sinus rhythm     Access:   Current line(s): PIV      Genitourinary:   Urinary status: voiding     Respiratory:   O2 Device: None (Room air)  Chronic home O2 use?: N/A  Incentive spirometer at bedside: N/A  GI:  Last Bowel Movement Date: 04/08/21  Current diet:  DIET REGULAR 2 GM NA (House Low NA)  Passing flatus: YES  Tolerating current diet: YES  % Diet Eaten: 80 %     Pain Management:   Patient states pain is manageable on current regimen: YES     Skin:  Samy Score: 21  Interventions: increase time out of bed    Patient Safety:  Fall Score: Total Score: 2  Interventions: bed/chair alarm  High Fall Risk:  Yes     Length of Stay:  Expected LOS: 3d 0h  Actual LOS: 6

## 2021-04-11 NOTE — PROGRESS NOTES
Problem: Falls - Risk of  Goal: *Absence of Falls  Description: Document Chris Gomez Fall Risk and appropriate interventions in the flowsheet. Outcome: Progressing Towards Goal  Note: Fall Risk Interventions:  Mobility Interventions: Bed/chair exit alarm, Communicate number of staff needed for ambulation/transfer, OT consult for ADLs, Patient to call before getting OOB, PT Consult for mobility concerns, PT Consult for assist device competence, Utilize walker, cane, or other assistive device    Mentation Interventions: Adequate sleep, hydration, pain control    Medication Interventions: Bed/chair exit alarm, Evaluate medications/consider consulting pharmacy, Patient to call before getting OOB    Elimination Interventions: Bed/chair exit alarm, Call light in reach, Patient to call for help with toileting needs    History of Falls Interventions: Bed/chair exit alarm, Consult care management for discharge planning         Problem: Patient Education: Go to Patient Education Activity  Goal: Patient/Family Education  Outcome: Progressing Towards Goal     Problem: Patient Education: Go to Patient Education Activity  Goal: Patient/Family Education  Outcome: Progressing Towards Goal     Problem: Pressure Injury - Risk of  Goal: *Prevention of pressure injury  Description: Document Samy Scale and appropriate interventions in the flowsheet.   Outcome: Progressing Towards Goal  Note: Pressure Injury Interventions:  Sensory Interventions: Keep linens dry and wrinkle-free, Maintain/enhance activity level    Moisture Interventions: Minimize layers, Moisture barrier    Activity Interventions: Increase time out of bed, Pressure redistribution bed/mattress(bed type), PT/OT evaluation    Mobility Interventions: HOB 30 degrees or less, Pressure redistribution bed/mattress (bed type), PT/OT evaluation    Nutrition Interventions: Document food/fluid/supplement intake    Friction and Shear Interventions: Apply protective barrier, creams and emollients, Lift team/patient mobility team, Minimize layers                Problem: Patient Education: Go to Patient Education Activity  Goal: Patient/Family Education  Outcome: Progressing Towards Goal     Problem: Patient Education: Go to Patient Education Activity  Goal: Patient/Family Education  Outcome: Progressing Towards Goal     Problem: TIA/CVA Stroke: 0-24 hours  Goal: Off Pathway (Use only if patient is Off Pathway)  Outcome: Progressing Towards Goal  Goal: Activity/Safety  Outcome: Progressing Towards Goal  Goal: Consults, if ordered  Outcome: Progressing Towards Goal  Goal: Diagnostic Test/Procedures  Outcome: Progressing Towards Goal  Goal: Nutrition/Diet  Outcome: Progressing Towards Goal  Goal: Discharge Planning  Outcome: Progressing Towards Goal  Goal: Medications  Outcome: Progressing Towards Goal  Goal: Respiratory  Outcome: Progressing Towards Goal  Goal: Treatments/Interventions/Procedures  Outcome: Progressing Towards Goal  Goal: Minimize risk of bleeding post-thrombolytic infusion  Outcome: Progressing Towards Goal  Goal: Monitor for complications post-thrombolytic infusion  Outcome: Progressing Towards Goal  Goal: Psychosocial  Outcome: Progressing Towards Goal  Goal: *Hemodynamically stable  Outcome: Progressing Towards Goal  Goal: *Neurologically stable  Description: Absence of additional neurological deficits    Outcome: Progressing Towards Goal  Goal: *Verbalizes anxiety and depression are reduced or absent  Outcome: Progressing Towards Goal  Goal: *Absence of Signs of Aspiration on Current Diet  Outcome: Progressing Towards Goal  Goal: *Absence of deep venous thrombosis signs and symptoms(Stroke Metric)  Outcome: Progressing Towards Goal  Goal: *Ability to perform ADLs and demonstrates progressive mobility and function  Outcome: Progressing Towards Goal  Goal: *Stroke education started(Stroke Metric)  Outcome: Progressing Towards Goal  Goal: *Dysphagia screen performed(Stroke Metric)  Outcome: Progressing Towards Goal  Goal: *Rehab consulted(Stroke Metric)  Outcome: Progressing Towards Goal     Problem: TIA/CVA Stroke: Day 2 Until Discharge  Goal: Off Pathway (Use only if patient is Off Pathway)  Outcome: Progressing Towards Goal  Goal: Activity/Safety  Outcome: Progressing Towards Goal  Goal: Diagnostic Test/Procedures  Outcome: Progressing Towards Goal  Goal: Nutrition/Diet  Outcome: Progressing Towards Goal  Goal: Discharge Planning  Outcome: Progressing Towards Goal  Goal: Medications  Outcome: Progressing Towards Goal  Goal: Respiratory  Outcome: Progressing Towards Goal  Goal: Treatments/Interventions/Procedures  Outcome: Progressing Towards Goal  Goal: Psychosocial  Outcome: Progressing Towards Goal  Goal: *Verbalizes anxiety and depression are reduced or absent  Outcome: Progressing Towards Goal  Goal: *Absence of aspiration  Outcome: Progressing Towards Goal  Goal: *Absence of deep venous thrombosis signs and symptoms(Stroke Metric)  Outcome: Progressing Towards Goal  Goal: *Optimal pain control at patient's stated goal  Outcome: Progressing Towards Goal  Goal: *Tolerating diet  Outcome: Progressing Towards Goal  Goal: *Ability to perform ADLs and demonstrates progressive mobility and function  Outcome: Progressing Towards Goal  Goal: *Stroke education continued(Stroke Metric)  Outcome: Progressing Towards Goal     Problem: Patient Education: Go to Patient Education Activity  Goal: Patient/Family Education  Outcome: Progressing Towards Goal     Problem: Patient Education: Go to Patient Education Activity  Goal: Patient/Family Education  Outcome: Progressing Towards Goal

## 2021-04-12 ENCOUNTER — PATIENT OUTREACH (OUTPATIENT)
Dept: CASE MANAGEMENT | Age: 46
End: 2021-04-12

## 2021-04-12 VITALS
BODY MASS INDEX: 37.17 KG/M2 | HEART RATE: 82 BPM | WEIGHT: 223.11 LBS | DIASTOLIC BLOOD PRESSURE: 66 MMHG | HEIGHT: 65 IN | TEMPERATURE: 98.6 F | RESPIRATION RATE: 16 BRPM | SYSTOLIC BLOOD PRESSURE: 103 MMHG | OXYGEN SATURATION: 97 %

## 2021-04-12 PROCEDURE — 74011250637 HC RX REV CODE- 250/637: Performed by: INTERNAL MEDICINE

## 2021-04-12 PROCEDURE — 74011250637 HC RX REV CODE- 250/637: Performed by: PSYCHIATRY & NEUROLOGY

## 2021-04-12 PROCEDURE — 74011250636 HC RX REV CODE- 250/636: Performed by: INTERNAL MEDICINE

## 2021-04-12 RX ORDER — GABAPENTIN 600 MG/1
600 TABLET ORAL 3 TIMES DAILY
Qty: 20 TAB | Refills: 0 | Status: ON HOLD | OUTPATIENT
Start: 2021-04-12 | End: 2021-05-27 | Stop reason: SDUPTHER

## 2021-04-12 RX ADMIN — GABAPENTIN 600 MG: 300 CAPSULE ORAL at 10:07

## 2021-04-12 RX ADMIN — ENOXAPARIN SODIUM 40 MG: 40 INJECTION SUBCUTANEOUS at 10:08

## 2021-04-12 RX ADMIN — OXYCODONE 5 MG: 5 TABLET ORAL at 10:07

## 2021-04-12 RX ADMIN — ALPRAZOLAM 0.5 MG: 0.5 TABLET ORAL at 10:08

## 2021-04-12 RX ADMIN — Medication 10 ML: at 10:13

## 2021-04-12 RX ADMIN — GABAPENTIN 600 MG: 300 CAPSULE ORAL at 14:38

## 2021-04-12 RX ADMIN — DULOXETINE HYDROCHLORIDE 30 MG: 30 CAPSULE, DELAYED RELEASE ORAL at 10:08

## 2021-04-12 RX ADMIN — OXYCODONE 5 MG: 5 TABLET ORAL at 14:38

## 2021-04-12 RX ADMIN — ALPRAZOLAM 0.5 MG: 0.5 TABLET ORAL at 14:38

## 2021-04-12 NOTE — PROGRESS NOTES
Transition of Care Plan to SNF/Rehab    Communication to Patient/Family:  Met with patient and family and they are agreeable to the transition plan. The Plan for Transition of Care is related to the following treatment goals:     CM spoke with pt's stan: David Hay, via telephone to inform him of pt's d/c on today to snf, and medical transport (BLS) arrange at 2:30PM.  Kwesi Moreno was agreeable to pt's transport to [address] [place] [address]. The Patient and/or patient representative was provided with a choice of provider and agrees  with the discharge plan. Yes [x] No []    A Freedom of choice list was provided with basic dialogue that supports the patient's individualized plan of care/goals and shares the quality data associated with the providers. Yes [x] No []    SNF/Rehab Transition:  Patient has been accepted to [facility] SNF/Rehab and meets criteria for admission. Patient will transported by BLS-medical transport  and expected to leave at 2:30PM.    Communication to SNF/Rehab:  Bedside RN, Noemi Kasper , has been notified to update the transition plan to the facility and call report ([phone]). Discharge information has been updated on the AVS. And communicated to facility via [employer], or CC link. Discharge instructions to be fax'd to facility at [facility] #). Patient has been identified as part of the  [employer].  For Care Coordination associated with that Bundle Program, please contact   Bundle information has been communication to . Nursing Please include all hard scripts for controlled substances, med rec and dc summary, and AVS in packet. Reviewed and confirmed with facility, [facility], can manage the patient care needs for the following:     Brothers [place] with (X) only those applicable:  Medication:  [x]Medications are available at the facility  []IV Antibiotics    []Controlled Substance - hard copies available sent.   []Weekly Labs Equipment:  []CPAP/BiPAP  []Wound Vacuum  []Weir or Urinary Device  []PICC/Central Line  []Nebulizer  []Ventilator    Treatment:  []Isolation (for MRSA, VRE, etc.)  []Surgical Drain Management  []Tracheostomy Care  []Dressing Changes  []Dialysis with transportation  []PEG Care  []Oxygen  []Daily Weights for Heart Failure    Dietary:  []Any diet limitations  []Tube Feedings   []Total Parenteral Management (TPN)    Financial Resources:  [x]Medicaid Application Completed    [x]UAI Completed and copy given to pt/family  and copy given to pt/family  []A screening has previously been completed. []Level II Completed    [] Private pay individual who will not become   financially eligible for Medicaid within 6 months from admission to a [address] [place] [address]. [] Individual refused to have screening conducted. []Medicaid Application Completed    []The screening denied because it was determined individual did not need/did not qualify for nursing facility level of care. [] Out of state residents seeking direct admission to a [address] facility. [] Individuals who are inpatients of an out of state hospital, or in state or out of state veterans/ hospital and seek direct admission to a [address] facility  [] Individuals who are pateints or residents of a state owned/operated facility that is licensed by Department of [employer] ([facility]) and seek direct admission to [address] [place] [address]  [] A screening not required for enrollment in [address] [place] [address] [address] services as set out in [address] [place] [address] 59-  [] [facility]) staff shall perform screenings of the [facility] clients. Advanced Care Plan:  []Surrogate Decision Maker of Care  []POA  []Communicated Code Status and copy sent.     Other:

## 2021-04-12 NOTE — PROGRESS NOTES
Full SBAR report given to 615 Vijaya Raygoza RN at [facility] and 44 Mariana Polanco at 4714. Any additional questions have been answered and [facility] is scheduled to arrive at 1500. Patient IV has been removed and she is ready to go. Patient hard scripts are also included in discharge paper work bundle.

## 2021-04-12 NOTE — DISCHARGE SUMMARY
Discharge Summary      Name: Tony Cabral  [id number]  YOB: 1975 (Age: 50 y.o.)   Date of Admission: 3/31/2021  Date of Discharge: 4/12/2021  Attending Physician: Ajay Agarwal MD    Discharge Diagnosis:   Acute exacerbation of relapsing remitting multiple sclerosis, POA  Acute weakness and inability to stand secondary to above, POA   Anxiety/depression, POA  Fibromyalgia   Anxiety/depression, POA  Fibromyalgia    Consultations:  IP CONSULT TO NEUROLOGY    Brief Admission History/Reason for Admission Per Jessica Hernandez MD:   Clair Brooks is a 50 y.o. F with PMH of MS, Anxiety and fibromyalgia who was discharged last month when she was admitted for altered mental status and was evaluated for stroke and MS exacerbation. MRI showed stable demyelinating lesions. Since her discharge last month she presented emergency department 4 times this month complaining of generalized body aches and pains.   She was also noted to have slurred speech on last admission which is again noticed on this admission. She denies any subjective fever chills, chest pain, shortness of breath, cough, sputum production, nausea, vomiting, diarrhea. In the ED neurology was contacted and advised to admit for pulse dose of steroids and repeating MRI of brain and cervical spine.     We were asked to admit for work up and evaluation of the above problems. Brief Hospital Course by Main Problems:   Acute exacerbation of relapsing remitting multiple sclerosis, POA  Acute weakness and inability to stand secondary to above, POA  Patient had multiple ER visits in last 1 month for symptoms of generalized aches and pains with slurred speech. She is wheelchair-bound at her baseline now. Patient presented  with severe worsening of her bilateral lower extremity weakness, and mild worsening of bilateral upper arm weakness. MRI of brain and cervical spine showed stability of lesions.   UA neg.  S/p IV Solu-Medrol 1 g daily x3 doses. Continue Aubagio. She was just discharged last month and MRI at that time showed stable demyelinating lesions. Repeat MRI brain and C spine showed unchanged appearance of the cervical cord consistent with multiple sclerosis. No evidence of active demyelination. P2P done on 4/7, denied. Pt is medically stable for discharge to [facility]. F/ with neurology outpt.     Anxiety/depression, POA  Fibromyalgia  Continue Cymbalta and gabapentin    Tobacco use   Counseled. Discharge Exam:  Patient seen and examined by me on discharge day. Pertinent Findings:  Visit Vitals  /66   Pulse 82   Temp 98.6 °F (37 °C)   Resp 16   Ht 5' 4.5\" (1.638 m)   Wt 101.2 kg (223 lb 1.7 oz)   SpO2 97%   BMI 37.69 kg/m²     Gen:    Not in distress  Chest: Clear lungs  CVS:   Regular rhythm. No edema  Abd:  Soft, not distended, not tender    Discharge/Recent Laboratory Results:  No results for input(s): NA, K, CL, CO2, BUN, GLU, CA, PHOS, MG in the last 72 hours. No lab exists for component: CREATININE  No results for input(s): HGB, HCT, WBC, PLT, HGBEXT, HCTEXT, PLTEXT, HGBEXT, HCTEXT, PLTEXT in the last 72 hours. Discharge Medications:  Current Discharge Medication List      CONTINUE these medications which have CHANGED    Details   gabapentin (NEURONTIN) 600 mg tablet Take 1 Tab by mouth three (3) times daily. Max Daily Amount: 1,800 mg. Qty: 20 Tab, Refills: 0    Associated Diagnoses: MS (multiple sclerosis) ([facility] [address]         CONTINUE these medications which have NOT CHANGED    Details   naproxen (NAPROSYN) 500 mg tablet Take 1 Tab by mouth every twelve (12) hours as needed for Pain. Qty: 20 Tab, Refills: 0      DULoxetine (CYMBALTA) 60 mg capsule Take 1 Cap by mouth daily. Indications: neuropathic pain  Qty: 90 Cap, Refills: 5    Associated Diagnoses: Chronic pain syndrome; MS (multiple sclerosis) ([facility] [address]  Tobacco abuse      teriflunomide (AUBAGIO) 14 mg tablet Take 1 Tab by mouth daily. Indications: relapsing form of multiple sclerosis  Qty: 90 Tab, Refills: 3    Associated Diagnoses: MS (multiple sclerosis) ([facility])      acetaminophen (TYLENOL) 500 mg tablet Take 500 mg by mouth every four (4) hours as needed. nystatin (MYCOSTATIN) topical cream APPLY CREAM TOPICALLY TO AFFECTED AREA TWICE DAILY      melatonin 3 mg tablet Take 15 mg by mouth nightly as needed for Insomnia. STOP taking these medications       predniSONE (DELTASONE) 10 mg tablet Comments:   Reason for Stopping:               Patient Follow Up Instructions: Activity: Activity as tolerated  Diet: Regular Diet  Wound Care: None needed  Code: Full    DISPOSITION:    Home with Family:    Home with HH/PT/OT/RN:    SNF/LTC: x   COLLETTE:    OTHER:          Follow up with:   PCP : None  Follow-up Information     Follow up With Specialties Details Why Contact Info    Teriflunomide:  At the time of discharge or transfer to another unit, the nurse on duty will retrieve the home medications for the patient        [place] MD [facility] In 2 weeks  [address] [place] [address] 2  [phone]      [address] [place] [address]  [phone]    None    None (443) Patient stated that they have no PCP      Rose Arellano MD Family Medicine   [facility].  [phone]              Total time in minutes spent coordinating this discharge (includes going over instructions, follow-up, prescriptions, and preparing report for sign off to her PCP) :  35  minutes

## 2021-04-12 NOTE — PROGRESS NOTES
Transition of Care Plan:    RUR:23%  Disposition:SNF Placement-Insurance Auth   Follow up appointments: Follow up with PCP  DME needed:N/A-provided by snf   Transportation at Discharge:BLS transport   [address] or means to access home:   Family will have keys at d/c from snf     IM Medicare letter:2nd  Medicare to be given by CM   Caregiver Contact:Milton Alejandre: [phone]  Discharge Caregiver contacted prior to discharge? Family will be contacted at the time of d/c    UPDATE: 11:35PM    CM informed during IDRs that pt is ready for d/c on today, and can transition to snf: [address] [place] [address]. CM contact Tor Lozano (admin coordinator), via telephone and she reported that pt has a bed avaiable at snf, and she will report to room 57A. LATASHA Marquez, [address]           CM: Mac Strong is currently working with pt in the Neuro Unit. CM received a call from Tor Ross [facility] (admission [phone]), that pt has been accepted to [address] [place] [address] and received insurance auth through [facility] [employer]. Tor Lozano reported that they were ready to accept pt over the weekend, but pt wasn't medically stable to d/c. Tor Ross [phone] informed CM that facility is ready to accept pt on today if she is medically stable to d/c.  CM informed that pt has only til today for insurance to be approved to be accepted to snf, if not accepted to snf today then pt will have to receive insurance auth through [facility] [employer] again. CM will verify pt's d/c needs and plans during IDRs. CM will continue to follow.     LATASHA Marquez, [address] [facility]

## 2021-04-12 NOTE — PROGRESS NOTES
Problem: Falls - Risk of  Goal: *Absence of Falls  Description: Document Abigail Moore Fall Risk and appropriate interventions in the flowsheet. Outcome: Progressing Towards Goal  Note: Fall Risk Interventions:  Mobility Interventions: Bed/chair exit alarm, Patient to call before getting OOB    Mentation Interventions: Adequate sleep, hydration, pain control, Bed/chair exit alarm    Medication Interventions: Bed/chair exit alarm, Patient to call before getting OOB, Teach patient to arise slowly    Elimination Interventions: Bed/chair exit alarm, Call light in reach, Patient to call for help with toileting needs    History of Falls Interventions: Bed/chair exit alarm         Problem: Patient Education: Go to Patient Education Activity  Goal: Patient/Family Education  Outcome: Progressing Towards Goal     Problem: Pressure Injury - Risk of  Goal: *Prevention of pressure injury  Description: Document Samy Scale and appropriate interventions in the flowsheet.   Outcome: Progressing Towards Goal  Note: Pressure Injury Interventions:  Sensory Interventions: Pad between skin to skin, Monitor skin under medical devices, Minimize linen layers    Moisture Interventions: Absorbent underpads, Minimize layers    Activity Interventions: PT/OT evaluation    Mobility Interventions: Pressure redistribution bed/mattress (bed type)    Nutrition Interventions: Document food/fluid/supplement intake    Friction and Shear Interventions: Lift team/patient mobility team                Problem: Ischemic Stroke: Discharge Outcomes  Goal: *Verbalizes anxiety and depression are reduced or absent  Outcome: Progressing Towards Goal  Goal: *Verbalize understanding of risk factor modification(Stroke Metric)  Outcome: Progressing Towards Goal  Goal: *Hemodynamically stable  Outcome: Progressing Towards Goal  Goal: *Absence of aspiration pneumonia  Outcome: Progressing Towards Goal  Goal: *Aware of needed dietary changes  Outcome: Progressing Towards Goal  Goal: *Verbalize understanding of prescribed medications including anti-coagulants, anti-lipid, and/or anti-platelets(Stroke Metric)  Outcome: Progressing Towards Goal  Goal: *Tolerating diet  Outcome: Progressing Towards Goal  Goal: *Aware of follow-up diagnostics related to anticoagulants  Outcome: Progressing Towards Goal  Goal: *Ability to perform ADLs and demonstrates progressive mobility and function  Outcome: Progressing Towards Goal  Goal: *Absence of DVT(Stroke Metric)  Outcome: Progressing Towards Goal  Goal: *Absence of aspiration  Outcome: Progressing Towards Goal  Goal: *Optimal pain control at patient's stated goal  Outcome: Progressing Towards Goal  Goal: *Home safety concerns addressed  Outcome: Progressing Towards Goal  Goal: *Describes available resources and support systems  Outcome: Progressing Towards Goal  Goal: *Verbalizes understanding of activation of EMS(911) for stroke symptoms(Stroke Metric)  Outcome: Progressing Towards Goal  Goal: *Understands and describes signs and symptoms to report to providers(Stroke Metric)  Outcome: Progressing Towards Goal  Goal: *Neurolgocially stable (absence of additional neurological deficits)  Outcome: Progressing Towards Goal  Goal: *Verbalizes importance of follow-up with primary care physician(Stroke Metric)  Outcome: Progressing Towards Goal  Goal: *Smoking cessation discussed,if applicable(Stroke Metric)  Outcome: Progressing Towards Goal  Goal: *Depression screening completed(Stroke Metric)  Outcome: Progressing Towards Goal

## 2021-04-12 NOTE — PROGRESS NOTES
End of Shift Note     Bedside shift change report given to Bere Amezcua RN (oncoming nurse) by Maribell Kunz RN (offgoing nurse).  Report included the following information SBAR, Kardex, Intake/Output and MAR     Shift worked: night      Shift summary and any significant changes:      None      Concerns for physician to address: Morris Mchugh phone for oncoming shift:   [phone]         Activity:  Activity Level: Up with Assistance     Cardiac:   Cardiac Monitoring: Yes      Cardiac Rhythm: Normal sinus rhythm     Access:   Current line(s): PIV      Genitourinary:   Urinary status: voiding     Respiratory:   O2 Device: None (Room air)  Chronic home O2 use?: N/A  Incentive spirometer at bedside: N/A  GI:  Last Bowel Movement Date: 04/10/21  Current diet:  DIET REGULAR 2 GM NA (House Low NA)  Passing flatus: YES  Tolerating current diet: YES     Pain Management:   Patient states pain is manageable on current regimen: YES     Skin:  Samy Score: 21  Interventions: increase time out of bed    Patient Safety:  Fall Score: Total Score: 2  Interventions: bed/chair alarm  High Fall Risk:  Yes     Length of Stay:  Expected LOS: 3d 0h  Actual LOS: 12D

## 2021-04-12 NOTE — PROGRESS NOTES
-[facility] is now here to transport patient.  -Full SBAR report has been given to [facility] team and patient is ready for dicharge.

## 2021-04-12 NOTE — PROGRESS NOTES
4/12/21 Patient is to be discharged to [facility] and [address] [place] [address] today for rehab s/p hospital admission to [facility] 3/31/21-4/12/21. ACM will defer ccm services until patient is discharged to home.  DMB

## 2021-04-13 ENCOUNTER — PATIENT OUTREACH (OUTPATIENT)
Dept: CASE MANAGEMENT | Age: 46
End: 2021-04-13

## 2021-04-19 ENCOUNTER — PATIENT OUTREACH (OUTPATIENT)
Dept: CASE MANAGEMENT | Age: 46
End: 2021-04-19

## 2021-04-19 NOTE — PROGRESS NOTES
4/19/21 416 pm Left message to update patient on status of locating her  with Zeina Welsh to set up in home care for patient. Patient was also reminded of her upcoming PCP appointment on 4/22/21. [facility]  4/19/21-Patient has now returned home from rehab to her home. ACM  Spoke with patient today and she is requesting assistance in getting an in home aid. Patient is reported to have [facility] care Plus. Casemanager for patient at [facility] is Rusty Hudson at contact number [phone]. Messge was left for Ms Won Swift to return this AC call. Patient has follow up appointments scheduled at this time with PCP Dr Ellis Schwab, with [facility] on 4/22/21 and Dr Jose Luis Bravo neurologist, on 6/29/21 at 10 am.   Patient is unable to complete a medication reconciliation today. Patient reports she is feeling about the same-reports she is taking medications. Unable to complete medication reconciliation today. ACM will follow up with patient in approximately 1 week. DMB  Goals      Identification of barriers to adherence to a plan of care such as inability to afford medications, lack of insurance, lack of transportation, etc.      4/19/21 ACM contacted patient today to follow progress. Patient is now home with her friend as caregiver. Patient does not know date she left the Rehab center. Patient requested in home care aid be restarted. ACM contacted patient's caremanager at [facility] CCP program to assist in getting patient approved for in home care aid. Patient left message for Ms Rusty Hudson at [phone] for call back to AC. Patient has follow up visits sheduled with New PCP on 4/22/21 with Dr Rob Vasquez [facility]. Patient has follow up scheduled with Dr Byron Johnson, neurology on 6/29/21 at 10 am.   ACM will follow up with patient in 1 week. [facility]    3/30/21-4/12/21 Patient was admitted to hospital for care of MS exacerbation. Discharged to [facility] Rehab center. [facility]    3/18/21 After chart review, patient is currently ordered Augagio 14 mg every day. Has been approved by [employer] o[employer]nzalez through 12/31/21. Patient reports she has the medication in the home and is taking as prescribed.  Patient reports she is continuing with Medicaid Aide M-F 9a-3 pm. No hours approved for Sat/Sun but patient reports her friend stays with her.  Patient reports she has food in the home and is eating her meals.  Patient reports she has run out of xanax but does have gabapentin in the home and it taking 4 - 600 mg tabs daily- patient is rescribed 3 tabs daily, Patient was advised to contact Dr Carleen Nelson neurologist for clarification of daily dose   Patient has an appointment with Dr Carleen Nelson on 3/22/21 at 9 am.   Patient has scheduled new patient appointment with Dr Angel Puente at [facility] on 4/21/21 at 9 am.   Patient reports she has transportation- her friend who stays with her will provide transportation. [facility]  3/2/21 Patient reports she has called "[employer] Camilo to apply for Meals on Wheels and meals will be delivered starting 3/8/21.  Patient continues with home care aid through her Medicaid benefits. Michel Dsouza contacted [employer] on 3/2/21 to establish delivery dates for MS medications -Aubagio and Mavenclad. See progress note for this date for clarification on need for prescribtions  ACM will continue to monitor patient's adherence to plan of care. ACM will contact patient in next 7-10 days. [facility]      2/26/21 Patient with history of MS exerabations. Patient recently admitted to ER/hospital for Altered Mental Status. Patient is now home. Services are provided by [facility].  Patient has requested assistance in setting up meals on Wheels-    Patient will need to contact [employer] at [phone]-Ms Jeannie Alvarado to apply   ACM will contact [address] [place] [address] at [phone] for coordination of care for patient   ACM will contact [place] [phone] for coordination of care for patient   Patient will be informed of services available through her insurance coverage and assisted to apply if needed for these services. ACM will monitor patients adherence to plan of care. ACM will contact patient in 7-10 days for follow up. DMB          Knowledge and adherence of prescribed medication (ie. action, side effects, missed dose, etc.).      4/19/21 Patient is s/p hospital stay on 3/31-4/21/21 with discharge to [address] rehab center. Patient was unable to complete medication reconciliation today. ACM will contact patient in 1 week for follow up. [facility]    3/18/21 Patient is now taking Aubagio, 14 mg daily for MS, gabapentin 600 mg 4 tabs daily-prescription is for 3 tabs daily- patient will contact Dr Luisa Morris for clarification. Patient reports she has run out of xanax 0.25 mg and will discuss refills with Dr Chris Alcaraz on next office visit on 3/23/21. [facility]  3/2/21 Patient was unable to verbalized her medication list. When asked why she did not take her medications, patient replied Dr April Cardenas told me to stop my medications. Call was placed to Dr Beverly Julien office and nurse Sunni stated patient should be taking home medications as prescribed. Also patient requested assistance in obtaining her MS medications. [facility] was contacted and stated patient was shipped her 2 doses of Mavenclad and needs new prescription for further shipments. Prince Fabiola NP, was contacted to clarify if patient is to continue this medication.  Left message for call back to [facility]   The second MS medication Aubagio is ready to be shipped once [address] verifies patient address- should be shipped by 3/4/21- only authorized for one month at time- Patient will need call [address] monthly for refills   ACM will contact Cristofer Ports, NP office to clarify MS medications   ACM will contact Dr Chrissy Smith office to clarify daily home medication regime   ACM will contact patient with updated medication list as soon as possible and update chart  ACM will obtain clarification of home medications and inform patient of current medications.  DMB

## 2021-04-21 ENCOUNTER — PATIENT OUTREACH (OUTPATIENT)
Dept: CASE MANAGEMENT | Age: 46
End: 2021-04-21

## 2021-04-21 NOTE — PROGRESS NOTES
[address] [place] [address] Dr Discharge Follow-Up      Date/Time:  2021 2:29 PM    Patient was admitted to [facility] on 3/31/21 and discharged to [address] [place] [address] on 21. The patients discharge diagnosis was multiple sclerosis  . Patient left AMA on 21 from [address] [place] [address]. The discharge summary from the post acute facilty was not available at the time of outreach. Patient was contacted within 5 business days of leaving from the post acute facility. LPN Care Coordinator contacted the patient by telephone to perform post hospital discharge follow up. Verified name and  with patient as identifiers. Provided introduction to self, and explanation of the LPN Care Coordinator role. Patient did not receive post acute facility discharge instructions. The patient agrees to contact the PCP office for questions related to their healthcare. N Care Coordinator provided contact information for future reference. Advance Care Planning:   Does patient have an Advance Directive:  reviewed and current     Home Health orders at discharge: [address]: NA  Date of initial visit: [place] [address] ordered at discharge: none  [address]: [place] [address] received: NA    Medication(s):   The post acute facility medication discharge list was not available for this call.     [facility] follow up appointment(s):   Future Appointments   Date Time Provider Naveen Martins   2021  2:00 PM Farhad Friday, MD [facility] BS AMB   2021 10:00 AM MD ANCA Bach BS AMB      Non-[facility] follow up appointment(s): na  [facility]:  information provided as a resource     Patient chart forward back to complex case management

## 2021-04-21 NOTE — PROGRESS NOTES
Goals Addressed                 This Visit's Progress     Identification of barriers to adherence to a plan of care such as inability to afford medications, lack of insurance, lack of transportation, etc.         4/21/21 Patient was reported to have left [facility] against medical advise after 2 days(4/12-4/14/21)-Patient currently with no orders for home health or medications refills. · Patient has PCP follow up appointment with Dr MARCELLE Geller on 4/28/21, patient's friend will provide transportation-  · Antolin Hernandes LPN,  with [address] Medicaid ()-patient is eligible for personal care aid for 30 hours a week-Ms Cheri Linares will follow up with patient   · [facility] [employer],agency that was providing aides report they cannot staff patient needs-patient will need new agency-Ms Cheri Linares will follow up with patient   · Ms Binta Schroeder, [facility] liason, reports she has spoken to patient today to reinforce need to attend upcoming PCP  ACM will follow up with patient in 3-5 days. [facility]      4/19/21 ACM contacted patient today to follow progress. Patient is now home with her friend as caregiver. Patient does not know date she left the Rehab center. Patient requested in home care aid be restarted. ACM contacted patient's caremanager at [facility] CCP program to assist in getting patient approved for in home care aid. Patient left message for Ms Antolin Hernandes at [phone] for call back to ACM. Patient has follow up visits sheduled with New PCP on 4/22/21 with Dr Ankush Escalera [facility]. Patient has follow up scheduled with Dr Bulmaro Middleton, neurology on 6/29/21 at 10 am.   ACM will follow up with patient in 1 week. [facility]    3/30/21-4/12/21 Patient was admitted to hospital for care of MS exacerbation. Discharged to [facility]. [facility]    3/18/21 After chart review, patient is currently ordered Augagio 14 mg every day. Has been approved by Tung Gonzalez through 12/31/21. Patient reports she has the medication in the home and is taking as prescribed.  Patient reports she is continuing with Medicaid Aide M-F 9a-3 pm. No hours approved for Sat/Sun but patient reports her friend stays with her.  Patient reports she has food in the home and is eating her meals.  Patient reports she has run out of xanax but does have gabapentin in the home and it taking 4 - 600 mg tabs daily- patient is rescribed 3 tabs daily, Patient was advised to contact Dr Marcela Juarez neurologist for clarification of daily dose   Patient has an appointment with Dr Marcela Juarez on 3/22/21 at 9 am.   Patient has scheduled new patient appointment with Dr La Celis at [facility] on 4/21/21 at 9 am.   Patient reports she has transportation- her friend who stays with her will provide transportation. [facility]  3/2/21 Patient reports she has called [facility] to apply for Meals on Wheels and meals will be delivered starting 3/8/21.  Patient continues with home care aid through her Medicaid benefits. Gene Bland contacted [facility] on 3/2/21 to establish delivery dates for MS medications -Aubagio and Mavenclad. See progress note for this date for clarification on need for prescribtions  ACM will continue to monitor patient's adherence to plan of care. ACM will contact patient in next 7-10 days. [facility]      2/26/21 Patient with history of MS exerabations. Patient recently admitted to ER/hospital for Altered Mental Status. Patient is now home. Services are provided by [facility].  Patient has requested assistance in setting up meals on Wheels-    Patient will need to contact [employer] at [phone] to apply  Gene Bland will contact [address] [place] [address] at [phone] for coordination of care for patient   ACM will contact Yael Marrero for coordination of care for patient   Patient will be informed of services available through her insurance coverage and assisted to apply if needed for these services. ACM will monitor patients adherence to plan of care. ACM will contact patient in 7-10 days for follow up. [facility]

## 2021-04-26 ENCOUNTER — TELEPHONE (OUTPATIENT)
Dept: NEUROLOGY | Age: 46
End: 2021-04-26

## 2021-04-26 NOTE — TELEPHONE ENCOUNTER
Let [place] know that she did have her blood work in April. Additionally she is no longer on Mavenclad. And she is under the care of . He jose not me.

## 2021-04-26 NOTE — TELEPHONE ENCOUNTER
----- Message from Kipp Angelucci sent at 4/26/2021  1:06 PM EDT -----  Regarding: YENIFER Chan/Damien Orozco, Nurse wit [facility], is stating that pt is due for 6 month bloodwork for \"Mavenclad\". [place]'s best contact number [phone].

## 2021-04-28 ENCOUNTER — TRANSCRIBE ORDER (OUTPATIENT)
Dept: INTERNAL MEDICINE CLINIC | Age: 46
End: 2021-04-28

## 2021-04-28 ENCOUNTER — OFFICE VISIT (OUTPATIENT)
Dept: FAMILY MEDICINE CLINIC | Age: 46
End: 2021-04-28
Payer: MEDICARE

## 2021-04-28 ENCOUNTER — HOME HEALTH ADMISSION (OUTPATIENT)
Dept: HOME HEALTH SERVICES | Facility: HOME HEALTH | Age: 46
End: 2021-04-28
Payer: MEDICARE

## 2021-04-28 VITALS
WEIGHT: 223 LBS | BODY MASS INDEX: 37.15 KG/M2 | RESPIRATION RATE: 18 BRPM | SYSTOLIC BLOOD PRESSURE: 114 MMHG | TEMPERATURE: 97.7 F | DIASTOLIC BLOOD PRESSURE: 77 MMHG | OXYGEN SATURATION: 98 % | HEIGHT: 65 IN | HEART RATE: 74 BPM

## 2021-04-28 DIAGNOSIS — Z09 HOSPITAL DISCHARGE FOLLOW-UP: ICD-10-CM

## 2021-04-28 DIAGNOSIS — Z71.0 DISCUSSION ABOUT ADVANCE CARE PLANNING HELD WITH FAMILY MEMBER: ICD-10-CM

## 2021-04-28 DIAGNOSIS — G35 MULTIPLE SCLEROSIS (HCC): Primary | ICD-10-CM

## 2021-04-28 DIAGNOSIS — Z12.31 ENCOUNTER FOR SCREENING MAMMOGRAM FOR BREAST CANCER: ICD-10-CM

## 2021-04-28 DIAGNOSIS — F13.20 BENZODIAZEPINE DEPENDENCE (HCC): ICD-10-CM

## 2021-04-28 DIAGNOSIS — D70.9 NEUTROPENIA, UNSPECIFIED TYPE (HCC): ICD-10-CM

## 2021-04-28 DIAGNOSIS — G47.9 SLEEP DISORDER: ICD-10-CM

## 2021-04-28 PROCEDURE — G8427 DOCREV CUR MEDS BY ELIG CLIN: HCPCS | Performed by: FAMILY MEDICINE

## 2021-04-28 PROCEDURE — 1111F DSCHRG MED/CURRENT MED MERGE: CPT | Performed by: FAMILY MEDICINE

## 2021-04-28 PROCEDURE — 99496 TRANSJ CARE MGMT HIGH F2F 7D: CPT | Performed by: FAMILY MEDICINE

## 2021-04-28 RX ORDER — NORTRIPTYLINE HYDROCHLORIDE 25 MG/1
25 CAPSULE ORAL
Qty: 30 CAP | Refills: 0 | Status: SHIPPED | OUTPATIENT
Start: 2021-04-28 | End: 2021-08-26 | Stop reason: SDUPTHER

## 2021-04-28 NOTE — PROGRESS NOTES
Chief Complaint   Patient presents with   [facility] Follow Up     ms excerbation     1. Have you been to the ER, urgent care clinic since your last visit? Hospitalized since your last visit? Yes When: 3/31/21 Where: [facility] ER Reason for visit: MS  exacerbation     2. Have you seen or consulted any other health care providers outside of the [address] [place] [address] since your last visit? Include any pap smears or colon screening.  Yes When: 03/2021 Where: neurology Reason for visit: MS follow up    verified patient with two type of identifiers, boyfriend present   , admitted to [facility] ER on 3/31/21 for falls,  Dx with MS exacerbation ,  Discharged on 4/12/21,  Admitted to [place] rehab  And discharge 4/21/21- requesting home health

## 2021-04-28 NOTE — PROGRESS NOTES
Transitional Care Management Progress Note    Patient: Rod Mullins  : 1975  PCP: Mitul Pettit MD    Date of office visit: 2021   Date of admission: 3/31/21  Date of discharge: 21  Hospital: [facility]    Call initiated w/i 2 business dates of discharge: *No response documented in the last 14 days   Date of the most recent call to the patient: *No documented post hospital discharge outreach found in the last 14 days      Assessment/Plan:     Diagnoses and all orders for this visit:    1. Multiple sclerosis (HCC)  -     REFERRAL TO HOME HEALTH  -     REFERRAL TO NEUROLOGY  -     nortriptyline (PAMELOR) 25 mg capsule; Take 1 Cap by mouth nightly. -     [place] DISCHARGE MEDS RECONCILED W/ CURRENT OUTPATIENT MED LIST    2. Neutropenia, unspecified type ([facility] [address]  -     [address]  -     nortriptyline (PAMELOR) 25 mg capsule; Take 1 Cap by mouth nightly. -     [place] DISCHARGE MEDS RECONCILED W/ CURRENT OUTPATIENT MED LIST    3. Benzodiazepine dependence (HCC)  -     REFERRAL TO [facility]  -     nortriptyline (PAMELOR) 25 mg capsule; Take 1 Cap by mouth nightly. -     [place] DISCHARGE MEDS RECONCILED W/ CURRENT OUTPATIENT MED LIST    4. Sleep disorder  -     SLEEP MEDICINE REFERRAL  -     nortriptyline (PAMELOR) 25 mg capsule; Take 1 Cap by mouth nightly. -     [place] DISCHARGE MEDS RECONCILED W/ CURRENT OUTPATIENT MED LIST    5. Encounter for screening mammogram for breast cancer  -     [facility] MAMMO BI SCREENING INCL CAD; Future    6. Discussion about advance care planning held with family member  -     [place] DISCHARGE MEDS RECONCILED W/ CURRENT OUTPATIENT MED LIST    7.  Hospital discharge follow-up  -     [place] DISCHARGE MEDS RECONCILED W/ CURRENT OUTPATIENT MED LIST      The complexity of medical decision making for this patient's transitional care is high   Follow-up and Dispositions    · Return in 1 month (on 2021), or if symptoms worsen or fail to improve. Subjective: Elsa Byrd is a 50 y.o. female presenting today for follow-up after hospital discharge. This encounter and supporting documentation was reviewed if available. Medication reconciliation was performed today. The main problem requiring admission was multiple sclerosis sclerosis exacerbation weakness and inability to stand fibromyalgia depression anxiety disorder for which neurology was consulted patient was discharged on April 14, 2021 and sent to a rehab for which she was discharged on 7 days ago, in a stable condition, patient currently on a wheelchair stating that she is not able to sleep and she needs pain medication current gabapentin helping she denies any suicidal homicidal ideation  Her MRI showed stable demyelinating's brain lesions, unfortunately she was just discharged in few weeks ago from the most recent hospitalization she states that she has been compliant with her medications unfortunately she is a tobacco abuser for which she was recommended to slow down and was told stopping the cigarettes could be beneficiary for such patient,   Complications during admission: none      Interval history/Current status: Stable    Admitting symptoms have: improved      Medications marked \"taking\" at this time:  Prior to Admission medications    Medication Sig Start Date End Date Taking? Authorizing Provider   nortriptyline (PAMELOR) 25 mg capsule Take 1 Cap by mouth nightly. 4/28/21  Yes Dakota Benjamin MD   gabapentin (NEURONTIN) 600 mg tablet Take 1 Tab by mouth three (3) times daily. Max Daily Amount: 1,800 mg. 4/12/21  Yes Saige Bashir MD   DULoxetine (CYMBALTA) 60 mg capsule Take 1 Cap by mouth daily. Indications: neuropathic pain 3/23/21  Yes Sharonda Escobar MD   teriflunomide (AUBAGIO) 14 mg tablet Take 1 Tab by mouth daily.  Indications: relapsing form of multiple sclerosis 2/25/21  Yes Brigette Kessler NP   acetaminophen (TYLENOL) 500 mg tablet Take 500 mg by mouth every four (4) hours as needed. 8/21/20  Yes Provider, Historical   melatonin 3 mg tablet Take 15 mg by mouth nightly as needed for Insomnia. Yes Provider, Historical   naproxen (NAPROSYN) 500 mg tablet Take 1 Tab by mouth every twelve (12) hours as needed for Pain. Patient not taking: Reported on 4/28/2021 3/30/21 4/28/21  Bobbi Mccallum MD   nystatin (MYCOSTATIN) topical cream APPLY CREAM TOPICALLY TO AFFECTED AREA TWICE DAILY 1/1/21   Provider, Historical        ROS:      Constitutional: no chills and fever, obese, nad     HENT: no ear head pain and nosebleeds. No blurred vision, pain and discharge. Respiratory: no shortness of breath, wheezing cough nor sore throat. Cardiovascular: Has no chest pain, leg swelling ,and racing heart . Gastrointestinal: No constipation, diarrhea, nausea and vomiting. Genitourinary: No frequency. Musculoskeletal: Negative for joint pain. Skin: no itching, pimples or acne rash. Neurological: Negative for dizziness, no tremors  Psychiatric/Behavioral: ++ for depression has no normal interest to do things and  depressed the patient is not nervous/anxious. Current Outpatient Medications   Medication Sig Dispense Refill    nortriptyline (PAMELOR) 25 mg capsule Take 1 Cap by mouth nightly. 30 Cap 0    gabapentin (NEURONTIN) 600 mg tablet Take 1 Tab by mouth three (3) times daily. Max Daily Amount: 1,800 mg. 20 Tab 0    DULoxetine (CYMBALTA) 60 mg capsule Take 1 Cap by mouth daily. Indications: neuropathic pain 90 Cap 5    teriflunomide (AUBAGIO) 14 mg tablet Take 1 Tab by mouth daily. Indications: relapsing form of multiple sclerosis 90 Tab 3    acetaminophen (TYLENOL) 500 mg tablet Take 500 mg by mouth every four (4) hours as needed.  melatonin 3 mg tablet Take 15 mg by mouth nightly as needed for Insomnia.       nystatin (MYCOSTATIN) topical cream APPLY CREAM TOPICALLY TO AFFECTED AREA TWICE DAILY       Allergies   Allergen Reactions    Morphine Rash     Past Medical History:   Diagnosis Date    Body aches 12/11/2014    Chronic pain     Depression     Headache(784.0)     History of mammogram never before    MS [facility]) ([facility] [address]     Neurological disorder     Multiple Sclerosis    Pap smear for cervical cancer screening 2008    Psychiatric disorder     anxiety    Psychotic disorder [facility])      Past Surgical History:   Procedure Laterality Date    COLONOSCOPY N/A 2/28/2018    COL[facility] ENDOSCOPY    HX CHOLECYSTECTOMY      HX GYN      3 c-sections    HX HEENT      bilateral ear tube surgery    HX HERNIA REPAIR      HX OTHER SURGICAL      R inguinal hernia repair     Family History   Problem Relation Age of Onset    Heart Attack Father         tripple bypass    Cancer Father         lung    COPD Father     Diabetes Mother         type 2    Heart Disease Mother         heart disease    Diabetes Paternal Grandmother     No Known Problems Sister     No Known Problems Brother     No Known Problems Child      Social History     Tobacco Use    Smoking status: Current Every Day Smoker     Packs/day: 0.50     Years: 17.00     Pack years: 8.50     Types: Cigarettes    Smokeless tobacco: Never Used    Tobacco comment: 1 pack every 3 days.    Substance Use Topics    Alcohol use: No          Objective:     Visit Vitals  /77 (BP 1 Location: Right arm, BP Patient Position: Sitting, BP Cuff Size: Adult)   Pulse 74   Temp 97.7 °F (36.5 °C) (Temporal)   Resp 18   Ht 5' 4.5\" (1.638 m)   Wt 223 lb (101.2 kg)   LMP 03/01/2021   SpO2 98%   BMI 37.69 kg/m²        General: Patient alert cooperative appears stated for the age  [de-identified]; normocephalic and atraumatic PERRLA extraocular motor intact normal tympanic membrane normal external ear bilaterally normal sinuses with mucosal normal no drainage  Neck: supple no adenopathy no JVD no bruit  Lungs: Clear to auscultation bilaterally no rales rhonchi or wheezes  Heart: Normal regular S1-S2 no gallops rubs or clicks no murmur  Breast exam deferred  Abdomen: Soft nontender normal bowel sounds no organomegaly  Extremities: abnormal range of motion no point tenderness normal pulses no edema  Pelvic/: No lesion, no lymphadenopathy  Skin: Normal no lesion no rash        We discussed the expected course, resolution and complications of the diagnosis(es) in detail. Medication risks, benefits, costs, interactions, and alternatives were discussed as indicated. I advised her to contact the office if her condition worsens, changes or fails to improve as anticipated. She expressed understanding with the diagnosis(es) and plan.      Noemi Monroe MD

## 2021-04-28 NOTE — PATIENT INSTRUCTIONS
Multiple Sclerosis (MS): Care Instructions  Your Care Instructions  Multiple sclerosis, also called MS, is a disease that can affect the brain, spinal cord, and nerves to the eyes. MS can cause problems with muscle control and strength, vision, balance, feeling, and thinking. Whatever your symptoms are, taking medicine correctly and following your doctor's advice for home care can help you maintain your quality of life. Follow-up care is a key part of your treatment and safety. Be sure to make and go to all appointments, and call your doctor if you are having problems. It's also a good idea to know your test results and keep a list of the medicines you take. How can you care for yourself at home? General care  · Take your medicines exactly as prescribed. Call your doctor if you think you are having a problem with your medicine. · Use a cane, walker, or scooter if your doctor suggests it. · Keep doing your normal activities as much as you can. · If you have problems urinating, press or tap your bladder area to help start urine flow. If you have trouble controlling your urine, plan your fluid intake and activities so that a toilet will be available when you need it. · Spend time with family and friends. Join a support group for people with MS if you want extra help. · Depression is common with this condition. Tell your doctor if you have trouble sleeping, are eating too much or are not hungry, or feel sad or tearful all the time. Depression can be treated with medicine and counseling. Diet and exercise  · Eat a balanced diet. · If you have problems swallowing, change how and what you eat:  ? Try thick drinks, such as milk shakes. They are easier to swallow than other fluids. ? Do not eat foods that crumble easily. These can cause choking. ? Use a  to prepare food. Soft foods need less chewing. ? Eat small meals often so that you do not get tired from eating larger meals.   · Get exercise on most days. Work with your doctor to set up a program of walking, swimming, or other exercise that you are able to do. A physical therapist can teach you exercises if you cannot walk but can move your limbs and trunk. Or you can do exercises to help with coordination and balance. You can help improve muscle stiffness by doing exercises while lying in certain positions. When should you call for help? Call your doctor now or seek immediate medical care if:    · You have a change in symptoms.     · You fall or have another injury.     · You have symptoms of a urinary infection. For example:  ? You have blood or pus in your urine. ? You have pain in your back just below your rib cage. This is called flank pain. ? You have a fever, chills, or body aches. ? It hurts to urinate. ? You have groin or belly pain. Watch closely for changes in your health, and be sure to contact your doctor if:    · You want more information about MS or medicines.     · You have questions about alternative treatments. Do not use any other treatments without talking to your doctor first.   Where can you learn more? Go to http://www.gray.com/  Enter L665 in the search box to learn more about \"Multiple Sclerosis (MS): Care Instructions. \"  Current as of: August 4, 2020               Content Version: 12.8  © 2006-2021 [employer]. Care instructions adapted under license by [employer] (which disclaims liability or warranty for this information). If you have questions about a medical condition or this instruction, always ask your healthcare professional. [place] [address] any warranty or liability for your use of this information. Sleep Studies: About This Test  What is it? Sleep studies are tests that watch what happens to your body during sleep. These studies usually are done in a sleep lab. Sleep labs are often located in hospitals.  Sleep studies you do at home can be done with portable equipment. But they may not give the same results as a sleep lab. Why is this test done? Sleep studies are done for people who say that sleep isn't restful or that they are tired all day. These studies can help find sleep problems, such as:  · Sleep apnea. This means that an adult regularly stops breathing during sleep for 10 seconds or longer. · Excessive snoring. · Problems staying awake, such as narcolepsy. · Problems with nighttime behaviors. These include sleepwalking, night terrors, bed-wetting, and REM behavior disorders (RBD). · Conditions such as periodic limb movement disorder. This is repeated muscle twitching of the feet, arms, or legs during sleep. · Seizures that occur at night (nocturnal seizures). How do you prepare for the test?  · You may be asked to keep a sleep diary for 1 to 2 weeks before your sleep study. · Don't take any naps for 2 to 3 days before your test.  · You may be asked to avoid food or drinks with caffeine for a day or two before your test.  · Take a shower or bath before your test, but don't use sprays, oils, or gels on your hair. Don't wear makeup, fingernail polish, or fake nails. · Pack and take along a small overnight bag with personal items, such as a toothbrush, a comb, favorite pillows or blankets, and a book. You can wear your own nightclothes. · If you will have portable sleep monitoring, your doctor will explain how to use the equipment at home. How is the test done? · In the sleep lab, you will be in a private room, much like a hotel room. · Small pads or patches called electrodes will be placed on your head and body with a small amount of glue and tape. These will record things like brain activity, eye movement, oxygen levels, and snoring. · Soft elastic belts will be placed around your chest and belly to measure your breathing.   · Your blood oxygen levels will be checked by a small clip (oximeter) placed either on the tip of your index finger or on your earlobe. · If you have sleep apnea, you may wear a mask that is connected to a continuous positive airway pressure (CPAP) machine. · Depending on the type of test, you will be allowed to sleep through the night or you'll be awakened periodically and asked to stay awake for a while. · If you use portable sleep monitoring, follow the instructions your doctor gave you. How long does the test take? · You will stay in the sleep lab overnight. For some tests, you will also stay part of the next day. What happens after the test?  · You will be able to go home right away. · You may not sleep well during the test and may be tired the next day. · You can go back to your usual activities right away. · After your sleep problem has been identified, you may need a second study if your doctor orders treatment such as CPAP. Follow-up care is a key part of your treatment and safety. Be sure to make and go to all appointments, and call your doctor if you are having problems. It's also a good idea to keep a list of the medicines you take. Ask your doctor when you can expect to have your test results. Where can you learn more? Go to http://www.gray.com/  Enter A308 in the search box to learn more about \"Sleep Studies: About This Test.\"  Current as of: October 26, 2020               Content Version: 12.8  © 7983-0686 [employer]. Care instructions adapted under license by [employer] (which disclaims liability or warranty for this information). If you have questions about a medical condition or this instruction, always ask your healthcare professional. [place] [address] any warranty or liability for your use of this information.

## 2021-04-28 NOTE — ACP (ADVANCE CARE PLANNING)
Advance Care Planning (ACP) Physician       Date of ACP Conversation: 4/24/2020    Conversation Conducted with:   Patient with Decision Making Capacity     Other Legally Authorized Decision Maker would be Mother  as SDM     For My patients who has currently great Decision Making Capacity:     Patient is on presence of no family member,, stated that the pt wants to be not DNR at this time,  The pt likes to be a full code individual,  The patient states that there is a lot of will to live,  Pt was given the form,   will sign and bring us a copy on the later date.       Conversation Outcomes / Follow-Up Plan:   Completed new Advance Directive      Length of ACP Conversation in minutes:  16 minutes

## 2021-04-30 ENCOUNTER — PATIENT OUTREACH (OUTPATIENT)
Dept: CASE MANAGEMENT | Age: 46
End: 2021-04-30

## 2021-04-30 ENCOUNTER — HOME CARE VISIT (OUTPATIENT)
Dept: SCHEDULING | Facility: HOME HEALTH | Age: 46
End: 2021-04-30
Payer: MEDICARE

## 2021-04-30 DIAGNOSIS — F41.9 CHRONIC ANXIETY: ICD-10-CM

## 2021-04-30 PROCEDURE — 400018 HH-NO PAY CLAIM PROCEDURE

## 2021-04-30 PROCEDURE — 3331090001 HH PPS REVENUE CREDIT

## 2021-04-30 PROCEDURE — 400013 HH SOC

## 2021-04-30 PROCEDURE — G0151 HHCP-SERV OF PT,EA 15 MIN: HCPCS

## 2021-04-30 PROCEDURE — 3331090002 HH PPS REVENUE DEBIT

## 2021-04-30 RX ORDER — ALPRAZOLAM 0.5 MG/1
0.5 TABLET ORAL
Qty: 60 TAB | Refills: 0 | Status: ON HOLD | OUTPATIENT
Start: 2021-04-30 | End: 2021-05-27 | Stop reason: SDUPTHER

## 2021-04-30 NOTE — PROGRESS NOTES
4/30/21 Patient reports she has increasing anxiety today. She denies SI/HI at this time. Patient stated she has not called PCP or neurology physicians for assistance. Provided contact number for [facility] intervention number . Advised patient to contact PCP or neurology, Dr Loyda Cazares. Advised patient to contact Tejinder Tijerina for home visit. Advised patient to call 911 for  assessment and possible transport to ER. Goals      Identification of barriers to adherence to a plan of care such as inability to afford medications, lack of insurance, lack of transportation, etc.      4/30 ACM spoke with patient who reports she did attend PCP visit on 4/28/21. [facility] is in process of providing home health services   Patient has not restarted her home care aide through [place]. ACM will contact patient in 10-14 days for follow up. [facility]    4/21/21 Patient was reported to have left [facility] against medical advise after 2 days(4/12-4/14/21)-Patient currently with no orders for home health or medications refills. · Patient has PCP follow up appointment with Dr MARCELLE Geller on 4/28/21, patient's friend will provide transportation-  · Db Pandya LPN,  with [address] Medicaid ()-patient is eligible for personal care aid for 30 hours a week-Ms Josey Lane will follow up with patient   · [employer],agency that was providing aides report they cannot staff patient needs-patient will need new agency-Ms Josey Lane will follow up with patient   · Ms Luc Randhawa, [facility] liason, reports she has spoken to patient today to reinforce need to attend upcoming PCP  ACM will follow up with patient in 3-5 days. [facility]      4/19/21 ACM contacted patient today to follow progress. Patient is now home with her friend as caregiver. Patient does not know date she left the Rehab center. Patient requested in home care aid be restarted.  ACM contacted patient's caremanager at [facility] to assist in getting patient approved for in home care aid. Patient left message for Ms Rusty Hudson at [phone] for call back to AC. Patient has follow up visits sheduled with New PCP on 4/22/21 with Dr Rob Vasquez [facility]. Patient has follow up scheduled with Dr Byron Johnson, neurology on 6/29/21 at 10 am.   AC will follow up with patient in 1 week. [facility]    3/30/21-4/12/21 Patient was admitted to hospital for care of MS exacerbation. Discharged to [facility]. [facility]    3/18/21 After chart review, patient is currently ordered Augagio 14 mg every day. Has been approved by [employer] through 12/31/21. Patient reports she has the medication in the home and is taking as prescribed.  Patient reports she is continuing with Medicaid Aide M-F 9a-3 pm. No hours approved for Sat/Sun but patient reports her friend stays with her.  Patient reports she has food in the home and is eating her meals.  Patient reports she has run out of xanax but does have gabapentin in the home and it taking 4 - 600 mg tabs daily- patient is rescribed 3 tabs daily, Patient was advised to contact Dr Ava Mittal neurologist for clarification of daily dose   Patient has an appointment with Dr Ava Mittal on 3/22/21 at 9 am.   Patient has scheduled new patient appointment with Dr Humberto Correa at [facility] on 4/21/21 at 9 am.   Patient reports she has transportation- her friend who stays with her will provide transportation. [facility]  3/2/21 Patient reports she has called [facility] to apply for Meals on Wheels and meals will be delivered starting 3/8/21.  Patient continues with home care aid through her Medicaid benefits. Maria Elena Orona contacted [employer] on 3/2/21 to establish delivery dates for MS medications -Aubagio and Mavenclad.  See progress note for this date for clarification on need for prescribtions  AC will continue to monitor patient's adherence to plan of care. ACM will contact patient in next 7-10 days. [facility]      2/26/21 Patient with history of MS exerabations. Patient recently admitted to ER/hospital for Altered Mental Status. Patient is now home. Services are provided by [employer] [facility] [employer]. Patient has requested assistance in setting up meals on Wheels-    Patient will need to contact [facility] at .[phone]-Ms Scott Villalta to apply  Goldy Cassidy will contact [address] [place] [address] at [phone] for coordination of care for patient   ACM will contact [place]  for coordination of care for patient   Patient will be informed of services available through her insurance coverage and assisted to apply if needed for these services. ACM will monitor patients adherence to plan of care. ACM will contact patient in 7-10 days for follow up. [facility]          Knowledge and adherence of prescribed medication (ie. action, side effects, missed dose, etc.).      4/30/21 Patient reports her anxiety is greatly increased today. Patient reports she does not have medications in the home for anxiety. Patient advised to contact PCP or  Dr Paty Peoples for medication requests. Patient reports she has list of physicians and phone number. Patient has ACM contact number for guestions or concerns. ACM will follow up in 10-14 days. [facility]  4/19/21 Patient is s/p hospital stay on 3/31-4/21/21 with discharge to [facility] and rehab center. Patient was unable to complete medication reconciliation today. ACM will contact patient in 1 week for follow up. [facility]    3/18/21 Patient is now taking Aubagio, 14 mg daily for MS, gabapentin 600 mg 4 tabs daily-prescription is for 3 tabs daily- patient will contact Dr Tr Ferrera for clarification. Patient reports she has run out of xanax 0.25 mg and will discuss refills with Dr Jacquelyn Ramirez on next office visit on 3/23/21. [facility]  3/2/21 Patient was unable to verbalized her medication list. When asked why she did not take her medications, patient replied Dr Delvis Corbin told me to stop my medications. Call was placed to Dr Sherie Lai office and Elvi Fraire nurse stated patient should be taking home medications as prescribed. Also patient requested assistance in obtaining her MS medications. [facility] was contacted and stated patient was shipped her 2 doses of Mavenclad and needs new prescription for further shipments. Mayo Maurer NP, was contacted to clarify if patient is to continue this medication. Left message for call back to ACM   The second MS medication Aubagio is ready to be shipped once [facility] verifies patient address- should be shipped by 3/4/21- only authorized for one month at time- Patient will need call [address] monthly for refills   ACM will contact Mayo Maurer NP office to clarify MS medications   ACM will contact Dr Geller's office to clarify daily home medication regime   ACM will contact patient with updated medication list as soon as possible and update chart  ACM will obtain clarification of home medications and inform patient of current medications.  JEANNE

## 2021-05-01 VITALS
TEMPERATURE: 97.1 F | OXYGEN SATURATION: 98 % | RESPIRATION RATE: 18 BRPM | DIASTOLIC BLOOD PRESSURE: 80 MMHG | HEART RATE: 93 BPM | SYSTOLIC BLOOD PRESSURE: 124 MMHG

## 2021-05-01 PROCEDURE — 3331090001 HH PPS REVENUE CREDIT

## 2021-05-01 PROCEDURE — 3331090002 HH PPS REVENUE DEBIT

## 2021-05-02 PROCEDURE — 3331090001 HH PPS REVENUE CREDIT

## 2021-05-02 PROCEDURE — 3331090002 HH PPS REVENUE DEBIT

## 2021-05-03 PROCEDURE — 3331090001 HH PPS REVENUE CREDIT

## 2021-05-03 PROCEDURE — 3331090002 HH PPS REVENUE DEBIT

## 2021-05-04 ENCOUNTER — HOME CARE VISIT (OUTPATIENT)
Dept: SCHEDULING | Facility: HOME HEALTH | Age: 46
End: 2021-05-04
Payer: MEDICARE

## 2021-05-04 ENCOUNTER — TELEPHONE (OUTPATIENT)
Dept: FAMILY MEDICINE CLINIC | Age: 46
End: 2021-05-04

## 2021-05-04 VITALS
OXYGEN SATURATION: 98 % | TEMPERATURE: 98.1 F | RESPIRATION RATE: 18 BRPM | HEART RATE: 96 BPM | SYSTOLIC BLOOD PRESSURE: 120 MMHG | DIASTOLIC BLOOD PRESSURE: 80 MMHG

## 2021-05-04 VITALS
TEMPERATURE: 98.1 F | RESPIRATION RATE: 18 BRPM | HEART RATE: 96 BPM | OXYGEN SATURATION: 98 % | SYSTOLIC BLOOD PRESSURE: 120 MMHG | DIASTOLIC BLOOD PRESSURE: 80 MMHG

## 2021-05-04 PROCEDURE — 3331090001 HH PPS REVENUE CREDIT

## 2021-05-04 PROCEDURE — 3331090002 HH PPS REVENUE DEBIT

## 2021-05-04 PROCEDURE — G0151 HHCP-SERV OF PT,EA 15 MIN: HCPCS

## 2021-05-04 PROCEDURE — G0152 HHCP-SERV OF OT,EA 15 MIN: HCPCS

## 2021-05-04 NOTE — TELEPHONE ENCOUNTER
jhony from [facility] calling [phone]   for patient oenl santos 1975   she needs verbal orders for 2 x a week hh aide for 6 weeks ot services  Please call and leave a verbal order on the phone message thank you

## 2021-05-05 ENCOUNTER — HOME CARE VISIT (OUTPATIENT)
Dept: SCHEDULING | Facility: HOME HEALTH | Age: 46
End: 2021-05-05
Payer: MEDICARE

## 2021-05-05 ENCOUNTER — HOME CARE VISIT (OUTPATIENT)
Dept: HOME HEALTH SERVICES | Facility: HOME HEALTH | Age: 46
End: 2021-05-05
Payer: MEDICARE

## 2021-05-05 VITALS — TEMPERATURE: 98.2 F | HEART RATE: 95 BPM | OXYGEN SATURATION: 98 % | RESPIRATION RATE: 17 BRPM

## 2021-05-05 PROCEDURE — 3331090001 HH PPS REVENUE CREDIT

## 2021-05-05 PROCEDURE — 3331090002 HH PPS REVENUE DEBIT

## 2021-05-05 PROCEDURE — G0152 HHCP-SERV OF OT,EA 15 MIN: HCPCS

## 2021-05-06 ENCOUNTER — HOME CARE VISIT (OUTPATIENT)
Dept: SCHEDULING | Facility: HOME HEALTH | Age: 46
End: 2021-05-06
Payer: MEDICARE

## 2021-05-06 VITALS
SYSTOLIC BLOOD PRESSURE: 130 MMHG | TEMPERATURE: 97.7 F | DIASTOLIC BLOOD PRESSURE: 90 MMHG | HEART RATE: 106 BPM | OXYGEN SATURATION: 94 % | RESPIRATION RATE: 18 BRPM

## 2021-05-06 PROCEDURE — 3331090002 HH PPS REVENUE DEBIT

## 2021-05-06 PROCEDURE — G0151 HHCP-SERV OF PT,EA 15 MIN: HCPCS

## 2021-05-06 PROCEDURE — 3331090001 HH PPS REVENUE CREDIT

## 2021-05-07 ENCOUNTER — HOME CARE VISIT (OUTPATIENT)
Dept: HOME HEALTH SERVICES | Facility: HOME HEALTH | Age: 46
End: 2021-05-07
Payer: MEDICARE

## 2021-05-07 PROCEDURE — 3331090001 HH PPS REVENUE CREDIT

## 2021-05-07 PROCEDURE — 3331090002 HH PPS REVENUE DEBIT

## 2021-05-08 PROCEDURE — 3331090002 HH PPS REVENUE DEBIT

## 2021-05-08 PROCEDURE — 3331090001 HH PPS REVENUE CREDIT

## 2021-05-09 PROCEDURE — 3331090001 HH PPS REVENUE CREDIT

## 2021-05-09 PROCEDURE — 3331090002 HH PPS REVENUE DEBIT

## 2021-05-10 ENCOUNTER — HOME CARE VISIT (OUTPATIENT)
Dept: SCHEDULING | Facility: HOME HEALTH | Age: 46
End: 2021-05-10
Payer: MEDICARE

## 2021-05-10 VITALS
OXYGEN SATURATION: 98 % | RESPIRATION RATE: 17 BRPM | SYSTOLIC BLOOD PRESSURE: 110 MMHG | DIASTOLIC BLOOD PRESSURE: 80 MMHG | HEART RATE: 97 BPM | TEMPERATURE: 98.8 F

## 2021-05-10 PROCEDURE — G0152 HHCP-SERV OF OT,EA 15 MIN: HCPCS

## 2021-05-10 PROCEDURE — 3331090002 HH PPS REVENUE DEBIT

## 2021-05-10 PROCEDURE — 3331090001 HH PPS REVENUE CREDIT

## 2021-05-11 ENCOUNTER — HOME CARE VISIT (OUTPATIENT)
Dept: SCHEDULING | Facility: HOME HEALTH | Age: 46
End: 2021-05-11
Payer: MEDICARE

## 2021-05-11 VITALS
OXYGEN SATURATION: 93 % | SYSTOLIC BLOOD PRESSURE: 120 MMHG | DIASTOLIC BLOOD PRESSURE: 84 MMHG | TEMPERATURE: 98.2 F | RESPIRATION RATE: 18 BRPM | HEART RATE: 73 BPM

## 2021-05-11 PROCEDURE — 3331090001 HH PPS REVENUE CREDIT

## 2021-05-11 PROCEDURE — G0156 HHCP-SVS OF AIDE,EA 15 MIN: HCPCS

## 2021-05-11 PROCEDURE — 3331090002 HH PPS REVENUE DEBIT

## 2021-05-11 PROCEDURE — G0151 HHCP-SERV OF PT,EA 15 MIN: HCPCS

## 2021-05-12 ENCOUNTER — HOME CARE VISIT (OUTPATIENT)
Dept: HOME HEALTH SERVICES | Facility: HOME HEALTH | Age: 46
End: 2021-05-12
Payer: MEDICARE

## 2021-05-12 ENCOUNTER — HOME CARE VISIT (OUTPATIENT)
Dept: SCHEDULING | Facility: HOME HEALTH | Age: 46
End: 2021-05-12
Payer: MEDICARE

## 2021-05-12 VITALS
HEART RATE: 110 BPM | SYSTOLIC BLOOD PRESSURE: 110 MMHG | DIASTOLIC BLOOD PRESSURE: 80 MMHG | RESPIRATION RATE: 19 BRPM | TEMPERATURE: 98.7 F | OXYGEN SATURATION: 97 %

## 2021-05-12 PROCEDURE — G0152 HHCP-SERV OF OT,EA 15 MIN: HCPCS

## 2021-05-12 PROCEDURE — 3331090001 HH PPS REVENUE CREDIT

## 2021-05-12 PROCEDURE — 3331090002 HH PPS REVENUE DEBIT

## 2021-05-13 ENCOUNTER — HOME CARE VISIT (OUTPATIENT)
Dept: HOME HEALTH SERVICES | Facility: HOME HEALTH | Age: 46
End: 2021-05-13
Payer: MEDICARE

## 2021-05-13 PROCEDURE — 3331090002 HH PPS REVENUE DEBIT

## 2021-05-13 PROCEDURE — 3331090001 HH PPS REVENUE CREDIT

## 2021-05-18 ENCOUNTER — PATIENT OUTREACH (OUTPATIENT)
Dept: CASE MANAGEMENT | Age: 46
End: 2021-05-18

## 2021-05-18 NOTE — PROGRESS NOTES
5/24/21 Patient is currently admitted to hospital for possible MS flare up. ACM will defer calls until after discharge. [facility]  5/21/21 ACM contacted Medicaid CCCP sreedharmandaniel, Ms Frias Seen  who is not available until 5/25/21, however, ACM spoke with Karyn Crouch, [address] [place]  covering for Ms Saritha Rosales in her absence. Ms Brianna Singh reported that patient is not assigned a health care aide or agency at this time due to lack of available care aide staff/ agency that can staff the home. Ms Brianna Singh did mention it is reported in the chart, that patient had declined a facility placement at this time. Ms Brianna Singh stated she will leave message for Ms Saritha Rosales to follow up on her return to the office. ACM contacted [facility] and they are no longer providing services to the patient in her home. Per Intake, no current orders are present for home health to resume in home care at this time. ACM  contacted Ms Asim Gardner LCSW by [place] for further assistance. [facility]    5/20/21 ACM spoke with patient on Rodney Velez, emergency contact, phone. Patient stated she needed someone to come into to her home for assistance. Patient stated \"I need a shower, I have not had a shower in two weeks\". It appears patients friend is not able to assist in bathing, changing clothes, and bed changes for patient. Patient was asked if she would consider going to a facility  Where she could get assistance and patient stated no. Patient did not want to talk about going to assisted living or SNF. AC will attempt to contact patient's Medicaid  to arrange for an in home aid. [facility] will send chart message to PCP to see if home health can be arranged for patient. Patient was advised if she had a medical emergency to call 911. Patient was also reminded she could contact [facility] if needed. Patient verbalized understanding of instructions.    [facility] will follow up with patient after getting follow up from [place] . [facility]      5/18/21 ACM attempted to contact patient at numbers provided- left message for patient call back.  LEROYB

## 2021-05-23 ENCOUNTER — APPOINTMENT (OUTPATIENT)
Dept: GENERAL RADIOLOGY | Age: 46
DRG: 060 | End: 2021-05-23
Attending: EMERGENCY MEDICINE
Payer: MEDICARE

## 2021-05-23 ENCOUNTER — HOSPITAL ENCOUNTER (INPATIENT)
Age: 46
LOS: 4 days | Discharge: NURSING, REHAB OR LONG-TERM CARE | DRG: 060 | End: 2021-05-27
Attending: EMERGENCY MEDICINE | Admitting: STUDENT IN AN ORGANIZED HEALTH CARE EDUCATION/TRAINING PROGRAM
Payer: MEDICARE

## 2021-05-23 ENCOUNTER — APPOINTMENT (OUTPATIENT)
Dept: CT IMAGING | Age: 46
DRG: 060 | End: 2021-05-23
Attending: EMERGENCY MEDICINE
Payer: MEDICARE

## 2021-05-23 ENCOUNTER — APPOINTMENT (OUTPATIENT)
Dept: MRI IMAGING | Age: 46
DRG: 060 | End: 2021-05-23
Attending: STUDENT IN AN ORGANIZED HEALTH CARE EDUCATION/TRAINING PROGRAM
Payer: MEDICARE
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
## 2021-05-23 DIAGNOSIS — G35 MULTIPLE SCLEROSIS (HCC): Primary | ICD-10-CM

## 2021-05-23 DIAGNOSIS — M54.50 ACUTE MIDLINE LOW BACK PAIN WITHOUT SCIATICA: ICD-10-CM

## 2021-05-23 DIAGNOSIS — F41.9 CHRONIC ANXIETY: ICD-10-CM

## 2021-05-23 DIAGNOSIS — G35 MS (MULTIPLE SCLEROSIS) (HCC): ICD-10-CM

## 2021-05-23 PROBLEM — R47.81 SLURRED SPEECH: Status: ACTIVE | Noted: 2021-05-23

## 2021-05-23 LAB
ALBUMIN SERPL-MCNC: 3.5 G/DL (ref 3.5–5)
ALBUMIN/GLOB SERPL: 1.2 {RATIO} (ref 1.1–2.2)
ALP SERPL-CCNC: 67 U/L (ref 45–117)
ALT SERPL-CCNC: 14 U/L (ref 12–78)
ANION GAP SERPL CALC-SCNC: 3 MMOL/L (ref 5–15)
APPEARANCE UR: ABNORMAL
AST SERPL-CCNC: 13 U/L (ref 15–37)
BACTERIA URNS QL MICRO: ABNORMAL /HPF
BASOPHILS # BLD: 0 K/UL (ref 0–0.1)
BASOPHILS NFR BLD: 1 % (ref 0–1)
BILIRUB SERPL-MCNC: 0.3 MG/DL (ref 0.2–1)
BILIRUB UR QL: NEGATIVE
BUN SERPL-MCNC: 14 MG/DL (ref 6–20)
BUN/CREAT SERPL: 15 (ref 12–20)
CALCIUM SERPL-MCNC: 9.9 MG/DL (ref 8.5–10.1)
CHLORIDE SERPL-SCNC: 110 MMOL/L (ref 97–108)
CO2 SERPL-SCNC: 30 MMOL/L (ref 21–32)
COLOR UR: ABNORMAL
CREAT SERPL-MCNC: 0.95 MG/DL (ref 0.55–1.02)
DIFFERENTIAL METHOD BLD: ABNORMAL
EOSINOPHIL # BLD: 0.1 K/UL (ref 0–0.4)
EOSINOPHIL NFR BLD: 2 % (ref 0–7)
EPITH CASTS URNS QL MICRO: ABNORMAL /LPF
ERYTHROCYTE [DISTWIDTH] IN BLOOD BY AUTOMATED COUNT: 14.6 % (ref 11.5–14.5)
GLOBULIN SER CALC-MCNC: 3 G/DL (ref 2–4)
GLUCOSE BLD STRIP.AUTO-MCNC: 54 MG/DL (ref 65–117)
GLUCOSE BLD STRIP.AUTO-MCNC: 56 MG/DL (ref 65–117)
GLUCOSE BLD STRIP.AUTO-MCNC: 70 MG/DL (ref 65–117)
GLUCOSE BLD STRIP.AUTO-MCNC: 81 MG/DL (ref 65–117)
GLUCOSE SERPL-MCNC: 75 MG/DL (ref 65–100)
GLUCOSE UR STRIP.AUTO-MCNC: NEGATIVE MG/DL
HCG UR QL: NEGATIVE
HCT VFR BLD AUTO: 33.4 % (ref 35–47)
HGB BLD-MCNC: 10.8 G/DL (ref 11.5–16)
HGB UR QL STRIP: NEGATIVE
HYALINE CASTS URNS QL MICRO: ABNORMAL /LPF (ref 0–5)
IMM GRANULOCYTES # BLD AUTO: 0 K/UL (ref 0–0.04)
IMM GRANULOCYTES NFR BLD AUTO: 1 % (ref 0–0.5)
KETONES UR QL STRIP.AUTO: NEGATIVE MG/DL
LEUKOCYTE ESTERASE UR QL STRIP.AUTO: NEGATIVE
LYMPHOCYTES # BLD: 0.2 K/UL (ref 0.8–3.5)
LYMPHOCYTES NFR BLD: 8 % (ref 12–49)
MCH RBC QN AUTO: 31.9 PG (ref 26–34)
MCHC RBC AUTO-ENTMCNC: 32.3 G/DL (ref 30–36.5)
MCV RBC AUTO: 98.5 FL (ref 80–99)
MONOCYTES # BLD: 0.3 K/UL (ref 0–1)
MONOCYTES NFR BLD: 10 % (ref 5–13)
NEUTS SEG # BLD: 2.5 K/UL (ref 1.8–8)
NEUTS SEG NFR BLD: 78 % (ref 32–75)
NITRITE UR QL STRIP.AUTO: NEGATIVE
NRBC # BLD: 0 K/UL (ref 0–0.01)
NRBC BLD-RTO: 0 PER 100 WBC
PH UR STRIP: 8 [PH] (ref 5–8)
PLATELET # BLD AUTO: 272 K/UL (ref 150–400)
PMV BLD AUTO: 10 FL (ref 8.9–12.9)
POTASSIUM SERPL-SCNC: 5 MMOL/L (ref 3.5–5.1)
PROT SERPL-MCNC: 6.5 G/DL (ref 6.4–8.2)
PROT UR STRIP-MCNC: NEGATIVE MG/DL
RBC # BLD AUTO: 3.39 M/UL (ref 3.8–5.2)
RBC #/AREA URNS HPF: ABNORMAL /HPF (ref 0–5)
RBC MORPH BLD: ABNORMAL
SERVICE CMNT-IMP: ABNORMAL
SERVICE CMNT-IMP: ABNORMAL
SERVICE CMNT-IMP: NORMAL
SERVICE CMNT-IMP: NORMAL
SODIUM SERPL-SCNC: 143 MMOL/L (ref 136–145)
SP GR UR REFRACTOMETRY: 1.01 (ref 1–1.03)
UA: UC IF INDICATED,UAUC: ABNORMAL
UROBILINOGEN UR QL STRIP.AUTO: 0.2 EU/DL (ref 0.2–1)
WBC # BLD AUTO: 3.1 K/UL (ref 3.6–11)
WBC URNS QL MICRO: ABNORMAL /HPF (ref 0–4)

## 2021-05-23 PROCEDURE — 81025 URINE PREGNANCY TEST: CPT

## 2021-05-23 PROCEDURE — 36415 COLL VENOUS BLD VENIPUNCTURE: CPT

## 2021-05-23 PROCEDURE — 74011636320 HC RX REV CODE- 636/320: Performed by: STUDENT IN AN ORGANIZED HEALTH CARE EDUCATION/TRAINING PROGRAM

## 2021-05-23 PROCEDURE — 72100 X-RAY EXAM L-S SPINE 2/3 VWS: CPT

## 2021-05-23 PROCEDURE — 82962 GLUCOSE BLOOD TEST: CPT

## 2021-05-23 PROCEDURE — 74011250637 HC RX REV CODE- 250/637: Performed by: STUDENT IN AN ORGANIZED HEALTH CARE EDUCATION/TRAINING PROGRAM

## 2021-05-23 PROCEDURE — 85025 COMPLETE CBC W/AUTO DIFF WBC: CPT

## 2021-05-23 PROCEDURE — 99285 EMERGENCY DEPT VISIT HI MDM: CPT

## 2021-05-23 PROCEDURE — 70553 MRI BRAIN STEM W/O & W/DYE: CPT

## 2021-05-23 PROCEDURE — 74011250636 HC RX REV CODE- 250/636: Performed by: STUDENT IN AN ORGANIZED HEALTH CARE EDUCATION/TRAINING PROGRAM

## 2021-05-23 PROCEDURE — 80053 COMPREHEN METABOLIC PANEL: CPT

## 2021-05-23 PROCEDURE — A9576 INJ PROHANCE MULTIPACK: HCPCS | Performed by: STUDENT IN AN ORGANIZED HEALTH CARE EDUCATION/TRAINING PROGRAM

## 2021-05-23 PROCEDURE — 65660000000 HC RM CCU STEPDOWN

## 2021-05-23 PROCEDURE — 81001 URINALYSIS AUTO W/SCOPE: CPT

## 2021-05-23 PROCEDURE — 74011250637 HC RX REV CODE- 250/637: Performed by: EMERGENCY MEDICINE

## 2021-05-23 PROCEDURE — 70450 CT HEAD/BRAIN W/O DYE: CPT

## 2021-05-23 PROCEDURE — 74011000258 HC RX REV CODE- 258: Performed by: STUDENT IN AN ORGANIZED HEALTH CARE EDUCATION/TRAINING PROGRAM

## 2021-05-23 RX ORDER — INSULIN LISPRO 100 [IU]/ML
INJECTION, SOLUTION INTRAVENOUS; SUBCUTANEOUS
Status: DISCONTINUED | OUTPATIENT
Start: 2021-05-23 | End: 2021-05-28 | Stop reason: HOSPADM

## 2021-05-23 RX ORDER — MAGNESIUM SULFATE 100 %
4 CRYSTALS MISCELLANEOUS AS NEEDED
Status: DISCONTINUED | OUTPATIENT
Start: 2021-05-23 | End: 2021-05-28 | Stop reason: HOSPADM

## 2021-05-23 RX ORDER — POLYETHYLENE GLYCOL 3350 17 G/17G
17 POWDER, FOR SOLUTION ORAL DAILY PRN
Status: DISCONTINUED | OUTPATIENT
Start: 2021-05-23 | End: 2021-05-28 | Stop reason: HOSPADM

## 2021-05-23 RX ORDER — LANOLIN ALCOHOL/MO/W.PET/CERES
15 CREAM (GRAM) TOPICAL
Status: DISCONTINUED | OUTPATIENT
Start: 2021-05-23 | End: 2021-05-28 | Stop reason: HOSPADM

## 2021-05-23 RX ORDER — ACETAMINOPHEN 325 MG/1
650 TABLET ORAL
Status: DISCONTINUED | OUTPATIENT
Start: 2021-05-23 | End: 2021-05-28 | Stop reason: HOSPADM

## 2021-05-23 RX ORDER — ONDANSETRON 2 MG/ML
4 INJECTION INTRAMUSCULAR; INTRAVENOUS
Status: DISCONTINUED | OUTPATIENT
Start: 2021-05-23 | End: 2021-05-28 | Stop reason: HOSPADM

## 2021-05-23 RX ORDER — ENOXAPARIN SODIUM 100 MG/ML
40 INJECTION SUBCUTANEOUS DAILY
Status: DISCONTINUED | OUTPATIENT
Start: 2021-05-24 | End: 2021-05-28 | Stop reason: HOSPADM

## 2021-05-23 RX ORDER — SODIUM CHLORIDE 0.9 % (FLUSH) 0.9 %
5-40 SYRINGE (ML) INJECTION AS NEEDED
Status: DISCONTINUED | OUTPATIENT
Start: 2021-05-23 | End: 2021-05-28 | Stop reason: HOSPADM

## 2021-05-23 RX ORDER — GABAPENTIN 300 MG/1
600 CAPSULE ORAL 3 TIMES DAILY
Status: DISCONTINUED | OUTPATIENT
Start: 2021-05-23 | End: 2021-05-28 | Stop reason: HOSPADM

## 2021-05-23 RX ORDER — DULOXETIN HYDROCHLORIDE 30 MG/1
60 CAPSULE, DELAYED RELEASE ORAL DAILY
Status: DISCONTINUED | OUTPATIENT
Start: 2021-05-24 | End: 2021-05-28 | Stop reason: HOSPADM

## 2021-05-23 RX ORDER — LANOLIN ALCOHOL/MO/W.PET/CERES
CREAM (GRAM) TOPICAL
Status: DISCONTINUED
Start: 2021-05-23 | End: 2021-05-24 | Stop reason: WASHOUT

## 2021-05-23 RX ORDER — ONDANSETRON 4 MG/1
4 TABLET, ORALLY DISINTEGRATING ORAL
Status: DISCONTINUED | OUTPATIENT
Start: 2021-05-23 | End: 2021-05-28 | Stop reason: HOSPADM

## 2021-05-23 RX ORDER — DEXTROSE 50 % IN WATER (D50W) INTRAVENOUS SYRINGE
12.5-25 AS NEEDED
Status: DISCONTINUED | OUTPATIENT
Start: 2021-05-23 | End: 2021-05-28 | Stop reason: HOSPADM

## 2021-05-23 RX ORDER — NORTRIPTYLINE HYDROCHLORIDE 25 MG/1
CAPSULE ORAL
Status: DISPENSED
Start: 2021-05-23 | End: 2021-05-24

## 2021-05-23 RX ORDER — ACETAMINOPHEN 325 MG/1
TABLET ORAL
Status: DISPENSED
Start: 2021-05-23 | End: 2021-05-24

## 2021-05-23 RX ORDER — ACETAMINOPHEN 650 MG/1
650 SUPPOSITORY RECTAL
Status: DISCONTINUED | OUTPATIENT
Start: 2021-05-23 | End: 2021-05-28 | Stop reason: HOSPADM

## 2021-05-23 RX ORDER — SODIUM CHLORIDE 0.9 % (FLUSH) 0.9 %
5-40 SYRINGE (ML) INJECTION EVERY 8 HOURS
Status: DISCONTINUED | OUTPATIENT
Start: 2021-05-23 | End: 2021-05-28 | Stop reason: HOSPADM

## 2021-05-23 RX ORDER — GABAPENTIN 300 MG/1
CAPSULE ORAL
Status: DISPENSED
Start: 2021-05-23 | End: 2021-05-24

## 2021-05-23 RX ORDER — NORTRIPTYLINE HYDROCHLORIDE 25 MG/1
25 CAPSULE ORAL
Status: DISCONTINUED | OUTPATIENT
Start: 2021-05-23 | End: 2021-05-28 | Stop reason: HOSPADM

## 2021-05-23 RX ORDER — ALPRAZOLAM 0.5 MG/1
0.5 TABLET ORAL
Status: DISCONTINUED | OUTPATIENT
Start: 2021-05-23 | End: 2021-05-28 | Stop reason: HOSPADM

## 2021-05-23 RX ADMIN — NORTRIPTYLINE HYDROCHLORIDE 25 MG: 25 CAPSULE ORAL at 23:41

## 2021-05-23 RX ADMIN — GADOTERIDOL 20 ML: 279.3 INJECTION, SOLUTION INTRAVENOUS at 16:48

## 2021-05-23 RX ADMIN — GABAPENTIN 600 MG: 300 CAPSULE ORAL at 17:04

## 2021-05-23 RX ADMIN — ACETAMINOPHEN 650 MG: 325 TABLET ORAL at 23:41

## 2021-05-23 RX ADMIN — GABAPENTIN 600 MG: 300 CAPSULE ORAL at 23:41

## 2021-05-23 RX ADMIN — SODIUM CHLORIDE 1000 MG: 900 INJECTION INTRAVENOUS at 17:04

## 2021-05-23 NOTE — ED NOTES
Bedside and Verbal shift change report given to Micha Wilson RN (oncoming nurse) by Royal Phyllis RN (offgoing nurse). Report included the following information SBAR, ED Summary, MAR and Recent Results.

## 2021-05-23 NOTE — ED PROVIDER NOTES
EMERGENCY DEPARTMENT HISTORY AND PHYSICAL EXAM      Date: 5/23/2021  Patient Name: Madeline Ho    History of Presenting Illness     Chief Complaint   Patient presents with   Josef Fall     Presents to the ED via EMS with c/o \"MS flare\" x this morning - sx are very typical of her flares (slurred speech and generalized weakness). Rosy Martinist while trying to get up from the toilet, not has back pain. EMS reports transporting her multiple times for the same sx. History Provided By: Patient    HPI: Madeline Ho, 50 y.o. female presents to the ED with cc of fall. Patient is a 78-year-old female with history of MS, fibromyalgia, chronic pain who presents emergency department after a fall. Patient was admitted for a MS flare in March and discharged to [facility]. Patient reportedly was discharged 3 days ago. She reports \"they want feeding me there and I wanted to go home. \"  Patient reports she ambulates with a wheelchair and occasional walker. She reports she was standing up from the toilet and had a fall. Patient reports she landed on her bottom, she did not strike her head. She is not on blood thinners. After fall, patient reports midline lower back pain. Denies any saddle anesthesia. Does have baseline bladder incontinence or MS. She reports associated symptoms associate with MS flares including generalized weakness and dysarthria. Of note, she does report that she has \"body wide pain. \" Denies infectious symptoms. Neurologist is Dr. Sheri Quezada    There are no other complaints, changes, or physical findings at this time. PCP: Pedro Herrera MD    No current facility-administered medications on file prior to encounter. Current Outpatient Medications on File Prior to Encounter   Medication Sig Dispense Refill    ALPRAZolam (Xanax) 0.5 mg tablet Take 1 Tab by mouth two (2) times daily as needed for Anxiety.  Max Daily Amount: 1 mg. 60 Tab 0    nortriptyline (PAMELOR) 25 mg capsule Take 1 Cap by mouth nightly. 30 Cap 0    gabapentin (NEURONTIN) 600 mg tablet Take 1 Tab by mouth three (3) times daily. Max Daily Amount: 1,800 mg. 20 Tab 0    DULoxetine (CYMBALTA) 60 mg capsule Take 1 Cap by mouth daily. Indications: neuropathic pain 90 Cap 5    teriflunomide (AUBAGIO) 14 mg tablet Take 1 Tab by mouth daily. Indications: relapsing form of multiple sclerosis 90 Tab 3    acetaminophen (TYLENOL) 500 mg tablet Take 500 mg by mouth every four (4) hours as needed for Fever or Pain.  nystatin (MYCOSTATIN) topical cream APPLY CREAM TOPICALLY TO AFFECTED AREA TWICE DAILY      melatonin 3 mg tablet Take 15 mg by mouth nightly as needed for Insomnia.          Past History     Past Medical History:  Past Medical History:   Diagnosis Date    Body aches 12/11/2014    Chronic pain     Depression     Headache(784.0)     History of mammogram never before    MS (multiple sclerosis) ([facility] [address]     Neurological disorder     Multiple Sclerosis    Pap smear for cervical cancer screening 2008    Psychiatric disorder     anxiety    Psychotic disorder [facility])        Past Surgical History:  Past Surgical History:   Procedure Laterality Date    COLONOSCOPY N/A 2/28/2018    COLONOSCOPY performed by Alberto Henson MD at [facility] ENDOSCOPY    HX CHOLECYSTECTOMY      HX GYN      3 c-sections    HX HEENT      bilateral ear tube surgery    HX HERNIA REPAIR      HX OTHER SURGICAL      R inguinal hernia repair       Family History:  Family History   Problem Relation Age of Onset    Heart Attack Father         tripple bypass    Cancer Father         lung    COPD Father     Diabetes Mother         type 2    Heart Disease Mother         heart disease    Diabetes Paternal Grandmother     No Known Problems Sister     No Known Problems Brother     No Known Problems Child        Social History:  Social History     Tobacco Use    Smoking status: Current Every Day Smoker     Packs/day: 0.50     Years: 17.00     Pack years: 8.50     Types: Cigarettes    Smokeless tobacco: Never Used    Tobacco comment: 1 pack every 3 days. Vaping Use    Vaping Use: Never assessed   Substance Use Topics    Alcohol use: No    Drug use: No       Allergies: Allergies   Allergen Reactions    Morphine Rash         Review of Systems   Review of Systems   Constitutional: Negative for activity change, chills and fever. HENT: Negative for facial swelling and voice change. Eyes: Negative for redness. Respiratory: Negative for cough, shortness of breath and wheezing. Cardiovascular: Negative for chest pain and leg swelling. Gastrointestinal: Negative for abdominal pain, diarrhea, nausea and vomiting. Genitourinary: Negative for decreased urine volume. Musculoskeletal: Positive for back pain and gait problem. Skin: Negative for pallor and rash. Neurological: Positive for speech difficulty and weakness. Negative for tremors and facial asymmetry. Psychiatric/Behavioral: Negative for agitation. All other systems reviewed and are negative. Physical Exam   Physical Exam  Vitals and nursing note reviewed. Constitutional:       Comments: 68-year-old female, resting on stretcher, no acute distress   HENT:      Head: Normocephalic and atraumatic. Cardiovascular:      Rate and Rhythm: Normal rate and regular rhythm. Heart sounds: No murmur heard. No friction rub. No gallop. Pulmonary:      Effort: Pulmonary effort is normal.      Breath sounds: Normal breath sounds. Comments: Not hypoxic on room air  Abdominal:      Palpations: Abdomen is soft. Tenderness: There is no abdominal tenderness. Musculoskeletal:         General: No swelling. Normal range of motion. Cervical back: Normal range of motion. Comments: Tenderness to palpation along lumbar spine   Skin:     General: Skin is warm. Capillary Refill: Capillary refill takes less than 2 seconds.    Neurological:      General: No focal deficit present. Mental Status: She is alert and oriented to person, place, and time. Comments: Dysarthria noted, global weakness. Psychiatric:         Mood and Affect: Mood normal.         Diagnostic Study Results     Labs -     Recent Results (from the past 12 hour(s))   CBC WITH AUTOMATED DIFF    Collection Time: 05/23/21  1:29 PM   Result Value Ref Range    WBC 3.1 (L) 3.6 - 11.0 K/uL    RBC 3.39 (L) 3.80 - 5.20 M/uL    HGB 10.8 (L) 11.5 - 16.0 g/dL    HCT 33.4 (L) 35.0 - 47.0 %    MCV 98.5 80.0 - 99.0 FL    MCH 31.9 26.0 - 34.0 PG    MCHC 32.3 30.0 - 36.5 g/dL    RDW 14.6 (H) 11.5 - 14.5 %    PLATELET 310 452 - 549 K/uL    MPV 10.0 8.9 - 12.9 FL    NRBC 0.0 0  WBC    ABSOLUTE NRBC 0.00 0.00 - 0.01 K/uL    NEUTROPHILS 78 (H) 32 - 75 %    LYMPHOCYTES 8 (L) 12 - 49 %    MONOCYTES 10 5 - 13 %    EOSINOPHILS 2 0 - 7 %    BASOPHILS 1 0 - 1 %    IMMATURE GRANULOCYTES 1 (H) 0.0 - 0.5 %    ABS. NEUTROPHILS 2.5 1.8 - 8.0 K/UL    ABS. LYMPHOCYTES 0.2 (L) 0.8 - 3.5 K/UL    ABS. MONOCYTES 0.3 0.0 - 1.0 K/UL    ABS. EOSINOPHILS 0.1 0.0 - 0.4 K/UL    ABS. BASOPHILS 0.0 0.0 - 0.1 K/UL    ABS. IMM. GRANS. 0.0 0.00 - 0.04 K/UL    DF SMEAR SCANNED      RBC COMMENTS NORMOCYTIC, NORMOCHROMIC     METABOLIC PANEL, COMPREHENSIVE    Collection Time: 05/23/21  2:11 PM   Result Value Ref Range    Sodium 143 136 - 145 mmol/L    Potassium 5.0 3.5 - 5.1 mmol/L    Chloride 110 (H) 97 - 108 mmol/L    CO2 30 21 - 32 mmol/L    Anion gap 3 (L) 5 - 15 mmol/L    Glucose 75 65 - 100 mg/dL    BUN 14 6 - 20 MG/DL    Creatinine 0.95 0.55 - 1.02 MG/DL    BUN/Creatinine ratio 15 12 - 20      GFR est AA >60 >60 ml/min/1.73m2    GFR est non-AA >60 >60 ml/min/1.73m2    Calcium 9.9 8.5 - 10.1 MG/DL    Bilirubin, total 0.3 0.2 - 1.0 MG/DL    ALT (SGPT) 14 12 - 78 U/L    AST (SGOT) 13 (L) 15 - 37 U/L    Alk.  phosphatase 67 45 - 117 U/L    Protein, total 6.5 6.4 - 8.2 g/dL    Albumin 3.5 3.5 - 5.0 g/dL    Globulin 3.0 2.0 - 4.0 g/dL    A-G Ratio 1.2 1.1 - 2.2     GLUCOSE, POC    Collection Time: 05/23/21  5:07 PM   Result Value Ref Range    Glucose (POC) 56 (L) 65 - 117 mg/dL    Performed by Janak Reno RN    URINALYSIS W/ REFLEX CULTURE    Collection Time: 05/23/21  5:20 PM    Specimen: Urine   Result Value Ref Range    Color YELLOW/STRAW      Appearance TURBID (A) CLEAR      Specific gravity 1.011 1.003 - 1.030      pH (UA) 8.0 5.0 - 8.0      Protein Negative NEG mg/dL    Glucose Negative NEG mg/dL    Ketone Negative NEG mg/dL    Bilirubin Negative NEG      Blood Negative NEG      Urobilinogen 0.2 0.2 - 1.0 EU/dL    Nitrites Negative NEG      Leukocyte Esterase Negative NEG      WBC 0-4 0 - 4 /hpf    RBC 0-5 0 - 5 /hpf    Epithelial cells FEW FEW /lpf    Bacteria 2+ (A) NEG /hpf    UA:UC IF INDICATED CULTURE NOT INDICATED BY UA RESULT CNI      Hyaline cast 0-2 0 - 5 /lpf   GLUCOSE, POC    Collection Time: 05/23/21  5:35 PM   Result Value Ref Range    Glucose (POC) 54 (L) 65 - 117 mg/dL    Performed by Janak Reno RN    HCG URINE, QL. - POC    Collection Time: 05/23/21  6:18 PM   Result Value Ref Range    Pregnancy test,urine (POC) Negative NEG     GLUCOSE, POC    Collection Time: 05/23/21  6:18 PM   Result Value Ref Range    Glucose (POC) 70 65 - 117 mg/dL    Performed by Janak Reno RN    GLUCOSE, POC    Collection Time: 05/23/21  7:46 PM   Result Value Ref Range    Glucose (POC) 81 65 - 117 mg/dL    Performed by Janak Reno RN        Radiologic Studies -   MRI BRAIN W WO CONT   Final Result   Moderate stable supratentorial demyelinating disease burden. No evidence of interval progression or active demyelination. No intracranial mass, hemorrhage or evidence of acute infarction. CT HEAD WO CONT   Final Result   Moderate white matter disease is consistent with patient's known history of   multiple sclerosis. No intracranial hemorrhage or mass lesion.             XR SPINE LUMB 2 OR 3 V   Final Result   No acute fracture or subluxation. CT Results  (Last 48 hours)               05/23/21 1427  CT HEAD WO CONT Final result    Impression:  Moderate white matter disease is consistent with patient's known history of   multiple sclerosis. No intracranial hemorrhage or mass lesion. Narrative:  EXAM: CT HEAD WO CONT   Clinical history: MS flair   INDICATION: MS flare       COMPARISON: 3/31/2021. CONTRAST: None. TECHNIQUE: Unenhanced CT of the head was performed using 5 mm images. Brain and   bone windows were generated. Coronal and sagittal reformats. CT dose reduction   was achieved through use of a standardized protocol tailored for this   examination and automatic exposure control for dose modulation. FINDINGS:   Periventricular hypodensities are extensive and not significantly changed   compared to CT and MR 3/31/2021 tThere is no significant white matter disease. There is no intracranial hemorrhage, extra-axial collection, or mass effect. The   basilar cisterns are open. No CT evidence of acute infarct. The bone windows demonstrate no abnormalities. The visualized portions of the   paranasal sinuses and mastoid air cells are clear. CXR Results  (Last 48 hours)    None          Medical Decision Making   I am the first provider for this patient. I reviewed the vital signs, available nursing notes, past medical history, past surgical history, family history and social history. Vital Signs-Reviewed the patient's vital signs.   Patient Vitals for the past 12 hrs:   Temp Pulse Resp BP SpO2   05/23/21 2000 -- 69 14 108/65 100 %   05/23/21 1945 -- 68 12 -- 95 %   05/23/21 1944 -- -- -- 102/62 --   05/23/21 1600 -- 63 16 (!) 88/60 100 %   05/23/21 1500 -- 63 12 -- 99 %   05/23/21 1400 -- (!) 59 12 112/76 98 %   05/23/21 1332 -- -- -- -- 100 %   05/23/21 1300 -- 66 13 107/67 100 %   05/23/21 1252 -- 66 12 -- 98 %   05/23/21 1251 -- -- 17 97/70 --   05/23/21 1246 97.4 °F (36.3 °C) 67 12 102/78 100 %       Records Reviewed: Nursing Notes, Old Medical Records, Previous Radiology Studies and Previous Laboratory Studies    Provider Notes (Medical Decision Making):     47-year-old female presents emergency department with a chief complaint of fall. Vitals are stable. Patient has a history of MS, she does report dysarthria which is consistent with her MS flares. I suspect this is secondary to MS versus deconditioning. No evidence of syncope. This does not clinically fit a acute CVA. However given fall, will check a CT head. Check basic labs. Plain film back. ED Course:   Initial assessment performed. The patients presenting problems have been discussed, and they are in agreement with the care plan formulated and outlined with them. I have encouraged them to ask questions as they arise throughout their visit. ED Course as of May 23 2143   Yulissaconnie Perera May 23, 2021   1457 CT head negative. Labs are baseline other than mild worsening anemia. X-ray of spine is negative. Patient sleeping in bed. [MB]   [id number] Discussed with neurology, regarding patient, recommends r/o UTI with UA. They will evaluate for imaging. [MB]      ED Course User Index  [MB] MD Linda Miller MD      Disposition:    Admitted      Diagnosis     Clinical Impression:   1. Multiple sclerosis ([address]    2. Fall, initial encounter    3. Acute midline low back pain without sciatica        Attestations:    Linda Arguelles MD    Please note that this dictation was completed with [employer], the computer voice recognition software. Quite often unanticipated grammatical, syntax, homophones, and other interpretive errors are inadvertently transcribed by the computer software. Please disregard these errors. Please excuse any errors that have escaped final proofreading. Thank you.

## 2021-05-23 NOTE — PROGRESS NOTES

## 2021-05-23 NOTE — H&P
Hospitalist Admission Note    NAME: Omid Sharma   :  1975   MRN:  [id number]     Date/Time:  2021 4:38 PM    Patient PCP: Tariq Medrano MD  ______________________________________________________________________  Given the patient's current clinical presentation, I have a high level of concern for decompensation if discharged from the emergency department. Complex decision making was performed, which includes reviewing the patient's available past medical records, laboratory results, and x-ray films. My assessment of this patient's clinical condition and my plan of care is as follows. Assessment / Plan:    Generalized weakness  Slurring of speech  MS flareup  -CT head-Moderate white matter disease is consistent with patient's known history of  multiple sclerosis. No intracranial hemorrhage or mass lesion. -X-ray spine lumbar-There is no acute fracture or subluxation. The osseous structures are diffusely demineralized. There is moderate narrowing of the L4-L5 disc. Plan-MRI brain pending.  -Solu-Medrol 1 g x 5 doses. -Neurology consulted. -PT and OT consulted, case management consulted. -Continue Aubagio    Microcytic anemia  -Hemoglobin 10.8, slight drop from the previous. Ordered Y05, folic acid, TSH, iron profile, ferritin in a.m. Fibromyalgia  Neuropathy  Mood disorder  Insomnia  -Continue the home medications. Code Status: Full code  Surrogate Decision Maker:    DVT Prophylaxis: Lovenox  GI Prophylaxis: not indicated    Baseline: Ambulatory at home      Subjective:   CHIEF COMPLAINT: Generalized weakness, slurred speech    HISTORY OF PRESENT ILLNESS:     Lemuel Blake is a 50 y.o.  female who presents with generalized weakness and slurred speech that started couple of days ago. Patient past medical history of MS, fibromyalgia, mood disorder, insomnia.   Patient stated for the last 2 days she is feeling too weak and not able to get out of the bed along with difficulty in speaking. Patient had similar symptoms before when she has MS flareup. She was recently discharged from the rehab home [facility] couple of days ago and she started having the symptoms. Patient has been admitted multiple times with a similar symptoms in the past.  Denies any headache, no passing out episodes, no chest pain, no shortness of breath, no headache, no fever or chills, no history of sick contacts. We were asked to admit for work up and evaluation of the above problems. Past Medical History:   Diagnosis Date    Body aches 12/11/2014    Chronic pain     Depression     Headache(784.0)     History of mammogram never before    [facility] sclerosis) ([facility] [address]     Neurological disorder     Multiple Sclerosis    Pap smear for cervical cancer screening 2008    Psychiatric disorder     anxiety    Psychotic disorder [facility])         Past Surgical History:   Procedure Laterality Date    COLONOSCOPY N/A 2/28/2018    COLON[facility] performed by [facility]an [facility] at [facility] ENDOSCOPY    HX CHOLECYSTECTOMY      HX GYN      3 c-sections    HX HEENT      bilateral ear tube surgery    HX HERNIA REPAIR      HX OTHER SURGICAL      R inguinal hernia repair       Social History     Tobacco Use    Smoking status: Current Every Day Smoker     Packs/day: 0.50     Years: 17.00     Pack years: 8.50     Types: Cigarettes    Smokeless tobacco: Never Used    Tobacco comment: 1 pack every 3 days.    Substance Use Topics    Alcohol use: No        Family History   Problem Relation Age of Onset    Heart Attack Father         tripple bypass    Cancer Father         lung    COPD Father     Diabetes Mother         type 2    Heart Disease Mother         heart disease    Diabetes Paternal Grandmother     No Known Problems Sister     No Known Problems Brother     No Known Problems Child      Allergies   Allergen Reactions    Morphine Rash        Prior to Admission medications Medication Sig Start Date End Date Taking? Authorizing Provider   ALPRAZolam (Xanax) 0.5 mg tablet Take 1 Tab by mouth two (2) times daily as needed for Anxiety. Max Daily Amount: 1 mg. 4/30/21   Ninfa Estrada MD   nortriptyline (PAMELOR) 25 mg capsule Take 1 Cap by mouth nightly. 4/28/21   Ninfa Estrada MD   gabapentin (NEURONTIN) 600 mg tablet Take 1 Tab by mouth three (3) times daily. Max Daily Amount: 1,800 mg. 4/12/21   Jaz Evans MD   DULoxetine (CYMBALTA) 60 mg capsule Take 1 Cap by mouth daily. Indications: neuropathic pain 3/23/21   Terry Bradley MD   teriflunomide (AUBAGIO) 14 mg tablet Take 1 Tab by mouth daily. Indications: relapsing form of multiple sclerosis 2/25/21   Kevin Henriquez NP   acetaminophen (TYLENOL) 500 mg tablet Take 500 mg by mouth every four (4) hours as needed for Fever or Pain. 8/21/20   Provider, Historical   nystatin (MYCOSTATIN) topical cream APPLY CREAM TOPICALLY TO AFFECTED AREA TWICE DAILY 1/1/21   Provider, Historical   melatonin 3 mg tablet Take 15 mg by mouth nightly as needed for Insomnia. Provider, Historical       REVIEW OF SYSTEMS:     I am not able to complete the review of systems because:    The patient is intubated and sedated    The patient has altered mental status due to his acute medical problems    The patient has baseline aphasia from prior stroke(s)    The patient has baseline dementia and is not reliable historian    The patient is in acute medical distress and unable to provide information           Total of 12 systems reviewed as follows:       POSITIVE= underlined text  Negative = text not underlined  General:  fever, chills, sweats, generalized weakness, weight loss/gain,      loss of appetite   Eyes:    blurred vision, eye pain, loss of vision, double vision  ENT:    rhinorrhea, pharyngitis   Respiratory:   cough, sputum production, SOB, BOBO, wheezing, pleuritic pain   Cardiology:   chest pain, palpitations, orthopnea, PND, edema, syncope Gastrointestinal:  abdominal pain , N/V, diarrhea, dysphagia, constipation, bleeding   Genitourinary:  frequency, urgency, dysuria, hematuria, incontinence   Muskuloskeletal :  arthralgia, myalgia, back pain  Hematology:  easy bruising, nose or gum bleeding, lymphadenopathy   Dermatological: rash, ulceration, pruritis, color change / jaundice  Endocrine:   hot flashes or polydipsia   Neurological:  headache, dizziness, confusion, focal weakness, paresthesia,     Speech difficulties, memory loss, gait difficulty  Psychological: Feelings of anxiety, depression, agitation    Objective:   VITALS:    Visit Vitals  BP (!) 88/60   Pulse 63   Temp 97.4 °F (36.3 °C)   Resp 16   Ht 5' 4\" (1.626 m)   Wt 101.2 kg (223 lb)   SpO2 100%   BMI 38.28 kg/m²       PHYSICAL EXAM:    General:    Alert, cooperative, no distress, appears stated age. HEENT: Atraumatic, anicteric sclerae, pink conjunctivae     No oral ulcers, mucosa moist, throat clear, dentition fair  Neck:  Supple, symmetrical,  thyroid: non tender  Lungs:   Clear to auscultation bilaterally. No Wheezing or Rhonchi. No rales. Chest wall:  No tenderness  No Accessory muscle use. Heart:   Regular  rhythm,  No  murmur   No edema  Abdomen:   Soft, non-tender. Not distended. Bowel sounds normal  Extremities: No cyanosis. No clubbing,      Skin turgor normal, Capillary refill normal, Radial dial pulse 2+  Skin:     Not pale. Not Jaundiced  No rashes   Psych:  Good insight. Not depressed. Not anxious or agitated. Neurologic: EOMs intact. No facial asymmetry. Slow speech. Symmetrical strength, Sensation grossly intact.  Alert and oriented X 4.     _______________________________________________________________________  Care Plan discussed with:    Comments   Patient x    Family      RN x    Care Manager                    Consultant:  x    _______________________________________________________________________  Expected  Disposition:   Home with Family HH/PT/OT/RN    SNF/LTC x   [facility]LE[facility]    ________________________________________________________________________  TOTAL TIME:  60 Minutes    Critical Care Provided     Minutes non procedure based      Comments     Reviewed previous records   >50% of visit spent in counseling and coordination of care  Discussion with patient and/or family and questions answered       ________________________________________________________________________  Signed: Thien Brody MD    Procedures: see electronic medical records for all procedures/Xrays and details which were not copied into this note but were reviewed prior to creation of Plan. LAB DATA REVIEWED:    Recent Results (from the past 24 hour(s))   CBC WITH AUTOMATED DIFF    Collection Time: 05/23/21  1:29 PM   Result Value Ref Range    WBC 3.1 (L) 3.6 - 11.0 K/uL    RBC 3.39 (L) 3.80 - 5.20 M/uL    HGB 10.8 (L) 11.5 - 16.0 g/dL    HCT 33.4 (L) 35.0 - 47.0 %    MCV 98.5 80.0 - 99.0 FL    MCH 31.9 26.0 - 34.0 PG    MCHC 32.3 30.0 - 36.5 g/dL    RDW 14.6 (H) 11.5 - 14.5 %    PLATELET 796 403 - 250 K/uL    MPV 10.0 8.9 - 12.9 FL    NRBC 0.0 0  WBC    ABSOLUTE NRBC 0.00 0.00 - 0.01 K/uL    NEUTROPHILS 78 (H) 32 - 75 %    LYMPHOCYTES 8 (L) 12 - 49 %    MONOCYTES 10 5 - 13 %    EOSINOPHILS 2 0 - 7 %    BASOPHILS 1 0 - 1 %    IMMATURE GRANULOCYTES 1 (H) 0.0 - 0.5 %    ABS. NEUTROPHILS 2.5 1.8 - 8.0 K/UL    ABS. LYMPHOCYTES 0.2 (L) 0.8 - 3.5 K/UL    ABS. MONOCYTES 0.3 0.0 - 1.0 K/UL    ABS. EOSINOPHILS 0.1 0.0 - 0.4 K/UL    ABS. BASOPHILS 0.0 0.0 - 0.1 K/UL    ABS. IMM.  GRANS. 0.0 0.00 - 0.04 K/UL    DF SMEAR SCANNED      RBC COMMENTS NORMOCYTIC, NORMOCHROMIC     METABOLIC PANEL, COMPREHENSIVE    Collection Time: 05/23/21  2:11 PM   Result Value Ref Range    Sodium 143 136 - 145 mmol/L    Potassium 5.0 3.5 - 5.1 mmol/L    Chloride 110 (H) 97 - 108 mmol/L    CO2 30 21 - 32 mmol/L    Anion gap 3 (L) 5 - 15 mmol/L    Glucose 75 65 - 100 mg/dL    BUN 14 6 - 20 MG/DL Creatinine 0.95 0.55 - 1.02 MG/DL    BUN/Creatinine ratio 15 12 - 20      GFR est AA >60 >60 ml/min/1.73m2    GFR est non-AA >60 >60 ml/min/1.73m2    Calcium 9.9 8.5 - 10.1 MG/DL    Bilirubin, total 0.3 0.2 - 1.0 MG/DL    ALT (SGPT) 14 12 - 78 U/L    AST (SGOT) 13 (L) 15 - 37 U/L    Alk.  phosphatase 67 45 - 117 U/L    Protein, total 6.5 6.4 - 8.2 g/dL    Albumin 3.5 3.5 - 5.0 g/dL    Globulin 3.0 2.0 - 4.0 g/dL    A-G Ratio 1.2 1.1 - 2.2

## 2021-05-24 LAB
ANION GAP SERPL CALC-SCNC: 5 MMOL/L (ref 5–15)
BUN SERPL-MCNC: 17 MG/DL (ref 6–20)
BUN/CREAT SERPL: 16 (ref 12–20)
CALCIUM SERPL-MCNC: 9.2 MG/DL (ref 8.5–10.1)
CHLORIDE SERPL-SCNC: 109 MMOL/L (ref 97–108)
CO2 SERPL-SCNC: 24 MMOL/L (ref 21–32)
CREAT SERPL-MCNC: 1.08 MG/DL (ref 0.55–1.02)
GLUCOSE BLD STRIP.AUTO-MCNC: 144 MG/DL (ref 65–117)
GLUCOSE BLD STRIP.AUTO-MCNC: 158 MG/DL (ref 65–117)
GLUCOSE BLD STRIP.AUTO-MCNC: 163 MG/DL (ref 65–117)
GLUCOSE BLD STRIP.AUTO-MCNC: 164 MG/DL (ref 65–117)
GLUCOSE SERPL-MCNC: 159 MG/DL (ref 65–100)
MAGNESIUM SERPL-MCNC: 2 MG/DL (ref 1.6–2.4)
POTASSIUM SERPL-SCNC: 4.5 MMOL/L (ref 3.5–5.1)
SERVICE CMNT-IMP: ABNORMAL
SODIUM SERPL-SCNC: 138 MMOL/L (ref 136–145)

## 2021-05-24 PROCEDURE — 82962 GLUCOSE BLOOD TEST: CPT

## 2021-05-24 PROCEDURE — 74011636637 HC RX REV CODE- 636/637: Performed by: STUDENT IN AN ORGANIZED HEALTH CARE EDUCATION/TRAINING PROGRAM

## 2021-05-24 PROCEDURE — 74011250637 HC RX REV CODE- 250/637: Performed by: NURSE PRACTITIONER

## 2021-05-24 PROCEDURE — 97530 THERAPEUTIC ACTIVITIES: CPT

## 2021-05-24 PROCEDURE — 80048 BASIC METABOLIC PNL TOTAL CA: CPT

## 2021-05-24 PROCEDURE — 97161 PT EVAL LOW COMPLEX 20 MIN: CPT

## 2021-05-24 PROCEDURE — 74011250637 HC RX REV CODE- 250/637: Performed by: STUDENT IN AN ORGANIZED HEALTH CARE EDUCATION/TRAINING PROGRAM

## 2021-05-24 PROCEDURE — 74011250637 HC RX REV CODE- 250/637: Performed by: EMERGENCY MEDICINE

## 2021-05-24 PROCEDURE — 36415 COLL VENOUS BLD VENIPUNCTURE: CPT

## 2021-05-24 PROCEDURE — 97535 SELF CARE MNGMENT TRAINING: CPT

## 2021-05-24 PROCEDURE — 74011000258 HC RX REV CODE- 258: Performed by: STUDENT IN AN ORGANIZED HEALTH CARE EDUCATION/TRAINING PROGRAM

## 2021-05-24 PROCEDURE — 2709999900 HC NON-CHARGEABLE SUPPLY

## 2021-05-24 PROCEDURE — 97165 OT EVAL LOW COMPLEX 30 MIN: CPT

## 2021-05-24 PROCEDURE — 74011250636 HC RX REV CODE- 250/636: Performed by: STUDENT IN AN ORGANIZED HEALTH CARE EDUCATION/TRAINING PROGRAM

## 2021-05-24 PROCEDURE — 83735 ASSAY OF MAGNESIUM: CPT

## 2021-05-24 PROCEDURE — 65660000000 HC RM CCU STEPDOWN

## 2021-05-24 PROCEDURE — 99223 1ST HOSP IP/OBS HIGH 75: CPT | Performed by: PSYCHIATRY & NEUROLOGY

## 2021-05-24 RX ORDER — ZOLPIDEM TARTRATE 5 MG/1
5 TABLET ORAL ONCE
Status: COMPLETED | OUTPATIENT
Start: 2021-05-24 | End: 2021-05-24

## 2021-05-24 RX ORDER — OXYCODONE HYDROCHLORIDE 5 MG/1
10 TABLET ORAL
Status: DISCONTINUED | OUTPATIENT
Start: 2021-05-24 | End: 2021-05-28 | Stop reason: HOSPADM

## 2021-05-24 RX ORDER — OXYCODONE HYDROCHLORIDE 5 MG/1
5 TABLET ORAL
Status: DISCONTINUED | OUTPATIENT
Start: 2021-05-24 | End: 2021-05-28 | Stop reason: HOSPADM

## 2021-05-24 RX ADMIN — DULOXETINE HYDROCHLORIDE 60 MG: 30 CAPSULE, DELAYED RELEASE ORAL at 08:58

## 2021-05-24 RX ADMIN — OXYCODONE 5 MG: 5 TABLET ORAL at 20:03

## 2021-05-24 RX ADMIN — Medication 10 ML: at 13:15

## 2021-05-24 RX ADMIN — Medication 10 ML: at 21:20

## 2021-05-24 RX ADMIN — INSULIN LISPRO 2 UNITS: 100 INJECTION, SOLUTION INTRAVENOUS; SUBCUTANEOUS at 13:15

## 2021-05-24 RX ADMIN — GABAPENTIN 600 MG: 300 CAPSULE ORAL at 21:21

## 2021-05-24 RX ADMIN — ACETAMINOPHEN 650 MG: 325 TABLET ORAL at 23:12

## 2021-05-24 RX ADMIN — GABAPENTIN 600 MG: 300 CAPSULE ORAL at 09:05

## 2021-05-24 RX ADMIN — ENOXAPARIN SODIUM 40 MG: 40 INJECTION SUBCUTANEOUS at 09:05

## 2021-05-24 RX ADMIN — SODIUM CHLORIDE 1000 MG: 900 INJECTION INTRAVENOUS at 13:14

## 2021-05-24 RX ADMIN — GABAPENTIN 600 MG: 300 CAPSULE ORAL at 16:02

## 2021-05-24 RX ADMIN — NORTRIPTYLINE HYDROCHLORIDE 25 MG: 25 CAPSULE ORAL at 21:21

## 2021-05-24 RX ADMIN — INSULIN LISPRO 2 UNITS: 100 INJECTION, SOLUTION INTRAVENOUS; SUBCUTANEOUS at 17:32

## 2021-05-24 RX ADMIN — ZOLPIDEM TARTRATE 5 MG: 5 TABLET ORAL at 02:46

## 2021-05-24 RX ADMIN — INSULIN LISPRO 2 UNITS: 100 INJECTION, SOLUTION INTRAVENOUS; SUBCUTANEOUS at 08:59

## 2021-05-24 RX ADMIN — OXYCODONE 5 MG: 5 TABLET ORAL at 02:46

## 2021-05-24 RX ADMIN — OXYCODONE 10 MG: 5 TABLET ORAL at 08:57

## 2021-05-24 NOTE — PROGRESS NOTES
Received message from patient's nurse Sidney Fairchild stating :    She is asking for some pain medicine. She was specifically asking for Oxy. However I don't see it on her premed list. If I remember right, she had it the last time she was here. She also wants Ambien, she said that the melatonin doesn't work. It also isn't on her med list, but I vaguely remember her getting it when she was here last.     Discussion / orders:    · Ambien 5 mg po x 1  · Ordered Oxycodone 5 mg by mouth every 4 hours as needed for moderate pain and 10 mg by mouth every 4 hours as needed for severe pain           Please note that this note was dictated using Dragon computer voice recognition software. Quite often unanticipated grammatical, syntax, homophones, and other interpretive errors are inadvertently transcribed by the computer software. Please disregard these errors. Please excuse any errors that have escaped final proofreading.

## 2021-05-24 NOTE — PROGRESS NOTES
Problem: Self Care Deficits Care Plan (Adult)  Goal: *Acute Goals and Plan of Care (Insert Text)  Description:   FUNCTIONAL STATUS PRIOR TO ADMISSION: The patient used walker or w/c for functional mobility, furniture walks in bathroom as AD does not fit in bathroom (had fall in bathroom PTA). She had assistance for LB dressing, bathing and shower transfer (has shower chair and grabbars) from SO.    HOME SUPPORT: The patient lived with her SO who provided assistance. Occupational Therapy Goals  Initiated 5/24/2021  1. Patient will perform grooming with supervision/set-up in unsupported sit within 7 day(s). 2.  Patient will perform upper body dressing with supervision/set-up within 7 day(s). 3.  Patient will perform lower body dressing with moderate assistance  within 7 day(s). 4.  Patient will perform toilet transfers with CGA within 7 day(s). 5.  Patient will perform all aspects of toileting with minimal assistance within 7 day(s). 6.  Patient will participate in upper extremity therapeutic exercise/activities with independence for 5 minutes within 7 day(s). 7.  Patient will utilize energy conservation techniques during functional activities with verbal cues within 7 day(s). Outcome: Progressing Towards Goal   OCCUPATIONAL THERAPY EVALUATION  Patient: Omid Sharma (47 y.o. female)  Date: 5/24/2021  Primary Diagnosis: Multiple sclerosis exacerbation ([facility] [address] [G35]  Slurred speech [R47.81]        Precautions:  Fall    ASSESSMENT  Based on the objective data described below, the patient presents with decreased balance, endurance and strength and impaired functional mobility following admission for MS exacerbation. She was received supine in bed, agreeable to participate. She performed bed mobility with min A x2, good-fair sitting balance, worsened with dynamic task. Required total A to change brief and don socks, reports SO often assists with LB dressing at home.  Using RW she stood with min A x2 and transferred to chair, unsteady but no overt LOB, slow pace and often with narrow THADDEUS. Remained in chair at end of session with alarm set and needs met. Pt is below her functional baseline at this time and will benefit from continued therapy. Current Level of Function Impacting Discharge (ADLs/self-care): min A x2 transfers, setup-total A ADLs    Functional Outcome Measure: The patient scored 30/100 on the Barthel Index outcome measure. Other factors to consider for discharge: fall risk, supportive SO     Patient will benefit from skilled therapy intervention to address the above noted impairments. PLAN :  Recommendations and Planned Interventions: self care training, functional mobility training, therapeutic exercise, balance training, therapeutic activities, endurance activities, patient education, home safety training, and family training/education    Frequency/Duration: Patient will be followed by occupational therapy 4 times a week to address goals.     Recommendation for discharge: (in order for the patient to meet his/her long term goals)  To be determined pending [facility]    This discharge recommendation:  Has not yet been discussed the attending provider and/or case management    IF patient discharges home will need the following DME: TBD       SUBJECTIVE:   Patient stated [place] usually helps me get my socks on.    OBJECTIVE DATA SUMMARY:   HISTORY:   Past Medical History:   Diagnosis Date    Body aches 12/11/2014    Chronic pain     Depression     Headache(784.0)     History of mammogram never before    MS (multiple sclerosis) ([facility] [address]     Neurological disorder     Multiple Sclerosis    Pap smear for cervical cancer screening 2008    Psychiatric disorder     anxiety    Psychotic disorder [facility])      Past Surgical History:   Procedure Laterality Date    COLONOSCOPY N/A 2/28/2018    COLONOSCOPY performed by Chance Pederson MD at [facility] ENDOSCOPY    HX CHOLECYSTECTOMY      HX GYN      3 c-sections    HX HEENT      bilateral ear tube surgery    HX HERNIA REPAIR      HX OTHER SURGICAL      R inguinal hernia repair       Expanded or extensive additional review of patient history:     Home Situation  Home Environment: Apartment  # Steps to Enter: 2  Rails to Enter: No  One/Two Story Residence: One story  Living Alone: No  Support Systems: Spouse/Significant Other/Partner  Patient Expects to be Discharged to[de-identified] Apartment  Current DME Used/Available at Home: Grab bars, Raised toilet seat, Tub transfer bench, Walker, rollator, Walker, rolling, Wheelchair  Tub or Shower Type: Tub    Hand dominance: Right    EXAMINATION OF PERFORMANCE DEFICITS:  Cognitive/Behavioral Status:  Neurologic State: Alert  Orientation Level: Oriented X4  Cognition: Follows commands  Perception: Appears intact  Perseveration: No perseveration noted  Safety/Judgement: Fall prevention    Hearing: Auditory  Auditory Impairment: None    Vision/Perceptual:                                     Range of Motion:  AROM: Generally decreased, functional         Strength:  Strength: Generally decreased, functional        Coordination:  Coordination: Generally decreased, functional  Fine Motor Skills-Upper: Left Intact; Right Intact    Gross Motor Skills-Upper: Left Intact; Right Intact    Tone & Sensation:  Tone: Normal             Balance:  Sitting: Impaired; Without support  Sitting - Static: Good (unsupported)  Sitting - Dynamic: Fair (occasional)  Standing: Impaired; With support    Functional Mobility and Transfers for ADLs:  Bed Mobility:  Supine to Sit: Minimum assistance;Assist x2; Additional time    Transfers:  Sit to Stand: Minimum assistance;Assist x2; Additional time; Adaptive equipment (RW)  Stand to Sit: Minimum assistance;Assist x2  Bed to Chair: Minimum assistance;Assist x2; Additional time; Adaptive equipment    ADL Assessment:  Feeding: Setup    Oral Facial Hygiene/Grooming: Minimum assistance    Bathing: Moderate assistance    Upper Body Dressing: Moderate assistance    Lower Body Dressing: Total assistance    Toileting: Maximum assistance        ADL Intervention and task modifications:       Grooming  Position Performed: Seated in chair  Washing Face: Set-up      Lower Body Dressing Assistance  Protective Undergarmet: Total assistance (dependent)  Socks: Total assistance (dependent)         Cognitive Retraining  Safety/Judgement: Fall prevention    Functional Measure:  Barthel Index:    Bathin  Bladder: 5  Bowels: 10  Groomin  Dressin  Feedin  Mobility: 0  Stairs: 0  Toilet Use: 0  Transfer (Bed to Chair and Back): 5  Total: 30/100        The Barthel ADL Index: Guidelines  1. The index should be used as a record of what a patient does, not as a record of what a patient could do. 2. The main aim is to establish degree of independence from any help, physical or verbal, however minor and for whatever reason. 3. The need for supervision renders the patient not independent. 4. A patient's performance should be established using the best available evidence. Asking the patient, friends/relatives and nurses are the usual sources, but direct observation and common sense are also important. However direct testing is not needed. 5. Usually the patient's performance over the preceding 24-48 hours is important, but occasionally longer periods will be relevant. 6. Middle categories imply that the patient supplies over 50 per cent of the effort. 7. Use of aids to be independent is allowed. Josh Garcia., Barthel, D.W. (3805). Functional evaluation: the Barthel Index. [address] [facility] (14)2. LOUIS Aguirre, Nina Redman., Gertrudis Marinelli., Germaine Moura, [address] [place] [address] (). Measuring the change indisability after inpatient rehabilitation; comparison of the responsiveness of the Barthel Index and Functional [place] Measure.  Journal of Neurology, Neurosurgery, and Psychiatry, 66(4), [phone]. TUNDE Vicente, BETTY Moss, & Missy Hess M.A. (2004.) Assessment of post-stroke quality of life in cost-effectiveness studies: The usefulness of the Barthel Index and the EuroQoL-5D. Quality of Life Research, 15, 472-29        Occupational Therapy Evaluation Charge Determination   History Examination Decision-Making   LOW Complexity : Brief history review  MEDIUM Complexity : 3-5 performance deficits relating to physical, cognitive , or psychosocial skils that result in activity limitations and / or participation restrictions MEDIUM Complexity : Patient may present with comorbidities that affect occupational performnce. Miniml to moderate modification of tasks or assistance (eg, physical or verbal ) with assesment(s) is necessary to enable patient to complete evaluation       Based on the above components, the patient evaluation is determined to be of the following complexity level: LOW   Pain Rating:  Pt c/o mild back pain    Activity Tolerance:   Fair    After treatment patient left in no apparent distress:    Sitting in chair, Call bell within reach, and Bed / chair alarm activated    COMMUNICATION/EDUCATION:   The patients plan of care was discussed with: Physical therapist and Registered nurse. Home safety education was provided and the patient/caregiver indicated understanding., Patient/family have participated as able in goal setting and plan of care. , and Patient/family agree to work toward stated goals and plan of care. This patients plan of care is appropriate for delegation to [facility] [place].     Thank you for this referral.  William Cleaning, OT  Time Calculation: 34 mins

## 2021-05-24 NOTE — PROGRESS NOTES
End of Shift Note    Bedside shift change report given to Bárbara Hernández RN (oncoming nurse) by Pelon Carlton RN (offgoing nurse).   Report included the following information       Shift worked:  7a-7p   Shift summary and any significant changes:    none       Concerns for physician to address:     Zone phone for oncoming shift:  [phone]     Patient Information  Zuri Jimenez  50 y.o.  5/23/2021 12:47 PM by Jaron Rivers MD. Zuri Jimenez was admitted from home  Problem List  Patient Active Problem List    Diagnosis Date Noted    Slurred speech 05/23/2021    Weakness 04/01/2021    Multiple sclerosis ([address] 03/31/2021    Non compliance with medical treatment 02/07/2021    Recurrent falls 02/07/2021    Altered mental status 02/06/2021    Leukopenia 11/25/2020    UTI (urinary tract infection) 11/25/2020    Multiple sclerosis exacerbation ([address] 08/06/2020    Facial droop 03/23/2020    Exacerbation of multiple sclerosis ([address] 03/09/2020    Left-sided weakness 03/08/2020    Severe obesity ([address] 10/03/2018    Constipation 07/17/2015    Body aches 12/11/2014    Back pain 09/07/2012    Fibromyalgia 08/17/2012    MS (multiple sclerosis) ([address] 08/17/2012    Decreased hearing 01/31/2011    Acute pharyngitis 01/26/2011    Anxiety 08/25/2010    Blood pressure elevated 08/25/2010    Tobacco abuse 08/25/2010     Past Medical History:   Diagnosis Date    Body aches 12/11/2014    Chronic pain     Depression     Headache(784.0)     History of mammogram never before    MS (multiple sclerosis) ([address]     Neurological disorder     Multiple Sclerosis    Pap smear for cervical cancer screening 2008    Psychiatric disorder     anxiety    Psychotic disorder ([address]        Core Measures:  CVA: N  CHF:N      Activity:  Activity Level: Bed Rest  Number times ambulated in hallways past shift: 0  Number of times OOB to chair past shift:0    Cardiac:   Cardiac Monitoring:Y        Access:   Current line(s): peripheral line      Genitourinary:   Urinary status: voiding/ external catheter     Urinary Catheter? No  GI:     Current diet:  DIET DIABETIC CONSISTENT CARB Regular  DIET ONE TIME MESSAGE  Passing flatus: Tolerating current diet:Y       Pain Management:   Patient states pain is manageable on current regimen: Y    Skin:  Samy Score: 14  Interventions: turn    Patient Safety:  Fall Score:  Total Score: 4  Interventions:bed alarm  High Fall Risk: Yes  @Rollbelt  @dexterity to release roll belt  Yes/No ( must document dexterity  here by stating Yes or No here, otherwise this is a restraint and must follow restraint documentation policy.)    DVT prophylaxis:  DVT prophylaxis Med-  DVT prophylaxis SCD or CONG-    Wounds: (If Applicable)        Active Consults:  IP CONSULT TO NEUROLOGY  IP CONSULT TO HOSPITALIST    Length of Stay:  Expected LOS: 3d 0h  Actual LOS: 1  Discharge Plan: to be determined

## 2021-05-24 NOTE — ED NOTES
TRANSFER - OUT REPORT:    Verbal report given to [employer] (name) on Jagruti Castro  being transferred to NSTU (unit) for routine progression of care       Report consisted of patients Situation, Background, Assessment and   Recommendations(SBAR). Information from the following report(s) SBAR, ED Summary, [facility] and Recent Results was reviewed with the receiving nurse. Lines:   Peripheral IV 05/23/21 Left Antecubital (Active)   Site Assessment Clean, dry, & intact 05/23/21 1435   Phlebitis Assessment 0 05/23/21 1435   Infiltration Assessment 0 05/23/21 1435   Dressing Status Clean, dry, & intact 05/23/21 1435   Dressing Type Transparent 05/23/21 1435   Hub Color/Line Status Pink;Flushed 05/23/21 1435   Action Taken Blood drawn 05/23/21 1435        Opportunity for questions and clarification was provided.

## 2021-05-24 NOTE — PROGRESS NOTES
Transition of Care Plan:    RUR: 37%  Disposition: Inpatient Rehab   Follow up appointments: will follow up with PCP   DME needed:TBD by therapist   Transportation at Discharge:BLS-medical transport  Lois Yu or means to access home:  Family will have keys to enter the home   IM Medicare letter: 2nd  Medicare Letter to be given  [address] (boyfriend) [phone]  Discharge Caregiver contacted prior to discharge? Family to be contacted at the time of d/c.    Reason for Admission:   Multiple Sclerosis exacerbation                 RUR Score:    36%       PCP: First and Last name:  Brad Valdivia MD     Name of Practice:   Are you a current patient: Yes/No:  Yes   Approximate date of last visit: UNKNOWN   Can you do a virtual visit with your PCP:  YES, if recommended by physician              Resources/supports as identified by patient/family:   Family support                 Top Challenges facing patient (as identified by patient/family and CM): Finances/Medication cost?    Pt has insurance to assist with [employer]? Will require BLS-medical transport               Support system or lack thereof? Family support                      Living arrangements? Lives with boyfriend            Self-care/ADLs/Cognition? Need assistance with ADLs          Current Advanced Directive/Advance Care Plan:  Full Code      Healthcare Decision Maker:   Click here to complete HealthCare Decision Makers including selection of the Healthcare Decision Maker Relationship (ie \"Primary\")      Primary Decision Maker: Astrdi Elizondo - Mother - [phone]    Secondary Decision Maker: Brent Torres - [phone]    Payor Source Payor: Sunitha Bills / Plan: [address] [place] [address] HMO / Product Type: Managed Care Medicare /                             Plan for utilizing home health:         Will require inpatient rehab Transition of Care Plan:                    CM completed room visit with pt for assessment. Pt is known to reside with her boyfriend in their apartment style home (3 steps into main entrance). Pt is not active with PCP and uses [facility] ([address] Pt is known to need assistance with ADLs, and does not drive. Pt will require BLS transport at the time of d/c. Pt reported [address] and IPR in the past with [facility]. Pt listed her boyfriend as medical decision maker when discussing ACP. CM discussed with pt that therapist are recommending IPR, at the time of d/c. Pt is agreeable to the following. CM was asked by pt to place a referral to [facility]. CM sent referral.  Pt understands that once accepted by facility, then pt will require insurance auth through [facility] [employer]. INSURANCE AUTH CAN TAKE 24-48HRS FOR ACCPETANCE. CM currently waiting for therapist to enter clinicals. CM sent referral to [facility], via [facility] (acceptance pending). CM received call from IPR liaison, and it was reported that she would like to complete face to face, by pt's bedside on today. CM approved for IPR liaison to complete F2F with pt. Pt will require rapid covid test at the time of d/c. CM will continue to follow. Care Management Interventions  PCP Verified by CM: Yes  Mode of Transport at Discharge: Other (see comment)  Transition of Care Consult (CM Consult): Discharge Planning  Discharge Durable Medical Equipment: No  Physical Therapy Consult: Yes  Occupational Therapy Consult: Yes  Speech Therapy Consult: Yes  Current Support Network:  Other, Own Home (lives with boyfriend )  Confirm Follow Up Transport: Family  The Patient and/or Patient Representative was Provided with a Choice of Provider and Agrees with the Discharge Plan?: Yes  Freedom of Choice List was Provided with Basic Dialogue that Supports the Patient's Individualized Plan of Care/Goals, Treatment Preferences and Shares the Quality Data Associated with the Providers?: Yes  Discharge Location  Discharge Placement: Other: (inpatient rehab)      LATASHA Marquez, [address] [place] [address]

## 2021-05-24 NOTE — ROUTINE PROCESS
End of Shift Note Bedside shift change report given to *** (oncoming nurse) by Leo Ramirez RN (offgoing nurse). Report included the following information {[facility] REPORTS PB:55997} Shift worked: night shift   *** Shift summary and any significant changes:  
Oxy added for \pain   *** Concerns for physician to address: new admit   ***  
Zone phone for oncoming shift:   *** Patient Information Mau Robles 
50 y.o. 
5/23/2021 12:47 PM by Bernadette Baldwin MD. Mau Robles was admitted from {Admitted from:99533} Problem List 
Patient Active Problem List  
 Diagnosis Date Noted  Slurred speech 05/23/2021  Weakness 04/01/2021  Multiple sclerosis ([address] 03/31/2021  Non compliance with medical treatment 02/07/2021  Recurrent falls 02/07/2021  Altered mental status 02/06/2021  Leukopenia 11/25/2020  UTI (urinary tract infection) 11/25/2020  Multiple sclerosis exacerbation ([address] 08/06/2020  Facial droop 03/23/2020  Exacerbation of multiple sclerosis ([address] 03/09/2020  Left-sided weakness 03/08/2020  Severe obesity ([address] 10/03/2018  Constipation 07/17/2015  Body aches 12/11/2014  Back pain 09/07/2012  Fibromyalgia 08/17/2012  MS (multiple sclerosis) ([address] 08/17/2012  Decreased hearing 01/31/2011  Acute pharyngitis 01/26/2011  Anxiety 08/25/2010  Blood pressure elevated 08/25/2010  Tobacco abuse 08/25/2010 Past Medical History:  
Diagnosis Date  Body aches 12/11/2014  Chronic pain  Depression  Headache(784.0)  History of mammogram never before  MS (multiple sclerosis) ([address]  Neurological disorder Multiple Sclerosis  Pap smear for cervical cancer screening 2008  Psychiatric disorder   
 anxiety  Psychotic disorder ([address] Core Measures: CVA: {YES/NO:65610} {is patient on a pathway:07819} CHF:{YES/NO:37471} {is patient on a pathway:28180} PNA:{YES/NO:65186} {is patient on a pathway:66768} Activity: 
Activity Level: Bed Rest 
Number times ambulated in hallways past shift: {Numbers; 0-5:227495} Number of times OOB to chair past shift: {Numbers; 0-5:566571} Cardiac:  
Cardiac Monitoring: {YES/NO:98486} Access:  
Current line(s): {access:00240} Central Line? {Central RIPP:58466} Placement date *** Reason Medically Necessary *** PICC LINE? {PICC IYPF:36960} Placement date ***Reason Medically Necessary *** Genitourinary:  
Urinary status: {:92256} Urinary Catheter? {Urinary Catheter:} Placement Date *** Reason Medically Necessary *** Respiratory:  
O2 Device: None (Room air) Chronic home O2 use?: {YES/NO/NA:28168} Incentive spirometer at bedside: {YES/NO/NA:28888} GI: 
  
Current diet:  DIET DIABETIC CONSISTENT CARB Regular Passing flatus: {YES/NO:62915} Tolerating current diet: {YES/NO:88777} Pain Management:  
Patient states pain is manageable on current regimen: {YES/NO/NA:94720} Skin: 
Samy Score: 14 Interventions: {samy interventions:83757} Patient Safety: 
Fall Score: Total Score: 5 Interventions: {fall interventions:72794} High Fall Risk: Yes 
@Rollbelt 
@dexterity to release roll belt  Yes/No ( must document dexterity  here by stating Yes or No here, otherwise this is a restraint and must follow restraint documentation policy.) DVT prophylaxis: DVT prophylaxis Med- {Meds:77148} DVT prophylaxis SCD or CONG- {SCD or IPC:29562} Wounds: (If Applicable) Wounds- {HOYRQ:70469} Location *** Active Consults: 
IP CONSULT TO NEUROLOGY 
IP CONSULT TO HOSPITALIST Length of Stay: 
Expected LOS: - - - Actual LOS: 1 Discharge Plan: {Discharge Plannin} *** 
 
 
Chantel Guerra RN

## 2021-05-24 NOTE — CONSULTS
Date of Consultation:  May 24, 2021    Referring Physician: Kathy Lazaro MD     Reason for Consultation:  MS flare    Chief Complaint   Patient presents with   Kylie Manriquez     Presents to the ED via EMS with c/o \"MS flare\" x this morning - sx are very typical of her flares (slurred speech and generalized weakness). Lorraine Harvey while trying to get up from the toilet, not has back pain. EMS reports transporting her multiple times for the same sx. History of Present Illness: Tony Cabral is a 50 y.o. female with a history of relapsing remitting multiple sclerosis on [place] who is currently admitted to the hospital with worsening right-sided weakness and difficulty with ambulation. Patient reports that she was in an inpatient rehab however felt like her symptoms were worsening. She also reports that she was not getting enough food while there. She was brought back to the hospital given these worsening weakness and admitted for possible work-up of MS flare. She has been given steroids with some slight improvement in her symptoms. She did undergo an MRI of the brain which showed no active lesions or demyelination at this time. It is possible that her ears symptoms could be due to a cervical or spinal cord lesion.     Past Medical History:   Diagnosis Date    Body aches 12/11/2014    Chronic pain     Depression     Headache(784.0)     History of mammogram never before    MS [facility] sclerosis) ([facility] [address]     Neurological disorder     Multiple Sclerosis    Pap smear for cervical cancer screening 2008    Psychiatric disorder     anxiety    Psychotic disorder [facility])         Past Surgical History:   Procedure Laterality Date    COLONOSCOPY N/A 2/28/2018    COL[facility] performed [facility]COPY    HX CHOLECYSTECTOMY      HX GYN      3 c-sections    HX HEENT      bilateral ear tube surgery    HX HERNIA REPAIR      HX OTHER SURGICAL      R inguinal hernia repair        Family History Problem Relation Age of Onset    Heart Attack Father         tripple bypass    Cancer Father         lung    COPD Father     Diabetes Mother         type 2    Heart Disease Mother         heart disease    Diabetes Paternal Grandmother     No Known Problems Sister     No Known Problems Brother     No Known Problems Child         Social History     Tobacco Use    Smoking status: Current Every Day Smoker     Packs/day: 0.50     Years: 17.00     Pack years: 8.50     Types: Cigarettes    Smokeless tobacco: Never Used    Tobacco comment: 1 pack every 3 days. Substance Use Topics    Alcohol use: No        Allergies   Allergen Reactions    Morphine Rash        Prior to Admission medications    Medication Sig Start Date End Date Taking? Authorizing Provider   ALPRAZolam (Xanax) 0.5 mg tablet Take 1 Tab by mouth two (2) times daily as needed for Anxiety. Max Daily Amount: 1 mg. 4/30/21   Romayne Minors, MD   nortriptyline (PAMELOR) 25 mg capsule Take 1 Cap by mouth nightly. 4/28/21   Romayne Minors, MD   gabapentin (NEURONTIN) 600 mg tablet Take 1 Tab by mouth three (3) times daily. Max Daily Amount: 1,800 mg. 4/12/21   Latonia Dennis MD   DULoxetine (CYMBALTA) 60 mg capsule Take 1 Cap by mouth daily. Indications: neuropathic pain 3/23/21   Main Amador MD   teriflunomide (AUBAGIO) 14 mg tablet Take 1 Tab by mouth daily. Indications: relapsing form of multiple sclerosis 2/25/21   Marcial Dolan NP   acetaminophen (TYLENOL) 500 mg tablet Take 500 mg by mouth every four (4) hours as needed for Fever or Pain. 8/21/20   Provider, Historical   nystatin (MYCOSTATIN) topical cream APPLY CREAM TOPICALLY TO AFFECTED AREA TWICE DAILY 1/1/21   Provider, Historical   melatonin 3 mg tablet Take 15 mg by mouth nightly as needed for Insomnia.   Patient not taking: Reported on 5/24/2021    Provider, Historical       Review of Systems:    General, constitutional: negative  Eyes, vision: negative  Ears, nose, throat: negative  Cardiovascular, heart: negative  Respiratory: negative  Gastrointestinal: negative  Genitourinary: negative  Musculoskeletal: negative  Skin and integumentary: negative  Psychiatric: negative  Endocrine: negative  Neurological: negative, except for HPI  Hematologic/lymphatic: negative  Allergy/immunology: negative    []Unable to obtain  ROS due to  []mental status change  []sedated   []intubated      PHYSICAL EXAMINATION:   Visit Vitals  /82   Pulse (!) 113   Temp 98.3 °F (36.8 °C)   Resp 18   Ht 5' 4\" (1.626 m)   Wt 223 lb (101.2 kg)   SpO2 100%   Breastfeeding No   BMI 38.28 kg/m²     Physical Exam:  General:  no acute distress  Neck: no carotid bruits  Lungs: clear to auscultation  Heart:  no murmurs, regular rate and rhythm   Lower extremity: no edema    Neurological exam:  Mental Status: Awake, alert, oriented to person, place and time  Attention and Concentration: able to state the days of the week backwards, she did get confused a few times and it took her a few tries prior to stating this correctly. Speech and Language: No dysarthria. Able to name, repeat and follow commands   Fund of knowledge was preserved     Cranial nerves: II-XII:  Pupils equal and reactive, visual fields intact by confrontation   Extraocular movements intact, no evidence of nystagmus or ptosis   Facial sensation intact   Facial movements symmetric   Hearing intact to soft rub bilaterally   Shoulder shrug symmetric and strong   Tongue protrusion full and midline without fasciculation or atrophy     Motor:   Normal tone and Bulk   Drift: No evidence of pronator drift      Strength testing:  Her upper extremities were at least a 4/5 with some slight drift in the right upper extremity. Her lower extremities were 3 out of 5 with antigravity movements.   Distally she was 4 out of 5.       Reflexes:    Biceps Triceps  Brachiorad Patellar Achilles Plantar Hoffmans   Right  3 3 3 3 Few beats clonus  up Pos   Left  3 3 3 3 2 up Pos       Cerebellar testing: Finger-nose-finger was intact. She was unable to do heel-to-shin given the weakness.     Romberg: absent     Gait: Deferred given her weakness. Data:     Lab Results   Component Value Date/Time    Hemoglobin A1c 4.9 12/16/2020 04:41 PM    Sodium 138 05/24/2021 02:19 AM    Potassium 4.5 05/24/2021 02:19 AM    Chloride 109 (H) 05/24/2021 02:19 AM    Glucose 159 (H) 05/24/2021 02:19 AM    BUN 17 05/24/2021 02:19 AM    Creatinine 1.08 (H) 05/24/2021 02:19 AM    Calcium 9.2 05/24/2021 02:19 AM    WBC 3.1 (L) 05/23/2021 01:29 PM    HCT 33.4 (L) 05/23/2021 01:29 PM    HGB 10.8 (L) 05/23/2021 01:29 PM    PLATELET 100 40/82/5484 01:29 PM       Imaging:    Results from Hospital Encounter encounter on 05/23/21    MRI BRAIN W WO CONT    Narrative  Clinical history: MS flare up  INDICATION:   MS flare up    COMPARISON: 3/31/2021  TECHNIQUE: MR examination of the brain includes axial and sagittal T1 , axial  T2, axial FLAIR, axial gradient echo, axial DWI, coronal T1 . Pre and post  contrast axial T1-weighted imaging. Postcontrast T1-weighted imaging coronal  plane. CONTRAST: 20 ml ProHance  FINDINGS:  There is no intracranial mass, hemorrhage or acute infarction. Periventricular and scattered foci of increased T2 signal intensity in the  corona radiata and central line. A number of lesions are perpendicular to the  corpus callosum. There is callosal thinning which is unchanged. There is mild  sulcal and ventricular prominence. There is minimal infratentorial abnormal T2  signal intensity foci. There is no diffusion restriction. . There is no abnormal parenchymal enhancement. There is no abnormal meningeal  enhancement demonstrated. The brain architecture is normal. There is no evidence  of midline shift or mass-effect. The ventricles are normal in size, position and  configuration. There are no extra-axial fluid collections. Major intracranial  vascular flow-voids are unremarkable.  The orbits are grossly symmetric. Dural  venous sinuses are grossly unremarkable. There is no Chiari or syrinx. Pituitary  and infundibulum grossly unremarkable. Cerebellopontine angles are unremarkable. No skull base mass is identified. Cavernous sinuses are symmetric. The mastoid  air cells and are well pneumatized and clear. Impression  Moderate stable supratentorial demyelinating disease burden. No evidence of interval progression or active demyelination. No intracranial mass, hemorrhage or evidence of acute infarction. Results from [place] encounter on 05/23/21    CT HEAD WO CONT    Narrative  EXAM: CT HEAD WO CONT  Clinical history: MS flair  INDICATION: MS flare    COMPARISON: 3/31/2021. CONTRAST: None. TECHNIQUE: Unenhanced CT of the head was performed using 5 mm images. Brain and  bone windows were generated. Coronal and sagittal reformats. CT dose reduction  was achieved through use of a standardized protocol tailored for this  examination and automatic exposure control for dose modulation. FINDINGS:  Periventricular hypodensities are extensive and not significantly changed  compared to CT and MR 3/31/2021 tThere is no significant white matter disease. There is no intracranial hemorrhage, extra-axial collection, or mass effect. The  basilar cisterns are open. No CT evidence of acute infarct. The bone windows demonstrate no abnormalities. The visualized portions of the  paranasal sinuses and mastoid air cells are clear. Impression  Moderate white matter disease is consistent with patient's known history of  multiple sclerosis. No intracranial hemorrhage or mass lesion. IMPRESSION/RECOMMENDATIONS:  Charlie Ying is a 50 y.o. female with a history of depression and multiple sclerosis who is admitted to the hospital for a possible MS flare with symptoms of lower extremity weakness and difficulty with ambulation.     MS flare: Symptoms include lower extremity weakness as well as difficulty with ambulation worse than her baseline, MRI of the brain showed active lesions however she did not imaging of her spine she has a new lesion in her spinal cord resulting in the symptoms.  -Would recommend continuing IV methylprednisone 1 g for a total of 5 days.  -She will need aggressive physical therapy and Occupational Therapy and likely need to be discharged to inpatient rehab. -As she does have some nausea and abdominal pain she may benefit from a PPI while on the steroids.  -She should continue her gabapentin and Cymbalta for neuropathic pain. Both of these medications can be increased if she needs better control of her pain. Thank you very much for this consultation, will sign off. Please call with any additional questions. I have requested follow-up for the patient in neurology clinic.     Rosie Thurman MD

## 2021-05-24 NOTE — PROGRESS NOTES
Problem: Mobility Impaired (Adult and Pediatric)  Goal: *Acute Goals and Plan of Care (Insert Text)  Description: FUNCTIONAL STATUS PRIOR TO ADMISSION: Patient reports she was hospitalized in March and then discharged to SNF for therapy, has only been home a few days but was able to ambulate short distances with RW, then had a fall in bathroom. Patient has a history of frequent falls and frequent hospital stays. HOME SUPPORT PRIOR TO ADMISSION: Patient lives with her boyfriend, requires some assist for most ADLs    Physical Therapy Goals  Initiated 5/24/2021  1. Patient will move from supine to sit and sit to supine  in bed with supervision/set-up within 7 day(s). 2.  Patient will transfer from bed to chair and chair to bed with supervision/set-up using the least restrictive device within 7 day(s). 3.  Patient will perform sit to stand with supervision/set-up within 7 day(s). 4.  Patient will ambulate with minimal assistance/contact guard assist for 100 feet with the least restrictive device within 7 day(s). 5.  Patient will ascend/descend 2 stairs with  handrail(s) with minimal assistance/contact guard assist within 7 day(s). Outcome: Not Met   PHYSICAL THERAPY EVALUATION  Patient: Kera Hammer (33 y.o. female)  Date: 5/24/2021  Primary Diagnosis: Multiple sclerosis exacerbation ([facility] [address] [G35]  Slurred speech [R47.81]        Precautions:   Fall    ASSESSMENT  Based on the objective data described below, the patient presents with general weakness and fatigue, high falls risk, impaired functional mobility and decreased coordination. Patient received in bed and agreeable to participate, speech is dysarthric and slow but improved during session. Patient required min assist to come to sit EOB, is able to balance in static position with has LOB with challenges. Patient came to stand to RW and was able to slowly sidestep to chair and sit, required min assist x 2 for safety.   Patient sat in chair, is able to perform gentle LE exercises, then requested return to bed due to LBP (patient with bruising from fall). Patient was left in supine with call bell in reach, does not want to return to skilled facility so plans to go home with HHPT but will remain at a high risk of falls and needs increased assistance. Current Level of Function Impacting Discharge (mobility/balance): min assist x 2    Functional Outcome Measure: The patient scored 30/100 on the Barthel  outcome measure which is indicative of severe functional impairment     Other factors to consider for discharge: high falls risk, may require increased assistance at home     Patient will benefit from skilled therapy intervention to address the above noted impairments. PLAN :  Recommendations and Planned Interventions: bed mobility training, transfer training, gait training, therapeutic exercises, neuromuscular re-education, patient and family training/education, and therapeutic activities      Frequency/Duration: Patient will be followed by physical therapy:  4 times a week to address goals. Recommendation for discharge: (in order for the patient to meet his/her long term goals)  Physical therapy at least 2 days/week in the home AND ensure assist and/or supervision for safety with mobility    This discharge recommendation:  A follow-up discussion with the attending provider and/or case management is planned    IF patient discharges home will need the following DME: patient owns DME required for discharge         SUBJECTIVE:   Patient stated I can't walk.     OBJECTIVE DATA SUMMARY:   HISTORY:    Past Medical History:   Diagnosis Date    Body aches 12/11/2014    Chronic pain     Depression     Headache(784.0)     History of mammogram never before    MS (multiple sclerosis) ([facility] [address]     Neurological disorder     Multiple Sclerosis    Pap smear for cervical cancer screening 2008    Psychiatric disorder     anxiety    Psychotic disorder ([facility] [address] Past Surgical History:   Procedure Laterality Date    COLONOSCOPY N/A 2/28/2018    COLONOSCOPY performed by Hannah Ryan [facility]    HX CHOLECYSTECTOMY      HX GYN      3 c-sections    HX HEENT      bilateral ear tube surgery    HX HERNIA REPAIR      HX OTHER SURGICAL      R inguinal hernia repair       Personal factors and/or comorbidities impacting plan of care: frequent falls and hosp stays    Home Situation  Home Environment: Apartment  # Steps to Enter: 2  Rails to Enter: No  One/Two Story Residence: One story  Living Alone: No  Support Systems: Spouse/Significant Other/Partner  Patient Expects to be Discharged to[de-identified] Apartment  Current DME Used/Available at Home: Grab bars, Raised toilet seat, Tub transfer bench, Walker, rollator, Walker, rolling, Wheelchair  Tub or Shower Type: Tub    EXAMINATION/PRESENTATION/DECISION MAKING:   Critical Behavior:  Neurologic State: Alert  Orientation Level: Oriented X4  Cognition: Follows commands  Safety/Judgement: Fall prevention  Hearing: Auditory  Auditory Impairment: None  Range Of Motion:  AROM: Generally decreased, functional                       Strength:    Strength: Generally decreased, functional                    Tone & Sensation:   Tone: Normal                              Coordination:  Coordination: Generally decreased, functional  Vision:      Functional Mobility:  Bed Mobility:     Supine to Sit: Minimum assistance;Assist x2; Additional time        Transfers:  Sit to Stand: Minimum assistance;Assist x2; Additional time; Adaptive equipment (RW)  Stand to Sit: Minimum assistance;Assist x2        Bed to Chair: Minimum assistance;Assist x2; Additional time; Adaptive equipment              Balance:   Sitting: Impaired; Without support  Sitting - Static: Good (unsupported)  Sitting - Dynamic: Fair (occasional)  Standing: Impaired; With support  Ambulation/Gait Training:              Gait Description (WDL):  (sidestepped to chair) Stairs:              Functional Measure:  Barthel Index:    Bathin  Bladder: 5  Bowels: 10  Groomin  Dressin  Feedin  Mobility: 0  Stairs: 0  Toilet Use: 0  Transfer (Bed to Chair and Back): 5  Total: 30/100       The Barthel ADL Index: Guidelines  1. The index should be used as a record of what a patient does, not as a record of what a patient could do. 2. The main aim is to establish degree of independence from any help, physical or verbal, however minor and for whatever reason. 3. The need for supervision renders the patient not independent. 4. A patient's performance should be established using the best available evidence. Asking the patient, friends/relatives and nurses are the usual sources, but direct observation and common sense are also important. However direct testing is not needed. 5. Usually the patient's performance over the preceding 24-48 hours is important, but occasionally longer periods will be relevant. 6. Middle categories imply that the patient supplies over 50 per cent of the effort. 7. Use of aids to be independent is allowed. [employer] [place]., Barthel, D.W. (9228). Functional evaluation: the Barthel Index. [facility] (14)2. KUSUM LaureanoF, Gucci Mckeon., Hosea Moreno., [place], [address] [place] [address] (). Measuring the change indisability after inpatient rehabilitation; comparison of the responsiveness of the Barthel Index and Functional [place] Measure. Journal of Neurology, Neurosurgery, and Psychiatry, 66(4), 198-385. Floridalma Kim N.J.A, Perez Saleem  WJanetJ.NICOLE, & Adenike Herrera M.A. (2004.) Assessment of post-stroke quality of life in cost-effectiveness studies: The usefulness of the Barthel Index and the EuroQoL-5D.  Quality of Life Research, 15, 917-52           Physical Therapy Evaluation Charge Determination   History Examination Presentation Decision-Making   MEDIUM  Complexity : 1-2 comorbidities / personal factors will impact the outcome/ POC  MEDIUM Complexity : 3 Standardized tests and measures addressing body structure, function, activity limitation and / or participation in recreation  MEDIUM Complexity : Evolving with changing characteristics  LOW Complexity : FOTO score of       Based on the above components, the patient evaluation is determined to be of the following complexity level: MEDIUM    Pain Rating:      Activity Tolerance:   Fair    After treatment patient left in no apparent distress:   Supine in bed, Call bell within reach, Bed / chair alarm activated, and Side rails x 3    COMMUNICATION/EDUCATION:   The patients plan of care was discussed with: Occupational therapist and Registered nurse. Fall prevention education was provided and the patient/caregiver indicated understanding. and Patient/family agree to work toward stated goals and plan of care.     Thank you for this referral.  Sonny Sylvester, PT   Time Calculation: 40 mins

## 2021-05-25 LAB
ANION GAP SERPL CALC-SCNC: 6 MMOL/L (ref 5–15)
BASOPHILS # BLD: 0 K/UL (ref 0–0.1)
BASOPHILS NFR BLD: 0 % (ref 0–1)
BUN SERPL-MCNC: 15 MG/DL (ref 6–20)
BUN/CREAT SERPL: 18 (ref 12–20)
CALCIUM SERPL-MCNC: 8.3 MG/DL (ref 8.5–10.1)
CHLORIDE SERPL-SCNC: 110 MMOL/L (ref 97–108)
CO2 SERPL-SCNC: 23 MMOL/L (ref 21–32)
CREAT SERPL-MCNC: 0.84 MG/DL (ref 0.55–1.02)
DIFFERENTIAL METHOD BLD: ABNORMAL
EOSINOPHIL # BLD: 0 K/UL (ref 0–0.4)
EOSINOPHIL NFR BLD: 0 % (ref 0–7)
ERYTHROCYTE [DISTWIDTH] IN BLOOD BY AUTOMATED COUNT: 14.6 % (ref 11.5–14.5)
GLUCOSE BLD STRIP.AUTO-MCNC: 104 MG/DL (ref 65–117)
GLUCOSE BLD STRIP.AUTO-MCNC: 148 MG/DL (ref 65–117)
GLUCOSE BLD STRIP.AUTO-MCNC: 165 MG/DL (ref 65–117)
GLUCOSE SERPL-MCNC: 142 MG/DL (ref 65–100)
HCT VFR BLD AUTO: 31.9 % (ref 35–47)
HGB BLD-MCNC: 10.1 G/DL (ref 11.5–16)
IMM GRANULOCYTES # BLD AUTO: 0.1 K/UL (ref 0–0.04)
IMM GRANULOCYTES NFR BLD AUTO: 1 % (ref 0–0.5)
LYMPHOCYTES # BLD: 0.2 K/UL (ref 0.8–3.5)
LYMPHOCYTES NFR BLD: 2 % (ref 12–49)
MCH RBC QN AUTO: 31.9 PG (ref 26–34)
MCHC RBC AUTO-ENTMCNC: 31.7 G/DL (ref 30–36.5)
MCV RBC AUTO: 100.6 FL (ref 80–99)
MONOCYTES # BLD: 0.2 K/UL (ref 0–1)
MONOCYTES NFR BLD: 2 % (ref 5–13)
NEUTS SEG # BLD: 12.3 K/UL (ref 1.8–8)
NEUTS SEG NFR BLD: 95 % (ref 32–75)
NRBC # BLD: 0 K/UL (ref 0–0.01)
NRBC BLD-RTO: 0 PER 100 WBC
PLATELET # BLD AUTO: 248 K/UL (ref 150–400)
PMV BLD AUTO: 10.4 FL (ref 8.9–12.9)
POTASSIUM SERPL-SCNC: 3.9 MMOL/L (ref 3.5–5.1)
RBC # BLD AUTO: 3.17 M/UL (ref 3.8–5.2)
SERVICE CMNT-IMP: ABNORMAL
SERVICE CMNT-IMP: ABNORMAL
SERVICE CMNT-IMP: NORMAL
SODIUM SERPL-SCNC: 139 MMOL/L (ref 136–145)
WBC # BLD AUTO: 12.9 K/UL (ref 3.6–11)

## 2021-05-25 PROCEDURE — 80048 BASIC METABOLIC PNL TOTAL CA: CPT

## 2021-05-25 PROCEDURE — 74011636637 HC RX REV CODE- 636/637: Performed by: STUDENT IN AN ORGANIZED HEALTH CARE EDUCATION/TRAINING PROGRAM

## 2021-05-25 PROCEDURE — 74011000258 HC RX REV CODE- 258: Performed by: STUDENT IN AN ORGANIZED HEALTH CARE EDUCATION/TRAINING PROGRAM

## 2021-05-25 PROCEDURE — 65660000000 HC RM CCU STEPDOWN

## 2021-05-25 PROCEDURE — 74011250637 HC RX REV CODE- 250/637: Performed by: STUDENT IN AN ORGANIZED HEALTH CARE EDUCATION/TRAINING PROGRAM

## 2021-05-25 PROCEDURE — 36415 COLL VENOUS BLD VENIPUNCTURE: CPT

## 2021-05-25 PROCEDURE — 97530 THERAPEUTIC ACTIVITIES: CPT

## 2021-05-25 PROCEDURE — 97116 GAIT TRAINING THERAPY: CPT

## 2021-05-25 PROCEDURE — 74011250636 HC RX REV CODE- 250/636: Performed by: STUDENT IN AN ORGANIZED HEALTH CARE EDUCATION/TRAINING PROGRAM

## 2021-05-25 PROCEDURE — 85025 COMPLETE CBC W/AUTO DIFF WBC: CPT

## 2021-05-25 PROCEDURE — 82962 GLUCOSE BLOOD TEST: CPT

## 2021-05-25 PROCEDURE — 74011250637 HC RX REV CODE- 250/637: Performed by: INTERNAL MEDICINE

## 2021-05-25 PROCEDURE — 97535 SELF CARE MNGMENT TRAINING: CPT

## 2021-05-25 PROCEDURE — 74011250637 HC RX REV CODE- 250/637: Performed by: NURSE PRACTITIONER

## 2021-05-25 RX ORDER — PANTOPRAZOLE SODIUM 40 MG/1
40 TABLET, DELAYED RELEASE ORAL
Status: DISCONTINUED | OUTPATIENT
Start: 2021-05-26 | End: 2021-05-28 | Stop reason: HOSPADM

## 2021-05-25 RX ORDER — LANOLIN ALCOHOL/MO/W.PET/CERES
1000 CREAM (GRAM) TOPICAL DAILY
Status: DISCONTINUED | OUTPATIENT
Start: 2021-05-25 | End: 2021-05-28 | Stop reason: HOSPADM

## 2021-05-25 RX ADMIN — CYANOCOBALAMIN TAB 500 MCG 1000 MCG: 500 TAB at 11:48

## 2021-05-25 RX ADMIN — NORTRIPTYLINE HYDROCHLORIDE 25 MG: 25 CAPSULE ORAL at 21:26

## 2021-05-25 RX ADMIN — Medication 10 ML: at 21:27

## 2021-05-25 RX ADMIN — GABAPENTIN 600 MG: 300 CAPSULE ORAL at 21:27

## 2021-05-25 RX ADMIN — ENOXAPARIN SODIUM 40 MG: 40 INJECTION SUBCUTANEOUS at 09:14

## 2021-05-25 RX ADMIN — Medication 10 ML: at 14:14

## 2021-05-25 RX ADMIN — DULOXETINE HYDROCHLORIDE 60 MG: 30 CAPSULE, DELAYED RELEASE ORAL at 09:14

## 2021-05-25 RX ADMIN — INSULIN LISPRO 2 UNITS: 100 INJECTION, SOLUTION INTRAVENOUS; SUBCUTANEOUS at 11:48

## 2021-05-25 RX ADMIN — OXYCODONE 5 MG: 5 TABLET ORAL at 21:26

## 2021-05-25 RX ADMIN — GABAPENTIN 600 MG: 300 CAPSULE ORAL at 09:14

## 2021-05-25 RX ADMIN — OXYCODONE 5 MG: 5 TABLET ORAL at 05:02

## 2021-05-25 RX ADMIN — GABAPENTIN 600 MG: 300 CAPSULE ORAL at 17:56

## 2021-05-25 RX ADMIN — Medication 10 ML: at 05:02

## 2021-05-25 RX ADMIN — Medication 15 MG: at 21:26

## 2021-05-25 RX ADMIN — INSULIN LISPRO 2 UNITS: 100 INJECTION, SOLUTION INTRAVENOUS; SUBCUTANEOUS at 17:57

## 2021-05-25 RX ADMIN — SODIUM CHLORIDE 1000 MG: 900 INJECTION INTRAVENOUS at 09:16

## 2021-05-25 RX ADMIN — OXYCODONE 10 MG: 5 TABLET ORAL at 14:12

## 2021-05-25 RX ADMIN — ONDANSETRON 4 MG: 2 INJECTION INTRAMUSCULAR; INTRAVENOUS at 18:30

## 2021-05-25 NOTE — PROGRESS NOTES
Problem: Self Care Deficits Care Plan (Adult)  Goal: *Acute Goals and Plan of Care (Insert Text)  Description:   FUNCTIONAL STATUS PRIOR TO ADMISSION: The patient used walker or w/c for functional mobility, furniture walks in bathroom as AD does not fit in bathroom (had fall in bathroom PTA). She had assistance for LB dressing, bathing and shower transfer (has shower chair and grabbars) from SO.    HOME SUPPORT: The patient lived with her SO who provided assistance. Occupational Therapy Goals  Initiated 5/24/2021  1. Patient will perform grooming with supervision/set-up in unsupported sit within 7 day(s). 2.  Patient will perform upper body dressing with supervision/set-up within 7 day(s). 3.  Patient will perform lower body dressing with moderate assistance  within 7 day(s). 4.  Patient will perform toilet transfers with CGA within 7 day(s). 5.  Patient will perform all aspects of toileting with minimal assistance within 7 day(s). 6.  Patient will participate in upper extremity therapeutic exercise/activities with independence for 5 minutes within 7 day(s). 7.  Patient will utilize energy conservation techniques during functional activities with verbal cues within 7 day(s). Outcome: Progressing Towards Goal   OCCUPATIONAL THERAPY TREATMENT  Patient: Laurie Hansen (9 y.o. female)  Date: 5/25/2021  Diagnosis: Multiple sclerosis exacerbation ([facility] [address] [G35]  Slurred speech [R47.81] <principal problem not specified>       Precautions: Fall  Chart, occupational therapy assessment, plan of care, and goals were reviewed. ASSESSMENT  Patient continues with skilled OT services and is progressing towards goals. Pt was supine in bed and was very motivated to get up and get into the chair. She was CGA for  bed mobility, and was able to assist with scooting to EOB and he sitting balance was good. She was able to sit on EOb to wash UB and legs with HCG wipes.   Pt was soiled with urine and able to stand with mod of 2 and was max to clean self with min of 1 for standing  with use of RW. She was able to transfer to chair with min of 2 and RW, pt sat and rested , was able to wash fab area while she was sitting and stated she normally does this in bed. She was min of 2 to stand and able to walk to bathroom with RW and min of 2, for safety, and min of 2 for transfer to  toilet. She was able to stand with mod of 2 from toilet and min of 2 for walking back to recliner. Pt is making progress and feel that she would do well in In pt rehab with extra rest breaks, but still concerned about her home situation after she returns from In pt rehab and she has a progressive dx. Current Level of Function Impacting Discharge (ADLs): max for LB ADLs     Other factors to consider for discharge: if pt returns home she will need more assist in the home and home care OT and PT          PLAN :  Patient continues to benefit from skilled intervention to address the above impairments. Continue treatment per established plan of care to address goals. Recommend with staff: Billy Thrasher for meals     Recommend next OT session: work on standing endurance and educate pt on use of AE for ADLs    Recommendation for discharge: (in order for the patient to meet his/her long term goals)  Therapy 3 hours per day 5-7 days per week    This discharge recommendation:  Has been made in collaboration with the attending provider and/or case management    IF patient discharges home will need the following DME: tbd       SUBJECTIVE:   Patient stated I want to do it if I can.     OBJECTIVE DATA SUMMARY:   Cognitive/Behavioral Status:  Neurologic State: Alert  Orientation Level: Oriented X4  Cognition: Appropriate decision making  Perception: Appears intact  Perseveration: No perseveration noted  Safety/Judgement: Fall prevention    Functional Mobility and Transfers for ADLs:  Bed Mobility:  Supine to Sit: Contact guard assistance    Transfers:  Sit to Stand: Minimum assistance  Functional Transfers  Bathroom Mobility: Minimum assistance (of 2)  Toilet Transfer : Minimum assistance;Assist x2  Bed to Chair: Minimum assistance    Balance:  Sitting: Impaired; Without support  Sitting - Static: Good (unsupported)  Sitting - Dynamic: Fair (occasional)  Standing: Impaired  Standing - Static: Constant support;Good  Standing - Dynamic : Constant support; Fair    ADL Intervention:  Feeding  Feeding Assistance: Set-up    Grooming  Grooming Assistance: Set-up  Position Performed: Seated in chair  Washing Face: Set-up  Washing Hands: Set-up  Brushing Teeth: Set-up    Upper Body Bathing  Bathing Assistance: Set-up  Position Performed: Seated edge of bed    Lower Body Bathing  Bathing Assistance: Maximum assistance  Perineal  : Maximum assistance  Position Performed: Standing         Toileting  Toileting Assistance: Maximum assistance  Bladder Hygiene: Maximum assistance  Bowel Hygiene: Maximum assistance    Cognitive Retraining  Safety/Judgement: Fall prevention    Activity Tolerance:   Fair    After treatment patient left in no apparent distress:   Sitting in chair and Call bell within reach    COMMUNICATION/COLLABORATION:   The patients plan of care was discussed with: Physical therapist and Registered nurse.          Beth Caroleen Dubin, OT  Time Calculation: 37 mins

## 2021-05-25 NOTE — PROGRESS NOTES
Hospitalist Progress Note    NAME: Bola Martinez   :  1975   MRN:  [id number]       Assessment / Plan:    Generalized weakness  Slurring of speech  MS flareup  -CT head-Moderate white matter disease is consistent with patient's known history of  multiple sclerosis. No intracranial hemorrhage or mass lesion. -X-ray spine lumbar-There is no acute fracture or subluxation. The osseous structures are diffusely demineralized. -- MRI Brain show Moderate stable supratentorial demyelinating disease burden  -Solu-Medrol 1 g x 5 doses. -Neurology consult   -PT and OT consulted, case management consulted. -Continue Aubagio     Microcytic anemia  -Hemoglobin 10.8, slight drop from the previous. Ordered H97, folic acid, TSH, iron profile, ferritin in a.m.     Fibromyalgia  Neuropathy  Mood disorder  Insomnia  -Continue the home medications.       30.0 - 39.9 Obese / Body mass index is 38.28 kg/m². Estimated discharge date: May 24  Barriers:    Code status: Full  Prophylaxis: Lovenox  Recommended Disposition: Home w/Family     Subjective:     Chief Complaint / Reason for Physician Visit  \"\". Discussed with RN events overnight. Complaining from Generalized pain     Review of Systems:  Symptom Y/N Comments  Symptom Y/N Comments   Fever/Chills n   Chest Pain n    Poor Appetite    Edema     Cough    Abdominal Pain     Sputum    Joint Pain     SOB/BOBO n   Pruritis/Rash     Nausea/vomit    Tolerating PT/OT     Diarrhea    Tolerating Diet y    Constipation n   Other       Could NOT obtain due to:      Objective:     VITALS:   Last 24hrs VS reviewed since prior progress note.  Most recent are:  Patient Vitals for the past 24 hrs:   Temp Pulse Resp BP SpO2   21 1947 98.1 °F (36.7 °C) 96 18 121/68 98 %   21 1554 98.7 °F (37.1 °C) 88 18 118/72 96 %   21 1310 98.3 °F (36.8 °C) (!) 113 18 133/82 100 %   21 0749 98.6 °F (37 °C) (!) 110 18 121/88 95 %   21 0243 97.4 °F (36.3 °C) 84 16 116/78 96 %   05/23/21 2145 97.8 °F (36.6 °C) 86 16 118/80 95 %     No intake or output data in the 24 hours ending 05/24/21 2128     I had a face to face encounter and independently examined this patient on 5/24/2021, as outlined below:  PHYSICAL EXAM:  General: WD, WN. Alert, cooperative, no acute distress    EENT:  EOMI. Anicteric sclerae. MMM  Resp:  CTA bilaterally, no wheezing or rales. No accessory muscle use  CV:  Regular  rhythm,  No edema  GI:  Soft, Non distended, Non tender. +Bowel sounds  Neurologic:  Alert and oriented X 3, normal speech,   Psych:   Good insight. Not anxious nor agitated  Skin:  No rashes. No jaundice    Reviewed most current lab test results and cultures  YES  Reviewed most current radiology test results   YES  Review and summation of old records today    NO  Reviewed patient's current orders and MAR    YES  PMH/ reviewed - no change compared to H&P  ________________________________________________________________________  Care Plan discussed with:    Comments   Patient y    Family      RN y    Care Manager     Consultant                        Multidiciplinary team rounds were held today with , nursing, pharmacist and clinical coordinator. Patient's plan of care was discussed; medications were reviewed and discharge planning was addressed. ________________________________________________________________________  Total NON critical care TIME: 35   Minutes    Total CRITICAL CARE TIME Spent:   Minutes non procedure based      Comments   >50% of visit spent in counseling and coordination of care y    ________________________________________________________________________  Stewart MD Andrew     Procedures: see electronic medical records for all procedures/Xrays and details which were not copied into this note but were reviewed prior to creation of Plan. LABS:  I reviewed today's most current labs and imaging studies.   Pertinent labs include:  Recent Labs 05/23/21  1329   WBC 3.1*   HGB 10.8*   HCT 33.4*        Recent Labs     05/24/21  0219 05/23/21  1411    143   K 4.5 5.0   * 110*   CO2 24 30   * 75   BUN 17 14   CREA 1.08* 0.95   CA 9.2 9.9   MG 2.0  --    ALB  --  3.5   TBILI  --  0.3   ALT  --  14       Signed:  Jesús Burnham MD

## 2021-05-25 NOTE — PROGRESS NOTES
Problem: Mobility Impaired (Adult and Pediatric)  Goal: *Acute Goals and Plan of Care (Insert Text)  Description: FUNCTIONAL STATUS PRIOR TO ADMISSION: Patient reports she was hospitalized in March and then discharged to SNF for therapy, has only been home a few days but was able to ambulate short distances with RW, then had a fall in bathroom. Patient has a history of frequent falls and frequent hospital stays. HOME SUPPORT PRIOR TO ADMISSION: Patient lives with her boyfriend, requires some assist for most ADLs    Physical Therapy Goals  Initiated 5/24/2021  1. Patient will move from supine to sit and sit to supine  in bed with supervision/set-up within 7 day(s). 2.  Patient will transfer from bed to chair and chair to bed with supervision/set-up using the least restrictive device within 7 day(s). 3.  Patient will perform sit to stand with supervision/set-up within 7 day(s). 4.  Patient will ambulate with minimal assistance/contact guard assist for 100 feet with the least restrictive device within 7 day(s). 5.  Patient will ascend/descend 2 stairs with  handrail(s) with minimal assistance/contact guard assist within 7 day(s). Outcome: Progressing Towards Goal   PHYSICAL THERAPY TREATMENT  Patient: Rubina Cabrera (73 y.o. female)  Date: 5/25/2021  Diagnosis: Multiple sclerosis exacerbation ([facility] [address] [G35]  Slurred speech [R47.81] <principal problem not specified>       Precautions: Fall  Chart, physical therapy assessment, plan of care and goals were reviewed. ASSESSMENT  Patient continues with skilled PT services and is progressing towards goals. Patient was received in bed and agreeable to participate, was able to come to EOB with CGA and extra time and sat unsupported to perform hygiene tasks.   Patient came to stand with min assist to RW and ambulated with slow pace to bathroom, has difficulty advancing right LE and tends to keep externally rotated, required tactile and evrbal cuing to improve. Patient rested on toilet then returned to sit up in recliner, remains below baseline and will benefit from continued therapy. Patient demonstrated improved tolerance today and was able to advance ambulation so appears to be be appropriate for inpatient rehab. Current Level of Function Impacting Discharge (mobility/balance): min assist to stand and ambulate with RW    Other factors to consider for discharge: high risk of falls         PLAN :  Patient continues to benefit from skilled intervention to address the above impairments. Continue treatment per established plan of care. to address goals. Recommendation for discharge: (in order for the patient to meet his/her long term goals)  Therapy 3 hours per day 5-7 days per week    This discharge recommendation:  Has been made in collaboration with the attending provider and/or case management    IF patient discharges home will need the following DME: to be determined (TBD)       SUBJECTIVE:   Patient stated I do what I can.     OBJECTIVE DATA SUMMARY:   Critical Behavior:  Neurologic State: Alert  Orientation Level: Oriented X4  Cognition: Follows commands  Safety/Judgement: Fall prevention  Functional Mobility Training:  Bed Mobility:     Supine to Sit: Contact guard assistance              Transfers:  Sit to Stand: Minimum assistance  Stand to Sit: Minimum assistance        Bed to Chair: Minimum assistance                    Balance:  Sitting: Impaired; Without support  Sitting - Static: Good (unsupported)  Sitting - Dynamic: Fair (occasional)  Standing: Impaired  Standing - Static: Constant support;Good  Standing - Dynamic : Constant support; Fair  Ambulation/Gait Training:  Distance (ft): 20 Feet (ft)  Assistive Device: Gait belt;Walker, rolling  Ambulation - Level of Assistance: Minimal assistance        Gait Abnormalities: Decreased step clearance;Trunk sway increased        Base of Support: Widened     Speed/Heena: Pace decreased (<100 feet/min)  Step Length: Left shortened;Right shortened  Swing Pattern: Right asymmetrical                 Stairs: Activity Tolerance:   Fair    After treatment patient left in no apparent distress:   Sitting in chair, Call bell within reach, and Bed / chair alarm activated    COMMUNICATION/COLLABORATION:   The patients plan of care was discussed with: Occupational therapist, Registered nurse, and Case management.      Asya Clancy, PT   Time Calculation: 37 mins

## 2021-05-25 NOTE — PROGRESS NOTES
Bedside shift change report given to Kristin Lam RN (oncoming nurse) by Daily Fox RN (offgoing nurse). Report included the following information         Shift worked:  7a-7p   Shift summary and any significant changes:     none         Concerns for physician to address:     Zone phone for oncoming shift:        Patient Information  Mau Robles  50 y.o.  5/23/2021 12:47 PM by Bernadette Baldwin MD. Mau Robles was admitted from home  Problem List       Patient Active Problem List     Diagnosis Date Noted    Slurred speech 05/23/2021    Weakness 04/01/2021    Multiple sclerosis ([address] 03/31/2021    Non compliance with medical treatment 02/07/2021    Recurrent falls 02/07/2021    Altered mental status 02/06/2021    Leukopenia 11/25/2020    UTI (urinary tract infection) 11/25/2020    Multiple sclerosis exacerbation ([address] 08/06/2020    Facial droop 03/23/2020    Exacerbation of multiple sclerosis ([address] 03/09/2020    Left-sided weakness 03/08/2020    Severe obesity ([address] 10/03/2018    Constipation 07/17/2015    Body aches 12/11/2014    Back pain 09/07/2012    Fibromyalgia 08/17/2012    MS (multiple sclerosis) ([address] 08/17/2012    Decreased hearing 01/31/2011    Acute pharyngitis 01/26/2011    Anxiety 08/25/2010    Blood pressure elevated 08/25/2010    Tobacco abuse 08/25/2010           Past Medical History:   Diagnosis Date    Body aches 12/11/2014    Chronic pain      Depression      Headache(784.0)      History of mammogram never before    MS (multiple sclerosis) ([address]      Neurological disorder       Multiple Sclerosis    Pap smear for cervical cancer screening 2008    Psychiatric disorder       anxiety    Psychotic disorder ([address]           Core Measures:  CVA: N  CHF:N        Activity:  Activity Level: Bed Rest  Number times ambulated in hallways past shift: 0  Number of times OOB to chair past shift:0     Cardiac:   Cardiac Monitoring: Y      Access:   Current line(s): peripheral line        Genitourinary:   Urinary status: voiding/ external catheter  Urinary Catheter? No  GI:  Current diet:  DIET DIABETIC CONSISTENT CARB Regular  DIET ONE TIME MESSAGE  Passing flatus: Tolerating current diet:Y     Pain Management:   Patient states pain is manageable on current regimen: Y     Skin:  Samy Score: 14  Interventions: turn    Patient Safety:  Fall Score:  Total Score: 4  Interventions:bed alarm  High Fall Risk: Yes  @Rollbelt  @dexterity to release roll belt  Yes/No ( must document dexterity  here by stating Yes or No here, otherwise this is a restraint and must follow restraint documentation policy.)     DVT prophylaxis:  DVT prophylaxis Med-  DVT prophylaxis SCD or CONG-     Wounds: (If Applicable)           Active Consults:  IP CONSULT TO NEUROLOGY  IP CONSULT TO HOSPITALIST     Length of Stay:  Expected LOS: 3d 0h  Actual LOS: 1  Discharge Plan: referral sent to inpt rehab

## 2021-05-25 NOTE — PROGRESS NOTES
End of Shift Note    Bedside shift change report given to Malathi Lyman RN (oncoming nurse) by Rita Bullard (offgoing nurse).   Report included the following information       Shift worked:  nights   Shift summary and any significant changes:    Oxy twice, tylenol once, bath given       Concerns for physician to address:     Zone phone for oncoming shift:       Patient Information  Elsa Byrd  50 y.o.  5/23/2021 12:47 PM by Delvis Jason MD. Elsa Byrd was admitted from home  Problem List  Patient Active Problem List    Diagnosis Date Noted    Slurred speech 05/23/2021    Weakness 04/01/2021    Multiple sclerosis ([address] 03/31/2021    Non compliance with medical treatment 02/07/2021    Recurrent falls 02/07/2021    Altered mental status 02/06/2021    Leukopenia 11/25/2020    UTI (urinary tract infection) 11/25/2020    Multiple sclerosis exacerbation ([address] 08/06/2020    Facial droop 03/23/2020    Exacerbation of multiple sclerosis ([address] 03/09/2020    Left-sided weakness 03/08/2020    Severe obesity ([address] 10/03/2018    Constipation 07/17/2015    Body aches 12/11/2014    Back pain 09/07/2012    Fibromyalgia 08/17/2012    MS (multiple sclerosis) ([address] 08/17/2012    Decreased hearing 01/31/2011    Acute pharyngitis 01/26/2011    Anxiety 08/25/2010    Blood pressure elevated 08/25/2010    Tobacco abuse 08/25/2010     Past Medical History:   Diagnosis Date    Body aches 12/11/2014    Chronic pain     Depression     Headache(784.0)     History of mammogram never before    MS (multiple sclerosis) ([address]     Neurological disorder     Multiple Sclerosis    Pap smear for cervical cancer screening 2008    Psychiatric disorder     anxiety    Psychotic disorder ([address]        Core Measures:  CVA: N  CHF:N      Activity:  Activity Level: Bed Rest  Number times ambulated in hallways past shift: 0  Number of times OOB to chair past shift:0    Cardiac:   Cardiac Monitoring:Y   Cardiac Rhythm: Sinus Rhythm    Access:   Current line(s): peripheral line      Genitourinary:   Urinary status: voiding/ external catheter     Urinary Catheter? No  GI:     Current diet:  DIET DIABETIC CONSISTENT CARB Regular  DIET ONE TIME MESSAGE  Passing flatus: Tolerating current diet:Y       Pain Management:   Patient states pain is manageable on current regimen: Y    Skin:  Samy Score: 16  Interventions: turn    Patient Safety:  Fall Score:  Total Score: 4  Interventions:bed alarm  High Fall Risk: Yes  @Rollbelt  @dexterity to release roll belt  Yes/No ( must document dexterity  here by stating Yes or No here, otherwise this is a restraint and must follow restraint documentation policy.)    DVT prophylaxis:  DVT prophylaxis Med-  DVT prophylaxis SCD or CONG-    Wounds: (If Applicable)        Active Consults:  IP CONSULT TO NEUROLOGY  IP CONSULT TO HOSPITALIST    Length of Stay:  Expected LOS: 3d 0h  Actual LOS: 2  Discharge Plan: to be determined

## 2021-05-25 NOTE — PROGRESS NOTES
Hospitalist Progress Note    NAME: Tosin Xiong   :  1975   MRN:  [id number]       Assessment / Plan:  Acute exacerbation of multiple sclerosis  -CT head-Moderate white matter disease is consistent with patient's known history of  multiple sclerosis. No intracranial hemorrhage or mass lesion. -X-ray spine lumbar-There is no acute fracture or subluxation. The osseous structures are diffusely demineralized. -- MRI Brain show Moderate stable supratentorial demyelinating disease burden  -Solu-Medrol 1 g x 5 doses. Last dose of Solu-Medrol on   -Continue Aubagio  -Continue PT OT  -Will need inpatient rehab placement. Case management consulted     Microcytic anemia  -Hemoglobin 10.8,  -Recent B12 was low normal at 255. Will start B12 supplements    Fibromyalgia  Neuropathy  Mood disorder  Insomnia  -Continue home Cymbalta, gabapentin, Pamelor       30.0 - 39.9 Obese / Body mass index is 38.28 kg/m². Estimated discharge date: May 24  Barriers:    Code status: Full  Prophylaxis: Lovenox  Recommended Disposition: Home w/Family     Subjective:     Chief Complaint / Reason for Physician Visit  \"\". Discussed with RN events overnight. Complaining from Generalized pain     Review of Systems:  Symptom Y/N Comments  Symptom Y/N Comments   Fever/Chills n   Chest Pain n    Poor Appetite    Edema     Cough    Abdominal Pain     Sputum    Joint Pain     SOB/BOBO n   Pruritis/Rash     Nausea/vomit    Tolerating PT/OT     Diarrhea    Tolerating Diet y    Constipation n   Other       Could NOT obtain due to:      Objective:     VITALS:   Last 24hrs VS reviewed since prior progress note.  Most recent are:  Patient Vitals for the past 24 hrs:   Temp Pulse Resp BP SpO2   21 1450 98.5 °F (36.9 °C) 74 16 (!) 104/56 95 %   21 1129 98 °F (36.7 °C) 80 18 125/85 100 %   21 0806 98.3 °F (36.8 °C) 66 16 109/72 99 %   21 0533 97.8 °F (36.6 °C) 63 18 125/67 99 %   21 0400 -- 70 -- -- -- 05/25/21 0000 98.1 °F (36.7 °C) 70 18 111/66 96 %   05/24/21 2000 -- 67 -- -- --   05/24/21 1947 98.1 °F (36.7 °C) 96 18 121/68 98 %     No intake or output data in the 24 hours ending 05/25/21 1655     I had a face to face encounter and independently examined this patient on 5/25/2021, as outlined below:  PHYSICAL EXAM:  General: WD, WN. Alert, cooperative, no acute distress    EENT:  EOMI. Anicteric sclerae. MMM  Resp:  CTA bilaterally, no wheezing or rales. No accessory muscle use  CV:  Regular  rhythm,  No edema  GI:  Soft, Non distended, Non tender. +Bowel sounds  Neurologic:  Alert and oriented X 3, normal speech,   Psych:   Good insight. Not anxious nor agitated  Skin:  No rashes. No jaundice    Reviewed most current lab test results and cultures  YES  Reviewed most current radiology test results   YES  Review and summation of old records today    NO  Reviewed patient's current orders and MAR    YES  PMH/ reviewed - no change compared to H&P  ________________________________________________________________________  Care Plan discussed with:    Comments   Patient y    Family      RN y    Care Manager     Consultant                        Multidiciplinary team rounds were held today with , nursing, pharmacist and clinical coordinator. Patient's plan of care was discussed; medications were reviewed and discharge planning was addressed. ________________________________________________________________________  Total NON critical care TIME: 35   Minutes    Total CRITICAL CARE TIME Spent:   Minutes non procedure based      Comments   >50% of visit spent in counseling and coordination of care y    ________________________________________________________________________  Excell MD Maxim     Procedures: see electronic medical records for all procedures/Xrays and details which were not copied into this note but were reviewed prior to creation of Plan.       LABS:  I reviewed today's most current labs and imaging studies.   Pertinent labs include:  Recent Labs     05/25/21 0423 05/23/21  1329   WBC 12.9* 3.1*   HGB 10.1* 10.8*   HCT 31.9* 33.4*    272     Recent Labs     05/25/21 0423 05/24/21  0219 05/23/21  1411    138 143   K 3.9 4.5 5.0   * 109* 110*   CO2 23 24 30   * 159* 75   BUN 15 17 14   CREA 0.84 1.08* 0.95   CA 8.3* 9.2 9.9   MG  --  2.0  --    ALB  --   --  3.5   TBILI  --   --  0.3   ALT  --   --  14       Signed: Michael Aguila MD

## 2021-05-25 NOTE — PROGRESS NOTES
Problem: Falls - Risk of  Goal: *Absence of Falls  Description: Document Laxmigeraldine Lopez Fall Risk and appropriate interventions in the flowsheet.   Outcome: Progressing Towards Goal  Note: Fall Risk Interventions:  Mobility Interventions: Bed/chair exit alarm         Medication Interventions: Bed/chair exit alarm    Elimination Interventions: Call light in reach, Bed/chair exit alarm    History of Falls Interventions: Bed/chair exit alarm         Problem: Pain  Goal: *Control of Pain  Outcome: Progressing Towards Goal

## 2021-05-26 LAB
GLUCOSE BLD STRIP.AUTO-MCNC: 101 MG/DL (ref 65–117)
GLUCOSE BLD STRIP.AUTO-MCNC: 108 MG/DL (ref 65–117)
GLUCOSE BLD STRIP.AUTO-MCNC: 164 MG/DL (ref 65–117)
GLUCOSE BLD STRIP.AUTO-MCNC: 183 MG/DL (ref 65–117)
SERVICE CMNT-IMP: ABNORMAL
SERVICE CMNT-IMP: ABNORMAL
SERVICE CMNT-IMP: NORMAL
SERVICE CMNT-IMP: NORMAL

## 2021-05-26 PROCEDURE — 82962 GLUCOSE BLOOD TEST: CPT

## 2021-05-26 PROCEDURE — 97535 SELF CARE MNGMENT TRAINING: CPT

## 2021-05-26 PROCEDURE — 97530 THERAPEUTIC ACTIVITIES: CPT

## 2021-05-26 PROCEDURE — 97116 GAIT TRAINING THERAPY: CPT

## 2021-05-26 PROCEDURE — 74011000258 HC RX REV CODE- 258: Performed by: STUDENT IN AN ORGANIZED HEALTH CARE EDUCATION/TRAINING PROGRAM

## 2021-05-26 PROCEDURE — 74011250636 HC RX REV CODE- 250/636: Performed by: STUDENT IN AN ORGANIZED HEALTH CARE EDUCATION/TRAINING PROGRAM

## 2021-05-26 PROCEDURE — 65660000000 HC RM CCU STEPDOWN

## 2021-05-26 PROCEDURE — 74011250637 HC RX REV CODE- 250/637: Performed by: NURSE PRACTITIONER

## 2021-05-26 PROCEDURE — 74011250637 HC RX REV CODE- 250/637: Performed by: INTERNAL MEDICINE

## 2021-05-26 PROCEDURE — 74011250637 HC RX REV CODE- 250/637: Performed by: STUDENT IN AN ORGANIZED HEALTH CARE EDUCATION/TRAINING PROGRAM

## 2021-05-26 PROCEDURE — 74011636637 HC RX REV CODE- 636/637: Performed by: STUDENT IN AN ORGANIZED HEALTH CARE EDUCATION/TRAINING PROGRAM

## 2021-05-26 RX ORDER — AMOXICILLIN 250 MG
1 CAPSULE ORAL 2 TIMES DAILY
Status: DISCONTINUED | OUTPATIENT
Start: 2021-05-26 | End: 2021-05-28 | Stop reason: HOSPADM

## 2021-05-26 RX ADMIN — Medication 10 ML: at 21:01

## 2021-05-26 RX ADMIN — GABAPENTIN 600 MG: 300 CAPSULE ORAL at 21:01

## 2021-05-26 RX ADMIN — DULOXETINE HYDROCHLORIDE 60 MG: 30 CAPSULE, DELAYED RELEASE ORAL at 09:13

## 2021-05-26 RX ADMIN — GABAPENTIN 600 MG: 300 CAPSULE ORAL at 15:33

## 2021-05-26 RX ADMIN — Medication 10 ML: at 05:58

## 2021-05-26 RX ADMIN — CYANOCOBALAMIN TAB 500 MCG 1000 MCG: 500 TAB at 09:13

## 2021-05-26 RX ADMIN — ENOXAPARIN SODIUM 40 MG: 40 INJECTION SUBCUTANEOUS at 09:15

## 2021-05-26 RX ADMIN — SODIUM CHLORIDE 1000 MG: 900 INJECTION INTRAVENOUS at 12:20

## 2021-05-26 RX ADMIN — GABAPENTIN 600 MG: 300 CAPSULE ORAL at 09:13

## 2021-05-26 RX ADMIN — DOCUSATE SODIUM 50MG AND SENNOSIDES 8.6MG 1 TABLET: 8.6; 5 TABLET, FILM COATED ORAL at 17:24

## 2021-05-26 RX ADMIN — Medication 15 MG: at 21:53

## 2021-05-26 RX ADMIN — PANTOPRAZOLE SODIUM 40 MG: 40 TABLET, DELAYED RELEASE ORAL at 09:13

## 2021-05-26 RX ADMIN — ALPRAZOLAM 0.5 MG: 0.5 TABLET ORAL at 09:25

## 2021-05-26 RX ADMIN — Medication 10 ML: at 15:34

## 2021-05-26 RX ADMIN — NORTRIPTYLINE HYDROCHLORIDE 25 MG: 25 CAPSULE ORAL at 21:01

## 2021-05-26 RX ADMIN — INSULIN LISPRO 2 UNITS: 100 INJECTION, SOLUTION INTRAVENOUS; SUBCUTANEOUS at 17:25

## 2021-05-26 RX ADMIN — OXYCODONE 10 MG: 5 TABLET ORAL at 17:24

## 2021-05-26 NOTE — PROGRESS NOTES
Problem: Mobility Impaired (Adult and Pediatric)  Goal: *Acute Goals and Plan of Care (Insert Text)  Description: FUNCTIONAL STATUS PRIOR TO ADMISSION: Patient reports she was hospitalized in March and then discharged to SNF for therapy, has only been home a few days but was able to ambulate short distances with RW, then had a fall in bathroom. Patient has a history of frequent falls and frequent hospital stays. HOME SUPPORT PRIOR TO ADMISSION: Patient lives with her boyfriend, requires some assist for most ADLs    Physical Therapy Goals  Initiated 5/24/2021  1. Patient will move from supine to sit and sit to supine  in bed with supervision/set-up within 7 day(s). 2.  Patient will transfer from bed to chair and chair to bed with supervision/set-up using the least restrictive device within 7 day(s). 3.  Patient will perform sit to stand with supervision/set-up within 7 day(s). 4.  Patient will ambulate with minimal assistance/contact guard assist for 100 feet with the least restrictive device within 7 day(s). 5.  Patient will ascend/descend 2 stairs with  handrail(s) with minimal assistance/contact guard assist within 7 day(s). Outcome: Progressing Towards Goal   PHYSICAL THERAPY TREATMENT  Patient: Omid Sharma (24 y.o. female)  Date: 5/26/2021  Diagnosis: Multiple sclerosis exacerbation ([facility] [G35]  Slurred speech [R47.81] <principal problem not specified>       Precautions: Fall  Chart, physical therapy assessment, plan of care and goals were reviewed. ASSESSMENT  Patient continues with skilled PT services and is progressing towards goals. Patient received in bed and agreeable to participate, able to come to EOB with SBA and sit unsupported.   Patient came to stand with min assist to RW and was able to increase ambulation to 30 feet, including into the bathroom, gait is still slow with increased lateral sway and difficulty advancing right LE which is also held in externally rotated position. Patient stood at sink and with tactile facilitation was able to improve trunk position and core activation, also worked on core strengthening in seated position. Patient left sitting up with call bell in reach, will benefit from inpatient rehab to maximize progress and safety. .     Current Level of Function Impacting Discharge (mobility/balance): min assist for transfers and ambulation    Other factors to consider for discharge: falls risk         PLAN :  Patient continues to benefit from skilled intervention to address the above impairments. Continue treatment per established plan of care. to address goals. Recommendation for discharge: (in order for the patient to meet his/her long term goals)  Therapy 3 hours per day 5-7 days per week    This discharge recommendation:  Has been made in collaboration with the attending provider and/or case management    IF patient discharges home will need the following DME: patient owns DME required for discharge       SUBJECTIVE:   Patient stated I feel a little better today.     OBJECTIVE DATA SUMMARY:   Critical Behavior:  Neurologic State: Alert  Orientation Level: Appropriate for age  Cognition: Appropriate decision making  Safety/Judgement: Awareness of environment  Functional Mobility Training:  Bed Mobility:     Supine to Sit: Stand-by assistance              Transfers:  Sit to Stand: Minimum assistance  Stand to Sit: Contact guard assistance        Bed to Chair: Minimum assistance                    Balance:  Sitting: Impaired  Sitting - Static: Good (unsupported)  Sitting - Dynamic: Fair (occasional)  Standing: Impaired  Standing - Static: Constant support;Good  Standing - Dynamic : Constant support; Fair  Ambulation/Gait Training:  Distance (ft): 30 Feet (ft)  Assistive Device: Gait belt;Walker, rolling  Ambulation - Level of Assistance: Minimal assistance        Gait Abnormalities: Decreased step clearance;Trunk sway increased; Other (keeps right LE ER)        Base of Support: Widened     Speed/Heena: Pace decreased (<100 feet/min)  Step Length: Left shortened;Right shortened  Swing Pattern: Right asymmetrical                 Stairs: Therapeutic Exercises:   Glute sets in standing, trunk curls seated  Pain Rating:      Activity Tolerance:   Fair    After treatment patient left in no apparent distress:   Sitting in chair, Call bell within reach, and Bed / chair alarm activated    COMMUNICATION/COLLABORATION:   The patients plan of care was discussed with: Occupational therapist and Registered nurse.      Jesus Pena, PT   Time Calculation: 36 mins

## 2021-05-26 NOTE — PROGRESS NOTES
Bedside shift change report given to ALEJANDRO Huntley (oncoming nurse) by Jaxon Sullivan RN (offgoing nurse).   Report included the following information         Shift worked:  days   Shift summary and any significant changes:     none         Concerns for physician to address:     Zone phone for oncoming shift:         Patient Information  Margarette Chi  28 y.o.  5/23/2021 12:47 PM by Jeri Cool MD. Clair Brooks was admitted from home  Problem List          Patient Active Problem List     Diagnosis Date Noted    Slurred speech 05/23/2021    Weakness 04/01/2021    Multiple sclerosis ([address] 03/31/2021    Non compliance with medical treatment 02/07/2021    Recurrent falls 02/07/2021    Altered mental status 02/06/2021    Leukopenia 11/25/2020    UTI (urinary tract infection) 11/25/2020    Multiple sclerosis exacerbation ([address] 08/06/2020    Facial droop 03/23/2020    Exacerbation of multiple sclerosis ([address] 03/09/2020    Left-sided weakness 03/08/2020    Severe obesity ([address] 10/03/2018    Constipation 07/17/2015    Body aches 12/11/2014    Back pain 09/07/2012    Fibromyalgia 08/17/2012    MS (multiple sclerosis) ([address] 08/17/2012    Decreased hearing 01/31/2011    Acute pharyngitis 01/26/2011    Anxiety 08/25/2010    Blood pressure elevated 08/25/2010    Tobacco abuse 08/25/2010              Past Medical History:   Diagnosis Date    Body aches 12/11/2014    Chronic pain      Depression      Headache(784.0)      History of mammogram never before    MS (multiple sclerosis) ([address]      Neurological disorder       Multiple Sclerosis    Pap smear for cervical cancer screening 2008    Psychiatric disorder       anxiety    Psychotic disorder ([address]           Core Measures:  CVA: N  CHF:N        Activity:  Activity Level: Bed Rest  Number times ambulated in hallways past shift: 0  Number of times OOB to chair past shift:0     Cardiac:   Cardiac Monitoring:Y      Access:   Current line(s): peripheral line        Genitourinary:   Urinary status: voiding/ external catheter  Urinary Catheter? No  GI:  Current diet:  DIET DIABETIC CONSISTENT CARB Regular  DIET ONE TIME MESSAGE  Passing flatus:    Tolerating current diet:Y     Pain Management:   Patient states pain is manageable on current regimen: Y     Skin:  Samy Score: 14  Interventions: turn    Patient Safety:  Fall Score: Total Score: 4  Interventions:bed alarm  High Fall Risk: Yes  @Rollbelt  @dexterity to release roll belt  Yes/No ( must document dexterity  here by stating Yes or No here, otherwise this is a restraint and must follow restraint documentation policy.)     DVT prophylaxis:  DVT prophylaxis Med-  DVT prophylaxis SCD or CONG-     Wounds: (If Applicable)           Active Consults:  IP CONSULT TO NEUROLOGY  IP CONSULT TO HOSPITALIST     Length of Stay:  Expected LOS: 3d 0h  Actual LOS: 1  Discharge Plan: to be determined

## 2021-05-26 NOTE — PROGRESS NOTES
Problem: Falls - Risk of  Goal: *Absence of Falls  Description: Document Lilia Shaikh Fall Risk and appropriate interventions in the flowsheet. Outcome: Progressing Towards Goal  Note: Fall Risk Interventions:  Mobility Interventions: Bed/chair exit alarm         Medication Interventions: Bed/chair exit alarm    Elimination Interventions: Bed/chair exit alarm, Call light in reach    History of Falls Interventions: Bed/chair exit alarm, Room close to nurse's station         Problem: Falls - Risk of  Goal: *Absence of Falls  Description: Document Lilia Shaikh Fall Risk and appropriate interventions in the flowsheet.   Outcome: Progressing Towards Goal  Note: Fall Risk Interventions:  Mobility Interventions: Bed/chair exit alarm         Medication Interventions: Bed/chair exit alarm    Elimination Interventions: Bed/chair exit alarm, Call light in reach    History of Falls Interventions: Bed/chair exit alarm, Room close to nurse's station         Problem: Pain  Goal: *Control of Pain  Outcome: Progressing Towards Goal

## 2021-05-26 NOTE — PROGRESS NOTES
Hospitalist Progress Note    NAME: Elsa Byrd   :  1975   MRN:  [id number]       Assessment / Plan:  Acute exacerbation of multiple sclerosis  -CT head-Moderate white matter disease is consistent with patient's known history of  multiple sclerosis. No intracranial hemorrhage or mass lesion. -X-ray spine lumbar-There is no acute fracture or subluxation. The osseous structures are diffusely demineralized. -- MRI Brain show Moderate stable supratentorial demyelinating disease burden  -Solu-Medrol 1 g x 5 doses. Last dose of Solu-Medrol on   -Continue Aubagio  -Continue PT OT  -[id number] done and accepted to inpatient rehab    Constipation  -Start laxatives. Out of bed to chair.       Microcytic anemia  -Hemoglobin 10.8,  -Recent B12 was low normal at 255. Continue added B12 supplements    Fibromyalgia  Neuropathy  Mood disorder  Insomnia  -Continue home Cymbalta, gabapentin, Pamelor       30.0 - 39.9 Obese / Body mass index is 38.28 kg/m². Estimated discharge date: May 24  Barriers:    Code status: Full  Prophylaxis: Lovenox  Recommended Disposition: Home w/Family     Subjective:     Chief Complaint / Reason for Physician Visit  She reports improvement in her weakness  Reports constipation but no abdominal pain, nausea or vomiting        Objective:     VITALS:   Last 24hrs VS reviewed since prior progress note.  Most recent are:  Patient Vitals for the past 24 hrs:   Temp Pulse Resp BP SpO2   21 1154 97.6 °F (36.4 °C) 76 16 (!) 123/59 100 %   21 0734 97.6 °F (36.4 °C) 71 16 119/73 100 %   21 0024 97.6 °F (36.4 °C) (!) 57 16 101/61 96 %   21 1941 97.7 °F (36.5 °C) 67 16 118/71 95 %       Intake/Output Summary (Last 24 hours) at 2021 1531  Last data filed at 2021 2125  Gross per 24 hour   Intake --   Output 600 ml   Net -600 ml        I had a face to face encounter and independently examined this patient on 2021, as outlined below:  PHYSICAL EXAM:  General: WD, WN. Alert, cooperative, no acute distress    EENT:  EOMI. Anicteric sclerae. MMM  Resp:  CTA bilaterally, no wheezing or rales. No accessory muscle use  CV:  Regular  rhythm,  No edema  GI:  Soft, Non distended, Non tender. +Bowel sounds  Neurologic:  Alert and oriented X 3, normal speech,   Psych:   Good insight. Not anxious nor agitated  Skin:  No rashes. No jaundice    Reviewed most current lab test results and cultures  YES  Reviewed most current radiology test results   YES  Review and summation of old records today    NO  Reviewed patient's current orders and MAR    YES  PMH/SH reviewed - no change compared to H&P  ________________________________________________________________________  Care Plan discussed with:    Comments   Patient y    Family      RN y    Care Manager     Consultant                        Multidiciplinary team rounds were held today with , nursing, pharmacist and clinical coordinator. Patient's plan of care was discussed; medications were reviewed and discharge planning was addressed. ________________________________________________________________________  Total NON critical care TIME: 35   Minutes    Total CRITICAL CARE TIME Spent:   Minutes non procedure based      Comments   >50% of visit spent in counseling and coordination of care y    ________________________________________________________________________  Myrna Moody MD     Procedures: see electronic medical records for all procedures/Xrays and details which were not copied into this note but were reviewed prior to creation of Plan. LABS:  I reviewed today's most current labs and imaging studies.   Pertinent labs include:  Recent Labs     05/25/21  0423   WBC 12.9*   HGB 10.1*   HCT 31.9*        Recent Labs     05/25/21  0423 05/24/21  0219    138   K 3.9 4.5   * 109*   CO2 23 24   * 159*   BUN 15 17   CREA 0.84 1.08*   CA 8.3* 9.2   MG  --  2.0       Signed: Rivera Santa MD

## 2021-05-26 NOTE — PROGRESS NOTES
Transition of Care Plan:    RUR: 37%  Disposition: Inpatient Rehab   Follow up appointments: will follow up with PCP   DME needed:TBD by therapist   Transportation at Discharge:BLS-medical transport  [address] or means to access home:  Family will have keys to enter the home    Medicare letter: 2nd  Medicare Letter to be given  [address] (boyfriend) [phone]  Discharge Caregiver contacted prior to discharge? Family to be contacted at the time of d/c.      UPDATE: 11:37PM    CM informed physician that P2P is needed. Physician assistant will arrange P2P with [facility] physician and [address] [place] [address]. CM will continue to follow. LATASHA Bell,         CM informed that [facility] has denied pt for IPR. CM informed that [facility] physician can complete peer to peer with [address] [place] [address]. [facility] physician can contact [employer] at: Bridger Oro ext: [phone] and pt's reference: [phone]. CM will inform the physician and pt's nurse of the following.     Sly Bowles, LATASHA, [address] [place] [address]

## 2021-05-26 NOTE — PROGRESS NOTES
Bedside shift change report given to Justice Bonilla RN (oncoming nurse) by Maggie Guzman RN (offgoing nurse). Report included the following information         Shift worked:  nights   Shift summary and any significant changes:     Pt slept beautifully all night.         Concerns for physician to address:     Zone phone for oncoming shift:        Patient Information  Hina Mckoy  50 y.o.  5/23/2021 12:47 PM by Marily Junior MD. Hina Mckoy was admitted from home  Problem List       Patient Active Problem List     Diagnosis Date Noted    Slurred speech 05/23/2021    Weakness 04/01/2021    Multiple sclerosis ([address] 03/31/2021    Non compliance with medical treatment 02/07/2021    Recurrent falls 02/07/2021    Altered mental status 02/06/2021    Leukopenia 11/25/2020    UTI (urinary tract infection) 11/25/2020    Multiple sclerosis exacerbation ([address] 08/06/2020    Facial droop 03/23/2020    Exacerbation of multiple sclerosis ([address] 03/09/2020    Left-sided weakness 03/08/2020    Severe obesity ([address] 10/03/2018    Constipation 07/17/2015    Body aches 12/11/2014    Back pain 09/07/2012    Fibromyalgia 08/17/2012    MS (multiple sclerosis) ([address] 08/17/2012    Decreased hearing 01/31/2011    Acute pharyngitis 01/26/2011    Anxiety 08/25/2010    Blood pressure elevated 08/25/2010    Tobacco abuse 08/25/2010           Past Medical History:   Diagnosis Date    Body aches 12/11/2014    Chronic pain      Depression      Headache(784.0)      History of mammogram never before    MS (multiple sclerosis) ([address]      Neurological disorder       Multiple Sclerosis    Pap smear for cervical cancer screening 2008    Psychiatric disorder       anxiety    Psychotic disorder ([address]           Core Measures:  CVA: N  CHF:N        Activity:  Activity Level: Bed Rest  Number times ambulated in hallways past shift: 0  Number of times OOB to chair past shift:0     Cardiac:   Cardiac Monitoring: Y      Access: Current line(s): peripheral line        Genitourinary:   Urinary status: voiding/ external catheter  Urinary Catheter? No  GI:  Current diet:  DIET DIABETIC CONSISTENT CARB Regular  DIET ONE TIME MESSAGE  Passing flatus: Tolerating current diet:Y     Pain Management:   Patient states pain is manageable on current regimen: Y     Skin:  Samy Score: 14  Interventions: turn    Patient Safety:  Fall Score:  Total Score: 4  Interventions:bed alarm  High Fall Risk: Yes  @Rollbelt  @dexterity to release roll belt  Yes/No ( must document dexterity  here by stating Yes or No here, otherwise this is a restraint and must follow restraint documentation policy.)     DVT prophylaxis:  DVT prophylaxis Med-  DVT prophylaxis SCD or CONG-     Wounds: (If Applicable)           Active Consults:  IP CONSULT TO NEUROLOGY  IP CONSULT TO HOSPITALIST     Length of Stay:  Expected LOS: 3d 0h  Actual LOS: 1  Discharge Plan: referral sent to in rehab

## 2021-05-26 NOTE — PROGRESS NOTES
Problem: Self Care Deficits Care Plan (Adult)  Goal: *Acute Goals and Plan of Care (Insert Text)  Description:   FUNCTIONAL STATUS PRIOR TO ADMISSION: The patient used walker or w/c for functional mobility, furniture walks in bathroom as AD does not fit in bathroom (had fall in bathroom PTA). She had assistance for LB dressing, bathing and shower transfer (has shower chair and grabbars) from SO.    HOME SUPPORT: The patient lived with her SO who provided assistance. Occupational Therapy Goals  Initiated 5/24/2021  1. Patient will perform grooming with supervision/set-up in unsupported sit within 7 day(s). 2.  Patient will perform upper body dressing with supervision/set-up within 7 day(s). 3.  Patient will perform lower body dressing with moderate assistance  within 7 day(s). 4.  Patient will perform toilet transfers with CGA within 7 day(s). 5.  Patient will perform all aspects of toileting with minimal assistance within 7 day(s). 6.  Patient will participate in upper extremity therapeutic exercise/activities with independence for 5 minutes within 7 day(s). 7.  Patient will utilize energy conservation techniques during functional activities with verbal cues within 7 day(s). Outcome: Progressing Towards Goal   OCCUPATIONAL THERAPY TREATMENT  Patient: Laurie Hansen (42 y.o. female)  Date: 5/26/2021  Diagnosis: Multiple sclerosis exacerbation ([facility] [address] [G35]  Slurred speech [R47.81] <principal problem not specified>       Precautions: Fall  Chart, occupational therapy assessment, plan of care, and goals were reviewed. ASSESSMENT  Patient continues with skilled OT services and is progressing towards goals. Pt was SBA for bed mobility, and was able to stand with CGA to min of 1 and with use of RW she was able to walk to toilet and able to clean fab area with setup, and able to ciro her pull up depends over her feet sitting on toilet.   ( pull ups from home)  Pt was able to stand with min to mod of 2 and use of grab bar, and she stood at sink with CGA and combed her hair, and washed her face. She was doing very well with her standing endurance and worked on tactile cues to have pt stand more upright and less leaning to the right. She was able to ciro her slippers on while sitting in chair tailor sitting. ( she pulls her foot up with use of sock)    Current Level of Function Impacting Discharge (ADLs): mod for ADLs              PLAN :  Patient continues to benefit from skilled intervention to address the above impairments. Continue treatment per established plan of care to address goals. Recommend with staff: Elvis Pérez for meals     Recommend next OT session: work on washing buttocks in standing at sink or in front of chair    Recommendation for discharge: (in order for the patient to meet his/her long term goals)  Therapy 3 hours per day 5-7 days per week    This discharge recommendation:  Has been made in collaboration with the attending provider and/or case management    IF patient discharges home will need the following DME: tbd       SUBJECTIVE:   Patient stated I feel much better today .     OBJECTIVE DATA SUMMARY:   Cognitive/Behavioral Status:  Neurologic State: Alert  Orientation Level: Appropriate for age  Cognition: Appropriate decision making  Perception: Appears intact  Perseveration: No perseveration noted  Safety/Judgement: Awareness of environment    Functional Mobility and Transfers for ADLs:  Bed Mobility:  Supine to Sit: Stand-by assistance    Transfers:        Bed to Chair: Minimum assistance    Balance:  Sitting: Impaired  Sitting - Static: Good (unsupported)  Sitting - Dynamic: Fair (occasional)  Standing: Impaired  Standing - Static: Constant support;Good  Standing - Dynamic : Constant support; Fair    ADL Intervention:  Feeding  Feeding Assistance: Set-up    Grooming  Grooming Assistance: Set-up  Position Performed: Seated in chair  Washing Face: Independent  Washing Hands: Independent  Brushing Teeth: Independent  Brushing/Combing Hair: Contact guard assistance;Stand-by assistance (standing)    Upper Body Bathing  Bathing Assistance: Set-up  Position Performed: Seated in chair    Lower Body Bathing  Bathing Assistance: Contact guard assistance;Stand-by assistance  Perineal  : Contact guard assistance;Stand-by assistance  Position Performed: Seated on toilet       Toileting  Bladder Hygiene: Modified independent  Bowel Hygiene: Minimum assistance;Set-up  Clothing Management: Minimum assistance    Cognitive Retraining  Safety/Judgement: Awareness of environment    Therapeutic Exercises:       Pain:  none    Activity Tolerance:   Fair    After treatment patient left in no apparent distress:   Sitting in chair, Call bell within reach, and Bed / chair alarm activated    COMMUNICATION/COLLABORATION:   The patients plan of care was discussed with: Physical therapist and Registered nurse.      Ruth Ann Morales OT  Time Calculation: 26 mins

## 2021-05-27 VITALS
DIASTOLIC BLOOD PRESSURE: 70 MMHG | BODY MASS INDEX: 38.07 KG/M2 | SYSTOLIC BLOOD PRESSURE: 132 MMHG | HEART RATE: 64 BPM | HEIGHT: 64 IN | OXYGEN SATURATION: 95 % | RESPIRATION RATE: 16 BRPM | TEMPERATURE: 98 F | WEIGHT: 223 LBS

## 2021-05-27 LAB
ANION GAP SERPL CALC-SCNC: 5 MMOL/L (ref 5–15)
BUN SERPL-MCNC: 20 MG/DL (ref 6–20)
BUN/CREAT SERPL: 29 (ref 12–20)
CALCIUM SERPL-MCNC: 8 MG/DL (ref 8.5–10.1)
CHLORIDE SERPL-SCNC: 108 MMOL/L (ref 97–108)
CO2 SERPL-SCNC: 25 MMOL/L (ref 21–32)
COVID-19 RAPID TEST, COVR: ABNORMAL
CREAT SERPL-MCNC: 0.69 MG/DL (ref 0.55–1.02)
ERYTHROCYTE [DISTWIDTH] IN BLOOD BY AUTOMATED COUNT: 14.4 % (ref 11.5–14.5)
GLUCOSE BLD STRIP.AUTO-MCNC: 115 MG/DL (ref 65–117)
GLUCOSE BLD STRIP.AUTO-MCNC: 134 MG/DL (ref 65–117)
GLUCOSE BLD STRIP.AUTO-MCNC: 141 MG/DL (ref 65–117)
GLUCOSE SERPL-MCNC: 131 MG/DL (ref 65–100)
HCT VFR BLD AUTO: 29.8 % (ref 35–47)
HGB BLD-MCNC: 9.6 G/DL (ref 11.5–16)
MCH RBC QN AUTO: 32 PG (ref 26–34)
MCHC RBC AUTO-ENTMCNC: 32.2 G/DL (ref 30–36.5)
MCV RBC AUTO: 99.3 FL (ref 80–99)
NRBC # BLD: 0 K/UL (ref 0–0.01)
NRBC BLD-RTO: 0 PER 100 WBC
PLATELET # BLD AUTO: 214 K/UL (ref 150–400)
PMV BLD AUTO: 10.1 FL (ref 8.9–12.9)
POTASSIUM SERPL-SCNC: 4.1 MMOL/L (ref 3.5–5.1)
RBC # BLD AUTO: 3 M/UL (ref 3.8–5.2)
SERVICE CMNT-IMP: ABNORMAL
SERVICE CMNT-IMP: ABNORMAL
SERVICE CMNT-IMP: NORMAL
SODIUM SERPL-SCNC: 138 MMOL/L (ref 136–145)
SOURCE, COVRS: ABNORMAL
WBC # BLD AUTO: 6.7 K/UL (ref 3.6–11)

## 2021-05-27 PROCEDURE — 74011000258 HC RX REV CODE- 258: Performed by: STUDENT IN AN ORGANIZED HEALTH CARE EDUCATION/TRAINING PROGRAM

## 2021-05-27 PROCEDURE — 74011250636 HC RX REV CODE- 250/636: Performed by: STUDENT IN AN ORGANIZED HEALTH CARE EDUCATION/TRAINING PROGRAM

## 2021-05-27 PROCEDURE — 36415 COLL VENOUS BLD VENIPUNCTURE: CPT

## 2021-05-27 PROCEDURE — 74011636637 HC RX REV CODE- 636/637: Performed by: STUDENT IN AN ORGANIZED HEALTH CARE EDUCATION/TRAINING PROGRAM

## 2021-05-27 PROCEDURE — 82962 GLUCOSE BLOOD TEST: CPT

## 2021-05-27 PROCEDURE — 74011250637 HC RX REV CODE- 250/637: Performed by: INTERNAL MEDICINE

## 2021-05-27 PROCEDURE — 74011250637 HC RX REV CODE- 250/637: Performed by: STUDENT IN AN ORGANIZED HEALTH CARE EDUCATION/TRAINING PROGRAM

## 2021-05-27 PROCEDURE — 85027 COMPLETE CBC AUTOMATED: CPT

## 2021-05-27 PROCEDURE — 74011250637 HC RX REV CODE- 250/637: Performed by: NURSE PRACTITIONER

## 2021-05-27 PROCEDURE — 80048 BASIC METABOLIC PNL TOTAL CA: CPT

## 2021-05-27 PROCEDURE — 87635 SARS-COV-2 COVID-19 AMP PRB: CPT

## 2021-05-27 PROCEDURE — 2709999900 HC NON-CHARGEABLE SUPPLY

## 2021-05-27 RX ORDER — GABAPENTIN 600 MG/1
600 TABLET ORAL 3 TIMES DAILY
Qty: 45 TABLET | Refills: 0 | Status: SHIPPED | OUTPATIENT
Start: 2021-05-27 | End: 2021-06-11

## 2021-05-27 RX ORDER — LANOLIN ALCOHOL/MO/W.PET/CERES
1000 CREAM (GRAM) TOPICAL DAILY
Qty: 30 TABLET | Refills: 0 | Status: SHIPPED | OUTPATIENT
Start: 2021-05-28 | End: 2021-06-27

## 2021-05-27 RX ORDER — OXYCODONE HYDROCHLORIDE 5 MG/1
5 TABLET ORAL
Qty: 15 TABLET | Refills: 0 | Status: SHIPPED | OUTPATIENT
Start: 2021-05-27 | End: 2021-05-30

## 2021-05-27 RX ORDER — POLYETHYLENE GLYCOL 3350 17 G/17G
17 POWDER, FOR SOLUTION ORAL
Qty: 15 PACKET | Refills: 1 | Status: SHIPPED | OUTPATIENT
Start: 2021-05-27 | End: 2021-06-11

## 2021-05-27 RX ORDER — NALOXONE HYDROCHLORIDE 4 MG/.1ML
SPRAY NASAL
Qty: 1 EACH | Refills: 1 | Status: SHIPPED | OUTPATIENT
Start: 2021-05-27

## 2021-05-27 RX ORDER — ALPRAZOLAM 0.5 MG/1
0.5 TABLET ORAL
Qty: 15 TABLET | Refills: 0 | Status: SHIPPED | OUTPATIENT
Start: 2021-05-27 | End: 2021-06-03

## 2021-05-27 RX ORDER — AMOXICILLIN 250 MG
1 CAPSULE ORAL 2 TIMES DAILY
Qty: 10 TABLET | Refills: 0 | Status: SHIPPED | OUTPATIENT
Start: 2021-05-27 | End: 2021-06-01

## 2021-05-27 RX ORDER — MAGNESIUM CITRATE
296 SOLUTION, ORAL ORAL
Status: COMPLETED | OUTPATIENT
Start: 2021-05-27 | End: 2021-05-27

## 2021-05-27 RX ADMIN — DULOXETINE HYDROCHLORIDE 60 MG: 30 CAPSULE, DELAYED RELEASE ORAL at 08:22

## 2021-05-27 RX ADMIN — GABAPENTIN 600 MG: 300 CAPSULE ORAL at 08:22

## 2021-05-27 RX ADMIN — GABAPENTIN 600 MG: 300 CAPSULE ORAL at 16:47

## 2021-05-27 RX ADMIN — OXYCODONE 10 MG: 5 TABLET ORAL at 10:27

## 2021-05-27 RX ADMIN — ENOXAPARIN SODIUM 40 MG: 40 INJECTION SUBCUTANEOUS at 08:22

## 2021-05-27 RX ADMIN — GABAPENTIN 600 MG: 300 CAPSULE ORAL at 21:01

## 2021-05-27 RX ADMIN — DOCUSATE SODIUM 50MG AND SENNOSIDES 8.6MG 1 TABLET: 8.6; 5 TABLET, FILM COATED ORAL at 16:47

## 2021-05-27 RX ADMIN — OXYCODONE 5 MG: 5 TABLET ORAL at 21:05

## 2021-05-27 RX ADMIN — MAGNESIUM CITRATE 296 ML: 1.75 LIQUID ORAL at 13:05

## 2021-05-27 RX ADMIN — ACETAMINOPHEN 650 MG: 325 TABLET ORAL at 19:14

## 2021-05-27 RX ADMIN — CYANOCOBALAMIN TAB 500 MCG 1000 MCG: 500 TAB at 08:22

## 2021-05-27 RX ADMIN — NORTRIPTYLINE HYDROCHLORIDE 25 MG: 25 CAPSULE ORAL at 21:01

## 2021-05-27 RX ADMIN — Medication 10 ML: at 05:52

## 2021-05-27 RX ADMIN — PANTOPRAZOLE SODIUM 40 MG: 40 TABLET, DELAYED RELEASE ORAL at 08:22

## 2021-05-27 RX ADMIN — DOCUSATE SODIUM 50MG AND SENNOSIDES 8.6MG 1 TABLET: 8.6; 5 TABLET, FILM COATED ORAL at 08:22

## 2021-05-27 RX ADMIN — OXYCODONE 5 MG: 5 TABLET ORAL at 01:44

## 2021-05-27 RX ADMIN — SODIUM CHLORIDE 1000 MG: 900 INJECTION INTRAVENOUS at 09:32

## 2021-05-27 RX ADMIN — INSULIN LISPRO 2 UNITS: 100 INJECTION, SOLUTION INTRAVENOUS; SUBCUTANEOUS at 17:00

## 2021-05-27 NOTE — PROGRESS NOTES
Pharmacist Discharge Medication Reconciliation    Significant PMH:   Past Medical History:   Diagnosis Date    Body aches 12/11/2014    Chronic pain     Depression     Headache(784.0)     History of mammogram never before    MS (multiple sclerosis) ([facility] [address]     Neurological disorder     Multiple Sclerosis    Pap smear for cervical cancer screening 2008    Psychiatric disorder     anxiety    Psychotic disorder [facility])      Chief Complaint for this Admission:   Chief Complaint   Patient presents with    Fall     Presents to the ED via EMS with c/o \"MS flare\" x this morning - sx are very typical of her flares (slurred speech and generalized weakness). Germant Shandra while trying to get up from the toilet, not has back pain. EMS reports transporting her multiple times for the same sx. Allergies: Morphine    Discharge Medications:   Current Discharge Medication List        START taking these medications    Details   cyanocobalamin 1,000 mcg tablet Take 1 Tablet by mouth daily for 30 days. Qty: 30 Tablet, Refills: 0  Start date: 5/28/2021, End date: 6/27/2021      senna-docusate (PERICOLACE) 8.6-50 mg per tablet Take 1 Tablet by mouth two (2) times a day for 5 days. Qty: 10 Tablet, Refills: 0  Start date: 5/27/2021, End date: 6/1/2021      polyethylene glycol (Miralax) 17 gram packet Take 1 Packet by mouth daily as needed for Constipation for up to 15 days. Qty: 15 Packet, Refills: 1  Start date: 5/27/2021, End date: 6/11/2021           CONTINUE these medications which have CHANGED    Details   ALPRAZolam (Xanax) 0.5 mg tablet Take 1 Tablet by mouth two (2) times daily as needed for Anxiety for up to 7 days. Max Daily Amount: 1 mg. Qty: 15 Tablet, Refills: 0  Start date: 5/27/2021, End date: 6/3/2021    Associated Diagnoses: Chronic anxiety           CONTINUE these medications which have NOT CHANGED    Details   nortriptyline (PAMELOR) 25 mg capsule Take 1 Cap by mouth nightly.   Qty: 30 Cap, Refills: 0    Associated Diagnoses: Neutropenia, unspecified type ([facility] [address] Benzodiazepine dependence ([facility] [address] Multiple sclerosis ([facility] [address] Sleep disorder      gabapentin (NEURONTIN) 600 mg tablet Take 1 Tab by mouth three (3) times daily. Max Daily Amount: 1,800 mg. Qty: 20 Tab, Refills: 0    Associated Diagnoses: MS (multiple sclerosis) ([facility])      DULoxetine (CYMBALTA) 60 mg capsule Take 1 Cap by mouth daily. Indications: neuropathic pain  Qty: 90 Cap, Refills: 5    Associated Diagnoses: Chronic pain syndrome; MS (multiple sclerosis) ([facility] [address] Tobacco abuse      teriflunomide (AUBAGIO) 14 mg tablet Take 1 Tab by mouth daily. Indications: relapsing form of multiple sclerosis  Qty: 90 Tab, Refills: 3    Associated Diagnoses: MS (multiple sclerosis) (HCC)      acetaminophen (TYLENOL) 500 mg tablet Take 500 mg by mouth every four (4) hours as needed for Fever or Pain. nystatin (MYCOSTATIN) topical cream APPLY CREAM TOPICALLY TO AFFECTED AREA TWICE DAILY      melatonin 3 mg tablet Take 15 mg by mouth nightly as needed for Insomnia. The patient's chart, MAR and AVS were reviewed by Kurtis Kohler.     Discharging Provider: Usha Gaona MD    Thank Flex Mcduffie  PharmD Candidate [phone]

## 2021-05-27 NOTE — PROGRESS NOTES
Transition of Care Plan:    RUR: 37%  Disposition: Inpatient Rehab   Follow up appointments: will follow up with PCP   DME needed:TBD by therapist   Transportation at Discharge:BLS-medical transport  [address] or means to access home:  Family will have keys to enter the home   IM Medicare letter: 2nd IM Medicare Letter to be given  [address] (boyfriend) [phone]  Discharge Caregiver contacted prior to discharge? Family to be contacted at the time of d/c.    UPDATE: 2:44PM    [facility]TH informed that pt is not medically stable to d/c, and was asked to push transport back to 5:30PM (completed). CM will enter delay. LATASHA Marshall, [place] [address]      NORTH informed that pt's P2P that was scheduled on 5/26/21 has been approved. NORTH received call from liaison: Jade Briones, at [facility], reported that they are able to accept pt on today at any time. CM informed pt's nurse of the following and was informed to schedule transport today at 12PM to [facility]. CM arranged transport with [facility]. CM informed that no covid test is needed at the time of d/c. Facility gives there own covid test.    CM completed room visit to inform pt of the following. Pt is agreeable to the following transition to [facility]. CM provided pt with 2nd IM Medicare Letter (verbal consent) placed in chart. NORTH provided pt's nurse with call report info: [phone] and accepting physician at facility is: Dr. Damari Gonzales. CM to inform pt's nurse and physician that EMTALA is needed at the time of d/c.    NORTH completed the needs of the pt at this time.     LATASHA Marshall, [address] [place] [address]

## 2021-05-27 NOTE — DISCHARGE SUMMARY
Hospitalist Discharge Summary     Patient ID:  Evans Loaiza  [id number]  89 y.o.  1975 5/23/2021    PCP on record: Lupillo Quarles MD    Admit date: 5/23/2021  Discharge date and time: 5/27/2021    DISCHARGE DIAGNOSIS:    Acute exacerbation of multiple sclerosis  Constipation  Microcytic anemia  Fibromyalgia  Neuropathy  Mood disorder  Insomnia     CONSULTATIONS:  IP CONSULT TO NEUROLOGY  IP CONSULT TO HOSPITALIST    Excerpted HPI from H&P of Evans Merino MD:  Lashanda Tse is a 50 y.o.  female who presents with generalized weakness and slurred speech that started couple of days ago. Patient past medical history of MS, fibromyalgia, mood disorder, insomnia. Patient stated for the last 2 days she is feeling too weak and not able to get out of the bed along with difficulty in speaking. Patient had similar symptoms before when she has MS flareup. She was recently discharged from the rehab home and over health couple of days ago and she started having the symptoms. Patient has been admitted multiple times with a similar symptoms in the past.  Denies any headache, no passing out episodes, no chest pain, no shortness of breath, no headache, no fever or chills, no history of sick contacts.    ______________________________________________________________________  DISCHARGE SUMMARY/HOSPITAL COURSE:  for full details see H&P, daily progress notes, labs, consult notes. Hospital course problem wise:    Acute exacerbation of multiple sclerosis  -Patient was admitted to hospital and underwent work-up as below. CT head-Moderate white matter disease is consistent with patient's known history of multiple sclerosis. No intracranial hemorrhage or mass lesion. -X-ray spine lumbar-There is no acute fracture or subluxation. The osseous structures are diffusely demineralized. -- MRI Brain show Moderate stable supratentorial demyelinating disease burden Solu-Medrol 1 g x 5 doses. Last dose of Solu-Medrol on 5/27. Continue Aubagio  -Physical therapy has evaluated the patient and recommended placement in inpatient rehab. Inpatient rehab has accepted the patient and is being discharged for continuation of rehabilitation efforts. She will follow with primary neurologist in about 2 weeks time.     Constipation  -She will receive enema today and if she has a bowel movement, she will be discharged on scheduled and also as needed laxatives.        Microcytic anemia  -Hemoglobin 10.8,  -Recent B12 was low normal at 255. Continue added B12 supplements     Fibromyalgia  Neuropathy  Mood disorder  Insomnia    Patient seen and examined today, vital signs are stable, lab work is at baseline was cleared by all consultant parties including neurology to be discharged for outpatient follow-up.    _______________________________________________________________________  Patient seen and examined by me on discharge day. Pertinent Findings:  Gen:    Not in distress  Chest: Clear lungs  CVS:   Regular rhythm. No edema  Abd:  Soft, not distended, not tender  Neuro:  Alert, awake  _______________________________________________________________________  DISCHARGE MEDICATIONS:   Current Discharge Medication List      START taking these medications    Details   cyanocobalamin 1,000 mcg tablet Take 1 Tablet by mouth daily for 30 days. Qty: 30 Tablet, Refills: 0  Start date: 5/28/2021, End date: 6/27/2021      senna-docusate (PERICOLACE) 8.6-50 mg per tablet Take 1 Tablet by mouth two (2) times a day for 5 days. Qty: 10 Tablet, Refills: 0  Start date: 5/27/2021, End date: 6/1/2021      polyethylene glycol (Miralax) 17 gram packet Take 1 Packet by mouth daily as needed for Constipation for up to 15 days.   Qty: 15 Packet, Refills: 1  Start date: 5/27/2021, End date: 6/11/2021         CONTINUE these medications which have CHANGED    Details   ALPRAZolam (Xanax) 0.5 mg tablet Take 1 Tablet by mouth two (2) times daily as needed for Anxiety for up to 7 days. Max Daily Amount: 1 mg. Qty: 15 Tablet, Refills: 0  Start date: 5/27/2021, End date: 6/3/2021    Associated Diagnoses: Chronic anxiety         CONTINUE these medications which have NOT CHANGED    Details   nortriptyline (PAMELOR) 25 mg capsule Take 1 Cap by mouth nightly. Qty: 30 Cap, Refills: 0    Associated Diagnoses: Neutropenia, unspecified type ([facility] [address] Benzodiazepine dependence ([facility] [address] Multiple sclerosis ([facility] [address] Sleep disorder      gabapentin (NEURONTIN) 600 mg tablet Take 1 Tab by mouth three (3) times daily. Max Daily Amount: 1,800 mg. Qty: 20 Tab, Refills: 0    Associated Diagnoses: MS (multiple sclerosis) ([facility])      DULoxetine (CYMBALTA) 60 mg capsule Take 1 Cap by mouth daily. Indications: neuropathic pain  Qty: 90 Cap, Refills: 5    Associated Diagnoses: Chronic pain syndrome; MS (multiple sclerosis) ([facility] [address] Tobacco abuse      teriflunomide (AUBAGIO) 14 mg tablet Take 1 Tab by mouth daily. Indications: relapsing form of multiple sclerosis  Qty: 90 Tab, Refills: 3    Associated Diagnoses: MS (multiple sclerosis) ([facility])      acetaminophen (TYLENOL) 500 mg tablet Take 500 mg by mouth every four (4) hours as needed for Fever or Pain. nystatin (MYCOSTATIN) topical cream APPLY CREAM TOPICALLY TO AFFECTED AREA TWICE DAILY      melatonin 3 mg tablet Take 15 mg by mouth nightly as needed for Insomnia. Patient Follow Up Instructions:     1. Recommended diet: Cardiac    2. Recommended activity: Activity as tolerated    3. If you experience any of the following symptoms then please call your primary care physician or return to the emergency room if you cannot get hold of your doctor:    4. Wound Care: None    5. Lab work: Cbc and Bmp in 1 week    6. Take scheduled laxatives as below for 5 days and then as needed     7. Bring these papers with you to your follow up appointments.  The papers will help your doctors be sure to continue the care plan from the [facility].        Follow-up Information     Follow up With Specialties Details Why Contact Info    Pedro Herrera MD Family Medicine Schedule an appointment as soon as possible for a visit in 1 week  [address]       Iona Rudolph MD Neurology Schedule an appointment as soon as possible for a visit in 1 week  [address] [facility]  [phone]          ________________________________________________________________    Risk of deterioration: High    Condition at Discharge:  Stable  __________________________________________________________________    Disposition  IPR    ____________________________________________________________________    Code Status: Full Code  ___________________________________________________________________      Total time in minutes spent coordinating this discharge (includes going over instructions, follow-up, prescriptions, and preparing report for sign off to her PCP) :  35  minutes    Signed:  Lois Heck MD

## 2021-05-27 NOTE — DISCHARGE INSTRUCTIONS
Patient Discharge Instructions    Jd Spears / [id number] : 1975    Admitted 2021 Discharged: 2021         DISCHARGE DIAGNOSIS:   Acute exacerbation of multiple sclerosis  Constipation  Microcytic anemia  Fibromyalgia  Neuropathy  Mood disorder  Insomnia       Take Home Medications     {Medication reconciliation information is now added to the patient's AVS automatically when it is printed. There is no need to use this SmartLink in discharge instructions. Highlight this text and delete it to clear this message}      General drug facts      If you have a very bad allergy, wear an allergy ID at all times.  It is important that you take the medication exactly as they are prescribed.  Keep your medication in the bottles provided by the pharmacist.  Génesis Primus a list of all your drugs (prescription, natural products, vitamins, OTC) with you. Give this list to your doctor.  Do not take other medications without consulting your doctor.   Do not share your drugs with others and do not take anyone else's drugs.  Keep all drugs out of the reach of children and pets.   Most drugs may be thrown away in household trash after mixing with coffee grounds or priyanka litter and sealing in a plastic bag.   Keep a list Call your doctor for help with any side effects. If in the [place] you may also call the FDA at 3-611-ISK-3146     Talk with the doctor before starting any new drug, including OTC, natural products, or vitamins. What to do at Home    1. Recommended diet: Cardiac    2. Recommended activity: Activity as tolerated    3. If you experience any of the following symptoms then please call your primary care physician or return to the emergency room if you cannot get hold of your doctor:    4. Wound Care: None    5. Lab work: Cbc and Bmp in 1 week    6. Take scheduled laxatives as below for 5 days and then as needed     7. Bring these papers with you to your follow up appointments. The papers will help your doctors be sure to continue the care plan from the hospital.      If you have questions regarding the hospital related prescriptions or hospital related issues please call [facility] at [phone]. You can always direct your questions to your primary care doctor if you are unable to reach your hospital physician; your PCP works as an extension of your hospital doctor just like your hospital doctor is an extension of your PCP for your time at the hospital [facility])      Follow-up with:   PCP: Celestino Diego MD  Follow-up Information     Follow up With Specialties Details Why Contact Info    Celestino Diego MD Family Medicine Schedule an appointment as soon as possible for a visit in 1 week  [address] [place] [address]      Paz Harman MD Neurology Schedule an appointment as soon as possible for a visit in 1 week  [facility]  [phone]             Please call for your own appointment        Information obtained by :  I understand that if any problems occur once I am at home I am to contact my physician. I understand and acknowledge receipt of the instructions indicated above.                                                                                                                                            Physician's or R.N.'s Signature                                                                  Date/Time                                                                                                                                              Patient or Representative Signature                                                          Date/Time

## 2021-05-27 NOTE — PROGRESS NOTES
Problem: Falls - Risk of  Goal: *Absence of Falls  Description: Document Lay Maple Fall Risk and appropriate interventions in the flowsheet.   Outcome: Progressing Towards Goal  Note: Fall Risk Interventions:  Mobility Interventions: Bed/chair exit alarm         Medication Interventions: Bed/chair exit alarm    Elimination Interventions: Call light in reach, Bed/chair exit alarm    History of Falls Interventions: Bed/chair exit alarm, Room close to nurse's station

## 2021-05-27 NOTE — PROGRESS NOTES
Bedside shift change report given to ALEJANDRO Huntley (oncoming nurse) by Lynda Alcantara RN (offgoing nurse).   Report included the following information         Shift worked:  nights   Shift summary and any significant changes:     HR dropped to 50's, but stabilized when she was awoken.         Concerns for physician to address:     Zone phone for oncoming shift:         Patient Information  Tony Cabral  54 y.o.  5/23/2021 12:47 PM by Real Polo MD. Clair Brooks was admitted from home  Problem List          Patient Active Problem List     Diagnosis Date Noted    Slurred speech 05/23/2021    Weakness 04/01/2021    Multiple sclerosis ([address] 03/31/2021    Non compliance with medical treatment 02/07/2021    Recurrent falls 02/07/2021    Altered mental status 02/06/2021    Leukopenia 11/25/2020    UTI (urinary tract infection) 11/25/2020    Multiple sclerosis exacerbation ([address] 08/06/2020    Facial droop 03/23/2020    Exacerbation of multiple sclerosis ([address] 03/09/2020    Left-sided weakness 03/08/2020    Severe obesity ([address] 10/03/2018    Constipation 07/17/2015    Body aches 12/11/2014    Back pain 09/07/2012    Fibromyalgia 08/17/2012    MS (multiple sclerosis) ([address] 08/17/2012    Decreased hearing 01/31/2011    Acute pharyngitis 01/26/2011    Anxiety 08/25/2010    Blood pressure elevated 08/25/2010    Tobacco abuse 08/25/2010              Past Medical History:   Diagnosis Date    Body aches 12/11/2014    Chronic pain      Depression      Headache(784.0)      History of mammogram never before    MS (multiple sclerosis) ([address]      Neurological disorder       Multiple Sclerosis    Pap smear for cervical cancer screening 2008    Psychiatric disorder       anxiety    Psychotic disorder ([address]           Core Measures:  CVA: N  CHF:N        Activity:  Activity Level: Bed Rest  Number times ambulated in hallways past shift: 0  Number of times OOB to chair past shift:0     Cardiac:   Cardiac Monitoring:Y      Access:   Current line(s): peripheral line        Genitourinary:   Urinary status: voiding/ external catheter  Urinary Catheter? No  GI:  Current diet:  DIET DIABETIC CONSISTENT CARB Regular  DIET ONE TIME MESSAGE  Passing flatus:    Tolerating current diet:Y     Pain Management:   Patient states pain is manageable on current regimen: Y     Skin:  Samy Score: 14  Interventions: turn    Patient Safety:  Fall Score: Total Score: 4  Interventions:bed alarm  High Fall Risk: Yes  @Rollbelt  @dexterity to release roll belt  Yes/No ( must document dexterity  here by stating Yes or No here, otherwise this is a restraint and must follow restraint documentation policy.)     DVT prophylaxis:  DVT prophylaxis Med-  DVT prophylaxis SCD or CONG-     Wounds: (If Applicable)           Active Consults:  IP CONSULT TO NEUROLOGY  IP CONSULT TO HOSPITALIST     Length of Stay:  Expected LOS: 3d 0h  Actual LOS: 1  Discharge Plan: to be determined

## 2021-05-28 ENCOUNTER — PATIENT OUTREACH (OUTPATIENT)
Dept: CASE MANAGEMENT | Age: 46
End: 2021-05-28

## 2021-05-28 NOTE — PROGRESS NOTES
Transition of care outreach postponed for 7 days due to patient's discharge to inpatient rehab facility.   [facility] 5/23-5/27/21 MS exacerbation d/c [facility] f/u 7d

## 2021-06-03 ENCOUNTER — PATIENT OUTREACH (OUTPATIENT)
Dept: CASE MANAGEMENT | Age: 46
End: 2021-06-03

## 2021-06-03 NOTE — PROGRESS NOTES
6/3/21 Patient is s/p discharge from hospital on 5/28/21. Discharged to [facility] for follow up care. Patient currently in [facility] episode of care, Care Management will continue at end of Transitions of Care episode.  DMB

## 2021-06-07 ENCOUNTER — PATIENT OUTREACH (OUTPATIENT)
Dept: CASE MANAGEMENT | Age: 46
End: 2021-06-07

## 2021-06-07 NOTE — PROGRESS NOTES
Transition of care outreach postponed for 14 days due to patient's discharge to SNF.   D[facility] to [facility] 5/27/21 still admitted f/u 14d

## 2021-06-21 ENCOUNTER — TELEPHONE (OUTPATIENT)
Dept: FAMILY MEDICINE CLINIC | Age: 46
End: 2021-06-21

## 2021-06-21 NOTE — TELEPHONE ENCOUNTER
Radames ( phy.ther. ) with [facility] wants Beni Prieto to know they resume physical therapy with patient after she left hospital he would like to get a verbal order for a  he can be reached @ [phone]

## 2021-06-24 ENCOUNTER — PATIENT OUTREACH (OUTPATIENT)
Dept: CASE MANAGEMENT | Age: 46
End: 2021-06-24

## 2021-06-25 NOTE — PROGRESS NOTES
6/25/21 ACM contacted patient who stated she is feeling better. Patient was alert and oriented and answering questions appropriately. ·  Patient reports she does not have medicaid aide assistance at this time  · Patient reports she must move out of her current location by 7/31/21- she and her friend are looking for places to rent but it is cost prohibitive at this time  · Patient stated she is receiving [employer] at this time so she is able to procure food items  · Yaniv Guerrero, friend provides some transportation as needed to medical appointment  · Patient stated she is does not have any family or friends she can move in with  · Patient reports she has made an appointment with [employer] for counseling for next week this is Veritual Visit  Goals      Supportive resources in place to maintain patient in the community (ie. Home Health, DME equipment, refer to, medication assistant plan, etc.)      6/25/21 - Patient is s/p admission to [facility] on 6/11/21-6/17/21 for possible drug overdose. Patient has returned home with care provided by friend Yaniv Guerrero.  Patient reports she has a virtual appointment with [employer] for starting counseling next week( week of 6/28/21)   Patient will contact PCP for follow up appointment s/p hospitalization   Patient has a scheduled appointment with Dr Sheri Quezada, neurologist on 6/29/21 10 am-[place] will provide transportation  Odra 7 contacted Ms Gayle Haley QI  with [address] Medicaid at  to request home health aide services be restarted for patient   Ms Porfirio Ribeiro stated she will refer patient to their Housing Assistance program to assist in getting patient new housing- Ms Porfirio Ribeiro stated she will follow up with patient  [facility] is currently providing services for patient. Patient will continue to seek assistance from [facility]. ACM will monitor patient and follow up in 2 weeks.  JEANNE                6/24/21 ACM s/w [facility] Physical therapist, Kory Gutierrez who reports   Patient is able to stand -walk about ten steps and pivot to be able to get to bathroom  Patient has been able to complete her adls today as needed. Patient does use wheelchair to move around apartment. Mr Cathleen Heimlich reports he has requested  from his agency meet with patient to discuss needed services.  DMB

## 2021-06-29 ENCOUNTER — OFFICE VISIT (OUTPATIENT)
Dept: NEUROLOGY | Age: 46
End: 2021-06-29
Payer: MEDICARE

## 2021-06-29 VITALS
HEIGHT: 64 IN | DIASTOLIC BLOOD PRESSURE: 76 MMHG | BODY MASS INDEX: 38.28 KG/M2 | SYSTOLIC BLOOD PRESSURE: 118 MMHG | HEART RATE: 101 BPM | OXYGEN SATURATION: 98 % | RESPIRATION RATE: 16 BRPM

## 2021-06-29 DIAGNOSIS — Z72.0 TOBACCO ABUSE: ICD-10-CM

## 2021-06-29 DIAGNOSIS — G35 MS (MULTIPLE SCLEROSIS) (HCC): Primary | ICD-10-CM

## 2021-06-29 DIAGNOSIS — R35.0 URINE FREQUENCY: ICD-10-CM

## 2021-06-29 DIAGNOSIS — R26.9 GAIT ABNORMALITY: ICD-10-CM

## 2021-06-29 DIAGNOSIS — F33.2 SEVERE EPISODE OF RECURRENT MAJOR DEPRESSIVE DISORDER, WITHOUT PSYCHOTIC FEATURES (HCC): ICD-10-CM

## 2021-06-29 DIAGNOSIS — G89.4 CHRONIC PAIN SYNDROME: ICD-10-CM

## 2021-06-29 PROCEDURE — 99215 OFFICE O/P EST HI 40 MIN: CPT | Performed by: PSYCHIATRY & NEUROLOGY

## 2021-06-29 PROCEDURE — G8510 SCR DEP NEG, NO PLAN REQD: HCPCS | Performed by: PSYCHIATRY & NEUROLOGY

## 2021-06-29 PROCEDURE — G8427 DOCREV CUR MEDS BY ELIG CLIN: HCPCS | Performed by: PSYCHIATRY & NEUROLOGY

## 2021-06-29 PROCEDURE — G8417 CALC BMI ABV UP PARAM F/U: HCPCS | Performed by: PSYCHIATRY & NEUROLOGY

## 2021-06-29 RX ORDER — FERROUS SULFATE 325(65) MG
325 TABLET, DELAYED RELEASE (ENTERIC COATED) ORAL
Qty: 90 TABLET | Refills: 3 | Status: SHIPPED | OUTPATIENT
Start: 2021-06-29

## 2021-06-29 RX ORDER — FERROUS SULFATE 325(65) MG
325 TABLET, DELAYED RELEASE (ENTERIC COATED) ORAL
Qty: 30 TABLET | Refills: 4 | Status: SHIPPED | OUTPATIENT
Start: 2021-06-29 | End: 2021-06-29

## 2021-06-29 NOTE — PROGRESS NOTES
Follow-up Visit    Name Mary Glover Age 50 y.o. MRN [id number]  1975       Chief Complaint: Pain    Got Sara Batman and Sara Batman vaccination three weeks ago while in at [facility] psych unit after attempted suicide. She is depressed over her condition of being unable to get out of the wheelchair. She took all her pills she had at home. She is discharged on cymbalta, gabapentin, melatonin and nicotine patch. She is compliant with aubagio. No new MS symptoms. About to get evicted from her house. She was able to see her children but still does not saundra custody. I addressed her short term goals and asked her to look at her long term prospects and to adjust to her current situation. Addressed her most recent labs and her anemia. WIll start her on ironsulfate. Assesment and Plan  1. MS (multiple sclerosis) (HCC)  Continue aubagio  - DULoxetine (CYMBALTA) 60 mg capsule; Take 1 Cap by mouth daily. Indications: neuropathic pain  Dispense: 90 Cap; Refill: 5    2. Anxiety  Conitnue cymbalta    3. Current moderate episode of major depressive disorder without prior episode ([facility])  Duloxetine  Recent attempted suicide. 4. Chronic pain syndrome  - DULoxetine    5. Tobacco abuse  Continue nicotine patch    6. Iron deficiency anemia  Continue iron      Allergies  Morphine     Medications  Current Outpatient Medications   Medication Sig    naloxone (NARCAN) 4 mg/actuation nasal spray Use 1 spray intranasally, then discard. Repeat with new spray every 2 min as needed for opioid overdose symptoms, alternating nostrils.  nortriptyline (PAMELOR) 25 mg capsule Take 1 Cap by mouth nightly.  DULoxetine (CYMBALTA) 60 mg capsule Take 1 Cap by mouth daily. Indications: neuropathic pain    teriflunomide (AUBAGIO) 14 mg tablet Take 1 Tab by mouth daily. Indications: relapsing form of multiple sclerosis    acetaminophen (TYLENOL) 500 mg tablet Take 500 mg by mouth every four (4) hours as needed for Fever or Pain.     nystatin (MYCOSTATIN) topical cream APPLY CREAM TOPICALLY TO AFFECTED AREA TWICE DAILY    melatonin 3 mg tablet Take 15 mg by mouth nightly as needed for Insomnia. No current facility-administered medications for this visit. Medical History  Past Medical History:   Diagnosis Date    Body aches 12/11/2014    Chronic pain     Depression     Headache(784.0)     History of mammogram never before    [facility] ([facility]) [facility]     Neurological disorder     Multiple Sclerosis    Pap smear for cervical cancer screening 2008    Psychiatric disorder     anxiety    Psychotic disorder ([facility]        Review of Systems   Eyes: Negative for blurred vision and double vision. Respiratory: Negative for cough and shortness of breath. Cardiovascular: Negative for chest pain, palpitations and orthopnea. Gastrointestinal: Negative for nausea and vomiting. Musculoskeletal: Positive for falls, joint pain and myalgias. Neurological: Negative for dizziness and headaches. Psychiatric/Behavioral: Positive for depression. The patient is nervous/anxious. Exam:  Visit Vitals  /76 (BP 1 Location: Left upper arm, BP Patient Position: Sitting, BP Cuff Size: Adult)   Pulse (!) 101   Resp 16   Ht 5' 4\" (1.626 m)   LMP  (LMP Unknown)   SpO2 98%   BMI 38.28 kg/m²      General: Well developed, well nourished. Head: Normocephalic, atraumatic, anicteric sclera   Neck Normal ROM   Lungs:  Clear to auscultation   Cardiac: Regular rate and rhythm with no murmurs. Abd: Bowel sounds were audible   Ext: No pedal edema   Skin: Supple no rash      NeurologicExam:  Mental Status: Alert and oriented to person place and time   Speech:  Mildly dysarthric no aphasia. Cranial Nerves:  II - XII Intact    Motor:   bilateral upper ext 5/5  Bilateral hip flexion weakness (more one the Left)   Reflexes:   symmetric   Sensory:   diminished sensation on the left . Tremor:   No tremor noted.    Cerebellar:  Coordination intact. Neurovascular: No carotid bruits.  No JVD       Lab Review  Lab Results   Component Value Date/Time    WBC 6.7 05/27/2021 01:19 AM    HCT 29.8 (L) 05/27/2021 01:19 AM    HGB 9.6 (L) 05/27/2021 01:19 AM    PLATELET 348 02/82/4612 01:19 AM       Lab Results   Component Value Date/Time    Sodium 138 05/27/2021 01:19 AM    Potassium 4.1 05/27/2021 01:19 AM    Chloride 108 05/27/2021 01:19 AM    CO2 25 05/27/2021 01:19 AM    Glucose 131 (H) 05/27/2021 01:19 AM    BUN 20 05/27/2021 01:19 AM    Creatinine 0.69 05/27/2021 01:19 AM    Calcium 8.0 (L) 05/27/2021 01:19 AM       Lab Results   Component Value Date/Time    Hemoglobin A1c 4.9 12/16/2020 04:41 PM        Lab Results   Component Value Date/Time    Cholesterol, total 164 02/07/2021 04:36 AM    HDL Cholesterol 77 02/07/2021 04:36 AM    LDL, calculated 80.2 02/07/2021 04:36 AM    VLDL, calculated 6.8 02/07/2021 04:36 AM    Triglyceride 34 02/07/2021 04:36 AM    CHOL/HDL Ratio 2.1 02/07/2021 04:36 AM     45 minutes spent more than 50% spent in chart review and face to face  examination, discussion of treatment options and approach

## 2021-06-29 NOTE — PROGRESS NOTES
PSYCHOTHERAPY NOTE      Burgess Smith is a 53 y.o. female who presents with depression/anxiety. Client was seen for an individual psychotherapy session that lasted for 50 minutes. Progress:  Client reports little change in mood since last session. She remains irritable and upset re: present situation with sons and ex- who has custody. She found out that another woman is now living in her home and this intensified her frustrations. She also reports increase in pain and frustrations re: medication changes (reduction of gabapentin and discontinuance of benzodiazepines). States she is complying with current medications. We processed all of her concerns. Explored ways to promote better communication, especially with sons and ex-. Discussed what was in her control and what was not. Introduced concept of \"wise mind\" and need for client to not make emotional decisions. Also gave her some literature on anger management. Mental Status exam:         Sensorium  oriented to time, place and person   Relations cooperative   Appearance:  age appropriate and casually dressed   Motor Behavior:  gait slow--reports it to be affected by her MS and cool weather   Speech:  normal pitch and normal volume   Thought Process: goal directed and logical   Thought Content free of delusions and free of hallucinations   Suicidal ideations no plan , no intention and contracts for safety   Homicidal ideations no plan , no intention and contracts for safety   Mood:  irritable   Affect:  mood-congruent   Memory recent  adequate   Memory remote:  adequate   Concentration:  adequate   Abstraction:  abstract   Insight:  fair   Reliability fair   Judgment:  fair         DIAGNOSIS AND IMPRESSION:  Axis I: Adjustment Disorder with Mixed Emotional Features; R/O Major Depression.    Axis II: Deferred    Axis III:   Past Medical History:   Diagnosis Date    Body aches 12/11/2014    Chronic pain     Depression     GERD stated (gastroesophageal reflux disease)     Headache(784.0)     History of mammogram never before    MS [facility]) ([facility]     Neurological disorder     Multiple Sclerosis    Pap smear for cervical cancer screening 2008    Psychiatric disorder     anxiety    Psychotic disorder ([facility]      Axis IV: Problems with primary support group, Problems related to social environment, Housing problems, Economic problems, Problems with access to health care services, Problems related to legal system/crime and Other psychosocial or environmental problems  Axis V:  51-60 moderate symptoms    Interventions/plans: Follow up in 2 weeks.

## 2021-06-29 NOTE — PROGRESS NOTES
Chief Complaint   Patient presents with    Follow-up     states that she was in the institution for a weeks right before her shot for covid 6/10

## 2021-07-16 ENCOUNTER — PATIENT OUTREACH (OUTPATIENT)
Dept: CASE MANAGEMENT | Age: 46
End: 2021-07-16

## 2021-07-16 NOTE — PROGRESS NOTES
Goals      Supportive resources in place to maintain patient in the community (ie. Home Health, DME equipment, refer to, medication assistant plan, etc.)      7/16/21 Patient stated\" I am not doing good, I cant walk. \" Patient did say she is able to stand and pivot so she is able to use bathroom. Other wise she is in wheel chair. Patient is currently working on a housing situation. Patient reports her landlord did not renew her lease.  Patient reports she is continuing with home health PT services   Patient stated she attended Mental Health counseling and has appointment next week   Patient attended follow up neurologist appointment as scheduled   Patient reports she and Stephon Caballerolyn, her caregiver, have found a room to rent but want to find an appartment - advised patient to contact Housing Assistance line at [phone] Patient also requested contact information for her Medicaid  so she can follow up on her need for in home Aide services-information provided   Patient will discuss housing situation with her Medicaid  also   Patient reports she is taking medications as prescribed-still has chronic pain  ACM will continue to monitor. ACM follow up in 7-14 days. [facility]    6/25/21 - Patient is s/p admission to [facility] on 6/11/21-6/17/21 for possible drug overdose. Patient has returned home with care provided by friend Stephon Bailey.     Patient reports she has a virtual appointment with [address] [place] [address] for starting counseling next week( week of 6/28/21)   Patient will contact PCP for follow up appointment s/p hospitalization   Patient has a scheduled appointment with Dr Liam Schmid, neurologist on 6/29/21 10 am-[place] will provide transportation  Odra 7 contacted Ms Baltazar Koffi LPN  with [employer] Medicaid at  to request home health aide services be restarted for patient   Ms Gee Londono stated she will refer patient to their Housing Assistance program to assist in getting patient new housing- Ms Yogi Meckel stated she will follow up with patient  [facility] is currently providing services for patient. Patient will continue to seek assistance from [facility]. ACM will monitor patient and follow up in 2 weeks.  DMB

## 2021-07-21 DIAGNOSIS — G35 MS (MULTIPLE SCLEROSIS) (HCC): ICD-10-CM

## 2021-07-27 RX ORDER — GABAPENTIN 600 MG/1
TABLET ORAL
Qty: 90 TABLET | Refills: 2 | Status: SHIPPED | OUTPATIENT
Start: 2021-07-27 | End: 2021-11-08 | Stop reason: SDUPTHER

## 2021-08-18 ENCOUNTER — PATIENT OUTREACH (OUTPATIENT)
Dept: CASE MANAGEMENT | Age: 46
End: 2021-08-18

## 2021-08-19 NOTE — PROGRESS NOTES
8/24/21 AC left message with patient to contact her Medicaid  to follow up on getting aide in home assistance. [facility]  8/18/21-8/20/21 Patient reported she and her friend Makayla Boss have moved to a new address, patient is renting room in a home   Radames with [facility] [address]  provided the following address [address], [facility], [address], Pt with [employer] [facility] stated [facility] is [facility] at this time as patient has reached her baseline for PT   Ms Emperatriz Richards LPN,  with [facility] program, reports she will contact patient to discuss reinstating home care aide and transportation program for patient and to offer referral to Housing Assistance program   Ms Anisa Degroot, [facility] stated patient will need to call [phone] to make application for SNAP, or other services-this phone number will be provided to patient for follow up.  Patient declined Meals on Wheels at this time   AC will continue to monitor and follow up in 2 weeks. [facility]      8/20/21 AC contacted Medicaid casemanager for patient, [facility] left message for call back. Obtained contact information for [facility] Lucretia Martinez services- will provide contact information to patient. [facility]  8/19/21 Ms Kim Gray returned this [facility] phone call. Patient reports she is continuing to live in the one room she and Devante Murphy are currently sharing. Patient is buying all meals prepared. Patient stated she is not getting Meals on Wheels or food stamp assistance at this time. [facility] will attempt to find resource numbers for patient. Medicaid  will also be contacted to assist in providing resources for patient. Call placed to Jamin Torres PT for [facility] and he called back to say the patients address is now [address] [address].  He reports he saw patient for 1 visit at the new address- patient and friend are renting one room and bathroom is large enough for her wheelchair so she can use the shower also. Patient has access to common areas of the home. [facility]    8/19/21 Patient not available to phone at this time, per Mr Dunia Hernández, caregiver, patient is in bathroom and will need call back later.  LEROYB

## 2021-08-26 ENCOUNTER — OFFICE VISIT (OUTPATIENT)
Dept: FAMILY MEDICINE CLINIC | Age: 46
End: 2021-08-26
Payer: MEDICARE

## 2021-08-26 VITALS
HEART RATE: 96 BPM | OXYGEN SATURATION: 97 % | BODY MASS INDEX: 32.71 KG/M2 | WEIGHT: 191.6 LBS | DIASTOLIC BLOOD PRESSURE: 70 MMHG | RESPIRATION RATE: 16 BRPM | HEIGHT: 64 IN | SYSTOLIC BLOOD PRESSURE: 106 MMHG

## 2021-08-26 DIAGNOSIS — F13.20 BENZODIAZEPINE DEPENDENCE (HCC): ICD-10-CM

## 2021-08-26 DIAGNOSIS — G35 MULTIPLE SCLEROSIS (HCC): ICD-10-CM

## 2021-08-26 DIAGNOSIS — D70.9 NEUTROPENIA, UNSPECIFIED TYPE (HCC): ICD-10-CM

## 2021-08-26 DIAGNOSIS — G47.9 SLEEP DISORDER: ICD-10-CM

## 2021-08-26 PROCEDURE — G8417 CALC BMI ABV UP PARAM F/U: HCPCS | Performed by: FAMILY MEDICINE

## 2021-08-26 PROCEDURE — G8510 SCR DEP NEG, NO PLAN REQD: HCPCS | Performed by: FAMILY MEDICINE

## 2021-08-26 PROCEDURE — G8427 DOCREV CUR MEDS BY ELIG CLIN: HCPCS | Performed by: FAMILY MEDICINE

## 2021-08-26 PROCEDURE — 99214 OFFICE O/P EST MOD 30 MIN: CPT | Performed by: FAMILY MEDICINE

## 2021-08-26 RX ORDER — NORTRIPTYLINE HYDROCHLORIDE 25 MG/1
25 CAPSULE ORAL
Qty: 30 CAPSULE | Refills: 2 | Status: SHIPPED | OUTPATIENT
Start: 2021-08-26

## 2021-08-26 NOTE — PATIENT INSTRUCTIONS
Multiple Sclerosis (MS): Care Instructions  Your Care Instructions  Multiple sclerosis, also called MS, is a disease that can affect the brain, spinal cord, and nerves to the eyes. MS can cause problems with muscle control and strength, vision, balance, feeling, and thinking. Whatever your symptoms are, taking medicine correctly and following your doctor's advice for home care can help you maintain your quality of life. Follow-up care is a key part of your treatment and safety. Be sure to make and go to all appointments, and call your doctor if you are having problems. It's also a good idea to know your test results and keep a list of the medicines you take. How can you care for yourself at home? General care  · Take your medicines exactly as prescribed. Call your doctor if you think you are having a problem with your medicine. · Use a cane, walker, or scooter if your doctor suggests it. · Keep doing your normal activities as much as you can. · If you have problems urinating, press or tap your bladder area to help start urine flow. If you have trouble controlling your urine, plan your fluid intake and activities so that a toilet will be available when you need it. · Spend time with family and friends. Join a support group for people with MS if you want extra help. · Depression is common with this condition. Tell your doctor if you have trouble sleeping, are eating too much or are not hungry, or feel sad or tearful all the time. Depression can be treated with medicine and counseling. Diet and exercise  · Eat a balanced diet. · If you have problems swallowing, change how and what you eat:  ? Try thick drinks, such as milk shakes. They are easier to swallow than other fluids. ? Do not eat foods that crumble easily. These can cause choking. ? Use a  to prepare food. Soft foods need less chewing. ? Eat small meals often so that you do not get tired from eating larger meals.   · Get exercise on most days. Work with your doctor to set up a program of walking, swimming, or other exercise that you are able to do. A physical therapist can teach you exercises if you cannot walk but can move your limbs and trunk. Or you can do exercises to help with coordination and balance. You can help improve muscle stiffness by doing exercises while lying in certain positions. When should you call for help? Call your doctor now or seek immediate medical care if:    · You have a change in symptoms.     · You fall or have another injury.     · You have symptoms of a urinary infection. For example:  ? You have blood or pus in your urine. ? You have pain in your back just below your rib cage. This is called flank pain. ? You have a fever, chills, or body aches. ? It hurts to urinate. ? You have groin or belly pain. Watch closely for changes in your health, and be sure to contact your doctor if:    · You want more information about MS or medicines.     · You have questions about alternative treatments. Do not use any other treatments without talking to your doctor first.   Where can you learn more? Go to http://www.gray.com/  Enter W589 in the search box to learn more about \"Multiple Sclerosis (MS): Care Instructions. \"  Current as of: August 4, 2020               Content Version: 12.8  © 2006-2021 Healthwise, [employer]. Care instructions adapted under license by [employer] (which disclaims liability or warranty for this information). If you have questions about a medical condition or this instruction, always ask your healthcare professional. [place] [address] any warranty or liability for your use of this information.

## 2021-08-26 NOTE — PROGRESS NOTES
HISTORY OF PRESENT ILLNESS  Hany Pack is a 50 y.o. female. Sent to ER not admitted, states that she passed out but had a nl work up  Patient currently with mobility limitation secondary to the disabling MS, currently patient is wheelchair dependent patient, unable to accomplish activities of daily living   patient mobility limitation cannot be resolved with cane or walker patient has adequate space at home to maneuver wheelchair so that patient activity of daily living can be improved   patient is able to use manual wheelchair secondary to physical disability in addition patient has caregiver able to provide assistance with a wheelchair at home, but the current one  broken hand and brake system. Unfortunately  with tobacco abuse stating that the pt is all stressed out, the tobacco use helps it, took meds for it, no s no h ideation, the pt has slowed down but not ready to quit at this time unable to afford the medication the pt likes to do it on its own, pt has been offered alternatives but states not yet,       Current Outpatient Medications   Medication Sig Dispense Refill    gabapentin (NEURONTIN) 600 mg tablet TAKE 1 TABLET BY MOUTH THREE TIMES DAILY . DO NOT EXCEED  1800MG  PER  DAY 90 Tablet 2    nortriptyline (PAMELOR) 25 mg capsule Take 1 Cap by mouth nightly. 30 Cap 0    acetaminophen (TYLENOL) 500 mg tablet Take 500 mg by mouth every four (4) hours as needed for Fever or Pain.  melatonin 3 mg tablet Take 15 mg by mouth nightly as needed for Insomnia.  ferrous sulfate (IRON) 325 mg (65 mg iron) EC tablet Take 1 Tablet by mouth three (3) times daily (with meals). (Patient not taking: Reported on 8/26/2021) 90 Tablet 3    naloxone (NARCAN) 4 mg/actuation nasal spray Use 1 spray intranasally, then discard. Repeat with new spray every 2 min as needed for opioid overdose symptoms, alternating nostrils.  (Patient not taking: Reported on 8/26/2021) 1 Each 1    DULoxetine (CYMBALTA) 60 mg capsule Take 1 Cap by mouth daily. Indications: neuropathic pain (Patient not taking: Reported on 8/26/2021) 90 Cap 5    teriflunomide (AUBAGIO) 14 mg tablet Take 1 Tab by mouth daily. Indications: relapsing form of multiple sclerosis (Patient not taking: Reported on 8/26/2021) 90 Tab 3    nystatin (MYCOSTATIN) topical cream two (2) times daily as needed. Allergies   Allergen Reactions    Morphine Rash     Past Medical History:   Diagnosis Date    Body aches 12/11/2014    Chronic pain     Depression     Headache(784.0)     History of mammogram never before    [facility]     Neurological disorder     Multiple Sclerosis    Pap smear for cervical cancer screening 2008    Psychiatric disorder     anxiety    Psychotic disorder [facility])      Past Surgical History:   Procedure Laterality Date    COLONOSCOPY N/A 2/28/2018    COLONOSCOPY performed by Sonja Kinney MD at [facility] ENDOSCOPY    HX CHOLECYSTECTOMY      HX GYN      3 c-sections    HX HEENT      bilateral ear tube surgery    HX HERNIA REPAIR      HX OTHER SURGICAL      R inguinal hernia repair     Family History   Problem Relation Age of Onset    Heart Attack Father         tripple bypass    Cancer Father         lung    COPD Father     Diabetes Mother         type 2    Heart Disease Mother         heart disease    Diabetes Paternal Grandmother     No Known Problems Sister     No Known Problems Brother     No Known Problems Child      Social History     Tobacco Use    Smoking status: Current Every Day Smoker     Packs/day: 0.50     Years: 17.00     Pack years: 8.50     Types: Cigarettes    Smokeless tobacco: Never Used    Tobacco comment: 1 pack every 3 days.    Substance Use Topics    Alcohol use: No      Lab Results   Component Value Date/Time    WBC 6.7 05/27/2021 01:19 AM    HGB 9.6 (L) 05/27/2021 01:19 AM    HCT 29.8 (L) 05/27/2021 01:19 AM    PLATELET 489 80/79/6059 01:19 AM    MCV 99.3 (H) 05/27/2021 01:19 AM     Lab Results   Component Value Date/Time    Cholesterol, total 164 02/07/2021 04:36 AM    HDL Cholesterol 77 02/07/2021 04:36 AM    LDL, calculated 80.2 02/07/2021 04:36 AM    Triglyceride 34 02/07/2021 04:36 AM    CHOL/HDL Ratio 2.1 02/07/2021 04:36 AM     Lab Results   Component Value Date/Time    ALT (SGPT) 14 05/23/2021 02:11 PM    Alk. phosphatase 67 05/23/2021 02:11 PM    Bilirubin, direct 0.1 08/07/2020 03:45 AM    Bilirubin, total 0.3 05/23/2021 02:11 PM    Albumin 3.5 05/23/2021 02:11 PM    Protein, total 6.5 05/23/2021 02:11 PM    Ammonia 20 03/25/2020 09:49 AM    INR 1.1 03/31/2021 01:52 PM    Prothrombin time 11.2 (H) 03/31/2021 01:52 PM    PLATELET 078 96/84/3607 01:19 AM    Hepatitis B surface Ag <0.10 03/25/2020 09:49 AM        Review of Systems   Constitutional: Negative for chills and fever. HENT: Negative for congestion and nosebleeds. Eyes: Negative for blurred vision and pain. Respiratory: Negative for cough, shortness of breath and wheezing. Cardiovascular: Negative for chest pain and leg swelling. Gastrointestinal: Negative for constipation, diarrhea, nausea and vomiting. Genitourinary: Negative for dysuria and frequency. Musculoskeletal: Negative for joint pain and myalgias. Skin: Negative for itching and rash. Neurological: Negative for dizziness, loss of consciousness and headaches. Psychiatric/Behavioral: Negative for depression. The patient is not nervous/anxious and does not have insomnia. Physical Exam  Vitals and nursing note reviewed. Constitutional:       Appearance: She is well-developed. HENT:      Head: Normocephalic and atraumatic. Eyes:      Conjunctiva/sclera: Conjunctivae normal.      Pupils: Pupils are equal, round, and reactive to light. Neck:      Thyroid: No thyromegaly. Vascular: No JVD. Cardiovascular:      Rate and Rhythm: Normal rate and regular rhythm. Heart sounds: Normal heart sounds. No murmur heard.    No friction rub. No gallop. Pulmonary:      Effort: Pulmonary effort is normal. No respiratory distress. Breath sounds: Normal breath sounds. No stridor. No wheezing or rales. Abdominal:      General: Bowel sounds are normal. There is no distension. Palpations: Abdomen is soft. There is no mass. Tenderness: There is no abdominal tenderness. Musculoskeletal:         General: No tenderness. Normal range of motion. Lymphadenopathy:      Cervical: No cervical adenopathy. Skin:     Findings: No erythema or rash. Neurological:      Mental Status: She is alert and oriented to person, place, and time. Cranial Nerves: No cranial nerve deficit. Deep Tendon Reflexes: Reflexes are normal and symmetric. Psychiatric:         Behavior: Behavior normal.         ASSESSMENT and PLAN  Diagnoses and all orders for this visit:    1. Neutropenia, unspecified type ([address]  -     nortriptyline (PAMELOR) 25 mg capsule; Take 1 Capsule by mouth nightly. -     AMB SUPPLY ORDER    2. Benzodiazepine dependence (HCC)  -     nortriptyline (PAMELOR) 25 mg capsule; Take 1 Capsule by mouth nightly. -     AMB SUPPLY ORDER    3. Multiple sclerosis (HCC)  -     nortriptyline (PAMELOR) 25 mg capsule; Take 1 Capsule by mouth nightly. -     AMB SUPPLY ORDER    4. Sleep disorder  -     nortriptyline (PAMELOR) 25 mg capsule; Take 1 Capsule by mouth nightly.   -     AMB SUPPLY ORDER    the form and application for the new WC would be started today    Concern gomez COVID-19 addressed and detailed, pt was told that the best way to prevent illness is by protection with vaccination, and to Wear a facemask , having social distance, and to get tested if possible,   Pt was also told if develop dyspnea needs to call 911 or go to er, call for furhter advise, pt agreed with todays recommendations,

## 2021-08-26 NOTE — PROGRESS NOTES
Chief Complaint   Patient presents with   [facility] ED Follow-up     passed out     1. Have you been to the ER, urgent care clinic since your last visit? Hospitalized since your last visit? Yes When: 8/15/21 Where: [facility] ([place]) Reason for visit: syncope    2. Have you seen or consulted any other health care providers outside of the [address] [place] [address] since your last visit? Include any pap smears or colon screening. No    verified patient with two type of identifiers. boyfriend present , requesting new wheelchair,- arm broken and wheels will not lock on current chair.   Also c/o increasing anxiety

## 2021-10-08 ENCOUNTER — DOCUMENTATION ONLY (OUTPATIENT)
Dept: NEUROLOGY | Age: 46
End: 2021-10-08

## 2021-10-21 ENCOUNTER — TELEPHONE (OUTPATIENT)
Dept: NEUROLOGY | Age: 46
End: 2021-10-21

## 2021-10-21 NOTE — TELEPHONE ENCOUNTER
----- Message from Asim Culp sent at 10/21/2021 10:21 AM EDT -----  Regarding: Dr. Zak Jeter: [phone]  Medication Refill    Caller (if not patient): n/a      Relationship of caller (if not patient): n/a      Best contact number(s): [phone]      Name of medication and dosage if known: gabapentin (NEURONTIN) 600 mg tablet       Is patient out of this medication (yes/no): no       [facility] [address]    Pharmacy listed in chart? (yes/no): no  Pharmacy phone number: [phone]      Details to clarify the request: pt is out of town and has asked to have rx refilled at location above.       Asim Culp

## 2021-11-02 ENCOUNTER — PATIENT OUTREACH (OUTPATIENT)
Dept: CASE MANAGEMENT | Age: 46
End: 2021-11-02

## 2021-11-05 NOTE — PROGRESS NOTES
11/5/21 ACM attempted to reach patient at numbers provided- left message with patient for call back. Unable to reach patient at this time will follow up in 5-7 days.  LEROYB

## 2021-11-08 ENCOUNTER — TELEPHONE (OUTPATIENT)
Dept: NEUROLOGY | Age: 46
End: 2021-11-08

## 2021-11-08 DIAGNOSIS — G35 MS (MULTIPLE SCLEROSIS) (HCC): ICD-10-CM

## 2021-11-08 RX ORDER — GABAPENTIN 600 MG/1
TABLET ORAL
Qty: 90 TABLET | Refills: 2 | Status: SHIPPED | OUTPATIENT
Start: 2021-11-08 | End: 2021-11-09 | Stop reason: SDUPTHER

## 2021-11-08 NOTE — TELEPHONE ENCOUNTER
Received call from pt, she is now requesting her Gabapentin rx be sent to the [facility] in [place].

## 2021-11-08 NOTE — TELEPHONE ENCOUNTER
Patient called through on the back line. She stated she was in a lot of pain all over. During the call her boyfriend took over the phone call. He stated she has been out of her Gabapentin medication since last week. Notified that Dr. Viral Guerrero sent the medication to the [facility] [address]. He stated that is the wrong location and needs to be sent to     [address] [place] [address]    He also stated she is having speech difficulty that is normally associated to when she has pain. No signs of facial drooping or weakness. Notified I will send the refill request to Dr. Viral Guerrero so he can update the order. Stated he is in the hospital doing rounds.

## 2021-11-09 ENCOUNTER — TELEPHONE (OUTPATIENT)
Dept: NEUROLOGY | Age: 46
End: 2021-11-09

## 2021-11-09 DIAGNOSIS — G35 MS (MULTIPLE SCLEROSIS) (HCC): ICD-10-CM

## 2021-11-09 RX ORDER — GABAPENTIN 600 MG/1
TABLET ORAL
Qty: 90 TABLET | Refills: 2 | Status: SHIPPED | OUTPATIENT
Start: 2021-11-09 | End: 2022-02-08 | Stop reason: SDUPTHER

## 2021-11-09 NOTE — TELEPHONE ENCOUNTER
Called and notified that medication was sent to the correct pharmacy and they are able to  that medication. Called [facility] on [facility] [facility] and canceled Gabapentin prescription as the medication has been sent to the correct pharmacy.

## 2021-11-09 NOTE — TELEPHONE ENCOUNTER
----- Message from Rebeka Silva sent at 11/9/2021 11:38 AM EST -----  Regarding: Dr. Ginny Fink: [phone]  General Message/Vendor Calls    Caller's first and last name: Emiliano Muse PT's Boyfriend's father       Reason for call: Request to send Gabapentin be sent to [facility] on [address] [place] [address] in [place], [place] ( Phone: [phone])      Callback required yes/no and why: yes/confirm sent       Best contact number(s): [phone]      Details to clarify the request: PT states prescription was sent to [facility] [place]. PT is in a great deal of pain and asking that medication be transferred as soon as possible.        Rebeka Silva

## 2021-11-30 ENCOUNTER — PATIENT OUTREACH (OUTPATIENT)
Dept: CASE MANAGEMENT | Age: 46
End: 2021-11-30

## 2021-11-30 NOTE — PROGRESS NOTES
Patient has graduated from the Complex Case Management  program on 12/1/21. Patient/family has the ability to self-manage at this time. Care management goals have been completed. No further Ambulatory Care Manager follow up scheduled. Goals Addressed                 This Visit's Progress     Supportive resources in place to maintain patient in the community (ie. Home Health, DME equipment, refer to, medication assistant plan, etc.)        12/1/21 ACM spoke with Wilton Galaviz, primary caregiver for patient. Patient gave verbal permission for AC to speak with Mr Malcolm Grossman. Per Mr Malcolm Grossman, he and the patient are visiting his father near [facility]. Mr Malcolm Grossman stated he and patient continue to live at [address] [place] [address] in [place] [address]. Mr Malcolm Grossman stated the patient's wheel chair is broken and patient needs a new one. Mr Malcolm Grossman stated he will call Dr Janel Sol and request an order for a new wheel chair be submitted to Medicare/Medicaid for possible payment. Mr Malcolm Grossman stated he and patient are doing well. AC informed Patient through voicemail and Mr Malcolm Grossman on this phone call that this episode of care will be resolved and if patient needs further [address] will need request from PCP. [facility]    11/5/21 ACM attempted to reach patient at numbers provided- left message with patient for call back. Unable to reach patient at this time will follow up in 5-7 days.  [facility]    8/18/21-8/20/21 Patient reported she and her friend Adrianna Castaneda have moved to a new address, patient is renting room in a home   Olya Tate with "[employer] [address] [place] [address] Mehrdad Villegas  provided the following address [address], [facility], [address] [facility] Korey Dietrich Early, Pt with "[employer] [facility] stated patient is no longer receiving [facility] services at this time as patient has reached her baseline for PT   Ms Loreto Kim LPN,  with [facility] program, reports she will contact patient to discuss reinstating home care aide and transportation program for patient and to offer referral to Housing Assistance program   Ms Smiley Diallo, [address] stated patient will need to call [phone] to make application for SNAP, or other services-this phone number will be provided to patient for follow up.  Patient declined Meals on Wheels at this time   ACM will continue to monitor and follow up in 2 weeks. [facility]    7/16/21 Patient stated\" I am not doing good, I cant walk. \" Patient did say she is able to stand and pivot so she is able to use bathroom. Other wise she is in wheel chair. Patient is currently working on a housing situation. Patient reports her landlord did not renew her lease.  Patient reports she is continuing with home health PT services   Patient stated she attended Mental Health counseling and has appointment next week   Patient attended follow up neurologist appointment as scheduled   Patient reports she and Maddie Monroe, her caregiver, have found a room to rent but want to find an appartment - advised patient to contact Housing Assistance line at [phone] Patient also requested contact information for her Medicaid  so she can follow up on her need for in home Aide services-information provided   Patient will discuss housing situation with her Medicaid  also   Patient reports she is taking medications as prescribed-still has chronic pain  ACM will continue to monitor. ACM follow up in 7-14 days. [facility]    6/25/21 - Patient is s/p [facility] on 6/11/21-6/17/21 for possible drug overdose. Patient has returned home with care provided by friend Maddie Monroe.     Patient reports she has a virtual appointment with [address] [place] [address] for starting counseling next week( week of 6/28/21)   Patient will contact PCP for follow up appointment s/p hospitalization   Patient has a scheduled appointment with Dr Janna Flores, neurologist on 6/29/21 10 am-[place] will provide transportation  [address] contacted Ms Jenifer Cogan QI  with [facility] at  to request home health aide services be restarted for patient   Ms Jesenia Gómez stated she will refer patient to their Housing Assistance program to assist in getting patient new housing- Ms Jesenia Gómez stated she will follow up with patient  [facility] is currently providing services for patient. Patient will continue to seek assistance from [facility]. ACM will monitor patient and follow up in 2 weeks. [facility]              Patient has Ambulatory Care Manager's contact information for any further questions, concerns, or needs. Patients upcoming visits:    Future Appointments   Date Time Provider Naveen Martins   4/7/2022  8:00 AM YENIFER Nicholas BS AMB      11/30/21 ACM attempted to reach patient at numbers provided- left messages for patient call back.  LEROYB

## 2022-02-08 DIAGNOSIS — G35 MS (MULTIPLE SCLEROSIS) (HCC): ICD-10-CM

## 2022-02-08 NOTE — TELEPHONE ENCOUNTER
Pt called for refill of:    Requested Prescriptions     Pending Prescriptions Disp Refills    gabapentin (NEURONTIN) 600 mg tablet 90 Tablet 2     Sig: TAKE 1 TABLET BY MOUTH THREE TIMES DAILY .  DO NOT EXCEED  1800MG  PER  DAY     Please send to [facility] in [place]

## 2022-02-09 RX ORDER — GABAPENTIN 600 MG/1
TABLET ORAL
Qty: 90 TABLET | Refills: 2 | Status: SHIPPED | OUTPATIENT
Start: 2022-02-09 | End: 2022-05-16 | Stop reason: SDUPTHER

## 2022-05-16 DIAGNOSIS — G35 MS (MULTIPLE SCLEROSIS) (HCC): ICD-10-CM

## 2022-05-16 NOTE — TELEPHONE ENCOUNTER
Suraj Power called for refill of:    Requested Prescriptions     Pending Prescriptions Disp Refills    gabapentin (NEURONTIN) 600 mg tablet 90 Tablet 2     Sig: TAKE 1 TABLET BY MOUTH THREE TIMES DAILY .  DO NOT EXCEED  1800MG  PER  DAY     Please send to [facility] in [place]    LOV: 4/7/2022  NOV: Visit date not found

## 2022-05-17 RX ORDER — GABAPENTIN 600 MG/1
TABLET ORAL
Qty: 90 TABLET | Refills: 2 | Status: SHIPPED | OUTPATIENT
Start: 2022-05-17

## 2022-05-17 NOTE — TELEPHONE ENCOUNTER
Actually last OV was 6/29/2021  She has an appointment with Dr. Elaine Lomax on 5/26/22  Please send or deny if warranted

## 2022-06-08 ENCOUNTER — TELEPHONE (OUTPATIENT)
Dept: NEUROLOGY | Age: 47
End: 2022-06-08

## 2022-06-08 NOTE — TELEPHONE ENCOUNTER
Re: MS medication     Is patient currently on any MS medication? In a report from [facility], I see this patient's name on it - and also noticed [employer] was used at one time - that auth  21. Please advise.

## 2022-06-09 NOTE — TELEPHONE ENCOUNTER
Is the patient calling about her medication?   Unclear why you are tracking down the prior authorization for the patient's medications please help me understand what is going on from your perspective

## 2022-06-15 ENCOUNTER — TELEPHONE (OUTPATIENT)
Dept: NEUROLOGY | Age: 47
End: 2022-06-15

## 2022-06-16 NOTE — TELEPHONE ENCOUNTER
Attempting to confirm which MS medication she is currently taking. I notified provider to verify [employer] as there is reference to it and [address] in  and the [employer]  21      There are also notes taken from our front office to clarify if pt is on [address].

## 2022-06-17 NOTE — TELEPHONE ENCOUNTER
lvm for patient to return our call and ask that the patient let us know where she  is being managed for MS.

## 2022-07-05 ENCOUNTER — TELEPHONE (OUTPATIENT)
Dept: NEUROLOGY | Age: 47
End: 2022-07-05

## 2023-05-03 NOTE — PROGRESS NOTES
Addended by: SURENDRA GOMEZ on: 5/3/2023 03:51 PM     Modules accepted: Orders     End of Shift Note    Bedside shift change report given to Bertha Harp RN  (oncoming nurse) by Lizeth Paige RN (offgoing nurse). Report included the following information     Shift worked:  7p-7a   Shift summary and any significant changes:            Concerns for physician to address:     Zone phone for oncoming shift:   [phone]     Patient Information  Delio Reynoso  50 y.o.  3/31/2021 12:09 PM by Lori Agrawal MD. Delio Reynoso was admitted from     Problem List  Patient Active Problem List    Diagnosis Date Noted    Multiple sclerosis ([address] 03/31/2021    Non compliance with medical treatment 02/07/2021    Recurrent falls 02/07/2021    Altered mental status 02/06/2021    Leukopenia 11/25/2020    UTI (urinary tract infection) 11/25/2020    Multiple sclerosis exacerbation ([address] 08/06/2020    Facial droop 03/23/2020    Exacerbation of multiple sclerosis ([address] 03/09/2020    Left-sided weakness 03/08/2020    Severe obesity ([address] 10/03/2018    Constipation 07/17/2015    Body aches 12/11/2014    Back pain 09/07/2012    Fibromyalgia 08/17/2012    MS (multiple sclerosis) ([address] 08/17/2012    Decreased hearing 01/31/2011    Acute pharyngitis 01/26/2011    Anxiety 08/25/2010    Blood pressure elevated 08/25/2010    Tobacco abuse 08/25/2010     Past Medical History:   Diagnosis Date    Body aches 12/11/2014    Chronic pain     Depression     Headache(784.0)     History of mammogram never before    MS (multiple sclerosis) ([address]     Neurological disorder     Multiple Sclerosis    Pap smear for cervical cancer screening 2008    Psychiatric disorder     anxiety    Psychotic disorder ([address]        Core Measures:  CVA: No  CHF: No  PNA: No    Activity:  Activity Level:  Up with Assistance  Number times ambulated in hallways past shift: 0  Number of times OOB to chair past shift: 0  Cardiac:   Cardiac Monitoring: Yes     Cardiac Rhythm: Normal sinus rhythm    Access:   Current line(s): 20 LAC  Central Line? No  PICC LINE? No    Genitourinary:   Urinary status: Continent  Urinary Catheter? No    Respiratory:   O2 Device: Room air  Chronic home O2 use?: No  Incentive spirometer at bedside: No     GI:   Current diet:  DIET DYSPHAGIA PUREED (NDD1)  No Fluids on Tray  Passing flatus: No  Tolerating current diet: No       Pain Management:   Patient states pain is manageable on current regimen: Yes    Skin:  Samy Score: 14  Interventions: turning, repositioning, dry    Patient Safety:  Fall Score:  Total Score: 4  Interventions: bed low, wheels locked, alarm on, call bell in reach, 3x sailrails  High Fall Risk: Yes    DVT prophylaxis:  DVT prophylaxis Med- Lovenox  DVT prophylaxis SCD or CONG- No    Wounds: (If Applicable)  Wounds- red back and heels, excoriation under folds    Active Consults:  IP CONSULT TO NEUROLOGY    Length of Stay:  Expected LOS: - - -  Actual LOS: 1  Discharge Plan:

## 2024-10-14 NOTE — TELEPHONE ENCOUNTER
Advised to the patient the script was escribed to [facility] on 09/25/18; needs to contact [facility] to see if it's ready to be picked up What Type Of Note Output Would You Prefer (Optional)?: Standard Output How Severe Is Your Acne?: moderate Is This A New Presentation, Or A Follow-Up?: Acne Yes

## 2025-02-18 NOTE — TELEPHONE ENCOUNTER
Left message for patient to call back. Left message to reschedule visit on 3/4/25 with Dr. Grant due to clinical necessity. Appointment cancelled by scheduling.

## (undated) DEVICE — Z DISCONTINUED PER MEDLINE LINE GAS SAMPLING O2/CO2 LNG AD 13 FT NSL W/ TBNG FILTERLINE

## (undated) DEVICE — SYR 3ML LL TIP 1/10ML GRAD --

## (undated) DEVICE — 1200 GUARD II KIT W/5MM TUBE W/O VAC TUBE: Brand: GUARDIAN

## (undated) DEVICE — KENDALL RADIOLUCENT FOAM MONITORING ELECTRODE RECTANGULAR SHAPE: Brand: KENDALL

## (undated) DEVICE — BASIN EMSIS 16OZ GRAPHITE PLAS KID SHP MOLD GRAD FOR ORAL

## (undated) DEVICE — SET ADMIN 16ML TBNG L100IN 2 Y INJ SITE IV PIGGY BK DISP

## (undated) DEVICE — Device

## (undated) DEVICE — TOWEL 4 PLY TISS 19X30 SUE WHT

## (undated) DEVICE — NEEDLE HYPO 18GA L1.5IN PNK S STL HUB POLYPR SHLD REG BVL

## (undated) DEVICE — SYR 10ML LUER LOK 1/5ML GRAD --

## (undated) DEVICE — CATH IV AUTOGRD BC YEL 24GA 19 -- INSYTE

## (undated) DEVICE — SOLIDIFIER MEDC 1200ML -- CONVERT TO 356117

## (undated) DEVICE — NEONATAL-ADULT SPO2 SENSOR: Brand: NELLCOR